# Patient Record
Sex: FEMALE | Race: WHITE | Employment: OTHER | URBAN - METROPOLITAN AREA
[De-identification: names, ages, dates, MRNs, and addresses within clinical notes are randomized per-mention and may not be internally consistent; named-entity substitution may affect disease eponyms.]

---

## 2017-01-03 ENCOUNTER — TELEPHONE (OUTPATIENT)
Dept: INTERNAL MEDICINE CLINIC | Age: 67
End: 2017-01-03

## 2017-01-03 ENCOUNTER — TELEPHONE (OUTPATIENT)
Dept: NEUROLOGY | Age: 67
End: 2017-01-03

## 2017-01-03 RX ORDER — PREGABALIN 75 MG/1
75 CAPSULE ORAL 2 TIMES DAILY
Qty: 60 CAP | Refills: 3
Start: 2017-01-03 | End: 2017-03-29 | Stop reason: SDUPTHER

## 2017-01-03 NOTE — TELEPHONE ENCOUNTER
Pt states she has an appt tomorrow, 1-4-16 but has a migraine. She would like to know if you could get her in any sooner than availability of 1-17-17?

## 2017-01-04 ENCOUNTER — DOCUMENTATION ONLY (OUTPATIENT)
Dept: INTERNAL MEDICINE CLINIC | Age: 67
End: 2017-01-04

## 2017-01-04 NOTE — PROGRESS NOTES
Medicare Part B Preventive Services Limitations Recommendation/Date completed if known Scheduled/ Next Due   Bone Mass Measurement  (age 72 & older, biennial) Requires diagnosis related to osteoporosis or estrogen deficiency. Biennial benefit unless patient has history of long-term glucocorticoid tx or baseline is needed because initial test was by other method       Recommended every 2 years    Cardiovascular Screening Blood Tests (every 5 years)  Total cholesterol, HDL, Triglycerides Order as a panel if possible Completed August 2016      Recommended annually Due August 2017   Colorectal Cancer Screening  -Fecal occult blood test (annual)  -Flexible sigmoidoscopy (5y)  -Screening colonoscopy (10y)  -Barium Enema  Completed June 2015 Dr. Deangelo Hull        Recommended in 2  years Due June 2017   Counseling to Prevent Tobacco Use (up to 8 sessions per year)  - Counseling greater than 3 and up to 10 minutes  - Counseling greater than 10 minutes Patients must be asymptomatic of tobacco-related conditions to receive as preventive service n/a n/a   Diabetes Screening Tests (at least every 3 years, Medicare covers annually or at 6-month intervals for prediabetic patients)    Fasting blood sugar (FBS) or glucose tolerance test (GTT)       Patient must be diagnosed with one of the following:  -Hypertension, Dyslipidemia, obesity, previous impaired FBS or GTT  Or any two of the following: overweight, FH of diabetes, age ? 72, history of gestational diabetes, birth of baby weighing more than 9 pounds   Completed A1c August 2016 - 5.8        Recommended every 3-6 months for prediabetes/diabetes Due now-Feb 2017   Diabetes Self-Management Training (DSMT) (no USPSTF recommendation) Requires referral by treating physician for patient with diabetes or renal disease. 10 hours of initial DSMT session of no less than 30 minutes each in a continuous 12-month period. 2 hours of follow-up DSMT in subsequent years.  n/a n/a   Glaucoma Screening (no USPSTF recommendation) Diabetes mellitus, family history, , age 48 or over,  American, age 72 or over         Recommended annually    Human Immunodeficiency Virus (HIV) Screening (annually for increased risk patients)  HIV-1 and HIV-2 by EIA, LAURA, rapid antibody test, or oral mucosa transudate Patient must be at increased risk for HIV infection per USPSTF guidelines or pregnant. Tests covered annually for patients at increased risk. Pregnant patients may receive up to 3 test during pregnancy. Medical Nutrition Therapy (MNT) (for diabetes or renal disease not recommended schedule) Requires referral by treating physician for patient with diabetes or renal disease. Can be provided in same year as diabetes self-management training (DSMT), and CMS recommends medical nutrition therapy take place after DSMT. Up to 3 hours for initial year and 2 hours in subsequent years. n/a n/a   Prostate Cancer Screening (annually up to age 76)  - Digital rectal exam (SUSANNA)  - Prostate specific antigen (PSA) Annually (age 48 or over), SUSANNA not paid separately when covered E/M service is provided on same date n/a        Recommended annually n/a   Seasonal Influenza Vaccination (annually)         Recommended annually    DUE every Fall   Pneumococcal Vaccination (once after 72)  Completed 2012    Recommended once over age 72 You are due for a Prevnar 13 vaccine. You can get this at your local pharmacy   Shingles Vaccine is also recommended once after age 61      Hepatitis B Vaccinations (if medium/high risk) Medium/high risk factors:  End-stage renal disease,  Hemophiliacs who received Factor VIII or IX concentrates, Clients of institutions for the mentally retarded, Persons who live in the same house as a HepB virus carrier, Homosexual men, Illicit injectable drug abusers. Screening Mammography (biennial age 54-69)?  Annually (age 36 or over) Completed 2009    Recommended annually Due now Screening Pap Tests and Pelvic Examination (up to age 79 and after 79 if unknown history or abnormal study last 10 years) Every 24 months except high risk   Dr. Florin Shanks? Recommended every 2 years    Ultrasound Screening for Abdominal Aortic Aneurysm (AAA) (once) Patient must be referred through [facility] and not have had a screening for abdominal aortic aneurysm before under Medicare.   Limited to patients who meet one of the following criteria:  - Men who are 73-68 years old and have smoked more than 100 cigarettes in their lifetime.  -Anyone with a FH of AAA  -Anyone recommended for screening by USPSTF n/a n/a

## 2017-01-04 NOTE — TELEPHONE ENCOUNTER
MD Kathi Deng        Caller: Unspecified (Yesterday, 12:10 PM)                     Phoned it in to [facility] at   [address]

## 2017-01-05 ENCOUNTER — OFFICE VISIT (OUTPATIENT)
Dept: INTERNAL MEDICINE CLINIC | Age: 67
End: 2017-01-05

## 2017-01-05 VITALS
WEIGHT: 229 LBS | OXYGEN SATURATION: 94 % | TEMPERATURE: 97.8 F | HEART RATE: 81 BPM | DIASTOLIC BLOOD PRESSURE: 74 MMHG | BODY MASS INDEX: 36.8 KG/M2 | SYSTOLIC BLOOD PRESSURE: 121 MMHG | HEIGHT: 66 IN | RESPIRATION RATE: 18 BRPM

## 2017-01-05 DIAGNOSIS — G43.809 OTHER MIGRAINE WITHOUT STATUS MIGRAINOSUS, NOT INTRACTABLE: ICD-10-CM

## 2017-01-05 DIAGNOSIS — Z13.820 SCREENING FOR OSTEOPOROSIS: ICD-10-CM

## 2017-01-05 DIAGNOSIS — Z00.00 ROUTINE GENERAL MEDICAL EXAMINATION AT A HEALTH CARE FACILITY: ICD-10-CM

## 2017-01-05 DIAGNOSIS — M54.50 BILATERAL LOW BACK PAIN WITHOUT SCIATICA, UNSPECIFIED CHRONICITY: Primary | ICD-10-CM

## 2017-01-05 DIAGNOSIS — Z13.39 SCREENING FOR ALCOHOLISM: ICD-10-CM

## 2017-01-05 DIAGNOSIS — Z78.0 ASYMPTOMATIC MENOPAUSAL STATE: ICD-10-CM

## 2017-01-05 DIAGNOSIS — Z13.31 SCREENING FOR DEPRESSION: ICD-10-CM

## 2017-01-05 DIAGNOSIS — Z23 ENCOUNTER FOR IMMUNIZATION: ICD-10-CM

## 2017-01-05 RX ORDER — BUTALBITAL, ASPIRIN AND CAFFEINE 50; 325; 40 MG/1; MG/1; MG/1
1 TABLET ORAL
Qty: 30 TAB | Refills: 6 | Status: SHIPPED | OUTPATIENT
Start: 2017-01-05 | End: 2017-10-19

## 2017-01-05 RX ORDER — CYCLOBENZAPRINE HCL 10 MG
10 TABLET ORAL
Qty: 30 TAB | Refills: 1 | Status: SHIPPED | OUTPATIENT
Start: 2017-01-05 | End: 2017-09-21

## 2017-01-05 NOTE — PROGRESS NOTES
1. Have you been to the ER, urgent care clinic since your last visit? Hospitalized since your last visit? NO    2. Have you seen or consulted any other health care providers outside of the [address] [place] [address] since your last visit? Include any pap smears or colon screening.  Dr Maria Elena Fuentes

## 2017-01-05 NOTE — PROGRESS NOTES
NNAWV    This is a Subsequent Medicare Annual Wellness Visit providing Personalized Prevention Plan Services (PPPS) (Performed 12 months after initial AWV and PPPS )    I have reviewed the patient's medical history in detail and updated the computerized patient record. History     Past Medical History   Diagnosis Date    Arrhythmia      atrial fibrillation 2014    Arthritis     Bipolar affective disorder ([address] 3/31/2010    Colon polyps 3/31/2010    Diarrhea 7/19/2013    Epigastric pain 6/5/2015    GERD (gastroesophageal reflux disease)      occasiional only; controlled with med    Hyperlipidemia 3/31/2010    Hypothyroidism 3/31/2010    Migraine 3/31/2010    S/P ablation of atrial fibrillation 5/13/2014    S/P cardiac pacemaker procedure 9/16/2014 9/16/14 Medtronic MRI compatible dual chamber pacemaker implant    Thyroid disease     Tick-borne disease     Unspecified sleep apnea      \"slight\" uses mouth device    Urge incontinence 3/31/2010    Vitamin D deficiency 3/31/2015      Past Surgical History   Procedure Laterality Date    Hx bunionectomy      Hx hysterectomy      Hx tonsillectomy      Pr cardiac surg procedure unlist  5/2014     ablation    Hx pacemaker  2014    Hx orthopaedic       removed bone spurs from R [employer] hand    Hx orthopaedic  1990s? left bunionectomy     Hx other surgical  07/2014     2 shocks to heart & ablations     Current Outpatient Prescriptions   Medication Sig Dispense Refill    cyclobenzaprine (FLEXERIL) 10 mg tablet Take 1 Tab by mouth three (3) times daily as needed for Muscle Spasm(s). 30 Tab 1    butalbital-aspirin-caffeine (FIORINAL) -40 mg tablet Take 1 Tab by mouth every four (4) hours as needed for Pain. Max Daily Amount: 6 Tabs. 30 Tab 6    pregabalin (LYRICA) 75 mg capsule Take 1 Cap by mouth two (2) times a day. Max Daily Amount: 150 mg.  Indications: FIBROMYALGIA 60 Cap 3    HYDROcodone-acetaminophen (NORCO)  mg tablet Take one by mouth twice a day for five days then 1/2 twice a day for 5 days then half daily for 4 days then quit 17 Tab 0    rizatriptan (MAXALT) 10 mg tablet Take the one on the onset of migraine may take a second in 40 minutes if the migraine does not begin to subside 12 Tab 3    PRADAXA 150 mg capsule TAKE ONE CAPSULE TWICE A DAY 60 Cap 1    furosemide (LASIX) 40 mg tablet take 1 tablet by mouth once daily 30 Tab 6    ergocalciferol (ERGOCALCIFEROL) 50,000 unit capsule Take 1 Cap by mouth every seven (7) days. 4 Cap 5    levothyroxine (SYNTHROID) 50 mcg tablet take 1 tablet by mouth once daily 90 Tab 3    zolpidem (AMBIEN) 5 mg tablet Take  by mouth nightly as needed for Sleep.  oxybutynin chloride XL (DITROPAN XL) 10 mg CR tablet Take 10 mg by mouth daily.  esomeprazole (NEXIUM) 40 mg capsule Take 40 mg by mouth daily.  DULoxetine (CYMBALTA) 30 mg capsule Take 60 mg by mouth daily.  diphenoxylate-atropine (LOMOTIL) 2.5-0.025 mg per tablet Take  by mouth four (4) times daily as needed for Diarrhea.  topiramate (TOPAMAX) 100 mg tablet Take 100 mg by mouth nightly.  lithium 300 mg tablet Take 400 mg by mouth nightly.  alprazolam (XANAX) 0.5 mg tablet Take 0.5 mg by mouth two (2) times a day.        Allergies   Allergen Reactions    Latex Swelling    Sulfa (Sulfonamide Antibiotics) Hives    Xarelto [Rivaroxaban] Other (comments)     GI problems     Family History   Problem Relation Age of Onset    Arrhythmia Mother      afib    Stroke Mother     Heart Failure Mother     Colon Cancer Father     Heart Disease Father     Arrhythmia Brother      afib    Asthma Brother     COPD Brother      Social History   Substance Use Topics    Smoking status: Never Smoker    Smokeless tobacco: Never Used    Alcohol use No     Patient Active Problem List   Diagnosis Code    Colon polyps K63.5    Urge incontinence N39.41    Migraine G43.909    Bipolar affective disorder ([facility] [address] F31.9  Hypothyroidism E03.9    Hyperglycemia R73.9    Hyperlipidemia E78.5    GERD (gastroesophageal reflux disease) K21.9    Diarrhea R19.7    Benign essential hypertension I10    Morbid obesity with BMI of 40.0-44.9, adult ([facility]) E66.01, Z68.41    Atrial fibrillation ([facility]) I48.91    S/P ablation of atrial fibrillation Z98.890, Z86.79    SSS (sick sinus syndrome) ([facility]) I49.5    S/P cardiac pacemaker procedure Z95.0    Esophageal spasm K22.4    A-fib ([facility]) I48.91    Vitamin D deficiency E55.9    Epigastric pain R10.13       Depression Risk Factor Screening:     PHQ 2 / 9, over the last two weeks 1/5/2017   Little interest or pleasure in doing things Several days   Feeling down, depressed or hopeless Several days   Total Score PHQ 2 2     Alcohol Risk Factor Screening: On any occasion during the past 3 months, have you had more than 3 drinks containing alcohol? Yes    Do you average more than 7 drinks per week? No      Functional Ability and Level of Safety:     Hearing Loss   normal-to-mild    Activities of Daily Living   Self-care. Requires assistance with: no ADLs    Fall Risk     Fall Risk Assessment, last 12 mths 1/5/2017   Able to walk? Yes   Fall in past 12 months? Yes   Fall with injury? Yes   Number of falls in past 12 months 1   Fall Risk Score 2     Abuse Screen   Patient is not abused    Review of Systems   Not required    Physical Examination     Evaluation of Cognitive Function:  Mood/affect:  happy  Appearance: age appropriate and casually dressed  Family member/caregiver input: no family member present    No exam performed today, [address] [place] [address].     Patient Care Team:  Esther Hernandez MD as PCP - General (Internal Medicine)  Enedelia Alan, RN as Ambulatory Care Navigator  Nick Mondragon MD as Referring Provider (Cardiology)  Demetrio Steiner RN as Nurse Gumaro Velasquez MD (Obstetrics & Gynecology)  Vinnie Carty MD (Neurology)  Elvi Moore MD (Podiatry)    Advice/Referrals/Counseling   Education and counseling provided:  Are appropriate based on today's review and evaluation  End-of-Life planning (with patient's consent)  Screening Mammography  Screening Pap and pelvic (covered once every 2 years)  Bone mass measurement (DEXA)  Screening for glaucoma    Assessment/Plan   1. Exercise level - walks regularly    2. Pt lives with her  who is her main support network. Medication reconciliation completed by MA and reviewed by provider. Family history and Care Team reviewed and updated. Patient provided with a copy of After Visit Summary and Preventative Screening table. Patient verbalized understanding of all information discussed. Nica Knox

## 2017-01-05 NOTE — PROGRESS NOTES
I have reviewed the information regarding the Medicare Annual Well Visit as documented by my nurse navigator; Agree with assessment.

## 2017-01-05 NOTE — PATIENT INSTRUCTIONS
1/5/2017  Medicare Part B Preventive Services Limitations Recommendation/Date completed if known Scheduled/ Next Due   Bone Mass Measurement  (age 72 & older, biennial) Requires diagnosis related to osteoporosis or estrogen deficiency. Biennial benefit unless patient has history of long-term glucocorticoid tx or baseline is needed because initial test was by other method          Recommended every 2 years Please schedule, ordered today   Cardiovascular Screening Blood Tests (every 5 years)  Total cholesterol, HDL, Triglycerides Order as a panel if possible Completed August 2016        Recommended annually Due August 2017   Colorectal Cancer Screening  -Fecal occult blood test (annual)  -Flexible sigmoidoscopy (5y)  -Screening colonoscopy (10y)  -Barium Enema   Completed June 2015   Dr. Jennings Cancer           Recommended in 2  years Due June 2017   Counseling to Prevent Tobacco Use (up to 8 sessions per year)  - Counseling greater than 3 and up to 10 minutes  - Counseling greater than 10 minutes Patients must be asymptomatic of tobacco-related conditions to receive as preventive service n/a n/a   Diabetes Screening Tests (at least every 3 years, Medicare covers annually or at 6-month intervals for prediabetic patients)     Fasting blood sugar (FBS) or glucose tolerance test (GTT)       Patient must be diagnosed with one of the following:  -Hypertension, Dyslipidemia, obesity, previous impaired FBS or GTT  Or any two of the following: overweight, FH of diabetes, age ? 72, history of gestational diabetes, birth of baby weighing more than 9 pounds Completed A1c August 2016 - 5.8           Recommended every 3-6 months for prediabetes/diabetes Due now-Feb 2017   Diabetes Self-Management Training (DSMT) (no USPSTF recommendation) Requires referral by treating physician for patient with diabetes or renal disease. 10 hours of initial DSMT session of no less than 30 minutes each in a continuous 12-month period.  2 hours of follow-up DSMT in subsequent years. n/a n/a   Glaucoma Screening (no USPSTF recommendation) Diabetes mellitus, family history, , age 48 or over,  American, age 72 or over             Recommended annually Scheduled at [facility]   Human Immunodeficiency Virus (HIV) Screening (annually for increased risk patients)  HIV-1 and HIV-2 by EIA, LAURA, rapid antibody test, or oral mucosa transudate Patient must be at increased risk for HIV infection per USPSTF guidelines or pregnant. Tests covered annually for patients at increased risk. Pregnant patients may receive up to 3 test during pregnancy.  n/a n/a   Medical Nutrition Therapy (MNT) (for diabetes or renal disease not recommended schedule) Requires referral by treating physician for patient with diabetes or renal disease. Can be provided in same year as diabetes self-management training (DSMT), and CMS recommends medical nutrition therapy take place after DSMT. Up to 3 hours for initial year and 2 hours in subsequent years. n/a n/a   Prostate Cancer Screening (annually up to age 76)  - Digital rectal exam (SUSANNA)  - Prostate specific antigen (PSA) Annually (age 48 or over), SUSANNA not paid separately when covered E/M service is provided on same date n/a           Recommended annually n/a   Seasonal Influenza Vaccination (annually)   Will get during today's visit       Recommended annually     DUE next Fall   Pneumococcal Vaccination (once after 65)   Completed 2012     Recommended once over age 72 You are due for a Prevnar 13 vaccine.  You can get this at your local pharmacy   Shingles Vaccine is also recommended once after age 61         Hepatitis B Vaccinations (if medium/high risk) Medium/high risk factors: End-stage renal disease,  Hemophiliacs who received Factor VIII or IX concentrates, Clients of institutions for the mentally retarded, Persons who live in the same house as a HepB virus carrier, Homosexual men, Illicit injectable drug abusers.  n/a n/a   Screening Mammography (biennial age 54-69)? Annually (age 36 or over) Completed 2009     Recommended annually Due now, please schedule   Screening Pap Tests and Pelvic Examination (up to age 79 and after 79 if unknown history or abnormal study last 10 years) Every 25 months except high risk    Dr. Christa Cueva        Recommended every 2 years Due now, please schedule   Ultrasound Screening for Abdominal Aortic Aneurysm (AAA) (once) Patient must be referred through [facility] and not have had a screening for abdominal aortic aneurysm before under Medicare.  Limited to patients who meet one of the following criteria:  - Men who are 73-68 years old and have smoked more than 100 cigarettes in their lifetime.  -Anyone with a FH of AAA  -Anyone recommended for screening by USPSTF n/a n/a

## 2017-01-05 NOTE — MR AVS SNAPSHOT
Visit Information Date & Time Provider Department Dept. Phone Encounter #  
 1/5/2017 11:15 AM Gennaro Nicholson, [address] [id number] Follow-up Instructions Return if symptoms worsen or fail to improve. Your Appointments 3/1/2017  1:30 PM  
ROUTINE CARE with Gennaro Nicholson MD  
[facility] [address]) Appt Note: 6 mo f/u  
 [facility] [address] [address]  
[phone]  
  
   
 [facility] [address] [facility] [address] [address]  
  
    
 3/21/2017 11:00 AM  
PACEMAKER with PACEMAKER, [facility] [address]) Appt Note: MDT DCPM 6mo check [address]  
[phone] [address]  
  
    
 4/25/2017  3:40 PM  
Follow Up with Fidel Camacho MD  
[facility] at [facility] [address]) Appt Note: follow up headaches $ 0 CP jll 12/20/16  
 [address] [place] [address], [address] [address]  
[address] [facility], [address] [place] [address], [address] [address] Upcoming Health Maintenance Date Due DTaP/Tdap/Td series (1 - Tdap) 1/8/1970 ZOSTER VACCINE AGE 60> 1/8/2009 GLAUCOMA SCREENING Q2Y 1/8/2014 OSTEOPOROSIS SCREENING (DEXA) 1/8/2014 MEDICARE YEARLY EXAM 1/8/2014 BREAST CANCER SCRN MAMMOGRAM 6/1/2015 INFLUENZA AGE 9 TO ADULT 8/1/2016 Pneumococcal 65+ Low/Medium Risk (2 of 2 - PPSV23) 10/16/2017 COLONOSCOPY 9/1/2019 Allergies as of 1/5/2017  Review Complete On: 1/5/2017 By: Gennaro Nicholson MD  
  
 Severity Noted Reaction Type Reactions Latex  03/19/2014    Swelling Sulfa (Sulfonamide Antibiotics)  03/23/2010    Hives Xarelto [Rivaroxaban]  10/20/2014    Other (comments) GI problems Current Immunizations  Reviewed on 6/9/2014 Name Date Influenza Vaccine 10/25/2015, 10/1/2014 Influenza Vaccine PF 11/15/2013 Influenza Vaccine Split 10/16/2012 Pneumococcal Vaccine (Unspecified Type) 10/16/2012 Not reviewed this visit You Were Diagnosed With   
  
 Codes Comments Bilateral low back pain without sciatica, unspecified chronicity    -  Primary ICD-10-CM: M54.5 ICD-9-CM: 724.2 Other migraine without status migrainosus, not intractable     ICD-10-CM: G43. [address] Vitals BP Pulse Temp Resp Height(growth percentile) Weight(growth percentile) 121/74 (BP 1 Location: Left arm, BP Patient Position: Sitting) 81 97.8 °F (36.6 °C) (Oral) 18 5' 6\" (1.676 m) 229 lb (103.9 kg) SpO2 BMI OB Status Smoking Status 94% 36.96 kg/m2 Hysterectomy Never Smoker Vitals History BMI and BSA Data Body Mass Index Body Surface Area  
 36.96 kg/m 2 2.2 m 2 Preferred Pharmacy Pharmacy Name Phone [facility]Y [id number], [place] - [address] [phone] Your Updated Medication List  
  
   
This list is accurate as of: 1/5/17 11:33 AM.  Always use your most recent med list.  
  
  
  
  
 AMBIEN 5 mg tablet Generic drug:  zolpidem Take  by mouth nightly as needed for Sleep. butalbital-aspirin-caffeine -40 mg tablet Commonly known as:  Luis Cam Take 1 Tab by mouth every four (4) hours as needed for Pain. Max Daily Amount: 6 Tabs. cyclobenzaprine 10 mg tablet Commonly known as:  FLEXERIL Take 1 Tab by mouth three (3) times daily as needed for Muscle Spasm(s). CYMBALTA 30 mg capsule Generic drug:  DULoxetine Take 60 mg by mouth daily. ergocalciferol 50,000 unit capsule Commonly known as:  ERGOCALCIFEROL Take 1 Cap by mouth every seven (7) days. furosemide 40 mg tablet Commonly known as:  LASIX  
take 1 tablet by mouth once daily HYDROcodone-acetaminophen  mg tablet Commonly known as:  Lori Villavicencio  
 Take one by mouth twice a day for five days then 1/2 twice a day for 5 days then half daily for 4 days then quit  
  
 levothyroxine 50 mcg tablet Commonly known as:  SYNTHROID  
take 1 tablet by mouth once daily  
  
 lithium carbonate 300 mg tablet Take 400 mg by mouth nightly. LOMOTIL 2.5-0.025 mg per tablet Generic drug:  diphenoxylate-atropine Take  by mouth four (4) times daily as needed for Diarrhea. NexIUM 40 mg capsule Generic drug:  esomeprazole Take 40 mg by mouth daily. oxybutynin chloride XL 10 mg CR tablet Commonly known as:  DITROPAN XL Take 10 mg by mouth daily. PRADAXA 150 mg capsule Generic drug:  dabigatran etexilate TAKE ONE CAPSULE TWICE A DAY  
  
 pregabalin 75 mg capsule Commonly known as:  Cindy Citizen Take 1 Cap by mouth two (2) times a day. Max Daily Amount: 150 mg. Indications: FIBROMYALGIA  
  
 rizatriptan 10 mg tablet Commonly known as:  Nguyen Aj Take the one on the onset of migraine may take a second in 40 minutes if the migraine does not begin to subside TOPAMAX 100 mg tablet Generic drug:  topiramate Take 100 mg by mouth nightly. XANAX 0.5 mg tablet Generic drug:  ALPRAZolam  
Take 0.5 mg by mouth two (2) times a day. Prescriptions Printed Refills  
 butalbital-aspirin-caffeine (FIORINAL) -40 mg tablet 6 Sig: Take 1 Tab by mouth every four (4) hours as needed for Pain. Max Daily Amount: 6 Tabs. Class: Print Route: Oral  
  
Prescriptions Sent to Pharmacy Refills  
 cyclobenzaprine (FLEXERIL) 10 mg tablet 1 Sig: Take 1 Tab by mouth three (3) times daily as needed for Muscle Spasm(s). Class: Normal  
 Pharmacy: [facility]/pharmacy [address] [place] [address] [address]  #: [phone] Route: Oral  
  
Follow-up Instructions Return if symptoms worsen or fail to improve. Referral Information Referral ID Referred By Referred To  
  
 [id number] Nasrin Dockjocelyn Not Available Visits Status Start Date End Date 1 New Request 1/5/17 1/5/18 If your referral has a status of pending review or denied, additional information will be sent to support the outcome of this decision. Referral ID Referred By Referred To  
 [id number] Nasrin Floyd Not Available Visits Status Start Date End Date 1 New Request 1/5/17 1/5/18 If your referral has a status of pending review or denied, additional information will be sent to support the outcome of this decision. Introducing [facility]! Dear Reyna Mitchell: Thank you for requesting a [employer] account. Our records indicate that you already have an active [employer] account. You can access your account anytime at [email or web address] Did you know that you can access your hospital and ER discharge instructions at any time in [employer]? You can also review all of your test results from your hospital stay or ER visit. Additional Information If you have questions, please visit the Frequently Asked Questions section of the [employer] website at [email or web address] [email or web address] Remember, [employer] is NOT to be used for urgent needs. For medical emergencies, dial 911. Now available from your iPhone and Android! Please provide this summary of care documentation to your next provider. Your primary care clinician is listed as [place]. If you have any questions after today's visit, please call [phone].
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
## 2017-01-13 ENCOUNTER — TELEPHONE (OUTPATIENT)
Dept: CARDIOLOGY CLINIC | Age: 67
End: 2017-01-13

## 2017-01-13 NOTE — TELEPHONE ENCOUNTER
Interaction check from pharmacist phone call received. Lithium and Tikosyn (taking but  on med list) interaction due to QT prolongation. Pt has been on both of these medications for awhile and has a dual chamber pacemaker.

## 2017-01-13 NOTE — TELEPHONE ENCOUNTER
We check ekgs frequently and she has not been having issues with QT prolongation. She has not shown any issues on her device checks as well.

## 2017-01-18 ENCOUNTER — TELEPHONE (OUTPATIENT)
Dept: INTERNAL MEDICINE CLINIC | Age: 67
End: 2017-01-18

## 2017-01-18 RX ORDER — HYDROCODONE BITARTRATE AND ACETAMINOPHEN 5; 325 MG/1; MG/1
1 TABLET ORAL
Qty: 30 TAB | Refills: 0 | Status: SHIPPED | OUTPATIENT
Start: 2017-01-18 | End: 2017-02-24 | Stop reason: SDUPTHER

## 2017-01-18 NOTE — TELEPHONE ENCOUNTER
Spoke with patient after verifying name and  regarding medication refill. Patient stated that she received Hydrocodone at a previous appointment and Dr. Ynes Flor gave her 10 pills and told her to call the office when she ran out. Patient stated the medication that Dr. Ynes Flor gave her didn't work. Patient stated she takes Rizatriptan and it works for an hour and comes back full blown. Patient would like a prescription for Hydrocodone. Will discuss with Dr. Ynes Flor. Patient given an opportunity to ask questions, repeated information, and verbalized understanding.

## 2017-01-18 NOTE — TELEPHONE ENCOUNTER
Pt called and states that she is needing a call back in regards to her medication (no details). Please call pt.

## 2017-01-18 NOTE — TELEPHONE ENCOUNTER
Patient called on number on file. Message left to return call to office. Writer needs to know medication the patient is inquiring about.

## 2017-01-19 RX ORDER — DABIGATRAN ETEXILATE MESYLATE 150 MG/1
CAPSULE ORAL
Qty: 60 CAP | Refills: 1 | Status: SHIPPED | OUTPATIENT
Start: 2017-01-19 | End: 2017-03-20 | Stop reason: SDUPTHER

## 2017-02-17 ENCOUNTER — DOCUMENTATION ONLY (OUTPATIENT)
Dept: NEUROLOGY | Age: 67
End: 2017-02-17

## 2017-02-21 ENCOUNTER — TELEPHONE (OUTPATIENT)
Dept: NEUROLOGY | Age: 67
End: 2017-02-21

## 2017-02-21 NOTE — TELEPHONE ENCOUNTER
This is the one where you say take on cap by mouth 3xs a day for 2 weeks, then decrease. ...  Can you clarify how she is decreasing the medication

## 2017-02-23 ENCOUNTER — TELEPHONE (OUTPATIENT)
Dept: NEUROLOGY | Age: 67
End: 2017-02-23

## 2017-02-23 NOTE — TELEPHONE ENCOUNTER
Patient called states that [facility] pharmacy has not recv the lyrica request, pt would like to have the nurse call back in and ask for Kirsty Monreal and he will fill it right away

## 2017-02-23 NOTE — TELEPHONE ENCOUNTER
Patient would like the refill for her lyrica, would like a call to know the status of the request and if it needs to be picked up her  can do it tomorrow

## 2017-02-24 RX ORDER — HYDROCODONE BITARTRATE AND ACETAMINOPHEN 5; 325 MG/1; MG/1
1 TABLET ORAL
Qty: 30 TAB | Refills: 0 | Status: SHIPPED | OUTPATIENT
Start: 2017-02-24 | End: 2017-04-05 | Stop reason: SDUPTHER

## 2017-02-24 NOTE — TELEPHONE ENCOUNTER
Patient has contacted her [facility] pharmacy and their phone lines are now working, please call in the prescription for the lyrica for her

## 2017-02-24 NOTE — TELEPHONE ENCOUNTER
Called the pt and advised that the script for Lyrica has been faxed to the [address] [place] [address]

## 2017-03-21 ENCOUNTER — TELEPHONE (OUTPATIENT)
Dept: CARDIOLOGY CLINIC | Age: 67
End: 2017-03-21

## 2017-03-21 DIAGNOSIS — E55.9 VITAMIN D DEFICIENCY: ICD-10-CM

## 2017-03-21 RX ORDER — DABIGATRAN ETEXILATE MESYLATE 150 MG/1
CAPSULE ORAL
Qty: 60 CAP | Refills: 1 | Status: SHIPPED | OUTPATIENT
Start: 2017-03-21 | End: 2017-05-16 | Stop reason: SDUPTHER

## 2017-03-21 RX ORDER — ERGOCALCIFEROL 1.25 MG/1
50000 CAPSULE ORAL
Qty: 4 CAP | Refills: 5 | Status: SHIPPED | OUTPATIENT
Start: 2017-03-21 | End: 2017-09-09 | Stop reason: SDUPTHER

## 2017-03-21 NOTE — TELEPHONE ENCOUNTER
Patient is scheduled for a pacemaker check today but she has cancelled due to a migraine. Patient is requesting a call from you.     Thanks

## 2017-03-29 ENCOUNTER — TELEPHONE (OUTPATIENT)
Dept: NEUROLOGY | Age: 67
End: 2017-03-29

## 2017-03-29 PROBLEM — M79.7 FIBROMYALGIA: Status: ACTIVE | Noted: 2017-03-29

## 2017-03-29 RX ORDER — PREGABALIN 75 MG/1
75 CAPSULE ORAL 2 TIMES DAILY
Qty: 60 CAP | Refills: 3 | Status: SHIPPED | OUTPATIENT
Start: 2017-03-29 | End: 2017-04-03 | Stop reason: SDUPTHER

## 2017-03-29 NOTE — TELEPHONE ENCOUNTER
Future Appointments  Date Time Provider Radha Yoon   4/3/2017 3:30 PM Cierra Garcia MD [address]   4/4/2017 11:45 AM PACEMAKER, Adelaida ZHANG [facility]   4/25/2017 3:40 PM Pérez Woodall MD [address]                         Last Appointment My Department:  12/20/2016    Can you please send in refill    Requested Prescriptions     Pending Prescriptions Disp Refills    pregabalin (LYRICA) 75 mg capsule 60 Cap 3     Sig: Take 1 Cap by mouth two (2) times a day. Max Daily Amount: 150 mg.  Indications: FIBROMYALGIA

## 2017-03-30 ENCOUNTER — TELEPHONE (OUTPATIENT)
Dept: NEUROLOGY | Age: 67
End: 2017-03-30

## 2017-03-30 NOTE — TELEPHONE ENCOUNTER
Called the patient and advised of the information provided from our PA person in office, advised the tier reduction was set-up today

## 2017-03-30 NOTE — TELEPHONE ENCOUNTER
Called [facility] - Lyrica - did not require a prior auth. It was 'dismissed' in their system from the PA request I did yesterday because it did not need one. Seems only needed refill which was written yesterday. Cost is $159.00. Set up a tier reduction request today. Will advise nurse of status.

## 2017-03-30 NOTE — TELEPHONE ENCOUNTER
----- Message from  [employer] sent at 3/30/2017  8:30 AM EDT -----  Regarding: /Telephone  Pt requesting update on prescription refill. She stated [facility] pharmacy has faxed request to the office two days ago. Patient is completely out of medication, has not had it for 2 days. Best contact [phone].

## 2017-04-03 ENCOUNTER — TELEPHONE (OUTPATIENT)
Dept: NEUROLOGY | Age: 67
End: 2017-04-03

## 2017-04-03 RX ORDER — PREGABALIN 75 MG/1
75 CAPSULE ORAL 2 TIMES DAILY
Qty: 28 CAP | Refills: 0 | Status: SHIPPED | COMMUNITY
Start: 2017-04-03 | End: 2017-04-05 | Stop reason: SDUPTHER

## 2017-04-04 ENCOUNTER — CLINICAL SUPPORT (OUTPATIENT)
Dept: CARDIOLOGY CLINIC | Age: 67
End: 2017-04-04

## 2017-04-04 DIAGNOSIS — I48.91 ATRIAL FIBRILLATION, UNSPECIFIED TYPE (HCC): ICD-10-CM

## 2017-04-04 DIAGNOSIS — Z95.0 S/P CARDIAC PACEMAKER PROCEDURE: Primary | ICD-10-CM

## 2017-04-04 DIAGNOSIS — I49.5 SSS (SICK SINUS SYNDROME) (HCC): ICD-10-CM

## 2017-04-04 RX ORDER — DOFETILIDE 0.12 MG/1
125 CAPSULE ORAL 2 TIMES DAILY
Qty: 60 CAP | Refills: 1 | Status: SHIPPED | OUTPATIENT
Start: 2017-04-04 | End: 2017-09-21

## 2017-04-04 RX ORDER — DOFETILIDE 0.25 MG/1
CAPSULE ORAL
COMMUNITY
Start: 2017-02-24 | End: 2017-04-04 | Stop reason: SDUPTHER

## 2017-04-05 ENCOUNTER — HOSPITAL ENCOUNTER (OUTPATIENT)
Dept: LAB | Age: 67
Discharge: HOME | End: 2017-04-05
Payer: MEDICARE

## 2017-04-05 ENCOUNTER — OFFICE VISIT (OUTPATIENT)
Dept: INTERNAL MEDICINE CLINIC | Age: 67
End: 2017-04-05

## 2017-04-05 VITALS
DIASTOLIC BLOOD PRESSURE: 59 MMHG | HEART RATE: 79 BPM | HEIGHT: 66 IN | BODY MASS INDEX: 37.67 KG/M2 | RESPIRATION RATE: 18 BRPM | SYSTOLIC BLOOD PRESSURE: 104 MMHG | WEIGHT: 234.4 LBS | TEMPERATURE: 97.7 F | OXYGEN SATURATION: 97 %

## 2017-04-05 DIAGNOSIS — E78.2 MIXED HYPERLIPIDEMIA: ICD-10-CM

## 2017-04-05 DIAGNOSIS — G43.019 INTRACTABLE MIGRAINE WITHOUT AURA AND WITHOUT STATUS MIGRAINOSUS: ICD-10-CM

## 2017-04-05 DIAGNOSIS — M25.511 RIGHT SHOULDER PAIN: ICD-10-CM

## 2017-04-05 DIAGNOSIS — R73.9 HYPERGLYCEMIA: ICD-10-CM

## 2017-04-05 DIAGNOSIS — E03.9 HYPOTHYROIDISM, UNSPECIFIED TYPE: Primary | ICD-10-CM

## 2017-04-05 PROCEDURE — 84443 ASSAY THYROID STIM HORMONE: CPT

## 2017-04-05 PROCEDURE — 82306 VITAMIN D 25 HYDROXY: CPT

## 2017-04-05 PROCEDURE — 36415 COLL VENOUS BLD VENIPUNCTURE: CPT

## 2017-04-05 PROCEDURE — 80061 LIPID PANEL: CPT

## 2017-04-05 PROCEDURE — 84439 ASSAY OF FREE THYROXINE: CPT

## 2017-04-05 PROCEDURE — 80053 COMPREHEN METABOLIC PANEL: CPT

## 2017-04-05 RX ORDER — HYDROCODONE BITARTRATE AND ACETAMINOPHEN 5; 325 MG/1; MG/1
1 TABLET ORAL
Qty: 30 TAB | Refills: 0 | Status: SHIPPED | OUTPATIENT
Start: 2017-04-05 | End: 2017-05-09 | Stop reason: SDUPTHER
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
## 2017-04-05 NOTE — PROGRESS NOTES
1. Have you been to the ER, urgent care clinic since your last visit? Hospitalized since your last visit?no    2. Have you seen or consulted any other health care providers outside of the [address] [place] [address] since your last visit? Include any pap smears or colon screening.  no

## 2017-04-05 NOTE — MR AVS SNAPSHOT
Visit Information Date & Time Provider Department Dept. Phone Encounter #  
 4/5/2017  8:45 AM Nanette Brown, [address] [place] [address],[address] [phone] [id number] Follow-up Instructions Return in about 6 months (around 10/5/2017) for Thyroid, etc.  
  
Your Appointments 4/25/2017  3:40 PM  
Follow Up with Melissa Romo MD  
Neurology Clinic at [facility] [address]) Appt Note: follow up headaches $ 0 CP jll 12/20/16  
 [address] [place] [address], 
[address] [place] [address], [address] [address]  
[address], [address] [facility], [address] [address]  
  
    
 5/4/2017 10:30 AM  
PACEMAKER with PACEMAKER, [facility] [address]) Appt Note: MDT DCPM 1mo rhythm check, on decreased tikosyn dose [id number] [facility]  
[phone] [address] [facility]  
  
    
 5/4/2017 10:30 AM  
1 MONTH with Gumaro Swenson MD  
[facility] [address]) Appt Note: MDT DCPM 1mo rhythm check, on decreased tikosyn dose [id number] [facility]  
[phone] [id number] [facility] Upcoming Health Maintenance Date Due DTaP/Tdap/Td series (1 - Tdap) 1/8/1970 ZOSTER VACCINE AGE 60> 1/8/2009 GLAUCOMA SCREENING Q2Y 1/8/2014 OSTEOPOROSIS SCREENING (DEXA) 1/8/2014 BREAST CANCER SCRN MAMMOGRAM 6/1/2015 Pneumococcal 65+ Low/Medium Risk (2 of 2 - PPSV23) 10/16/2017 MEDICARE YEARLY EXAM 1/6/2018 COLONOSCOPY 9/1/2019 Allergies as of 4/5/2017  Review Complete On: 4/5/2017 By: Nanette Brown MD  
  
 Severity Noted Reaction Type Reactions Latex  03/19/2014    Swelling Sulfa (Sulfonamide Antibiotics)  03/23/2010    Hives Xarelto [Rivaroxaban]  10/20/2014    Other (comments) GI problems Current Immunizations  Reviewed on 6/9/2014 Name Date Influenza Vaccine 10/25/2015, 10/1/2014 Influenza Vaccine (Quad) PF 1/5/2017 Influenza Vaccine PF 11/15/2013 Influenza Vaccine Split 10/16/2012 Pneumococcal Vaccine (Unspecified Type) 10/16/2012 Not reviewed this visit You Were Diagnosed With   
  
 Codes Comments Hypothyroidism, unspecified type    -  Primary ICD-10-CM: E03.9 ICD-9-CM: 244.9 Intractable migraine without aura and without status migrainosus     ICD-10-CM: G43.019 
ICD-9-CM: 346.11 Hyperglycemia     ICD-10-CM: R73.9 ICD-9-CM: 790.29 Mixed hyperlipidemia     ICD-10-CM: E78.2 ICD-9-CM: 272.2 Vitals BP Pulse Temp Resp Height(growth percentile) Weight(growth percentile) 104/59 (BP 1 Location: Left arm, BP Patient Position: Sitting) 79 97.7 °F (36.5 °C) (Oral) 18 5' 6\" (1.676 m) 234 lb 6.4 oz (106.3 kg) SpO2 BMI OB Status Smoking Status 97% 37.83 kg/m2 Hysterectomy Never Smoker Vitals History BMI and BSA Data Body Mass Index Body Surface Area  
 37.83 kg/m 2 2.22 m 2 Preferred Pharmacy Pharmacy Name Phone [facility]/PHARMACY #[id number]- [place], [place] - [address] [phone] Your Updated Medication List  
  
   
This list is accurate as of: 4/5/17  9:26 AM.  Always use your most recent med list.  
  
  
  
  
 AMBIEN 5 mg tablet Generic drug:  zolpidem Take  by mouth nightly as needed for Sleep. butalbital-aspirin-caffeine -40 mg tablet Commonly known as:  Jay Oddi Take 1 Tab by mouth every four (4) hours as needed for Pain. Max Daily Amount: 6 Tabs. cyclobenzaprine 10 mg tablet Commonly known as:  FLEXERIL Take 1 Tab by mouth three (3) times daily as needed for Muscle Spasm(s). CYMBALTA 30 mg capsule Generic drug:  DULoxetine Take 60 mg by mouth daily. dofetilide 125 mcg capsule Commonly known as:  Ezella Running Take 1 Cap by mouth two (2) times a day. ergocalciferol 50,000 unit capsule Commonly known as:  ERGOCALCIFEROL Take 1 Cap by mouth every seven (7) days. furosemide 40 mg tablet Commonly known as:  LASIX  
take 1 tablet by mouth once daily HYDROcodone-acetaminophen 5-325 mg per tablet Commonly known as:  Ceil Heap Take 1 Tab by mouth every eight (8) hours as needed for Pain. Max Daily Amount: 3 Tabs. levothyroxine 50 mcg tablet Commonly known as:  SYNTHROID  
take 1 tablet by mouth once daily  
  
 lithium carbonate 300 mg tablet Take 400 mg by mouth nightly. LOMOTIL 2.5-0.025 mg per tablet Generic drug:  diphenoxylate-atropine Take  by mouth four (4) times daily as needed for Diarrhea. NexIUM 40 mg capsule Generic drug:  esomeprazole Take 40 mg by mouth daily. oxybutynin chloride XL 10 mg CR tablet Commonly known as:  DITROPAN XL Take 10 mg by mouth daily. PRADAXA 150 mg capsule Generic drug:  dabigatran etexilate TAKE ONE CAPSULE TWICE A DAY  
  
 pregabalin 75 mg capsule Commonly known as:  Torin Jarrett Take 1 Cap by mouth two (2) times a day. Max Daily Amount: 150 mg. Indications: FIBROMYALGIA  
  
 rizatriptan 10 mg tablet Commonly known as:  Kip [place] Take the one on the onset of migraine may take a second in 40 minutes if the migraine does not begin to subside TOPAMAX 100 mg tablet Generic drug:  topiramate Take 100 mg by mouth nightly. XANAX 0.5 mg tablet Generic drug:  ALPRAZolam  
Take 0.5 mg by mouth two (2) times a day. Prescriptions Printed Refills HYDROcodone-acetaminophen (NORCO) 5-325 mg per tablet 0 Sig: Take 1 Tab by mouth every eight (8) hours as needed for Pain. Max Daily Amount: 3 Tabs. Class: Print Route: Oral  
  
Follow-up Instructions Return in about 6 months (around 10/5/2017) for Thyroid, etc.  
  
  
Introducing [facility] & HEALTH SERVICES! Dear Lisa Turner: Thank you for requesting a [employer] account. Our records indicate that you already have an active [employer] account. You can access your account anytime at [email or web address] (MemberRx). [employer] (Member[employer]) Did you know that you can access your hospital and ER discharge instructions at any time in [employer]? You can also review all of your test results from your hospital stay or ER visit. Additional Information If you have questions, please visit the Frequently Asked Questions section of the [employer] website at [email or web address] ([email or web address]). [employer] (Member[employer])/. Remember, [employer] is NOT to be used for urgent needs. For medical emergencies, dial 911. Now available from your iPhone and Android! Please provide this summary of care documentation to your next provider. Your primary care clinician is listed as [place]. If you have any questions after today's visit, please call [phone].

## 2017-04-05 NOTE — TELEPHONE ENCOUNTER
Requested Prescriptions     Pending Prescriptions Disp Refills    pregabalin (LYRICA) 75 mg capsule 30 Cap 5     Sig: Take 1 Cap by mouth two (2) times a day. Max Daily Amount: 150 mg.  Indications: FIBROMYALGIA     The tier has been done for the patient needed to have the medication current in the record to send in for the patient

## 2017-04-06 ENCOUNTER — TELEPHONE (OUTPATIENT)
Dept: INTERNAL MEDICINE CLINIC | Age: 67
End: 2017-04-06

## 2017-04-06 RX ORDER — PREGABALIN 75 MG/1
75 CAPSULE ORAL 2 TIMES DAILY
Qty: 30 CAP | Refills: 5 | Status: SHIPPED | OUTPATIENT
Start: 2017-04-06 | End: 2017-07-25 | Stop reason: SDUPTHER

## 2017-05-04 ENCOUNTER — CLINICAL SUPPORT (OUTPATIENT)
Dept: CARDIOLOGY CLINIC | Age: 67
End: 2017-05-04

## 2017-05-04 ENCOUNTER — OFFICE VISIT (OUTPATIENT)
Dept: CARDIOLOGY CLINIC | Age: 67
End: 2017-05-04

## 2017-05-04 VITALS
WEIGHT: 237 LBS | SYSTOLIC BLOOD PRESSURE: 110 MMHG | RESPIRATION RATE: 18 BRPM | OXYGEN SATURATION: 97 % | HEIGHT: 66 IN | BODY MASS INDEX: 38.09 KG/M2 | HEART RATE: 76 BPM | DIASTOLIC BLOOD PRESSURE: 70 MMHG

## 2017-05-04 DIAGNOSIS — I48.20 CHRONIC ATRIAL FIBRILLATION (HCC): ICD-10-CM

## 2017-05-04 DIAGNOSIS — I10 BENIGN ESSENTIAL HYPERTENSION: Primary | ICD-10-CM

## 2017-05-04 DIAGNOSIS — Z95.0 S/P CARDIAC PACEMAKER PROCEDURE: Primary | ICD-10-CM

## 2017-05-04 DIAGNOSIS — I48.91 ATRIAL FIBRILLATION, UNSPECIFIED TYPE (HCC): ICD-10-CM

## 2017-05-04 NOTE — MR AVS SNAPSHOT
Visit Information Date & Time Provider Department Dept. Phone Encounter #  
 5/4/2017 10:30 AM Maurilio Chen Postin, [address] [facility] [phone] [id number] Your Appointments 5/24/2017  3:30 PM  
Follow Up with Jaleesa Bazzi NP Neurology Clinic at [facility] [address]) Appt Note: follow up headaches $ 0 CP jll 12/20/16; follow up headaches $ 0 CP jll 12/20/16  
 [address] [facility], 
[address], [address] [phone]  
[address], [address], [address] [address]  
  
    
 10/6/2017  1:30 PM  
ROUTINE CARE with Sandro Aguilar MD  
[facility] [address]) Appt Note: 6 month follow up  
 [address] [address]  
[address] [facility]  [address] [place] [address] Upcoming Health Maintenance Date Due DTaP/Tdap/Td series (1 - Tdap) 1/8/1970 ZOSTER VACCINE AGE 60> 1/8/2009 GLAUCOMA SCREENING Q2Y 1/8/2014 OSTEOPOROSIS SCREENING (DEXA) 1/8/2014 BREAST CANCER SCRN MAMMOGRAM 6/1/2015 INFLUENZA AGE 9 TO ADULT 8/1/2017 Pneumococcal 65+ Low/Medium Risk (2 of 2 - PPSV23) 10/16/2017 MEDICARE YEARLY EXAM 1/6/2018 COLONOSCOPY 9/1/2019 Allergies as of 5/4/2017  Review Complete On: 4/5/2017 By: Sandro Aguilar MD  
  
 Severity Noted Reaction Type Reactions Latex  03/19/2014    Swelling Sulfa (Sulfonamide Antibiotics)  03/23/2010    Hives Xarelto [Rivaroxaban]  10/20/2014    Other (comments) GI problems Current Immunizations  Reviewed on 6/9/2014 Name Date Influenza Vaccine 10/25/2015, 10/1/2014 Influenza Vaccine (Quad) PF 1/5/2017 Influenza Vaccine PF 11/15/2013 Influenza Vaccine Split 10/16/2012 Pneumococcal Vaccine (Unspecified Type) 10/16/2012 Not reviewed this visit You Were Diagnosed With   
  
 Codes Comments Benign essential hypertension    -  Primary ICD-10-CM: I10 
ICD-9-CM: 401.1 Vitals BP Pulse Resp Height(growth percentile) Weight(growth percentile) SpO2  
 110/70 (BP 1 Location: Right arm, BP Patient Position: Sitting) 76 18 5' 6\" (1.676 m) 237 lb (107.5 kg) 97% BMI OB Status Smoking Status 38.25 kg/m2 Hysterectomy Never Smoker Vitals History BMI and BSA Data Body Mass Index Body Surface Area  
 38.25 kg/m 2 2.24 m 2 Preferred Pharmacy Pharmacy Name Phone [facility]/PHARMACY #[id number]- [place], [place] - [address] [phone] Your Updated Medication List  
  
   
This list is accurate as of: 5/4/17 11:08 AM.  Always use your most recent med list.  
  
  
  
  
 AMBIEN 5 mg tablet Generic drug:  zolpidem Take  by mouth nightly as needed for Sleep. butalbital-aspirin-caffeine -40 mg tablet Commonly known as:  Sonu Shown Take 1 Tab by mouth every four (4) hours as needed for Pain. Max Daily Amount: 6 Tabs. cyclobenzaprine 10 mg tablet Commonly known as:  FLEXERIL Take 1 Tab by mouth three (3) times daily as needed for Muscle Spasm(s). CYMBALTA 30 mg capsule Generic drug:  DULoxetine Take 60 mg by mouth daily. dofetilide 125 mcg capsule Commonly known as:  Gabbi Burdock Take 1 Cap by mouth two (2) times a day. ergocalciferol 50,000 unit capsule Commonly known as:  ERGOCALCIFEROL Take 1 Cap by mouth every seven (7) days. furosemide 40 mg tablet Commonly known as:  LASIX  
take 1 tablet by mouth once daily HYDROcodone-acetaminophen 5-325 mg per tablet Commonly known as:  Ceil Heap Take 1 Tab by mouth every eight (8) hours as needed for Pain. Max Daily Amount: 3 Tabs. levothyroxine 50 mcg tablet Commonly known as:  SYNTHROID  
take 1 tablet by mouth once daily  
  
 lithium carbonate 300 mg tablet Take 400 mg by mouth nightly. LOMOTIL 2.5-0.025 mg per tablet Generic drug:  diphenoxylate-atropine Take  by mouth four (4) times daily as needed for Diarrhea. NexIUM 40 mg capsule Generic drug:  esomeprazole Take 40 mg by mouth daily. oxybutynin chloride XL 10 mg CR tablet Commonly known as:  DITROPAN XL Take 10 mg by mouth daily. PRADAXA 150 mg capsule Generic drug:  dabigatran etexilate TAKE ONE CAPSULE TWICE A DAY  
  
 pregabalin 75 mg capsule Commonly known as:  Atul Lizbeth Take 1 Cap by mouth two (2) times a day. Max Daily Amount: 150 mg. Indications: FIBROMYALGIA  
  
 rizatriptan 10 mg tablet Commonly known as:  Kenna Payer Take the one on the onset of migraine may take a second in 40 minutes if the migraine does not begin to subside TOPAMAX 100 mg tablet Generic drug:  topiramate Take 100 mg by mouth nightly. XANAX 0.5 mg tablet Generic drug:  ALPRAZolam  
Take 0.5 mg by mouth two (2) times a day. We Performed the Following AMB POC EKG ROUTINE W/ 12 LEADS, INTER & REP [61518 CPT(R)] To-Do List   
 05/08/2017 11:15 AM  
  Appointment with [facility] 1 at [facility] ([phone]) 1. Please bring a list or a bag of your current medications to your appointment 2. Please be sure to remove ALL hair clips, pins, extensions, etc., prior to arriving for your MRI procedure. 3. Bring any non [facility] films or CDs pertaining to the area being imaged with you on the day of appointment. 4. A written order with a valid diagnosis and Physicians  signature is required for all scheduled tests. 5. Check in at registration 30min before your appointment time unless you were instructed to do otherwise. Introducing hospitals & HEALTH SERVICES! Dear Al Yañez: Thank you for requesting a [email or web address] account. Our records indicate that you already have an active [email or web address] account. You can access your account anytime at [email or web address] Did you know that you can access your hospital and ER discharge instructions at any time in TRA? You can also review all of your test results from your hospital stay or ER visit. Additional Information If you have questions, please visit the Frequently Asked Questions section of the [employer] website at [email or web address] Remember, [employer] is NOT to be used for urgent needs. For medical emergencies, dial 911. Now available from your iPhone and Android! Please provide this summary of care documentation to your next provider. Your primary care clinician is listed as [place]. If you have any questions after today's visit, please call [phone].

## 2017-05-04 NOTE — PROGRESS NOTES
Subjective:      Alfred Orosco is a 75 y.o. female is here for one month follow up after decreasing Tikosyn dose to 125mcg. She is feeling well without cardiac complaints. The patient denies chest pain/ shortness of breath, orthopnea, PND, LE edema, palpitations, syncope, presyncope or fatigue.        Patient Active Problem List    Diagnosis Date Noted    Fibromyalgia 03/29/2017    Epigastric pain 06/05/2015    Vitamin D deficiency 03/31/2015    A-fib ([facility] 01/28/2015    Esophageal spasm 12/16/2014    S/P cardiac pacemaker procedure 09/16/2014    SSS (sick sinus syndrome) ([facility] 09/02/2014    S/P ablation of atrial fibrillation 05/13/2014    Atrial fibrillation ([facility] 04/21/2014    Benign essential hypertension 03/14/2014    Morbid obesity with BMI of 40.0-44.9, adult ([facility] 03/14/2014    Diarrhea 07/19/2013    GERD (gastroesophageal reflux disease) 04/13/2012    Colon polyps 03/31/2010    Urge incontinence 03/31/2010    Migraine 03/31/2010    Bipolar affective disorder ([facility] 03/31/2010    Hypothyroidism 03/31/2010    Hyperglycemia 03/31/2010    Hyperlipidemia 03/31/2010      Bree Angeles MD  Past Medical History:   Diagnosis Date    Arrhythmia     atrial fibrillation 2014    Arthritis     Bipolar affective disorder ([facility] 3/31/2010    Colon polyps 3/31/2010    Diarrhea 7/19/2013    Epigastric pain 6/5/2015    GERD (gastroesophageal reflux disease)     occasiional only; controlled with med    Hyperlipidemia 3/31/2010    Hypothyroidism 3/31/2010    Migraine 3/31/2010    S/P ablation of atrial fibrillation 5/13/2014    S/P cardiac pacemaker procedure 9/16/2014 9/16/14 Medtronic MRI compatible dual chamber pacemaker implant    Thyroid disease     Tick-borne disease     Unspecified sleep apnea     \"slight\" uses mouth device    Urge incontinence 3/31/2010    Vitamin D deficiency 3/31/2015      Past Surgical History:   Procedure Laterality Date    CARDIAC SURG PROCEDURE UNLIST  5/2014    ablation    HX BUNIONECTOMY      HX HYSTERECTOMY      HX ORTHOPAEDIC      removed bone spurs from R & L hand    HX ORTHOPAEDIC  1990s? left bunionectomy     HX OTHER SURGICAL  07/2014    2 shocks to heart & ablations    HX PACEMAKER  2014    HX TONSILLECTOMY       Allergies   Allergen Reactions    Latex Swelling    Sulfa (Sulfonamide Antibiotics) Hives    Xarelto [Rivaroxaban] Other (comments)     GI problems      Family History   Problem Relation Age of Onset    Arrhythmia Mother      afib    Stroke Mother     Heart Failure Mother     Colon Cancer Father     Heart Disease Father     Arrhythmia Brother      afib    Asthma Brother     COPD Brother     negative for cardiac disease  Social History     Social History    Marital status:      Spouse name: N/A    Number of children: N/A    Years of education: N/A     Social History Main Topics    Smoking status: Never Smoker    Smokeless tobacco: Never Used    Alcohol use Yes      Comment: occasional    Drug use: No    Sexual activity: No     Other Topics Concern    None     Social History Narrative     Current Outpatient Prescriptions   Medication Sig    pregabalin (LYRICA) 75 mg capsule Take 1 Cap by mouth two (2) times a day. Max Daily Amount: 150 mg. Indications: FIBROMYALGIA    HYDROcodone-acetaminophen (NORCO) 5-325 mg per tablet Take 1 Tab by mouth every eight (8) hours as needed for Pain. Max Daily Amount: 3 Tabs.  dofetilide (TIKOSYN) 125 mcg capsule Take 1 Cap by mouth two (2) times a day.  PRADAXA 150 mg capsule TAKE ONE CAPSULE TWICE A DAY    ergocalciferol (ERGOCALCIFEROL) 50,000 unit capsule Take 1 Cap by mouth every seven (7) days.  cyclobenzaprine (FLEXERIL) 10 mg tablet Take 1 Tab by mouth three (3) times daily as needed for Muscle Spasm(s).  butalbital-aspirin-caffeine (FIORINAL) -40 mg tablet Take 1 Tab by mouth every four (4) hours as needed for Pain. Max Daily Amount: 6 Tabs.  furosemide (LASIX) 40 mg tablet take 1 tablet by mouth once daily    levothyroxine (SYNTHROID) 50 mcg tablet take 1 tablet by mouth once daily    zolpidem (AMBIEN) 5 mg tablet Take  by mouth nightly as needed for Sleep.  DULoxetine (CYMBALTA) 30 mg capsule Take 60 mg by mouth daily.  diphenoxylate-atropine (LOMOTIL) 2.5-0.025 mg per tablet Take  by mouth four (4) times daily as needed for Diarrhea.  topiramate (TOPAMAX) 100 mg tablet Take 100 mg by mouth nightly.  lithium 300 mg tablet Take 400 mg by mouth nightly.  alprazolam (XANAX) 0.5 mg tablet Take 0.5 mg by mouth two (2) times a day.  rizatriptan (MAXALT) 10 mg tablet Take the one on the onset of migraine may take a second in 40 minutes if the migraine does not begin to subside    oxybutynin chloride XL (DITROPAN XL) 10 mg CR tablet Take 10 mg by mouth daily.  esomeprazole (NEXIUM) 40 mg capsule Take 40 mg by mouth daily. No current facility-administered medications for this visit. Vitals:    05/04/17 1032   BP: 110/70   Pulse: 76   Resp: 18   SpO2: 97%   Weight: 237 lb (107.5 kg)   Height: 5' 6\" (1.676 m)       I have reviewed the nurses notes, vitals, problem list, allergy list, medical history, family, social history and medications. Review of Symptoms:    General: Pt denies excessive weight gain or loss. Pt is able to conduct ADL's  HEENT: Denies blurred vision, headaches, epistaxis and difficulty swallowing. Respiratory: Denies shortness of breath, BIANCHI, wheezing or stridor. Cardiovascular: Denies precordial pain, palpitations, edema or PND  Gastrointestinal: Denies poor appetite, indigestion, abdominal pain or blood in stool  Urinary: Denies dysuria, pyuria  Musculoskeletal: Denies pain or swelling from muscles or joints  Neurologic: Denies tremor, paresthesias, or sensory motor disturbance  Skin: Denies rash, itching or texture change.   Psych: Denies depression      Physical Exam:      General: Well developed, in no acute distress. HEENT: Eyes - PERRL, no jvd  Heart:  Normal S1/S2 negative S3 or S4. Regular, no murmur, gallop or rub.   Respiratory: Clear bilaterally x 4, no wheezing or rales  Abdomen:   Soft, non-tender, bowel sounds are active.   Extremities:  No edema, normal cap refill, no cyanosis. Musculoskeletal: No clubbing  Neuro: A&Ox3, speech clear, gait stable. Skin: Skin color is normal. No rashes or lesions. Non diaphoretic  Vascular: 2+ pulses symmetric in all extremities    Cardiographics    Ekg: sinus rhythm with first degree AVB  [id number]    Results for orders placed or performed during the hospital encounter of 01/28/15   EKG, 12 LEAD, INITIAL   Result Value Ref Range    Ventricular Rate 83 BPM    Atrial Rate 83 BPM    P-R Interval 174 ms    QRS Duration 124 ms    Q-T Interval 440 ms    QTC Calculation (Bezet) 517 ms    Calculated P Axis 81 degrees    Calculated R Axis 23 degrees    Calculated T Axis -155 degrees    Diagnosis       Sinus rhythm  Nonspecific intraventricular conduction delay  ST & T wave abnormality, consider inferior ischemia  ST & T wave abnormality, consider anterolateral ischemia  When compared with ECG of 29-JUL-2014 05:23,    SD interval has decreased  Confirmed by Shirleyann Ahumada ([id number]) on 1/29/2015 5:42:05 AM   Results for orders placed or performed in visit on 08/19/14   CARDIAC HOLTER MONITOR, 24 HOURS    Narrative    ECG Monitor/24 hours, Complete    Reason for Holter Monitor   A-FIB    Heartbeat    Slowest 42  Average 60  Fastest  99        Results:   Underlying Rhythm: Normal sinus rhythm      Atrial Arrhythmias: premature atrial contractions; occasional,  atrial couplets and atrial triplets            AV Conduction: normal    Ventricular Arrhythmias: premature ventricular contractions;  occasional     ST Segment Analysis:normal     Symptom Correlation:  Headache did not correlate with any arrhythmias.   Fatigue/dizziness correlated with sinus bradycardia in the 40s    Comment:   Sinus rhythm with symptomatic sinus bradycardia. Clinical  correlation advised. Venancio Cameron MD, [facility]            Lab Results   Component Value Date/Time    WBC 6.4 08/08/2016 10:09 AM    HGB 14.1 08/08/2016 10:09 AM    HCT 43.6 08/08/2016 10:09 AM    PLATELET 369 41/35/6579 10:09 AM    MCV 91 08/08/2016 10:09 AM      Lab Results   Component Value Date/Time    Sodium 142 04/05/2017 09:31 AM    Potassium 4.5 04/05/2017 09:31 AM    Chloride 105 04/05/2017 09:31 AM    CO2 20 04/05/2017 09:31 AM    Anion gap 9 01/30/2015 06:00 AM    Glucose 104 04/05/2017 09:31 AM    BUN 19 04/05/2017 09:31 AM    Creatinine 0.68 04/05/2017 09:31 AM    BUN/Creatinine ratio 28 04/05/2017 09:31 AM    GFR est  04/05/2017 09:31 AM    GFR est non-AA 90 04/05/2017 09:31 AM    Calcium 9.7 04/05/2017 09:31 AM    Bilirubin, total 0.2 04/05/2017 09:31 AM    AST (SGOT) 17 04/05/2017 09:31 AM    Alk. phosphatase 80 04/05/2017 09:31 AM    Protein, total 6.5 04/05/2017 09:31 AM    Albumin 4.1 04/05/2017 09:31 AM    Globulin 3.3 01/26/2015 04:10 PM    A-G Ratio 1.7 04/05/2017 09:31 AM    ALT (SGPT) 17 04/05/2017 09:31 AM         Assessment:     Assessment:        ICD-10-CM ICD-9-CM    1. Benign essential hypertension I10 401.1 AMB POC EKG ROUTINE W/ 12 LEADS, INTER & REP     Orders Placed This Encounter    AMB POC EKG ROUTINE W/ 12 LEADS, INTER & REP     Order Specific Question:   Reason for Exam:     Answer:   routine        Plan:   Ms. Beth Jiang is here for follow up after decreasing Tikosyn dose in an effort to eliminate this medical therapy. Device interrogation today demonstrates normal functioning without episodes of AF. She is feeling well. Plan to discontinue Tikosyn follow up with device check in 1 months. Continue medical management for HTN. Pheobe Casino, NP    Thank you for allowing me to participate in Edmond Weiner 's care.     Emir Orona MD, [facility]

## 2017-05-04 NOTE — PROGRESS NOTES
Chief Complaint   Patient presents with    Hypertension     1 mo Tikosyn dose decrease. Denied cardiac symptoms.

## 2017-05-05 RX ORDER — NARATRIPTAN 2.5 MG/1
2.5 TABLET ORAL
Qty: 6 TAB | Refills: 11 | Status: SHIPPED | OUTPATIENT
Start: 2017-05-05 | End: 2017-08-09

## 2017-05-09 NOTE — TELEPHONE ENCOUNTER
Pt is requesting hydrocodone for migraines. She has two pills left and doesn't feel too good she states.

## 2017-05-10 RX ORDER — HYDROCODONE BITARTRATE AND ACETAMINOPHEN 5; 325 MG/1; MG/1
1 TABLET ORAL
Qty: 30 TAB | Refills: 0 | Status: SHIPPED | OUTPATIENT
Start: 2017-05-10 | End: 2017-06-14 | Stop reason: SDUPTHER

## 2017-05-15 ENCOUNTER — HOSPITAL ENCOUNTER (OUTPATIENT)
Dept: GENERAL RADIOLOGY | Age: 67
Discharge: HOME | End: 2017-05-15
Attending: ORTHOPAEDIC SURGERY
Payer: MEDICARE

## 2017-05-15 ENCOUNTER — HOSPITAL ENCOUNTER (OUTPATIENT)
Dept: MRI IMAGING | Age: 67
Discharge: HOME | End: 2017-05-15
Attending: ORTHOPAEDIC SURGERY
Payer: MEDICARE

## 2017-05-15 PROCEDURE — 73221 MRI JOINT UPR EXTREM W/O DYE: CPT

## 2017-05-15 PROCEDURE — 71010 XR CHEST SNGL V: CPT

## 2017-05-17 RX ORDER — DABIGATRAN ETEXILATE MESYLATE 150 MG/1
CAPSULE ORAL
Qty: 60 CAP | Refills: 1 | Status: SHIPPED | OUTPATIENT
Start: 2017-05-17 | End: 2017-07-26 | Stop reason: SDUPTHER

## 2017-05-30 ENCOUNTER — TELEPHONE (OUTPATIENT)
Dept: INTERNAL MEDICINE CLINIC | Age: 67
End: 2017-05-30

## 2017-05-30 DIAGNOSIS — Z01.89 PHYSICAL THERAPY EVALUATION, INITIAL: Primary | ICD-10-CM

## 2017-05-30 DIAGNOSIS — M54.9 BACK PAIN, UNSPECIFIED BACK LOCATION, UNSPECIFIED BACK PAIN LATERALITY, UNSPECIFIED CHRONICITY: ICD-10-CM

## 2017-05-30 NOTE — TELEPHONE ENCOUNTER
Bent over and pulled lower part of back. Pt requesting order for PT to be sent to [facility] on [facility] [address][phone] pt doesn't have fax. Please call pt with any questions.

## 2017-06-01 NOTE — TELEPHONE ENCOUNTER
Pt is requesting an order to go to PT for her back. She goes to the physical therapy on [address] she states it's the same place as before?

## 2017-06-07 RX ORDER — RIZATRIPTAN BENZOATE 10 MG/1
TABLET ORAL
Qty: 12 TAB | Refills: 3 | Status: SHIPPED | OUTPATIENT
Start: 2017-06-07 | End: 2017-10-23

## 2017-06-14 NOTE — TELEPHONE ENCOUNTER
Pt is requesting a refill on the Hydrocodone as she has about two pills left and have been getting migraines from the heat. Pt best contact number [phone].        Message received & copied from [facility]

## 2017-06-15 RX ORDER — HYDROCODONE BITARTRATE AND ACETAMINOPHEN 5; 325 MG/1; MG/1
1 TABLET ORAL
Qty: 30 TAB | Refills: 0 | Status: SHIPPED | OUTPATIENT
Start: 2017-06-15 | End: 2017-06-21 | Stop reason: SDUPTHER

## 2017-06-21 RX ORDER — HYDROCODONE BITARTRATE AND ACETAMINOPHEN 5; 325 MG/1; MG/1
1 TABLET ORAL
Qty: 30 TAB | Refills: 0 | Status: SHIPPED | OUTPATIENT
Start: 2017-06-21 | End: 2017-07-14 | Stop reason: SDUPTHER

## 2017-06-21 NOTE — TELEPHONE ENCOUNTER
Patient states she needs to request refill as soon as possible as patient reports she has a migraine & is out of medication. Please call when Hard Copy is ready for .  Thank you

## 2017-07-13 NOTE — TELEPHONE ENCOUNTER
Last month Dr. Rudi Palafox left two scripts for hydrocodone. One for this month and one for next month. She picked up and they were in two different envelopes. She had first one filled around the 21 th of June. She put the other one away for this month and now she can't find that script. She states she has turned the house upside down and can't find.   Please call pt

## 2017-07-14 RX ORDER — HYDROCODONE BITARTRATE AND ACETAMINOPHEN 5; 325 MG/1; MG/1
1 TABLET ORAL
Qty: 30 TAB | Refills: 0 | Status: SHIPPED | OUTPATIENT
Start: 2017-07-14 | End: 2017-08-30 | Stop reason: SDUPTHER

## 2017-07-14 NOTE — TELEPHONE ENCOUNTER
This can only happen once. General rule is we can't refill if a script is lost or stolen. Rx done.    BJF

## 2017-07-17 NOTE — TELEPHONE ENCOUNTER
Pt notified that script for hydrocodone is ready for  and that [facility] LPN, will have the script. Joselyn made pt aware of new guide lines on pain medication, that Kathy will go over pain contract and also do a urine drug test. Pt understood.

## 2017-07-18 ENCOUNTER — HOSPITAL ENCOUNTER (OUTPATIENT)
Dept: LAB | Age: 67
Discharge: HOME | End: 2017-07-18
Payer: MEDICARE

## 2017-07-18 ENCOUNTER — TELEPHONE (OUTPATIENT)
Dept: INTERNAL MEDICINE CLINIC | Age: 67
End: 2017-07-18

## 2017-07-18 DIAGNOSIS — Z02.83 ENCOUNTER FOR DRUG SCREENING: Primary | ICD-10-CM

## 2017-07-18 PROCEDURE — 82570 ASSAY OF URINE CREATININE: CPT

## 2017-07-18 RX ORDER — PREGABALIN 75 MG/1
75 CAPSULE ORAL 2 TIMES DAILY
Qty: 30 CAP | Refills: 5 | Status: CANCELLED | OUTPATIENT
Start: 2017-07-18

## 2017-07-18 NOTE — TELEPHONE ENCOUNTER
Patient called and said [facility] has tried to fax this request over multiple times. She really needs this medication ASAP. Please give her a call when this has been done. Thanks.

## 2017-07-25 ENCOUNTER — TELEPHONE (OUTPATIENT)
Dept: NEUROLOGY | Age: 67
End: 2017-07-25

## 2017-07-25 RX ORDER — PREGABALIN 75 MG/1
75 CAPSULE ORAL 2 TIMES DAILY
Qty: 60 CAP | Refills: 5 | Status: SHIPPED | OUTPATIENT
Start: 2017-07-25 | End: 2017-10-23

## 2017-07-25 NOTE — TELEPHONE ENCOUNTER
The request has been faxed over to the pharmacy,    Called the patient and advised the script for the Lyrica has been faxed over to the pharmacy

## 2017-07-25 NOTE — TELEPHONE ENCOUNTER
----- Message from Kole Bowden sent at 7/25/2017  1:37 PM EDT -----  Regarding: Dr. Alfred Herndon [phone]        Pt would like a call back, today, from the nurse advising the status of her refill request for Lyrica. Pt stated, she spoke with two different people at the office and [facility] has contacted the office as well; with no results. Pt has been out of medication since last wk.

## 2017-07-25 NOTE — TELEPHONE ENCOUNTER
Requested Prescriptions     Pending Prescriptions Disp Refills    pregabalin (LYRICA) 75 mg capsule 60 Cap 5     Sig: Take 1 Cap by mouth two (2) times a day. Max Daily Amount: 150 mg.  Indications: FIBROMYALGIA

## 2017-07-25 NOTE — TELEPHONE ENCOUNTER
Earlene Lopez - is this supposed to be for 15 days only? Please see my note on 7/21 - for twice a day, should be 60/30, correct? Please let the patient know when it has been completed. She is concerned since she runs out early with a 30 day supply in 15 days.

## 2017-07-26 LAB — DRUGS UR: NORMAL

## 2017-07-26 RX ORDER — DABIGATRAN ETEXILATE 150 MG/1
CAPSULE ORAL
Qty: 60 CAP | Refills: 1 | Status: SHIPPED | OUTPATIENT
Start: 2017-07-26 | End: 2017-09-16 | Stop reason: SDUPTHER

## 2017-08-05 RX ORDER — LEVOTHYROXINE SODIUM 50 UG/1
TABLET ORAL
Qty: 90 TAB | Refills: 3 | Status: SHIPPED | OUTPATIENT
Start: 2017-08-05 | End: 2018-08-09 | Stop reason: SDUPTHER

## 2017-08-09 ENCOUNTER — OFFICE VISIT (OUTPATIENT)
Dept: NEUROLOGY | Age: 67
End: 2017-08-09

## 2017-08-09 VITALS
HEART RATE: 77 BPM | HEIGHT: 66 IN | WEIGHT: 233 LBS | DIASTOLIC BLOOD PRESSURE: 64 MMHG | OXYGEN SATURATION: 96 % | BODY MASS INDEX: 37.45 KG/M2 | SYSTOLIC BLOOD PRESSURE: 98 MMHG

## 2017-08-09 DIAGNOSIS — G25.0 ESSENTIAL TREMOR: ICD-10-CM

## 2017-08-09 DIAGNOSIS — G43.019 INTRACTABLE MIGRAINE WITHOUT AURA AND WITHOUT STATUS MIGRAINOSUS: Primary | ICD-10-CM

## 2017-08-09 NOTE — Clinical Note
Please fax a copy of the office note to patient's psychiatric NP, Flor Alvarez.   I could not find a fax number, but the phone number is [phone]

## 2017-08-09 NOTE — MR AVS SNAPSHOT
Visit Information Date & Time Provider Department Dept. Phone Encounter #  
 8/9/2017  3:00 PM Kirit Gaines NP [facility] [phone] [id number] Your Appointments 8/15/2017 11:30 AM  
PACEMAKER with PACEMAKER, [facility]) Appt Note: 1 month per berlin Polk cp MDT PM; .  
 [id number] [facility] system  
[phone] [address] Health system  
  
    
 8/15/2017 11:30 AM  
1 MONTH with Alexei Tejada MD  
[facility] Appt Note: . [id number] Health system  
[phone] [address] Health system  
  
    
 10/6/2017  1:30 PM  
ROUTINE CARE with Babita Cutler MD  
[facility] Appt Note: 6 month follow up  
 [address] [facility] [address] [facility]  
[phone]  
  
   
 [address] [facility] [address] [facility]  
  
    
 2/8/2018  3:00 PM  
Follow Up with Ryan Duke MD  
[facility]) Appt Note: Follow up $0CP tdb 8/9/17  
 [address], [address] [id number]  
[address], [address] [place] [address] [address] [phone] Upcoming Health Maintenance Date Due DTaP/Tdap/Td series (1 - Tdap) 1/8/1970 ZOSTER VACCINE AGE 60> 11/8/2008 GLAUCOMA SCREENING Q2Y 1/8/2014 OSTEOPOROSIS SCREENING (DEXA) 1/8/2014 BREAST CANCER SCRN MAMMOGRAM 6/1/2015 INFLUENZA AGE 9 TO ADULT 8/1/2017 Pneumococcal 65+ Low/Medium Risk (2 of 2 - PPSV23) 10/16/2017 MEDICARE YEARLY EXAM 1/6/2018 COLONOSCOPY 9/1/2019 Allergies as of 8/9/2017  Review Complete On: 8/9/2017 By: Alex Alvarado LPN Severity Noted Reaction Type Reactions Latex  03/19/2014    Swelling Sulfa (Sulfonamide Antibiotics)  03/23/2010    Hives Xarelto [Rivaroxaban]  10/20/2014    Other (comments) GI problems Current Immunizations  Reviewed on 6/9/2014 Name Date Influenza Vaccine 10/25/2015, 10/1/2014 Influenza Vaccine (Quad) PF 1/5/2017 Influenza Vaccine PF 11/15/2013 Influenza Vaccine Split 10/16/2012 ZZZ-RETIRED (DO NOT USE) Pneumococcal Vaccine (Unspecified Type) 10/16/2012 Not reviewed this visit Vitals BP Pulse Height(growth percentile) Weight(growth percentile) SpO2 BMI  
 98/64 77 5' 6\" (1.676 m) 233 lb (105.7 kg) 96% 37.61 kg/m2 OB Status Smoking Status Hysterectomy Never Smoker Vitals History BMI and BSA Data Body Mass Index Body Surface Area  
 37.61 kg/m 2 2.22 m 2 Preferred Pharmacy Pharmacy Name Phone [id number] [address] [phone] Your Updated Medication List  
  
   
This list is accurate as of: 8/9/17  3:10 PM.  Always use your most recent med list.  
  
  
  
  
 AMBIEN 5 mg tablet Generic drug:  zolpidem Take  by mouth nightly as needed for Sleep. butalbital-aspirin-caffeine -40 mg tablet Commonly known as:  Trinda [place] Take 1 Tab by mouth every four (4) hours as needed for Pain. Max Daily Amount: 6 Tabs. cyclobenzaprine 10 mg tablet Commonly known as:  FLEXERIL Take 1 Tab by mouth three (3) times daily as needed for Muscle Spasm(s). CYMBALTA 30 mg capsule Generic drug:  DULoxetine Take 60 mg by mouth daily. dabigatran etexilate 150 mg capsule Commonly known as:  PRADAXA TAKE ONE CAPSULE TWICE A DAY  
  
 dofetilide 125 mcg capsule Commonly known as:  Felipe [place] Take 1 Cap by mouth two (2) times a day. ergocalciferol 50,000 unit capsule Commonly known as:  ERGOCALCIFEROL Take 1 Cap by mouth every seven (7) days. furosemide 40 mg tablet Commonly known as:  LASIX  
take 1 tablet by mouth once daily HYDROcodone-acetaminophen 5-325 mg per tablet Commonly known as:  Ping [place] Take 1 Tab by mouth every eight (8) hours as needed for Pain. Max Daily Amount: 3 Tabs. levothyroxine 50 mcg tablet Commonly known as:  SYNTHROID  
TAKE 1 TABLET EVERY DAY  
  
 lithium carbonate 300 mg tablet Take 400 mg by mouth nightly. LOMOTIL 2.5-0.025 mg per tablet Generic drug:  diphenoxylate-atropine Take  by mouth four (4) times daily as needed for Diarrhea. NexIUM 40 mg capsule Generic drug:  esomeprazole Take 40 mg by mouth daily. pregabalin 75 mg capsule Commonly known as:  Agustin [place] Take 1 Cap by mouth two (2) times a day. Max Daily Amount: 150 mg. Indications: FIBROMYALGIA  
  
 rizatriptan 10 mg tablet Commonly known as:  Earvin Po Take the one on the onset of migraine may take a second in 40 minutes if the migraine does not begin to subside TOPAMAX 100 mg tablet Generic drug:  topiramate Take 100 mg by mouth nightly. XANAX 0.5 mg tablet Generic drug:  ALPRAZolam  
Take 0.5 mg by mouth two (2) times a day. Patient Instructions Talk to your psychiatry NP about increasing your Topamax, you could try titrating up to 100 mg twice a day. If that does give to significant side effects, there is an extended release version that may be better tolerated PRESCRIPTION REFILL POLICY [facility] Statement to Patients April 1, 2014 In an effort to ensure the large volume of patient prescription refills is processed in the most efficient and expeditious manner, we are asking our patients to assist us by calling your Pharmacy for all prescription refills, this will include also your  Mail Order Pharmacy. The pharmacy will contact our office electronically to continue the refill process. Please do not wait until the last minute to call your pharmacy.  We need at least 48 hours (2days) to fill prescriptions. We also encourage you to call your pharmacy before going to  your prescription to make sure it is ready. With regard to controlled substance prescription refill requests (narcotic refills) that need to be picked up at our office, we ask your cooperation by providing us with at least 72 hours (3days) notice that you will need a refill. We will not refill narcotic prescription refill requests after 4:00pm on any weekday, Monday through Thursday, or after 2:00pm on Fridays, or on the weekends. We encourage everyone to explore another way of getting your prescription refill request processed using rVita, our patient web portal through our electronic medical record system. rVita is an efficient and effective way to communicate your medication request directly to the office and  downloadable as an mulu on your smart phone . rVita also features a review functionality that allows you to view your medication list as well as leave messages for your physician. Are you ready to get connected? If so please review the attatched instructions or speak to any of our staff to get you set up right away! Thank you so much for your cooperation. Should you have any questions please contact our Practice Administrator. The Physicians and Staff,  [facility] A Healthy Lifestyle: Care Instructions Your Care Instructions A healthy lifestyle can help you feel good, stay at a healthy weight, and have plenty of energy for both work and play. A healthy lifestyle is something you can share with your whole family. A healthy lifestyle also can lower your risk for serious health problems, such as high blood pressure, heart disease, and diabetes. You can follow a few steps listed below to improve your health and the health of your family. Follow-up care is a key part of your treatment and safety.  Be sure to make and go to all appointments, and call your doctor if you are having problems. Its also a good idea to know your test results and keep a list of the medicines you take. How can you care for yourself at home? · Do not eat too much sugar, fat, or fast foods. You can still have dessert and treats now and then. The goal is moderation. · Start small to improve your eating habits. Pay attention to portion sizes, drink less juice and soda pop, and eat more fruits and vegetables. ¨ Eat a healthy amount of food. A 3-ounce serving of meat, for example, is about the size of a deck of cards. Fill the rest of your plate with vegetables and whole grains. ¨ Limit the amount of soda and sports drinks you have every day. Drink more water when you are thirsty. ¨ Eat at least 5 servings of fruits and vegetables every day. It may seem like a lot, but it is not hard to reach this goal. A serving or helping is 1 piece of fruit, 1 cup of vegetables, or 2 cups of leafy, raw vegetables. Have an apple or some carrot sticks as an afternoon snack instead of a candy bar. Try to have fruits and/or vegetables at every meal. 
· Make exercise part of your daily routine. You may want to start with simple activities, such as walking, bicycling, or slow swimming. Try to be active 30 to 60 minutes every day. You do not need to do all 30 to 60 minutes all at once. For example, you can exercise 3 times a day for 10 or 20 minutes. Moderate exercise is safe for most people, but it is always a good idea to talk to your doctor before starting an exercise program. 
· Keep moving. Candy Blair the lawn, work in the garden, or [employer]. Take the stairs instead of the elevator at work. · If you smoke, quit. People who smoke have an increased risk for heart attack, stroke, cancer, and other lung illnesses. Quitting is hard, but there are ways to boost your chance of quitting tobacco for good. ¨ Use nicotine gum, patches, or lozenges. ¨ Ask your doctor about stop-smoking programs and medicines. ¨ Keep trying. In addition to reducing your risk of diseases in the future, you will notice some benefits soon after you stop using tobacco. If you have shortness of breath or asthma symptoms, they will likely get better within a few weeks after you quit. · Limit how much alcohol you drink. Moderate amounts of alcohol (up to 2 drinks a day for men, 1 drink a day for women) are okay. But drinking too much can lead to liver problems, high blood pressure, and other health problems. Family health If you have a family, there are many things you can do together to improve your health. · Eat meals together as a family as often as possible. · Eat healthy foods. This includes fruits, vegetables, lean meats and dairy, and whole grains. · Include your family in your fitness plan. Most people think of activities such as jogging or tennis as the way to fitness, but there are many ways you and your family can be more active. Anything that makes you breathe hard and gets your heart pumping is exercise. Here are some tips: 
¨ Walk to do errands or to take your child to school or the bus. ¨ Go for a family bike ride after dinner instead of watching TV. Where can you learn more? Go to http://roge-suha.info/. Enter M837 in the search box to learn more about \"A Healthy Lifestyle: Care Instructions. \" Current as of: July 26, 2016 Content Version: 11.3 © 6683-4715 Healthwise, Incorporated. Care instructions adapted under license by [employer] (which disclaims liability or warranty for this information). If you have questions about a medical condition or this instruction, always ask your healthcare professional. [place] [address] any warranty or liability for your use of this information. Introducing [facility] & HE[facility]! Dear Jackelin So: Thank you for requesting a [employer] account.   Our records indicate that you already have an active Wingz account. You can access your account anytime at [email or web address] Did you know that you can access your hospital and ER discharge instructions at any time in Wingz? You can also review all of your test results from your hospital stay or ER visit. Additional Information If you have questions, please visit the Frequently Asked Questions section of the Wingz website at [email or web address] Remember, Wingz is NOT to be used for urgent needs. For medical emergencies, dial 911. Now available from your iPhone and Android! Please provide this summary of care documentation to your next provider. Your primary care clinician is listed as [place]. If you have any questions after today's visit, please call [phone].

## 2017-08-09 NOTE — PATIENT INSTRUCTIONS
Talk to your psychiatry NP about increasing your Topamax, you could try titrating up to 100 mg twice a day. If that does give to significant side effects, there is an extended release version that may be better tolerated        [facility]   Statement to Patients  April 1, 2014      In an effort to ensure the large volume of patient prescription refills is processed in the most efficient and expeditious manner, we are asking our patients to assist us by calling your Pharmacy for all prescription refills, this will include also your  Mail Order Pharmacy. The pharmacy will contact our office electronically to continue the refill process. Please do not wait until the last minute to call your pharmacy. We need at least 48 hours (2days) to fill prescriptions. We also encourage you to call your pharmacy before going to  your prescription to make sure it is ready. With regard to controlled substance prescription refill requests (narcotic refills) that need to be picked up at our office, we ask your cooperation by providing us with at least 72 hours (3days) notice that you will need a refill. We will not refill narcotic prescription refill requests after 4:00pm on any weekday, Monday through Thursday, or after 2:00pm on Fridays, or on the weekends. We encourage everyone to explore another way of getting your prescription refill request processed using Acquia, our patient web portal through our electronic medical record system. Acquia is an efficient and effective way to communicate your medication request directly to the office and  downloadable as an mulu on your smart phone . Acquia also features a review functionality that allows you to view your medication list as well as leave messages for your physician. Are you ready to get connected?  If so please review the attatched instructions or speak to any of our staff to get you set up right away!    Thank you so much for your cooperation. Should you have any questions please contact our Practice Administrator. The Physicians and Staff,  [facility]          A Healthy Lifestyle: Care Instructions  Your Care Instructions  A healthy lifestyle can help you feel good, stay at a healthy weight, and have plenty of energy for both work and play. A healthy lifestyle is something you can share with your whole family. A healthy lifestyle also can lower your risk for serious health problems, such as high blood pressure, heart disease, and diabetes. You can follow a few steps listed below to improve your health and the health of your family. Follow-up care is a key part of your treatment and safety. Be sure to make and go to all appointments, and call your doctor if you are having problems. Its also a good idea to know your test results and keep a list of the medicines you take. How can you care for yourself at home? · Do not eat too much sugar, fat, or fast foods. You can still have dessert and treats now and then. The goal is moderation. · Start small to improve your eating habits. Pay attention to portion sizes, drink less juice and soda pop, and eat more fruits and vegetables. ¨ Eat a healthy amount of food. A 3-ounce serving of meat, for example, is about the size of a deck of cards. Fill the rest of your plate with vegetables and whole grains. ¨ Limit the amount of soda and sports drinks you have every day. Drink more water when you are thirsty. ¨ Eat at least 5 servings of fruits and vegetables every day. It may seem like a lot, but it is not hard to reach this goal. A serving or helping is 1 piece of fruit, 1 cup of vegetables, or 2 cups of leafy, raw vegetables. Have an apple or some carrot sticks as an afternoon snack instead of a candy bar. Try to have fruits and/or vegetables at every meal.  · Make exercise part of your daily routine.  You may want to start with simple activities, such as walking, bicycling, or slow swimming. Try to be active 30 to 60 minutes every day. You do not need to do all 30 to 60 minutes all at once. For example, you can exercise 3 times a day for 10 or 20 minutes. Moderate exercise is safe for most people, but it is always a good idea to talk to your doctor before starting an exercise program.  · Keep moving. Nik Cue the lawn, work in the garden, or [employer]. Take the stairs instead of the elevator at work. · If you smoke, quit. People who smoke have an increased risk for heart attack, stroke, cancer, and other lung illnesses. Quitting is hard, but there are ways to boost your chance of quitting tobacco for good. ¨ Use nicotine gum, patches, or lozenges. ¨ Ask your doctor about stop-smoking programs and medicines. ¨ Keep trying. In addition to reducing your risk of diseases in the future, you will notice some benefits soon after you stop using tobacco. If you have shortness of breath or asthma symptoms, they will likely get better within a few weeks after you quit. · Limit how much alcohol you drink. Moderate amounts of alcohol (up to 2 drinks a day for men, 1 drink a day for women) are okay. But drinking too much can lead to liver problems, high blood pressure, and other health problems. Family health  If you have a family, there are many things you can do together to improve your health. · Eat meals together as a family as often as possible. · Eat healthy foods. This includes fruits, vegetables, lean meats and dairy, and whole grains. · Include your family in your fitness plan. Most people think of activities such as jogging or tennis as the way to fitness, but there are many ways you and your family can be more active. Anything that makes you breathe hard and gets your heart pumping is exercise. Here are some tips:  ¨ Walk to do errands or to take your child to school or the bus.   ¨ Go for a family bike ride after dinner instead of watching TV. Where can you learn more? Go to http://roge-suha.info/. Enter X646 in the search box to learn more about \"A Healthy Lifestyle: Care Instructions. \"  Current as of: July 26, 2016  Content Version: 11.3  © 4056-9109 [employer], [employer]. Care instructions adapted under license by [employer] (which disclaims liability or warranty for this information). If you have questions about a medical condition or this instruction, always ask your healthcare professional. [address] any warranty or liability for your use of this information.

## 2017-08-09 NOTE — PROGRESS NOTES
Date:            2017    Name:  Vipin Ramos  :  1950  MRN:  [id number]     PCP:  Sherita Woodard MD    Chief Complaint   Patient presents with    Follow-up    Migraine         HISTORY OF PRESENT ILLNESS:  Romi Aldridge is a 75 y.o., female who presents today for follow up for migraines. She reports that her headaches are bad right now, Summer tends to be a bad season for her. Heat and storms really trigger her headaches. She tries maxalt first when she gets a migraine, which does help a little but does not completely get rid of her headache. Second pill will knock her headache down for a few hours but then it will come back. She takes a hydrocodone for severe headaches when the maxalt does not work, her PCP is prescribing the hydrocodone now. She has a hard time making 30 pills last a full month. She added Lyrica last for preventative and for fibromyalgia and neuropathy, has not seen any improvement in headaches with that. She has tried topiramate, nortriptyline, elavil, neurontin, lyrica, botox with no appreciable benefit. She is still on topamax at bedtime, does not remember ever taking that in the morning or taking more than 100 mg daily. Her Topamax prescription comes from her psychiatric NP.    2016 recap  Patient returns for a follow up visit. We discussed the current guidelines in prescribing narcotics. I have advised her that going forward I will be weaning her off hydrocodone. We discussed other migraine medications that she has tried in the past as an alternatives. Her migraines occur 1-3 times a week and last for several hours. They are of a 7/10 magnitude and are preceded with photopsia and are associated with nausea and photophobia. She has tried topiramate, nortriptyline, elavil, neurontin, lyrica, botox with no appreciable benefit. She has also been on triptans and butabital. She has been seen in the ER on several occasions.  The most effective treatment that has reduced ER visits has been hydrocodone. Patient recently has seen her sleep doctor. She was previously non compliant with cpap and this caused excessive daytime drowsiness. She is now under strict instructions to comply with cpap and to follow proper sleep hygiene. She feels more energetic and less somnolent and has self restricted her driving.       Assesment and Plan     1. Migraine headaches intractable  Trial of maxalt. Agreed to wean off the hydrocodone.      2. Sleep apnea  Now compliant with cpap  Self restricted driving.      3. Essential tremor  Of limited magnitude will treat if necessary     Current Outpatient Prescriptions   Medication Sig    levothyroxine (SYNTHROID) 50 mcg tablet TAKE 1 TABLET EVERY DAY    dabigatran etexilate (PRADAXA) 150 mg capsule TAKE ONE CAPSULE TWICE A DAY    pregabalin (LYRICA) 75 mg capsule Take 1 Cap by mouth two (2) times a day. Max Daily Amount: 150 mg. Indications: FIBROMYALGIA    HYDROcodone-acetaminophen (NORCO) 5-325 mg per tablet Take 1 Tab by mouth every eight (8) hours as needed for Pain. Max Daily Amount: 3 Tabs.  rizatriptan (MAXALT) 10 mg tablet Take the one on the onset of migraine may take a second in 40 minutes if the migraine does not begin to subside    dofetilide (TIKOSYN) 125 mcg capsule Take 1 Cap by mouth two (2) times a day.  ergocalciferol (ERGOCALCIFEROL) 50,000 unit capsule Take 1 Cap by mouth every seven (7) days.  cyclobenzaprine (FLEXERIL) 10 mg tablet Take 1 Tab by mouth three (3) times daily as needed for Muscle Spasm(s).  butalbital-aspirin-caffeine (FIORINAL) -40 mg tablet Take 1 Tab by mouth every four (4) hours as needed for Pain. Max Daily Amount: 6 Tabs.  furosemide (LASIX) 40 mg tablet take 1 tablet by mouth once daily    zolpidem (AMBIEN) 5 mg tablet Take  by mouth nightly as needed for Sleep.  esomeprazole (NEXIUM) 40 mg capsule Take 40 mg by mouth daily.     DULoxetine (CYMBALTA) 30 mg capsule Take 60 mg by mouth daily.  diphenoxylate-atropine (LOMOTIL) 2.5-0.025 mg per tablet Take  by mouth four (4) times daily as needed for Diarrhea.  topiramate (TOPAMAX) 100 mg tablet Take 100 mg by mouth nightly.  lithium 300 mg tablet Take 400 mg by mouth nightly.  alprazolam (XANAX) 0.5 mg tablet Take 0.5 mg by mouth two (2) times a day. No current facility-administered medications for this visit. Allergies   Allergen Reactions    Latex Swelling    Sulfa (Sulfonamide Antibiotics) Hives    Xarelto [Rivaroxaban] Other (comments)     GI problems     Past Medical History:   Diagnosis Date    Arrhythmia     atrial fibrillation 2014    Arthritis     Bipolar affective disorder ([facility] [address] 3/31/2010    Colon polyps 3/31/2010    Diarrhea 7/19/2013    Epigastric pain 6/5/2015    GERD (gastroesophageal reflux disease)     occasiional only; controlled with med    Hyperlipidemia 3/31/2010    Hypothyroidism 3/31/2010    Migraine 3/31/2010    S/P ablation of atrial fibrillation 5/13/2014    S/P cardiac pacemaker procedure 9/16/2014 9/16/14 Medtronic MRI compatible dual chamber pacemaker implant    Thyroid disease     Tick-borne disease     Unspecified sleep apnea     \"slight\" uses mouth device    Urge incontinence 3/31/2010    Vitamin D deficiency 3/31/2015     Past Surgical History:   Procedure Laterality Date    CARDIAC SURG PROCEDURE UNLIST  5/2014    ablation    HX BUNIONECTOMY      HX HYSTERECTOMY      HX ORTHOPAEDIC      removed bone spurs from R & L hand    HX ORTHOPAEDIC  1990s? left bunionectomy     HX OTHER SURGICAL  07/2014    2 shocks to heart & ablations    HX PACEMAKER  2014    HX TONSILLECTOMY       Social History     Social History    Marital status:      Spouse name: N/A    Number of children: N/A    Years of education: N/A     Occupational History    Not on file.      Social History Main Topics    Smoking status: Never Smoker    Smokeless tobacco: Never Used  Alcohol use Yes      Comment: occasional    Drug use: No    Sexual activity: No     Other Topics Concern    Not on file     Social History Narrative     Family History   Problem Relation Age of Onset    Arrhythmia Mother      afib    Stroke Mother     Heart Failure Mother     Colon Cancer Father     Heart Disease Father     Arrhythmia Brother      afib    Asthma Brother     COPD Brother          PHYSICAL EXAMINATION:    Visit Vitals    BP 98/64    Pulse 77    Ht 5' 6\" (1.676 m)    Wt 233 lb (105.7 kg)    SpO2 96%    BMI 37.61 kg/m2     General:  Well defined, obese, and groomed individual in no acute distress. Neck: Supple, nontender, no bruits, no pain with resistance to active range of motion. Heart: Regular rate and rhythm, no murmurs, rub, or gallop. Normal S1S2. Lungs:  Clear to auscultation bilaterally with equal chest expansion, no cough, no wheeze  Musculoskeletal:  Extremities revealed no edema and had full range of motion of joints. Psych:  Good mood and bright affect    NEUROLOGICAL EXAMINATION:     Mental Status:   Alert and oriented to person, place, and time with recent and remote memory intact. Attention span and concentration are normal. Speech is fluent with a full fund of knowledge. Cranial Nerves:    II, III, IV, VI:  Visual acuity grossly intact. Pupils are equal, round, and reactive to light. Extra-ocular movements are full and fluid. No ptosis or nystagmus. V-XII: Hearing is grossly intact. Facial features are symmetric, with normal sensation and strength. The palate rises symmetrically and the tongue protrudes midline. Sternocleidomastoids 5/5. Motor Examination: Normal tone, bulk, and strength, 5/5 muscle strength throughout. Coordination:  Finger to nose testing was normal.   No resting tremor, bilateral hand intention tremor left > right  Gait and Station:  Steady while walking, difficulty with tandem walking. Normal arm swing.   No pronator drift. No muscle wasting or fasciculations noted. ASSESSMENT AND PLAN    ICD-10-CM ICD-9-CM    1. Intractable migraine without aura and without status migrainosus G43.019 346.11    2. Essential tremor G25.0 35.1      53-year-old female seen in follow-up for migraines. These are currently poorly controlled, summer weather is a trigger for her. She admits to using pain medication frequently, she takes Maxalt, Fioricet, or hydrocodone (which she receives from her PCP) for her migraines. She is struggling to make her hydrocodone prescription last a full 30 days. She is on Topamax from her psychiatrist for bipolar disorder, has already tried and failed nortriptyline, elavil, neurontin, lyrica, botox for preventative. 1.  Discussed analgesic rebound headache, and that she is on likely to get her headaches under control as long as she is taking pain medication more than 2 days per week  2. With the above in mind, she can continue using Maxalt and Fioricet for severe headaches. For less severe headaches, she should use conservative measures of cold rags, ice packs, resting  3. We will send a note to her psychiatrist, asked whether she can increase her Topamax. She may do better with 100 mg twice a day, if side effects are limiting at a higher dose she could try the extended release  4. Continue Lyrica 75 mg twice daily for  fibromyalgia  5. We will hold off on treatment of essential tremor to avoid adding another medication to her heart extensive regimen    Follow-up in 6 months, call sooner with concerns    Delfina High NP    This note was created using voice recognition software. Despite editing, there may be syntax errors.

## 2017-08-10 ENCOUNTER — DOCUMENTATION ONLY (OUTPATIENT)
Dept: NEUROLOGY | Age: 67
End: 2017-08-10

## 2017-08-15 ENCOUNTER — OFFICE VISIT (OUTPATIENT)
Dept: CARDIOLOGY CLINIC | Age: 67
End: 2017-08-15

## 2017-08-30 RX ORDER — HYDROCODONE BITARTRATE AND ACETAMINOPHEN 5; 325 MG/1; MG/1
1 TABLET ORAL
Qty: 21 TAB | Refills: 0 | Status: SHIPPED | OUTPATIENT
Start: 2017-08-30 | End: 2017-09-06

## 2017-09-01 ENCOUNTER — TELEPHONE (OUTPATIENT)
Dept: INTERNAL MEDICINE CLINIC | Age: 67
End: 2017-09-01

## 2017-09-01 NOTE — TELEPHONE ENCOUNTER
Patient would like to have an appointment for an hernia as soon as possible. The patient is checking the status of her medication Hydrocodone.  Her number is [phone].        Message received & copied from [facility]

## 2017-09-01 NOTE — TELEPHONE ENCOUNTER
Patient is scheduled for an appointment for 9/29 17 with Dr. Alexandrea Chavez for hernia eval and she was notified that she can  her script on Tuesday.

## 2017-09-09 DIAGNOSIS — E55.9 VITAMIN D DEFICIENCY: ICD-10-CM

## 2017-09-11 RX ORDER — ERGOCALCIFEROL 1.25 MG/1
CAPSULE ORAL
Qty: 4 CAP | Refills: 5 | Status: SHIPPED | OUTPATIENT
Start: 2017-09-11 | End: 2018-01-19 | Stop reason: SDUPTHER

## 2017-09-14 ENCOUNTER — CLINICAL SUPPORT (OUTPATIENT)
Dept: CARDIOLOGY CLINIC | Age: 67
End: 2017-09-14

## 2017-09-14 DIAGNOSIS — I48.91 ATRIAL FIBRILLATION, UNSPECIFIED TYPE (HCC): ICD-10-CM

## 2017-09-14 DIAGNOSIS — Z95.0 S/P CARDIAC PACEMAKER PROCEDURE: Primary | ICD-10-CM

## 2017-09-14 DIAGNOSIS — I49.5 SSS (SICK SINUS SYNDROME) (HCC): ICD-10-CM

## 2017-09-18 RX ORDER — DABIGATRAN ETEXILATE MESYLATE 150 MG/1
CAPSULE ORAL
Qty: 60 CAP | Refills: 1 | Status: SHIPPED | OUTPATIENT
Start: 2017-09-18 | End: 2017-11-24 | Stop reason: SDUPTHER

## 2017-09-21 ENCOUNTER — OFFICE VISIT (OUTPATIENT)
Dept: INTERNAL MEDICINE CLINIC | Age: 67
End: 2017-09-21

## 2017-09-21 VITALS
OXYGEN SATURATION: 96 % | WEIGHT: 233.4 LBS | SYSTOLIC BLOOD PRESSURE: 113 MMHG | DIASTOLIC BLOOD PRESSURE: 77 MMHG | BODY MASS INDEX: 37.51 KG/M2 | HEART RATE: 77 BPM | HEIGHT: 66 IN | RESPIRATION RATE: 18 BRPM | TEMPERATURE: 98.1 F

## 2017-09-21 DIAGNOSIS — E03.9 HYPOTHYROIDISM, UNSPECIFIED TYPE: ICD-10-CM

## 2017-09-21 DIAGNOSIS — L29.9 PRURITUS: Primary | ICD-10-CM

## 2017-09-21 DIAGNOSIS — K42.9 UMBILICAL HERNIA WITHOUT OBSTRUCTION AND WITHOUT GANGRENE: ICD-10-CM

## 2017-09-21 DIAGNOSIS — I10 BENIGN ESSENTIAL HYPERTENSION: ICD-10-CM

## 2017-09-21 DIAGNOSIS — J30.89 ALLERGIC RHINITIS DUE TO OTHER ALLERGIC TRIGGER, UNSPECIFIED RHINITIS SEASONALITY: ICD-10-CM

## 2017-09-21 DIAGNOSIS — R73.9 HYPERGLYCEMIA: ICD-10-CM

## 2017-09-21 RX ORDER — MONTELUKAST SODIUM 10 MG/1
10 TABLET ORAL DAILY
Qty: 30 TAB | Refills: 1 | Status: SHIPPED | OUTPATIENT
Start: 2017-09-21 | End: 2017-10-19

## 2017-09-21 RX ORDER — PREDNISONE 10 MG/1
TABLET ORAL
Qty: 21 TAB | Refills: 0 | Status: SHIPPED | OUTPATIENT
Start: 2017-09-21 | End: 2017-10-19

## 2017-09-21 RX ORDER — HYDROXYZINE 25 MG/1
25 TABLET, FILM COATED ORAL
Qty: 30 TAB | Refills: 1 | Status: SHIPPED | OUTPATIENT
Start: 2017-09-21 | End: 2018-04-11 | Stop reason: SDUPTHER

## 2017-09-21 NOTE — MR AVS SNAPSHOT
Visit Information Date & Time Provider Department Dept. Phone Encounter #  
 9/21/2017 11:30 AM Vicky Arciniega, [address] [place] [address],[address] [phone] [id number] Your Appointments 9/26/2017  1:30 PM  
1 MONTH with Tiara Saenz MD  
[facility] [address]) Appt Note: .; 8-15-17 1mo rsched 8-15-17 lj  
 [id number] [facility]  
[phone] [address] [facility]  
  
    
 9/26/2017  1:30 PM  
PACEMAKER with PACEMAKER, [facility] [address]) Appt Note: 6mo mdt dcpm  
 [address] [facility]  
[phone] [address] [facility]  
  
    
 9/29/2017  3:30 PM  
ROUTINE CARE with Vicky Arciniega MD  
[facility] [address] [place] [address] Road) Appt Note: eval hernia  
 [facility] [address] [facility]  
[phone]  
  
   
 [facility] [address] [facility] [address] [address]  
  
    
 10/6/2017  1:30 PM  
ROUTINE CARE with Vicky Arciniega MD  
[facility] [address] [place] [address] Road) Appt Note: 6 month follow up  
 [facility] [address] [address]  
[phone]  
  
    
 2/8/2018  3:00 PM  
Follow Up with Marlene Sue MD  
[facility] [address]) Appt Note: Follow up $0CP tdb 8/9/17  
 [address] [place] [address], 
[address] [place] [address], [address] [phone]  
[address] [facility], [address] [place] [address], [address] [place] [address] [address] [address] Upcoming Health Maintenance Date Due DTaP/Tdap/Td series (1 - Tdap) 1/8/1970 ZOSTER VACCINE AGE 60> 11/8/2008 GLAUCOMA SCREENING Q2Y 1/8/2014 OSTEOPOROSIS SCREENING (DEXA) 1/8/2014 INFLUENZA AGE 9 TO ADULT 8/1/2017 Pneumococcal 65+ Low/Medium Risk (2 of 2 - PPSV23) 10/16/2017 MEDICARE YEARLY EXAM 1/6/2018 BREAST CANCER SCRN MAMMOGRAM 6/1/2019 COLONOSCOPY 9/1/2019 Allergies as of 9/21/2017  Review Complete On: 9/21/2017 By: Rachel Novak Severity Noted Reaction Type Reactions Latex  03/19/2014    Swelling Sulfa (Sulfonamide Antibiotics)  03/23/2010    Hives Xarelto [Rivaroxaban]  10/20/2014    Other (comments) GI problems Current Immunizations  Reviewed on 6/9/2014 Name Date Influenza Vaccine 10/25/2015, 10/1/2014 Influenza Vaccine (Quad) PF 1/5/2017 Influenza Vaccine PF 11/15/2013 Influenza Vaccine Split 10/16/2012 ZZZ-RETIRED (DO NOT USE) Pneumococcal Vaccine (Unspecified Type) 10/16/2012 Not reviewed this visit You Were Diagnosed With   
  
 Codes Comments Pruritus    -  Primary ICD-10-CM: L29.9 ICD-9-CM: 698.9 Allergic rhinitis due to other allergic trigger, unspecified rhinitis seasonality     ICD-10-CM: J30.89 ICD-9-CM: 477.8 Hypothyroidism, unspecified type     ICD-10-CM: E03.9 ICD-9-CM: 244.9 Hyperglycemia     ICD-10-CM: R73.9 ICD-9-CM: 790.29 Benign essential hypertension     ICD-10-CM: I10 
ICD-9-CM: 401.1 Umbilical hernia without obstruction and without gangrene     ICD-10-CM: K42.9 ICD-9-CM: 553.1 Vitals BP Pulse Temp Resp Height(growth percentile) Weight(growth percentile) 113/77 (BP 1 Location: Right arm, BP Patient Position: Sitting) 77 98.1 °F (36.7 °C) (Oral) 18 5' 6\" (1.676 m) 233 lb 6.4 oz (105.9 kg) SpO2 BMI OB Status Smoking Status 96% 37.67 kg/m2 Hysterectomy Never Smoker Vitals History BMI and BSA Data Body Mass Index Body Surface Area  
 37.67 kg/m 2 2.22 m 2 Preferred Pharmacy Pharmacy Name Phone [id number], [place] [address] [phone] Your Updated Medication List  
  
   
This list is accurate as of: 9/21/17 12:17 PM.  Always use your most recent med list.  
  
  
  
  
 AMBIEN 5 mg tablet Generic drug:  zolpidem Take  by mouth nightly as needed for Sleep. butalbital-aspirin-caffeine -40 mg tablet Commonly known as:  Osie Nadine Take 1 Tab by mouth every four (4) hours as needed for Pain. Max Daily Amount: 6 Tabs. CYMBALTA 30 mg capsule Generic drug:  DULoxetine Take 60 mg by mouth daily. ergocalciferol 50,000 unit capsule Commonly known as:  ERGOCALCIFEROL  
TAKE 1 CAP BY MOUTH EVERY SEVEN (7) DAYS. hydrOXYzine HCl 25 mg tablet Commonly known as:  ATARAX Take 1 Tab by mouth three (3) times daily as needed for Itching for up to 10 days. levothyroxine 50 mcg tablet Commonly known as:  SYNTHROID  
TAKE 1 TABLET EVERY DAY  
  
 lithium carbonate 300 mg tablet Take 400 mg by mouth nightly. LOMOTIL 2.5-0.025 mg per tablet Generic drug:  diphenoxylate-atropine Take  by mouth four (4) times daily as needed for Diarrhea.  
  
 montelukast 10 mg tablet Commonly known as:  SINGULAIR Take 1 Tab by mouth daily. PRADAXA 150 mg capsule Generic drug:  dabigatran etexilate TAKE ONE CAPSULE TWICE A DAY  
  
 predniSONE 10 mg dose pack Commonly known as:  STERAPRED DS See administration instruction per 10mg dose pack  
  
 pregabalin 75 mg capsule Commonly known as:  Sameul [facility] Take 1 Cap by mouth two (2) times a day. Max Daily Amount: 150 mg. Indications: FIBROMYALGIA  
  
 rizatriptan 10 mg tablet Commonly known as:  Dino Simmonds Take the one on the onset of migraine may take a second in 40 minutes if the migraine does not begin to subside TOPAMAX 100 mg tablet Generic drug:  topiramate Take 100 mg by mouth nightly. XANAX 0.5 mg tablet Generic drug:  ALPRAZolam  
Take 0.5 mg by mouth two (2) times a day. Prescriptions Sent to Pharmacy Refills  
 hydrOXYzine HCl (ATARAX) 25 mg tablet 1 Sig: Take 1 Tab by mouth three (3) times daily as needed for Itching for up to 10 days. Class: Normal  
 Pharmacy: [facility]/pharmacy [address] [place] [address] [address] Ph #: [phone] Route: Oral  
 predniSONE (STERAPRED DS) 10 mg dose pack 0 Sig: See administration instruction per 10mg dose pack Class: Normal  
 Pharmacy: [facility]/pharmacy [address] [place] [address] [address] Ph #: [phone]  
 montelukast (SINGULAIR) 10 mg tablet 1 Sig: Take 1 Tab by mouth daily. Class: Normal  
 Pharmacy: [facility]/pharmacy [address] [place] [address] [address] Ph #: [phone] Route: Oral  
  
We Performed the Following CBC WITH AUTOMATED DIFF [21435 CPT(R)] HEMOGLOBIN A1C WITH EAG [18834 CPT(R)] LIPID PANEL [10870 CPT(R)] METABOLIC PANEL, COMPREHENSIVE [84183 CPT(R)] REFERRAL TO GENERAL SURGERY [REF27 Custom] Comments:  
 Please evaluate patient for umbilical hernia TSH 3RD GENERATION [88430 CPT(R)] Referral Information Referral ID Referred By Referred To  
  
 [id number] Alphonso CHRIS MD   
   [address] [place] [address] [address] [place] [address] Phone: [phone] Fax: [phone] Visits Status Start Date End Date 1 New Request 9/21/17 9/21/18 If your referral has a status of pending review or denied, additional information will be sent to support the outcome of this decision. Introducing [facility]! Dear Jing Sahu: Thank you for requesting a [employer] account. Our records indicate that you already have an active [employer] account. You can access your account anytime at [email or web address] Did you know that you can access your hospital and ER discharge instructions at any time in [employer]? You can also review all of your test results from your hospital stay or ER visit. Additional Information If you have questions, please visit the Frequently Asked Questions section of the [employer] website at [email or web address] Remember, [employer] is NOT to be used for urgent needs. For medical emergencies, dial 911. Now available from your iPhone and Android! Please provide this summary of care documentation to your next provider. Your primary care clinician is listed as [place]. If you have any questions after today's visit, please call [phone].

## 2017-09-21 NOTE — PROGRESS NOTES
SUBJECTIVE  Ms. Aylin Potts presents today acutely for     Chief Complaint   Patient presents with    Skin Exam     pt c.o itching all over x2 months; pt also c/o vaginal itch and that she has been using monostat     URI     pt c.o persistanc cough, sneezing, sore throat, and slight headache    Fatigue     pt c.o feeling tired all the time       Pruritus--\"It started in the back of my head, and I really thought it was the CPAP mask. \"  Also forehead, ears. It didn't go away with stopping CPAP. Now has spread to trunk and lower extremities. \"My bottom has started itching\"--got monistat, but that hasn't helped. She isn't aware of any new medications, soaps, hair products. \"I feel like I'm getting a cold,but it never comes. \"  Mild headache, cough, sore throat, teeth hurt. Allergist has seen her. \"I'm allergic to ticks and dogs, but I've had the dogs for 10 years. \"    OBJECTIVE  Visit Vitals    /77 (BP 1 Location: Right arm, BP Patient Position: Sitting)    Pulse 77    Temp 98.1 °F (36.7 °C) (Oral)    Resp 18    Ht 5' 6\" (1.676 m)    Wt 233 lb 6.4 oz (105.9 kg)    SpO2 96%    BMI 37.67 kg/m2     Gen: Pleasant 75 y.o.  female in NAD.   HEENT: PERRLA. EOMI. OP moist and pink.  Neck: Supple.  No LAD.  HEART: RRR, No M/G/R.   LUNGS: CTAB No W/R.   ABDOMEN: S, NT, ND, BS+.   EXTREMITIES: Warm. No C/C/E. MUSCULOSKELETAL: Normal ROM, muscle strength 5/5 all groups. NEURO: Alert and oriented x 3.  Cranial nerves grossly intact.  No focal sensory or motor deficits noted. SKIN: Warm. Dry. No rashes or other lesions noted. ASSESSMENT / PLAN    ICD-10-CM ICD-9-CM    1. Pruritus L29.9 698.9 hydrOXYzine HCl (ATARAX) 25 mg tablet      predniSONE (STERAPRED DS) 10 mg dose pack      CBC WITH AUTOMATED DIFF     Labs ordered in anticipation of next visit. Also, she requests referral for umbilical hernia--Dr. Yaya Buchanan.      I have reviewed with the patient details of the assessment and plan and all questions were answered. Relevant patient education was performed. Follow-up Disposition: As planned.

## 2017-09-26 ENCOUNTER — CLINICAL SUPPORT (OUTPATIENT)
Dept: CARDIOLOGY CLINIC | Age: 67
End: 2017-09-26

## 2017-09-26 ENCOUNTER — OFFICE VISIT (OUTPATIENT)
Dept: CARDIOLOGY CLINIC | Age: 67
End: 2017-09-26

## 2017-09-26 VITALS
HEIGHT: 66 IN | BODY MASS INDEX: 38.15 KG/M2 | DIASTOLIC BLOOD PRESSURE: 72 MMHG | WEIGHT: 237.4 LBS | OXYGEN SATURATION: 99 % | HEART RATE: 94 BPM | SYSTOLIC BLOOD PRESSURE: 122 MMHG | RESPIRATION RATE: 20 BRPM

## 2017-09-26 DIAGNOSIS — I49.5 SSS (SICK SINUS SYNDROME) (HCC): ICD-10-CM

## 2017-09-26 DIAGNOSIS — I48.0 PAROXYSMAL ATRIAL FIBRILLATION (HCC): ICD-10-CM

## 2017-09-26 DIAGNOSIS — I48.91 ATRIAL FIBRILLATION, UNSPECIFIED TYPE (HCC): Primary | ICD-10-CM

## 2017-09-26 DIAGNOSIS — Z95.0 S/P CARDIAC PACEMAKER PROCEDURE: Primary | ICD-10-CM

## 2017-09-26 RX ORDER — NITROFURANTOIN 25; 75 MG/1; MG/1
CAPSULE ORAL
Refills: 10 | COMMUNITY
Start: 2017-09-09 | End: 2017-10-23

## 2017-09-26 RX ORDER — CYCLOBENZAPRINE HCL 10 MG
TABLET ORAL
Refills: 1 | COMMUNITY
Start: 2017-07-02 | End: 2017-10-19

## 2017-09-26 RX ORDER — LITHIUM CARBONATE 300 MG/1
TABLET, FILM COATED, EXTENDED RELEASE ORAL
COMMUNITY
Start: 2017-09-23 | End: 2017-10-23

## 2017-09-26 RX ORDER — DICLOFENAC SODIUM 75 MG/1
TABLET, DELAYED RELEASE ORAL
Refills: 2 | COMMUNITY
Start: 2017-06-22 | End: 2017-10-19

## 2017-09-26 RX ORDER — LIDOCAINE HYDROCHLORIDE 20 MG/ML
SOLUTION ORAL; TOPICAL
COMMUNITY
Start: 2017-07-14 | End: 2017-10-19

## 2017-09-26 RX ORDER — FLUCONAZOLE 150 MG/1
TABLET ORAL
COMMUNITY
Start: 2017-09-23 | End: 2018-04-24

## 2017-09-26 RX ORDER — TOLTERODINE 4 MG/1
CAPSULE, EXTENDED RELEASE ORAL
Refills: 99 | COMMUNITY
Start: 2017-08-31 | End: 2017-10-19

## 2017-09-26 NOTE — PROGRESS NOTES
Subjective:      Vaishali Tse is a 75 y.o. female is here for f/u of her afib. The patient denies chest pain/ shortness of breath, orthopnea, PND, LE edema, palpitations, syncope, presyncope or fatigue.        Patient Active Problem List    Diagnosis Date Noted    Fibromyalgia 03/29/2017    Epigastric pain 06/05/2015    Vitamin D deficiency 03/31/2015    A-fib ([facility] 01/28/2015    Esophageal spasm 12/16/2014    S/P cardiac pacemaker procedure 09/16/2014    SSS (sick sinus syndrome) ([facility] 09/02/2014    S/P ablation of atrial fibrillation 05/13/2014    Atrial fibrillation ([facility] 04/21/2014    Benign essential hypertension 03/14/2014    Morbid obesity with BMI of 40.0-44.9, adult ([facility] 03/14/2014    Diarrhea 07/19/2013    GERD (gastroesophageal reflux disease) 04/13/2012    Colon polyps 03/31/2010    Urge incontinence 03/31/2010    Migraine 03/31/2010    Bipolar affective disorder ([facility] 03/31/2010    Hypothyroidism 03/31/2010    Hyperglycemia 03/31/2010    Hyperlipidemia 03/31/2010      Sean Young MD  Past Medical History:   Diagnosis Date    Arrhythmia     atrial fibrillation 2014    Arthritis     Bipolar affective disorder ([facility] 3/31/2010    Colon polyps 3/31/2010    Diarrhea 7/19/2013    Epigastric pain 6/5/2015    GERD (gastroesophageal reflux disease)     occasiional only; controlled with med    Hyperlipidemia 3/31/2010    Hypothyroidism 3/31/2010    Migraine 3/31/2010    S/P ablation of atrial fibrillation 5/13/2014    S/P cardiac pacemaker procedure 9/16/2014 9/16/14 Medtronic MRI compatible dual chamber pacemaker implant    Thyroid disease     Tick-borne disease     Unspecified sleep apnea     \"slight\" uses mouth device    Urge incontinence 3/31/2010    Vitamin D deficiency 3/31/2015      Past Surgical History:   Procedure Laterality Date    CARDIAC SURG PROCEDURE UNLIST  5/2014    ablation    HX BUNIONECTOMY      HX HYSTERECTOMY      HX ORTHOPAEDIC removed bone spurs from R & L hand    HX ORTHOPAEDIC  1990s? left bunionectomy     HX OTHER SURGICAL  07/2014    2 shocks to heart & ablations    HX PACEMAKER  2014    HX TONSILLECTOMY       Allergies   Allergen Reactions    Latex Swelling    Sulfa (Sulfonamide Antibiotics) Hives    Xarelto [Rivaroxaban] Other (comments)     GI problems      Family History   Problem Relation Age of Onset    Arrhythmia Mother      afib    Stroke Mother     Heart Failure Mother     Colon Cancer Father     Heart Disease Father     Arrhythmia Brother      afib    Asthma Brother     COPD Brother     negative for cardiac disease  Social History     Social History    Marital status:      Spouse name: N/A    Number of children: N/A    Years of education: N/A     Social History Main Topics    Smoking status: Never Smoker    Smokeless tobacco: Never Used    Alcohol use Yes      Comment: occasional    Drug use: No    Sexual activity: No     Other Topics Concern    None     Social History Narrative     Current Outpatient Prescriptions   Medication Sig    tolterodine ER (DETROL LA) 4 mg ER capsule TAKE 1 CAPSULE BY MOUTH DAILY    LIDOCAINE VISCOUS 2 % solution     fluconazole (DIFLUCAN) 150 mg tablet     hydrOXYzine HCl (ATARAX) 25 mg tablet Take 1 Tab by mouth three (3) times daily as needed for Itching for up to 10 days.  predniSONE (STERAPRED DS) 10 mg dose pack See administration instruction per 10mg dose pack    montelukast (SINGULAIR) 10 mg tablet Take 1 Tab by mouth daily.  PRADAXA 150 mg capsule TAKE ONE CAPSULE TWICE A DAY    ergocalciferol (ERGOCALCIFEROL) 50,000 unit capsule TAKE 1 CAP BY MOUTH EVERY SEVEN (7) DAYS.  levothyroxine (SYNTHROID) 50 mcg tablet TAKE 1 TABLET EVERY DAY    pregabalin (LYRICA) 75 mg capsule Take 1 Cap by mouth two (2) times a day. Max Daily Amount: 150 mg.  Indications: FIBROMYALGIA    rizatriptan (MAXALT) 10 mg tablet Take the one on the onset of migraine may take a second in 40 minutes if the migraine does not begin to subside    DULoxetine (CYMBALTA) 30 mg capsule Take 60 mg by mouth daily.  diphenoxylate-atropine (LOMOTIL) 2.5-0.025 mg per tablet Take  by mouth four (4) times daily as needed for Diarrhea.  topiramate (TOPAMAX) 100 mg tablet Take 100 mg by mouth nightly.  lithium 300 mg tablet Take 300 mg by mouth nightly.  alprazolam (XANAX) 0.5 mg tablet Take 0.5 mg by mouth daily.  diclofenac EC (VOLTAREN) 75 mg EC tablet TAKE 1 TABLET (75 MG TOTAL) BY MOUTH 2 (TWO) TIMES A DAY. TAKE ON FULL STOMACH    cyclobenzaprine (FLEXERIL) 10 mg tablet TAKE 1 TAB BY MOUTH THREE (3) TIMES DAILY AS NEEDED FOR MUSCLE SPASM(S).  lithium carbonate SR (LITHOBID) 300 mg CR tablet     nitrofurantoin, macrocrystal-monohydrate, (MACROBID) 100 mg capsule TAKE ONE CAPSULE BY MOUTH EVERY DAY TO PREVENT URINARY TRACT INFECTIONS    butalbital-aspirin-caffeine (FIORINAL) -40 mg tablet Take 1 Tab by mouth every four (4) hours as needed for Pain. Max Daily Amount: 6 Tabs.  zolpidem (AMBIEN) 5 mg tablet Take  by mouth nightly as needed for Sleep. No current facility-administered medications for this visit. Vitals:    09/26/17 1320   BP: 122/72   Pulse: 94   Resp: 20   SpO2: 99%   Weight: 237 lb 6.4 oz (107.7 kg)   Height: 5' 6\" (1.676 m)       I have reviewed the nurses notes, vitals, problem list, allergy list, medical history, family, social history and medications. Review of Symptoms:    General: Pt denies excessive weight gain or loss. Pt is able to conduct ADL's  HEENT: Denies blurred vision, headaches, epistaxis and difficulty swallowing. Respiratory: Denies shortness of breath, BIANCHI, wheezing or stridor.   Cardiovascular: Denies precordial pain, palpitations, edema or PND  Gastrointestinal: Denies poor appetite, indigestion, abdominal pain or blood in stool  Urinary: Denies dysuria, pyuria  Musculoskeletal: Denies pain or swelling from muscles or joints  Neurologic: Denies tremor, paresthesias, or sensory motor disturbance  Skin: Denies rash, itching or texture change. Psych: Denies depression      Physical Exam:      General: Well developed, in no acute distress. HEENT: Eyes - PERRL, no jvd  Heart:  Normal S1/S2 negative S3 or S4. Regular, no murmur, gallop or rub.   Respiratory: Clear bilaterally x 4, no wheezing or rales  Abdomen:   Soft, non-tender, bowel sounds are active.   Extremities:  No edema, normal cap refill, no cyanosis. Musculoskeletal: No clubbing  Neuro: A&Ox3, speech clear, gait stable. Skin: Skin color is normal. No rashes or lesions.  Non diaphoretic  Vascular: 2+ pulses symmetric in all extremities    Cardiographics    Ekg: a pacing with pvcs    Pacer - no AF    Results for orders placed or performed during the hospital encounter of 01/28/15   EKG, 12 LEAD, INITIAL   Result Value Ref Range    Ventricular Rate 83 BPM    Atrial Rate 83 BPM    P-R Interval 174 ms    QRS Duration 124 ms    Q-T Interval 440 ms    QTC Calculation (Bezet) 517 ms    Calculated P Axis 81 degrees    Calculated R Axis 23 degrees    Calculated T Axis -155 degrees    Diagnosis       Sinus rhythm  Nonspecific intraventricular conduction delay  ST & T wave abnormality, consider inferior ischemia  ST & T wave abnormality, consider anterolateral ischemia  When compared with ECG of 29-JUL-2014 05:23,    LA interval has decreased  Confirmed by Jermaine Ross ([id number]) on 1/29/2015 5:42:05 AM   Results for orders placed or performed in visit on 08/19/14   CARDIAC HOLTER MONITOR, 24 HOURS    Narrative    ECG Monitor/24 hours, Complete    Reason for Holter Monitor   A-FIB    Heartbeat    Slowest 42  Average 60  Fastest  99        Results:   Underlying Rhythm: Normal sinus rhythm      Atrial Arrhythmias: premature atrial contractions; occasional,  atrial couplets and atrial triplets            AV Conduction: normal    Ventricular Arrhythmias: premature ventricular contractions;  occasional     ST Segment Analysis:normal     Symptom Correlation:  Headache did not correlate with any arrhythmias. Fatigue/dizziness correlated with sinus bradycardia in the 40s    Comment:   Sinus rhythm with symptomatic sinus bradycardia. Clinical  correlation advised. Sol Rogel MD, [facility]            Lab Results   Component Value Date/Time    WBC 6.4 08/08/2016 10:09 AM    HGB 14.1 08/08/2016 10:09 AM    HCT 43.6 08/08/2016 10:09 AM    PLATELET 388 27/06/0033 10:09 AM    MCV 91 08/08/2016 10:09 AM      Lab Results   Component Value Date/Time    Sodium 142 04/05/2017 09:31 AM    Potassium 4.5 04/05/2017 09:31 AM    Chloride 105 04/05/2017 09:31 AM    CO2 20 04/05/2017 09:31 AM    Anion gap 9 01/30/2015 06:00 AM    Glucose 104 04/05/2017 09:31 AM    BUN 19 04/05/2017 09:31 AM    Creatinine 0.68 04/05/2017 09:31 AM    BUN/Creatinine ratio 28 04/05/2017 09:31 AM    GFR est  04/05/2017 09:31 AM    GFR est non-AA 90 04/05/2017 09:31 AM    Calcium 9.7 04/05/2017 09:31 AM    Bilirubin, total 0.2 04/05/2017 09:31 AM    AST (SGOT) 17 04/05/2017 09:31 AM    Alk. phosphatase 80 04/05/2017 09:31 AM    Protein, total 6.5 04/05/2017 09:31 AM    Albumin 4.1 04/05/2017 09:31 AM    Globulin 3.3 01/26/2015 04:10 PM    A-G Ratio 1.7 04/05/2017 09:31 AM    ALT (SGPT) 17 04/05/2017 09:31 AM         Assessment:     Assessment:        ICD-10-CM ICD-9-CM    1. Atrial fibrillation, unspecified type ([facility] I48.91 427.31 AMB POC EKG ROUTINE W/ 12 LEADS, INTER & REP   2. SSS (sick sinus syndrome) ([facility]) I49.5 427.81      Orders Placed This Encounter    AMB POC EKG ROUTINE W/ 12 LEADS, INTER & REP     Order Specific Question:   Reason for Exam:     Answer:   Routine    diclofenac EC (VOLTAREN) 75 mg EC tablet     Sig: TAKE 1 TABLET (75 MG TOTAL) BY MOUTH 2 (TWO) TIMES A DAY.  TAKE ON FULL STOMACH     Refill:  2    tolterodine ER (DETROL LA) 4 mg ER capsule     Sig: TAKE 1 CAPSULE BY MOUTH DAILY Refill:  99    cyclobenzaprine (FLEXERIL) 10 mg tablet     Sig: TAKE 1 TAB BY MOUTH THREE (3) TIMES DAILY AS NEEDED FOR MUSCLE SPASM(S). Refill:  1    lithium carbonate SR (LITHOBID) 300 mg CR tablet    nitrofurantoin, macrocrystal-monohydrate, (MACROBID) 100 mg capsule     Sig: TAKE ONE CAPSULE BY MOUTH EVERY DAY TO PREVENT URINARY TRACT INFECTIONS     Refill:  10    LIDOCAINE VISCOUS 2 % solution    fluconazole (DIFLUCAN) 150 mg tablet        Plan:   Ms Austine Hammans is A paced for her sss and asymptomatic. She has not had any further AF. She will cont on her [address]. She will follow up in the device clinic per routine and with me in one year. Continue medical management for htn, AF and SSS. Thank you for allowing me to participate in Jacob Nguyen 's care.     Dinah Trejo MD, [facility]

## 2017-09-26 NOTE — MR AVS SNAPSHOT
Visit Information Date & Time Provider Department Dept. Phone Encounter #  
 9/26/2017  1:30 PM Maurilio Jaime, [address] [facility] [phone] [id number] Your Appointments 9/28/2017  3:10 PM  
New Patient with Philomena Carmichael MD  
[facility] Dr[address] Marco Antonio [facility] ([facility]) Appt Note: hernia, ref by yuriy reyna, medicare,mut of [address], [address], [address] [phone]  
[address], [address], [address] [facility] [address] [phone]  
  
    
 10/6/2017  1:30 PM  
ROUTINE CARE with Jose Hay MD  
[facility] Appt Note: 6 month follow up  
 [facility] [address] [facility]  
[phone]  
  
   
 [facility] [address] [facility] [address] [facility]  
  
    
 2/8/2018  3:00 PM  
Follow Up with Irina Alvarez MD  
[facility]) Appt Note: Follow up $0CP tdb 8/9/17  
 [address] [facility], 
[address] [place] [address], [address] [phone]  
[address] [facility], [address] [place] [address], [address] [place] [address] [address] [phone] Upcoming Health Maintenance Date Due DTaP/Tdap/Td series (1 - Tdap) 1/8/1970 ZOSTER VACCINE AGE 60> 11/8/2008 GLAUCOMA SCREENING Q2Y 1/8/2014 OSTEOPOROSIS SCREENING (DEXA) 1/8/2014 INFLUENZA AGE 9 TO ADULT 8/1/2017 Pneumococcal 65+ Low/Medium Risk (2 of 2 - PPSV23) 10/16/2017 MEDICARE YEARLY EXAM 1/6/2018 BREAST CANCER SCRN MAMMOGRAM 6/1/2019 COLONOSCOPY 9/1/2019 Allergies as of 9/26/2017  Review Complete On: 9/21/2017 By: Sowmya Field Severity Noted Reaction Type Reactions Latex  03/19/2014    Swelling Sulfa (Sulfonamide Antibiotics)  03/23/2010    Hives Xarelto [Rivaroxaban]  10/20/2014    Other (comments) GI problems Current Immunizations  Reviewed on 6/9/2014 Name Date Influenza Vaccine 10/25/2015, 10/1/2014 Influenza Vaccine (Quad) PF 1/5/2017 Influenza Vaccine PF 11/15/2013 Influenza Vaccine Split 10/16/2012 ZZZ-RETIRED (DO NOT USE) Pneumococcal Vaccine (Unspecified Type) 10/16/2012 Not reviewed this visit You Were Diagnosed With   
  
 Codes Comments Atrial fibrillation, unspecified type ([facility]    -  Primary ICD-10-CM: I48.91 
ICD-9-CM: 427.31 Vitals BP Pulse Resp Height(growth percentile) Weight(growth percentile) SpO2  
 122/72 (BP 1 Location: Left arm, BP Patient Position: Sitting) 94 20 5' 6\" (1.676 m) 237 lb 6.4 oz (107.7 kg) 99% BMI OB Status Smoking Status 38.32 kg/m2 Hysterectomy Never Smoker Vitals History BMI and BSA Data Body Mass Index Body Surface Area  
 38.32 kg/m 2 2.24 m 2 Preferred Pharmacy Pharmacy Name Phone [id number]- [place], [place] - [address] [phone] Your Updated Medication List  
  
   
This list is accurate as of: 9/26/17  1:50 PM.  Always use your most recent med list.  
  
  
  
  
 AMBIEN 5 mg tablet Generic drug:  zolpidem Take  by mouth nightly as needed for Sleep. butalbital-aspirin-caffeine -40 mg tablet Commonly known as:  Alejo Schein Take 1 Tab by mouth every four (4) hours as needed for Pain. Max Daily Amount: 6 Tabs. cyclobenzaprine 10 mg tablet Commonly known as:  FLEXERIL  
TAKE 1 TAB BY MOUTH THREE (3) TIMES DAILY AS NEEDED FOR MUSCLE SPASM(S). CYMBALTA 30 mg capsule Generic drug:  DULoxetine Take 60 mg by mouth daily. diclofenac EC 75 mg EC tablet Commonly known as:  VOLTAREN  
TAKE 1 TABLET (75 MG TOTAL) BY MOUTH 2 (TWO) TIMES A DAY. TAKE ON FULL STOMACH  
  
 ergocalciferol 50,000 unit capsule Commonly known as:  ERGOCALCIFEROL  
TAKE 1 CAP BY MOUTH EVERY SEVEN (7) DAYS. fluconazole 150 mg tablet Commonly known as:  DIFLUCAN  
  
 hydrOXYzine HCl 25 mg tablet Commonly known as:  ATARAX Take 1 Tab by mouth three (3) times daily as needed for Itching for up to 10 days. levothyroxine 50 mcg tablet Commonly known as:  SYNTHROID  
TAKE 1 TABLET EVERY DAY  
  
 LIDOCAINE VISCOUS 2 % solution Generic drug:  lidocaine * lithium carbonate 300 mg tablet Take 300 mg by mouth nightly. * lithium carbonate  mg CR tablet Commonly known as:  LITHOBID  
  
 LOMOTIL 2.5-0.025 mg per tablet Generic drug:  diphenoxylate-atropine Take  by mouth four (4) times daily as needed for Diarrhea.  
  
 montelukast 10 mg tablet Commonly known as:  SINGULAIR Take 1 Tab by mouth daily. nitrofurantoin (macrocrystal-monohydrate) 100 mg capsule Commonly known as:  MACROBID  
TAKE ONE CAPSULE BY MOUTH EVERY DAY TO PREVENT URINARY TRACT INFECTIONS PRADAXA 150 mg capsule Generic drug:  dabigatran etexilate TAKE ONE CAPSULE TWICE A DAY  
  
 predniSONE 10 mg dose pack Commonly known as:  STERAPRED DS See administration instruction per 10mg dose pack  
  
 pregabalin 75 mg capsule Commonly known as:  Evins Anne Take 1 Cap by mouth two (2) times a day. Max Daily Amount: 150 mg. Indications: FIBROMYALGIA  
  
 rizatriptan 10 mg tablet Commonly known as:  Elodia Forward Take the one on the onset of migraine may take a second in 40 minutes if the migraine does not begin to subside  
  
 tolterodine ER 4 mg ER capsule Commonly known as:  DETROL LA  
TAKE 1 CAPSULE BY MOUTH DAILY  
  
 TOPAMAX 100 mg tablet Generic drug:  topiramate Take 100 mg by mouth nightly. XANAX 0.5 mg tablet Generic drug:  ALPRAZolam  
Take 0.5 mg by mouth daily. * Notice: This list has 2 medication(s) that are the same as other medications prescribed for you. Read the directions carefully, and ask your doctor or other care provider to review them with you. We Performed the Following AMB POC EKG ROUTINE W/ 12 LEADS, INTER & REP [71312 CPT(R)] Introducing [facility]! Dear Maikol Ordoñez: Thank you for requesting a [employer] account. Our records indicate that you already have an active [employer] account. You can access your account anytime at [email or web address] Did you know that you can access your hospital and ER discharge instructions at any time in [employer]? You can also review all of your test results from your hospital stay or ER visit. Additional Information If you have questions, please visit the Frequently Asked Questions section of the [employer] website at [email or web address] Remember, [employer] is NOT to be used for urgent needs. For medical emergencies, dial 911. Now available from your iPhone and Android! Please provide this summary of care documentation to your next provider. Your primary care clinician is listed as [place]. If you have any questions after today's visit, please call [phone].

## 2017-09-27 RX ORDER — HYDROCODONE BITARTRATE AND ACETAMINOPHEN 5; 325 MG/1; MG/1
TABLET ORAL
Status: CANCELLED | OUTPATIENT
Start: 2017-09-27

## 2017-09-27 RX ORDER — HYDROCODONE BITARTRATE AND ACETAMINOPHEN 5; 325 MG/1; MG/1
1 TABLET ORAL
Qty: 30 TAB | Refills: 0 | Status: SHIPPED | OUTPATIENT
Start: 2017-09-27 | End: 2017-10-23 | Stop reason: SDUPTHER

## 2017-09-27 NOTE — TELEPHONE ENCOUNTER
I called pt, no answer- left voice message that script that she request will be ready for  on 9/28/17 at 8am.    Requested Prescriptions     Signed Prescriptions Disp Refills    HYDROcodone-acetaminophen (NORCO) 5-325 mg per tablet 30 Tab 0     Sig: Take 1 Tab by mouth every eight (8) hours as needed for Pain. Max Daily Amount: 3 Tabs.      Authorizing Provider: Nurys Onofre

## 2017-10-16 ENCOUNTER — OFFICE VISIT (OUTPATIENT)
Dept: SURGERY | Age: 67
End: 2017-10-16

## 2017-10-19 ENCOUNTER — HOSPITAL ENCOUNTER (OUTPATIENT)
Dept: LAB | Age: 67
Discharge: HOME | End: 2017-10-19
Payer: MEDICARE

## 2017-10-19 ENCOUNTER — OFFICE VISIT (OUTPATIENT)
Dept: SURGERY | Age: 67
End: 2017-10-19

## 2017-10-19 ENCOUNTER — APPOINTMENT (OUTPATIENT)
Dept: INTERNAL MEDICINE CLINIC | Age: 67
End: 2017-10-19

## 2017-10-19 ENCOUNTER — TELEPHONE (OUTPATIENT)
Dept: CARDIOLOGY CLINIC | Age: 67
End: 2017-10-19

## 2017-10-19 VITALS
BODY MASS INDEX: 37.61 KG/M2 | SYSTOLIC BLOOD PRESSURE: 131 MMHG | OXYGEN SATURATION: 97 % | WEIGHT: 234 LBS | RESPIRATION RATE: 16 BRPM | HEIGHT: 66 IN | HEART RATE: 81 BPM | DIASTOLIC BLOOD PRESSURE: 48 MMHG

## 2017-10-19 DIAGNOSIS — E66.9 OBESITY, CLASS II, BMI 35-39.9: ICD-10-CM

## 2017-10-19 DIAGNOSIS — K43.2 INCISIONAL HERNIA, WITHOUT OBSTRUCTION OR GANGRENE: Primary | ICD-10-CM

## 2017-10-19 PROCEDURE — 36415 COLL VENOUS BLD VENIPUNCTURE: CPT

## 2017-10-19 PROCEDURE — 80053 COMPREHEN METABOLIC PANEL: CPT

## 2017-10-19 PROCEDURE — 84443 ASSAY THYROID STIM HORMONE: CPT

## 2017-10-19 PROCEDURE — 85025 COMPLETE CBC W/AUTO DIFF WBC: CPT

## 2017-10-19 PROCEDURE — 83036 HEMOGLOBIN GLYCOSYLATED A1C: CPT

## 2017-10-19 PROCEDURE — 80061 LIPID PANEL: CPT

## 2017-10-19 NOTE — TELEPHONE ENCOUNTER
Ronnell Austin from [facility], clearance note in connect care and when can she stop taking her prodaxa.     Thanks

## 2017-10-19 NOTE — PROGRESS NOTES
HISTORY OF PRESENT ILLNESS  Lara Harper is a 75 y.o. female.   HPI Comments:   Hernia    Hx of IBS with pain  Hard to tell how much is attributed to the hernia    BMs chronic diarrhea    Alpha gal allergy: cannot eat red meat or dairy    Wt about the same    Has pacemaker, on Pradaxa        ____________________________________________________________________________  Patient presents with:  Possible Hernia: umbilical hernia- seen at the request Dr Tameka Giordano    /48 (BP 1 Location: Left arm, BP Patient Position: Sitting)  Pulse 81  Resp 16  Ht 5' 6\" (1.676 m)  Wt 106.1 kg (234 lb)  SpO2 97%  BMI 37.77 kg/m2  Past Medical History:  No date: Arrhythmia      Comment: atrial fibrillation 2014  No date: Arthritis  3/31/2010: Bipolar affective disorder ([address]  3/31/2010: Colon polyps  No date: Depression  7/19/2013: Diarrhea  6/5/2015: Epigastric pain  No date: GERD (gastroesophageal reflux disease)      Comment: occasiional only; controlled with med  3/31/2010: Hyperlipidemia  3/31/2010: Hypothyroidism  No date: Long term current use of anticoagulant therapy  3/31/2010: Migraine  No date: Psychotic disorder  5/13/2014: S/P ablation of atrial fibrillation  9/16/2014: S/P cardiac pacemaker procedure      Comment: 9/16/14 Medtronic MRI compatible dual chamber                pacemaker implant  No date: Sleep apnea  No date: Stool color black  No date: Thyroid disease  No date: Tick-borne disease      Comment: Alpha Gal allergy  No date: Unspecified sleep apnea      Comment: \"slight\" uses mouth device  3/31/2010: Urge incontinence  3/31/2015: Vitamin D deficiency  Past Surgical History:  5/2014: CARDIAC SURG PROCEDURE UNLIST      Comment: ablation  No date: HX BUNIONECTOMY  No date: HX HYSTERECTOMY  No date: HX ORTHOPAEDIC      Comment: removed bone spurs from R & L hand  1990s?: HX ORTHOPAEDIC      Comment: left bunionectomy   07/2014: HX OTHER SURGICAL      Comment: 2 shocks to heart & ablations  2014: HX PACEMAKER  2015: HX PACEMAKER PLACEMENT  No date: HX TONSILLECTOMY  No date: HX TONSILLECTOMY  Social History    Marital status:              Spouse name:                       Years of education:                 Number of children:               Social History Main Topics    Smoking status: Never Smoker                                                                Smokeless status: Never Used                        Alcohol use: Yes                Comment: occasional    Drug use: No              Sexual activity: No                     Review of patient's family history indicates:    Arrhythmia                     Mother                      Comment: afib    Stroke                         Mother                    Heart Failure                  Mother                    Colon Cancer                   Father                    Heart Disease                  Father                    Arrhythmia                     Brother                     Comment: afib    Asthma                         Brother                   COPD                           Brother                   Current Outpatient Prescriptions:  HYDROcodone-acetaminophen (Rima Mura) 5-325 mg per tablet, Take 1 Tab by mouth every eight (8) hours as needed for Pain. Max Daily Amount: 3 Tabs. lithium carbonate SR (LITHOBID) 300 mg CR tablet,   nitrofurantoin, macrocrystal-monohydrate, (MACROBID) 100 mg capsule, TAKE ONE CAPSULE BY MOUTH EVERY DAY TO PREVENT URINARY TRACT INFECTIONS  fluconazole (DIFLUCAN) 150 mg tablet,   PRADAXA 150 mg capsule, TAKE ONE CAPSULE TWICE A DAY  ergocalciferol (ERGOCALCIFEROL) 50,000 unit capsule, TAKE 1 CAP BY MOUTH EVERY SEVEN (7) DAYS.  levothyroxine (SYNTHROID) 50 mcg tablet, TAKE 1 TABLET EVERY DAY  pregabalin (LYRICA) 75 mg capsule, Take 1 Cap by mouth two (2) times a day. Max Daily Amount: 150 mg.  Indications: FIBROMYALGIA  rizatriptan (MAXALT) 10 mg tablet, Take the one on the onset of migraine may take a second in 40 minutes if the migraine does not begin to subside  topiramate (TOPAMAX) 100 mg tablet, Take 100 mg by mouth nightly. lithium 300 mg tablet, Take 300 mg by mouth nightly. alprazolam (XANAX) 0.5 mg tablet, Take 0.5 mg by mouth daily. diclofenac EC (VOLTAREN) 75 mg EC tablet, TAKE 1 TABLET (75 MG TOTAL) BY MOUTH 2 (TWO) TIMES A DAY. TAKE ON FULL STOMACH  tolterodine ER (DETROL LA) 4 mg ER capsule, TAKE 1 CAPSULE BY MOUTH DAILY  cyclobenzaprine (FLEXERIL) 10 mg tablet, TAKE 1 TAB BY MOUTH THREE (3) TIMES DAILY AS NEEDED FOR MUSCLE SPASM(S). LIDOCAINE VISCOUS 2 % solution,   predniSONE (STERAPRED DS) 10 mg dose pack, See administration instruction per 10mg dose pack  montelukast (SINGULAIR) 10 mg tablet, Take 1 Tab by mouth daily. butalbital-aspirin-caffeine (FIORINAL) -40 mg tablet, Take 1 Tab by mouth every four (4) hours as needed for Pain. Max Daily Amount: 6 Tabs.  zolpidem (AMBIEN) 5 mg tablet, Take  by mouth nightly as needed for Sleep. DULoxetine (CYMBALTA) 30 mg capsule, Take 60 mg by mouth daily. diphenoxylate-atropine (LOMOTIL) 2.5-0.025 mg per tablet, Take  by mouth four (4) times daily as needed for Diarrhea. No current facility-administered medications for this visit. Allergies:  -- Latex -- Swelling   -- Sulfa (Sulfonamide Antibiotics) -- Hives   -- Xarelto (Rivaroxaban) -- Other (comments)    --  GI problems  _____________________________________________________________________________      Possible Hernia   The history is provided by the patient. This is a new problem. The current episode started more than 1 week ago. The problem occurs constantly. The problem has been gradually worsening. Associated symptoms include abdominal pain. Pertinent negatives include no chest pain, no headaches and no shortness of breath. The symptoms are aggravated by exertion. The symptoms are relieved by rest. The treatment provided no relief.        Review of Systems   Constitutional: Negative for chills, fever and weight loss. HENT: Negative for ear pain. Eyes: Negative for pain. Respiratory: Negative for shortness of breath. Cardiovascular: Negative for chest pain. Gastrointestinal: Positive for abdominal pain. Negative for blood in stool. Genitourinary: Negative for hematuria. Musculoskeletal: Negative for joint pain. Skin: Negative for rash. Neurological: Negative for dizziness, focal weakness, seizures and headaches. Endo/Heme/Allergies: Does not bruise/bleed easily. Psychiatric/Behavioral: The patient does not have insomnia. Physical Exam   Constitutional: She is oriented to person, place, and time. She appears well-developed and well-nourished. No distress. HENT:   Head: Normocephalic and atraumatic. Mouth/Throat: No oropharyngeal exudate. Eyes: Pupils are equal, round, and reactive to light. Neck: Normal range of motion. No tracheal deviation present. Cardiovascular: Normal rate, regular rhythm and normal heart sounds. No murmur heard. Pulmonary/Chest: Effort normal and breath sounds normal. No respiratory distress. She has no wheezes. Abdominal: Soft. Bowel sounds are normal. She exhibits no distension and no mass. There is tenderness in the periumbilical area. There is no rebound and no guarding. A hernia is present. Obese     Musculoskeletal: Normal range of motion. She exhibits no edema or tenderness. Lymphadenopathy:     She has no cervical adenopathy. Neurological: She is alert and oriented to person, place, and time. Skin: Skin is warm. No rash noted. She is not diaphoretic. No erythema. Psychiatric: She has a normal mood and affect. Her behavior is normal.       ASSESSMENT and PLAN    ICD-10-CM ICD-9-CM    1. Incisional hernia, without obstruction or gangrene K43.2 553.21    2.  Obesity, Class II, BMI 35-39.9 E66.9 278.00       I have recommended to Aneesh Acharya that we proceed with surgery    I had an extensive discussion with her regarding the risks, benefits, and alternatives of proceeding with a(n) Laparoscopic Incisional Hernia Repair with Mesh, robot assisted. Risks,benefits, and alternatives were discussed including the risk of anesthesia, bleeding, infection, injury to bowel, chronic pain, and recurrence were discussed. I reviewed with Naila Nolenting her increased risk of bleeding secondary to pradaxa use. I have asked her to discontinue for 2 days prior to surgery to reduce this risk if ok with Dr. Beverly Workman.    She is in agreement to proceed. All questions were answered. We will schedule her at her earliest convenience. Thank you for this consult.

## 2017-10-19 NOTE — MR AVS SNAPSHOT
Visit Information Date & Time Provider Department Dept. Phone Encounter #  
 10/19/2017 10:10 AM Federico Van MD [facility] [id number] Your Appointments 10/23/2017  2:45 PM  
ROUTINE CARE with Pebbles Valdivia MD  
[facility]) Appt Note: 6 month follow up Thyroid; rs; rs  
 [address] [facility]  
[phone]  
  
   
 [address] [facility]  [address] [facility]  
  
    
 2/8/2018  3:00 PM  
Follow Up with Deepti Edmonds MD  
[facility]) Appt Note: Follow up $0CP tdb 8/9/17  
 [address] [facility], 
[address] [place] [address], [address] [phone]  
[address] [facility], [address] [place] [address], [address] [phone] Upcoming Health Maintenance Date Due DTaP/Tdap/Td series (1 - Tdap) 1/8/1970 ZOSTER VACCINE AGE 60> 11/8/2008 GLAUCOMA SCREENING Q2Y 1/8/2014 OSTEOPOROSIS SCREENING (DEXA) 1/8/2014 INFLUENZA AGE 9 TO ADULT 8/1/2017 Pneumococcal 65+ Low/Medium Risk (2 of 2 - PPSV23) 10/16/2017 MEDICARE YEARLY EXAM 1/6/2018 BREAST CANCER SCRN MAMMOGRAM 6/1/2019 COLONOSCOPY 9/1/2019 Allergies as of 10/19/2017  Review Complete On: 10/19/2017 By: eVrn Li Severity Noted Reaction Type Reactions Latex  03/19/2014    Swelling Sulfa (Sulfonamide Antibiotics)  03/23/2010    Hives Xarelto [Rivaroxaban]  10/20/2014    Other (comments) GI problems Current Immunizations  Reviewed on 6/9/2014 Name Date Influenza Vaccine 10/25/2015, 10/1/2014 Influenza Vaccine (Quad) PF 1/5/2017 Influenza Vaccine PF 11/15/2013 Influenza Vaccine Split 10/16/2012 ZZZ-RETIRED (DO NOT USE) Pneumococcal Vaccine (Unspecified Type) 10/16/2012 Not reviewed this visit Vitals BP Pulse Resp Height(growth percentile) Weight(growth percentile) SpO2 131/48 (BP 1 Location: Left arm, BP Patient Position: Sitting) 81 16 5' 6\" (1.676 m) 234 lb (106.1 kg) 97% BMI OB Status Smoking Status 37.77 kg/m2 Hysterectomy Never Smoker BMI and BSA Data Body Mass Index Body Surface Area  
 37.77 kg/m 2 2.22 m 2 Preferred Pharmacy Pharmacy Name Phone [id number] [address] [phone] Your Updated Medication List  
  
   
This list is accurate as of: 10/19/17 10:53 AM.  Always use your most recent med list.  
  
  
  
  
 ergocalciferol 50,000 unit capsule Commonly known as:  ERGOCALCIFEROL  
TAKE 1 CAP BY MOUTH EVERY SEVEN (7) DAYS. fluconazole 150 mg tablet Commonly known as:  DIFLUCAN  
  
 HYDROcodone-acetaminophen 5-325 mg per tablet Commonly known as:  Rima Mura Take 1 Tab by mouth every eight (8) hours as needed for Pain. Max Daily Amount: 3 Tabs. levothyroxine 50 mcg tablet Commonly known as:  SYNTHROID  
TAKE 1 TABLET EVERY DAY  
  
 * lithium carbonate 300 mg tablet Take 300 mg by mouth nightly. * lithium carbonate  mg CR tablet Commonly known as:  LITHOBID  
  
 nitrofurantoin (macrocrystal-monohydrate) 100 mg capsule Commonly known as:  MACROBID  
TAKE ONE CAPSULE BY MOUTH EVERY DAY TO PREVENT URINARY TRACT INFECTIONS PRADAXA 150 mg capsule Generic drug:  dabigatran etexilate TAKE ONE CAPSULE TWICE A DAY  
  
 pregabalin 75 mg capsule Commonly known as:  [place] Schlatter Take 1 Cap by mouth two (2) times a day. Max Daily Amount: 150 mg. Indications: FIBROMYALGIA  
  
 rizatriptan 10 mg tablet Commonly known as:  [place] Take the one on the onset of migraine may take a second in 40 minutes if the migraine does not begin to subside TOPAMAX 100 mg tablet Generic drug:  topiramate Take 100 mg by mouth nightly. XANAX 0.5 mg tablet Generic drug:  ALPRAZolam  
Take 0.5 mg by mouth daily. * Notice: This list has 2 medication(s) that are the same as other medications prescribed for you. Read the directions carefully, and ask your doctor or other care provider to review them with you. Patient Instructions Will check with  regarding pacemaker and when to stop Pradaxa prior to scheduling hernia surgery. Introducing [facility] SERVICES! Dear Jenene Leyden: Thank you for requesting a [employer] account. Our records indicate that you already have an active [employer] account. You can access your account anytime at [email or web address] Did you know that you can access your hospital and ER discharge instructions at any time in [employer]? You can also review all of your test results from your hospital stay or ER visit. Additional Information If you have questions, please visit the Frequently Asked Questions section of the [employer] website at [email or web address] Remember, [employer] is NOT to be used for urgent needs. For medical emergencies, dial 911. Now available from your iPhone and Android! Please provide this summary of care documentation to your next provider. Your primary care clinician is listed as [place]. If you have any questions after today's visit, please call [phone].

## 2017-10-20 ENCOUNTER — TELEPHONE (OUTPATIENT)
Dept: SURGERY | Age: 67
End: 2017-10-20

## 2017-10-20 PROBLEM — K43.2 INCISIONAL HERNIA, WITHOUT OBSTRUCTION OR GANGRENE: Status: ACTIVE | Noted: 2017-10-20

## 2017-10-20 PROBLEM — E66.9 OBESITY, CLASS II, BMI 35-39.9: Status: ACTIVE | Noted: 2017-10-20

## 2017-10-20 LAB
ALBUMIN SERPL-MCNC: 4.3 G/DL (ref 3.6–4.8)
ALBUMIN/GLOB SERPL: 2 {RATIO} (ref 1.2–2.2)
ALP SERPL-CCNC: 67 IU/L (ref 39–117)
ALT SERPL-CCNC: 16 IU/L (ref 0–32)
AST SERPL-CCNC: 13 IU/L (ref 0–40)
BASOPHILS # BLD AUTO: 0 X10E3/UL (ref 0–0.2)
BASOPHILS NFR BLD AUTO: 1 %
BILIRUB SERPL-MCNC: 0.5 MG/DL (ref 0–1.2)
BUN SERPL-MCNC: 20 MG/DL (ref 8–27)
BUN/CREAT SERPL: 23 (ref 12–28)
CALCIUM SERPL-MCNC: 9.7 MG/DL (ref 8.7–10.3)
CHLORIDE SERPL-SCNC: 109 MMOL/L (ref 96–106)
CHOLEST SERPL-MCNC: 207 MG/DL (ref 100–199)
CO2 SERPL-SCNC: 23 MMOL/L (ref 18–29)
CREAT SERPL-MCNC: 0.88 MG/DL (ref 0.57–1)
EOSINOPHIL # BLD AUTO: 0.3 X10E3/UL (ref 0–0.4)
EOSINOPHIL NFR BLD AUTO: 5 %
ERYTHROCYTE [DISTWIDTH] IN BLOOD BY AUTOMATED COUNT: 13.6 % (ref 12.3–15.4)
EST. AVERAGE GLUCOSE BLD GHB EST-MCNC: 111 MG/DL
GFR SERPLBLD CREATININE-BSD FMLA CKD-EPI: 68 ML/MIN/1.73
GFR SERPLBLD CREATININE-BSD FMLA CKD-EPI: 78 ML/MIN/1.73
GLOBULIN SER CALC-MCNC: 2.2 G/DL (ref 1.5–4.5)
GLUCOSE SERPL-MCNC: 106 MG/DL (ref 65–99)
HBA1C MFR BLD: 5.5 % (ref 4.8–5.6)
HCT VFR BLD AUTO: 43.9 % (ref 34–46.6)
HDLC SERPL-MCNC: 53 MG/DL
HGB BLD-MCNC: 14.5 G/DL (ref 11.1–15.9)
IMM GRANULOCYTES # BLD: 0 X10E3/UL (ref 0–0.1)
IMM GRANULOCYTES NFR BLD: 0 %
LDLC SERPL CALC-MCNC: 118 MG/DL (ref 0–99)
LYMPHOCYTES # BLD AUTO: 2.1 X10E3/UL (ref 0.7–3.1)
LYMPHOCYTES NFR BLD AUTO: 37 %
MCH RBC QN AUTO: 30.5 PG (ref 26.6–33)
MCHC RBC AUTO-ENTMCNC: 33 G/DL (ref 31.5–35.7)
MCV RBC AUTO: 92 FL (ref 79–97)
MONOCYTES # BLD AUTO: 0.5 X10E3/UL (ref 0.1–0.9)
MONOCYTES NFR BLD AUTO: 8 %
NEUTROPHILS # BLD AUTO: 2.9 X10E3/UL (ref 1.4–7)
NEUTROPHILS NFR BLD AUTO: 49 %
PLATELET # BLD AUTO: 257 X10E3/UL (ref 150–379)
POTASSIUM SERPL-SCNC: 4.6 MMOL/L (ref 3.5–5.2)
PROT SERPL-MCNC: 6.5 G/DL (ref 6–8.5)
RBC # BLD AUTO: 4.76 X10E6/UL (ref 3.77–5.28)
SODIUM SERPL-SCNC: 144 MMOL/L (ref 134–144)
TRIGL SERPL-MCNC: 181 MG/DL (ref 0–149)
TSH SERPL DL<=0.005 MIU/L-ACNC: 1.76 UIU/ML (ref 0.45–4.5)
VLDLC SERPL CALC-MCNC: 36 MG/DL (ref 5–40)
WBC # BLD AUTO: 5.8 X10E3/UL (ref 3.4–10.8)

## 2017-10-20 NOTE — TELEPHONE ENCOUNTER
Please advise when patient can stop her Pradaxa prior to her Laparoscopic Incisional Hernia Repair with Mesh, robot assisted. Last 2 PM checks on 9/14/17 and 9/26/17 - 1 ATR, longest was 1 min 29 sec.

## 2017-10-20 NOTE — TELEPHONE ENCOUNTER
Advice Only            Ye Johnson NP   You 3 hours ago (11:37 AM)                 Ok to hold 2 days prior to surgery and resume as soon as possible. (Routing comment)                        You routed conversation to Ye Johnson NP 5 hours ago (8:53 AM)                       You 5 hours ago (8:49 AM)                 Please advise when patient can stop her Pradaxa prior to her Laparoscopic Incisional Hernia Repair with Mesh, robot assisted.     Last 2 PM checks on 9/14/17 and 9/26/17 - 1 ATR, longest was 1 min 29 sec.

## 2017-10-23 ENCOUNTER — OFFICE VISIT (OUTPATIENT)
Dept: INTERNAL MEDICINE CLINIC | Age: 67
End: 2017-10-23

## 2017-10-23 VITALS
WEIGHT: 235 LBS | HEART RATE: 83 BPM | SYSTOLIC BLOOD PRESSURE: 105 MMHG | OXYGEN SATURATION: 97 % | DIASTOLIC BLOOD PRESSURE: 52 MMHG | RESPIRATION RATE: 18 BRPM | BODY MASS INDEX: 37.77 KG/M2 | TEMPERATURE: 97.9 F | HEIGHT: 66 IN

## 2017-10-23 DIAGNOSIS — E78.2 MIXED HYPERLIPIDEMIA: ICD-10-CM

## 2017-10-23 DIAGNOSIS — E03.9 HYPOTHYROIDISM, UNSPECIFIED TYPE: Primary | ICD-10-CM

## 2017-10-23 DIAGNOSIS — G43.019 INTRACTABLE MIGRAINE WITHOUT AURA AND WITHOUT STATUS MIGRAINOSUS: ICD-10-CM

## 2017-10-23 DIAGNOSIS — R73.9 HYPERGLYCEMIA: ICD-10-CM

## 2017-10-23 DIAGNOSIS — K21.9 GASTROESOPHAGEAL REFLUX DISEASE WITHOUT ESOPHAGITIS: ICD-10-CM

## 2017-10-23 DIAGNOSIS — Z23 ENCOUNTER FOR IMMUNIZATION: ICD-10-CM

## 2017-10-23 DIAGNOSIS — F31.70 BIPOLAR AFFECTIVE DISORDER IN REMISSION (HCC): ICD-10-CM

## 2017-10-23 DIAGNOSIS — E55.9 VITAMIN D DEFICIENCY: ICD-10-CM

## 2017-10-23 RX ORDER — NARATRIPTAN 2.5 MG/1
2.5 TABLET ORAL
Qty: 12 TAB | Refills: 11 | Status: SHIPPED | OUTPATIENT
Start: 2017-10-23 | End: 2018-05-25

## 2017-10-23 RX ORDER — HYDROCODONE BITARTRATE AND ACETAMINOPHEN 5; 325 MG/1; MG/1
1 TABLET ORAL
Qty: 30 TAB | Refills: 0 | Status: SHIPPED | OUTPATIENT
Start: 2017-10-23 | End: 2017-11-20 | Stop reason: SDUPTHER
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
## 2017-10-23 NOTE — MR AVS SNAPSHOT
Visit Information Date & Time Provider Department Dept. Phone Encounter #  
 10/23/2017  2:45 PM Maddy Ward, [address] [phone] [id number] Follow-up Instructions Return in about 6 months (around 4/23/2018) for thyroid, etc.  
  
Your Appointments 12/18/2017  1:30 PM  
POST OP 10 MIN with Lamond Krabbe, MD  
[facility] Dr. Marco nAtonio Bynum [facility] ([address] [facility] [facility]) Appt Note: Post/op Davinci incisional HR with mesh on 12/4/17  
 [address] [facility], [address] [facility], [address] [phone]  
[address], [address], [address] [facility], [address] [facility] [address] [address]  
  
    
 2/8/2018  3:00 PM  
Follow Up with Ivan Yanez MD  
[facility] at [facility] [address] [facility]) Appt Note: Follow up $0CP tdb 8/9/17  
 [address] [facility], 
[address] [facility], [address] [phone]  
[address] , [address] [place] [address], [address] [phone] Upcoming Health Maintenance Date Due DTaP/Tdap/Td series (1 - Tdap) 1/8/1970 ZOSTER VACCINE AGE 60> 11/8/2008 GLAUCOMA SCREENING Q2Y 1/8/2014 OSTEOPOROSIS SCREENING (DEXA) 1/8/2014 INFLUENZA AGE 9 TO ADULT 8/1/2017 Pneumococcal 65+ Low/Medium Risk (2 of 2 - PPSV23) 10/16/2017 MEDICARE YEARLY EXAM 1/6/2018 BREAST CANCER SCRN MAMMOGRAM 6/1/2019 COLONOSCOPY 9/1/2019 Allergies as of 10/23/2017  Review Complete On: 10/23/2017 By: Maddy Ward MD  
  
 Severity Noted Reaction Type Reactions Latex  03/19/2014    Swelling Sulfa (Sulfonamide Antibiotics)  03/23/2010    Hives Xarelto [Rivaroxaban]  10/20/2014    Other (comments) GI problems Current Immunizations  Reviewed on 6/9/2014 Name Date Influenza Vaccine 10/25/2015, 10/1/2014 Influenza Vaccine (Quad) PF  Incomplete, 1/5/2017 Influenza Vaccine PF 11/15/2013 Influenza Vaccine Split 10/16/2012 ZZZ-RETIRED (DO NOT USE) Pneumococcal Vaccine (Unspecified Type) 10/16/2012 Not reviewed this visit You Were Diagnosed With   
  
 Codes Comments Hypothyroidism, unspecified type    -  Primary ICD-10-CM: E03.9 ICD-9-CM: 244.9 Hyperglycemia     ICD-10-CM: R73.9 ICD-9-CM: 790.29 Mixed hyperlipidemia     ICD-10-CM: E78.2 ICD-9-CM: 272.2 Gastroesophageal reflux disease without esophagitis     ICD-10-CM: K21.9 ICD-9-CM: 530.81 Intractable migraine without aura and without status migrainosus     ICD-10-CM: G43.019 
ICD-9-CM: 346.11 Vitamin D deficiency     ICD-10-CM: E55.9 ICD-9-CM: 268.9 Bipolar affective disorder in remission [facility])     ICD-10-CM: F31.70 ICD-9-CM: 296.80 Encounter for immunization     ICD-10-CM: R26 ICD-9-CM: V03.89 Vitals BP Pulse Temp Resp Height(growth percentile) Weight(growth percentile) 105/52 (BP 1 Location: Left arm, BP Patient Position: Sitting) 83 97.9 °F (36.6 °C) (Oral) 18 5' 6\" (1.676 m) 235 lb (106.6 kg) SpO2 BMI OB Status Smoking Status 97% 37.93 kg/m2 Hysterectomy Never Smoker Vitals History BMI and BSA Data Body Mass Index Body Surface Area  
 37.93 kg/m 2 2.23 m 2 Preferred Pharmacy Pharmacy Name Phone [facility]Y [id number], [place] - [address] [phone] Your Updated Medication List  
  
   
This list is accurate as of: 10/23/17  3:26 PM.  Always use your most recent med list.  
  
  
  
  
 ergocalciferol 50,000 unit capsule Commonly known as:  ERGOCALCIFEROL  
TAKE 1 CAP BY MOUTH EVERY SEVEN (7) DAYS. fluconazole 150 mg tablet Commonly known as:  DIFLUCAN  
  
 HYDROcodone-acetaminophen 5-325 mg per tablet Commonly known as:  [address] Take 1 Tab by mouth every eight (8) hours as needed for Pain. Max Daily Amount: 3 Tabs. levothyroxine 50 mcg tablet Commonly known as:  SYNTHROID  
TAKE 1 TABLET EVERY DAY  
  
 lithium carbonate 300 mg tablet Take 300 mg by mouth nightly. naratriptan 2.5 mg Tab Commonly known as:  Morena Scale Take 1 Tab by mouth once as needed for up to 1 dose. PRADAXA 150 mg capsule Generic drug:  dabigatran etexilate TAKE ONE CAPSULE TWICE A DAY  
  
 TOPAMAX 100 mg tablet Generic drug:  topiramate Take 100 mg by mouth nightly. XANAX 0.5 mg tablet Generic drug:  ALPRAZolam  
Take 0.5 mg by mouth daily. Prescriptions Printed Refills HYDROcodone-acetaminophen (NORCO) 5-325 mg per tablet 0 Sig: Take 1 Tab by mouth every eight (8) hours as needed for Pain. Max Daily Amount: 3 Tabs. Class: Print Route: Oral  
  
Prescriptions Sent to Pharmacy Refills  
 naratriptan (AMERGE) 2.5 mg tab 11 Sig: Take 1 Tab by mouth once as needed for up to 1 dose. Class: Normal  
 Pharmacy: [facility]/pharmacy [address] [place] [address] [address] Ph #: [phone] Route: Oral  
  
We Performed the Following INFLUENZA VIRUS VAC QUAD,SPLIT,PRESV FREE SYRINGE IM [id number] CPT(R)] Follow-up Instructions Return in about 6 months (around 4/23/2018) for thyroid, etc.  
  
To-Do List   
 11/27/2017 1:00 PM  
  Appointment with [facility] PAT ROOM P3 at [place] [address] ([phone])  
  
 02/01/2018 Lab:  LIPID PANEL   
  
 02/01/2018 Lab:  METABOLIC PANEL, COMPREHENSIVE   
  
 02/01/2018 Lab:  T4, FREE   
  
 02/01/2018 Lab:  TSH 3RD GENERATION   
  
 02/01/2018 Lab:  VITAMIN D, 25 HYDROXY Patient Instructions Learning About Cutting Calories How do calories affect your weight? Food gives your body energy. Energy from the food you eat is measured in calories. This energy keeps your heart beating, your brain active, and your muscles working. Your body needs a certain number of calories each day.  After your body uses the calories it needs, it stores extra calories as fat. To lose weight safely, you have to eat fewer calories while eating in a healthy way. How many calories do you need each day? The more active you are, the more calories you need. When you are less active, you need fewer calories. How many calories you need each day also depends on several things, including your age and whether you are male or female. Here are some general guidelines for adults: 
· Less active women and older adults need 1,600 to 2,000 calories each day. · Active women and less active men need 2,000 to 2,400 calories each day. · Active men need 2,400 to 3,000 calories each day. How can you cut calories and eat healthy meals? Whole grains, vegetables and fruits, and dried beans are good lower-calorie foods. They give you lots of nutrients and fiber. And they fill you up. Sweets, energy drinks, and soda pop are high in calories. They give you few nutrients and no fiber. Try to limit soda pop, fruit juice, and energy drinks. Drink water instead. Some fats can be part of a healthy diet. But cutting back on fats from highly processed foods like fast foods and many snack foods is a good way to lower the calories in your diet. Also, use smaller amounts of fats like butter, margarine, salad dressing, and mayonnaise. Add fresh garlic, lemon, or herbs to your meals to add flavor without adding fat. Meats and dairy products can be a big source of hidden fats. Try to choose lean or low-fat versions of these products. Fat-free cookies, candies, chips, and frozen treats can still be high in sugar and calories. Some fat-free foods have more calories than regular ones. Eat fat-free treats in moderation, as you would other foods. If your favorite foods are high in fat, salt, sugar, or calories, limit how often you eat them. Eat smaller servings, or look for healthy substitutes. Fill up on fruits, vegetables, and whole grains. Eating at home · Use meat as a side dish instead of as the main part of your meal. 
· Try main dishes that use whole wheat pasta, brown rice, dried beans, or vegetables. · Find ways to cook with little or no fat, such as broiling, steaming, or grilling. · Use cooking spray instead of oil. If you use oil, use a monounsaturated oil, such as canola or olive oil. · Trim fat from meats before you cook them. · Drain off fat after you brown the meat or while you roast it. · Chill soups and stews after you cook them. Then skim the fat off the top after it hardens. Eating out · Order foods that are broiled or poached rather than fried or breaded. · Cut back on the amount of butter or margarine that you use on bread. · Order sauces, gravies, and salad dressings on the side, and use only a little. · When you order pasta, choose tomato sauce rather than cream sauce. · Ask for salsa with your baked potato instead of sour cream, butter, cheese, or mejia. · Order meals in a small size instead of upgrading to a large. · Share an entree, or take part of your food home to eat as another meal. 
· Share appetizers and desserts. Where can you learn more? Go to http://roge-suha.info/. Enter 99 [id number] in the search box to learn more about \"Learning About Cutting Calories. \" Current as of: November 9, 2016 Content Version: 11.3 © 7114-4073 [employer]. Care instructions adapted under license by [employer] (which disclaims liability or warranty for this information). If you have questions about a medical condition or this instruction, always ask your healthcare professional. [place] [address] any warranty or liability for your use of this information. Introducing [facility]! Dear Purvi Villa: Thank you for requesting a Idooble account. Our records indicate that you already have an active Idooble account.   You can access your account anytime at [email or web address] Did you know that you can access your hospital and ER discharge instructions at any time in HypeSpark? You can also review all of your test results from your hospital stay or ER visit. Additional Information If you have questions, please visit the Frequently Asked Questions section of the HypeSpark website at [email or web address] Remember, HypeSpark is NOT to be used for urgent needs. For medical emergencies, dial 911. Now available from your iPhone and Android! Please provide this summary of care documentation to your next provider. Your primary care clinician is listed as [place]. If you have any questions after today's visit, please call [phone].

## 2017-10-23 NOTE — PATIENT INSTRUCTIONS

## 2017-10-23 NOTE — PROGRESS NOTES
1. Have you been to the ER, urgent care clinic since your last visit? Hospitalized since your last visit?no    2. Have you seen or consulted any other health care providers outside of the [address] [place] [address] since your last visit? Include any pap smears or colon screening.  no

## 2017-11-20 ENCOUNTER — TELEPHONE (OUTPATIENT)
Dept: INTERNAL MEDICINE CLINIC | Age: 67
End: 2017-11-20

## 2017-11-20 RX ORDER — HYDROCODONE BITARTRATE AND ACETAMINOPHEN 5; 325 MG/1; MG/1
1 TABLET ORAL
Qty: 30 TAB | Refills: 0 | Status: SHIPPED | OUTPATIENT
Start: 2017-11-20 | End: 2017-12-18 | Stop reason: SDUPTHER

## 2017-11-20 NOTE — TELEPHONE ENCOUNTER
Pt called to request Rx for Hydrocodone instead of taking the Noritriptyline that was prescribed, but it didn't work for her.        Message received & copied from [facility]

## 2017-11-20 NOTE — TELEPHONE ENCOUNTER
Identified patient 2 identifiers verified. Patient made aware and will  prescription Tuesday morning.

## 2017-11-22 ENCOUNTER — HOSPITAL ENCOUNTER (OUTPATIENT)
Dept: CT IMAGING | Age: 67
Discharge: HOME | End: 2017-11-22
Attending: SPECIALIST
Payer: MEDICARE

## 2017-11-22 DIAGNOSIS — K58.0 IRRITABLE BOWEL SYNDROME WITH DIARRHEA: ICD-10-CM

## 2017-11-22 DIAGNOSIS — R19.8 ALTERED BOWEL FUNCTION: ICD-10-CM

## 2017-11-22 DIAGNOSIS — R10.30 ABDOMINAL PAIN, LOWER: ICD-10-CM

## 2017-11-22 PROCEDURE — 74011000258 HC RX REV CODE- 258: Performed by: SPECIALIST

## 2017-11-22 PROCEDURE — 74011000255 HC RX REV CODE- 255: Performed by: SPECIALIST

## 2017-11-22 PROCEDURE — 74177 CT ABD & PELVIS W/CONTRAST: CPT

## 2017-11-22 PROCEDURE — 74011636320 HC RX REV CODE- 636/320: Performed by: SPECIALIST

## 2017-11-22 RX ORDER — SODIUM CHLORIDE 9 MG/ML
50 INJECTION, SOLUTION INTRAVENOUS
Status: COMPLETED | OUTPATIENT
Start: 2017-11-22 | End: 2017-11-22

## 2017-11-22 RX ORDER — BARIUM SULFATE 20 MG/ML
900 SUSPENSION ORAL
Status: COMPLETED | OUTPATIENT
Start: 2017-11-22 | End: 2017-11-22

## 2017-11-22 RX ADMIN — IOPAMIDOL 100 ML: 755 INJECTION, SOLUTION INTRAVENOUS at 12:29

## 2017-11-22 RX ADMIN — BARIUM SULFATE 900 ML: 21 SUSPENSION ORAL at 12:29

## 2017-11-22 RX ADMIN — SODIUM CHLORIDE 50 ML/HR: 900 INJECTION, SOLUTION INTRAVENOUS at 12:29

## 2017-11-24 ENCOUNTER — TELEPHONE (OUTPATIENT)
Dept: SURGERY | Age: 67
End: 2017-11-24

## 2017-11-24 RX ORDER — DABIGATRAN ETEXILATE MESYLATE 150 MG/1
CAPSULE ORAL
Qty: 60 CAP | Refills: 1 | Status: SHIPPED | OUTPATIENT
Start: 2017-11-24 | End: 2018-01-17 | Stop reason: SDUPTHER

## 2017-11-24 NOTE — TELEPHONE ENCOUNTER
Pt called to reschedule hernia until Feb.Did not give  a reason. Pt agreeable with feb 19. Will mail out new pre-op instructions and PAT appointment.

## 2017-12-11 RX ORDER — MONTELUKAST SODIUM 10 MG/1
TABLET ORAL
Qty: 30 TAB | Refills: 1 | Status: SHIPPED | OUTPATIENT
Start: 2017-12-11 | End: 2018-05-25

## 2017-12-14 ENCOUNTER — TELEPHONE (OUTPATIENT)
Dept: SURGERY | Age: 67
End: 2017-12-14

## 2017-12-18 NOTE — TELEPHONE ENCOUNTER
#[phone] pt states she knows this is just a bit early, but is concerned about the holidays. She has been getting a lot of migraines. Please call when ready for pickup.

## 2017-12-19 RX ORDER — HYDROCODONE BITARTRATE AND ACETAMINOPHEN 5; 325 MG/1; MG/1
1 TABLET ORAL
Qty: 30 TAB | Refills: 0 | Status: SHIPPED | OUTPATIENT
Start: 2017-12-19 | End: 2018-01-22 | Stop reason: SDUPTHER

## 2017-12-20 NOTE — TELEPHONE ENCOUNTER
Call completed to patient, two identifiers verified. Patient advised of 04-04 hour refill policy. Patient verbalized an understanding.

## 2017-12-22 ENCOUNTER — TELEPHONE (OUTPATIENT)
Dept: INTERNAL MEDICINE CLINIC | Age: 67
End: 2017-12-22

## 2017-12-22 NOTE — TELEPHONE ENCOUNTER
Patient requests a call back as soon as possible in reference to getting her Hard Copy of her Prescription today before we close for [place]. Patient states it takes her 45 mins to get here. Please call to advise if ready for .  Thank you

## 2017-12-22 NOTE — TELEPHONE ENCOUNTER
Called, spoke to pt. Two pt identifiers confirmed. Pt informed that norco rx is ready. Pt verbalized understanding of information discussed w/ no further questions at this time.

## 2018-01-17 RX ORDER — DABIGATRAN ETEXILATE MESYLATE 150 MG/1
CAPSULE ORAL
Qty: 60 CAP | Refills: 1 | Status: SHIPPED | OUTPATIENT
Start: 2018-01-17 | End: 2018-03-20 | Stop reason: SDUPTHER

## 2018-01-19 DIAGNOSIS — E55.9 VITAMIN D DEFICIENCY: ICD-10-CM

## 2018-01-19 RX ORDER — ERGOCALCIFEROL 1.25 MG/1
CAPSULE ORAL
Qty: 4 CAP | Refills: 5 | Status: SHIPPED | OUTPATIENT
Start: 2018-01-19 | End: 2018-01-22 | Stop reason: SDUPTHER

## 2018-01-22 DIAGNOSIS — E55.9 VITAMIN D DEFICIENCY: ICD-10-CM

## 2018-01-22 DIAGNOSIS — R52 PAIN: Primary | ICD-10-CM

## 2018-01-22 RX ORDER — ERGOCALCIFEROL 1.25 MG/1
CAPSULE ORAL
Qty: 12 CAP | Refills: 3 | Status: SHIPPED | OUTPATIENT
Start: 2018-01-22 | End: 2018-02-14 | Stop reason: SDUPTHER

## 2018-01-22 NOTE — TELEPHONE ENCOUNTER
#[phone] wife states she needs a referral to a RA for her . Please call. Pt states she has an appt in [facility] on [address] and is asking if she can pickup script at that time? Please call.

## 2018-01-23 ENCOUNTER — TELEPHONE (OUTPATIENT)
Dept: CARDIOLOGY CLINIC | Age: 68
End: 2018-01-23

## 2018-01-23 RX ORDER — HYDROCODONE BITARTRATE AND ACETAMINOPHEN 5; 325 MG/1; MG/1
1 TABLET ORAL
Qty: 30 TAB | Refills: 0 | Status: SHIPPED | OUTPATIENT
Start: 2018-01-23 | End: 2018-02-26 | Stop reason: SDUPTHER

## 2018-01-23 NOTE — TELEPHONE ENCOUNTER
Patient saw her Therapist and she is requesting to speak to the nurse regarding the medication she is taking-          Thanks,

## 2018-01-23 NOTE — TELEPHONE ENCOUNTER
Call completed to patient, two identifiers verified. Patient advised Rx was approved and available for pick-up.

## 2018-01-24 NOTE — TELEPHONE ENCOUNTER
Patient's psychologist would like to switch her from lithium to Geodon. They would like to get cardilogy clearance on this. Please advise.

## 2018-02-02 ENCOUNTER — TELEPHONE (OUTPATIENT)
Dept: SURGERY | Age: 68
End: 2018-02-02

## 2018-02-02 NOTE — TELEPHONE ENCOUNTER
Pt has hx of depression. Stated,\"Right now I am manic and the Dr's can't get me down, I can't go thru with surgery . \" want to cx surgery. She will call back when she is ready to reschedule. Chacha Gone

## 2018-02-05 ENCOUNTER — TELEPHONE (OUTPATIENT)
Dept: NEUROLOGY | Age: 68
End: 2018-02-05

## 2018-02-07 ENCOUNTER — TELEPHONE (OUTPATIENT)
Dept: NEUROLOGY | Age: 68
End: 2018-02-07

## 2018-02-07 NOTE — TELEPHONE ENCOUNTER
Called the patient back and advised that I wasn't able to save the appointment time that was offered before due to the timing of for the computer; called the patient back and offered an appointment with the NP  Future Appointments  Date Time Provider Radha Karrie   2/9/2018 11:00 AM Miriam German NP [address]   3/20/2018 2:00 PM PACEMAKER, [address]   3/27/2018 3:00 PM Lupe Driver MD [address]   4/23/2018 2:15 PM [facility]

## 2018-02-07 NOTE — TELEPHONE ENCOUNTER
Patient needs to be seen before the lyrica can be refilled    (not able to leave a message due to the mailbox not being set up yet)

## 2018-02-08 DIAGNOSIS — M79.7 FIBROMYALGIA: Primary | ICD-10-CM

## 2018-02-14 DIAGNOSIS — E55.9 VITAMIN D DEFICIENCY: ICD-10-CM

## 2018-02-14 NOTE — TELEPHONE ENCOUNTER
Future Appointments  Date Time Provider Radha Yoon   3/20/2018 2:00 PM Wei Tate, [address]   3/27/2018 3:00 PM Zion Carrasco MD [address]   4/23/2018 2:15 PM Marni Leavitt MD [address]                         Last Appointment My Department:  Visit date not found    Would you like to refill below? Requested Prescriptions     Pending Prescriptions Disp Refills    pregabalin (LYRICA) 75 mg capsule 60 Cap 1     Sig: Take 1 Cap by mouth two (2) times a day. Max Daily Amount: 150 mg.  Indications: FIBROMYALGIA

## 2018-02-15 RX ORDER — PREGABALIN 75 MG/1
75 CAPSULE ORAL 2 TIMES DAILY
Qty: 60 CAP | Refills: 5 | Status: SHIPPED | OUTPATIENT
Start: 2018-02-15 | End: 2018-03-27 | Stop reason: SDUPTHER

## 2018-02-15 RX ORDER — ERGOCALCIFEROL 1.25 MG/1
CAPSULE ORAL
Qty: 12 CAP | Refills: 3 | Status: SHIPPED | OUTPATIENT
Start: 2018-02-15 | End: 2019-06-25 | Stop reason: SDUPTHER

## 2018-02-19 ENCOUNTER — DOCUMENTATION ONLY (OUTPATIENT)
Dept: NEUROLOGY | Age: 68
End: 2018-02-19

## 2018-02-26 DIAGNOSIS — R52 PAIN: ICD-10-CM

## 2018-02-27 RX ORDER — HYDROCODONE BITARTRATE AND ACETAMINOPHEN 5; 325 MG/1; MG/1
1 TABLET ORAL
Qty: 30 TAB | Refills: 0 | Status: SHIPPED | OUTPATIENT
Start: 2018-02-27 | End: 2018-03-21 | Stop reason: SDUPTHER

## 2018-02-27 NOTE — TELEPHONE ENCOUNTER
Spoke to pt, 2 pt identifiers verified. Pt notified that script for hydrocodone is ready for pickup.

## 2018-03-20 RX ORDER — DABIGATRAN ETEXILATE MESYLATE 150 MG/1
CAPSULE ORAL
Qty: 60 CAP | Refills: 1 | Status: SHIPPED | OUTPATIENT
Start: 2018-03-20 | End: 2018-05-30 | Stop reason: SDUPTHER

## 2018-03-21 DIAGNOSIS — R52 PAIN: ICD-10-CM

## 2018-03-21 NOTE — TELEPHONE ENCOUNTER
#[phone] please call when ready for .   Pt states she had to use a couple extra this past weekend for a toothache until she was able to see the dentist.

## 2018-03-22 RX ORDER — HYDROCODONE BITARTRATE AND ACETAMINOPHEN 5; 325 MG/1; MG/1
1 TABLET ORAL
Qty: 30 TAB | Refills: 0 | Status: SHIPPED | OUTPATIENT
Start: 2018-03-22 | End: 2018-05-02 | Stop reason: SDUPTHER

## 2018-03-26 ENCOUNTER — HOSPITAL ENCOUNTER (OUTPATIENT)
Dept: CT IMAGING | Age: 68
Discharge: HOME | End: 2018-03-26
Attending: ORTHOPAEDIC SURGERY
Payer: MEDICARE

## 2018-03-26 DIAGNOSIS — M19.011 PRIMARY LOCALIZED OSTEOARTHROSIS OF SHOULDER REGION, RIGHT: ICD-10-CM

## 2018-03-26 PROCEDURE — 73200 CT UPPER EXTREMITY W/O DYE: CPT

## 2018-03-27 ENCOUNTER — OFFICE VISIT (OUTPATIENT)
Dept: NEUROLOGY | Age: 68
End: 2018-03-27

## 2018-03-27 VITALS
SYSTOLIC BLOOD PRESSURE: 122 MMHG | BODY MASS INDEX: 37.93 KG/M2 | DIASTOLIC BLOOD PRESSURE: 70 MMHG | HEIGHT: 66 IN | WEIGHT: 236 LBS | HEART RATE: 83 BPM | OXYGEN SATURATION: 98 %

## 2018-03-27 DIAGNOSIS — G43.719 CHRONIC MIGRAINE WITHOUT AURA, INTRACTABLE, WITHOUT STATUS MIGRAINOSUS: Primary | ICD-10-CM

## 2018-03-27 DIAGNOSIS — G47.33 OBSTRUCTIVE SLEEP APNEA: ICD-10-CM

## 2018-03-27 DIAGNOSIS — M79.7 FIBROMYALGIA: ICD-10-CM

## 2018-03-27 DIAGNOSIS — I48.20 CHRONIC ATRIAL FIBRILLATION (HCC): ICD-10-CM

## 2018-03-27 RX ORDER — LITHIUM CARBONATE 300 MG/1
900 TABLET, FILM COATED, EXTENDED RELEASE ORAL
COMMUNITY
Start: 2018-03-20 | End: 2018-03-27

## 2018-03-27 RX ORDER — NITROFURANTOIN 25; 75 MG/1; MG/1
CAPSULE ORAL
Refills: 5 | COMMUNITY
Start: 2018-03-02 | End: 2018-05-25

## 2018-03-27 RX ORDER — LAMOTRIGINE 25 MG/1
50 TABLET ORAL DAILY
COMMUNITY
Start: 2018-03-20 | End: 2019-03-05

## 2018-03-27 RX ORDER — TOPIRAMATE 200 MG/1
TABLET ORAL
Refills: 5 | COMMUNITY
Start: 2018-02-27 | End: 2018-04-24

## 2018-03-27 RX ORDER — PREGABALIN 100 MG/1
100 CAPSULE ORAL 2 TIMES DAILY
Qty: 60 CAP | Refills: 5 | Status: SHIPPED | OUTPATIENT
Start: 2018-03-27 | End: 2018-05-25

## 2018-03-27 NOTE — PROGRESS NOTES
Chief Complaint: Headaches    Now has an Alpha gal allergy This is giving her significant discomfort. She is unable to eat meat And suffersconstant fatigue. She is on chronic analgesic therapy for an arthritic right shoulder which may need replacement in the future. She complains of bilateral lower extremity neuropathy which she feels is getting worse she would like to adjust her medications we discussed increasing Lyrica      Assesment and Plan    1. Migraine headaches intractable  · Approach to treatment discussed. · Emphasis was placed on \"prevention is easier then intervention\"   · Explained rebound headaches and the importance of adopting a regimented approach when resorting to over the counter medication and prescribed medications to avoid these. · Importance of regular sleep was emphasized as well as early treatment. · All questions answered    2. Sleep apnea  Compliant with cpap    3. Essential tremor  Of limited magnitude will treat if necessary    4. Diabetic neuropathy  Increase Lyrica to 100mg ms b.i.d.    5.Right shoulder pain  Old orthopedics    6. Atrial fibrillation  Continue pradaxa          Allergies  Latex; Sulfa (sulfonamide antibiotics); and Xarelto [rivaroxaban]     Medications  Medication Sig    HYDROcodone-acetaminophen (NORCO)  mg tablet Take 1 Tab by mouth every twelve (12) hours every twelve (12) hours. Max Daily Amount: 2 Tabs.  PRADAXA 150 mg capsule TAKE ONE CAPSULE TWICE A DAY    furosemide (LASIX) 40 mg tablet take 1 tablet by mouth once daily    ergocalciferol (ERGOCALCIFEROL) 50,000 unit capsule Take 1 Cap by mouth every seven (7) days.  levothyroxine (SYNTHROID) 50 mcg tablet take 1 tablet by mouth once daily    nitrofurantoin, macrocrystal-monohydrate, (MACROBID) 100 mg capsule Take 100 mg by mouth daily.  zolpidem (AMBIEN) 5 mg tablet Take  by mouth nightly as needed for Sleep.     oxybutynin chloride XL (DITROPAN XL) 10 mg CR tablet Take 10 mg by mouth daily.    esomeprazole (NEXIUM) 40 mg capsule Take 40 mg by mouth daily.  DULoxetine (CYMBALTA) 30 mg capsule Take 60 mg by mouth daily.  diphenoxylate-atropine (LOMOTIL) 2.5-0.025 mg per tablet Take  by mouth four (4) times daily as needed for Diarrhea.  topiramate (TOPAMAX) 100 mg tablet Take 100 mg by mouth nightly.  lithium 300 mg tablet Take 400 mg by mouth nightly.  pregabalin (LYRICA) 75 mg capsule Take 150 mg by mouth daily (with dinner).  alprazolam (XANAX) 0.5 mg tablet Take 0.5 mg by mouth two (2) times a day. Medical History  Past Medical History   Diagnosis Date    Arrhythmia      atrial fibrillation 2014    Arthritis     Bipolar affective disorder ([facility] [address] 3/31/2010    Colon polyps 3/31/2010    Diarrhea 7/19/2013    Epigastric pain 6/5/2015    GERD (gastroesophageal reflux disease)      occasiional only; controlled with med    Hyperlipidemia 3/31/2010    Hypothyroidism 3/31/2010    Migraine 3/31/2010    S/P ablation of atrial fibrillation 5/13/2014    S/P cardiac pacemaker procedure 9/16/2014 9/16/14 Medtronic MRI compatible dual chamber pacemaker implant    Thyroid disease     Tick-borne disease     Unspecified sleep apnea      \"slight\" uses mouth device    Urge incontinence 3/31/2010    Vitamin D deficiency 3/31/2015       Review of Systems  Neuro: positive for frequent headaches, negative for  dizziness, seizures,   memory problems, paresthesia,  weakness positive for essential tremor  Constitutional: negative for fevers, chills positive for   fatigue  MSKL:positive for myalgias, arthralgias, and neck pain  BHV: positive for anxiety, and insomnia    Exam:    Visit Vitals    /70    Pulse 83    Ht 5' 6\" (1.676 m)    Wt 236 lb (107 kg)    SpO2 98%    BMI 38.09 kg/m2        General: Well developed, well nourished.  Patient in Mild discomfort   Head: Normocephalic, atraumatic, anicteric sclera   Ext: No pedal edema   Skin: No rash, supple Neurological Exam:  Mental Status: Alert and oriented to person place and time   Speech: Fluent no aphasia or dysarthria. Gait:  Gait is balanced and fluid with normal arm swing. Tremor:   Slight occasional coarse intention tremor right hand   Cerebellar:  Coordination intact. Neurovascular: No carotid bruits. No JVD       .   Lab Review    Lab Results   Component Value Date/Time    WBC 6.4 08/08/2016 10:09 AM    HCT 43.6 08/08/2016 10:09 AM    HGB 14.1 08/08/2016 10:09 AM    PLATELET 872 52/23/6082 10:09 AM       Lab Results   Component Value Date/Time    SODIUM 143 08/08/2016 10:09 AM    POTASSIUM 4.3 08/08/2016 10:09 AM    CHLORIDE 107 08/08/2016 10:09 AM    CO2 24 08/08/2016 10:09 AM    GLUCOSE 99 08/08/2016 10:09 AM    BUN 15 08/08/2016 10:09 AM    CREATININE 0.71 08/08/2016 10:09 AM    CALCIUM 10.0 08/08/2016 10:09 AM       Lab Results   Component Value Date/Time    HEMOGLOBIN A1C 5.8 08/08/2016 10:09 AM    HEMOGLOBIN A1C  5.9 04/28/2014 04:04 PM        Lab Results   Component Value Date/Time    CHOLESTEROL,  201 08/08/2016 10:09 AM    HDL CHOLESTEROL 48 08/08/2016 10:09 AM    LDL,DIRECT 124 03/30/2010 08:40 AM    LDL, CALCULATED 101 08/08/2016 10:09 AM    VLDL, CALCULATED 52 08/08/2016 10:09 AM    TRIGLYCERIDE 258 08/08/2016 10:09 AM    CHOL/HDL RATIO 5.3 03/30/2010 08:40 AM

## 2018-03-27 NOTE — MR AVS SNAPSHOT
[address] [address], 
[address], [address] [facility] 
[phone] Patient: Tori Burgos MRN: [id number] LRE:[id number] Visit Information Date & Time Provider Department Dept. Phone Encounter #  
 3/27/2018  3:00 PM Elizabeth Capps MD Neurology Clinic at [facility] [phone] [id number] Follow-up Instructions Return if symptoms worsen or fail to improve. Your Appointments 4/23/2018  2:15 PM  
ROUTINE CARE with Demarcus Nelson MD  
[facility]) Appt Note: 6 month f.u  
 [address] [id number]  
[address] [facility] [address] [facility] Upcoming Health Maintenance Date Due DTaP/Tdap/Td series (1 - Tdap) 1/8/1970 ZOSTER VACCINE AGE 60> 11/8/2008 GLAUCOMA SCREENING Q2Y 1/8/2014 Bone Densitometry (Dexa) Screening 1/8/2014 Pneumococcal 65+ Low/Medium Risk (2 of 2 - PPSV23) 10/16/2017 MEDICARE YEARLY EXAM 3/14/2018 BREAST CANCER SCRN MAMMOGRAM 6/1/2019 COLONOSCOPY 9/1/2019 Allergies as of 3/27/2018  Review Complete On: 3/27/2018 By: Elizabeth Capps MD  
  
 Severity Noted Reaction Type Reactions Latex  03/19/2014    Swelling Sulfa (Sulfonamide Antibiotics)  03/23/2010    Hives Xarelto [Rivaroxaban]  10/20/2014    Other (comments) GI problems Current Immunizations  Reviewed on 10/24/2017 Name Date Influenza Vaccine 10/23/2017, 10/25/2015, 10/1/2014 Influenza Vaccine (Quad) PF  Incomplete, 1/5/2017 Influenza Vaccine PF 11/15/2013 Influenza Vaccine Split 10/16/2012 ZZZ-RETIRED (DO NOT USE) Pneumococcal Vaccine (Unspecified Type) 10/16/2012 Not reviewed this visit You Were Diagnosed With   
  
 Codes Comments Fibromyalgia     ICD-10-CM: M79.7 ICD-9-CM: 729.1 Vitals BP Pulse Height(growth percentile) Weight(growth percentile) SpO2 BMI  
 122/70 83 5' 6\" (1.676 m) 236 lb (107 kg) 98% 38.09 kg/m2 OB Status Smoking Status Hysterectomy Never Smoker Vitals History BMI and BSA Data Body Mass Index Body Surface Area 38.09 kg/m 2 2.23 m 2 Preferred Pharmacy Pharmacy Name Phone [facility]Y #[id number]- [facility] [address] [phone] Your Updated Medication List  
  
   
This list is accurate as of 3/27/18  4:05 PM.  Always use your most recent med list.  
  
  
  
  
 ergocalciferol 50,000 unit capsule Commonly known as:  ERGOCALCIFEROL  
TAKE 1 CAP BY MOUTH EVERY SEVEN (7) DAYS. fluconazole 150 mg tablet Commonly known as:  DIFLUCAN  
  
 HYDROcodone-acetaminophen 5-325 mg per tablet Commonly known as:  Dyann Midway Take 1 Tab by mouth every eight (8) hours as needed for Pain. Max Daily Amount: 3 Tabs. lamoTRIgine 25 mg tablet Commonly known as: LaMICtal  
50 mg daily. levothyroxine 50 mcg tablet Commonly known as:  SYNTHROID  
TAKE 1 TABLET EVERY DAY  
  
 lithium carbonate 300 mg tablet Take 900 mg by mouth nightly. montelukast 10 mg tablet Commonly known as:  SINGULAIR  
TAKE 1 TAB BY MOUTH DAILY. naratriptan 2.5 mg Tab Commonly known as:  Belvie Jarret Take 1 Tab by mouth once as needed for up to 1 dose. nitrofurantoin (macrocrystal-monohydrate) 100 mg capsule Commonly known as:  MACROBID  
TAKE ONE CAPSULE BY MOUTH EVERY DAY TO PREVENT URINARY TRACT INFECTIONS PRADAXA 150 mg capsule Generic drug:  dabigatran etexilate TAKE ONE CAPSULE TWICE A DAY  
  
 pregabalin 100 mg capsule Commonly known as:  Alfaro Crumb Take 1 Cap by mouth two (2) times a day. Max Daily Amount: 200 mg. Indications: FIBROMYALGIA  
  
 * TOPAMAX 100 mg tablet Generic drug:  topiramate Take 200 mg by mouth nightly. * topiramate 200 mg tablet Commonly known as:  TOPAMAX TAKE 1 TABLET BY MOUTH AT BEDTIME  
  
 XANAX 0.5 mg tablet Generic drug:  ALPRAZolam  
Take 0.5 mg by mouth daily. * Notice: This list has 2 medication(s) that are the same as other medications prescribed for you. Read the directions carefully, and ask your doctor or other care provider to review them with you. Prescriptions Printed Refills  
 pregabalin (LYRICA) 100 mg capsule 5 Sig: Take 1 Cap by mouth two (2) times a day. Max Daily Amount: 200 mg. Indications: FIBROMYALGIA Class: Print Route: Oral  
  
Follow-up Instructions Return if symptoms worsen or fail to improve. Introducing [facility] SERVICES! Dear Panfilo Brown: Thank you for requesting a [email or web address] account. Our records indicate that you already have an active [email or web address] account. You can access your account anytime at [email or web address] Did you know that you can access your hospital and ER discharge instructions at any time in [email or web address]? You can also review all of your test results from your hospital stay or ER visit. Additional Information If you have questions, please visit the Frequently Asked Questions section of the [email or web address] website at [email or web address] Remember, [email or web address] is NOT to be used for urgent needs. For medical emergencies, dial 911. Now available from your iPhone and Android! Please provide this summary of care documentation to your next provider. Your primary care clinician is listed as [place]. If you have any questions after today's visit, please call [phone].

## 2018-04-11 DIAGNOSIS — L29.9 PRURITUS: ICD-10-CM

## 2018-04-12 RX ORDER — HYDROXYZINE 25 MG/1
TABLET, FILM COATED ORAL
Qty: 30 TAB | Refills: 1 | Status: SHIPPED | OUTPATIENT
Start: 2018-04-12 | End: 2018-04-24

## 2018-04-24 NOTE — PERIOP NOTES
[facility]  Ambulatory Surgery Unit  Pre-operative Instructions for Endo Procedures    Procedure Date  4/27/2018            Tentative Arrival Time 0830      1. On the day of your procedure, please report to the Ambulatory Surgery Unit Registration Desk and sign in at your designated time. The Ambulatory Surgery Unit is located in [facility] on the [facility] side of the \[facility] [place] [facility]\"" across from the [address] [place] [address]. Please have all of your health insurance cards and a photo ID. 2. You must have someone with you to drive you home, as you should not drive a car for 24 hours following anesthesia. Please make arrangements for a responsible adult friend or family member to stay with you for at least the first 24 hours after your procedure. 3. Do not have anything to eat or drink (including water, gum, mints, coffee, juice) after midnight   4/26/2018. This may not apply to medications prescribed by your physician. (Please note below the special instructions with medications to take the morning of your procedure.)    4. If applicable, follow the clear liquid diet and bowel prep instructions provided by your physician's office. If you do not have this information, or have any questions, please contact your physician's office. 5. We recommend you do not drink any alcoholic beverages for 24 hours before and after your procedure. 6. Contact your surgeons office for instructions on the following medications: non-steroidal anti-inflammatory drugs (i.e. Advil, Aleve), vitamins, and supplements. (Some surgeons will want you to stop these medications prior to surgery and others may allow you to take them)   **If you are currently taking Plavix, Coumadin, Aspirin and/or other blood-thinning agents, contact your surgeon for instructions. ** Your surgeon will partner with the physician prescribing these medications to determine if it is safe to stop or if you need to continue taking.  Please do not stop taking these medications without instructions from your surgeon. 7. In an effort to help prevent surgical site infection, we ask that you shower with an anti-bacterial soap (i.e. Dial or Safeguard) on the morning of your procedure. Do not apply any lotions, powders, or deodorants after showering. 8. Wear comfortable clothes. Wear glasses instead of contacts. Do not bring any jewelry or money (other than copays or fees as instructed). Do not wear make-up, particularly mascara, the morning of your procedure. Wear your hair loose or down, no ponytails, buns, yesi pins or clips. All body piercings must be removed. 9. You should understand that if you do not follow these instructions your procedure may be cancelled. If your physical condition changes (i.e. fever, cold or flu) please contact your surgeon as soon as possible. 10. It is important that you be on time. If a situation occurs where you may be late, or if you have any questions or problems, please call [phone]. 11. Your procedure time may be subject to change. You will receive a phone call the day prior to confirm your arrival time. Special Instructions: Take all medications and inhalers, as prescribed, on the morning of surgery with a sip of water EXCEPT: No NSAIDS or ASA. I understand a pre-operative phone call will be made to verify my procedure time. In the event that I am not available, I give permission for a message to be left on my answering service and/or with another person?       yes         Patient verbalized understanding___________________      ___________________      ___________________  (Signature of Patient)          (Witness)                   (Date and Time)

## 2018-04-26 ENCOUNTER — ANESTHESIA EVENT (OUTPATIENT)
Dept: SURGERY | Age: 68
End: 2018-04-26
Payer: MEDICARE

## 2018-04-27 ENCOUNTER — HOSPITAL ENCOUNTER (EMERGENCY)
Age: 68
Discharge: HOME | End: 2018-04-28
Attending: EMERGENCY MEDICINE | Admitting: EMERGENCY MEDICINE
Payer: MEDICARE

## 2018-04-27 ENCOUNTER — APPOINTMENT (OUTPATIENT)
Dept: CT IMAGING | Age: 68
End: 2018-04-27
Attending: EMERGENCY MEDICINE
Payer: MEDICARE

## 2018-04-27 ENCOUNTER — HOSPITAL ENCOUNTER (OUTPATIENT)
Age: 68
Setting detail: OUTPATIENT SURGERY
Discharge: HOME | End: 2018-04-27
Attending: SPECIALIST | Admitting: SPECIALIST
Payer: MEDICARE

## 2018-04-27 ENCOUNTER — ANESTHESIA (OUTPATIENT)
Dept: SURGERY | Age: 68
End: 2018-04-27
Payer: MEDICARE

## 2018-04-27 VITALS
BODY MASS INDEX: 39.41 KG/M2 | OXYGEN SATURATION: 97 % | DIASTOLIC BLOOD PRESSURE: 76 MMHG | HEIGHT: 65 IN | TEMPERATURE: 98.3 F | RESPIRATION RATE: 14 BRPM | WEIGHT: 236.55 LBS | HEART RATE: 77 BPM | SYSTOLIC BLOOD PRESSURE: 146 MMHG

## 2018-04-27 VITALS
RESPIRATION RATE: 20 BRPM | HEIGHT: 65 IN | OXYGEN SATURATION: 97 % | TEMPERATURE: 97.6 F | BODY MASS INDEX: 38.65 KG/M2 | SYSTOLIC BLOOD PRESSURE: 95 MMHG | WEIGHT: 232 LBS | HEART RATE: 75 BPM | DIASTOLIC BLOOD PRESSURE: 72 MMHG

## 2018-04-27 DIAGNOSIS — R41.840 DIFFICULTY CONCENTRATING: ICD-10-CM

## 2018-04-27 DIAGNOSIS — R29.810 FACIAL DROOP: Primary | ICD-10-CM

## 2018-04-27 LAB
ALBUMIN SERPL-MCNC: 3.5 G/DL (ref 3.5–5)
ALBUMIN/GLOB SERPL: 1.3 {RATIO} (ref 1.1–2.2)
ALP SERPL-CCNC: 100 U/L (ref 45–117)
ALT SERPL-CCNC: 29 U/L (ref 12–78)
ANION GAP SERPL CALC-SCNC: 8 MMOL/L (ref 5–15)
APTT PPP: 26.6 SEC (ref 22.1–32)
AST SERPL-CCNC: 17 U/L (ref 15–37)
BASOPHILS # BLD: 0 K/UL (ref 0–0.1)
BASOPHILS NFR BLD: 1 % (ref 0–1)
BILIRUB SERPL-MCNC: 0.2 MG/DL (ref 0.2–1)
BUN SERPL-MCNC: 17 MG/DL (ref 6–20)
BUN/CREAT SERPL: 23 (ref 12–20)
CALCIUM SERPL-MCNC: 9.3 MG/DL (ref 8.5–10.1)
CHLORIDE SERPL-SCNC: 116 MMOL/L (ref 97–108)
CO2 SERPL-SCNC: 22 MMOL/L (ref 21–32)
CREAT SERPL-MCNC: 0.75 MG/DL (ref 0.55–1.02)
DIFFERENTIAL METHOD BLD: NORMAL
EOSINOPHIL # BLD: 0.3 K/UL (ref 0–0.4)
EOSINOPHIL NFR BLD: 4 % (ref 0–7)
ERYTHROCYTE [DISTWIDTH] IN BLOOD BY AUTOMATED COUNT: 13.2 % (ref 11.5–14.5)
GLOBULIN SER CALC-MCNC: 2.8 G/DL (ref 2–4)
GLUCOSE SERPL-MCNC: 131 MG/DL (ref 65–100)
HCT VFR BLD AUTO: 39.4 % (ref 35–47)
HGB BLD-MCNC: 13.1 G/DL (ref 11.5–16)
IMM GRANULOCYTES # BLD: 0 K/UL (ref 0–0.04)
IMM GRANULOCYTES NFR BLD AUTO: 0 % (ref 0–0.5)
INR PPP: 1 (ref 0.9–1.1)
LYMPHOCYTES # BLD: 2.1 K/UL (ref 0.8–3.5)
LYMPHOCYTES NFR BLD: 30 % (ref 12–49)
MCH RBC QN AUTO: 30.8 PG (ref 26–34)
MCHC RBC AUTO-ENTMCNC: 33.2 G/DL (ref 30–36.5)
MCV RBC AUTO: 92.7 FL (ref 80–99)
MONOCYTES # BLD: 0.5 K/UL (ref 0–1)
MONOCYTES NFR BLD: 7 % (ref 5–13)
NEUTS SEG # BLD: 4.1 K/UL (ref 1.8–8)
NEUTS SEG NFR BLD: 59 % (ref 32–75)
NRBC # BLD: 0 K/UL (ref 0–0.01)
NRBC BLD-RTO: 0 PER 100 WBC
PLATELET # BLD AUTO: 226 K/UL (ref 150–400)
PMV BLD AUTO: 10.5 FL (ref 8.9–12.9)
POTASSIUM SERPL-SCNC: 3.6 MMOL/L (ref 3.5–5.1)
PROT SERPL-MCNC: 6.3 G/DL (ref 6.4–8.2)
PROTHROMBIN TIME: 9.9 SEC (ref 9–11.1)
RBC # BLD AUTO: 4.25 M/UL (ref 3.8–5.2)
SODIUM SERPL-SCNC: 146 MMOL/L (ref 136–145)
THERAPEUTIC RANGE,PTTT: NORMAL SECS (ref 58–77)
WBC # BLD AUTO: 7 K/UL (ref 3.6–11)

## 2018-04-27 PROCEDURE — 36415 COLL VENOUS BLD VENIPUNCTURE: CPT | Performed by: EMERGENCY MEDICINE

## 2018-04-27 PROCEDURE — 81001 URINALYSIS AUTO W/SCOPE: CPT | Performed by: EMERGENCY MEDICINE

## 2018-04-27 PROCEDURE — 80053 COMPREHEN METABOLIC PANEL: CPT | Performed by: EMERGENCY MEDICINE

## 2018-04-27 PROCEDURE — 74011250636 HC RX REV CODE- 250/636: Performed by: ANESTHESIOLOGY

## 2018-04-27 PROCEDURE — 70498 CT ANGIOGRAPHY NECK: CPT

## 2018-04-27 PROCEDURE — 74011250636 HC RX REV CODE- 250/636: Performed by: EMERGENCY MEDICINE

## 2018-04-27 PROCEDURE — 99285 EMERGENCY DEPT VISIT HI MDM: CPT

## 2018-04-27 PROCEDURE — 74011636320 HC RX REV CODE- 636/320: Performed by: EMERGENCY MEDICINE

## 2018-04-27 PROCEDURE — 77030013992 HC SNR POLYP ENDOSC BSC -B: Performed by: SPECIALIST

## 2018-04-27 PROCEDURE — 85730 THROMBOPLASTIN TIME PARTIAL: CPT | Performed by: EMERGENCY MEDICINE

## 2018-04-27 PROCEDURE — 87086 URINE CULTURE/COLONY COUNT: CPT | Performed by: EMERGENCY MEDICINE

## 2018-04-27 PROCEDURE — 76030000002 HC AMB SURG OR TIME FIRST 0.: Performed by: SPECIALIST

## 2018-04-27 PROCEDURE — 77030020255 HC SOL INJ LR 1000ML BG: Performed by: SPECIALIST

## 2018-04-27 PROCEDURE — 93005 ELECTROCARDIOGRAM TRACING: CPT

## 2018-04-27 PROCEDURE — 85610 PROTHROMBIN TIME: CPT | Performed by: EMERGENCY MEDICINE

## 2018-04-27 PROCEDURE — 76210000046 HC AMBSU PH II REC FIRST 0.5 HR: Performed by: SPECIALIST

## 2018-04-27 PROCEDURE — 74011250636 HC RX REV CODE- 250/636

## 2018-04-27 PROCEDURE — 88305 TISSUE EXAM BY PATHOLOGIST: CPT | Performed by: SPECIALIST

## 2018-04-27 PROCEDURE — 85025 COMPLETE CBC W/AUTO DIFF WBC: CPT | Performed by: EMERGENCY MEDICINE

## 2018-04-27 PROCEDURE — 74011000250 HC RX REV CODE- 250

## 2018-04-27 PROCEDURE — 70450 CT HEAD/BRAIN W/O DYE: CPT

## 2018-04-27 PROCEDURE — 76060000073 HC AMB SURG ANES FIRST 0.5 HR: Performed by: SPECIALIST

## 2018-04-27 PROCEDURE — 74011250637 HC RX REV CODE- 250/637: Performed by: SPECIALIST

## 2018-04-27 PROCEDURE — 77030009426 HC FCPS BIOP ENDOSC BSC -B: Performed by: SPECIALIST

## 2018-04-27 PROCEDURE — 77030021352 HC CBL LD SYS DISP COVD -B: Performed by: SPECIALIST

## 2018-04-27 PROCEDURE — 76210000040 HC AMBSU PH I REC FIRST 0.5 HR: Performed by: SPECIALIST

## 2018-04-27 RX ORDER — SODIUM CHLORIDE 0.9 % (FLUSH) 0.9 %
10 SYRINGE (ML) INJECTION
Status: COMPLETED | OUTPATIENT
Start: 2018-04-27 | End: 2018-04-27

## 2018-04-27 RX ORDER — SODIUM CHLORIDE, SODIUM LACTATE, POTASSIUM CHLORIDE, CALCIUM CHLORIDE 600; 310; 30; 20 MG/100ML; MG/100ML; MG/100ML; MG/100ML
25 INJECTION, SOLUTION INTRAVENOUS CONTINUOUS
Status: DISCONTINUED | OUTPATIENT
Start: 2018-04-27 | End: 2018-04-27 | Stop reason: HOSPADM

## 2018-04-27 RX ORDER — SODIUM CHLORIDE 9 MG/ML
100 INJECTION, SOLUTION INTRAVENOUS CONTINUOUS
Status: DISCONTINUED | OUTPATIENT
Start: 2018-04-27 | End: 2018-04-27 | Stop reason: HOSPADM

## 2018-04-27 RX ORDER — SODIUM CHLORIDE 0.9 % (FLUSH) 0.9 %
5-10 SYRINGE (ML) INJECTION EVERY 8 HOURS
Status: DISCONTINUED | OUTPATIENT
Start: 2018-04-27 | End: 2018-04-27 | Stop reason: HOSPADM

## 2018-04-27 RX ORDER — SODIUM CHLORIDE 0.9 % (FLUSH) 0.9 %
5-10 SYRINGE (ML) INJECTION AS NEEDED
Status: DISCONTINUED | OUTPATIENT
Start: 2018-04-27 | End: 2018-04-27 | Stop reason: HOSPADM

## 2018-04-27 RX ORDER — EPINEPHRINE 0.1 MG/ML
1 INJECTION INTRACARDIAC; INTRAVENOUS
Status: DISCONTINUED | OUTPATIENT
Start: 2018-04-27 | End: 2018-04-27 | Stop reason: HOSPADM

## 2018-04-27 RX ORDER — LIDOCAINE HYDROCHLORIDE 20 MG/ML
INJECTION, SOLUTION EPIDURAL; INFILTRATION; INTRACAUDAL; PERINEURAL AS NEEDED
Status: DISCONTINUED | OUTPATIENT
Start: 2018-04-27 | End: 2018-04-27 | Stop reason: HOSPADM

## 2018-04-27 RX ORDER — FLUMAZENIL 0.1 MG/ML
0.2 INJECTION INTRAVENOUS
Status: DISCONTINUED | OUTPATIENT
Start: 2018-04-27 | End: 2018-04-27 | Stop reason: HOSPADM

## 2018-04-27 RX ORDER — SODIUM CHLORIDE 9 MG/ML
50 INJECTION, SOLUTION INTRAVENOUS
Status: COMPLETED | OUTPATIENT
Start: 2018-04-27 | End: 2018-04-28

## 2018-04-27 RX ORDER — DEXTROMETHORPHAN/PSEUDOEPHED 2.5-7.5/.8
1.2 DROPS ORAL
Status: DISCONTINUED | OUTPATIENT
Start: 2018-04-27 | End: 2018-04-27 | Stop reason: HOSPADM

## 2018-04-27 RX ORDER — LIDOCAINE HYDROCHLORIDE 10 MG/ML
0.1 INJECTION, SOLUTION EPIDURAL; INFILTRATION; INTRACAUDAL; PERINEURAL AS NEEDED
Status: DISCONTINUED | OUTPATIENT
Start: 2018-04-27 | End: 2018-04-27 | Stop reason: HOSPADM

## 2018-04-27 RX ORDER — MIDAZOLAM HYDROCHLORIDE 1 MG/ML
.25-5 INJECTION, SOLUTION INTRAMUSCULAR; INTRAVENOUS
Status: DISCONTINUED | OUTPATIENT
Start: 2018-04-27 | End: 2018-04-27 | Stop reason: HOSPADM

## 2018-04-27 RX ORDER — ATROPINE SULFATE 0.1 MG/ML
0.5 INJECTION INTRAVENOUS
Status: DISCONTINUED | OUTPATIENT
Start: 2018-04-27 | End: 2018-04-27 | Stop reason: HOSPADM

## 2018-04-27 RX ORDER — FENTANYL CITRATE 50 UG/ML
25 INJECTION, SOLUTION INTRAMUSCULAR; INTRAVENOUS
Status: DISCONTINUED | OUTPATIENT
Start: 2018-04-27 | End: 2018-04-27 | Stop reason: HOSPADM

## 2018-04-27 RX ORDER — PROPOFOL 10 MG/ML
INJECTION, EMULSION INTRAVENOUS AS NEEDED
Status: DISCONTINUED | OUTPATIENT
Start: 2018-04-27 | End: 2018-04-27 | Stop reason: HOSPADM

## 2018-04-27 RX ORDER — NALOXONE HYDROCHLORIDE 0.4 MG/ML
0.4 INJECTION, SOLUTION INTRAMUSCULAR; INTRAVENOUS; SUBCUTANEOUS
Status: DISCONTINUED | OUTPATIENT
Start: 2018-04-27 | End: 2018-04-27 | Stop reason: HOSPADM

## 2018-04-27 RX ORDER — ONDANSETRON 2 MG/ML
4 INJECTION INTRAMUSCULAR; INTRAVENOUS AS NEEDED
Status: DISCONTINUED | OUTPATIENT
Start: 2018-04-27 | End: 2018-04-27 | Stop reason: HOSPADM

## 2018-04-27 RX ORDER — DIPHENHYDRAMINE HYDROCHLORIDE 50 MG/ML
12.5 INJECTION, SOLUTION INTRAMUSCULAR; INTRAVENOUS AS NEEDED
Status: DISCONTINUED | OUTPATIENT
Start: 2018-04-27 | End: 2018-04-27 | Stop reason: HOSPADM

## 2018-04-27 RX ORDER — KETAMINE HYDROCHLORIDE 10 MG/ML
INJECTION, SOLUTION INTRAMUSCULAR; INTRAVENOUS AS NEEDED
Status: DISCONTINUED | OUTPATIENT
Start: 2018-04-27 | End: 2018-04-27

## 2018-04-27 RX ADMIN — Medication 10 ML: at 22:55

## 2018-04-27 RX ADMIN — PROPOFOL 30 MG: 10 INJECTION, EMULSION INTRAVENOUS at 09:34

## 2018-04-27 RX ADMIN — PROPOFOL 30 MG: 10 INJECTION, EMULSION INTRAVENOUS at 09:27

## 2018-04-27 RX ADMIN — LIDOCAINE HYDROCHLORIDE 80 MG: 20 INJECTION, SOLUTION EPIDURAL; INFILTRATION; INTRACAUDAL; PERINEURAL at 09:42

## 2018-04-27 RX ADMIN — IOPAMIDOL 100 ML: 755 INJECTION, SOLUTION INTRAVENOUS at 22:55

## 2018-04-27 RX ADMIN — PROPOFOL 30 MG: 10 INJECTION, EMULSION INTRAVENOUS at 09:31

## 2018-04-27 RX ADMIN — PROPOFOL 30 MG: 10 INJECTION, EMULSION INTRAVENOUS at 09:37

## 2018-04-27 RX ADMIN — SODIUM CHLORIDE 50 ML/HR: 900 INJECTION, SOLUTION INTRAVENOUS at 22:55

## 2018-04-27 RX ADMIN — PROPOFOL 30 MG: 10 INJECTION, EMULSION INTRAVENOUS at 09:42

## 2018-04-27 RX ADMIN — SODIUM CHLORIDE, SODIUM LACTATE, POTASSIUM CHLORIDE, AND CALCIUM CHLORIDE 25 ML/HR: 600; 310; 30; 20 INJECTION, SOLUTION INTRAVENOUS at 08:29

## 2018-04-27 RX ADMIN — PROPOFOL 30 MG: 10 INJECTION, EMULSION INTRAVENOUS at 09:29

## 2018-04-27 RX ADMIN — PROPOFOL 30 MG: 10 INJECTION, EMULSION INTRAVENOUS at 09:25

## 2018-04-27 RX ADMIN — PROPOFOL 80 MG: 10 INJECTION, EMULSION INTRAVENOUS at 09:23

## 2018-04-27 RX ADMIN — PROPOFOL 30 MG: 10 INJECTION, EMULSION INTRAVENOUS at 09:24

## 2018-04-27 RX ADMIN — PROPOFOL 30 MG: 10 INJECTION, EMULSION INTRAVENOUS at 09:40

## 2018-04-27 RX ADMIN — SODIUM CHLORIDE, POTASSIUM CHLORIDE, SODIUM LACTATE AND CALCIUM CHLORIDE: 600; 310; 30; 20 INJECTION, SOLUTION INTRAVENOUS at 09:17

## 2018-04-27 NOTE — ANESTHESIA PREPROCEDURE EVALUATION
Anesthetic History   No history of anesthetic complications            Review of Systems / Medical History  Patient summary reviewed, nursing notes reviewed and pertinent labs reviewed    Pulmonary        Sleep apnea (uses mouthpiece)           Neuro/Psych         Headaches (migraines) and psychiatric history    Comments: Bipolar Affective d/o Cardiovascular    Hypertension: well controlled        Dysrhythmias : atrial fibrillation  Pacemaker ( PM for SSS)    Exercise tolerance: >4 METS  Comments: Not pacemaker dependent   GI/Hepatic/Renal     GERD ( occasional only)           Endo/Other      Hypothyroidism: well controlled  Morbid obesity and arthritis     Other Findings   Comments: Vitamin D Deficiency           Physical Exam    Airway  Mallampati: I  TM Distance: 4 - 6 cm  Neck ROM: normal range of motion   Mouth opening: Normal     Cardiovascular  Regular rate and rhythm,  S1 and S2 normal,  no murmur, click, rub, or gallop  Rhythm: regular  Rate: normal      Pertinent negatives: No murmur   Dental  No notable dental hx       Pulmonary  Breath sounds clear to auscultation               Abdominal  GI exam deferred       Other Findings            Anesthetic Plan    ASA: 3  Anesthesia type: total IV anesthesia          Induction: Intravenous  Anesthetic plan and risks discussed with: Patient

## 2018-04-27 NOTE — IP AVS SNAPSHOT
[address] [facility] 
[phone] Patient: Vaishali Tse MRN: [id number] DKP:4/3/5253 About your hospitalization You were admitted on:  April 27, 2018 You last received care in the:  [facility] PACU You were discharged on:  April 27, 2018 Why you were hospitalized Your primary diagnosis was:  Not on File Follow-up Information Follow up With Details Comments Contact Info MD Leonela Montoya. [address] [facility] 
[phone] Your Scheduled Appointments Wednesday May 02, 2018 11:15 AM EDT ROUTINE CARE with Sean Young MD  
[facility] [address] [facility]  
[phone] Tuesday May 29, 2018 11:00 AM EDT  
PREADMISSION TEST with [facility] PAT ROOM P3 [facility] Preadmit Testing (RI OR PRE ASSESSMENT) [address]  
[phone] Thursday June 07, 2018 SHOULDER ARTHROPLASTY TOTAL REVERSE  with Belinda Odom MD  
[facility] AMB SURGERY UNIT (RI OR PRE ASSESSMENT) [address]  
[phone] Discharge Orders None A check tracy indicates which time of day the medication should be taken. My Medications CONTINUE taking these medications Instructions Each Dose to Equal  
 Morning Noon Evening Bedtime  
 ergocalciferol 50,000 unit capsule Commonly known as:  ERGOCALCIFEROL Your last dose was: Your next dose is: TAKE 1 CAP BY MOUTH EVERY SEVEN (7) DAYS. HYDROcodone-acetaminophen 5-325 mg per tablet Commonly known as:  Orren Josefina Your last dose was: Your next dose is: Take 1 Tab by mouth every eight (8) hours as needed for Pain. Max Daily Amount: 3 Tabs. 1 Tab lamoTRIgine 25 mg tablet Commonly known as: LaMICtal  
   
Your last dose was: Your next dose is:    
   
   
 50 mg daily. 50 mg  
    
   
   
   
  
 levothyroxine 50 mcg tablet Commonly known as:  SYNTHROID Your last dose was: Your next dose is: TAKE 1 TABLET EVERY DAY  
     
   
   
   
  
 lithium carbonate 300 mg tablet Your last dose was: Your next dose is: Take 300 mg by mouth nightly. 300 mg  
    
   
   
   
  
 montelukast 10 mg tablet Commonly known as:  SINGULAIR Your last dose was: Your next dose is: TAKE 1 TAB BY MOUTH DAILY. naratriptan 2.5 mg Tab Commonly known as:  [employer] Your last dose was: Your next dose is: Take 1 Tab by mouth once as needed for up to 1 dose. 2.5 mg  
    
   
   
   
  
 nitrofurantoin (macrocrystal-monohydrate) 100 mg capsule Commonly known as:  MACROBID Your last dose was: Your next dose is: TAKE ONE CAPSULE BY MOUTH EVERY DAY TO PREVENT URINARY TRACT INFECTIONS PRADAXA 150 mg capsule Generic drug:  dabigatran etexilate Your last dose was: Your next dose is: TAKE ONE CAPSULE TWICE A DAY  
     
   
   
   
  
 pregabalin 100 mg capsule Commonly known as:  Brian Yañeze Your last dose was: Your next dose is: Take 1 Cap by mouth two (2) times a day. Max Daily Amount: 200 mg. Indications: FIBROMYALGIA  
 100 mg  
    
   
   
   
  
 TOPAMAX 100 mg tablet Generic drug:  topiramate Your last dose was: Your next dose is: Take 200 mg by mouth nightly. 200 mg  
    
   
   
   
  
 XANAX 0.5 mg tablet Generic drug:  ALPRAZolam  
   
Your last dose was: Your next dose is: Take 0.5 mg by mouth daily. 0.5 mg  
    
   
   
   
  
  
  
  
Opioid Education Prescription Opioids: What You Need to Know: 
 
 
Discomfort: 
Redness at IV site- apply warm compress to area; if redness or soreness persist- contact your physician There may be a slight amount of blood passed from the rectum Gaseous discomfort- walking, belching will help relieve any discomfort You may not operate a vehicle for 24 hours You may not engage in an occupation involving machinery or appliances for rest of today You may not drink alcoholic beverages for at least 24 hours Avoid making any critical decisions for at least 24 hour DIET: 
 You may resume your normal diet today. You should not overeat or \"feast\" today as your abdomen may become distended or uncomfortable. MEDICATIONS: 
 I reconciled this list from the list you gave us when you came today for the procedure. Please clarify with me, your primary care physician and the nurse who is discharging you if we have any discrepancies. Aspirin and or non-steroidal medication (Ibuprofen, Motrin, naproxen, etc.) is ok in limited quantities. ACTIVITY: 
You may resume your normal daily activities it is recommended that you spend the remainder of the day resting -  avoid any strenuous activity. CALL M.D. ANY SIGN OF: Increasing pain, nausea, vomiting Abdominal distension (swelling) New increased bleeding (oral or rectal) Fever (chills) Pain in chest area Bloody discharge from nose or mouth Shortness of breath Follow-up Instructions: 
 Call Dr. Daniel Alonzo for the results of  biopsy in approximately one week Telephone #  [phone] Follow up visit as previously scheduled or as needed. >>>MAY RESUME PRADAXA ON Sunday!!!<<< 
 
Ashlie Harley MD 
9:58 AM 
4/27/2018 DO NOT TAKE SLEEPING MEDICATIONS OR ANTIANXIETY MEDICATIONS WHILE TAKING NARCOTIC PAIN MEDICATIONS,  ESPECIALLY THE NIGHT OF ANESTHESIA. CPAP PATIENTS BE SURE TO WEAR MACHINE WHENEVER NAPPING OR SLEEPING. DISCHARGE SUMMARY from Nurse The following personal items collected during your admission are returned to you:  
Dental Appliance: Dental Appliances: None Vision: Visual Aid: Glasses Hearing Aid:   
Jewelry:   
Clothing:   
Other Valuables:   
Valuables sent to safe:   
 
 
PATIENT INSTRUCTIONS: 
 
 
B - Balance E - Eyes F-  Face looks uneven A-  Arms unable to move or move even S-  Speech slurred or non-existent T-  Time-call 911 as soon as signs and symptoms begin-DO NOT go Back to bed or wait to see if you get better-TIME IS BRAIN. If you have not received your influenza and/or pneumococcal vaccine, please follow up with your primary care physician. The discharge information has been reviewed with the patient and spouse. The patient and spouse verbalized understanding. ACO Transitions of Care Introducing [employer] [address] [place] offers a voluntary care coordination program to provide high quality service and care to [facility] fee-for-service beneficiaries. Yobany Stovall was designed to help you enhance your health and well-being through the following services: ? Transitions of Care  support for individuals who are transitioning from one care setting to another (example: Hospital to home). ? Chronic and Complex Care Coordination  support for individuals and caregivers of those with serious or chronic illnesses or with more than one chronic (ongoing) condition and those who take a number of different medications. If you meet specific medical criteria, a [facility] may call you directly to coordinate your care with your primary care physician and your other care providers. For questions about the [facility] programs, please, contact your physicians office. For general questions or additional information about Accountable Care Organizations: 
Please visit www.medicare.gov/acos. html or call 1-800-MEDICARE (1-750.542.6032) TTY users should call 6-995.275.5854. Introducing [facility] & HEALTH SERVICES! Dear Jazmine Parsons: Thank you for requesting a CarePayment account. Our records indicate that you already have an active CarePayment account. You can access your account anytime at [email or web address] Did you know that you can access your hospital and ER discharge instructions at any time in CarePayment? You can also review all of your test results from your hospital stay or ER visit. Additional Information If you have questions, please visit the Frequently Asked Questions section of the CarePayment website at [email or web address] Remember, CarePayment is NOT to be used for urgent needs. For medical emergencies, dial 911. Now available from your iPhone and Android! Introducing Andre Connor As a [facility] patient, I wanted to make you aware of our electronic visit tool called [email or web address]. [employer] allows you to connect within minutes with a medical provider 24 hours a day, seven days a week via a mobile device or tablet or logging into a secure website from your computer. You can access Andre Pereirafin from anywhere in the [place]. A virtual visit might be right for you when you have a simple condition and feel like you just dont want to get out of bed, or cant get away from work for an appointment, when your [facility] provider is not available (evenings, weekends or holidays), or when youre out of town and need minor care. Electronic visits cost only $49 and if the [employer] provider determines a prescription is needed to treat your condition, one can be electronically transmitted to a nearby pharmacy*. Please take a moment to enroll today if you have not already done so. The enrollment process is free and takes just a few minutes. To enroll, please download the [employer] mulu to your tablet or phone, or visit [email or web address] to enroll on your computer. And, as an [address] [place] [address] patient with a Highlight account, the results of your visits will be scanned into your electronic medical record and your primary care provider will be able to view the scanned results. We urge you to continue to see your regular [facility] provider for your ongoing medical care. And while your primary care provider may not be the one available when you seek a Andre Pereirafin virtual visit, the peace of mind you get from getting a real diagnosis real time can be priceless. For more information on Andre Pereirafin, view our Frequently Asked Questions (FAQs) at [email or web address]. Sincerely, 
 
Sandee Hooker MD 
Chief Medical Officer [facility] *:  certain medications cannot be prescribed via Andre Kellifin Providers Seen During Your Hospitalization Provider Specialty Primary office phone Ambar Zhu MD Gastroenterology [phone] Your Primary Care Physician (PCP) Primary Care Physician Office Phone Office Fax Brianna Pinto [phone] You are allergic to the following Allergen Reactions Latex Swelling Sulfa (Sulfonamide Antibiotics) Hives Xarelto (Rivaroxaban) Other (comments) GI problems Recent Documentation Height Weight BMI OB Status Smoking Status 1.651 m 106.6 kg 39.11 kg/m2 Hysterectomy Never Smoker Emergency Contacts Name Discharge Info Relation Home Work Mobile JjBill DISCHARGE CAREGIVER [3] Spouse [3] [phone] Patient Belongings The following personal items are in your possession at time of discharge: 
  Dental Appliances: None  Visual Aid: Glasses Please provide this summary of care documentation to your next provider. Signatures-by signing, you are acknowledging that this After Visit Summary has been reviewed with you and you have received a copy. Patient Signature:  ____________________________________________________________ Date:  ____________________________________________________________  
  
Pardeep Cart Provider Signature:  ____________________________________________________________ Date:  ____________________________________________________________

## 2018-04-27 NOTE — PROCEDURES
Colonoscopy    Indications: change in bowels    Pre-operative Diagnosis: see above     Medications:  See anesthesia form    Post-operative Diagnosis:  DIVERTICULOSIS, ASCENDING COLON POLYP, RECTAL POLYP, HYPERTROPHIC ANAL PAPILLA, EXTERNAL HEMORRHOIDS      Procedure Details   Prior to the procedure its objectives, risks, consequences and alternatives were discussed with the patient who then elected to proceed. All questions were answered. Digital Rectal Exam:  was normal     The Olympus videocolonoscope was inserted in the rectum and advanced to the cecum. The cecum was identified by typical landmarks. The colonoscope was slowly and carefully withdrawn as the mucosa was inspected. In the ascending colon there were two polyps removed (8mm hot snare, 4mm cold snare). Diverticula were present throughout, more so in the left colon. In the rectum at 3 cm there was a 4mm sessile polyp that I snared (hot). I took random biopsies. No other abnormalities were noted. Retroflexion in the rectum was remarkable for hypertrophied anal papillae. Upon removal of the scope external hemorrhoids were seen. Photos to document the ileocecal valve, appendiceal orifice and retroflexion exam were obtained. The preparation was adequate      Estimated Blood Loss:  none    Specimens:  Ascending colon polyps x 2  Random colon for diarrhea  Rectal polyp    Findings: Three polyps removed  External hemorrhoids  diverticulosis    Complications:  none    Repeat colonoscopy is recommended in: Three years.                Jeremy Rae MD  9:55 AM  4/27/2018

## 2018-04-27 NOTE — PERIOP NOTES
Jacy Hortensiain  1950  [id number]    Situation:  Verbal report given from: FANI Vyas  Procedure: Procedure(s):  COLONOSCOPY  ENDOSCOPIC POLYPECTOMY  COLON BIOPSY    Background:    Preoperative diagnosis: ALTERED BOWEL FUNCTION  ATRIAL FIBRILLATION S/P ABLATION   CHANGE IN BOWEL HABIT  CONSTIPATION, UNSPECIFIED     Postoperative diagnosis: DIVERTICULOSIS, ASCENDING COLON POLYP, RECTAL POLYP, HYPERTROPHIC ANAL PAPILLA, EXTERNAL HEMORRHOIDS    :  Dr. Leonard Plaster    Assistant(s): Circ-1: Sandro Spann RN  Circ-2: Fco Tucker RN  Circ-Relief: Jonathan Edmondson RN  Scrub Tech-1: Shaista Murillo    Specimens:   ID Type Source Tests Collected by Time Destination   1 : ASCENDING COLON POLYP Preservative Colon, Ena Small MD 4/27/2018 0930 Pathology   2 : RANDOM COLON BIOPSY Preservative Random colon  Juan Del Rio MD 4/27/2018 9939 Pathology   3 : rectal polyp Preservative Rectal  Juan Del Rio MD 4/27/2018 8664 Pathology       Assessment:  Intra-procedure medications         Anesthesia gave intra-procedure sedation and medications, see anesthesia flow sheet     Intravenous fluids: LR@ KVO     Vital signs stable       Recommendation:    Permission to share finding with  : yes

## 2018-04-27 NOTE — DISCHARGE INSTRUCTIONS
Trip Chappell  [id number]  1950              Procedure  Discharge Instructions:      Discomfort:  Redness at IV site- apply warm compress to area; if redness or soreness persist- contact your physician  There may be a slight amount of blood passed from the rectum  Gaseous discomfort- walking, belching will help relieve any discomfort  You may not operate a vehicle for 24 hours  You may not engage in an occupation involving machinery or appliances for rest of today  You may not drink alcoholic beverages for at least 24 hours  Avoid making any critical decisions for at least 24 hour  DIET:   You may resume your normal diet today. You should not overeat or \"feast\" today as your abdomen may become distended or uncomfortable. MEDICATIONS:   I reconciled this list from the list you gave us when you came today for the procedure. Please clarify with me, your primary care physician and the nurse who is discharging you if we have any discrepancies. Aspirin and or non-steroidal medication (Ibuprofen, Motrin, naproxen, etc.) is ok in limited quantities. ACTIVITY:  You may resume your normal daily activities it is recommended that you spend the remainder of the day resting -  avoid any strenuous activity. CALL M.D. ANY SIGN OF:  Increasing pain, nausea, vomiting  Abdominal distension (swelling)  New increased bleeding (oral or rectal)  Fever (chills)  Pain in chest area  Bloody discharge from nose or mouth  Shortness of breath          Follow-up Instructions:   Call Dr. Ragini Canseco for the results of  biopsy in approximately one week  Telephone #  [phone]  Follow up visit as previously scheduled or as needed.      >>>MAY RESUME PRADAXA ON Sunday!!!<<<    Sg Pyle MD  9:58 AM  4/27/2018             DO NOT TAKE SLEEPING MEDICATIONS OR ANTIANXIETY MEDICATIONS WHILE TAKING NARCOTIC PAIN MEDICATIONS,  ESPECIALLY THE NIGHT OF ANESTHESIA.     CPAP PATIENTS BE SURE TO WEAR MACHINE WHENEVER NAPPING OR SLEEPING. DISCHARGE SUMMARY from Nurse    The following personal items collected during your admission are returned to you:   Dental Appliance: Dental Appliances: None  Vision: Visual Aid: Glasses  Hearing Aid:    Jewelry:    Clothing:    Other Valuables:    Valuables sent to safe:        PATIENT INSTRUCTIONS:    After General Anesthesia or Intravenous Sedation, for 24 hours or while taking prescription Narcotics:        Someone should be with you for the next 24 hours. For your own safety, a responsible adult must drive you home. · Limit your activities  · Recommended activity: Rest today, up with assistance today. Do not climb stairs or shower unattended for the next 24 hours. · Please start with a soft bland diet and advance as tolerated (no nausea) to regular diet. · If you have a sore throat you should try the following: fluids, warm salt water gargles, or throat lozenges. If it does not improve after several days please follow up with your primary physician. · Do not drive and operate hazardous machinery  · Do not make important personal or business decisions  · Do  not drink alcoholic beverages  · If you have not urinated within 8 hours after discharge, please contact your surgeon on call. Report the following to your surgeon:  · Excessive pain, swelling, redness or odor of or around the surgical area  · Temperature over 100.5  · Nausea and vomiting lasting longer than 4 hours or if unable to take medications  · Any signs of decreased circulation or nerve impairment to extremity: change in color, persistent  numbness, tingling, coldness or increase pain      · You will receive a Post Operative Call from one of the Recovery Room Nurses on the day after your surgery to check on you. It is very important for us to know how you are recovering after your surgery. If you have an issue or need to speak with someone, please call your surgeon, do not wait for the post operative call.     · You may receive an e-mail or letter in the mail from [employer] regarding your experience with us in the Ambulatory Surgery Unit. Your feedback is valuable to us and we appreciate your participation in the survey. · If the above instructions are not adequate, please contact Tam Pritchett RN, [facility] anesthesia Nurse Manager or our Anesthesiologist, at [phone]. If you are having problems after your surgery, call the physician at his office number. · We wish you a speedy recovery ? What to do at Home:      *  Please give a list of your current medications to your Primary Care Provider. *  Please update this list whenever your medications are discontinued, doses are      changed, or new medications (including over-the-counter products) are added. *  Please carry medication information at all times in case of emergency situations. These are general instructions for a healthy lifestyle:    No smoking/ No tobacco products/ Avoid exposure to second hand smoke    Surgeon General's Warning:  Quitting smoking now greatly reduces serious risk to your health. Obesity, smoking, and sedentary lifestyle greatly increases your risk for illness    A healthy diet, regular physical exercise & weight monitoring are important for maintaining a healthy lifestyle    You may be retaining fluid if you have a history of heart failure or if you experience any of the following symptoms:  Weight gain of 3 pounds or more overnight or 5 pounds in a week, increased swelling in our hands or feet or shortness of breath while lying flat in bed. Please call your doctor as soon as you notice any of these symptoms; do not wait until your next office visit.     Recognize signs and symptoms of STROKE:    B - Balance  E - Eyes    F-  Face looks uneven    A-  Arms unable to move or move even    S-  Speech slurred or non-existent    T-  Time-call 911 as soon as signs and symptoms begin-DO NOT go       Back to bed or wait to see if you get better-TIME IS BRAIN. If you have not received your influenza and/or pneumococcal vaccine, please follow up with your primary care physician. The discharge information has been reviewed with the patient and spouse. The patient and spouse verbalized understanding.

## 2018-04-27 NOTE — ANESTHESIA POSTPROCEDURE EVALUATION
Post-Anesthesia Evaluation and Assessment    Patient: Tigist Robbins MRN: [id number]  SSN: [id number]    YOB: 1950  Age: 70 y.o. Sex: female       Cardiovascular Function/Vital Signs  Visit Vitals    BP 95/72    Pulse 75    Temp 36.4 °C (97.6 °F)    Resp 20    Ht 5' 5\" (1.651 m)    Wt 105.2 kg (232 lb)    SpO2 97%    BMI 38.61 kg/m2       Patient is status post total IV anesthesia anesthesia for Procedure(s):  COLONOSCOPY  ENDOSCOPIC POLYPECTOMY  COLON BIOPSY. Nausea/Vomiting: None    Postoperative hydration reviewed and adequate. Pain:  Pain Scale 1: Numeric (0 - 10) (04/27/18 1017)  Pain Intensity 1: 0 (04/27/18 1017)   Managed    Neurological Status:   Neuro (WDL): Within Defined Limits (04/27/18 1017)  Neuro  Neurologic State: Alert (04/27/18 1017)   At baseline    Mental Status and Level of Consciousness: Arousable    Pulmonary Status:   O2 Device: Room air (04/27/18 1017)   Adequate oxygenation and airway patent    Complications related to anesthesia: None    Post-anesthesia assessment completed.  No concerns    Signed By: Marco Reese MD     April 27, 2018

## 2018-04-27 NOTE — H&P
Pre-endoscopy H and P    The patient was seen and examined in the [facility] pre op . The airway was assessed and docuemented. The problem list, past medical history, and medications were reviewed. The history is:  She is having a lot of esophagus issues. She chokes easily on chicken and steak. This is improved somewhat. sxhe hasknown distal spasm. this can improve with indigestion tabs. The pain in the stomach is also \"at a calm\". She is now having constipation and occasional diarrhea. after 4 days of no bowels she will have a day of gas all day. then she will have # 4 type. Diarrhea stools are: 6-7. No pain with bms and no pain with constiaptoin. She might have some pain during the gas pain. this is all new. She will have four days of constipation then have a bm. It will stick to the toilet like glue. Today she had an episode of incontinence (second ever). the bad diarrhea (alphagal) stopped two months ago. there are no pancreas problems. no odynophagia, dilation 2.5 years ago helped.            Patient Active Problem List   Diagnosis Code    Colon polyps K63.5    Urge incontinence N39.41    Migraine G43.909    Bipolar affective disorder ([facility] [address] F31.9    Hypothyroidism E03.9    Hyperglycemia R73.9    Hyperlipidemia E78.5    GERD (gastroesophageal reflux disease) K21.9    Diarrhea R19.7    Benign essential hypertension I10    Morbid obesity with BMI of 40.0-44.9, adult ([facility] [address] E66.01, Z68.41    Atrial fibrillation ([facility]) I48.91    S/P ablation of atrial fibrillation Z98.890, Z86.79    SSS (sick sinus syndrome) ([facility]) I49.5    S/P cardiac pacemaker procedure Z95.0    Esophageal spasm K22.4    A-fib ([facility]) I48.91    Vitamin D deficiency E55.9    Epigastric pain R10.13    Fibromyalgia M79.7    Incisional hernia, without obstruction or gangrene K43.2    Obesity, Class II, BMI 35-39.9 E66.9     Social History     Social History    Marital status:      Spouse name: N/A    Number of children: N/A    Years of education: N/A     Occupational History    Not on file. Social History Main Topics    Smoking status: Never Smoker    Smokeless tobacco: Never Used    Alcohol use Yes      Comment: occasional    Drug use: No    Sexual activity: No     Other Topics Concern    Not on file     Social History Narrative     Past Medical History:   Diagnosis Date    Arrhythmia     atrial fibrillation 2014    Arthritis     Bipolar affective disorder ([facility] [address] 3/31/2010    Colon polyps 3/31/2010    Depression     Diarrhea 7/19/2013    Epigastric pain 6/5/2015    GERD (gastroesophageal reflux disease)     occasiional only; controlled with med    Hyperlipidemia 3/31/2010    Hypothyroidism 3/31/2010    Long term current use of anticoagulant therapy     Migraine 3/31/2010    Psychotic disorder     S/P ablation of atrial fibrillation 5/13/2014    S/P cardiac pacemaker procedure 9/16/2014 9/16/14 [employer] MRI compatible dual chamber pacemaker implant    Sleep apnea     Stool color black     Thyroid disease     Tick-borne disease     Alpha Gal allergy    Unspecified sleep apnea     \"slight\" uses mouth device    Urge incontinence 3/31/2010    Vitamin D deficiency 3/31/2015     The patient has a family history of na    Prescriptions Prior to Admission   Medication Sig    lamoTRIgine (LAMICTAL) 25 mg tablet 50 mg daily.  nitrofurantoin, macrocrystal-monohydrate, (MACROBID) 100 mg capsule TAKE ONE CAPSULE BY MOUTH EVERY DAY TO PREVENT URINARY TRACT INFECTIONS    pregabalin (LYRICA) 100 mg capsule Take 1 Cap by mouth two (2) times a day. Max Daily Amount: 200 mg. Indications: FIBROMYALGIA    HYDROcodone-acetaminophen (NORCO) 5-325 mg per tablet Take 1 Tab by mouth every eight (8) hours as needed for Pain. Max Daily Amount: 3 Tabs.  PRADAXA 150 mg capsule TAKE ONE CAPSULE TWICE A DAY    ergocalciferol (ERGOCALCIFEROL) 50,000 unit capsule TAKE 1 CAP BY MOUTH EVERY SEVEN (7) DAYS.     levothyroxine (SYNTHROID) 50 mcg tablet TAKE 1 TABLET EVERY DAY    topiramate (TOPAMAX) 100 mg tablet Take 200 mg by mouth nightly.  lithium 300 mg tablet Take 300 mg by mouth nightly.  alprazolam (XANAX) 0.5 mg tablet Take 0.5 mg by mouth daily.  montelukast (SINGULAIR) 10 mg tablet TAKE 1 TAB BY MOUTH DAILY.  naratriptan (AMERGE) 2.5 mg tab Take 1 Tab by mouth once as needed for up to 1 dose. The review of systems is:  negative for shortness of breath or chest pain      The heart, lungs, and mental status were satisfactory for the administration of anesthesia sedation and for the procedure. Main issues are incontinence, cosntipation, change in bowels. I discussed with the patient the objectives, risks, consequences and alternatives to the procedure.       Evangelina Lopez MD  4/27/2018  6:52 AM

## 2018-04-28 LAB
APPEARANCE UR: ABNORMAL
ATRIAL RATE: 75 BPM
BACTERIA URNS QL MICRO: ABNORMAL /HPF
BILIRUB UR QL: NEGATIVE
CALCULATED R AXIS, ECG10: -29 DEGREES
CALCULATED T AXIS, ECG11: 93 DEGREES
COLOR UR: ABNORMAL
DIAGNOSIS, 93000: NORMAL
EPITH CASTS URNS QL MICRO: ABNORMAL /LPF
GLUCOSE UR STRIP.AUTO-MCNC: NEGATIVE MG/DL
HGB UR QL STRIP: NEGATIVE
KETONES UR QL STRIP.AUTO: NEGATIVE MG/DL
LEUKOCYTE ESTERASE UR QL STRIP.AUTO: ABNORMAL
NITRITE UR QL STRIP.AUTO: NEGATIVE
P-R INTERVAL, ECG05: 326 MS
PH UR STRIP: 6.5 [PH] (ref 5–8)
PROT UR STRIP-MCNC: NEGATIVE MG/DL
Q-T INTERVAL, ECG07: 450 MS
QRS DURATION, ECG06: 170 MS
QTC CALCULATION (BEZET), ECG08: 502 MS
RBC #/AREA URNS HPF: ABNORMAL /HPF (ref 0–5)
SP GR UR REFRACTOMETRY: 1.01 (ref 1–1.03)
UA: UC IF INDICATED,UAUC: ABNORMAL
UROBILINOGEN UR QL STRIP.AUTO: 0.2 EU/DL (ref 0.2–1)
VENTRICULAR RATE, ECG03: 75 BPM
WBC URNS QL MICRO: ABNORMAL /HPF (ref 0–4)

## 2018-04-28 NOTE — ED PROVIDER NOTES
EMERGENCY DEPARTMENT HISTORY AND PHYSICAL EXAM      Date: 4/27/2018  Patient Name: Delayne Ahumada    History of Presenting Illness     Chief Complaint   Patient presents with    Facial Droop     She noticed about a week ago that her face was droopy in the left side. She also feels off balance. History Provided By: Patient    HPI: Delayne Ahumada, 70 y.o. female with PMHx significant for arrhythmia, GERD, and hyperlipidemia, presents ambulatory to the ED with cc of constantly feeling off balance for the past week, but worsening today. Pt notes that she thinks she had a stroke in the past week because she had left sided facial droop for ~1 day. She reports that her forehead was spared. Pt notes that she thought she had Bell's Palsy so she did not come in for evaluation. However, after reading more on strokes, she decided to come to ED for evaluation. She reports that she is on pradaxa, and notes that she has had 4 ablations which resolved her a fib. Pt notes that she has a pacemaker, and was told by her cardiologist to not take aspirin. She reports that she has seen a neurologist in the past for migraines. Pt denies fever, recent falls, or recent head trauma. Chief Complaint: off balance  Duration: 1 Weeks  Timing:  Worsening  Severity: Moderate  Associated Symptoms: left sided facial droop    There are no other complaints, changes, or physical findings at this time. PCP: Jessika Bach MD   Neurologist: Dr. Zunilda Nunes    Current Outpatient Prescriptions   Medication Sig Dispense Refill    lamoTRIgine (LAMICTAL) 25 mg tablet 50 mg daily.  nitrofurantoin, macrocrystal-monohydrate, (MACROBID) 100 mg capsule TAKE ONE CAPSULE BY MOUTH EVERY DAY TO PREVENT URINARY TRACT INFECTIONS  5    pregabalin (LYRICA) 100 mg capsule Take 1 Cap by mouth two (2) times a day. Max Daily Amount: 200 mg.  Indications: FIBROMYALGIA 60 Cap 5    HYDROcodone-acetaminophen (NORCO) 5-325 mg per tablet Take 1 Tab by mouth every eight (8) hours as needed for Pain. Max Daily Amount: 3 Tabs. 30 Tab 0    PRADAXA 150 mg capsule TAKE ONE CAPSULE TWICE A DAY 60 Cap 1    ergocalciferol (ERGOCALCIFEROL) 50,000 unit capsule TAKE 1 CAP BY MOUTH EVERY SEVEN (7) DAYS. 12 Cap 3    montelukast (SINGULAIR) 10 mg tablet TAKE 1 TAB BY MOUTH DAILY. 30 Tab 1    naratriptan (AMERGE) 2.5 mg tab Take 1 Tab by mouth once as needed for up to 1 dose. 12 Tab 11    levothyroxine (SYNTHROID) 50 mcg tablet TAKE 1 TABLET EVERY DAY 90 Tab 3    topiramate (TOPAMAX) 100 mg tablet Take 200 mg by mouth nightly.  lithium 300 mg tablet Take 300 mg by mouth nightly.  alprazolam (XANAX) 0.5 mg tablet Take 0.5 mg by mouth daily.          Past History     Past Medical History:  Past Medical History:   Diagnosis Date    Arrhythmia     atrial fibrillation 2014    Arthritis     Bipolar affective disorder ([facility] [address] 3/31/2010    Colon polyps 3/31/2010    Depression     Diarrhea 7/19/2013    Epigastric pain 6/5/2015    GERD (gastroesophageal reflux disease)     occasiional only; controlled with med    Hyperlipidemia 3/31/2010    Hypothyroidism 3/31/2010    Long term current use of anticoagulant therapy     Migraine 3/31/2010    Psychotic disorder     S/P ablation of atrial fibrillation 5/13/2014    S/P cardiac pacemaker procedure 9/16/2014 9/16/14 [employer] MRI compatible dual chamber pacemaker implant    Sleep apnea     Stool color black     Thyroid disease     Tick-borne disease     Alpha Gal allergy    Unspecified sleep apnea     \"slight\" uses mouth device    Urge incontinence 3/31/2010    Vitamin D deficiency 3/31/2015       Past Surgical History:  Past Surgical History:   Procedure Laterality Date    CARDIAC SURG PROCEDURE UNLIST  5/2014    ablation    COLONOSCOPY N/A 4/27/2018    COLONOSCOPY performed by Syd Fournier MD at [address] [place] [address]  4/27/2018         COLONOSCOPY,RENALDO Nixon  4/27/2018         HX BUNIONECTOMY      HX GI      egd, colonoscopy    HX HYSTERECTOMY      HX ORTHOPAEDIC      removed bone spurs from R & L hand    HX ORTHOPAEDIC  1990s? left bunionectomy     HX OTHER SURGICAL  07/2014    2 shocks to heart & ablations    HX PACEMAKER  2014    On the right side    HX PACEMAKER PLACEMENT  2015    HX TONSILLECTOMY      HX TONSILLECTOMY         Family History:  Family History   Problem Relation Age of Onset    Arrhythmia Mother      afib    Stroke Mother     Heart Failure Mother     Colon Cancer Father     Heart Disease Father     Arrhythmia Brother      afib    Asthma Brother     COPD Brother        Social History:  Social History   Substance Use Topics    Smoking status: Never Smoker    Smokeless tobacco: Never Used    Alcohol use Yes      Comment: occasional       Allergies: Allergies   Allergen Reactions    Latex Swelling    Sulfa (Sulfonamide Antibiotics) Hives    Xarelto [Rivaroxaban] Other (comments)     GI problems         Review of Systems   Review of Systems   Constitutional: Negative for chills and fever. HENT: Negative for congestion and sore throat. Eyes: Negative for visual disturbance. Respiratory: Negative for cough and shortness of breath. Cardiovascular: Negative for chest pain and leg swelling. Gastrointestinal: Negative for abdominal pain, blood in stool, diarrhea and nausea. Endocrine: Negative for polyuria. Genitourinary: Negative for dysuria, flank pain, vaginal bleeding and vaginal discharge. Musculoskeletal: Negative for myalgias. Skin: Negative for rash. Allergic/Immunologic: Negative for immunocompromised state. Neurological: Positive for facial asymmetry. Negative for weakness and headaches. +unsteady on feet   Psychiatric/Behavioral: Negative for confusion. Physical Exam   Physical Exam   Constitutional: She is oriented to person, place, and time. She appears well-developed and well-nourished.    HENT:   Head: Normocephalic and atraumatic. Moist mucous membranes   Eyes: Conjunctivae are normal. Pupils are equal, round, and reactive to light. Right eye exhibits no discharge. Left eye exhibits no discharge. Neck: Normal range of motion. Neck supple. No tracheal deviation present. Cardiovascular: Normal rate, regular rhythm and normal heart sounds. No murmur heard. Pulmonary/Chest: Effort normal and breath sounds normal. No respiratory distress. She has no wheezes. She has no rales. Abdominal: Soft. Bowel sounds are normal. There is no tenderness. There is no rebound and no guarding. Musculoskeletal: Normal range of motion. She exhibits no edema, tenderness or deformity. Neurological: She is alert and oriented to person, place, and time. Skin: Skin is warm and dry. No rash noted. No erythema. Psychiatric: Her behavior is normal.   Nursing note and vitals reviewed.         Diagnostic Study Results     Labs -     Recent Results (from the past 12 hour(s))   EKG, 12 LEAD, INITIAL    Collection Time: 04/27/18  8:28 PM   Result Value Ref Range    Ventricular Rate 75 BPM    Atrial Rate 75 BPM    P-R Interval 326 ms    QRS Duration 170 ms    Q-T Interval 450 ms    QTC Calculation (Bezet) 502 ms    Calculated R Axis -29 degrees    Calculated T Axis 93 degrees    Diagnosis       Atrial-paced rhythm with prolonged AV conduction  Right bundle branch block  Abnormal ECG  When compared with ECG of 28-JAN-2015 14:28,  Electronic atrial pacemaker has replaced Sinus rhythm  Right bundle branch block has replaced Nonspecific intraventricular   conduction delay     CBC WITH AUTOMATED DIFF    Collection Time: 04/27/18  9:03 PM   Result Value Ref Range    WBC 7.0 3.6 - 11.0 K/uL    RBC 4.25 3.80 - 5.20 M/uL    HGB 13.1 11.5 - 16.0 g/dL    HCT 39.4 35.0 - 47.0 %    MCV 92.7 80.0 - 99.0 FL    MCH 30.8 26.0 - 34.0 PG    MCHC 33.2 30.0 - 36.5 g/dL    RDW 13.2 11.5 - 14.5 %    PLATELET 634 792 - 566 K/uL    MPV 10.5 8.9 - 12.9 FL NRBC 0.0 0  WBC    ABSOLUTE NRBC 0.00 0.00 - 0.01 K/uL    NEUTROPHILS 59 32 - 75 %    LYMPHOCYTES 30 12 - 49 %    MONOCYTES 7 5 - 13 %    EOSINOPHILS 4 0 - 7 %    BASOPHILS 1 0 - 1 %    IMMATURE GRANULOCYTES 0 0.0 - 0.5 %    ABS. NEUTROPHILS 4.1 1.8 - 8.0 K/UL    ABS. LYMPHOCYTES 2.1 0.8 - 3.5 K/UL    ABS. MONOCYTES 0.5 0.0 - 1.0 K/UL    ABS. EOSINOPHILS 0.3 0.0 - 0.4 K/UL    ABS. BASOPHILS 0.0 0.0 - 0.1 K/UL    ABS. IMM. GRANS. 0.0 0.00 - 0.04 K/UL    DF AUTOMATED     METABOLIC PANEL, COMPREHENSIVE    Collection Time: 04/27/18  9:03 PM   Result Value Ref Range    Sodium 146 (H) 136 - 145 mmol/L    Potassium 3.6 3.5 - 5.1 mmol/L    Chloride 116 (H) 97 - 108 mmol/L    CO2 22 21 - 32 mmol/L    Anion gap 8 5 - 15 mmol/L    Glucose 131 (H) 65 - 100 mg/dL    BUN 17 6 - 20 MG/DL    Creatinine 0.75 0.55 - 1.02 MG/DL    BUN/Creatinine ratio 23 (H) 12 - 20      GFR est AA >60 >60 ml/min/1.73m2    GFR est non-AA >60 >60 ml/min/1.73m2    Calcium 9.3 8.5 - 10.1 MG/DL    Bilirubin, total 0.2 0.2 - 1.0 MG/DL    ALT (SGPT) 29 12 - 78 U/L    AST (SGOT) 17 15 - 37 U/L    Alk.  phosphatase 100 45 - 117 U/L    Protein, total 6.3 (L) 6.4 - 8.2 g/dL    Albumin 3.5 3.5 - 5.0 g/dL    Globulin 2.8 2.0 - 4.0 g/dL    A-G Ratio 1.3 1.1 - 2.2     PROTHROMBIN TIME + INR    Collection Time: 04/27/18  9:03 PM   Result Value Ref Range    INR 1.0 0.9 - 1.1      Prothrombin time 9.9 9.0 - 11.1 sec   PTT    Collection Time: 04/27/18  9:03 PM   Result Value Ref Range    aPTT 26.6 22.1 - 32.0 sec    aPTT, therapeutic range     58.0 - 77.0 SECS   URINALYSIS W/ REFLEX CULTURE    Collection Time: 04/27/18 11:09 PM   Result Value Ref Range    Color YELLOW/STRAW      Appearance CLOUDY (A) CLEAR      Specific gravity 1.015 1.003 - 1.030      pH (UA) 6.5 5.0 - 8.0      Protein NEGATIVE  NEG mg/dL    Glucose NEGATIVE  NEG mg/dL    Ketone NEGATIVE  NEG mg/dL    Bilirubin NEGATIVE  NEG      Blood NEGATIVE  NEG      Urobilinogen 0.2 0.2 - 1.0 EU/dL Nitrites NEGATIVE  NEG      Leukocyte Esterase SMALL (A) NEG      WBC 5-10 0 - 4 /hpf    RBC 0-5 0 - 5 /hpf    Epithelial cells MANY (A) FEW /lpf    Bacteria 3+ (A) NEG /hpf    UA:UC IF INDICATED URINE CULTURE ORDERED (A) CNI         Radiologic Studies -       EXAM:  CT HEAD WO CONT     INDICATION:   Stroke. Left facial droop for a week.     COMPARISON: None.     CONTRAST:  None.     TECHNIQUE: Unenhanced CT of the head was performed using 5 mm images. Brain and  bone windows were generated. CT dose reduction was achieved through use of a  standardized protocol tailored for this examination and automatic exposure  control for dose modulation.       FINDINGS:  The ventricles and sulci are normal in size, shape and configuration and  midline. There is no significant white matter disease. There is no intracranial  hemorrhage, significant extra-axial collection, mass, mass effect or midline  shift. The basilar cisterns are open. No acute infarct is identified. The bone  windows demonstrate no abnormalities. The visualized portions of the paranasal  sinuses and mastoid air cells are clear.     IMPRESSION  IMPRESSION: No acute intracranial process.       **PRELIMINARY REPORT**     No evidence of acute thrombosis or significant stenosis. Hypoplastic A1 segment  of the right anterior cerebral artery.     Preliminary report was provided by Dr. Agnes Wong, the on-call radiologist, at 11:16  PM.     Final report to follow.     **END PRELIMINARY REPORT**       Medical Decision Making   I am the first provider for this patient. I reviewed the vital signs, available nursing notes, past medical history, past surgical history, family history and social history. Vital Signs-Reviewed the patient's vital signs.   Patient Vitals for the past 12 hrs:   Temp Pulse Resp BP SpO2   04/27/18 2145 - 77 14 146/76 -   04/27/18 2130 - 77 19 140/78 97 %   04/27/18 2115 - 75 17 136/66 96 %   04/27/18 2100 - 77 20 131/63 96 %   04/27/18 2008 98.3 °F (36.8 °C) 83 16 148/69 98 %       Pulse Oximetry Analysis - 97% on room air    Cardiac Monitor:   Rate: 77 bpm  Rhythm: Normal Sinus Rhythm     EKG interpretation: (Preliminary)  2028  Rhythm: atrial-paced rhythm with prolonged AV conduction with RBBB; and regular . Rate (approx.): 75; Axis: normal; LA interval: normal; QRS interval: 170; QT/QTc: 450/502; ST/T wave: no acute ischemic changes. Written by Keren Gonzalez ED Scribe, as dictated by Elizabeth Baumgarten, DO. Records Reviewed: Old Medical Records    Provider Notes (Medical Decision Making):   DDx: stroke, TIA, UTI, electrolyte disturbance, intracranial bleed, medication side effect    ED Course:   Initial assessment performed. The patients presenting problems have been discussed, and they are in agreement with the care plan formulated and outlined with them. I have encouraged them to ask questions as they arise throughout their visit. CONSULT NOTE:  10:13 PM  Elizabeth Baumgarten, DO spoke with Dr. Timbo Cheng  Specialty: Neurology  Discussed pt's hx, disposition, and available diagnostic and imaging results. Reviewed care plans. Consultant agrees with plans as outlined. Dr. Timbo Cheng said if CTA head and neck and is normal pt can follow up as out pt. Written by Keren Gonzalez ED Scribe, as dictated by Elizabeth Baumgarten, DO. Disposition:  DISCHARGE NOTE:  12:10 AM  The patient is ready for discharge. The patient's signs, symptoms, diagnosis, and discharge instructions have been discussed and the patient has conveyed their understanding. The patient is to follow up as recommended or return to the ER should their symptoms worsen. Plan has been discussed and the patient is in agreement. PLAN:  1. Current Discharge Medication List        2.    Follow-up Information     Follow up With Details Comments Contact Info    Jose Dutta MD Schedule an appointment as soon as possible for a visit  [address]  [facility]. [phone]      [facility] EMERGENCY DEPT  If symptoms worsen [address] [address]  [phone]        Return to ED if worse     Diagnosis     Clinical Impression:   1. Facial droop    2. Difficulty concentrating        Attestations: This note is prepared by Keern Gonzalez, acting as Scribe for Elizabeth Baumgarten, DO Elizabeth Baumgarten, DO: The scribe's documentation has been prepared under my direction and personally reviewed by me in its entirety. I confirm that the note above accurately reflects all work, treatment, procedures, and medical decision making performed by me.

## 2018-04-28 NOTE — ED NOTES
Pt discharged by MD Cody Rosen. Pt provided with discharge instructions Rx and instructions on follow up care. Pt out of ED in stable condition accompanied by family.

## 2018-04-29 LAB
BACTERIA SPEC CULT: ABNORMAL
CC UR VC: ABNORMAL
SERVICE CMNT-IMP: ABNORMAL

## 2018-04-30 ENCOUNTER — TELEPHONE (OUTPATIENT)
Dept: NEUROLOGY | Age: 68
End: 2018-04-30

## 2018-04-30 ENCOUNTER — OFFICE VISIT (OUTPATIENT)
Dept: NEUROLOGY | Age: 68
End: 2018-04-30

## 2018-04-30 VITALS
BODY MASS INDEX: 39.65 KG/M2 | DIASTOLIC BLOOD PRESSURE: 82 MMHG | OXYGEN SATURATION: 98 % | HEART RATE: 78 BPM | WEIGHT: 238 LBS | SYSTOLIC BLOOD PRESSURE: 122 MMHG | HEIGHT: 65 IN

## 2018-04-30 DIAGNOSIS — E03.9 ACQUIRED HYPOTHYROIDISM: ICD-10-CM

## 2018-04-30 DIAGNOSIS — E53.8 B12 DEFICIENCY: ICD-10-CM

## 2018-04-30 DIAGNOSIS — T43.595A LITHIUM ADVERSE REACTION: ICD-10-CM

## 2018-04-30 DIAGNOSIS — E78.2 MIXED HYPERLIPIDEMIA: ICD-10-CM

## 2018-04-30 DIAGNOSIS — G45.1 HEMISPHERIC CAROTID ARTERY SYNDROME: Primary | ICD-10-CM

## 2018-04-30 DIAGNOSIS — G25.0 ESSENTIAL TREMOR: ICD-10-CM

## 2018-04-30 DIAGNOSIS — R42 DIZZINESS: ICD-10-CM

## 2018-04-30 DIAGNOSIS — F31.70 BIPOLAR AFFECTIVE DISORDER IN REMISSION (HCC): ICD-10-CM

## 2018-04-30 DIAGNOSIS — E55.9 VITAMIN D DEFICIENCY: ICD-10-CM

## 2018-04-30 RX ORDER — AZITHROMYCIN 500 MG/1
500 TABLET, FILM COATED ORAL DAILY
Qty: 14 TAB | Refills: 0 | Status: SHIPPED | OUTPATIENT
Start: 2018-04-30 | End: 2018-04-30 | Stop reason: CLARIF

## 2018-04-30 NOTE — MR AVS SNAPSHOT
[address], 
[address], [address] [facility] 
[phone] Patient: Delayne Ahumada MRN: [id number] PIE:[id number] Visit Information Date & Time Provider Department Dept. Phone Encounter #  
 4/30/2018  3:00 PM Dimas Beth MD Neurology Clinic at [facility] [phone] [id number] Follow-up Instructions Return in about 3 months (around 7/30/2018) for STROKE. Your Appointments 5/2/2018 11:15 AM  
ROUTINE CARE with Jessika Bach MD  
[facility] Appt Note: 6 month f.u; r/s  
 [facility] [address] [id number]  
[address] [facility] [address] [facility] Upcoming Health Maintenance Date Due DTaP/Tdap/Td series (1 - Tdap) 1/8/1970 ZOSTER VACCINE AGE 60> 11/8/2008 GLAUCOMA SCREENING Q2Y 1/8/2014 Bone Densitometry (Dexa) Screening 1/8/2014 Pneumococcal 65+ Low/Medium Risk (2 of 2 - PPSV23) 10/16/2017 MEDICARE YEARLY EXAM 3/14/2018 Influenza Age 5 to Adult 8/1/2018 BREAST CANCER SCRN MAMMOGRAM 6/1/2019 COLONOSCOPY 4/27/2028 Allergies as of 4/30/2018  Review Complete On: 4/30/2018 By: Dimas Beth MD  
  
 Severity Noted Reaction Type Reactions Latex  03/19/2014    Swelling Sulfa (Sulfonamide Antibiotics)  03/23/2010    Hives Xarelto [Rivaroxaban]  10/20/2014    Other (comments) GI problems Current Immunizations  Reviewed on 10/24/2017 Name Date Influenza Vaccine 10/23/2017, 10/25/2015, 10/1/2014 Influenza Vaccine (Quad) PF  Incomplete, 1/5/2017 Influenza Vaccine PF 11/15/2013 Influenza Vaccine Split 10/16/2012 ZZZ-RETIRED (DO NOT USE) Pneumococcal Vaccine (Unspecified Type) 10/16/2012 Not reviewed this visit You Were Diagnosed With   
  
 Codes Comments Hemispheric carotid artery syndrome    -  Primary ICD-10-CM: G45.1 ICD-9-CM: 435.8 Mixed hyperlipidemia     ICD-10-CM: E78.2 ICD-9-CM: 272.2 Bipolar affective disorder in remission [facility])     ICD-10-CM: F31.70 ICD-9-CM: 296.80 Essential tremor     ICD-10-CM: G25.0 ICD-9-CM: 333.1 Acute cystitis without hematuria     ICD-10-CM: N30.00 ICD-9-CM: 595.0 Dizziness     ICD-10-CM: T62 ICD-9-CM: 780.4 Lithium adverse reaction     ICD-10-CM: T43.595A ICD-9-CM: E939.8 Acquired hypothyroidism     ICD-10-CM: E03.9 ICD-9-CM: 118. 9 Vitals BP Pulse Height(growth percentile) Weight(growth percentile) SpO2 BMI  
 122/82 78 5' 5\" (1.651 m) 238 lb (108 kg) 98% 39.61 kg/m2 OB Status Smoking Status Hysterectomy Never Smoker BMI and BSA Data Body Mass Index Body Surface Area  
 39.61 kg/m 2 2.23 m 2 Preferred Pharmacy Pharmacy Name Phone [facility]/PHARMACY #[id number], [place] - [address] [phone] Your Updated Medication List  
  
   
This list is accurate as of 4/30/18  3:48 PM.  Always use your most recent med list.  
  
  
  
  
 azithromycin 500 mg Tab Commonly known as:  Olevia Lavell Take 1 Tab by mouth daily. ergocalciferol 50,000 unit capsule Commonly known as:  ERGOCALCIFEROL  
TAKE 1 CAP BY MOUTH EVERY SEVEN (7) DAYS. HYDROcodone-acetaminophen 5-325 mg per tablet Commonly known as:  Gleda Ridgel Take 1 Tab by mouth every eight (8) hours as needed for Pain. Max Daily Amount: 3 Tabs. lamoTRIgine 25 mg tablet Commonly known as: LaMICtal  
50 mg daily. levothyroxine 50 mcg tablet Commonly known as:  SYNTHROID  
TAKE 1 TABLET EVERY DAY  
  
 lithium carbonate 300 mg tablet Take 300 mg by mouth nightly. montelukast 10 mg tablet Commonly known as:  SINGULAIR  
TAKE 1 TAB BY MOUTH DAILY. naratriptan 2.5 mg Tab Commonly known as:  Merdis long term Take 1 Tab by mouth once as needed for up to 1 dose. nitrofurantoin (macrocrystal-monohydrate) 100 mg capsule Commonly known as:  MACROBID  
TAKE ONE CAPSULE BY MOUTH EVERY DAY TO PREVENT URINARY TRACT INFECTIONS PRADAXA 150 mg capsule Generic drug:  dabigatran etexilate TAKE ONE CAPSULE TWICE A DAY  
  
 pregabalin 100 mg capsule Commonly known as:  [place] Take 1 Cap by mouth two (2) times a day. Max Daily Amount: 200 mg. Indications: FIBROMYALGIA  
  
 TOPAMAX 100 mg tablet Generic drug:  topiramate Take 200 mg by mouth nightly. XANAX 0.5 mg tablet Generic drug:  ALPRAZolam  
Take 0.5 mg by mouth daily. Prescriptions Sent to Pharmacy Refills  
 azithromycin (ZITHROMAX) 500 mg tab 0 Sig: Take 1 Tab by mouth daily. Class: Normal  
 Pharmacy: [facility]/pharmacy [address] [place] [address] [address]  #: [phone] Route: Oral  
  
We Performed the Following LITHIUM [id number] CPT(R)] TSH 3RD GENERATION [23027 CPT(R)] Follow-up Instructions Return in about 3 months (around 7/30/2018) for STROKE. To-Do List   
 04/30/2018 Imaging:  DUPLEX CAROTID BILATERAL AMB NEURO   
  
 04/30/2018 Imaging:  MRI BRAIN WO CONT   
  
 05/29/2018 11:00 AM  
  Appointment with [facility] ROOM P3 at [place] [address] ([phone]) Patient Instructions PRESCRIPTION REFILL POLICY [facility] Statement to Patients April 1, 2014 In an effort to ensure the large volume of patient prescription refills is processed in the most efficient and expeditious manner, we are asking our patients to assist us by calling your Pharmacy for all prescription refills, this will include also your  Mail Order Pharmacy. The pharmacy will contact our office electronically to continue the refill process. Please do not wait until the last minute to call your pharmacy.  We need at least 48 hours (2days) to fill prescriptions. We also encourage you to call your pharmacy before going to  your prescription to make sure it is ready. With regard to controlled substance prescription refill requests (narcotic refills) that need to be picked up at our office, we ask your cooperation by providing us with at least 72 hours (3days) notice that you will need a refill. We will not refill narcotic prescription refill requests after 4:00pm on any weekday, Monday through Thursday, or after 2:00pm on Fridays, or on the weekends. We encourage everyone to explore another way of getting your prescription refill request processed using [employer], our patient web portal through our electronic medical record system. [employer] is an efficient and effective way to communicate your medication request directly to the office and  downloadable as an mulu on your smart phone . [employer] also features a review functionality that allows you to view your medication list as well as leave messages for your physician. Are you ready to get connected? If so please review the attatched instructions or speak to any of our staff to get you set up right away! Thank you so much for your cooperation. Should you have any questions please contact our Practice Administrator. The Physicians and Staff,  [facility] Please be advised there is a $25 fee for all paperwork to be completed from our  providers. This is to be paid by the patient prior to picking up the completed forms. A Healthy Lifestyle: Care Instructions Your Care Instructions A healthy lifestyle can help you feel good, stay at a healthy weight, and have plenty of energy for both work and play. A healthy lifestyle is something you can share with your whole family. A healthy lifestyle also can lower your risk for serious health problems, such as high blood pressure, heart disease, and diabetes. You can follow a few steps listed below to improve your health and the health of your family. Follow-up care is a key part of your treatment and safety. Be sure to make and go to all appointments, and call your doctor if you are having problems. It's also a good idea to know your test results and keep a list of the medicines you take. How can you care for yourself at home? · Do not eat too much sugar, fat, or fast foods. You can still have dessert and treats now and then. The goal is moderation. · Start small to improve your eating habits. Pay attention to portion sizes, drink less juice and soda pop, and eat more fruits and vegetables. ¨ Eat a healthy amount of food. A 3-ounce serving of meat, for example, is about the size of a deck of cards. Fill the rest of your plate with vegetables and whole grains. ¨ Limit the amount of soda and sports drinks you have every day. Drink more water when you are thirsty. ¨ Eat at least 5 servings of fruits and vegetables every day. It may seem like a lot, but it is not hard to reach this goal. A serving or helping is 1 piece of fruit, 1 cup of vegetables, or 2 cups of leafy, raw vegetables. Have an apple or some carrot sticks as an afternoon snack instead of a candy bar. Try to have fruits and/or vegetables at every meal. 
· Make exercise part of your daily routine. You may want to start with simple activities, such as walking, bicycling, or slow swimming. Try to be active 30 to 60 minutes every day. You do not need to do all 30 to 60 minutes all at once. For example, you can exercise 3 times a day for 10 or 20 minutes. Moderate exercise is safe for most people, but it is always a good idea to talk to your doctor before starting an exercise program. 
· Keep moving. Manolo Buddle the lawn, work in the garden, or [employer] Take the stairs instead of the elevator at work. · If you smoke, quit.  People who smoke have an increased risk for heart attack, stroke, cancer, and other lung illnesses. Quitting is hard, but there are ways to boost your chance of quitting tobacco for good. ¨ Use nicotine gum, patches, or lozenges. ¨ Ask your doctor about stop-smoking programs and medicines. ¨ Keep trying. In addition to reducing your risk of diseases in the future, you will notice some benefits soon after you stop using tobacco. If you have shortness of breath or asthma symptoms, they will likely get better within a few weeks after you quit. · Limit how much alcohol you drink. Moderate amounts of alcohol (up to 2 drinks a day for men, 1 drink a day for women) are okay. But drinking too much can lead to liver problems, high blood pressure, and other health problems. Family health If you have a family, there are many things you can do together to improve your health. · Eat meals together as a family as often as possible. · Eat healthy foods. This includes fruits, vegetables, lean meats and dairy, and whole grains. · Include your family in your fitness plan. Most people think of activities such as jogging or tennis as the way to fitness, but there are many ways you and your family can be more active. Anything that makes you breathe hard and gets your heart pumping is exercise. Here are some tips: 
¨ Walk to do errands or to take your child to school or the bus. ¨ Go for a family bike ride after dinner instead of watching TV. Where can you learn more? Go to http://roge-suha.info/. Enter J488 in the search box to learn more about \"A Healthy Lifestyle: Care Instructions. \" Current as of: May 12, 2017 Content Version: 11.4 © 8011-9732 [employer]. Care instructions adapted under license by [employer] (which disclaims liability or warranty for this information).  If you have questions about a medical condition or this instruction, always ask your healthcare professional. Valentina Alvarado[employer] disclaims any warranty or liability for your use of this information. Introducing [facility]! Dear Nayana Hagan: Thank you for requesting a [employer] account. Our records indicate that you already have an active [employer] account. You can access your account anytime at [email or web address] Did you know that you can access your hospital and ER discharge instructions at any time in [employer]? You can also review all of your test results from your hospital stay or ER visit. Additional Information If you have questions, please visit the Frequently Asked Questions section of the [employer] website at [email or web address] Remember, [employer] is NOT to be used for urgent needs. For medical emergencies, dial 911. Now available from your iPhone and Android! Please provide this summary of care documentation to your next provider. Your primary care clinician is listed as [place]. If you have any questions after today's visit, please call [phone].

## 2018-04-30 NOTE — PATIENT INSTRUCTIONS
[facility]   Statement to Patients  April 1, 2014      In an effort to ensure the large volume of patient prescription refills is processed in the most efficient and expeditious manner, we are asking our patients to assist us by calling your Pharmacy for all prescription refills, this will include also your  Mail Order Pharmacy. The pharmacy will contact our office electronically to continue the refill process. Please do not wait until the last minute to call your pharmacy. We need at least 48 hours (2days) to fill prescriptions. We also encourage you to call your pharmacy before going to  your prescription to make sure it is ready. With regard to controlled substance prescription refill requests (narcotic refills) that need to be picked up at our office, we ask your cooperation by providing us with at least 72 hours (3days) notice that you will need a refill. We will not refill narcotic prescription refill requests after 4:00pm on any weekday, Monday through Thursday, or after 2:00pm on Fridays, or on the weekends. We encourage everyone to explore another way of getting your prescription refill request processed using [employer], our patient web portal through our electronic medical record system. [employer] is an efficient and effective way to communicate your medication request directly to the office and  downloadable as an mulu on your smart phone . [employer] also features a review functionality that allows you to view your medication list as well as leave messages for your physician. Are you ready to get connected? If so please review the attatched instructions or speak to any of our staff to get you set up right away! Thank you so much for your cooperation. Should you have any questions please contact our Practice Administrator.     The Physicians and Staff,  [facility]     Please be advised there is a $25 fee for all paperwork to be completed from our  providers. This is to be paid by the patient prior to picking up the completed forms. A Healthy Lifestyle: Care Instructions  Your Care Instructions    A healthy lifestyle can help you feel good, stay at a healthy weight, and have plenty of energy for both work and play. A healthy lifestyle is something you can share with your whole family. A healthy lifestyle also can lower your risk for serious health problems, such as high blood pressure, heart disease, and diabetes. You can follow a few steps listed below to improve your health and the health of your family. Follow-up care is a key part of your treatment and safety. Be sure to make and go to all appointments, and call your doctor if you are having problems. It's also a good idea to know your test results and keep a list of the medicines you take. How can you care for yourself at home? · Do not eat too much sugar, fat, or fast foods. You can still have dessert and treats now and then. The goal is moderation. · Start small to improve your eating habits. Pay attention to portion sizes, drink less juice and soda pop, and eat more fruits and vegetables. ¨ Eat a healthy amount of food. A 3-ounce serving of meat, for example, is about the size of a deck of cards. Fill the rest of your plate with vegetables and whole grains. ¨ Limit the amount of soda and sports drinks you have every day. Drink more water when you are thirsty. ¨ Eat at least 5 servings of fruits and vegetables every day. It may seem like a lot, but it is not hard to reach this goal. A serving or helping is 1 piece of fruit, 1 cup of vegetables, or 2 cups of leafy, raw vegetables. Have an apple or some carrot sticks as an afternoon snack instead of a candy bar. Try to have fruits and/or vegetables at every meal.  · Make exercise part of your daily routine. You may want to start with simple activities, such as walking, bicycling, or slow swimming.  Try to be active 30 to 60 minutes every day. You do not need to do all 30 to 60 minutes all at once. For example, you can exercise 3 times a day for 10 or 20 minutes. Moderate exercise is safe for most people, but it is always a good idea to talk to your doctor before starting an exercise program.  · Keep moving. Gus Em the lawn, work in the garden, or [employer]. Take the stairs instead of the elevator at work. · If you smoke, quit. People who smoke have an increased risk for heart attack, stroke, cancer, and other lung illnesses. Quitting is hard, but there are ways to boost your chance of quitting tobacco for good. ¨ Use nicotine gum, patches, or lozenges. ¨ Ask your doctor about stop-smoking programs and medicines. ¨ Keep trying. In addition to reducing your risk of diseases in the future, you will notice some benefits soon after you stop using tobacco. If you have shortness of breath or asthma symptoms, they will likely get better within a few weeks after you quit. · Limit how much alcohol you drink. Moderate amounts of alcohol (up to 2 drinks a day for men, 1 drink a day for women) are okay. But drinking too much can lead to liver problems, high blood pressure, and other health problems. Family health  If you have a family, there are many things you can do together to improve your health. · Eat meals together as a family as often as possible. · Eat healthy foods. This includes fruits, vegetables, lean meats and dairy, and whole grains. · Include your family in your fitness plan. Most people think of activities such as jogging or tennis as the way to fitness, but there are many ways you and your family can be more active. Anything that makes you breathe hard and gets your heart pumping is exercise. Here are some tips:  ¨ Walk to do errands or to take your child to school or the bus. ¨ Go for a family bike ride after dinner instead of watching TV. Where can you learn more?   Go to http://roge-suha.info/. Enter K817 in the search box to learn more about \"A Healthy Lifestyle: Care Instructions. \"  Current as of: May 12, 2017  Content Version: 11.4  © 4104-4250 [employer]wise, [employer]orporated. Care instructions adapted under license by [employer] (which disclaims liability or warranty for this information). If you have questions about a medical condition or this instruction, always ask your healthcare professional. [address] any warranty or liability for your use of this information.

## 2018-04-30 NOTE — PROGRESS NOTES
Chief Complaint: Headaches    Returns for a follow up visit. She had acute onset right facial droop this past week. Seen in the ER three days after its onset. She is having trouble texting and and she is unsteady when walking. She has been compliant with her medications. She has not fallen. She has been struggling with urinary incontinence for which she has been receiving botox. She has had several urinary tract infections. Urinary studies were reviewed from last er visit, showing lactobacillus bacteremia which may have been iatrogenic. Assesment and Plan    1. Hemispheric carotid artery syndrome    - MRI BRAIN WO CONT; Future    2. Mixed hyperlipidemia      3. Bipolar affective disorder in remission ([facility] [address]  Continue lithium    4. Essential tremor  Not an issue    5. Dizziness    - DUPLEX CAROTID BILATERAL AMB NEURO (18287); Future    6. Lithium adverse reaction    - LITHIUM    7. Acquired hypothyroidism    - TSH 3RD GENERATION    8. B12 deficiency    - VITAMIN B12    9. Vitamin D deficiency    - VITAMIN D, 25 HYROXY PANEL; Future    Allergies  Latex; Sulfa (sulfonamide antibiotics); and Xarelto [rivaroxaban]     Medications  Medication Sig    HYDROcodone-acetaminophen (NORCO)  mg tablet Take 1 Tab by mouth every twelve (12) hours every twelve (12) hours. Max Daily Amount: 2 Tabs.  PRADAXA 150 mg capsule TAKE ONE CAPSULE TWICE A DAY    furosemide (LASIX) 40 mg tablet take 1 tablet by mouth once daily    ergocalciferol (ERGOCALCIFEROL) 50,000 unit capsule Take 1 Cap by mouth every seven (7) days.  levothyroxine (SYNTHROID) 50 mcg tablet take 1 tablet by mouth once daily    nitrofurantoin, macrocrystal-monohydrate, (MACROBID) 100 mg capsule Take 100 mg by mouth daily.  zolpidem (AMBIEN) 5 mg tablet Take  by mouth nightly as needed for Sleep.  oxybutynin chloride XL (DITROPAN XL) 10 mg CR tablet Take 10 mg by mouth daily.  esomeprazole (NEXIUM) 40 mg capsule Take 40 mg by mouth daily.  DULoxetine (CYMBALTA) 30 mg capsule Take 60 mg by mouth daily.  diphenoxylate-atropine (LOMOTIL) 2.5-0.025 mg per tablet Take  by mouth four (4) times daily as needed for Diarrhea.  topiramate (TOPAMAX) 100 mg tablet Take 100 mg by mouth nightly.  lithium 300 mg tablet Take 400 mg by mouth nightly.  pregabalin (LYRICA) 75 mg capsule Take 150 mg by mouth daily (with dinner).  alprazolam (XANAX) 0.5 mg tablet Take 0.5 mg by mouth two (2) times a day. Medical History  Past Medical History   Diagnosis Date    Arrhythmia      atrial fibrillation 2014    Arthritis     Bipolar affective disorder ([facility] [address] 3/31/2010    Colon polyps 3/31/2010    Diarrhea 7/19/2013    Epigastric pain 6/5/2015    GERD (gastroesophageal reflux disease)      occasiional only; controlled with med    Hyperlipidemia 3/31/2010    Hypothyroidism 3/31/2010    Migraine 3/31/2010    S/P ablation of atrial fibrillation 5/13/2014    S/P cardiac pacemaker procedure 9/16/2014 9/16/14 [employer]onic MRI compatible dual chamber pacemaker implant    Thyroid disease     Tick-borne disease     Unspecified sleep apnea      \"slight\" uses mouth device    Urge incontinence 3/31/2010    Vitamin D deficiency 3/31/2015       Review of Systems  Neuro: positive for frequent headaches, negative for  dizziness, seizures,   memory problems, paresthesia,  weakness positive for essential tremor  Constitutional: negative for fevers, chills positive for   fatigue  MSKL:positive for myalgias, arthralgias, and neck pain  BHV: positive for anxiety, and insomnia    Exam:    Visit Vitals    /82    Pulse 78    Ht 5' 5\" (1.651 m)    Wt 238 lb (108 kg)    SpO2 98%    BMI 39.61 kg/m2        Physical Exam   Constitutional: She is oriented to person, place, and time and well-developed, well-nourished, and in no distress. HENT:   Head: Normocephalic and atraumatic. Eyes: EOM are normal. Pupils are equal, round, and reactive to light. Neck: Normal range of motion. Neck supple. No JVD present. Carotid bruit is not present. No thyromegaly present. Cardiovascular: Normal rate, regular rhythm, normal heart sounds and intact distal pulses. Pulmonary/Chest: Effort normal and breath sounds normal.   Abdominal: Soft. Bowel sounds are normal.   Neurological: She is alert and oriented to person, place, and time. She has normal strength and normal reflexes. She displays no weakness. Cranial nerve deficit: Slight right facial droop. She shows no pronator drift. Abnormal gait: Unsteady gait.    Psychiatric: Affect and judgment normal.       Lab Review    Lab Results   Component Value Date/Time    WBC 6.4 08/08/2016 10:09 AM    HCT 43.6 08/08/2016 10:09 AM    HGB 14.1 08/08/2016 10:09 AM    PLATELET 381 92/76/9541 10:09 AM       Lab Results   Component Value Date/Time    SODIUM 143 08/08/2016 10:09 AM    POTASSIUM 4.3 08/08/2016 10:09 AM    CHLORIDE 107 08/08/2016 10:09 AM    CO2 24 08/08/2016 10:09 AM    GLUCOSE 99 08/08/2016 10:09 AM    BUN 15 08/08/2016 10:09 AM    CREATININE 0.71 08/08/2016 10:09 AM    CALCIUM 10.0 08/08/2016 10:09 AM       Lab Results   Component Value Date/Time    HEMOGLOBIN A1C 5.8 08/08/2016 10:09 AM    HEMOGLOBIN A1C  5.9 04/28/2014 04:04 PM        Lab Results   Component Value Date/Time    CHOLESTEROL,  201 08/08/2016 10:09 AM    HDL CHOLESTEROL 48 08/08/2016 10:09 AM    LDL,DIRECT 124 03/30/2010 08:40 AM    LDL, CALCULATED 101 08/08/2016 10:09 AM    VLDL, CALCULATED 52 08/08/2016 10:09 AM    TRIGLYCERIDE 258 08/08/2016 10:09 AM    CHOL/HDL RATIO 5.3 03/30/2010 08:40 AM

## 2018-04-30 NOTE — TELEPHONE ENCOUNTER
----- Message from Luis Eduardo Atkinson sent at 4/30/2018  3:59 PM EDT -----  Regarding: Dr Lilia Woodall with [facility] would like a call back from Dr. Yuliya Guevara or the nurse regarding a drug interaction between achromycin and pradaxa. Basilio Griffin can be reached at [phone].

## 2018-04-30 NOTE — TELEPHONE ENCOUNTER
Advised to the [facility] per Dr. Tangela Rocha not to give to not to give the patient what was sent in and Dr. Tangela Rocha is going to send in something else

## 2018-05-01 ENCOUNTER — TELEPHONE (OUTPATIENT)
Dept: NEUROLOGY | Age: 68
End: 2018-05-01

## 2018-05-01 NOTE — TELEPHONE ENCOUNTER
Advised to the patient that the original medication that was sent in had to be reversed due to an issue with another mediation that she was taking. Dr Linette Esparza patient would like to know if you will be sending in another antibiotic for the patient to take.

## 2018-05-02 ENCOUNTER — TELEPHONE (OUTPATIENT)
Dept: CARDIOLOGY CLINIC | Age: 68
End: 2018-05-02

## 2018-05-02 ENCOUNTER — OFFICE VISIT (OUTPATIENT)
Dept: INTERNAL MEDICINE CLINIC | Age: 68
End: 2018-05-02

## 2018-05-02 VITALS
TEMPERATURE: 97.7 F | BODY MASS INDEX: 39.18 KG/M2 | DIASTOLIC BLOOD PRESSURE: 81 MMHG | HEIGHT: 65 IN | HEART RATE: 87 BPM | OXYGEN SATURATION: 98 % | SYSTOLIC BLOOD PRESSURE: 119 MMHG | RESPIRATION RATE: 18 BRPM | WEIGHT: 235.2 LBS

## 2018-05-02 DIAGNOSIS — N39.0 URINARY TRACT INFECTION WITHOUT HEMATURIA, SITE UNSPECIFIED: Primary | ICD-10-CM

## 2018-05-02 DIAGNOSIS — K21.9 GASTROESOPHAGEAL REFLUX DISEASE WITHOUT ESOPHAGITIS: ICD-10-CM

## 2018-05-02 DIAGNOSIS — E03.9 HYPOTHYROIDISM, UNSPECIFIED TYPE: ICD-10-CM

## 2018-05-02 DIAGNOSIS — R52 PAIN: ICD-10-CM

## 2018-05-02 DIAGNOSIS — E78.2 MIXED HYPERLIPIDEMIA: ICD-10-CM

## 2018-05-02 DIAGNOSIS — R73.9 HYPERGLYCEMIA: ICD-10-CM

## 2018-05-02 DIAGNOSIS — E55.9 VITAMIN D DEFICIENCY: ICD-10-CM

## 2018-05-02 DIAGNOSIS — F31.70 BIPOLAR AFFECTIVE DISORDER IN REMISSION (HCC): ICD-10-CM

## 2018-05-02 DIAGNOSIS — E53.8 B12 DEFICIENCY: ICD-10-CM

## 2018-05-02 DIAGNOSIS — I48.20 CHRONIC ATRIAL FIBRILLATION (HCC): ICD-10-CM

## 2018-05-02 DIAGNOSIS — G43.019 INTRACTABLE MIGRAINE WITHOUT AURA AND WITHOUT STATUS MIGRAINOSUS: ICD-10-CM

## 2018-05-02 RX ORDER — HYDROCODONE BITARTRATE AND ACETAMINOPHEN 5; 325 MG/1; MG/1
1 TABLET ORAL
Qty: 30 TAB | Refills: 0 | Status: SHIPPED | OUTPATIENT
Start: 2018-05-02 | End: 2018-05-24 | Stop reason: SDUPTHER

## 2018-05-02 RX ORDER — CEFUROXIME AXETIL 500 MG/1
500 TABLET ORAL 2 TIMES DAILY
Qty: 10 TAB | Refills: 0 | Status: SHIPPED | OUTPATIENT
Start: 2018-05-02 | End: 2018-05-07

## 2018-05-02 NOTE — TELEPHONE ENCOUNTER
----- Message from Maykel Baron sent at 5/2/2018  1:36 PM EDT -----  Regarding: /telephone  Pt called to let the nurse know that she was able to get a RX for her UTI.

## 2018-05-02 NOTE — MR AVS SNAPSHOT
[address] [facility] 
[phone] Patient: Margo Guzman MRN: [id number] OCY:[birth year] Visit Information Date & Time Provider Department Dept. Phone Encounter #  
 5/2/2018 11:15 AM Josefina Raman, [address] [id number] Follow-up Instructions Return in about 6 months (around 11/2/2018) for thyroid, etc.  
  
Your Appointments 7/30/2018  1:00 PM  
Follow Up with Troy Lee MD  
Neurology Clinic at [facility]) Appt Note: Follow up [address] [place] [address], 
[address] [place] [address], [address] [phone]  
[address] , [address] [place] [address], [address] [phone] Upcoming Health Maintenance Date Due DTaP/Tdap/Td series (1 - Tdap) 1/8/1970 ZOSTER VACCINE AGE 60> 11/8/2008 GLAUCOMA SCREENING Q2Y 1/8/2014 Bone Densitometry (Dexa) Screening 1/8/2014 Pneumococcal 65+ Low/Medium Risk (2 of 2 - PPSV23) 10/16/2017 MEDICARE YEARLY EXAM 3/14/2018 Influenza Age 5 to Adult 8/1/2018 BREAST CANCER SCRN MAMMOGRAM 6/1/2019 COLONOSCOPY 4/27/2028 Allergies as of 5/2/2018  Review Complete On: 5/2/2018 By: Josefina Raman MD  
  
 Severity Noted Reaction Type Reactions Latex  03/19/2014    Swelling Sulfa (Sulfonamide Antibiotics)  03/23/2010    Hives Xarelto [Rivaroxaban]  10/20/2014    Other (comments) GI problems Current Immunizations  Reviewed on 10/24/2017 Name Date Influenza Vaccine 10/23/2017, 10/25/2015, 10/1/2014 Influenza Vaccine (Quad) PF  Incomplete, 1/5/2017 Influenza Vaccine PF 11/15/2013 Influenza Vaccine Split 10/16/2012 ZZZ-RETIRED (DO NOT USE) Pneumococcal Vaccine (Unspecified Type) 10/16/2012 Not reviewed this visit You Were Diagnosed With   
  
 Codes Comments Urinary tract infection without hematuria, site unspecified    -  Primary ICD-10-CM: N39.0 ICD-9-CM: 599.0 Chronic atrial fibrillation (HCC)     ICD-10-CM: N45.4 ICD-9-CM: 427.31 Bipolar affective disorder in remission [facility])     ICD-10-CM: F31.70 ICD-9-CM: 296.80 Pain     ICD-10-CM: R52 ICD-9-CM: 780.96 Hypothyroidism, unspecified type     ICD-10-CM: E03.9 ICD-9-CM: 244.9 Hyperglycemia     ICD-10-CM: R73.9 ICD-9-CM: 790.29 Mixed hyperlipidemia     ICD-10-CM: E78.2 ICD-9-CM: 272.2 Gastroesophageal reflux disease without esophagitis     ICD-10-CM: K21.9 ICD-9-CM: 530.81 Vitamin D deficiency     ICD-10-CM: E55.9 ICD-9-CM: 268.9 Intractable migraine without aura and without status migrainosus     ICD-10-CM: G43.019 
ICD-9-CM: 346.11   
 B12 deficiency     ICD-10-CM: E53.8 ICD-9-CM: 266.2 Vitals BP Pulse Temp Resp Height(growth percentile) Weight(growth percentile) 119/81 (BP 1 Location: Left arm, BP Patient Position: Sitting) 87 97.7 °F (36.5 °C) (Oral) 18 5' 5\" (1.651 m) 235 lb 3.2 oz (106.7 kg) SpO2 BMI OB Status Smoking Status 98% 39.14 kg/m2 Hysterectomy Never Smoker Vitals History BMI and BSA Data Body Mass Index Body Surface Area  
 39.14 kg/m 2 2.21 m 2 Preferred Pharmacy Pharmacy Name Phone [id number]- [place], [place] - [address] [phone] Your Updated Medication List  
  
   
This list is accurate as of 5/2/18 12:28 PM.  Always use your most recent med list.  
  
  
  
  
 cefUROXime 500 mg tablet Commonly known as:  CEFTIN Take 1 Tab by mouth two (2) times a day for 5 days. ergocalciferol 50,000 unit capsule Commonly known as:  ERGOCALCIFEROL  
TAKE 1 CAP BY MOUTH EVERY SEVEN (7) DAYS. HYDROcodone-acetaminophen 5-325 mg per tablet Commonly known as:  Gayle Fernandez Take 1 Tab by mouth every eight (8) hours as needed for Pain.  Max Daily Amount: 3 Tabs. lamoTRIgine 25 mg tablet Commonly known as: LaMICtal  
50 mg daily. levothyroxine 50 mcg tablet Commonly known as:  SYNTHROID  
TAKE 1 TABLET EVERY DAY  
  
 lithium carbonate 300 mg tablet Take 300 mg by mouth nightly. montelukast 10 mg tablet Commonly known as:  SINGULAIR  
TAKE 1 TAB BY MOUTH DAILY. naratriptan 2.5 mg Tab Commonly known as:  Bree Bradford Take 1 Tab by mouth once as needed for up to 1 dose. nitrofurantoin (macrocrystal-monohydrate) 100 mg capsule Commonly known as:  MACROBID  
TAKE ONE CAPSULE BY MOUTH EVERY DAY TO PREVENT URINARY TRACT INFECTIONS PRADAXA 150 mg capsule Generic drug:  dabigatran etexilate TAKE ONE CAPSULE TWICE A DAY  
  
 pregabalin 100 mg capsule Commonly known as:  Solo Mola Take 1 Cap by mouth two (2) times a day. Max Daily Amount: 200 mg. Indications: FIBROMYALGIA  
  
 TOPAMAX 100 mg tablet Generic drug:  topiramate Take 200 mg by mouth nightly. XANAX 0.5 mg tablet Generic drug:  ALPRAZolam  
Take 0.5 mg by mouth daily. Prescriptions Printed Refills HYDROcodone-acetaminophen (NORCO) 5-325 mg per tablet 0 Sig: Take 1 Tab by mouth every eight (8) hours as needed for Pain. Max Daily Amount: 3 Tabs. Class: Print Route: Oral  
  
Prescriptions Sent to Pharmacy Refills  
 cefUROXime (CEFTIN) 500 mg tablet 0 Sig: Take 1 Tab by mouth two (2) times a day for 5 days. Class: Normal  
 Pharmacy: [facility]/pharmacy [facility] [address] Ph #: [phone] Route: Oral  
  
We Performed the Following HEMOGLOBIN A1C WITH EAG [56896 CPT(R)] LIPID PANEL [22957 CPT(R)] METABOLIC PANEL, COMPREHENSIVE [20319 CPT(R)] T4, FREE [id number] CPT(R)] TSH 3RD GENERATION [30194 CPT(R)] VITAMIN B12 [id number] CPT(R)] VITAMIN D, 25 HYDROXY [id number] CPT(R)] Follow-up Instructions Return in about 6 months (around 11/2/2018) for thyroid, etc.  
  
To-Do List   
 05/29/2018 11:00 AM  
  Appointment with [facility] ROOM P3 at [facility] ([phone]) [facility] SERVICES! Dear Rocío: Thank you for requesting a [facility] account. Our records indicate that you already have an active Native account. You can access your account anytime at [email or web address] Did you know that you can access your hospital and ER discharge instructions at any time in Native? You can also review all of your test results from your hospital stay or ER visit. Additional Information If you have questions, please visit the Frequently Asked Questions section of the Native website at [email or web address] Remember, Native is NOT to be used for urgent needs. For medical emergencies, dial 911. Now available from your iPhone and Android! Please provide this summary of care documentation to your next provider. Your primary care clinician is listed as [place]. If you have any questions after today's visit, please call [phone].

## 2018-05-02 NOTE — TELEPHONE ENCOUNTER
Pt needs to know her pacemaker information and device's company before she has her MRI done. Please call.       Thanks,    [employer]

## 2018-05-02 NOTE — PROGRESS NOTES
Subjective:  Ms. Norma Ramirez is here for follow up. Chief Complaint   Patient presents with    Cholesterol Problem     6 here today for 6 month f.u        Has UTI, needs antibiotic. (She says that Dr. Andreia Shore had called something in that interfered with her other meds). She is planning to get a \"reverse shoulder replacement. \" Christelle Otoniel  A fib:  She has had two EP studies in 2014, and abalation for a fib, with Dr. Andreia Shore. She also sees Dr. Marybel Hutchinson. Sees Dr. Ragini Canseco for GI issues/GERD. On PPI. Blood testing has revealed allergies to pork, beef, egg white, milk. She has been referred by Christie Leblanc in Dr. Bee Love office, to go see an allergist.  She is concerned about weight, which is going up. Insomnia is a little better. ASHELY now on CPAP. Has trouble tolerating the mask--takes it off early in the morning. Depression. Stable. Currently following with NP. Also Sherry Sanchez, psychologist.   Migraines: Getting a bit better. \"Dr. David Hinson gave me a new medicine, but it made me sick. \"   Having a frequent unilateral HA, associated with photophobia and phonophobia. Tried botox injections in scalp, for migraines--\"it didn't work. \" She had been treated by Dr. Valeri Lu, neurologist, with meds including topamax and hydrocodone. Just about the only thing that has worked for her for abortive therapy is hydrocodone. Dr. David Hinson informed her in Dec that he can't prescribe narcotics anymore, \"and he explained what's going on with the government and everything. \"    Gets botox injections into bladder--\"It's amazing\". She is not having much urinary incontinence. Dr. Vivek Ward sees her. PMH: Anxiety, Depression/BPAD (seeing a psychiatrist; hospiatalized in 2007), Migraines (seeing Dr. David Hinson), hypothyroidism, hyperlipidemia, A fib (sees Dr. Marybel Hutchinson and Dr. Andreia Shore). Vit D deficiency. Colon polyps--2009 Romulo Marie. Hand pain s/p injections by Dr. Ernesto Armstrong. GERD--Sees Dr. Ragini Canseco. Obesity.   PSH: Hysterectomy due to endometriosis. Bunionectomy, neuroma. Carpal tunnel surgery. EP ablation x 2. Colon polypectomy in 2009. Sigmoidoscopy in 2015. EGD and dilatation in 2015. Hammertoe surgery 2016. Ventral hernia repair. All: Sulfa--hives. Had a reaction to one of the meds for BPAD prior to starting lithium. Meds:    Current Outpatient Prescriptions on File Prior to Visit   Medication Sig Dispense Refill    lamoTRIgine (LAMICTAL) 25 mg tablet 50 mg daily.  nitrofurantoin, macrocrystal-monohydrate, (MACROBID) 100 mg capsule TAKE ONE CAPSULE BY MOUTH EVERY DAY TO PREVENT URINARY TRACT INFECTIONS  5    pregabalin (LYRICA) 100 mg capsule Take 1 Cap by mouth two (2) times a day. Max Daily Amount: 200 mg. Indications: FIBROMYALGIA 60 Cap 5    HYDROcodone-acetaminophen (NORCO) 5-325 mg per tablet Take 1 Tab by mouth every eight (8) hours as needed for Pain. Max Daily Amount: 3 Tabs. 30 Tab 0    PRADAXA 150 mg capsule TAKE ONE CAPSULE TWICE A DAY 60 Cap 1    ergocalciferol (ERGOCALCIFEROL) 50,000 unit capsule TAKE 1 CAP BY MOUTH EVERY SEVEN (7) DAYS. 12 Cap 3    montelukast (SINGULAIR) 10 mg tablet TAKE 1 TAB BY MOUTH DAILY. 30 Tab 1    naratriptan (AMERGE) 2.5 mg tab Take 1 Tab by mouth once as needed for up to 1 dose. 12 Tab 11    levothyroxine (SYNTHROID) 50 mcg tablet TAKE 1 TABLET EVERY DAY 90 Tab 3    topiramate (TOPAMAX) 100 mg tablet Take 200 mg by mouth nightly.  lithium 300 mg tablet Take 300 mg by mouth nightly.  alprazolam (XANAX) 0.5 mg tablet Take 0.5 mg by mouth daily. No current facility-administered medications on file prior to visit. 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 FH:  Father, colon cancer. Mother, stroke. Grandmother, DM.  ROS: Otherwise negative except as noted elsewhere. Has a chronic burning sensation in her mouth and neck.     Objective:  Vitals:   Visit Vitals    /81 (BP 1 Location: Left arm, BP Patient Position: Sitting)    Pulse 87    Temp 97.7 °F (36.5 °C) (Oral)    Resp 18    Ht 5' 5\" (1.651 m)    Wt 235 lb 3.2 oz (106.7 kg)    SpO2 98%    BMI 39.14 kg/m2   . General appearance: Pleasant obese middle aged white female in NAD. HEENT: PERRLA. EOMI. Neck: Supple with No LAD. Lungs: CTAB. No wheezes. No rales. Heart: Irregular. Abdomen: S; NT, ND, BS +. Extremities: Warm. No C/C/E. Neuro: Nonfocal.     Assessment/Plan:    1. UTI: Rx for ceftin. 2. Hypothyroidism: Clinically euthyroid. Continue synthroid. 3. Atrial Fibrillation: F/U as per Dr. Vita Smith.    4. HLP:  Tg high. Continue OTC fish oil. Also encouraged lifestyle efforts. Will recheck lipids. 5. Migraines: Had been seeing Dr. Terry Barcenas. Has prn rizatriptan--try naratriptan, and topamax, and prn oxycodone. 6. BPAD/Anxiety/Stress. Continue psychiatry follow up. Hannah Fuller, psychology. 7. Urge incontinence: Ongoing. Continue f/u with Dr. Minerva Kehr. 8. Hand pain: F/U as per Dr. Garry Mckeon. 9. Epigastric burning / GERD: On Prilosec OTC. Follows with Dr. Maco Alatorre. 10. Hyperglycemia: Recheck labs; She has in the past met criteria for DM (fasting glc > 125 on more than 1 occasion); A1C okay today. For now, work on diet and exercise. 11. Edema: On daily lasix.      Follow up in 6 months

## 2018-05-02 NOTE — PROGRESS NOTES
1. Have you been to the ER, urgent care clinic since your last visit? Hospitalized since your last visit? yes  2. Have you seen or consulted any other health care providers outside of the [facility] since your last visit? Include any pap smears or colon screening.  no

## 2018-05-08 ENCOUNTER — OFFICE VISIT (OUTPATIENT)
Dept: CARDIOLOGY CLINIC | Age: 68
End: 2018-05-08

## 2018-05-08 ENCOUNTER — CLINICAL SUPPORT (OUTPATIENT)
Dept: CARDIOLOGY CLINIC | Age: 68
End: 2018-05-08

## 2018-05-08 VITALS
OXYGEN SATURATION: 98 % | RESPIRATION RATE: 16 BRPM | WEIGHT: 234 LBS | HEIGHT: 65 IN | DIASTOLIC BLOOD PRESSURE: 70 MMHG | SYSTOLIC BLOOD PRESSURE: 120 MMHG | HEART RATE: 80 BPM | BODY MASS INDEX: 38.99 KG/M2

## 2018-05-08 DIAGNOSIS — Z86.79 S/P ABLATION OF ATRIAL FIBRILLATION: ICD-10-CM

## 2018-05-08 DIAGNOSIS — Z98.890 S/P ABLATION OF ATRIAL FIBRILLATION: ICD-10-CM

## 2018-05-08 DIAGNOSIS — Z95.0 S/P CARDIAC PACEMAKER PROCEDURE: ICD-10-CM

## 2018-05-08 DIAGNOSIS — I48.0 PAROXYSMAL ATRIAL FIBRILLATION (HCC): Primary | ICD-10-CM

## 2018-05-08 DIAGNOSIS — I49.5 SSS (SICK SINUS SYNDROME) (HCC): ICD-10-CM

## 2018-05-08 DIAGNOSIS — Z01.810 PRE-OPERATIVE CARDIOVASCULAR EXAMINATION: ICD-10-CM

## 2018-05-08 DIAGNOSIS — I48.0 PAROXYSMAL ATRIAL FIBRILLATION (HCC): ICD-10-CM

## 2018-05-08 DIAGNOSIS — I10 BENIGN ESSENTIAL HYPERTENSION: ICD-10-CM

## 2018-05-08 DIAGNOSIS — Z95.0 S/P CARDIAC PACEMAKER PROCEDURE: Primary | ICD-10-CM

## 2018-05-08 NOTE — MR AVS SNAPSHOT
[facility] 
[phone] Patient: Iris Wills MRN: [id number] Visit Information Date & Time Provider Department Dept. Phone Encounter #  
 5/8/2018  1:00 PM Edwin Crabtree, 1024 [facility] [phone] [id number] Your Appointments 7/30/2018  1:00 PM  
Follow Up with Reny Wick MD  
[facility]) Appt Note: Follow up [address] [place] [address], 
[address] [place] [address], [address] [phone]  
[address] , [address] [place] [address] Avenue, [address] [address]  
  
    
 11/1/2018 11:30 AM  
ROUTINE CARE with Reyna Christopher MD  
[facility]) Appt Note: 6 month follow up  
 [facility] [address] [phone]  
[address] [facility] [address] [facility] Upcoming Health Maintenance Date Due DTaP/Tdap/Td series (1 - Tdap) 1/8/1970 ZOSTER VACCINE AGE 60> 11/8/2008 GLAUCOMA SCREENING Q2Y 1/8/2014 Bone Densitometry (Dexa) Screening 1/8/2014 Pneumococcal 65+ Low/Medium Risk (2 of 2 - PPSV23) 10/16/2017 MEDICARE YEARLY EXAM 3/14/2018 Influenza Age 5 to Adult 8/1/2018 BREAST CANCER SCRN MAMMOGRAM 6/1/2019 COLONOSCOPY 4/27/2028 Allergies as of 5/8/2018  Review Complete On: 5/8/2018 By: Irina Talavera NP Severity Noted Reaction Type Reactions Latex  03/19/2014    Swelling Sulfa (Sulfonamide Antibiotics)  03/23/2010    Hives Xarelto [Rivaroxaban]  10/20/2014    Other (comments) GI problems Current Immunizations  Reviewed on 10/24/2017 Name Date Influenza Vaccine 10/23/2017, 10/25/2015, 10/1/2014 Influenza Vaccine (Quad) PF  Incomplete, 1/5/2017 Influenza Vaccine PF 11/15/2013 Influenza Vaccine Split 10/16/2012 ZZZ-RETIRED (DO NOT USE) Pneumococcal Vaccine (Unspecified Type) 10/16/2012 Not reviewed this visit You Were Diagnosed With   
  
 Codes Comments Paroxysmal atrial fibrillation (HCC)    -  Primary ICD-10-CM: I48.0 ICD-9-CM: 427.31   
 SSS (sick sinus syndrome) (HCC)     ICD-10-CM: I49.5 ICD-9-CM: 427.81 Benign essential hypertension     ICD-10-CM: I10 
ICD-9-CM: 401.1 S/P cardiac pacemaker procedure     ICD-10-CM: Z95.0 ICD-9-CM: V45.01 S/P ablation of atrial fibrillation     ICD-10-CM: Z98.890, Z86.79 
ICD-9-CM: V45.89 Vitals BP Pulse Resp Height(growth percentile) Weight(growth percentile) SpO2  
 120/70 (BP 1 Location: Left arm, BP Patient Position: Sitting) 80 16 5' 5\" (1.651 m) 234 lb (106.1 kg) 98% BMI OB Status Smoking Status 38.94 kg/m2 Hysterectomy Never Smoker BMI and BSA Data Body Mass Index Body Surface Area 38.94 kg/m 2 2.21 m 2 Preferred Pharmacy Pharmacy Name Phone [id number]- [place], [place] - [address] [phone] Your Updated Medication List  
  
   
This list is accurate as of 5/8/18  1:47 PM.  Always use your most recent med list.  
  
  
  
  
 ergocalciferol 50,000 unit capsule Commonly known as:  ERGOCALCIFEROL  
TAKE 1 CAP BY MOUTH EVERY SEVEN (7) DAYS. HYDROcodone-acetaminophen 5-325 mg per tablet Commonly known as:  Cornelio [place] Take 1 Tab by mouth every eight (8) hours as needed for Pain. Max Daily Amount: 3 Tabs. lamoTRIgine 25 mg tablet Commonly known as: LaMICtal  
50 mg daily. levothyroxine 50 mcg tablet Commonly known as:  SYNTHROID  
TAKE 1 TABLET EVERY DAY  
  
 lithium carbonate 300 mg tablet Take 300 mg by mouth two (2) times a day. montelukast 10 mg tablet Commonly known as:  SINGULAIR  
TAKE 1 TAB BY MOUTH DAILY. naratriptan 2.5 mg Tab Commonly known as:  Mainor Jacobs  
 Take 1 Tab by mouth once as needed for up to 1 dose. nitrofurantoin (macrocrystal-monohydrate) 100 mg capsule Commonly known as:  MACROBID  
TAKE ONE CAPSULE BY MOUTH EVERY DAY TO PREVENT URINARY TRACT INFECTIONS PRADAXA 150 mg capsule Generic drug:  dabigatran etexilate TAKE ONE CAPSULE TWICE A DAY  
  
 pregabalin 100 mg capsule Commonly known as:  Meir Acron Take 1 Cap by mouth two (2) times a day. Max Daily Amount: 200 mg. Indications: FIBROMYALGIA  
  
 TOPAMAX 100 mg tablet Generic drug:  topiramate Take 200 mg by mouth nightly. XANAX 0.5 mg tablet Generic drug:  ALPRAZolam  
Take 0.5 mg by mouth daily. We Performed the Following AMB POC EKG ROUTINE W/ 12 LEADS, INTER & REP [87313 CPT(R)] To-Do List   
 05/29/2018 11:00 AM  
  Appointment with CADEN PAT ROOM P3 at [place] [address] ([phone]) [facility]! Dear Beth Clarkor: Thank you for requesting a [employer] account. Our records indicate that you already have an active [employer] account. You can access your account anytime at [email or web address] Did you know that you can access your hospital and ER discharge instructions at any time in [employer]? You can also review all of your test results from your hospital stay or ER visit. Additional Information If you have questions, please visit the Frequently Asked Questions section of the [employer] website at [email or web address] Remember, [employer] is NOT to be used for urgent needs. For medical emergencies, dial 911. Now available from your iPhone and Android! Please provide this summary of care documentation to your next provider. Your primary care clinician is listed as [place]. If you have any questions after today's visit, please call [phone].

## 2018-05-08 NOTE — PROGRESS NOTES
Subjective:      Gogo Storey is a 70 y.o. female is here for follow up of her AF and device check. She is doing well. The patient denies chest pain/ shortness of breath, orthopnea, PND, LE edema, palpitations, syncope, presyncope or fatigue.        Patient Active Problem List    Diagnosis Date Noted    Incisional hernia, without obstruction or gangrene 10/20/2017    Obesity, Class II, BMI 35-39.9 10/20/2017    Fibromyalgia 03/29/2017    Epigastric pain 06/05/2015    Vitamin D deficiency 03/31/2015    A-fib ([facility] 01/28/2015    Esophageal spasm 12/16/2014    S/P cardiac pacemaker procedure 09/16/2014    SSS (sick sinus syndrome) ([facility] 09/02/2014    S/P ablation of atrial fibrillation 05/13/2014    Atrial fibrillation ([facility] 04/21/2014    Benign essential hypertension 03/14/2014    Morbid obesity with BMI of 40.0-44.9, adult ([facility] 03/14/2014    Diarrhea 07/19/2013    GERD (gastroesophageal reflux disease) 04/13/2012    Colon polyps 03/31/2010    Urge incontinence 03/31/2010    Migraine 03/31/2010    Bipolar affective disorder ([facility] 03/31/2010    Hypothyroidism 03/31/2010    Hyperglycemia 03/31/2010    Hyperlipidemia 03/31/2010      Reji Officer, MD  Past Medical History:   Diagnosis Date    Arrhythmia     atrial fibrillation 2014, sick sinus syndrome    Arthritis     Bipolar affective disorder ([facility] 3/31/2010    Colon polyps 3/31/2010    Depression     Diarrhea 7/19/2013    Epigastric pain 6/5/2015    GERD (gastroesophageal reflux disease)     occasiional only; controlled with med    Hyperlipidemia 3/31/2010    Hypothyroidism 3/31/2010    Long term current use of anticoagulant therapy     Migraine 3/31/2010    Psychotic disorder     S/P ablation of atrial fibrillation 5/13/2014    S/P cardiac pacemaker procedure 9/16/2014 9/16/14 Medtronic MRI compatible dual chamber pacemaker implant    Sleep apnea     Stool color black     Thyroid disease     Tick-borne disease     Alpha Gal allergy    Unspecified sleep apnea     \"slight\" uses mouth device    Urge incontinence 3/31/2010    Vitamin D deficiency 3/31/2015      Past Surgical History:   Procedure Laterality Date    CARDIAC SURG PROCEDURE UNLIST  5/2014    ablation    COLONOSCOPY N/A 4/27/2018    COLONOSCOPY performed by Mani Lemos MD at [address] [place] [address]  4/27/2018         COLONOSCOPY,RENALDO MITCHELL,SNARE  4/27/2018         HX BUNIONECTOMY      HX GI      egd, colonoscopy    HX HYSTERECTOMY      HX ORTHOPAEDIC      removed bone spurs from R & L hand    HX ORTHOPAEDIC  1990s? left bunionectomy     HX OTHER SURGICAL  07/2014    2 shocks to heart & ablations    HX PACEMAKER  2014    On the right side    HX PACEMAKER PLACEMENT  2015    HX TONSILLECTOMY      HX TONSILLECTOMY       Allergies   Allergen Reactions    Latex Swelling    Sulfa (Sulfonamide Antibiotics) Hives    Xarelto [Rivaroxaban] Other (comments)     GI problems      Family History   Problem Relation Age of Onset    Arrhythmia Mother      afib    Stroke Mother     Heart Failure Mother     Colon Cancer Father     Heart Disease Father     Arrhythmia Brother      afib    Asthma Brother     COPD Brother     negative for cardiac disease  Social History     Social History    Marital status:      Spouse name: N/A    Number of children: N/A    Years of education: N/A     Social History Main Topics    Smoking status: Never Smoker    Smokeless tobacco: Never Used    Alcohol use Yes      Comment: occasional    Drug use: No    Sexual activity: No     Other Topics Concern    None     Social History Narrative     Current Outpatient Prescriptions   Medication Sig    HYDROcodone-acetaminophen (NORCO) 5-325 mg per tablet Take 1 Tab by mouth every eight (8) hours as needed for Pain. Max Daily Amount: 3 Tabs.  lamoTRIgine (LAMICTAL) 25 mg tablet 50 mg daily.     nitrofurantoin, macrocrystal-monohydrate, (MACROBID) 100 mg capsule TAKE ONE CAPSULE BY MOUTH EVERY DAY TO PREVENT URINARY TRACT INFECTIONS    pregabalin (LYRICA) 100 mg capsule Take 1 Cap by mouth two (2) times a day. Max Daily Amount: 200 mg. Indications: FIBROMYALGIA    PRADAXA 150 mg capsule TAKE ONE CAPSULE TWICE A DAY    ergocalciferol (ERGOCALCIFEROL) 50,000 unit capsule TAKE 1 CAP BY MOUTH EVERY SEVEN (7) DAYS.  montelukast (SINGULAIR) 10 mg tablet TAKE 1 TAB BY MOUTH DAILY.  naratriptan (AMERGE) 2.5 mg tab Take 1 Tab by mouth once as needed for up to 1 dose.  levothyroxine (SYNTHROID) 50 mcg tablet TAKE 1 TABLET EVERY DAY    topiramate (TOPAMAX) 100 mg tablet Take 200 mg by mouth nightly.  lithium 300 mg tablet Take 300 mg by mouth two (2) times a day.  alprazolam (XANAX) 0.5 mg tablet Take 0.5 mg by mouth daily. No current facility-administered medications for this visit. Vitals:    05/08/18 1332   BP: 120/70   Pulse: 80   Resp: 16   SpO2: 98%   Weight: 234 lb (106.1 kg)   Height: 5' 5\" (1.651 m)       I have reviewed the nurses notes, vitals, problem list, allergy list, medical history, family, social history and medications. Review of Symptoms:    General: Pt denies excessive weight gain or loss. Pt is able to conduct ADL's  HEENT: Denies blurred vision, headaches, epistaxis and difficulty swallowing. Respiratory: Denies shortness of breath, BIANCHI, wheezing or stridor. Cardiovascular: Denies precordial pain, palpitations, edema or PND  Gastrointestinal: Denies poor appetite, indigestion, abdominal pain or blood in stool  Urinary: Denies dysuria, pyuria  Musculoskeletal: Denies pain or swelling from muscles or joints  Neurologic: Denies tremor, paresthesias, or sensory motor disturbance  Skin: Denies rash, itching or texture change. Psych: Denies depression      Physical Exam:      General: Well developed, in no acute distress. HEENT: Eyes - PERRL, no jvd  Heart:  Normal S1/S2 negative S3 or S4.  Regular, no murmur, gallop or rub.   Respiratory: Clear bilaterally x 4, no wheezing or rales  Abdomen:   Soft, non-tender, bowel sounds are active.   Extremities:  No edema, normal cap refill, no cyanosis. Musculoskeletal: No clubbing  Neuro: A&Ox3, speech clear, gait stable. Skin: Skin color is normal. No rashes or lesions. Non diaphoretic  Vascular: 2+ pulses symmetric in all extremities    Cardiographics    MDT Pm: 99.6% AP, 0.3% RVP      Results for orders placed or performed during the hospital encounter of 04/27/18   EKG, 12 LEAD, INITIAL   Result Value Ref Range    Ventricular Rate 75 BPM    Atrial Rate 75 BPM    P-R Interval 326 ms    QRS Duration 170 ms    Q-T Interval 450 ms    QTC Calculation (Bezet) 502 ms    Calculated R Axis -29 degrees    Calculated T Axis 93 degrees    Diagnosis       Atrial-paced rhythm with prolonged AV conduction  Right bundle branch block  When compared with ECG of 28-JAN-2015 14:28,  Electronic atrial pacemaker has replaced Sinus rhythm  Right bundle branch block has replaced Nonspecific intraventricular   conduction delay  Confirmed by Inder Darnell, P.VJanine ([id number]) on 4/28/2018 1:38:30 PM     Results for orders placed or performed in visit on 08/19/14   CARDIAC HOLTER MONITOR, 24 HOURS    Narrative    ECG Monitor/24 hours, Complete    Reason for Holter Monitor   A-FIB    Heartbeat    Slowest 42  Average 60  Fastest  99        Results:   Underlying Rhythm: Normal sinus rhythm      Atrial Arrhythmias: premature atrial contractions; occasional,  atrial couplets and atrial triplets            AV Conduction: normal    Ventricular Arrhythmias: premature ventricular contractions;  occasional     ST Segment Analysis:normal     Symptom Correlation:  Headache did not correlate with any arrhythmias. Fatigue/dizziness correlated with sinus bradycardia in the 40s    Comment:   Sinus rhythm with symptomatic sinus bradycardia. Clinical  correlation advised.      Jeremy Coleman MD, Symone Fox Lab Results   Component Value Date/Time    WBC 7.0 04/27/2018 09:03 PM    HGB 13.1 04/27/2018 09:03 PM    HCT 39.4 04/27/2018 09:03 PM    PLATELET 008 03/90/7063 09:03 PM    MCV 92.7 04/27/2018 09:03 PM      Lab Results   Component Value Date/Time    Sodium 146 (H) 04/27/2018 09:03 PM    Potassium 3.6 04/27/2018 09:03 PM    Chloride 116 (H) 04/27/2018 09:03 PM    CO2 22 04/27/2018 09:03 PM    Anion gap 8 04/27/2018 09:03 PM    Glucose 131 (H) 04/27/2018 09:03 PM    BUN 17 04/27/2018 09:03 PM    Creatinine 0.75 04/27/2018 09:03 PM    BUN/Creatinine ratio 23 (H) 04/27/2018 09:03 PM    GFR est AA >60 04/27/2018 09:03 PM    GFR est non-AA >60 04/27/2018 09:03 PM    Calcium 9.3 04/27/2018 09:03 PM    Bilirubin, total 0.2 04/27/2018 09:03 PM    AST (SGOT) 17 04/27/2018 09:03 PM    Alk. phosphatase 100 04/27/2018 09:03 PM    Protein, total 6.3 (L) 04/27/2018 09:03 PM    Albumin 3.5 04/27/2018 09:03 PM    Globulin 2.8 04/27/2018 09:03 PM    A-G Ratio 1.3 04/27/2018 09:03 PM    ALT (SGPT) 29 04/27/2018 09:03 PM         Assessment:     Assessment:        ICD-10-CM ICD-9-CM    1. Paroxysmal atrial fibrillation (HCC) I48.0 427.31 AMB POC EKG ROUTINE W/ 12 LEADS, INTER & REP   2. SSS (sick sinus syndrome) (HCC) I49.5 427.81    3. Benign essential hypertension I10 401.1    4. S/P cardiac pacemaker procedure Z95.0 V45.01    5. S/P ablation of atrial fibrillation Z98.890 V45.89     Z86.79       Orders Placed This Encounter    AMB POC EKG ROUTINE W/ 12 LEADS, INTER & REP     Order Specific Question:   Reason for Exam:     Answer:   routine        Plan:   Ms. Loy Parker is here for device check and follow up of her AF. She is doing well and denies cardiac complaints. EKG shows atrial pacing for her sick sinus and her device interrogation demonstrates normal functioning (99.6% AP, 0.3% RVP). She has a nl lvef. She is considered low risk for cardiac complications for upcoming right shoulder replacement.  Continue current medical therapy for htn and afib. and follow up with Dr. Nan Alvarado as scheduled. Continue medical management for sss, HTN, AF. Thank you for allowing me to participate in Tahira Coreasrita 's care.     CODY Burgess MD, Shaniqua Boyd

## 2018-05-08 NOTE — PROGRESS NOTES
1. Have you been to the ER, urgent care clinic since your last visit? Hospitalized since your last visit? Yes When: 4/2018 [facility] facial droop    2. Have you seen or consulted any other health care providers outside of the [address] [place] [address] since your last visit? Include any pap smears or colon screening. Yes When: 3/2018 Dr Sommer [place]     Patient has no cardiac complaints. She is requesting clearance for MRI & Right shoulder surgery.

## 2018-05-08 NOTE — TELEPHONE ENCOUNTER
She doesn't need it. Tell her I made a mistake reading the report. The bacteria in her urine was from the medicine she was using.

## 2018-05-11 ENCOUNTER — DOCUMENTATION ONLY (OUTPATIENT)
Dept: NEUROLOGY | Age: 68
End: 2018-05-11

## 2018-05-11 ENCOUNTER — APPOINTMENT (OUTPATIENT)
Dept: INTERNAL MEDICINE CLINIC | Age: 68
End: 2018-05-11

## 2018-05-11 ENCOUNTER — HOSPITAL ENCOUNTER (OUTPATIENT)
Dept: GENERAL RADIOLOGY | Age: 68
Discharge: HOME | End: 2018-05-11
Payer: MEDICARE

## 2018-05-11 ENCOUNTER — HOSPITAL ENCOUNTER (OUTPATIENT)
Dept: LAB | Age: 68
Discharge: HOME | End: 2018-05-11
Payer: MEDICARE

## 2018-05-11 DIAGNOSIS — I63.9 CEREBROVASCULAR ACCIDENT (CVA), UNSPECIFIED MECHANISM (HCC): ICD-10-CM

## 2018-05-11 PROCEDURE — 84443 ASSAY THYROID STIM HORMONE: CPT

## 2018-05-11 PROCEDURE — 36415 COLL VENOUS BLD VENIPUNCTURE: CPT

## 2018-05-11 PROCEDURE — 71046 X-RAY EXAM CHEST 2 VIEWS: CPT

## 2018-05-11 PROCEDURE — 84439 ASSAY OF FREE THYROXINE: CPT

## 2018-05-11 PROCEDURE — 80053 COMPREHEN METABOLIC PANEL: CPT

## 2018-05-11 PROCEDURE — 82306 VITAMIN D 25 HYDROXY: CPT

## 2018-05-11 PROCEDURE — 83036 HEMOGLOBIN GLYCOSYLATED A1C: CPT

## 2018-05-11 PROCEDURE — 82607 VITAMIN B-12: CPT

## 2018-05-11 PROCEDURE — 80061 LIPID PANEL: CPT

## 2018-05-12 LAB
25(OH)D3+25(OH)D2 SERPL-MCNC: 28 NG/ML (ref 30–100)
ALBUMIN SERPL-MCNC: 4 G/DL (ref 3.6–4.8)
ALBUMIN/GLOB SERPL: 1.6 {RATIO} (ref 1.2–2.2)
ALP SERPL-CCNC: 77 IU/L (ref 39–117)
ALT SERPL-CCNC: 14 IU/L (ref 0–32)
AST SERPL-CCNC: 15 IU/L (ref 0–40)
BILIRUB SERPL-MCNC: 0.2 MG/DL (ref 0–1.2)
BUN SERPL-MCNC: 20 MG/DL (ref 8–27)
BUN/CREAT SERPL: 26 (ref 12–28)
CALCIUM SERPL-MCNC: 9.9 MG/DL (ref 8.7–10.3)
CHLORIDE SERPL-SCNC: 111 MMOL/L (ref 96–106)
CHOLEST SERPL-MCNC: 250 MG/DL (ref 100–199)
CO2 SERPL-SCNC: 22 MMOL/L (ref 18–29)
CREAT SERPL-MCNC: 0.78 MG/DL (ref 0.57–1)
EST. AVERAGE GLUCOSE BLD GHB EST-MCNC: 111 MG/DL
GFR SERPLBLD CREATININE-BSD FMLA CKD-EPI: 78 ML/MIN/1.73
GFR SERPLBLD CREATININE-BSD FMLA CKD-EPI: 90 ML/MIN/1.73
GLOBULIN SER CALC-MCNC: 2.5 G/DL (ref 1.5–4.5)
GLUCOSE SERPL-MCNC: 113 MG/DL (ref 65–99)
HBA1C MFR BLD: 5.5 % (ref 4.8–5.6)
HDLC SERPL-MCNC: 48 MG/DL
LDLC SERPL CALC-MCNC: 148 MG/DL (ref 0–99)
POTASSIUM SERPL-SCNC: 4.8 MMOL/L (ref 3.5–5.2)
PROT SERPL-MCNC: 6.5 G/DL (ref 6–8.5)
SODIUM SERPL-SCNC: 143 MMOL/L (ref 134–144)
T4 FREE SERPL-MCNC: 0.95 NG/DL (ref 0.82–1.77)
TRIGL SERPL-MCNC: 272 MG/DL (ref 0–149)
TSH SERPL DL<=0.005 MIU/L-ACNC: 3.04 UIU/ML (ref 0.45–4.5)
VIT B12 SERPL-MCNC: 222 PG/ML (ref 232–1245)
VLDLC SERPL CALC-MCNC: 54 MG/DL (ref 5–40)

## 2018-05-15 ENCOUNTER — TELEPHONE (OUTPATIENT)
Dept: NEUROLOGY | Age: 68
End: 2018-05-15

## 2018-05-15 NOTE — TELEPHONE ENCOUNTER
Advised to the patient of the results for the chest x-ray; patient stated that the shoulder surgery is June 7th;  Spoke with Dr. Vazquez Beavers and he stated that patient isn't able to get an MRI done

## 2018-05-16 ENCOUNTER — TELEPHONE (OUTPATIENT)
Dept: INTERNAL MEDICINE CLINIC | Age: 68
End: 2018-05-16

## 2018-05-24 DIAGNOSIS — R52 PAIN: ICD-10-CM

## 2018-05-24 RX ORDER — HYDROCODONE BITARTRATE AND ACETAMINOPHEN 5; 325 MG/1; MG/1
1 TABLET ORAL
Qty: 30 TAB | Refills: 0 | Status: SHIPPED | OUTPATIENT
Start: 2018-05-24 | End: 2018-06-09

## 2018-05-24 NOTE — TELEPHONE ENCOUNTER
Patient is requesting refill for Rx\" Hydrocodone\".  Stated weather has been horrible for migraines.   Send to [facility] [phone]   Best contact [phone]       Message received & copied from Janna Mckeon

## 2018-05-25 RX ORDER — PREGABALIN 200 MG/1
100 CAPSULE ORAL 2 TIMES DAILY
COMMUNITY
End: 2018-08-02 | Stop reason: SDUPTHER

## 2018-05-25 RX ORDER — ACETAMINOPHEN 500 MG
1000 TABLET ORAL
COMMUNITY
End: 2018-06-09

## 2018-05-25 RX ORDER — DICLOFENAC SODIUM 75 MG/1
75 TABLET, DELAYED RELEASE ORAL 2 TIMES DAILY
COMMUNITY
End: 2018-12-28

## 2018-05-25 RX ORDER — DIPHENOXYLATE HYDROCHLORIDE AND ATROPINE SULFATE 2.5; .025 MG/1; MG/1
1 TABLET ORAL
COMMUNITY
End: 2020-11-17 | Stop reason: ALTCHOICE

## 2018-05-30 ENCOUNTER — HOSPITAL ENCOUNTER (OUTPATIENT)
Dept: SURGERY | Age: 68
Discharge: HOME | End: 2018-05-30
Attending: ORTHOPAEDIC SURGERY
Payer: MEDICARE

## 2018-05-30 VITALS
TEMPERATURE: 97.8 F | HEART RATE: 78 BPM | DIASTOLIC BLOOD PRESSURE: 50 MMHG | OXYGEN SATURATION: 98 % | RESPIRATION RATE: 16 BRPM | SYSTOLIC BLOOD PRESSURE: 136 MMHG

## 2018-05-30 LAB
ABO + RH BLD: NORMAL
APPEARANCE UR: ABNORMAL
BACTERIA URNS QL MICRO: ABNORMAL /HPF
BILIRUB UR QL: NEGATIVE
BLOOD GROUP ANTIBODIES SERPL: NORMAL
COLOR UR: ABNORMAL
EPITH CASTS URNS QL MICRO: ABNORMAL /LPF
ERYTHROCYTE [DISTWIDTH] IN BLOOD BY AUTOMATED COUNT: 13.8 % (ref 11.5–14.5)
GLUCOSE UR STRIP.AUTO-MCNC: NEGATIVE MG/DL
HCT VFR BLD AUTO: 43.3 % (ref 35–47)
HGB BLD-MCNC: 14.3 G/DL (ref 11.5–16)
HGB UR QL STRIP: NEGATIVE
INR PPP: 1.2 (ref 0.9–1.1)
KETONES UR QL STRIP.AUTO: NEGATIVE MG/DL
LEUKOCYTE ESTERASE UR QL STRIP.AUTO: NEGATIVE
MCH RBC QN AUTO: 31.2 PG (ref 26–34)
MCHC RBC AUTO-ENTMCNC: 33 G/DL (ref 30–36.5)
MCV RBC AUTO: 94.3 FL (ref 80–99)
NITRITE UR QL STRIP.AUTO: NEGATIVE
NRBC # BLD: 0 K/UL (ref 0–0.01)
NRBC BLD-RTO: 0 PER 100 WBC
PH UR STRIP: 6 [PH] (ref 5–8)
PLATELET # BLD AUTO: 238 K/UL (ref 150–400)
PMV BLD AUTO: 10.2 FL (ref 8.9–12.9)
PROT UR STRIP-MCNC: NEGATIVE MG/DL
PROTHROMBIN TIME: 11.8 SEC (ref 9–11.1)
RBC # BLD AUTO: 4.59 M/UL (ref 3.8–5.2)
RBC #/AREA URNS HPF: ABNORMAL /HPF (ref 0–5)
SP GR UR REFRACTOMETRY: 1.02 (ref 1–1.03)
SPECIMEN EXP DATE BLD: NORMAL
UA: UC IF INDICATED,UAUC: ABNORMAL
UROBILINOGEN UR QL STRIP.AUTO: 0.2 EU/DL (ref 0.2–1)
WBC # BLD AUTO: 6.2 K/UL (ref 3.6–11)
WBC URNS QL MICRO: ABNORMAL /HPF (ref 0–4)

## 2018-05-30 PROCEDURE — 36415 COLL VENOUS BLD VENIPUNCTURE: CPT | Performed by: ORTHOPAEDIC SURGERY

## 2018-05-30 PROCEDURE — 85610 PROTHROMBIN TIME: CPT | Performed by: ORTHOPAEDIC SURGERY

## 2018-05-30 PROCEDURE — 87086 URINE CULTURE/COLONY COUNT: CPT | Performed by: ORTHOPAEDIC SURGERY

## 2018-05-30 PROCEDURE — 85027 COMPLETE CBC AUTOMATED: CPT | Performed by: ORTHOPAEDIC SURGERY

## 2018-05-30 PROCEDURE — 81001 URINALYSIS AUTO W/SCOPE: CPT | Performed by: ORTHOPAEDIC SURGERY

## 2018-05-30 PROCEDURE — 86900 BLOOD TYPING SEROLOGIC ABO: CPT | Performed by: ORTHOPAEDIC SURGERY

## 2018-05-30 RX ORDER — DABIGATRAN ETEXILATE MESYLATE 150 MG/1
CAPSULE ORAL
Qty: 60 CAP | Refills: 0 | Status: SHIPPED | OUTPATIENT
Start: 2018-05-30 | End: 2018-07-02 | Stop reason: SDUPTHER

## 2018-05-30 NOTE — PERIOP NOTES
Dr. West May to review patient's health history: patient has history S/P ablation of A-fib in 2014, Pacemaker in 2014. Heart notes/EKG in [facility]. She has ASHELY, unable to tolerate CPAP, denies any chest pain, and says she can walk of stairs (mets greater than 4). She is allergic to Beef/milk/pork: Alpha gal allergy. Has hemispheric carotid artery syndrome: neuro notes/ CT of head/neck on chart & in connect care. Chart to Dr. West May for review. Urine C&S/ MRSA tests pending. 6/4/18 1615:Dr. West May reviewed above information: Pacemaker form on chart, may proceed with surgery per Dr. West May.

## 2018-05-30 NOTE — PERIOP NOTES
Emailed Kaleb roland NP: ASHELY score is 4, used CPAP for a year, but not slept well, and stopped it. Tried mouth guard but it was uncomfortable, stopped it. Called and faxed Pacemaker form to Dr. Zia Ramon: received confirmation. Also left message with Aysha Saunders, assistant, who will send message to his nurse to confirm they received form/please fax to [facility] as soon as possible. Today's reflex urinalysis showed bacteria 2+, Urine C&S in progress.

## 2018-05-30 NOTE — ADVANCED PRACTICE NURSE
Msg sent to PCP via [facility] staff message regarding ASHELY score of 4. Pt has previous dx of ASHELY and was noncompliant with CPAP after one year. Request further recommendations for pt regarding sleep apnea evaluation.

## 2018-05-30 NOTE — PERIOP NOTES
Preventing Infections Before - and After - Your Surgery  IMPORTANT INSTRUCTIONS    Please read and follow these instructions carefully. Every Night for Three (3) nights before your surgery: Monday, 6/4  1. Shower with [employer] provided at your preassessment appointment. A shower is better than a bath for cleaning your skin. 2. If needed, ask someone to help you reach all areas of your body. Dont forget to clean your belly button with every shower. The night before your surgery: Wednesday, 6/6  1. On the night before your surgery, shower with Aria Mist soap provided at your preassessment appointment. 2. With one packet of Hibiclens in hand, turn water off.  3. Apply Hibiclens antiseptic skin cleanser with a clean, freshly washed washcloth. ? Gently apply to your body from chin to toes (except the genital area) and especially the area(s) where your incision(s) will be. ? Leave Hibiclens on your skin for at least 20 seconds. CAUTION: If needed, Hibiclens may be used to clean the folds of skin of the legs (such as in the area of the groin) and on your buttocks and hips. However, do not use Hibiclens above the neck or in the genital area (your bottom) or put inside any area of your body. 4. Turn the water back on and rinse. 5. Dry gently with a clean, freshly washed towel. 6. After your shower, do not use any powder, deodorant, perfumes or lotion. 7. Use clean, freshly washed towels and washcloths every time you shower. 8. Wear clean, freshly washed pajamas to bed the night before surgery. 9. Sleep on clean, freshly washed sheets. 10. Do not allow pets to sleep in your bed with you. The Morning of your surgery:  1. Shower again thoroughly with [employer] provided at your preassessment appointment. If needed, ask someone for help to reach all areas of your body. Dont forget to clean your belly button! Rinse. 2. Dry gently with a clean, freshly washed towel.   3. After your shower, do not use any powder, deodorant, perfumes or lotion prior to surgery. 4. Put on clean, freshly washed clothing. Tips to help prevent infections after your surgery:  1. Protect your surgical wound from germs:  ? Hand washing is the most important thing you and your caregivers can do to prevent infections. ? Keep your bandage clean and dry! ? Do not touch your surgical wound. 2. Use clean, freshly washed towels and washcloths every time you shower; do not share bath linens with others. 3. Until your surgical wound is healed, wear clothing and sleep on bed linens each day that are clean and freshly washed. 4. Do not allow pets to sleep in your bed with you or touch your surgical wound. 5. Do not smoke - smoking delays wound healing. This may be a good time to stop smoking. 6. If you have diabetes, it is important for you to manage your blood sugar levels properly before your surgery as well as after your surgery. Poorly managed blood sugar levels slow down wound healing and prevent you from healing completely.

## 2018-05-30 NOTE — PERIOP NOTES
[facility]  Ambulatory Surgery Unit  Pre-operative Instructions    Surgery/Procedure Date  6/7/18            Tentative Arrival Time: Pre op will call on 6/6/18 after 1:30pm      1. On the day of your surgery/procedure, please report to the Ambulatory Surgery Unit Registration Desk and sign in at your designated time. The Ambulatory Surgery Unit is located in [facility] on the [facility] side of the [facility] across from the [address] [place] [address]. Please have all of your health insurance cards and a photo ID. 2. You must have someone with you to drive you home, as you should not drive a car for 24 hours following anesthesia. Please make arrangements for a responsible adult friend or family member to stay with you for at least the first 24 hours after your surgery. 3. Do not have anything to eat or drink (including water, gum, mints, coffee, juice) after midnight   6/6/18. This may not apply to medications prescribed by your physician. (Please note below the special instructions with medications to take the morning of surgery, if applicable.)    4. We recommend you do not drink any alcoholic beverages for 24 hours before and after your surgery. 5. Contact your surgeons office for instructions on the following medications: non-steroidal anti-inflammatory drugs (i.e. Advil, Aleve), vitamins, and supplements. (Some surgeons will want you to stop these medications prior to surgery and others may allow you to take them)   **If you are currently taking Plavix, Coumadin, Aspirin and/or other blood-thinning agents, contact your surgeon for instructions. ** Your surgeon will partner with the physician prescribing these medications to determine if it is safe to stop or if you need to continue taking. Please do not stop taking these medications without instructions from your surgeon. 6. Follow the showering instructions on page 3 using the [employer].  If applicable, please do not shave the operative site for 48 hours prior to surgery. 7. Wear comfortable clothes. Wear glasses instead of contacts. Do not bring any jewelry or money (other than copays or fees as instructed). Do not wear make-up, particularly mascara, the morning of your surgery. Do not wear nail polish, particularly if you are having foot /hand surgery. Wear your hair loose or down, no ponytails, buns, yesi pins or clips. All body piercings must be removed. 8. You should understand that if you do not follow these instructions your surgery may be cancelled. If your physical condition changes (i.e. fever, cold or flu) please contact your surgeon as soon as possible. 9. It is important that you be on time. If a situation occurs where you may be late, or if you have any questions or problems, please call [phone].    10. Your surgery time may be subject to change. You will receive a phone call the day prior to surgery to confirm your arrival time. 11. Pediatric patients: please bring a change of clothes, diapers, bottle/sippy cup, pacifier, etc.      Special Instructions: patient to call  [facility] office for instructions concerning Diclofenac, Pradaxa    Take all medications and inhalers, as prescribed, on the morning of surgery with a sip of water EXCEPT: none      I understand a pre-operative phone call will be made to verify my surgery time. In the event that I am not available, I give permission for a message to be left on my answering service and/or with another person?       yes         ___________________      ___________________      ________________  (Signature of Patient)          (Witness)                   (Date and Time)

## 2018-05-31 DIAGNOSIS — G47.33 OSA (OBSTRUCTIVE SLEEP APNEA): Primary | ICD-10-CM

## 2018-05-31 LAB
BACTERIA SPEC CULT: NORMAL
CC UR VC: NORMAL
SERVICE CMNT-IMP: NORMAL
SERVICE CMNT-IMP: NORMAL

## 2018-06-04 ENCOUNTER — ANESTHESIA EVENT (OUTPATIENT)
Dept: SURGERY | Age: 68
DRG: 483 | End: 2018-06-04
Payer: MEDICARE

## 2018-06-06 PROBLEM — M19.011 OSTEOARTHRITIS OF RIGHT SHOULDER: Status: ACTIVE | Noted: 2018-06-06

## 2018-06-06 NOTE — H&P
Subjective:   Patient ID: Bess Langford is a 70 y.o. female.     Chief Complaint:  Right shoulder pain     History of Present Illness:     Pt has R grade 4 glenohumeral arthritis and presents for CT scan F/U of R shoulder. Pain has worsening sharp stabbing right shoulder pain over the last week. Rates the pain at its worse as a 10/10. No new injuries. Pain worse with use. Better with rest.    Nonsurgical treatments do not help.                Objective:   Constitutional:  No acute distress. Well nourished. Well developed. Walks without a limp. Eyes:  Sclera are nonicteric. Respiratory:  No labored breathing. Cardiovascular:  No marked edema. Skin:  No marked skin ulcers. Neurological:  No marked sensory loss noted. Psychiatric: Alert and oriented x3.   Right Shoulder Exam      Tenderness   The patient is experiencing no tenderness.           Range of Motion   Forward Flexion: 120      Muscle Strength   External Rotation: 5/5   Supraspinatus: 4/5   Subscapularis: 5/5      Tests   Apprehension: negative  Drop Arm: positive  Hawkin's test: positive     Other   Erythema: absent  Scars: absent  Sensation: normal  Pulse: present        Left Shoulder Exam      Tenderness   The patient is experiencing no tenderness.            Range of Motion   Forward Flexion: 120      Muscle Strength   External Rotation: 5/5   Supraspinatus: 5/5   Subscapularis: 5/5      Tests   Apprehension: negative  Drop Arm: negative  Hawkin's test: negative     Other   Erythema: absent  Scars: absent  Sensation: normal  Pulse: present               Procedures:    Procedures   None performed today.       Radiographs:       Ct Upper Extremity Right Without Contrast ([id number])     Result Date: 3/28/2018  [facility] - CT UP EXT RT WO CONT Patient: Emmy Andrews : 1950 Date of Service: 3/26/2018 9:59:28 AM Reason For Exam: Primary localized osteoarthrosis of shoulder region, right Ordering Provider: Merlyn Pacheco Physician: Shivam Mckay Signing Date: 3/28/2018 10:56:26 AM Addendum: The glenoid retroversion measures 17 degrees. EXAM:  CT UP EXT RT WO CONT INDICATION:  Right shoulder pain secondary to osteoarthritis. COMPARISON: MRI 1/74/2461 TECHNIQUE: Helical CT of the right shoulder with oblique coronal and oblique sagittal reformats. Images reviewed in soft tissue and bone windows. CT dose reduction was achieved through the use of a standardized protocol tailored for this examination and automatic exposure control for dose modulation. CONTRAST: None. FINDINGS: Glenohumeral joint: Severe osteoarthritis. There is near bone-on-bone contact in the inferior glenohumeral joint. There is mild flattening of the humeral head with degenerative sclerosis and a prominent spur from the inferior and posterior aspects. There is mild retroversion of the glenoid with osteophytosis along the anterior margin. There is a prominent central subchondral cyst with surrounding degenerative sclerosis. Glenoid bone depth in the transverse plane: 3.4 cm. Bones: Normal bone mineralization. No fracture, dislocation, or periosteal reaction. Joint fluid:  No significant joint effusion. Rotator cuff tendons: No massive cuff tear identified given the limitations of CT. Acromioclavicular joint: Intact. Acromion process type: 2 Muscles: No atrophy. Other: No soft tissue mass. Partially imaged lung is clear. A right-sided pacemaker is in place. IMPRESSION: Unchanged severe glenohumeral osteoarthritis with glenoid retroversion      CT scan shows 15 degrees of retroversion. Assessment:      1. Primary localized osteoarthrosis of right shoulder region    2. Obesity (BMI 30-39. 9)            Plan:   I reviewed previous records, previous xrays which showed Grade 4 arthritis. I reviewed CT scan which showed extreme thinning of the supraspinatus and 15 degrees of retroversion.     I am concerned about the supraspinatus.   We do not want to do a Total and have the cuff tear in 2 years requiring subsequent revision to a reverse. We will evaluate the [facility] intraooperatively and make a decision at that time to do a total or a reverse. Pt give consent to proceed with surgery.       I discussed indications, risks and benefits of operative and non-operative treatments discussed, specifically the risk of early glenoid component loosening if heavy labor or weight lifting are resumed post-operatively.  Risks including infection, blood clot arms, blood loss, arm swelling post-operatively which may worsen carpal tunnel or cubital tunnel symptoms, hand swelling causing hand stiffness.

## 2018-06-07 ENCOUNTER — ANESTHESIA (OUTPATIENT)
Dept: SURGERY | Age: 68
DRG: 483 | End: 2018-06-07
Payer: MEDICARE

## 2018-06-07 ENCOUNTER — HOSPITAL ENCOUNTER (INPATIENT)
Age: 68
LOS: 2 days | Discharge: HOME HEALTH | DRG: 483 | End: 2018-06-09
Attending: ORTHOPAEDIC SURGERY | Admitting: ORTHOPAEDIC SURGERY
Payer: MEDICARE

## 2018-06-07 DIAGNOSIS — Z96.611 S/P REVERSE TOTAL SHOULDER ARTHROPLASTY, RIGHT: Primary | ICD-10-CM

## 2018-06-07 PROCEDURE — C1776 JOINT DEVICE (IMPLANTABLE): HCPCS | Performed by: ORTHOPAEDIC SURGERY

## 2018-06-07 PROCEDURE — 77030020268 HC MISC GENERAL SUPPLY: Performed by: ORTHOPAEDIC SURGERY

## 2018-06-07 PROCEDURE — 77030006835 HC BLD SAW SAG STRY -B: Performed by: ORTHOPAEDIC SURGERY

## 2018-06-07 PROCEDURE — 74011250636 HC RX REV CODE- 250/636: Performed by: ORTHOPAEDIC SURGERY

## 2018-06-07 PROCEDURE — 0LS30ZZ REPOSITION RIGHT UPPER ARM TENDON, OPEN APPROACH: ICD-10-PCS | Performed by: ORTHOPAEDIC SURGERY

## 2018-06-07 PROCEDURE — 77030013708 HC HNDPC SUC IRR PULS STRY –B: Performed by: ORTHOPAEDIC SURGERY

## 2018-06-07 PROCEDURE — 74011250636 HC RX REV CODE- 250/636

## 2018-06-07 PROCEDURE — 77030020788: Performed by: ORTHOPAEDIC SURGERY

## 2018-06-07 PROCEDURE — 77030002912 HC SUT ETHBND J&J -A: Performed by: ORTHOPAEDIC SURGERY

## 2018-06-07 PROCEDURE — 77030008684 HC TU ET CUF COVD -B: Performed by: ANESTHESIOLOGY

## 2018-06-07 PROCEDURE — 77030019908 HC STETH ESOPH SIMS -A: Performed by: ANESTHESIOLOGY

## 2018-06-07 PROCEDURE — 77030038020 HC MANFLD NEPTUNE STRY -B: Performed by: ORTHOPAEDIC SURGERY

## 2018-06-07 PROCEDURE — 74011000250 HC RX REV CODE- 250: Performed by: ORTHOPAEDIC SURGERY

## 2018-06-07 PROCEDURE — 76030000020 HC AMB SURG 1.5 TO 2 HR INTENSV-TIER 1: Performed by: ORTHOPAEDIC SURGERY

## 2018-06-07 PROCEDURE — 74011250637 HC RX REV CODE- 250/637: Performed by: PHYSICIAN ASSISTANT

## 2018-06-07 PROCEDURE — C1713 ANCHOR/SCREW BN/BN,TIS/BN: HCPCS | Performed by: ORTHOPAEDIC SURGERY

## 2018-06-07 PROCEDURE — 74011000250 HC RX REV CODE- 250

## 2018-06-07 PROCEDURE — 77030008467 HC STPLR SKN COVD -B: Performed by: ORTHOPAEDIC SURGERY

## 2018-06-07 PROCEDURE — 77030018673: Performed by: ORTHOPAEDIC SURGERY

## 2018-06-07 PROCEDURE — 77030011640 HC PAD GRND REM COVD -A: Performed by: ORTHOPAEDIC SURGERY

## 2018-06-07 PROCEDURE — 77030026438 HC STYL ET INTUB CARD -A: Performed by: ANESTHESIOLOGY

## 2018-06-07 PROCEDURE — 77030031139 HC SUT VCRL2 J&J -A: Performed by: ORTHOPAEDIC SURGERY

## 2018-06-07 PROCEDURE — 77030002922 HC SUT FBRWRE ARTH -B: Performed by: ORTHOPAEDIC SURGERY

## 2018-06-07 PROCEDURE — 3E0T3BZ INTRODUCTION OF ANESTHETIC AGENT INTO PERIPHERAL NERVES AND PLEXI, PERCUTANEOUS APPROACH: ICD-10-PCS | Performed by: ANESTHESIOLOGY

## 2018-06-07 PROCEDURE — 76060000063 HC AMB SURG ANES 1.5 TO 2 HR: Performed by: ORTHOPAEDIC SURGERY

## 2018-06-07 PROCEDURE — 65270000029 HC RM PRIVATE

## 2018-06-07 PROCEDURE — 0RRJ00Z REPLACEMENT OF RIGHT SHOULDER JOINT WITH REVERSE BALL AND SOCKET SYNTHETIC SUBSTITUTE, OPEN APPROACH: ICD-10-PCS | Performed by: ORTHOPAEDIC SURGERY

## 2018-06-07 PROCEDURE — 77030010781 HC BOWL MX BN ENCR -C: Performed by: ORTHOPAEDIC SURGERY

## 2018-06-07 PROCEDURE — 74011250636 HC RX REV CODE- 250/636: Performed by: ANESTHESIOLOGY

## 2018-06-07 PROCEDURE — 77030018846 HC SOL IRR STRL H20 ICUM -A: Performed by: ORTHOPAEDIC SURGERY

## 2018-06-07 PROCEDURE — 77030010516 HC APPL HEMA CLP TELE -B: Performed by: ORTHOPAEDIC SURGERY

## 2018-06-07 PROCEDURE — 76210000036 HC AMBSU PH I REC 1.5 TO 2 HR: Performed by: ORTHOPAEDIC SURGERY

## 2018-06-07 PROCEDURE — 77030018836 HC SOL IRR NACL ICUM -A: Performed by: ORTHOPAEDIC SURGERY

## 2018-06-07 PROCEDURE — 74011250636 HC RX REV CODE- 250/636: Performed by: PHYSICIAN ASSISTANT

## 2018-06-07 DEVICE — SCREW, LOCKING BONE, RSP, 5X22
Type: IMPLANTABLE DEVICE | Site: SHOULDER | Status: FUNCTIONAL
Brand: DJO SURGICAL

## 2018-06-07 DEVICE — SCREW, LOCKING BONE, RSP, 5X14
Type: IMPLANTABLE DEVICE | Site: SHOULDER | Status: FUNCTIONAL
Brand: DJO SURGICAL

## 2018-06-07 DEVICE — PLUG, CEMENT SZ. 10.0
Type: IMPLANTABLE DEVICE | Site: SHOULDER | Status: FUNCTIONAL
Brand: DJO SURGICAL

## 2018-06-07 DEVICE — COMPONENT SHLDR REVERSED RSP: Type: IMPLANTABLE DEVICE | Site: SHOULDER | Status: FUNCTIONAL

## 2018-06-07 DEVICE — COBALT HV BONE CEMENT 40GM
Type: IMPLANTABLE DEVICE | Site: SHOULDER | Status: FUNCTIONAL
Brand: DJO SURGICAL

## 2018-06-07 DEVICE — BASEPLATE, GLENOID HA-COAT, RSP, 6.5MM X 30MM
Type: IMPLANTABLE DEVICE | Site: SHOULDER | Status: FUNCTIONAL
Brand: DJO SURGICAL

## 2018-06-07 DEVICE — GLENOID, HEAD W/RETAINING SCREW, RSP, 32MM/-4MM
Type: IMPLANTABLE DEVICE | Site: SHOULDER | Status: FUNCTIONAL
Brand: DJO SURGICAL

## 2018-06-07 RX ORDER — POLYETHYLENE GLYCOL 3350 17 G/17G
17 POWDER, FOR SOLUTION ORAL DAILY
Status: DISCONTINUED | OUTPATIENT
Start: 2018-06-08 | End: 2018-06-09 | Stop reason: HOSPADM

## 2018-06-07 RX ORDER — DIPHENHYDRAMINE HYDROCHLORIDE 50 MG/ML
12.5 INJECTION, SOLUTION INTRAMUSCULAR; INTRAVENOUS AS NEEDED
Status: DISCONTINUED | OUTPATIENT
Start: 2018-06-07 | End: 2018-06-07 | Stop reason: HOSPADM

## 2018-06-07 RX ORDER — FENTANYL CITRATE 50 UG/ML
25 INJECTION, SOLUTION INTRAMUSCULAR; INTRAVENOUS
Status: DISCONTINUED | OUTPATIENT
Start: 2018-06-07 | End: 2018-06-07 | Stop reason: HOSPADM

## 2018-06-07 RX ORDER — HYDROMORPHONE HYDROCHLORIDE 2 MG/ML
0.5 INJECTION, SOLUTION INTRAMUSCULAR; INTRAVENOUS; SUBCUTANEOUS
Status: DISCONTINUED | OUTPATIENT
Start: 2018-06-07 | End: 2018-06-08

## 2018-06-07 RX ORDER — OXYCODONE HYDROCHLORIDE 5 MG/1
10 TABLET ORAL
Status: DISCONTINUED | OUTPATIENT
Start: 2018-06-07 | End: 2018-06-08

## 2018-06-07 RX ORDER — ROCURONIUM BROMIDE 10 MG/ML
INJECTION, SOLUTION INTRAVENOUS AS NEEDED
Status: DISCONTINUED | OUTPATIENT
Start: 2018-06-07 | End: 2018-06-07 | Stop reason: HOSPADM

## 2018-06-07 RX ORDER — CEFAZOLIN SODIUM/WATER 2 G/20 ML
SYRINGE (ML) INTRAVENOUS
Status: COMPLETED
Start: 2018-06-07 | End: 2018-06-07

## 2018-06-07 RX ORDER — FAMOTIDINE 20 MG/1
20 TABLET, FILM COATED ORAL 2 TIMES DAILY
Status: DISCONTINUED | OUTPATIENT
Start: 2018-06-07 | End: 2018-06-09 | Stop reason: HOSPADM

## 2018-06-07 RX ORDER — ROPIVACAINE HYDROCHLORIDE 5 MG/ML
INJECTION, SOLUTION EPIDURAL; INFILTRATION; PERINEURAL
Status: DISCONTINUED
Start: 2018-06-07 | End: 2018-06-07

## 2018-06-07 RX ORDER — OXYCODONE HYDROCHLORIDE 5 MG/1
5 TABLET ORAL
Status: DISCONTINUED | OUTPATIENT
Start: 2018-06-07 | End: 2018-06-09 | Stop reason: HOSPADM

## 2018-06-07 RX ORDER — SODIUM CHLORIDE 0.9 % (FLUSH) 0.9 %
5-10 SYRINGE (ML) INJECTION AS NEEDED
Status: DISCONTINUED | OUTPATIENT
Start: 2018-06-07 | End: 2018-06-07 | Stop reason: HOSPADM

## 2018-06-07 RX ORDER — DIPHENHYDRAMINE HYDROCHLORIDE 50 MG/ML
12.5 INJECTION, SOLUTION INTRAMUSCULAR; INTRAVENOUS
Status: DISCONTINUED | OUTPATIENT
Start: 2018-06-07 | End: 2018-06-08

## 2018-06-07 RX ORDER — MORPHINE SULFATE 10 MG/ML
2 INJECTION, SOLUTION INTRAMUSCULAR; INTRAVENOUS
Status: DISCONTINUED | OUTPATIENT
Start: 2018-06-07 | End: 2018-06-07 | Stop reason: HOSPADM

## 2018-06-07 RX ORDER — ACETAMINOPHEN 10 MG/ML
INJECTION, SOLUTION INTRAVENOUS
Status: DISPENSED
Start: 2018-06-07 | End: 2018-06-08

## 2018-06-07 RX ORDER — AMOXICILLIN 250 MG
1 CAPSULE ORAL 2 TIMES DAILY
Status: DISCONTINUED | OUTPATIENT
Start: 2018-06-07 | End: 2018-06-09 | Stop reason: HOSPADM

## 2018-06-07 RX ORDER — LIDOCAINE HYDROCHLORIDE AND EPINEPHRINE 20; 5 MG/ML; UG/ML
INJECTION, SOLUTION EPIDURAL; INFILTRATION; INTRACAUDAL; PERINEURAL
Status: DISCONTINUED
Start: 2018-06-07 | End: 2018-06-07

## 2018-06-07 RX ORDER — MIDAZOLAM HYDROCHLORIDE 1 MG/ML
INJECTION, SOLUTION INTRAMUSCULAR; INTRAVENOUS AS NEEDED
Status: DISCONTINUED | OUTPATIENT
Start: 2018-06-07 | End: 2018-06-07 | Stop reason: HOSPADM

## 2018-06-07 RX ORDER — SODIUM CHLORIDE 0.9 % (FLUSH) 0.9 %
5-10 SYRINGE (ML) INJECTION EVERY 8 HOURS
Status: DISCONTINUED | OUTPATIENT
Start: 2018-06-07 | End: 2018-06-07 | Stop reason: HOSPADM

## 2018-06-07 RX ORDER — SODIUM CHLORIDE 9 MG/ML
100 INJECTION, SOLUTION INTRAVENOUS CONTINUOUS
Status: DISPENSED | OUTPATIENT
Start: 2018-06-07 | End: 2018-06-08

## 2018-06-07 RX ORDER — LIDOCAINE HYDROCHLORIDE AND EPINEPHRINE 10; 10 MG/ML; UG/ML
INJECTION, SOLUTION INFILTRATION; PERINEURAL AS NEEDED
Status: DISCONTINUED | OUTPATIENT
Start: 2018-06-07 | End: 2018-06-07 | Stop reason: HOSPADM

## 2018-06-07 RX ORDER — TOPIRAMATE 100 MG/1
200 TABLET, FILM COATED ORAL
Status: DISCONTINUED | OUTPATIENT
Start: 2018-06-07 | End: 2018-06-09 | Stop reason: HOSPADM

## 2018-06-07 RX ORDER — ACETAMINOPHEN 10 MG/ML
INJECTION, SOLUTION INTRAVENOUS AS NEEDED
Status: DISCONTINUED | OUTPATIENT
Start: 2018-06-07 | End: 2018-06-07 | Stop reason: HOSPADM

## 2018-06-07 RX ORDER — FENTANYL CITRATE 50 UG/ML
INJECTION, SOLUTION INTRAMUSCULAR; INTRAVENOUS AS NEEDED
Status: DISCONTINUED | OUTPATIENT
Start: 2018-06-07 | End: 2018-06-07 | Stop reason: HOSPADM

## 2018-06-07 RX ORDER — CEFAZOLIN SODIUM/WATER 2 G/20 ML
2 SYRINGE (ML) INTRAVENOUS ONCE
Status: COMPLETED | OUTPATIENT
Start: 2018-06-07 | End: 2018-06-07

## 2018-06-07 RX ORDER — EPINEPHRINE 1 MG/ML
INJECTION, SOLUTION, CONCENTRATE INTRAVENOUS
Status: DISCONTINUED
Start: 2018-06-07 | End: 2018-06-07

## 2018-06-07 RX ORDER — NALOXONE HYDROCHLORIDE 0.4 MG/ML
0.4 INJECTION, SOLUTION INTRAMUSCULAR; INTRAVENOUS; SUBCUTANEOUS AS NEEDED
Status: DISCONTINUED | OUTPATIENT
Start: 2018-06-07 | End: 2018-06-09 | Stop reason: HOSPADM

## 2018-06-07 RX ORDER — DEXAMETHASONE SODIUM PHOSPHATE 4 MG/ML
INJECTION, SOLUTION INTRA-ARTICULAR; INTRALESIONAL; INTRAMUSCULAR; INTRAVENOUS; SOFT TISSUE AS NEEDED
Status: DISCONTINUED | OUTPATIENT
Start: 2018-06-07 | End: 2018-06-07 | Stop reason: HOSPADM

## 2018-06-07 RX ORDER — LIDOCAINE HYDROCHLORIDE 10 MG/ML
0.1 INJECTION, SOLUTION EPIDURAL; INFILTRATION; INTRACAUDAL; PERINEURAL AS NEEDED
Status: DISCONTINUED | OUTPATIENT
Start: 2018-06-07 | End: 2018-06-07 | Stop reason: HOSPADM

## 2018-06-07 RX ORDER — SODIUM CHLORIDE 0.9 % (FLUSH) 0.9 %
5-10 SYRINGE (ML) INJECTION AS NEEDED
Status: DISCONTINUED | OUTPATIENT
Start: 2018-06-07 | End: 2018-06-09 | Stop reason: HOSPADM

## 2018-06-07 RX ORDER — PROPOFOL 10 MG/ML
INJECTION, EMULSION INTRAVENOUS AS NEEDED
Status: DISCONTINUED | OUTPATIENT
Start: 2018-06-07 | End: 2018-06-07 | Stop reason: HOSPADM

## 2018-06-07 RX ORDER — DIPHENOXYLATE HYDROCHLORIDE AND ATROPINE SULFATE 2.5; .025 MG/1; MG/1
1 TABLET ORAL
Status: DISCONTINUED | OUTPATIENT
Start: 2018-06-07 | End: 2018-06-09 | Stop reason: HOSPADM

## 2018-06-07 RX ORDER — OXYCODONE AND ACETAMINOPHEN 5; 325 MG/1; MG/1
1 TABLET ORAL
Status: DISCONTINUED | OUTPATIENT
Start: 2018-06-07 | End: 2018-06-07 | Stop reason: HOSPADM

## 2018-06-07 RX ORDER — MIDAZOLAM HYDROCHLORIDE 1 MG/ML
INJECTION, SOLUTION INTRAMUSCULAR; INTRAVENOUS
Status: COMPLETED
Start: 2018-06-07 | End: 2018-06-07

## 2018-06-07 RX ORDER — LITHIUM CARBONATE 300 MG
300 TABLET ORAL 2 TIMES DAILY
Status: DISCONTINUED | OUTPATIENT
Start: 2018-06-07 | End: 2018-06-09 | Stop reason: HOSPADM

## 2018-06-07 RX ORDER — LEVOTHYROXINE SODIUM 50 UG/1
50 TABLET ORAL
Status: DISCONTINUED | OUTPATIENT
Start: 2018-06-08 | End: 2018-06-09 | Stop reason: HOSPADM

## 2018-06-07 RX ORDER — ONDANSETRON 2 MG/ML
4 INJECTION INTRAMUSCULAR; INTRAVENOUS
Status: ACTIVE | OUTPATIENT
Start: 2018-06-07 | End: 2018-06-08

## 2018-06-07 RX ORDER — DABIGATRAN ETEXILATE 150 MG/1
150 CAPSULE ORAL 2 TIMES DAILY
Status: DISCONTINUED | OUTPATIENT
Start: 2018-06-07 | End: 2018-06-09 | Stop reason: HOSPADM

## 2018-06-07 RX ORDER — LIDOCAINE HYDROCHLORIDE AND EPINEPHRINE 20; 10 MG/ML; UG/ML
INJECTION, SOLUTION INFILTRATION; PERINEURAL AS NEEDED
Status: DISCONTINUED | OUTPATIENT
Start: 2018-06-07 | End: 2018-06-07 | Stop reason: HOSPADM

## 2018-06-07 RX ORDER — GLYCOPYRROLATE 0.2 MG/ML
INJECTION INTRAMUSCULAR; INTRAVENOUS AS NEEDED
Status: DISCONTINUED | OUTPATIENT
Start: 2018-06-07 | End: 2018-06-07 | Stop reason: HOSPADM

## 2018-06-07 RX ORDER — SODIUM CHLORIDE 0.9 % (FLUSH) 0.9 %
5-10 SYRINGE (ML) INJECTION EVERY 8 HOURS
Status: DISCONTINUED | OUTPATIENT
Start: 2018-06-07 | End: 2018-06-09 | Stop reason: HOSPADM

## 2018-06-07 RX ORDER — PHENYLEPHRINE HCL IN 0.9% NACL 0.4MG/10ML
SYRINGE (ML) INTRAVENOUS AS NEEDED
Status: DISCONTINUED | OUTPATIENT
Start: 2018-06-07 | End: 2018-06-07 | Stop reason: HOSPADM

## 2018-06-07 RX ORDER — LIDOCAINE HYDROCHLORIDE 20 MG/ML
INJECTION, SOLUTION EPIDURAL; INFILTRATION; INTRACAUDAL; PERINEURAL AS NEEDED
Status: DISCONTINUED | OUTPATIENT
Start: 2018-06-07 | End: 2018-06-07 | Stop reason: HOSPADM

## 2018-06-07 RX ORDER — HYDROMORPHONE HYDROCHLORIDE 1 MG/ML
.2-.5 INJECTION, SOLUTION INTRAMUSCULAR; INTRAVENOUS; SUBCUTANEOUS ONCE
Status: DISCONTINUED | OUTPATIENT
Start: 2018-06-07 | End: 2018-06-07 | Stop reason: HOSPADM

## 2018-06-07 RX ORDER — LAMOTRIGINE 25 MG/1
50 TABLET ORAL DAILY
Status: DISCONTINUED | OUTPATIENT
Start: 2018-06-08 | End: 2018-06-09 | Stop reason: HOSPADM

## 2018-06-07 RX ORDER — ONDANSETRON 2 MG/ML
INJECTION INTRAMUSCULAR; INTRAVENOUS AS NEEDED
Status: DISCONTINUED | OUTPATIENT
Start: 2018-06-07 | End: 2018-06-07 | Stop reason: HOSPADM

## 2018-06-07 RX ORDER — KETOROLAC TROMETHAMINE 30 MG/ML
15 INJECTION, SOLUTION INTRAMUSCULAR; INTRAVENOUS EVERY 6 HOURS
Status: COMPLETED | OUTPATIENT
Start: 2018-06-07 | End: 2018-06-08

## 2018-06-07 RX ORDER — ACETAMINOPHEN 500 MG
1000 TABLET ORAL EVERY 6 HOURS
Status: DISCONTINUED | OUTPATIENT
Start: 2018-06-07 | End: 2018-06-09 | Stop reason: HOSPADM

## 2018-06-07 RX ORDER — SODIUM CHLORIDE, SODIUM LACTATE, POTASSIUM CHLORIDE, CALCIUM CHLORIDE 600; 310; 30; 20 MG/100ML; MG/100ML; MG/100ML; MG/100ML
25 INJECTION, SOLUTION INTRAVENOUS CONTINUOUS
Status: DISCONTINUED | OUTPATIENT
Start: 2018-06-07 | End: 2018-06-07 | Stop reason: HOSPADM

## 2018-06-07 RX ORDER — NEOSTIGMINE METHYLSULFATE 1 MG/ML
INJECTION INTRAVENOUS AS NEEDED
Status: DISCONTINUED | OUTPATIENT
Start: 2018-06-07 | End: 2018-06-07 | Stop reason: HOSPADM

## 2018-06-07 RX ADMIN — Medication 10 ML: at 15:09

## 2018-06-07 RX ADMIN — KETOROLAC TROMETHAMINE 15 MG: 30 INJECTION, SOLUTION INTRAMUSCULAR at 18:05

## 2018-06-07 RX ADMIN — STANDARDIZED SENNA CONCENTRATE AND DOCUSATE SODIUM 1 TABLET: 8.6; 5 TABLET, FILM COATED ORAL at 18:03

## 2018-06-07 RX ADMIN — NEOSTIGMINE METHYLSULFATE 3 MG: 1 INJECTION INTRAVENOUS at 12:31

## 2018-06-07 RX ADMIN — Medication 120 MCG: at 11:59

## 2018-06-07 RX ADMIN — ACETAMINOPHEN 1000 MG: 500 TABLET, FILM COATED ORAL at 18:03

## 2018-06-07 RX ADMIN — FENTANYL CITRATE 25 MCG: 50 INJECTION, SOLUTION INTRAMUSCULAR; INTRAVENOUS at 13:26

## 2018-06-07 RX ADMIN — SODIUM CHLORIDE 100 ML/HR: 900 INJECTION, SOLUTION INTRAVENOUS at 15:09

## 2018-06-07 RX ADMIN — DEXAMETHASONE SODIUM PHOSPHATE 8 MG: 4 INJECTION, SOLUTION INTRA-ARTICULAR; INTRALESIONAL; INTRAMUSCULAR; INTRAVENOUS; SOFT TISSUE at 11:28

## 2018-06-07 RX ADMIN — PROPOFOL 150 MG: 10 INJECTION, EMULSION INTRAVENOUS at 11:18

## 2018-06-07 RX ADMIN — SODIUM CHLORIDE, SODIUM LACTATE, POTASSIUM CHLORIDE, AND CALCIUM CHLORIDE 25 ML/HR: 600; 310; 30; 20 INJECTION, SOLUTION INTRAVENOUS at 09:34

## 2018-06-07 RX ADMIN — Medication 120 MCG: at 11:39

## 2018-06-07 RX ADMIN — FENTANYL CITRATE 25 MCG: 50 INJECTION, SOLUTION INTRAMUSCULAR; INTRAVENOUS at 13:31

## 2018-06-07 RX ADMIN — LIDOCAINE HYDROCHLORIDE 50 MG: 20 INJECTION, SOLUTION EPIDURAL; INFILTRATION; INTRACAUDAL; PERINEURAL at 11:18

## 2018-06-07 RX ADMIN — Medication 2 G: at 11:29

## 2018-06-07 RX ADMIN — Medication 80 MCG: at 11:43

## 2018-06-07 RX ADMIN — FENTANYL CITRATE 100 MCG: 50 INJECTION, SOLUTION INTRAMUSCULAR; INTRAVENOUS at 11:18

## 2018-06-07 RX ADMIN — Medication 80 MCG: at 12:03

## 2018-06-07 RX ADMIN — MIDAZOLAM HYDROCHLORIDE 3 MG: 1 INJECTION, SOLUTION INTRAMUSCULAR; INTRAVENOUS at 09:45

## 2018-06-07 RX ADMIN — Medication 80 MCG: at 11:54

## 2018-06-07 RX ADMIN — OXYCODONE HYDROCHLORIDE 10 MG: 5 TABLET ORAL at 15:10

## 2018-06-07 RX ADMIN — MORPHINE SULFATE 2 MG: 10 INJECTION INTRAMUSCULAR; INTRAVENOUS; SUBCUTANEOUS at 14:00

## 2018-06-07 RX ADMIN — PREGABALIN 200 MG: 150 CAPSULE ORAL at 18:05

## 2018-06-07 RX ADMIN — DABIGATRAN ETEXILATE MESYLATE 150 MG: 150 CAPSULE ORAL at 22:00

## 2018-06-07 RX ADMIN — MORPHINE SULFATE 2 MG: 10 INJECTION INTRAMUSCULAR; INTRAVENOUS; SUBCUTANEOUS at 14:15

## 2018-06-07 RX ADMIN — PROPOFOL 50 MG: 10 INJECTION, EMULSION INTRAVENOUS at 11:49

## 2018-06-07 RX ADMIN — Medication 120 MCG: at 11:33

## 2018-06-07 RX ADMIN — Medication 80 MCG: at 11:30

## 2018-06-07 RX ADMIN — HYDROMORPHONE HYDROCHLORIDE 0.5 MG: 2 INJECTION INTRAMUSCULAR; INTRAVENOUS; SUBCUTANEOUS at 18:36

## 2018-06-07 RX ADMIN — MEPERIDINE HYDROCHLORIDE 12.5 MG: 50 INJECTION, SOLUTION INTRAMUSCULAR; INTRAVENOUS; SUBCUTANEOUS at 13:25

## 2018-06-07 RX ADMIN — TOPIRAMATE 200 MG: 100 TABLET, FILM COATED ORAL at 21:55

## 2018-06-07 RX ADMIN — OXYCODONE HYDROCHLORIDE 10 MG: 5 TABLET ORAL at 18:03

## 2018-06-07 RX ADMIN — Medication 80 MCG: at 11:56

## 2018-06-07 RX ADMIN — ACETAMINOPHEN 1000 MG: 10 INJECTION, SOLUTION INTRAVENOUS at 12:56

## 2018-06-07 RX ADMIN — ONDANSETRON 4 MG: 2 INJECTION INTRAMUSCULAR; INTRAVENOUS at 11:30

## 2018-06-07 RX ADMIN — LITHIUM CARBONATE 300 MG: 300 TABLET ORAL at 21:55

## 2018-06-07 RX ADMIN — Medication 10 ML: at 14:07

## 2018-06-07 RX ADMIN — MEPERIDINE HYDROCHLORIDE 12.5 MG: 50 INJECTION, SOLUTION INTRAMUSCULAR; INTRAVENOUS; SUBCUTANEOUS at 13:20

## 2018-06-07 RX ADMIN — Medication 40 MCG: at 12:01

## 2018-06-07 RX ADMIN — MIDAZOLAM HYDROCHLORIDE 2 MG: 1 INJECTION, SOLUTION INTRAMUSCULAR; INTRAVENOUS at 09:48

## 2018-06-07 RX ADMIN — ROCURONIUM BROMIDE 40 MG: 10 INJECTION, SOLUTION INTRAVENOUS at 11:18

## 2018-06-07 RX ADMIN — GLYCOPYRROLATE 0.4 MG: 0.2 INJECTION INTRAMUSCULAR; INTRAVENOUS at 12:31

## 2018-06-07 RX ADMIN — FAMOTIDINE 20 MG: 20 TABLET, FILM COATED ORAL at 18:04

## 2018-06-07 NOTE — IP AVS SNAPSHOT
[address] [facility] 
[phone] Patient: Tahira Taylor MRN: [id number] BMB:[id number] About your hospitalization You were admitted on:  June 7, 2018 You last received care in the:  [facility]ICS You were discharged on:  June 9, 2018 Why you were hospitalized Your primary diagnosis was:  Not on File Your diagnoses also included:  Osteoarthritis Of Right Shoulder Follow-up Information Follow up With Details Comments Contact Info Rachael Brothers MD In 2 weeks  [address] [facility] 
[phone] Armando Duke MD   [address] [facility] 
[phone] [address] [place] [address]  This is your home health agency. If you have questions regarding their services, or if you do not hear from them within 24 hours, please reach out to them directly. [address] 
1st Floor [place] [address] 
[phone] Your Scheduled Appointments Monday July 30, 2018  1:00 PM EDT Follow Up with Marti Huber MD  
Neurology Clinic at [address], [address] [facility]  
[phone] Discharge Orders None A check tracy indicates which time of day the medication should be taken. My Medications START taking these medications Instructions Each Dose to Equal  
 Morning Noon Evening Bedtime  
 naloxone 4 mg/actuation nasal spray Commonly known as:  [employer] Your last dose was: Your next dose is:    
   
   
 1 [place] by IntraNASal route as needed (respiratory depression). Give single spray into one nostril. Call 911.  Give additional doses every 2 to 3 minutes alternating nostrils until assistance arrives using a new nasal spray with each dose, if patient does not respond or responds and then relapses. 1 Spray  
    
   
   
   
  
 oxyCODONE IR 5 mg immediate release tablet Commonly known as:  Benedict Louis Your last dose was: Your next dose is: Take 1 Tab by mouth every four (4) hours as needed. Max Daily Amount: 30 mg.  
 5 mg  
    
   
   
   
  
 polyethylene glycol 17 gram packet Commonly known as:  Les De Leon Your last dose was: Your next dose is: Take 1 Packet by mouth daily as needed (constipation) for up to 15 days. 17 g  
    
   
   
   
  
 senna-docusate 8.6-50 mg per tablet Commonly known as:  Vika Pretty Your last dose was: Your next dose is: Take 1 Tab by mouth daily. 1 Tab CHANGE how you take these medications Instructions Each Dose to Equal  
 Morning Noon Evening Bedtime  
 acetaminophen 500 mg tablet Commonly known as:  TYLENOL What changed:   
- when to take this 
- reasons to take this Your last dose was: Your next dose is: Take 2 Tabs by mouth every six (6) hours for 14 days. 1000 mg CONTINUE taking these medications Instructions Each Dose to Equal  
 Morning Noon Evening Bedtime  
 diclofenac EC 75 mg EC tablet Commonly known as:  VOLTAREN Your last dose was: Your next dose is: Take 75 mg by mouth two (2) times a day. 75 mg  
    
   
   
   
  
 ergocalciferol 50,000 unit capsule Commonly known as:  ERGOCALCIFEROL Your last dose was: Your next dose is: TAKE 1 CAP BY MOUTH EVERY SEVEN (7) DAYS. lamoTRIgine 25 mg tablet Commonly known as: LaMICtal  
   
Your last dose was: Your next dose is:    
   
   
 50 mg daily. Indications: BIPOLAR DISORDER IN REMISSION 50 mg  
    
   
   
   
  
 levothyroxine 50 mcg tablet Commonly known as:  SYNTHROID Your last dose was: Your next dose is: TAKE 1 TABLET EVERY DAY  
     
   
   
   
  
 lithium carbonate 300 mg tablet Your last dose was: Your next dose is: Take 300 mg by mouth two (2) times a day. 300 mg  
    
   
   
   
  
 LOMOTIL 2.5-0.025 mg per tablet Generic drug:  diphenoxylate-atropine Your last dose was: Your next dose is: Take  by mouth two (2) times daily as needed for Diarrhea. LYRICA 200 mg capsule Generic drug:  pregabalin Your last dose was: Your next dose is: Take 200 mg by mouth two (2) times a day. Indications: Diabetic Peripheral Neuropathy 200 mg PRADAXA 150 mg capsule Generic drug:  dabigatran etexilate Your last dose was: Your next dose is: TAKE 1 CAPSULE BY MOUTH TWICE DAILY  
     
   
   
   
  
 TOPAMAX 100 mg tablet Generic drug:  topiramate Your last dose was: Your next dose is: Take 200 mg by mouth nightly. 200 mg  
    
   
   
   
  
 XANAX 0.5 mg tablet Generic drug:  ALPRAZolam  
   
Your last dose was: Your next dose is: Take 0.5 mg by mouth nightly as needed. 0.5 mg  
    
   
   
   
  
  
STOP taking these medications HYDROcodone-acetaminophen 5-325 mg per tablet Commonly known as:  Amarjit Palomares Where to Get Your Medications Information on where to get these meds will be given to you by the nurse or doctor. ! Ask your nurse or doctor about these medications  
  acetaminophen 500 mg tablet  
 naloxone 4 mg/actuation nasal spray  
 oxyCODONE IR 5 mg immediate release tablet  
 polyethylene glycol 17 gram packet  
 senna-docusate 8.6-50 mg per tablet Opioid Education Prescription Opioids: What You Need to Know: Prescription opioids can be used to help relieve moderate-to-severe pain and are often prescribed following a surgery or injury, or for certain health conditions. These medications can be an important part of treatment but also come with serious risks. Opioids are strong pain medicines. Examples include hydrocodone, oxycodone, fentanyl, and morphine. Heroin is an example of an illegal opioid. It is important to work with your health care provider to make sure you are getting the safest, most effective care. WHAT ARE THE RISKS AND SIDE EFFECTS OF OPIOID USE? Prescription opioids carry serious risks of addiction and overdose, especially with prolonged use. An opioid overdose, often marked by slow breathing, can cause sudden death. The use of prescription opioids can have a number of side effects as well, even when taken as directed. · Tolerance-meaning you might need to take more of a medication for the same pain relief · Physical dependence-meaning you have symptoms of withdrawal when the medication is stopped. Withdrawal symptoms can include nausea, sweating, chills, diarrhea, stomach cramps, and muscle aches. Withdrawal can last up to several weeks, depending on which drug you took and how long you took it. · Increased sensitivity to pain · Constipation · Nausea, vomiting, and dry mouth · Sleepiness and dizziness · Confusion · Depression · Low levels of testosterone that can result in lower sex drive, energy, and strength · Itching and sweating RISKS ARE GREATER WITH:      
· History of drug misuse, substance use disorder, or overdose · Mental health conditions (such as depression or anxiety) · Sleep apnea · Older age (72 years or older) · Pregnancy Avoid alcohol while taking prescription opioids. Also, unless specifically advised by your health care provider, medications to avoid include: · Benzodiazepines (such as Xanax or Valium) · Muscle relaxants (such as Soma or Flexeril) · Hypnotics (such as Ambien or Lunesta) · Other prescription opioids KNOW YOUR OPTIONS Talk to your health care provider about ways to manage your pain that don't involve prescription opioids. Some of these options may actually work better and have fewer risks and side effects. Options may include: 
· Pain relievers such as acetaminophen, ibuprofen, and naproxen · Some medications that are also used for depression or seizures · Physical therapy and exercise · Counseling to help patients learn how to cope better with triggers of pain and stress. · Application of heat or cold compress · Massage therapy · Relaxation techniques Be Informed Make sure you know the name of your medication, how much and how often to take it, and its potential risks & side effects. IF YOU ARE PRESCRIBED OPIOIDS FOR PAIN: 
· Never take opioids in greater amounts or more often than prescribed. Remember the goal is not to be pain-free but to manage your pain at a tolerable level. · Follow up with your primary care provider to: · Work together to create a plan on how to manage your pain. · Talk about ways to help manage your pain that don't involve prescription opioids. · Talk about any and all concerns and side effects. · Help prevent misuse and abuse. · Never sell or share prescription opioids · Help prevent misuse and abuse. · Store prescription opioids in a secure place and out of reach of others (this may include visitors, children, friends, and family). · Safely dispose of unused/unwanted prescription opioids: Find your community drug take-back program or your pharmacy mail-back program, or flush them down the toilet, following guidance from the Food and Drug Administration (www.fda.gov/Drugs/ResourcesForYou). · Visit www.cdc.gov/drugoverdose to learn about the risks of opioid abuse and overdose.  
· If you believe you may be struggling with addiction, tell your health care provider and ask for guidance or call [employer] at 9-994-757-HDFI. Discharge Instructions Avelina Herrera M.D. 
         Knee & Shoulder Surgery Sports Medicine [facility] Surgery: Total Shoulder Replacement Surgeon: Xiao Massey MD 
Surgery Date:  6/7/2018 Going Home from the [place] can let your arm hang loosely by your side, but avoid actively lifting the surgical arm. You may perform [place]man dangles and shoulder blade pinching exercises as instructed. Do not put weight on the affected arm. Do not lean on it, and do not hold objects with that hand. In general for the first week keep arm in sling, rest, perform simple stretching exercises and ice your shoulder. Common Post-op Concerns Hand swelling: Adjust sling, squeeze a stress ball, do biceps curls to help pump  the fluid out of your arm. Call the office if severe and we can see you to wrap  your hand/arm and send you for hand therapy. Bruising: Very common and will subside over 2-3 weeks. Nausea: If you have severe nausea call the office and a medicine can be called  in. Often times reducing pain medicine doses may provide relief. Fever: Somewhat common the first 3 days after surgery, take Tylenol Sling You should wear the sling most of the time until further instructions at your follow up appointment. You may need to adjust the front shoulder strap so that your hand is slightly above your elbow, this will prevent some of the hand swelling that is common after surgery. You may remove the sling when sitting down and allow your arm to rest in your lap. You may take the sling off to shower. To relieve pain: ? Use ice/gel packs. ? Put the ice pack directly over the wound, or anywhere you are hurting or swollen. ?  To control pain and swelling, keep ice on regularly, especially after physical activity. ? The packs should stay cold for 3-4 hours. When it is not cold anymore, rotate with the packs in the freezer. ?  
? Rest for at least 20 minutes between activity or exercises. ? You will be sent home with 2 different pain medicines. We recommend using hydromorphone first.  If this medicine makes you feel nauseous or does not adequately control her pain then stopped using this medicine, throw away, and use oxycodone. ? To keep track of your pain medications, write down what you take and when you take it. ? The last dose of pain medication you got in the hospital was:    
 
Medication Dose Date & Time ? Choose your medications based on the pain scale below: ? To keep your pain under control, take Tylenol every 6 hours for 14 days - even if you feel like you dont need it. ? For mild to moderate pain (1-6 on pain scale), take one pain pill every 3-4 hours as needed. ? For severe pain (7-10 on pain scale), take two pain pills every 3-4 hours as needed. ? To prevent nausea, take your pain medications with food. Pain Scale ? As your pain lessens: 
 
? Slowly start taking less pain medication. You may do this by waiting longer between doses or by taking smaller doses. ? Stop using the pain medications as soon as you no longer need it, usually in 2-3 weeks. ? Generally after shoulder surgery, ambulation or walking around and being active is the best prevention for blood clots. ? If you are at risk for blood clots due to your inability to walk around or have had blood clots in the past you may take Aspirin 325 mg once a day for 2-3 weeks to lower the chance of developing a blood clot. ? To prevent stomach upset or bleeding: ? Do not take non-steroidal anti-inflammatory medications (Ibuprofen, Advil, Motrin, Naproxen, etc.) ? Take Pepcid 20 mg twice a day, or a similar home medication, while you are taking a blood thinner. OPSITE (Honeycomb dressing) ? Keep your clear, waterproof dressing in place for 5-7 days after your leave the hospital. 
 
? If you are still having drainage, you will need to change your dressing in 5-7 days. You will be given one extra dressing to use at home. ? If there is no more drainage from the wound, you may leave it open to the air. ? Your staples will be removed at your 1st postop appointment usually about 10 days after surgery. OPSITE DRESSING INSTRUCTIONS ? To increase and promote healing: 
? Stop Smoking (or at least cut back on 
     Smoking). ? Eat a well-balanced diet (high in protein 
     and vitamin C). ? If you have a poor appetite, drink Ensure, Glucerna, or  
      [place] Instant Breakfast for the next 30 days. ? If you are diabetic, you should control your blood  
      sugars to prevent infection and help your wound  
      to heal. 
               
 
 
 
? To prevent constipation, stay active and drink plenty of fluid. ? While using pain medications, you should also take stool softeners and laxatives, such as Pericolace and Miralax. ? If you are having too many bowel Movements, then you may need 
     to stop taking the laxatives. ? You should have a bowel movement 3-4 days  
     after surgery and then at least every other day while 
     on pain medication. ? Take short walks (in your home)  
      about every 1-2 hours during the day. ? Try to increase how far you walk each day. ? NO DRIVING until your surgeon tells you it is ok. Rehabilitation Healing is the main focus during the early phase of your rehabilitation. This means protecting the shoulder and only performing elbow/hand/wrist exercises to prevent stiffness. · Weeks 0-3:  You should begin dangle exercises the day after surgery. Work at this for 5 repetitions about 5 times a day. You may also begin active-assisted forward flexion at this time by starting to lift your operative arm with your good arm there to help push it up. At this time avoid moving your arm out to the side or externally rotating. · Weeks 3-6: You may begin active forward flexion at this time and continue using your other arm to help raise the operative arm. You may be set up to see a physical therapist to work on your range of motion at this time. · Weeks 6-12: Focus is on actively lifting your arm and achieving full range of motion and beginning with some light weight lifting. You may now begin externally rotating your arm and gently strengthening. At 2 months weight bearing on the arm is now permitted for the use of walkers, pushing up out of a chair, etc.  
 
 
? Please call your physician immediately if you have: 
 
? Constant bleeding from your wound. ? Increasing redness or swelling around your wound (Some warmth, bruising and swelling is normal). ? Change in wound drainage (increase in amount, color, or bad smell). ? Change in mental status (unusual behavior ? Temperature over 101.5 degrees Fahrenheit ? Pain or redness in the calf (back of your lower leg  see picture) ? Increased swelling of the thigh, ankle, calf, or foot. ? Emergency: CALL 911 if you have: 
 
? Shortness of breath ? Chest pain when you cough or taking a deep breath A follow up appointment card will be given to you or your family member after the surgery. If you are not aware of your follow up time or lost the card, please call the office at [phone]. 
              
 
? If you have questions or concerns during normal business hours, you may reach Dr. Valeriy Erickson team at [phone].  
 
If you have immediate concerns or questions you may call the office and reach Dr Eliazar Villalobos or another [address] [place] [address] provider who is on call. [phone] ACO Transitions of Care [facility] a voluntary care coordination program to provide high quality service and care to [facility] fee-for-service beneficiaries. Kasey Menjivar was designed to help you enhance your health and well-being through the following services: ? Transitions of Care  support for individuals who are transitioning from one care setting to another (example: Hospital to home). ? Chronic and Complex Care Coordination  support for individuals and caregivers of those with serious or chronic illnesses or with more than one chronic (ongoing) condition and those who take a number of different medications. If you meet specific medical criteria, a [address] [place] [address] Rd may call you directly to coordinate your care with your primary care physician and your other care providers. For questions about the [facility] programs, please, contact your physicians office. For general questions or additional information about Accountable Care Organizations: 
Please visit www.medicare.gov/acos. html or call 1-800-MEDICARE (9-794.287.5962) TTY users should call 7-872.336.6743. [employer] Announcement We are excited to announce that we are making your provider's discharge notes available to you in [employer]. You will see these notes when they are completed and signed by the physician that discharged you from your recent hospital stay. If you have any questions or concerns about any information you see in [employer], please call the Health Information Department where you were seen or reach out to your Primary Care Provider for more information about your plan of care. Introducing [facility]! Dear Master: Thank you for requesting a Tamion account. Our records indicate that you already have an active Tamion account. You can access your account anytime at [email or web address] Did you know that you can access your hospital and ER discharge instructions at any time in Tamion? You can also review all of your test results from your hospital stay or ER visit. Additional Information If you have questions, please visit the Frequently Asked Questions section of the Tamion website at [email or web address] Remember, Tamion is NOT to be used for urgent needs. For medical emergencies, dial 911. Now available from your iPhone and Android! Introducing Andre Connor As a [employer] patient, I wanted to make you aware of our electronic visit tool called Andre Connor. [employer] 24/7 allows you to connect within minutes with a medical provider 24 hours a day, seven days a week via a mobile device or tablet or logging into a secure website from your computer. You can access Andre Pereirafin from anywhere in the [place]. A virtual visit might be right for you when you have a simple condition and feel like you just dont want to get out of bed, or cant get away from work for an appointment, when your regular [employer] provider is not available (evenings, weekends or holidays), or when youre out of town and need minor care. Electronic visits cost only $49 and if the [employer] 24/7 provider determines a prescription is needed to treat your condition, one can be electronically transmitted to a nearby pharmacy*. Please take a moment to enroll today if you have not already done so. The enrollment process is free and takes just a few minutes. To enroll, please download the [employer] 24/7 mulu to your tablet or phone, or visit [email or web address] to enroll on your computer. And, as an [address] [place] [address] patient with a [employer] account, the results of your visits will be scanned into your electronic medical record and your primary care provider will be able to view the scanned results. We urge you to continue to see your regular [facility] provider for your ongoing medical care. And while your primary care provider may not be the one available when you seek a Andre Pereirafin virtual visit, the peace of mind you get from getting a real diagnosis real time can be priceless. For more information on Andre frentsmoisefin, view our Frequently Asked Questions (FAQs) at [email or web address]. Sincerely, 
 
Bill Cueva MD 
Chief Medical Officer [facility] Kendal Bryant *:  certain medications cannot be prescribed via [employer] Providers Seen During Your Hospitalization Provider Specialty Primary office phone Feroz Bolden MD Orthopedic Surgery [phone] Your Primary Care Physician (PCP) Primary Care Physician Office Phone Office Fax Alexadnrea Alonso [phone] You are allergic to the following Allergen Reactions Latex Swelling Swelling of lips says patient Beef Containing Products Nausea and Vomiting  
 diarrhea Milk Nausea and Vomiting  
 diarrhea Pork Derived (Porcine) Nausea and Vomiting  
 diarrhea Sulfa (Sulfonamide Antibiotics) Hives Xarelto (Rivaroxaban) Other (comments) GI problems Recent Documentation Height Weight BMI OB Status Smoking Status 1.651 m 105.2 kg 38.61 kg/m2 Hysterectomy Never Smoker Emergency Contacts Name Discharge Info Relation Home Work Mobile Bill Gardner DISCHARGE CAREGIVER [3] Spouse [3] [phone] Patient Belongings  The following personal items are in your possession at time of discharge: 
  Dental Appliances: None  Visual Aid: Glasses      Home Medications: None Jewelry: None  Clothing: Other (comment) (clothing in belongings bag)    Other Valuables: Eyeglasses (in PACU) Please provide this summary of care documentation to your next provider. Signatures-by signing, you are acknowledging that this After Visit Summary has been reviewed with you and you have received a copy. Patient Signature:  ____________________________________________________________ Date:  ____________________________________________________________  
  
[facility] Provider Signature:  ____________________________________________________________ Date:  ____________________________________________________________

## 2018-06-07 NOTE — PERIOP NOTES
at bedside. Prescriptions for oxycodone and zofran given to  along with PT referrel, follow up and discharge instructions.

## 2018-06-07 NOTE — ANESTHESIA PREPROCEDURE EVALUATION
Anesthetic History   No history of anesthetic complications            Review of Systems / Medical History  Patient summary reviewed, nursing notes reviewed and pertinent labs reviewed    Pulmonary        Sleep apnea (uses mouthpiece): No treatment           Neuro/Psych         Headaches (migraines) and psychiatric history    Comments: Bipolar Affective d/o Cardiovascular    Hypertension: well controlled        Dysrhythmias : atrial fibrillation  Pacemaker ( PM for SSS) and hyperlipidemia    Exercise tolerance: <4 METS  Comments: Not pacemaker dependent   GI/Hepatic/Renal     GERD ( occasional only)          Comments: Colon polyps Endo/Other      Hypothyroidism: well controlled  Morbid obesity and arthritis     Other Findings   Comments: Vitamin D Deficiency  Urge incontinence  Hemispheric carotid artery syndrome   Osteoarthritis of right shoulder           Physical Exam    Airway  Mallampati: I  TM Distance: 4 - 6 cm  Neck ROM: normal range of motion   Mouth opening: Normal     Cardiovascular  Regular rate and rhythm,  S1 and S2 normal,  no murmur, click, rub, or gallop  Rhythm: regular  Rate: normal      Pertinent negatives: No murmur   Dental  No notable dental hx       Pulmonary  Breath sounds clear to auscultation               Abdominal  GI exam deferred       Other Findings            Anesthetic Plan    ASA: 3  Anesthesia type: general    Monitoring Plan: BIS  Post-op pain plan if not by surgeon: peripheral nerve block single    Induction: Intravenous  Anesthetic plan and risks discussed with: Patient

## 2018-06-07 NOTE — BRIEF OP NOTE
BRIEF OPERATIVE NOTE    Date of Procedure: 6/7/2018   Preoperative Diagnosis: OSTEOARTHRITIS RIGHT SHOULDER  Postoperative Diagnosis: OSTEOARTHRITIS RIGHT SHOULDER    Procedure(s):  RIGHT REVERSE SHOULDER ARTHROPLASTY AXILLARY NERVE NEUROPLASTY, ROTATOR CUFF REPAIR, BICEPS TENODESIS    Surgeon(s) and Role: Mary Knapp MD - Primary         Surgical Assistant: Bradley Garza PA-C  - Assist     Surgical Staff:  Circ-1: Kam Pearl RN  Circ-Relief: Mary Nava RN  Scrub Tech-1: Grace Mckeon  Scrub RN-1: Jovon Hurst RN  Event Time In   Incision Start 1136   Incision Close 1234     Anesthesia: General   Estimated Blood Loss: 60cc  Specimens: * No specimens in log *   Findings: see above   Complications: none  Implants:   Implant Name Type Inv.  Item Serial No.  Lot No. LRB No. Used Action   CEMENT BNE [place] 40/20GM -- FORMERLY [employer] - [id number] - SN/A  CEMENT BNE COLBALT 40/20GM -- FORMERLY [employer] - [id number] N/A [employer] [id number] Right 2 Implanted   BASEPLT GLENOID REVER 6.5X30MM --  - SN/A  BASEPLT GLENOID REVER 6.5X30MM --  N/A [employer] [id number] Right 1 Implanted   HEAD GLENOID REVERSE SHOULDER --  - SN/A  HEAD GLENOID REVERSE SHOULDER --  N/A [employer] [id number] Right 1 Implanted   RSTRCTR YIN BNE BIOABSRB 10MM --  - SN/A  RSTRCTR YIN BNE BIOABSRB 10MM --  N/A [employer] [id number] Right 1 Implanted   SCR LCK GLENOID REVERSE BA --  - SN/A  SCR LCK GLENOID REVERSE BA --  N/A [employer] [id number] Right 1 Implanted   SCR LCK GLENOID REVERSE BA --  - SN/A  SCR LCK GLENOID REVERSE BA --  N/A [employer] [id number] Right 1 Implanted   RSP Monoblock Stem Size 8 Other  N/A [employer] [id number] Right 1 Implanted   SCR LCK GLENOID REVERSE BA --  - SN/A  SCR LCK GLENOID REVERSE BA --  N/A [employer] [id number] Right 1 Implanted   SCR LCK GLENOID REVERSE BA --  - SN/A  SCR LCK GLENOID REVERSE BA --  N/A [employer] [id number] Right 1 Implanted   RSP Humeral Socket Insert 32mm Other   N/A [employer] [id number] Right 1 Implanted

## 2018-06-07 NOTE — ANESTHESIA PROCEDURE NOTES
Peripheral Block    Performed by: Ariel Vaca  Authorized by: Ariel Vaca       Pre-procedure: Indications: at surgeon's request and post-op pain management    Preanesthetic Checklist: risks and benefits discussed, site marked and timeout performed      Block Type:   Block Type:   Interscalene  Monitoring:  Standard ASA monitoring, continuous pulse ox, frequent vital sign checks, heart rate, responsive to questions and oxygen  Injection Technique:  Single shot  Procedures: nerve stimulator    Patient Position: supine  Prep: DuraPrep    Location:  Interscalene  Needle Type:  Stimuplex  Needle Gauge:  22 G  Needle Localization:  Nerve stimulator and ultrasound guidance  Motor Response: minimal motor response >0.4 mA    Medication Injected:  0.5%  ropivacaine  Adds:  Epi 1:200K  Volume (mL):  20  Add'l Medication Injected:  2.0%  lidocaine  Adds:  Epi 1:200K  Volume (mL):  20    Assessment:  Number of attempts:  1  Injection Assessment:  Incremental injection every 5 mL, negative aspiration for CSF, negative aspiration for blood, no paresthesia and no intravascular symptoms  Patient tolerance:  Patient tolerated the procedure well with no immediate complications

## 2018-06-07 NOTE — PERIOP NOTES
TRANSFER - OUT REPORT:    Verbal report given to Kristen DIAMOND on Burnice Quick  being transferred to [phone] for routine post - op       Report consisted of patients Situation, Background, Assessment and   Recommendations(SBAR). Information from the following report(s) OR Summary, Procedure Summary, Intake/Output and MAR was reviewed with the receiving nurse. Opportunity for questions and clarification was provided.       Patient transported with:   O2 @ 2 liters  Registered Nurse  [facility]

## 2018-06-07 NOTE — ANESTHESIA POSTPROCEDURE EVALUATION
Post-Anesthesia Evaluation and Assessment    Patient: Francis Mancia MRN: [id number]  SSN: [id number]    YOB: 1950  Age: 70 y.o. Sex: female       Cardiovascular Function/Vital Signs  Visit Vitals    /50    Pulse 75    Temp 36.4 °C (97.5 °F)    Resp 22    Ht 5' 5\" (1.651 m)    Wt 105.2 kg (232 lb)    SpO2 97%    BMI 38.61 kg/m2       Patient is status post general anesthesia for Procedure(s):  RIGHT REVERSE SHOULDER ARTHROPLASTY AXILLARY NERVE NEUROPLASTY, ROTATOR CUFF REPAIR, BICEPS TENODESIS  . Nausea/Vomiting: None    Postoperative hydration reviewed and adequate. Pain:  Pain Scale 1: Numeric (0 - 10) (06/07/18 1404)  Pain Intensity 1: 2 (06/07/18 1404)   Managed    Neurological Status:   Neuro (WDL): Exceptions to WDL (06/07/18 1250)  Neuro  RUE Motor Response:  (interscalene block) (06/07/18 1250)   At baseline    Mental Status and Level of Consciousness: Arousable    Pulmonary Status:   O2 Device: Nasal cannula (06/07/18 1253)   Adequate oxygenation and airway patent    Complications related to anesthesia: None    Post-anesthesia assessment completed.  No concerns    Signed By: Crescencio Marquez MD     June 7, 2018

## 2018-06-07 NOTE — PERIOP NOTES
Dr. Hakeem Herrera performed right interscalene block in preop using nerve stimulator. Pt on CM x3, 02 by NC at 2L, patient responsive when spoken to. Able to answer questions appropriately. Pt did receive 5 mg versed given by Dr. Umer Aldridge for sedation. Pt tolerated procedure well.  VSS and will continue to monitor

## 2018-06-08 LAB
ANION GAP SERPL CALC-SCNC: 8 MMOL/L (ref 5–15)
BUN SERPL-MCNC: 20 MG/DL (ref 6–20)
BUN/CREAT SERPL: 25 (ref 12–20)
CALCIUM SERPL-MCNC: 8.3 MG/DL (ref 8.5–10.1)
CHLORIDE SERPL-SCNC: 113 MMOL/L (ref 97–108)
CO2 SERPL-SCNC: 20 MMOL/L (ref 21–32)
CREAT SERPL-MCNC: 0.8 MG/DL (ref 0.55–1.02)
ERYTHROCYTE [DISTWIDTH] IN BLOOD BY AUTOMATED COUNT: 13.9 % (ref 11.5–14.5)
GLUCOSE SERPL-MCNC: 110 MG/DL (ref 65–100)
HCT VFR BLD AUTO: 34.5 % (ref 35–47)
HGB BLD-MCNC: 10.9 G/DL (ref 11.5–16)
MCH RBC QN AUTO: 30.6 PG (ref 26–34)
MCHC RBC AUTO-ENTMCNC: 31.6 G/DL (ref 30–36.5)
MCV RBC AUTO: 96.9 FL (ref 80–99)
NRBC # BLD: 0 K/UL (ref 0–0.01)
NRBC BLD-RTO: 0 PER 100 WBC
PLATELET # BLD AUTO: 174 K/UL (ref 150–400)
PMV BLD AUTO: 10.7 FL (ref 8.9–12.9)
POTASSIUM SERPL-SCNC: 4.2 MMOL/L (ref 3.5–5.1)
RBC # BLD AUTO: 3.56 M/UL (ref 3.8–5.2)
SODIUM SERPL-SCNC: 141 MMOL/L (ref 136–145)
WBC # BLD AUTO: 7.2 K/UL (ref 3.6–11)

## 2018-06-08 PROCEDURE — 74011250637 HC RX REV CODE- 250/637: Performed by: PHYSICIAN ASSISTANT

## 2018-06-08 PROCEDURE — 80048 BASIC METABOLIC PNL TOTAL CA: CPT | Performed by: ORTHOPAEDIC SURGERY

## 2018-06-08 PROCEDURE — 36415 COLL VENOUS BLD VENIPUNCTURE: CPT | Performed by: ORTHOPAEDIC SURGERY

## 2018-06-08 PROCEDURE — 97161 PT EVAL LOW COMPLEX 20 MIN: CPT

## 2018-06-08 PROCEDURE — 97166 OT EVAL MOD COMPLEX 45 MIN: CPT | Performed by: OCCUPATIONAL THERAPIST

## 2018-06-08 PROCEDURE — G8987 SELF CARE CURRENT STATUS: HCPCS | Performed by: OCCUPATIONAL THERAPIST

## 2018-06-08 PROCEDURE — 97535 SELF CARE MNGMENT TRAINING: CPT | Performed by: OCCUPATIONAL THERAPIST

## 2018-06-08 PROCEDURE — G8979 MOBILITY GOAL STATUS: HCPCS

## 2018-06-08 PROCEDURE — 65270000029 HC RM PRIVATE

## 2018-06-08 PROCEDURE — G8988 SELF CARE GOAL STATUS: HCPCS | Performed by: OCCUPATIONAL THERAPIST

## 2018-06-08 PROCEDURE — G8978 MOBILITY CURRENT STATUS: HCPCS

## 2018-06-08 PROCEDURE — 74011250636 HC RX REV CODE- 250/636: Performed by: PHYSICIAN ASSISTANT

## 2018-06-08 PROCEDURE — 97110 THERAPEUTIC EXERCISES: CPT | Performed by: OCCUPATIONAL THERAPIST

## 2018-06-08 PROCEDURE — 85027 COMPLETE CBC AUTOMATED: CPT | Performed by: ORTHOPAEDIC SURGERY

## 2018-06-08 PROCEDURE — 97530 THERAPEUTIC ACTIVITIES: CPT

## 2018-06-08 RX ORDER — POLYETHYLENE GLYCOL 3350 17 G/17G
17 POWDER, FOR SOLUTION ORAL
Qty: 15 PACKET | Refills: 0 | Status: SHIPPED | OUTPATIENT
Start: 2018-06-08 | End: 2018-06-23

## 2018-06-08 RX ORDER — ACETAMINOPHEN 500 MG
1000 TABLET ORAL EVERY 6 HOURS
Qty: 112 TAB | Refills: 0 | Status: SHIPPED
Start: 2018-06-08 | End: 2018-06-22

## 2018-06-08 RX ORDER — OXYCODONE HYDROCHLORIDE 5 MG/1
5 TABLET ORAL
Qty: 1 TAB | Refills: 0 | Status: SHIPPED
Start: 2018-06-08 | End: 2019-05-17

## 2018-06-08 RX ORDER — AMOXICILLIN 250 MG
1 CAPSULE ORAL DAILY
Qty: 30 TAB | Refills: 0 | Status: SHIPPED | OUTPATIENT
Start: 2018-06-08 | End: 2020-04-07 | Stop reason: ALTCHOICE

## 2018-06-08 RX ORDER — NALOXONE HYDROCHLORIDE 4 MG/.1ML
1 SPRAY NASAL AS NEEDED
Qty: 1 EACH | Refills: 0 | Status: SHIPPED | OUTPATIENT
Start: 2018-06-08 | End: 2019-03-05

## 2018-06-08 RX ADMIN — ACETAMINOPHEN 1000 MG: 500 TABLET, FILM COATED ORAL at 00:24

## 2018-06-08 RX ADMIN — Medication 10 ML: at 05:16

## 2018-06-08 RX ADMIN — Medication 10 ML: at 00:25

## 2018-06-08 RX ADMIN — KETOROLAC TROMETHAMINE 15 MG: 30 INJECTION, SOLUTION INTRAMUSCULAR at 00:26

## 2018-06-08 RX ADMIN — ACETAMINOPHEN 1000 MG: 500 TABLET, FILM COATED ORAL at 05:16

## 2018-06-08 RX ADMIN — DABIGATRAN ETEXILATE MESYLATE 150 MG: 150 CAPSULE ORAL at 17:43

## 2018-06-08 RX ADMIN — KETOROLAC TROMETHAMINE 15 MG: 30 INJECTION, SOLUTION INTRAMUSCULAR at 05:16

## 2018-06-08 RX ADMIN — KETOROLAC TROMETHAMINE 15 MG: 30 INJECTION, SOLUTION INTRAMUSCULAR at 11:25

## 2018-06-08 RX ADMIN — Medication 10 ML: at 11:26

## 2018-06-08 RX ADMIN — LAMOTRIGINE 50 MG: 25 TABLET ORAL at 08:22

## 2018-06-08 RX ADMIN — PREGABALIN 200 MG: 150 CAPSULE ORAL at 17:37

## 2018-06-08 RX ADMIN — OXYCODONE HYDROCHLORIDE 5 MG: 5 TABLET ORAL at 08:22

## 2018-06-08 RX ADMIN — STANDARDIZED SENNA CONCENTRATE AND DOCUSATE SODIUM 1 TABLET: 8.6; 5 TABLET, FILM COATED ORAL at 08:22

## 2018-06-08 RX ADMIN — STANDARDIZED SENNA CONCENTRATE AND DOCUSATE SODIUM 1 TABLET: 8.6; 5 TABLET, FILM COATED ORAL at 17:37

## 2018-06-08 RX ADMIN — OXYCODONE HYDROCHLORIDE 10 MG: 5 TABLET ORAL at 02:02

## 2018-06-08 RX ADMIN — PREGABALIN 200 MG: 150 CAPSULE ORAL at 08:22

## 2018-06-08 RX ADMIN — Medication 10 ML: at 21:27

## 2018-06-08 RX ADMIN — ACETAMINOPHEN 1000 MG: 500 TABLET, FILM COATED ORAL at 17:37

## 2018-06-08 RX ADMIN — TOPIRAMATE 200 MG: 100 TABLET, FILM COATED ORAL at 21:27

## 2018-06-08 RX ADMIN — FAMOTIDINE 20 MG: 20 TABLET, FILM COATED ORAL at 17:37

## 2018-06-08 RX ADMIN — FAMOTIDINE 20 MG: 20 TABLET, FILM COATED ORAL at 08:23

## 2018-06-08 RX ADMIN — POLYETHYLENE GLYCOL 3350 17 G: 17 POWDER, FOR SOLUTION ORAL at 08:22

## 2018-06-08 RX ADMIN — SODIUM CHLORIDE 100 ML/HR: 900 INJECTION, SOLUTION INTRAVENOUS at 00:25

## 2018-06-08 RX ADMIN — DABIGATRAN ETEXILATE MESYLATE 150 MG: 150 CAPSULE ORAL at 08:24

## 2018-06-08 RX ADMIN — LITHIUM CARBONATE 300 MG: 300 TABLET ORAL at 21:27

## 2018-06-08 RX ADMIN — LITHIUM CARBONATE 300 MG: 300 TABLET ORAL at 08:23

## 2018-06-08 RX ADMIN — LEVOTHYROXINE SODIUM 50 MCG: 50 TABLET ORAL at 07:35

## 2018-06-08 RX ADMIN — OXYCODONE HYDROCHLORIDE 5 MG: 5 TABLET ORAL at 05:16

## 2018-06-08 NOTE — DISCHARGE SUMMARY
Ortho Discharge Summary    Patient ID:  Iris Wills  [id number]  female  70 y.o.  1950    Admit date: 6/7/2018    Discharge date: 6/8/2018    Admitting Physician: Jamila Aguirre MD     Consulting Physician(s):   Treatment Team: Attending Provider: Jamila Aguirre MD; Utilization Review: Francheska Dos Santos RN; Nurse Practitioner: George Barton NP    Date of Surgery:   6/7/2018     Preoperative Diagnosis:  OSTEOARTHRITIS RIGHT SHOULDER    Postoperative Diagnosis:   OSTEOARTHRITIS RIGHT SHOULDER    Procedure(s):     RIGHT REVERSE SHOULDER ARTHROPLASTY AXILLARY NERVE NEUROPLASTY, ROTATOR CUFF REPAIR, BICEPS TENODESIS       Anesthesia Type:   General     Surgeon: Jamila Aguirre MD                            HPI:  Pt is a 70 y.o. female who has a history of OSTEOARTHRITIS RIGHT SHOULDER  with pain and limitations of activities of daily living who presents at this time for a right reverse TSDA following the failure of conservative management.     PMH:   Past Medical History:   Diagnosis Date    Arrhythmia 2014    atrial fibrillation 2014, sick sinus syndrome: [facility]    Arthritis     Bipolar affective disorder ([facility] [address] 3/31/2010    Chronic pain 2018    right shoulder    Colon polyps 03/31/2010    also 5/2018: colon polyps    Depression     Diarrhea 07/19/2013    D/t alpha gal allergy says patient    Epigastric pain 6/5/2015    GERD (gastroesophageal reflux disease)     occasiional only; controlled with med    Hemispheric carotid artery syndrome No stroke    Neuro: Reny Wick  ([facility] scan head/neck [facility] 4/[facility])    Hyperlipidemia 3/31/2010    Hypothyroidism 3/31/2010    Long term current use of anticoagulant therapy     Migraine 03/31/2010    has them x2 a month: last one 5/23/2018    Psychotic disorder     S/P ablation of atrial fibrillation 05/13/2014    S/P cardiac pacemaker procedure 9/16/2014 9/16/14 [employer] MRI compatible dual chamber pacemaker implant    Sleep apnea     unable to tolerate CPAP    Stool color black     Thyroid disease     Tick-borne disease     Alpha Gal allergy: no eat pork, beef, milk (Meat allergy showed up on a allergy test)    Urge incontinence 3/31/2010    Vitamin D deficiency 3/31/2015       Body mass index is 38.61 kg/(m^2). : A BMI > 30 is classified as obesity and > 40 is classified as morbid obesity. Medications upon admission :   Prior to Admission Medications   Prescriptions Last Dose Informant Patient Reported? Taking? HYDROcodone-acetaminophen (NORCO) 5-325 mg per tablet 2018 at Unknown time  No Yes   Sig: Take 1 Tab by mouth every eight (8) hours as needed for Pain. Max Daily Amount: 3 Tabs. PRADAXA 150 mg capsule 6/3/2018  No No   Sig: TAKE 1 CAPSULE BY MOUTH TWICE DAILY   acetaminophen (TYLENOL EXTRA STRENGTH) 500 mg tablet 2018 at Unknown time  Yes Yes   Sig: Take 1,000 mg by mouth two (2) times daily as needed for Pain. alprazolam (XANAX) 0.5 mg tablet 2018 at Unknown time  Yes Yes   Sig: Take 0.5 mg by mouth nightly as needed. diclofenac EC (VOLTAREN) 75 mg EC tablet 2018 at Unknown time  Yes Yes   Sig: Take 75 mg by mouth two (2) times a day. diphenoxylate-atropine (LOMOTIL) 2.5-0.025 mg per tablet 2018 at Unknown time  Yes Yes   Sig: Take  by mouth two (2) times daily as needed for Diarrhea.   ergocalciferol (ERGOCALCIFEROL) 50,000 unit capsule 2018 at Unknown time  No Yes   Sig: TAKE 1 CAP BY MOUTH EVERY SEVEN (7) DAYS.   lamoTRIgine (LAMICTAL) 25 mg tablet 2018 at Unknown time  Yes Yes   Si mg daily. Indications: BIPOLAR DISORDER IN REMISSION   levothyroxine (SYNTHROID) 50 mcg tablet 2018 at Unknown time  No Yes   Sig: TAKE 1 TABLET EVERY DAY   lithium 300 mg tablet 2018 at Unknown time  Yes Yes   Sig: Take 300 mg by mouth two (2) times a day. pregabalin (LYRICA) 200 mg capsule 2018 at Unknown time  Yes Yes   Sig: Take 200 mg by mouth two (2) times a day. Indications: Diabetic Peripheral Neuropathy   topiramate (TOPAMAX) 100 mg tablet 6/6/2018 at Unknown time  Yes Yes   Sig: Take 200 mg by mouth nightly. Facility-Administered Medications: None        Allergies: Allergies   Allergen Reactions    Latex Swelling     Swelling of lips says patient    Beef Containing Products Nausea and Vomiting     diarrhea    Milk Nausea and Vomiting     diarrhea    Pork Derived (Porcine) Nausea and Vomiting     diarrhea    Sulfa (Sulfonamide Antibiotics) Hives    Xarelto [Rivaroxaban] Other (comments)     GI problems        Hospital Course: The patient underwent surgery. Complications:  None; patient tolerated the procedure well. Was taken to the PACU in stable condition and then transferred to the ortho floor. Perioperative Antibiotics:  Ancef     Postoperative Pain Management:  Oxycodone      Postoperative transfusions:    Number of units banked PRBCs =   none     Post Op complications: none    Hemoglobin at discharge:    Lab Results   Component Value Date/Time    HGB 10.9 (L) 06/08/2018 04:12 AM    INR 1.2 (H) 05/30/2018 11:45 AM       Dressing remained clean, dry and intact. No significant erythema but moderate swelling and bruising noted to upper arm. Neurovascular exam found to be within normal limits. Wound appears to be healing without any evidence of infection. Occupational Therapy started on the day following surgery and participated in bed mobility, transfers and ambulation. Discharged to: Home. Condition on Discharge:   stable    Discharge instructions:  - Take pain medications as prescribed  - Resume pre hospital diet      - Discharge activity: activity as tolerated  - Wound Care Keep wound clean and dry. See discharge instruction sheet.             -DISCHARGE MEDICATION LIST     Current Discharge Medication List      START taking these medications    Details   oxyCODONE IR (ROXICODONE) 5 mg immediate release tablet Take 1 Tab by mouth every four (4) hours as needed. Max Daily Amount: 30 mg.  Qty: 1 Tab, Refills: 0    Comments: Rx already provided by Office  Associated Diagnoses: S/P reverse total shoulder arthroplasty, right      naloxone (NARCAN) 4 mg/actuation nasal spray 1 [place] by IntraNASal route as needed (respiratory depression). Give single spray into one nostril. Call 911. Give additional doses every 2 to 3 minutes alternating nostrils until assistance arrives using a new nasal spray with each dose, if patient does not respond or responds and then relapses. Qty: 1 Each, Refills: 0      polyethylene glycol (MIRALAX) 17 gram packet Take 1 Packet by mouth daily as needed (constipation) for up to 15 days. Qty: 15 Packet, Refills: 0      senna-docusate (PERICOLACE) 8.6-50 mg per tablet Take 1 Tab by mouth daily. Qty: 30 Tab, Refills: 0         CONTINUE these medications which have CHANGED    Details   acetaminophen (TYLENOL) 500 mg tablet Take 2 Tabs by mouth every six (6) hours for 14 days. Qty: 112 Tab, Refills: 0         CONTINUE these medications which have NOT CHANGED    Details   diphenoxylate-atropine (LOMOTIL) 2.5-0.025 mg per tablet Take  by mouth two (2) times daily as needed for Diarrhea. pregabalin (LYRICA) 200 mg capsule Take 200 mg by mouth two (2) times a day. Indications: Diabetic Peripheral Neuropathy      diclofenac EC (VOLTAREN) 75 mg EC tablet Take 75 mg by mouth two (2) times a day. lamoTRIgine (LAMICTAL) 25 mg tablet 50 mg daily. Indications: BIPOLAR DISORDER IN REMISSION      ergocalciferol (ERGOCALCIFEROL) 50,000 unit capsule TAKE 1 CAP BY MOUTH EVERY SEVEN (7) DAYS. Qty: 12 Cap, Refills: 3    Associated Diagnoses: Vitamin D deficiency      levothyroxine (SYNTHROID) 50 mcg tablet TAKE 1 TABLET EVERY DAY  Qty: 90 Tab, Refills: 3      topiramate (TOPAMAX) 100 mg tablet Take 200 mg by mouth nightly. lithium 300 mg tablet Take 300 mg by mouth two (2) times a day.       alprazolam (XANAX) 0.5 mg tablet Take 0.5 mg by mouth nightly as needed. PRADAXA 150 mg capsule TAKE 1 CAPSULE BY MOUTH TWICE DAILY  Qty: 60 Cap, Refills: 0         STOP taking these medications       HYDROcodone-acetaminophen (NORCO) 5-325 mg per tablet Comments:   Reason for Stopping:            per medical continuation form      -Follow up in office in 2 weeks      Signed:  BON DardenP-BC, ONP-C  Orthopaedic Nurse Practitioner    6/8/2018  10:06 AM

## 2018-06-08 NOTE — ROUTINE PROCESS
Bedside and Verbal shift change report given to Arnulfo Moralse (oncoming nurse) by Teodora Guevara RN (offgoing nurse). Report included the following information SBAR, Kardex, Intake/Output, MAR and Recent Results.

## 2018-06-08 NOTE — PROGRESS NOTES
Problem: Self Care Deficits Care Plan (Adult)  Goal: *Acute Goals and Plan of Care (Insert Text)  Occupational Therapy Goals:  Initiated 6/8/2018  1. Patient will perform upper body dressing adhering to TSR precautions with supervision/set-up within 7 days. 2. Patient will perform toileting with supervision/set-up within 7 days. 3. Patient will perform lower body dressing with supervision/set-up with AE PRN within 7 days. 4. Patient will independent with home exercise program for TSR protocol within 7 days. 5. Patient will transfer from toilet with supervision using the least restrictive device and appropriate durable medical equipment within 7 days. Occupational Therapy EVALUATION  Patient: Francis Mancia (71 y.o. female)  Date: 6/8/2018  Primary Diagnosis: OSTEOARTHRITIS RIGHT SHOULDER  Osteoarthritis of right shoulder  Procedure(s) (LRB):  RIGHT REVERSE SHOULDER ARTHROPLASTY AXILLARY NERVE NEUROPLASTY, ROTATOR CUFF REPAIR, BICEPS TENODESIS   (Right) 1 Day Post-Op   Precautions: Codman dangles. Passive and some gentle active assisted forward elevation only. No external rotation. ASSESSMENT :  Pt was seated at bedside chair upon arrival.  Pts  was also at bedside. Pt was drowsy at times and falling asleep during training today. She was noted to have had a bolus this AM for low BP but BP was stable this session at rest and with activity. The majority of the teaching was not retained by patient this session due to cognitive status. Per pts  pt takes a long time to process anesthesia out of her system. Pt made random off hand comments and needed hand over hand assist for majority of tasks today. At times pt was able to perform some tasks with supervision (wiping ivet areas seated, pulling up pants with one hand technique. Max assist to don all UB clothing with adaptive technique and educated on using button up shirt and getting assist with bra.   Pt needed hand over hand assist for all UB dressing due to decreased attention. Mobilized down simon with multiple path deviations and 4 losses of balance with min assist to correct. Pt reported a fall over the past 6 months with left foot deficits. Pt has steps with right sided rail only. Requested PT eval from ortho NP and informed PT of consult. Pt should do well once anethesia has worn off. Recommend home health with 24/7 assist at discharge. Patient will benefit from skilled intervention to address the above impairments. Patients rehabilitation potential is considered to be Good  Factors which may influence rehabilitation potential include:   []             None noted  [x]             Mental ability/status  []             Medical condition  []             Home/family situation and support systems  []             Safety awareness  []             Pain tolerance/management  []             Other:      PLAN :  Recommendations and Planned Interventions:  [x]               Self Care Training                  [x]        Therapeutic Activities  [x]               Functional Mobility Training    []        Cognitive Retraining  [x]               Therapeutic Exercises           []        Endurance Activities  [x]               Balance Training                   []        Neuromuscular Re-Education  []               Visual/Perceptual Training     [x]   Home Safety Training  [x]               Patient Education                 [x]        Family Training/Education  []               Other (comment):    Frequency/Duration: Patient will be followed by occupational therapy 4 times a week to address goals. Discharge Recommendations: Home Health with 24/7 assist  Further Equipment Recommendations for Discharge: shower chair; possible SPC (defer to PT)     SUBJECTIVE:   Patient stated Oh I am sorry.  I am not sure what you said    OBJECTIVE DATA SUMMARY:   HISTORY:   Past Medical History:   Diagnosis Date    Arrhythmia 2014    atrial fibrillation 2014, sick sinus syndrome: [facility]    Arthritis     Bipolar affective disorder ([address] 3/31/2010    Chronic pain 2018    right shoulder    Colon polyps 03/31/2010    also 5/2018: colon polyps    Depression     Diarrhea 07/19/2013    D/t alpha gal allergy says patient    Epigastric pain 6/5/2015    GERD (gastroesophageal reflux disease)     occasiional only; controlled with med    Hemispheric carotid artery syndrome No stroke    Neuro: Dillan Kurtz  (CT scan head/neck negative 4/2018 in [facility])    Hyperlipidemia 3/31/2010    Hypothyroidism 3/31/2010    Long term current use of anticoagulant therapy     Migraine 03/31/2010    has them x2 a month: last one 5/23/2018    Psychotic disorder     S/P ablation of atrial fibrillation 05/13/2014    S/P cardiac pacemaker procedure 9/16/2014 9/16/14 Medtronic MRI compatible dual chamber pacemaker implant    Sleep apnea     unable to tolerate CPAP    Stool color black     Thyroid disease     Tick-borne disease     Alpha Gal allergy: no eat pork, beef, milk (Meat allergy showed up on a allergy test)    Urge incontinence 3/31/2010    Vitamin D deficiency 3/31/2015     Past Surgical History:   Procedure Laterality Date    CARDIAC SURG PROCEDURE UNLIST  5/2014    ablation    COLONOSCOPY N/A 4/27/2018    COLONOSCOPY performed by Ashlie Harley MD at [address] [place] [address]  4/27/2018         COLONOSCOPY,[place]  4/27/2018         HX BUNIONECTOMY      HX GI  2010, 5/2018    egd, colonoscopy    HX HYSTERECTOMY      HX ORTHOPAEDIC  2000's    removed bone spurs from R & L hand    HX ORTHOPAEDIC  1990s?     left bunionectomy     HX OTHER SURGICAL  07/2014    2 shocks to heart & ablations    HX PACEMAKER  2014    On the right side    HX PACEMAKER PLACEMENT  2015    HX TONSILLECTOMY  15 yrs old       Prior Level of Function/Environment/Context: history of fall within the past 6 months; reports left foot issues and occasionally catches her toes on objects causing her to trip; ambulated without assist devices; chronic balance deficits with walking; performed ADLs without assist    Expanded or extensive additional review of patient history:     Home Situation  Home Environment: Private residence  # Steps to Enter: 4  Rails to Enter: Yes  Hand Rails : Right  One/Two Story Residence: One story  Living Alone: No  Support Systems: Family member(s), Friends \ neighbors, Spouse/Significant Other/Partner  Patient Expects to be Discharged to[de-identified] Private residence  Current DME Used/Available at Home: [employer], rolling, Grab bars  Tub or Shower Type: Shower    Hand dominance: Left    EXAMINATION OF PERFORMANCE DEFICITS:  Cognitive/Behavioral Status:  Neurologic State: Drowsy  Orientation Level: Oriented to place;Oriented to person;Oriented to situation (odd non related comments at times)  Cognition: Decreased attention/concentration;Decreased command following; Impulsive;Poor safety awareness  Perception: Appears intact  Perseveration: No perseveration noted  Safety/Judgement: Fall prevention;Decreased awareness of need for safety      Hearing: Auditory  Auditory Impairment: None    Vision/Perceptual:                                Corrective Lenses: Glasses    Range of Motion:    AROM: Generally decreased, functional (operative UE not tested)                         Strength:    Strength: Generally decreased, functional (operative extremity not tested)                Coordination:  Coordination: Within functional limits (operative Extremity)  Fine Motor Skills-Upper: Left Intact; Right Intact    Gross Motor Skills-Upper:  (operative Extremity not tested; non op WFL)    Tone & Sensation:    Tone: Normal  Sensation: Intact                      Balance:  Sitting: Intact  Standing: Impaired  Standing - Static: Fair  Standing - Dynamic : Fair    Functional Mobility and Transfers for ADLs:  Bed Mobility:  Rolling: Supervision  Supine to Sit: Supervision (educated to get up on opposite side of TSR)  Sit to Supine: Stand-by assistance (LE back to bed)  Scooting: Supervision    Transfers:  Sit to Stand: Supervision  Stand to Sit: Supervision  Bed to Chair: Minimum assistance  Toilet Transfer : Minimum assistance    ADL Assessment:  Feeding: Stand-by assistance    Oral Facial Hygiene/Grooming: Stand-by assistance    Bathing: Maximum assistance    Upper Body Dressing: Maximum assistance; Total assistance    Lower Body Dressing: Moderate assistance    Toileting: Minimum assistance                ADL Intervention and task modifications:  Educated pt and her  all THR on precautions:  Codman dangles. Passive and some gentle active assisted forward elevation only. No external rotation. Washing UB:  Patient instructed as well as  ( indicated understanding) propping surgical arm on surface, can back away from arm in order to access axilla to wash, dry, and deodorize or lean forward at waist like pendulum exercise to wash and dry with narrow part of hand to wash to prevent abduction. Informed pt and her  to not attempt pendulm type technique until pt is less confused and more awake     Donned pants/underwear one handed technique using non-operative extremity to thread. With moderate assist to thread and SBA assist to pull over right  hip. Educated pt to sit to don LB clothing and to not stand on one foot    Performed ivet care seated with supervision this session and limited full reach to mock BM clean up. Patient/ instructed and () indicated understanding dress operative UE first/undress last. Constant cues for pt not to move operative extremity towards clothing. Patient and  instructed and () indicated understanding of compensatory strategies to don sling donned and doffed sling x3 and pt needed max/total assist assist to perform.   donned pts sling with min assist. Verbal cues for pt not to move left UE at the shoulder. Educated pt to wear button up shirts and to don over affected extremity first.  Instructed pt that would need assist with fasteners and recommended they wear pants with elastic waist.       Pt does have a recliner at home and was told that  should sleep in recliner at home if this is more comfortable. Educated to have small pillow behind shoulder to prevent extension of operative shoulder when reclined in recliner or when supine in bed. Educated pt to get up on side of bed opposite of that to surgery side. Pt verbalized understanding as well as . Therapeutic Exercise: Educated pt on safe PROM lshoulder and educated to perform 2x-3 a day 10-15 reps  Issued hand out    EXERCISE   Sets   Reps   Active Active Assist   Passive   Comments   Shoulder Flexion 1 10 []               []               [x]                SROM using non op extremity to perform exercise with op extremity; pillow behind shoulder to prevent extension; max hand over hand assist and verbal cues not to actively lift arm;  also performed exercises for pt while she was supine   Pendulums: clockwise, counter clockwise, lateral standing 1 10 []               []               [x]                manual assist through hips and pt needed constant cues to keep non operative UE on surface for safety.   Verbal/tactile cues not to move operative extremity      []               []               []                constant cues to stay awake and attend to task   Elbow flexion/extension 1 10 [x]               []               []               constant cues to stay awake and attend to task   Wrist flexion/extension 1 10 [x]               []               []               constant cues to stay awake and attend to task   Finger flexion/extension  10 [x]               []               []               constant cues to stay awake and attend to task                             Cognitive Retraining  Safety/Judgement: Fall prevention;Decreased awareness of need for safety      Functional Measure:  Barthel Index:    Bathin  Bladder: 5  Bowels: 10  Groomin  Dressin  Feedin  Mobility: 10  Stairs: 0  Toilet Use: 5  Transfer (Bed to Chair and Back): 10  Total: 45       Barthel and G-code impairment scale:  Percentage of impairment CH  0% CI  1-19% CJ  20-39% CK  40-59% CL  60-79% CM  80-99% CN  100%   Barthel Score 0-100 100 99-80 79-60 59-40 20-39 1-19   0   Barthel Score 0-20 20 17-19 13-16 9-12 5-8 1-4 0      The Barthel ADL Index: Guidelines  1. The index should be used as a record of what a patient does, not as a record of what a patient could do. 2. The main aim is to establish degree of independence from any help, physical or verbal, however minor and for whatever reason. 3. The need for supervision renders the patient not independent. 4. A patient's performance should be established using the best available evidence. Asking the patient, friends/relatives and nurses are the usual sources, but direct observation and common sense are also important. However direct testing is not needed. 5. Usually the patient's performance over the preceding 24-48 hours is important, but occasionally longer periods will be relevant. 6. Middle categories imply that the patient supplies over 50 per cent of the effort. 7. Use of aids to be independent is allowed. Sowmya Kamara., Barthel, D.W. (0393). Functional evaluation: the Barthel Index. [facility] (14)2. REBECCA Anguiano Allene Guardian.Tracey.David, [address] [place] [address] (). Measuring the change indisability after inpatient rehabilitation; comparison of the responsiveness of the Barthel Index and Functional Bigelow Measure. Journal of Neurology, Neurosurgery, and Psychiatry, 66(4), 307-166.   MALIK Allen, DONELL Brooks.JERILYN, & Juan Bence, M.A. (2004.) Assessment of post-stroke quality of life in cost-effectiveness studies: The usefulness of the Barthel Index and the EuroQoL-5D. Quality of Life Research, 13, 822-14         G codes: In compliance with CMSs Claims Based Outcome Reporting, the following G-code set was chosen for this patient based on their primary functional limitation being treated: The outcome measure chosen to determine the severity of the functional limitation was the barthel with a score of 45/100 which was correlated with the impairment scale. ? Self Care:     - CURRENT STATUS: CK - 40%-59% impaired, limited or restricted    - GOAL STATUS: CJ - 20%-39% impaired, limited or restricted    - D/C STATUS:  ---------------To be determined---------------     Occupational Therapy Evaluation Charge Determination   History Examination Decision-Making   MEDIUM Complexity : Expanded review of history including physical, cognitive and psychosocial  history  MEDIUM Complexity : 3-5 performance deficits relating to physical, cognitive , or psychosocial skils that result in activity limitations and / or participation restrictions MEDIUM Complexity : Patient may present with comorbidities that affect occupational performnce. Miniml to moderate modification of tasks or assistance (eg, physical or verbal ) with assesment(s) is necessary to enable patient to complete evaluation       Based on the above components, the patient evaluation is determined to be of the following complexity level: MEDIUM  Pain:  Pain Scale 1: Numeric (0 - 10)  Pain Intensity 1: 0  Pain Location 1: Shoulder  Pain Orientation 1: Right  Pain Description 1: Aching  Pain Intervention(s) 1: Cold pack  Activity Tolerance:     Please refer to the flowsheet for vital signs taken during this treatment.   After treatment:   [] Patient left in no apparent distress sitting up in chair  [x] Patient left in no apparent distress in bed; pt falling asleep in chair  [x] Call bell left within reach  [x] Nursing notified  [x] Caregiver present  [] Bed alarm activated    COMMUNICATION/EDUCATION:   The patients plan of care was discussed with: Physical Therapist, Registered Nurse,  and pt, her  and ortho NP. [x] Home safety education was provided and the patient/caregiver indicated understanding. [x] family have participated as able in goal setting and plan of care. [x] Patient/family agree to work toward stated goals and plan of care. [] Patient understands intent and goals of therapy, but is neutral about his/her participation. [] Patient is unable to participate in goal setting and plan of care. This patients plan of care is appropriate for delegation to [facility].     Thank you for this referral.  Deidre Delgadillo OTR/L  Time Calculation: 79 mins

## 2018-06-08 NOTE — PROGRESS NOTES
Bedside shift change report given to Yazmin RN (oncoming nurse) by bird RN (offgoing nurse). Report included the following information SBAR, Kardex, OR Summary, Intake/Output, MAR and Recent Results.

## 2018-06-08 NOTE — DISCHARGE INSTRUCTIONS
Avelina Pascual M.D.           Jumatami Dixonro  Surgery: Total Shoulder Replacement  Surgeon: Sarah Danielle MD  Surgery Date:  6/7/2018      Going Home from the [address] [place] [address], [address] [place] [address] can let your arm hang loosely by your side, but avoid actively lifting the surgical arm. You may perform Codman dangles and shoulder blade pinching exercises as instructed. Do not put weight on the affected arm. Do not lean on it, and do not hold objects with that hand. In general for the first week keep arm in sling, rest, perform simple stretching exercises and ice your shoulder. Common Post-op Concerns      Hand swelling: Adjust sling, squeeze a stress ball, do biceps curls to help pump  the fluid out of your arm. Call the office if severe and we can see you to wrap  your hand/arm and send you for hand therapy. Bruising: Very common and will subside over 2-3 weeks. Nausea: If you have severe nausea call the office and a medicine can be called  in. Often times reducing pain medicine doses may provide relief. Fever: Somewhat common the first 3 days after surgery, take Tylenol    Sling  You should wear the sling most of the time until further instructions at your follow up appointment. You may need to adjust the front shoulder strap so that your hand is slightly above your elbow, this will prevent some of the hand swelling that is common after surgery. You may remove the sling when sitting down and allow your arm to rest in your lap. You may take the sling off to shower. To relieve pain:   Use ice/gel packs.  Put the ice pack directly over the wound, or anywhere you are hurting or swollen.  To control pain and swelling, keep ice on regularly, especially after physical activity.  The packs should stay cold for 3-4 hours. When it is not cold anymore, rotate with the packs in the freezer.        Rest for at least 20 minutes between activity or exercises.  You will be sent home with 2 different pain medicines. We recommend using hydromorphone first.  If this medicine makes you feel nauseous or does not adequately control her pain then stopped using this medicine, throw away, and use oxycodone.  To keep track of your pain medications, write down what you take and when you take it.  The last dose of pain medication you got in the hospital was:       Medication    Dose    Date & Time             Choose your medications based on the pain scale below:     To keep your pain under control, take Tylenol every 6 hours for 14 days - even if you feel like you dont need it.  For mild to moderate pain (1-6 on pain scale), take one pain pill every 3-4 hours as needed.  For severe pain (7-10 on pain scale), take two pain pills every 3-4 hours as needed.  To prevent nausea, take your pain medications with food. Pain Scale                   As your pain lessens:     Slowly start taking less pain medication. You may do this by waiting longer between doses or by taking smaller doses.  Stop using the pain medications as soon as you no longer need it, usually in 2-3 weeks.  Generally after shoulder surgery, ambulation or walking around and being active is the best prevention for blood clots.  If you are at risk for blood clots due to your inability to walk around or have had blood clots in the past you may take Aspirin 325 mg once a day for 2-3 weeks to lower the chance of developing a blood clot.  To prevent stomach upset or bleeding:   Do not take non-steroidal anti-inflammatory medications (Ibuprofen, Advil, Motrin, Naproxen, etc.)    Take Pepcid 20 mg twice a day, or a similar home medication, while you are taking a blood thinner.             OPSITE (Honeycomb dressing)     Keep your clear, waterproof dressing in place for 5-7 days after your leave the hospital.     If you are still having drainage, you will need to change your dressing in 5-7 days. You will be given one extra dressing to use at home.  If there is no more drainage from the wound, you may leave it open to the air.    Your staples will be removed at your 1st postop appointment usually about 10 days after surgery. OPSITE DRESSING INSTRUCTIONS                       To increase and promote healing:   Stop Smoking (or at least cut back on       Smoking).  Eat a well-balanced diet (high in protein       and vitamin C).  If you have a poor appetite, drink Ensure, Glucerna, or         [place] Instant Breakfast for the next 30 days.  If you are diabetic, you should control your blood         sugars to prevent infection and help your wound         to heal.                         To prevent constipation, stay active and drink plenty of fluid.  While using pain medications, you should also take stool softeners and laxatives, such as Pericolace and Miralax.  If you are having too many bowel       Movements, then you may need       to stop taking the laxatives.  You should have a bowel movement 3-4 days        after surgery and then at least every other day while       on pain medication.  Take short walks (in your home)         about every 1-2 hours during the day.  Try to increase how far you walk each day.  NO DRIVING until your surgeon tells you it is ok. Rehabilitation  Healing is the main focus during the early phase of your rehabilitation. This means protecting the shoulder and only performing elbow/hand/wrist exercises to prevent stiffness. · Weeks 0-3:  You should begin dangle exercises the day after surgery. Work at this for 5 repetitions about 5 times a day.  You may also begin active-assisted forward flexion at this time by starting to lift your operative arm with your good arm there to help push it up. At this time avoid moving your arm out to the side or externally rotating. · Weeks 3-6: You may begin active forward flexion at this time and continue using your other arm to help raise the operative arm. You may be set up to see a physical therapist to work on your range of motion at this time. · Weeks 6-12: Focus is on actively lifting your arm and achieving full range of motion and beginning with some light weight lifting. You may now begin externally rotating your arm and gently strengthening. At 2 months weight bearing on the arm is now permitted for the use of walkers, pushing up out of a chair, etc.        Please call your physician immediately if you have:     Constant bleeding from your wound.  Increasing redness or swelling around your wound (Some warmth, bruising and swelling is normal).  Change in wound drainage (increase in amount, color, or bad smell).  Change in mental status (unusual behavior   Temperature over 101.5 degrees Fahrenheit      Pain or redness in the calf (back of your lower leg - see picture)     Increased swelling of the thigh, ankle, calf, or foot.  Emergency: CALL 911 if you have:     Shortness of breath     Chest pain when you cough or taking a deep breath          A follow up appointment card will be given to you or your family member after the surgery. If you are not aware of your follow up time or lost the card, please call the office at [phone].  If you have questions or concerns during normal business hours, you may reach Dr. Valeriy Erickson team at [phone]. If you have immediate concerns or questions you may call the office and reach Dr Valeriy Erickson or another [address] [place] [address] provider who is on call.  [phone]

## 2018-06-08 NOTE — PROGRESS NOTES
Physical Therapy Goals  Initiated 6/8/2018  1. Patient will move from supine to sit and sit to supine in bed with independence within 7 day(s). 2. Patient will transfer from bed to chair and chair to bed with modified independence using the least restrictive device within 7 day(s). 3. Patient will perform sit to stand with independence within 7 day(s). 4. Patient will ambulate with modified independence for 656 feet with the least restrictive device within 7 day(s). 5. Patient will ascend/descend 12 stairs with one handrail(s) with independence within 7 day(s). physical Therapy EVALUATION  Patient: Pattie Almodovar (88 y.o. female)  Date: 6/8/2018  Primary Diagnosis: OSTEOARTHRITIS RIGHT SHOULDER  Osteoarthritis of right shoulder  Procedure(s) (LRB):  RIGHT REVERSE SHOULDER ARTHROPLASTY AXILLARY NERVE NEUROPLASTY, ROTATOR CUFF REPAIR, BICEPS TENODESIS   (Right) 1 Day Post-Op   Precautions:        ASSESSMENT :  Based on the objective data described below, the patient presents with decreased functional mobility due to decreased strength and balance impairments. Patient presented with general tiredness, which her  attributed to the anesthesia and pain medication. Patient received sitting in bedside chair with spouse and nurse present helping her to the restroom. Patient able to stand from commode independently, but is noticeably unsteady with static and dynamic standing. Began ambulation without assistive device and patient presented with multiple path deviations and tendency to lean and walk to the R. Mod a x 1 required to maintain balance and prevent loss of balance laterally. Patient given cane to use on L and gait became more unsteady with more frequency path deviations and greater lateral lean.  Pt. Reported that she usually walks with path deviations and was unable to remember falling in her yard 6 months ago, but did express feelings of unsteadiness and the need to grab onto objects for support. Stairs were ascended and descended 8 stairs, ascended 4 stairs while using left handrail for support and ascending 4 stairs with cane. Patients legs gave out briefly while ascending the stairs with the cane but she was able to catch herself. LLE was externally rotated and may have contributed to R path deviation. Patient unable to recall the exercises given in the morning by OT, presented with forgetfulness throughout session, and was unable to dual task with assistive device. Discussed home health vs. Outpatient PT with patient and spouse. Both expressed desire to go straight to outpatient for rehab of the R shoulder, but would recommend home health PT to address balance deficits. Recommend RW for stability once shoulder is WFL. Patient needs to be seen by acute PT tomorrow to address gait and balance deficits. Patient will benefit from skilled intervention to address the above impairments. Patients rehabilitation potential is considered to be Fair  Factors which may influence rehabilitation potential include:   []         None noted  [x]         Mental ability/status  []         Medical condition  []         Home/family situation and support systems  [x]         Safety awareness  []         Pain tolerance/management  []         Other:      PLAN :  Recommendations and Planned Interventions:  [x]           Bed Mobility Training             []    Neuromuscular Re-Education  [x]           Transfer Training                   []    Orthotic/Prosthetic Training  [x]           Gait Training                         []    Modalities  [x]           Therapeutic Exercises           []    Edema Management/Control  [x]           Therapeutic Activities            [x]    Patient and Family Training/Education  []           Other (comment):    Frequency/Duration: Patient will be followed by physical therapy  daily to address goals.   Discharge Recommendations: Home Health  Further Equipment Recommendations for Discharge: Gait belt     SUBJECTIVE:   Patient stated I like to grab on to furniture to catch my balance.     OBJECTIVE DATA SUMMARY:   HISTORY:    Past Medical History:   Diagnosis Date    Arrhythmia 2014    atrial fibrillation 2014, sick sinus syndrome: [facility]    Arthritis     Bipolar affective disorder ([address] 3/31/2010    Chronic pain 2018    right shoulder    Colon polyps 03/31/2010    also 5/2018: colon polyps    Depression     Diarrhea 07/19/2013    D/t alpha gal allergy says patient    Epigastric pain 6/5/2015    GERD (gastroesophageal reflux disease)     occasiional only; controlled with med    Hemispheric carotid artery syndrome No stroke    Neuro: [facility]  (CT scan head/neck negative 4/2018 in [facility])    Hyperlipidemia 3/31/2010    Hypothyroidism 3/31/2010    Long term current use of anticoagulant therapy     Migraine 03/31/2010    has them x2 a month: last one 5/23/2018    Psychotic disorder     S/P ablation of atrial fibrillation 05/13/2014    S/P cardiac pacemaker procedure 9/16/2014 9/16/14 Medtronic MRI compatible dual chamber pacemaker implant    Sleep apnea     unable to tolerate CPAP    Stool color black     Thyroid disease     Tick-borne disease     Alpha Gal allergy: no eat pork, beef, milk (Meat allergy showed up on a allergy test)    Urge incontinence 3/31/2010    Vitamin D deficiency 3/31/2015     Past Surgical History:   Procedure Laterality Date    CARDIAC SURG PROCEDURE UNLIST  5/2014    ablation    COLONOSCOPY N/A 4/27/2018    COLONOSCOPY performed by Mani Lemos MD at [address] [place] [address]  4/27/2018         COLONOSCOPY,RENALDO Chavirap  4/27/2018         HX BUNIONECTOMY      HX GI  2010, 5/2018    egd, colonoscopy    HX HYSTERECTOMY      HX ORTHOPAEDIC  2000's    removed bone spurs from R & L hand    HX ORTHOPAEDIC  1990s?     left bunionectomy     HX OTHER SURGICAL  07/2014    2 shocks to heart & ablations    HX PACEMAKER  2014    On the right side    HX PACEMAKER PLACEMENT  2015    HX TONSILLECTOMY  15 yrs old     Prior Level of Function/Home Situation: Patient lives with  and was independent prior to surgery with self reported balance deficits. No use of DME reported at home. Patient reports no regular physical activity or activities that she does regularly. Patient does drive. Personal factors and/or comorbidities impacting plan of care: Cognition    Home Situation  Home Environment: Private residence  # Steps to Enter: 4  Rails to Enter: Yes  Hand Rails : Right  One/Two Story Residence: One story  Living Alone: No  Support Systems: Family member(s), Friends \ neighbors, Spouse/Significant Other/Partner  Patient Expects to be Discharged to[de-identified] Private residence  Current DME Used/Available at Home: Walker, rolling, Grab bars  Tub or Shower Type: Shower    EXAMINATION/PRESENTATION/DECISION MAKING:   Critical Behavior:  Neurologic State: Drowsy  Orientation Level: Oriented to place, Oriented to person, Oriented to situation (odd non related comments at times)  Cognition: Decreased attention/concentration, Decreased command following, Impulsive, Poor safety awareness  Safety/Judgement: Fall prevention, Decreased awareness of need for safety  Hearing:   Auditory  Auditory Impairment: None    Range Of Motion:  AROM: Generally decreased, functional                       Strength:    Strength: Generally decreased, functional                    Tone & Sensation:   Tone: Normal              Sensation: Intact               Coordination:  Coordination: Within functional limits  Vision:   Corrective Lenses: Glasses  Functional Mobility:  Bed Mobility:  Rolling: Supervision  Supine to Sit: Supervision (educated to get up on opposite side of TSR)  Sit to Supine: Stand-by assistance (LE back to bed)  Scooting: Supervision  Transfers:  Sit to Stand: Supervision  Stand to Sit: Supervision        Bed to Chair: Minimum assistance              Balance:   Sitting: Intact  Standing: Impaired  Standing - Static: Fair  Standing - Dynamic : Fair  Ambulation/Gait Training:  Distance (ft): 300 Feet (ft)  Assistive Device: Gait belt  Ambulation - Level of Assistance: Moderate assistance;Assist x1     Gait Description (WDL): Exceptions to WDL  Gait Abnormalities: Decreased step clearance; Path deviations; Shuffling gait        Base of Support: Widened     Speed/Raisa: Pace decreased (<100 feet/min); Fluctuations  Step Length: Left shortened;Right shortened        Interventions: Safety awareness training;Verbal cues         Discussed need for assistive device with patient but that use of a cane made balance deficits worse. Stairs:  Number of Stairs Trained: 8  Stairs - Level of Assistance: Contact guard assistance   Rail Use: Left         Functional Measure:  Barthel Index:    Bathin  Bladder: 5  Bowels: 10  Groomin  Dressin  Feedin  Mobility: 10  Stairs: 0  Toilet Use: 5  Transfer (Bed to Chair and Back): 10  Total: 45       Barthel and G-code impairment scale:  Percentage of impairment CH  0% CI  1-19% CJ  20-39% CK  40-59% CL  60-79% CM  80-99% CN  100%   Barthel Score 0-100 100 99-80 79-60 59-40 20-39 1-19   0   Barthel Score 0-20 20 17-19 13-16 9-12 5-8 1-4 0      The Barthel ADL Index: Guidelines  1. The index should be used as a record of what a patient does, not as a record of what a patient could do. 2. The main aim is to establish degree of independence from any help, physical or verbal, however minor and for whatever reason. 3. The need for supervision renders the patient not independent. 4. A patient's performance should be established using the best available evidence. Asking the patient, friends/relatives and nurses are the usual sources, but direct observation and common sense are also important. However direct testing is not needed.   5. Usually the patient's performance over the preceding 24-48 hours is important, but occasionally longer periods will be relevant. 6. Middle categories imply that the patient supplies over 50 per cent of the effort. 7. Use of aids to be independent is allowed. Bernard Song., Barthel, D.W. (1123). Functional evaluation: the Barthel Index. [facility] (14)2. REBECCA Lanier, Silver Ply., Kam Cruz., Guy Jobs, [facility] (1999). Measuring the change indisability after inpatient rehabilitation; comparison of the responsiveness of the Barthel Index and Functional [place] Measure. Journal of Neurology, Neurosurgery, and Psychiatry, 66(4), 110-485. Jaelyn Eduardo, N.J.A, PATRICK Brooks, & Prachi Ovalle MJanineA. (2004.) Assessment of post-stroke quality of life in cost-effectiveness studies: The usefulness of the Barthel Index and the EuroQoL-5D. Quality of Life Research, 13, 788-29         G codes: In compliance with CMSs Claims Based Outcome Reporting, the following G-code set was chosen for this patient based on their primary functional limitation being treated: The outcome measure chosen to determine the severity of the functional limitation was the Barthel with a score of 45/100 which was correlated with the impairment scale. ? Mobility - Walking and Moving Around:     - CURRENT STATUS: CK - 40%-59% impaired, limited or restricted    - GOAL STATUS: CI - 1%-19% impaired, limited or restricted    - D/C STATUS:  ---------------To be determined---------------       Pain:  Pain Scale 1: Numeric (0 - 10)  Pain Intensity 1: 0  Pain Location 1: Shoulder  Pain Orientation 1: Right  Pain Description 1: Aching  Pain Intervention(s) 1: Cold pack  Activity Tolerance:   Activity tolerated with no limiting factors. VSS  Please refer to the flowsheet for vital signs taken during this treatment.   After treatment:   []         Patient left in no apparent distress sitting up in chair  [x]         Patient left in no apparent distress in bed  [x] Call bell left within reach  [x]         Nursing notified  [x]         Caregiver present  []         Bed alarm activated    COMMUNICATION/EDUCATION:   The patients plan of care was discussed with: Registered Nurse. [x]         Fall prevention education was provided and the patient/caregiver indicated understanding. [x]         Patient/family have participated as able in goal setting and plan of care. [x]         Patient/family agree to work toward stated goals and plan of care. []         Patient understands intent and goals of therapy, but is neutral about his/her participation. []         Patient is unable to participate in goal setting and plan of care. Thank you for this referral.  Miguelina Brito, SPT   Time Calculation: 29 mins     Regarding student involvement in patient care:  A student participated in this treatment session. Per CMS Medicare statements and APTA guidelines I certify that the following was true:  1. I was present and directly observed the entire session. 2. I made all skilled judgments and clinical decisions regarding care. 3. I am the practitioner responsible for assessment, treatment, and documentation.

## 2018-06-08 NOTE — OP NOTES
[address]  OPERATIVE REPORT    Dwayne Thrasher  MR#: [id number]  : 1950  ACCOUNT #: [de-identified]   DATE OF SERVICE: 2018    PREOPERATIVE DIAGNOSIS:  Right shoulder osteoarthritis with high-grade partial rotator cuff tear. POSTOPERATIVE DIAGNOSIS:  Right shoulder osteoarthritis with high-grade partial rotator cuff tear. PROCEDURE PERFORMED:  Right reverse total shoulder arthroplasty including glenoid, supraspinatus rotator cuff repair, axillary nerve neuroplasty, long head biceps tenodesis. SURGEON:  Erroll Lombard, MD    ASSISTANT:  LATRICIA Vogel    ANESTHESIA:  get    COMPLICATIONS:  None. ESTIMATED BLOOD LOSS:  60 mL. SPECIMENS REMOVED:  Humeral head. IMPLANTS:  RSP glenoid baseplate with 4 locking screws, a 32 - 4 glenosphere, a 10 mm cement restrictor, a size 8 monoblock stem, size 32 mm standard humeral socket insert. INDICATION:  This patient has osteoarthritis and a high-grade partial cuff tear, retroversion greater than 15 degrees on the glenoid, comes in for reverse arthroplasty. This is a complex surgical procedure requiring an assistant, and one was used. PROCEDURE:  The patient was brought to the operating theater, given airway, IV antibiotics and preoperative interscalene block, positioned on the operating room table in a beach chair position. Right shoulder prepped and draped. After a timeout, we made a deltopectoral incision, taking the vein laterally, dissecting down to the short flexors, which were retracted medially. Subdeltoid dissection was performed. The front of the supraspinatus was examined and it was felt that the quality of the tendon was not adequate for a total shoulder replacement. We removed the biceps from its groove and tenodesed the long head of the biceps to the pec major tendon using two #2 FiberWire sutures, then suture ligated the 3 vessels at the bottom of the subscapularis.   Then, we released the subscapularis off the lesser tuberosity and tagged the subscap for later dissection. Released the capsule anteriorly and inferiorly off the humeral neck, staying on bone to avoid damaging the axillary nerve. The patient has massive osteophytes which we had to remove as we dissected with a rongeur. Once the releases were performed, we were able to expose the proximal humerus which showed grade IV osteoarthritis. I made a 30-degree retroverted cut and then broached up to a size 8 and then used the small and medium pineapple coring reamers from the RSP system, removed the medial calcar bone. Put a Fukuda in posterior inferiorly and copiously irrigated the glenohumeral joint. We then did a 360 degree dissection around the subscap due to an axillary nerve neuroplasty from the undersurface of the subscap all the way to the posterior humeral shaft. With a nerve in plain view we did anterior and a 360-degree labral repair. With the glenoid thus exposed, we put in the central guide pin 10 degrees up and slightly angled posteriorly to make up for the retroversion so it was down the center of the vault. We then reamed with a small medium reamers, put in the RSP glenoid baseplate and the 4 locking screws. Finally screwed into place the 32 - 4 glenosphere and locked it in with the locking screw, then turned our attention to the proximal humerus, cemented in after putting in a cement restrictor and then cemented in the size 8 monoblock stem in 30 degrees of retroversion. Trialed various size implants and then finally put in the size 32 standard humeral socket insert. Once we reduced it, stability was good. Range of motion was good. We took this high-grade rotator cuff tear and actually repaired it to the front of the tuberosity using #2 FiberWire sutures to hopefully give her a little bit more external rotation power.   Copiously irrigated with bacitracin saline, closed in layers and took the patient to recovery room in stable condition.       MD KAREN Ferrari / [place]  D: 06/07/2018 20:16     T: 06/07/2018 22:22  JOB #: [id number]

## 2018-06-08 NOTE — PROGRESS NOTES
[facility] - Progress Note      Pt sitting in chair. No complaints. Pain well controlled. Feels ready to go home. VSS Afebrile. Incision and dressing c.d.i  Calves soft and supple; No pain with passive stretch  Sensation and motor intact  SCD/Foot pumps for mechanical DVT proph while in bed     PLAN:  1) PT/OT Today  2) Pain control continue current  3) Plan d/c today after PT/OT.     LATRICIA Vigil

## 2018-06-08 NOTE — PROGRESS NOTES
Ortho NP Note:    Pt sitting in chair. C/o pain in arm and to elbow  With moderate swelling and bruising already. Sensation with some \"tingling\" in fingers. Otherwsie intact and motor intact. Pulse strong and cap refill adequate. Slin in place but she is hold arm in dependent position. Educated her about elevation and ice. OT in room to work with her and  her on exercises. D/c home today    Abdulaziz Spicer NP      Addendum @ 21 [phone]:    Groggy and falling asleep while working with OT. Concern she is stumbling and a fall risk. Recommended PT which I have ordered. I have stopped IV opioid and higher dose fo Oxycodone to avoid medication induced sedation. Will monitor this afternoon. At this point OT strongly recommending another night in hospital for safe discharge.     Abdulaziz Spicer NP

## 2018-06-08 NOTE — PROGRESS NOTES
Spiritual Care Partner Volunteer visited patient in [place] on 6/8/18. Documented by:  Teetee Hernandez M.Div.    Paging Service [phone])

## 2018-06-09 ENCOUNTER — HOME HEALTH ADMISSION (OUTPATIENT)
Dept: HOME HEALTH SERVICES | Facility: HOME HEALTH | Age: 68
End: 2018-06-09
Payer: MEDICARE

## 2018-06-09 VITALS
BODY MASS INDEX: 38.65 KG/M2 | HEART RATE: 76 BPM | SYSTOLIC BLOOD PRESSURE: 107 MMHG | HEIGHT: 65 IN | DIASTOLIC BLOOD PRESSURE: 50 MMHG | OXYGEN SATURATION: 95 % | RESPIRATION RATE: 18 BRPM | WEIGHT: 232 LBS | TEMPERATURE: 98.4 F

## 2018-06-09 PROCEDURE — 74011250637 HC RX REV CODE- 250/637: Performed by: PHYSICIAN ASSISTANT

## 2018-06-09 PROCEDURE — 97535 SELF CARE MNGMENT TRAINING: CPT

## 2018-06-09 PROCEDURE — 97116 GAIT TRAINING THERAPY: CPT

## 2018-06-09 PROCEDURE — 97535 SELF CARE MNGMENT TRAINING: CPT | Performed by: OCCUPATIONAL THERAPIST

## 2018-06-09 RX ADMIN — OXYCODONE HYDROCHLORIDE 5 MG: 5 TABLET ORAL at 06:58

## 2018-06-09 RX ADMIN — OXYCODONE HYDROCHLORIDE 5 MG: 5 TABLET ORAL at 09:20

## 2018-06-09 RX ADMIN — PREGABALIN 200 MG: 150 CAPSULE ORAL at 09:20

## 2018-06-09 RX ADMIN — OXYCODONE HYDROCHLORIDE 5 MG: 5 TABLET ORAL at 14:58

## 2018-06-09 RX ADMIN — LITHIUM CARBONATE 300 MG: 300 TABLET ORAL at 09:21

## 2018-06-09 RX ADMIN — POLYETHYLENE GLYCOL 3350 17 G: 17 POWDER, FOR SOLUTION ORAL at 09:20

## 2018-06-09 RX ADMIN — Medication 10 ML: at 06:42

## 2018-06-09 RX ADMIN — LAMOTRIGINE 50 MG: 25 TABLET ORAL at 09:20

## 2018-06-09 RX ADMIN — LEVOTHYROXINE SODIUM 50 MCG: 50 TABLET ORAL at 06:41

## 2018-06-09 RX ADMIN — DABIGATRAN ETEXILATE MESYLATE 150 MG: 150 CAPSULE ORAL at 09:20

## 2018-06-09 RX ADMIN — Medication 10 ML: at 11:06

## 2018-06-09 RX ADMIN — STANDARDIZED SENNA CONCENTRATE AND DOCUSATE SODIUM 1 TABLET: 8.6; 5 TABLET, FILM COATED ORAL at 09:20

## 2018-06-09 RX ADMIN — ACETAMINOPHEN 1000 MG: 500 TABLET, FILM COATED ORAL at 06:41

## 2018-06-09 RX ADMIN — ACETAMINOPHEN 1000 MG: 500 TABLET, FILM COATED ORAL at 11:06

## 2018-06-09 RX ADMIN — FAMOTIDINE 20 MG: 20 TABLET, FILM COATED ORAL at 09:21

## 2018-06-09 RX ADMIN — ACETAMINOPHEN 1000 MG: 500 TABLET, FILM COATED ORAL at 00:44

## 2018-06-09 NOTE — PROGRESS NOTES
[facility] record reviewed. Pt and her  were seen for OT treatment. Reinforced all education provided in previous session. Reviewed precautions and sling as well as home safety and fall prevention. Pt and her  have necessary handouts for reinforcement. Pt will need supervision and assistance from her  for adls and IADLs at discharge. All questions answered. Pt is ready for discharge. No OT services needed at home at this time. Full OT note to follow.

## 2018-06-09 NOTE — ROUTINE PROCESS
Pt given education regarding discharge instructions, prescriptions, prescription literature, and extra dressings. Opportunities for questions given. PIV taken out with cath tip intact. Pt discharged home with  and home health.

## 2018-06-09 NOTE — PROGRESS NOTES
Orthopedic NP Progress Note  Post Op day: 2 Days Post-Op    June 9, 2018 [facility]    Attending Physician: Treatment Team: Attending Provider: Koby Ross MD; Utilization Review: Benigno Mendoza RN; Nurse Practitioner: Jorge Tinsley NP     Vital Signs:    Patient Vitals for the past 8 hrs:   BP Temp Pulse Resp SpO2   06/09/18 0800 109/59 98.7 °F (37.1 °C) 70 18 97 %   06/09/18 0432 108/56 98.7 °F (37.1 °C) 74 16 97 %     BMI (calculated): 38.6 (06/07/18 0923)    Intake/Output:     06/07 1901 - 06/09 0700  In: 2661.7 [P.O.:200; I.V.:2461.7]  Out: -     Pain Control:   Pain Assessment  Pain Scale 1: Numeric (0 - 10)  Pain Intensity 1: 0  Pain Location 1: Arm  Pain Orientation 1: Right  Pain Description 1: Aching  Pain Intervention(s) 1: Medication (see MAR)    LAB:    Recent Labs      06/08/18   0412   HCT  34.5*   HGB  10.9*     Lab Results   Component Value Date/Time    Sodium 141 06/08/2018 04:12 AM    Potassium 4.2 06/08/2018 04:12 AM    Chloride 113 (H) 06/08/2018 04:12 AM    CO2 20 (L) 06/08/2018 04:12 AM    Glucose 110 (H) 06/08/2018 04:12 AM    BUN 20 06/08/2018 04:12 AM    Creatinine 0.80 06/08/2018 04:12 AM    Calcium 8.3 (L) 06/08/2018 04:12 AM       Subjective:  Álvaro Antoine is a 70 y.o. female s/p a  Procedure(s):  RIGHT REVERSE SHOULDER ARTHROPLASTY AXILLARY NERVE NEUROPLASTY, ROTATOR CUFF REPAIR, BICEPS TENODESIS     Procedure(s):  RIGHT REVERSE SHOULDER ARTHROPLASTY AXILLARY NERVE NEUROPLASTY, ROTATOR CUFF REPAIR, BICEPS TENODESIS  . Tolerating diet. Pain well controlled      Objective: General: alert, cooperative, no distress. Cardiac: Normal S1&S2, no murmur. Resp: BBS clear, no wheezing. Gastrointestinal:  Soft, non-tender. Neuro/Vascular: CNS Intact. Sensation stable. Brisk cap refill, 2+ pulses UE/LE  Musculoskeletal:  +ROM UE with sling to RUE/LE. Efra's sign negative in bilateral lower extremities.   Calves soft, supple, non-tender upon palpation or with passive stretch. Skin: Incision - clean, dry and intact. No significant erythema or swelling. Dressing: clean, dry, and intact    Siegel - n  Drain - n       PT/OT:   Gait:  Gait  Base of Support: Widened  Speed/Raisa: Pace decreased (<100 feet/min), Fluctuations  Step Length: Left shortened, Right shortened  Gait Abnormalities: Decreased step clearance, Path deviations, Shuffling gait  Ambulation - Level of Assistance: Moderate assistance, Assist x1  Distance (ft): 300 Feet (ft)  Assistive Device: Gait belt  Rail Use: Left   Stairs - Level of Assistance: Contact guard assistance  Number of Stairs Trained: 8  Interventions: Safety awareness training, Verbal cues            Interventions: Safety awareness training, Verbal cues      Assessment:    s/p Procedure(s):  RIGHT REVERSE SHOULDER ARTHROPLASTY AXILLARY NERVE NEUROPLASTY, ROTATOR CUFF REPAIR, BICEPS TENODESIS      Active Problems:    Osteoarthritis of right shoulder (6/6/2018)         Plan:     -  Continue PT/OT  -  Continue established methods of pain control  -  VTE Prophylaxes - TEDS &/or SCDs        Discharge To: Home today with plan for [facility]     Dr. Garcia aware and agree with plan.      Signed By: Osito Chester NP    Orthopedic Nurse Practitioner

## 2018-06-09 NOTE — PROGRESS NOTES
*CM acknowledges discharge order*    CM awaiting PT/OT evaluation for further discharge planning. CM to meet with pt re: discharge planning once PT/OT recommendations are provided. 12:38p.m. Reason for Admission:   Osteoarthritis right shoulder                  RRAT Score:     13             Do you (patient/family) have any concerns for transition/discharge? None identified at this time              Plan for utilizing home health:     Pt is to utilize [facility] for home health    Likelihood of readmission? Low            Transition of Care Plan:      CM spoke with pt via telephone for assessment. CM introduced self, role. Pt verbalized understanding. Pt verified demographic information on chart. Pt stated she is independent in ADL/IADL needs at baseline. She does not have access to DME in the home, but stated she does not need it at this time. Pt's  to assist with transportation at discharge and as needed. CM and pt discussed home health options. Pt is open to utilizing home health. FOC offered and pt chose [address] CM sent referral to [facility] via [address]. Pt stated she has no further questions or needs at this time. CM updated AVS with [facility] information. CM will continue to remain available for support, discharge planning as needed. Care Management Interventions  Palliative Care Criteria Met (RRAT>21 & CHF Dx)?: No  Mode of Transport at Discharge:  Other (see comment)  Transition of Care Consult (CM Consult): [address]: Yes  Discharge Durable Medical Equipment: No  Physical Therapy Consult: Yes  Occupational Therapy Consult: Yes  Speech Therapy Consult: No  Current Support Network: Own Home, Lives with Spouse  Confirm Follow Up Transport: Family  Plan discussed with Pt/Family/Caregiver: Yes  Freedom of Choice Offered: Yes  Discharge Location  Discharge Placement: Home with home health    NAVEEN Rogers  [address]  [phone]

## 2018-06-09 NOTE — PROGRESS NOTES
Problem: Self Care Deficits Care Plan (Adult)  Goal: *Acute Goals and Plan of Care (Insert Text)  Occupational Therapy Goals:  Initiated 6/8/2018  1. Patient will perform upper body dressing adhering to TSR precautions with supervision/set-up within 7 days. 2. Patient will perform toileting with supervision/set-up within 7 days. 3. Patient will perform lower body dressing with supervision/set-up with AE PRN within 7 days. 4. Patient will independent with home exercise program for TSR protocol within 7 days. 5. Patient will transfer from toilet with supervision using the least restrictive device and appropriate durable medical equipment within 7 days. Occupational Therapy TREATMENT  Patient: Peyton Stewart (89 y.o. female)  Date: 6/9/2018  Diagnosis: OSTEOARTHRITIS RIGHT SHOULDER  Osteoarthritis of right shoulder <principal problem not specified>  Procedure(s) (LRB):  RIGHT REVERSE SHOULDER ARTHROPLASTY AXILLARY NERVE NEUROPLASTY, ROTATOR CUFF REPAIR, BICEPS TENODESIS   (Right) 2 Days Post-Op  Precautions:    Chart, occupational therapy assessment, plan of care, and goals were reviewed. ASSESSMENT:  Pt participated in OT tx session focusing on use of sling, donning/doffing appropriately, adhering to precautions and reinforcement of all handouts with pt and her . Educated on home safety and fall prevention. Encouraged to balance rest and activity. Per PT pt feels more comfortable using a cane. No concerns re: returning home,  will be with pt. All questions answered. Progression toward goals:  []       Improving appropriately and progressing toward goals  [x]       Improving slowly and progressing toward goals  []       Not making progress toward goals and plan of care will be adjusted     PLAN:  Patient continues to benefit from skilled intervention to address the above impairments. Continue treatment per established plan of care.   Discharge Recommendation:  F/u with MD  Further Equipment Recommendations for Discharge:  PT recommended cane     SUBJECTIVE:   Patient stated I don't do well with anesthesia.     OBJECTIVE DATA SUMMARY:   Cognitive/Behavioral Status:  Neurologic State: Alert;Eyes open spontaneously  Orientation Level: Oriented X4  Cognition: Follows commands                  Balance:  Sitting: Intact  Standing: Intact; With support  Standing - Static: Good;Constant support  Standing - Dynamic : Fair (to good)    ADL Intervention:        Reinforced all education provided via reviewing handouts and fitting sling. Pt encouraged to perform the hand exercises. She is quite edematous and unaware of the exercises, despite previous education. Pt stood up and changed position -- sling fit properly.  asking appropriate questions. He is ready and has been waiting to discharge home.     Encouraged keeping written track of medication management                                             Therapeutic Exercises:   All hand exercises--grasp release, table top, finger abd/adduction, opposition  Pain:  Pain Scale 1: Numeric (0 - 10)  Pain Intensity 1: no complaints of pain in RUE  Pain Location 1: Arm  Pain Orientation 1: Right  Pain Description 1: Aching  Pain Intervention(s) 1: Cold pack  After treatment:   [x] Patient left in no apparent distress sitting up in chair  [] Patient left in no apparent distress in bed  [x] Call bell left within reach  [x] Nursing notified  [x] Caregiver present  [] Bed alarm activated    COMMUNICATION/COLLABORATION:   The patients plan of care was discussed with: Registered Nurse    DILMA Yao/L  Time Calculation: 25 mins

## 2018-06-09 NOTE — PROGRESS NOTES
Problem: Falls - Risk of  Goal: *Absence of Falls  Document Jenna Fall Risk and appropriate interventions in the flowsheet.    Outcome: Progressing Towards Goal  Fall Risk Interventions:  Mobility Interventions: Assess mobility with egress test, Patient to call before getting OOB, Utilize gait belt for transfers/ambulation, Communicate number of staff needed for ambulation/transfer         Medication Interventions: Assess postural VS orthostatic hypotension, Patient to call before getting OOB, Utilize gait belt for transfers/ambulation    Elimination Interventions: Call light in reach, Patient to call for help with toileting needs, Toileting schedule/hourly rounds

## 2018-06-09 NOTE — PROGRESS NOTES
Problem: Mobility Impaired (Adult and Pediatric)  Goal: *Acute Goals and Plan of Care (Insert Text)  Physical Therapy Goals  Initiated 6/8/2018  1. Patient will move from supine to sit and sit to supine  in bed with independence within 7 day(s). 2.  Patient will transfer from bed to chair and chair to bed with modified independence using the least restrictive device within 7 day(s). 3.  Patient will perform sit to stand with independence within 7 day(s). 4.  Patient will ambulate with modified independence for 656 feet with the least restrictive device within 7 day(s). 5.  Patient will ascend/descend 12 stairs with one handrail(s) with independence within 7 day(s). physical Therapy TREATMENT  Patient: Amadeo Duverney (59 y.o. female)  Date: 6/9/2018  Diagnosis: OSTEOARTHRITIS RIGHT SHOULDER  Osteoarthritis of right shoulder <principal problem not specified>  Procedure(s) (LRB):  RIGHT REVERSE SHOULDER ARTHROPLASTY AXILLARY NERVE NEUROPLASTY, ROTATOR CUFF REPAIR, BICEPS TENODESIS   (Right) 2 Days Post-Op  Precautions:    Chart, physical therapy assessment, plan of care and goals were reviewed. ASSESSMENT:  Pt cleared by nurse to mobilize. Pt received in bed supine. Pt agreeable to therapy. Pt performed all bed mobility at supervision. Pt performed sit to stand transfer at supervision. Pt ambulated 200ft with no device at CGA and 200ft with SPC at CGA/SBA. Pt with imporved stability today requiring less asstiance for safety. Pt with some unsteadiness and path deviations with no device. Pts stability improved with SPC. Pt educated on correct sequencing with SPC. Pt able to use [facility] correctly. Pt agreeable to using SPC until balance has improves. Pt ascended and descended 4 steps with left rail at SBA step over step. Pt with improved mobility safety today with SPC. Pt could benefit from HHPT to improve balance and strength.    Progression toward goals:  [x]    Improving appropriately and progressing toward goals  []    Improving slowly and progressing toward goals  []    Not making progress toward goals and plan of care will be adjusted     PLAN:  Patient continues to benefit from skilled intervention to address the above impairments. Continue treatment per established plan of care. Discharge Recommendations:  Home Health vs none  Further Equipment Recommendations for Discharge:  Pt has SPC     SUBJECTIVE:   Patient stated I don't care if my friends laugh because I have a cane I think I need it for now.     OBJECTIVE DATA SUMMARY:   Critical Behavior:  Neurologic State: Alert, Eyes open spontaneously  Orientation Level: Oriented X4  Cognition: Follows commands  Safety/Judgement: Fall prevention, Decreased awareness of need for safety  Functional Mobility Training:  Bed Mobility:  Rolling: Supervision  Supine to Sit: Supervision     Scooting: Supervision        Transfers:  Sit to Stand: Supervision  Stand to Sit: Supervision                             Balance:  Sitting: Intact  Standing: Intact; With support  Standing - Static: Good;Constant support  Standing - Dynamic : Fair (to good)  Ambulation/Gait Training:  Distance (ft): 400 Feet (ft)  Assistive Device: Gait belt;Cane, straight  Ambulation - Level of Assistance: Contact guard assistance;Stand-by assistance        Gait Abnormalities: Decreased step clearance; Path deviations        Base of Support: Widened     Speed/Raisa: Pace decreased (<100 feet/min)  Step Length: Left shortened;Right shortened     Stairs:  Number of Stairs Trained: 4  Stairs - Level of Assistance: Stand-by assistance   Rail Use: Left   Pain:  Pain Scale 1: Numeric (0 - 10)  Pain Intensity 1: 4  Pain Location 1: Arm  Pain Orientation 1: Right  Pain Description 1: Aching  Pain Intervention(s) 1: Cold pack  Activity Tolerance:   Pt with improved stability with SPC.   After treatment:   [x]    Patient left in no apparent distress sitting up in chair  []    Patient left in no apparent distress in bed  [x]    Call bell left within reach  [x]    Nursing notified  [x]    Caregiver present   []    Bed alarm activated    COMMUNICATION/COLLABORATION:   The patients plan of care was discussed with: Occupational Therapist and Registered Nurse    Duncan Carbajal   Time Calculation: 15 mins

## 2018-06-10 ENCOUNTER — HOME CARE VISIT (OUTPATIENT)
Dept: SCHEDULING | Facility: HOME HEALTH | Age: 68
End: 2018-06-10
Payer: MEDICARE

## 2018-06-10 VITALS
RESPIRATION RATE: 16 BRPM | DIASTOLIC BLOOD PRESSURE: 84 MMHG | TEMPERATURE: 97.8 F | HEART RATE: 75 BPM | SYSTOLIC BLOOD PRESSURE: 130 MMHG | OXYGEN SATURATION: 97 %

## 2018-06-10 PROCEDURE — 3331090002 HH PPS REVENUE DEBIT

## 2018-06-10 PROCEDURE — 3331090001 HH PPS REVENUE CREDIT

## 2018-06-10 PROCEDURE — G0151 HHCP-SERV OF PT,EA 15 MIN: HCPCS

## 2018-06-10 PROCEDURE — 400013 HH SOC

## 2018-06-11 ENCOUNTER — PATIENT OUTREACH (OUTPATIENT)
Dept: INTERNAL MEDICINE CLINIC | Age: 68
End: 2018-06-11

## 2018-06-11 ENCOUNTER — HOME CARE VISIT (OUTPATIENT)
Dept: HOME HEALTH SERVICES | Facility: HOME HEALTH | Age: 68
End: 2018-06-11
Payer: MEDICARE

## 2018-06-11 PROCEDURE — 3331090001 HH PPS REVENUE CREDIT

## 2018-06-11 PROCEDURE — 3331090002 HH PPS REVENUE DEBIT

## 2018-06-11 NOTE — PROGRESS NOTES
Hospital Discharge Follow-Up      Date/Time:  6/11/2018 10:34 AM  VBD: Yes; Shoulders      Patient was admitted to [facility] on 6/7/18 and discharged on 6/9/18 s/p right shoulder arthroplasty. The physician discharge summary was available at the time of outreach. Patient was contacted within 1 business days of discharge. Top Challenges reviewed with the provider   - does not currently have a JONO appointment scheduled with PCP  - Advance Care Planning/Advance Medical Directive - not on file; please review       Method of communication with provider :staff message    Inpatient RRAT score: 15  Was this a readmission? no   Patient stated reason for the readmission: N/A    Nurse Navigator (NN) attempted to contact the patient by telephone to perform post hospital discharge assessment; lvm requesting a return phone call to this NN. NN outreach to [address] [place] [address] to inquire about scheduled outpatient follow-up; patient has post-op appointment scheduled with Dr. Joselo Herrera on 6/20/18.       Disease Specific:   N/A          IP Consults: None    Home Health orders at discharge: Physical [facility]: [address]  Date of initial visit: Start of Care 6/10/18    Durable Medical Equipment ordered/company: No  Durable Medical Equipment received: N/A    Recommended OP Follow-Up:  Dr. Joselo Herrera - 2 weeks  Dr. Ebony Cosme:   Does patient have an Advance Directive:  Not on file    Medication(s):     New Medications at Discharge: Narcan nasal spray, oxycodone, miralax, pericolace  Changed Medications at Discharge: tylenol  Discontinued Medications at Discharge: norco          PCP/Specialist follow up: Future Appointments  Date Time Provider Radha Yoon   7/30/2018 1:00 PM Shun Elmore MD [address]   11/1/2018 11:30 AM Airam Alicea MD [address]   5/14/2019 10:30 AM Kayleen Fuentes MD [address] [facility]   5/14/2019 10:45 AM Maurilio LOPEZ Karsten Cotto, [address]          Goals     None

## 2018-06-12 ENCOUNTER — HOME CARE VISIT (OUTPATIENT)
Dept: HOME HEALTH SERVICES | Facility: HOME HEALTH | Age: 68
End: 2018-06-12
Payer: MEDICARE

## 2018-06-12 PROCEDURE — 3331090001 HH PPS REVENUE CREDIT

## 2018-06-12 PROCEDURE — 3331090002 HH PPS REVENUE DEBIT

## 2018-06-13 ENCOUNTER — HOME CARE VISIT (OUTPATIENT)
Dept: SCHEDULING | Facility: HOME HEALTH | Age: 68
End: 2018-06-13
Payer: MEDICARE

## 2018-06-13 PROCEDURE — G0151 HHCP-SERV OF PT,EA 15 MIN: HCPCS

## 2018-06-13 PROCEDURE — 3331090001 HH PPS REVENUE CREDIT

## 2018-06-13 PROCEDURE — 3331090002 HH PPS REVENUE DEBIT

## 2018-06-14 ENCOUNTER — HOME CARE VISIT (OUTPATIENT)
Dept: SCHEDULING | Facility: HOME HEALTH | Age: 68
End: 2018-06-14
Payer: MEDICARE

## 2018-06-14 VITALS — OXYGEN SATURATION: 97 % | SYSTOLIC BLOOD PRESSURE: 140 MMHG | HEART RATE: 69 BPM | DIASTOLIC BLOOD PRESSURE: 100 MMHG

## 2018-06-14 PROCEDURE — 3331090002 HH PPS REVENUE DEBIT

## 2018-06-14 PROCEDURE — 3331090001 HH PPS REVENUE CREDIT

## 2018-06-14 PROCEDURE — G0152 HHCP-SERV OF OT,EA 15 MIN: HCPCS

## 2018-06-15 ENCOUNTER — HOME CARE VISIT (OUTPATIENT)
Dept: SCHEDULING | Facility: HOME HEALTH | Age: 68
End: 2018-06-15
Payer: MEDICARE

## 2018-06-15 VITALS
DIASTOLIC BLOOD PRESSURE: 88 MMHG | TEMPERATURE: 98.2 F | HEART RATE: 74 BPM | OXYGEN SATURATION: 96 % | SYSTOLIC BLOOD PRESSURE: 118 MMHG

## 2018-06-15 PROCEDURE — 3331090002 HH PPS REVENUE DEBIT

## 2018-06-15 PROCEDURE — G0151 HHCP-SERV OF PT,EA 15 MIN: HCPCS

## 2018-06-15 PROCEDURE — 3331090003 HH PPS REVENUE ADJ

## 2018-06-15 PROCEDURE — 3331090001 HH PPS REVENUE CREDIT

## 2018-06-18 VITALS
TEMPERATURE: 98.2 F | OXYGEN SATURATION: 96 % | SYSTOLIC BLOOD PRESSURE: 130 MMHG | HEART RATE: 78 BPM | DIASTOLIC BLOOD PRESSURE: 86 MMHG

## 2018-06-29 DIAGNOSIS — Z96.611 S/P REVERSE TOTAL SHOULDER ARTHROPLASTY, RIGHT: ICD-10-CM

## 2018-06-29 DIAGNOSIS — R52 PAIN: ICD-10-CM

## 2018-06-29 RX ORDER — OXYCODONE HYDROCHLORIDE 5 MG/1
5 TABLET ORAL
Qty: 1 TAB | Refills: 0 | Status: CANCELLED | OUTPATIENT
Start: 2018-06-29

## 2018-06-29 RX ORDER — HYDROCODONE BITARTRATE AND ACETAMINOPHEN 5; 325 MG/1; MG/1
1 TABLET ORAL
Qty: 30 TAB | Refills: 0 | Status: SHIPPED | OUTPATIENT
Start: 2018-06-29 | End: 2018-07-27 | Stop reason: SDUPTHER

## 2018-06-29 NOTE — TELEPHONE ENCOUNTER
Patient is requesting refill for Rx\" Hydrocodone\"  Patient has 3 pill remaining.  Please call when ready for .  Best contact  [phone]         Message received & copied from [facility]

## 2018-07-03 RX ORDER — DABIGATRAN ETEXILATE MESYLATE 150 MG/1
CAPSULE ORAL
Qty: 60 CAP | Refills: 0 | Status: SHIPPED | OUTPATIENT
Start: 2018-07-03 | End: 2018-08-01 | Stop reason: SDUPTHER

## 2018-07-19 ENCOUNTER — PATIENT OUTREACH (OUTPATIENT)
Dept: INTERNAL MEDICINE CLINIC | Age: 68
End: 2018-07-19

## 2018-07-19 NOTE — PROGRESS NOTES
7/19/2018   10:28 AM       - home health PT discharge 6/15/18    - NN outreach to [facility] today; confirmed that patient attended post-op office visit 6/20/18 with Physician Assistant, currently receiving Outpatient Physical Therapy, Next scheduled office visit is 7/30/18.    - To the best of this NN's knowledge, this patient had no additional ED visits or Hospital Admissions during 30 day JONO period following admission to [facility] 6/7/18-6/9/18.   - JONO period has ended. Transitions of Care Episode Resolved. Patient has graduated from the Transitions of Care Coordination  program on 7/9/18.

## 2018-07-27 DIAGNOSIS — R52 PAIN: ICD-10-CM

## 2018-07-27 NOTE — TELEPHONE ENCOUNTER
Identified patient 2 identifiers verified. Patient  Aware Dr. Simone Paris of for the office until next week and will wait until his return.

## 2018-07-27 NOTE — TELEPHONE ENCOUNTER
Pt is requesting a refill Rx on \"Hydrocodone 30 tablets\". Pt has been experiencing bad headaches because of the weather.      Pt best contact number [phone]   Secondary number [phone]              Copy/paste Andree Francis

## 2018-07-30 ENCOUNTER — TELEPHONE (OUTPATIENT)
Dept: INTERNAL MEDICINE CLINIC | Age: 68
End: 2018-07-30

## 2018-07-30 RX ORDER — HYDROCODONE BITARTRATE AND ACETAMINOPHEN 5; 325 MG/1; MG/1
1 TABLET ORAL
Qty: 30 TAB | Refills: 0 | Status: SHIPPED | OUTPATIENT
Start: 2018-07-30 | End: 2018-08-29 | Stop reason: SDUPTHER

## 2018-08-01 RX ORDER — DABIGATRAN ETEXILATE MESYLATE 150 MG/1
CAPSULE ORAL
Qty: 60 CAP | Refills: 0 | Status: SHIPPED | OUTPATIENT
Start: 2018-08-01 | End: 2018-09-05 | Stop reason: SDUPTHER

## 2018-08-02 DIAGNOSIS — M79.7 FIBROMYALGIA: Primary | ICD-10-CM

## 2018-08-02 NOTE — TELEPHONE ENCOUNTER
Future Appointments  Date Time Provider Radha Yoon   8/15/2018 11:40 AM Jono Rosales MD [address]   10/4/2018 2:20 PM Coleen Stewart  [facility]   11/1/2018 11:30 AM Marybeth Moses MD [address]   5/14/2019 10:30 AM Acosta Ambrocio MD [facility]   5/14/2019 10:45 AM Acosta Ambrocio MD [address] [facility]                         Last Appointment My Department:  4/30/2018    Would you like to refill below? Requested Prescriptions     Pending Prescriptions Disp Refills    pregabalin (LYRICA) 200 mg capsule       Sig: Take 1 Cap by mouth two (2) times a day. Max Daily Amount: 400 mg.  Indications: Diabetic Peripheral Neuropathy

## 2018-08-02 NOTE — TELEPHONE ENCOUNTER
----- Message from Shelby Dukes sent at 8/2/2018  9:23 AM EDT -----  Regarding: Hegab/Rx  Pt is requesting a Rx for Lyrica. called to [facility].She stated she has no medication left. Pts number is [phone] and [facility] number is [phone].

## 2018-08-04 RX ORDER — PREGABALIN 100 MG/1
100 CAPSULE ORAL 2 TIMES DAILY
Qty: 60 CAP | Refills: 3 | Status: SHIPPED | OUTPATIENT
Start: 2018-08-04 | End: 2018-11-26 | Stop reason: SDUPTHER

## 2018-08-07 ENCOUNTER — DOCUMENTATION ONLY (OUTPATIENT)
Dept: NEUROLOGY | Age: 68
End: 2018-08-07

## 2018-08-09 ENCOUNTER — TELEPHONE (OUTPATIENT)
Dept: CARDIOLOGY CLINIC | Age: 68
End: 2018-08-09

## 2018-08-09 NOTE — TELEPHONE ENCOUNTER
Patient is asking if it is okay to use Latanoprost, eye drops and would Dr. Liza Simons be able to prescribe a diuretic for swelling. Please call to advise. Thanks!

## 2018-08-09 NOTE — TELEPHONE ENCOUNTER
Kristen Sheth, ANP   You 56 minutes ago (10:49 AM)                 Ok to use Latanoprost.   Defer diuretic to PCP (hx of normal EF)   Thanks (Routing comment)

## 2018-08-10 RX ORDER — LEVOTHYROXINE SODIUM 50 UG/1
TABLET ORAL
Qty: 90 TAB | Refills: 3 | Status: SHIPPED | OUTPATIENT
Start: 2018-08-10 | End: 2019-08-31 | Stop reason: SDUPTHER

## 2018-08-29 DIAGNOSIS — R52 PAIN: ICD-10-CM

## 2018-08-30 ENCOUNTER — TELEPHONE (OUTPATIENT)
Dept: INTERNAL MEDICINE CLINIC | Age: 68
End: 2018-08-30

## 2018-08-30 DIAGNOSIS — R52 PAIN: ICD-10-CM

## 2018-08-30 RX ORDER — HYDROCODONE BITARTRATE AND ACETAMINOPHEN 5; 325 MG/1; MG/1
1 TABLET ORAL
Qty: 30 TAB | Refills: 0 | Status: SHIPPED | OUTPATIENT
Start: 2018-08-30 | End: 2018-09-04 | Stop reason: SDUPTHER

## 2018-09-04 RX ORDER — HYDROCODONE BITARTRATE AND ACETAMINOPHEN 5; 325 MG/1; MG/1
1 TABLET ORAL
Qty: 30 TAB | Refills: 0 | Status: SHIPPED | OUTPATIENT
Start: 2018-09-04 | End: 2018-10-12 | Stop reason: SDUPTHER

## 2018-09-06 RX ORDER — DABIGATRAN ETEXILATE MESYLATE 150 MG/1
CAPSULE ORAL
Qty: 60 CAP | Refills: 0 | Status: SHIPPED | OUTPATIENT
Start: 2018-09-06 | End: 2018-10-03 | Stop reason: SDUPTHER

## 2018-09-22 DIAGNOSIS — L29.9 PRURITUS: ICD-10-CM

## 2018-09-24 RX ORDER — HYDROXYZINE 25 MG/1
TABLET, FILM COATED ORAL
Qty: 30 TAB | Refills: 1 | Status: SHIPPED | OUTPATIENT
Start: 2018-09-24 | End: 2018-10-15 | Stop reason: SDUPTHER

## 2018-10-04 RX ORDER — DABIGATRAN ETEXILATE MESYLATE 150 MG/1
CAPSULE ORAL
Qty: 60 CAP | Refills: 0 | Status: SHIPPED | OUTPATIENT
Start: 2018-10-04 | End: 2018-11-04 | Stop reason: SDUPTHER

## 2018-10-12 DIAGNOSIS — R52 PAIN: ICD-10-CM

## 2018-10-12 NOTE — TELEPHONE ENCOUNTER
Patient requests approval asap as she is out of meds. Please call when Hard Copy is ready for  & patient states a detailed message can be left if not answer on status of request. Patient reminded of [address] around on this type of refill Thank you

## 2018-10-15 ENCOUNTER — OFFICE VISIT (OUTPATIENT)
Dept: INTERNAL MEDICINE CLINIC | Age: 68
End: 2018-10-15

## 2018-10-15 VITALS
SYSTOLIC BLOOD PRESSURE: 121 MMHG | BODY MASS INDEX: 38.49 KG/M2 | HEART RATE: 81 BPM | WEIGHT: 231 LBS | OXYGEN SATURATION: 97 % | RESPIRATION RATE: 16 BRPM | HEIGHT: 65 IN | DIASTOLIC BLOOD PRESSURE: 72 MMHG | TEMPERATURE: 98.8 F

## 2018-10-15 DIAGNOSIS — E78.2 MIXED HYPERLIPIDEMIA: ICD-10-CM

## 2018-10-15 DIAGNOSIS — E55.9 VITAMIN D DEFICIENCY: ICD-10-CM

## 2018-10-15 DIAGNOSIS — R73.9 HYPERGLYCEMIA: ICD-10-CM

## 2018-10-15 DIAGNOSIS — E03.9 HYPOTHYROIDISM, UNSPECIFIED TYPE: ICD-10-CM

## 2018-10-15 DIAGNOSIS — I10 BENIGN ESSENTIAL HYPERTENSION: ICD-10-CM

## 2018-10-15 DIAGNOSIS — R60.9 SWELLING: Primary | ICD-10-CM

## 2018-10-15 DIAGNOSIS — L29.9 PRURITUS: ICD-10-CM

## 2018-10-15 RX ORDER — HYDROCODONE BITARTRATE AND ACETAMINOPHEN 5; 325 MG/1; MG/1
1 TABLET ORAL
Qty: 30 TAB | Refills: 0 | Status: SHIPPED | OUTPATIENT
Start: 2018-10-15 | End: 2018-11-14 | Stop reason: SDUPTHER

## 2018-10-15 RX ORDER — FUROSEMIDE 20 MG/1
20 TABLET ORAL
Qty: 30 TAB | Refills: 6 | Status: SHIPPED | OUTPATIENT
Start: 2018-10-15 | End: 2019-03-05

## 2018-10-15 NOTE — MR AVS SNAPSHOT
[address] [facility] 
[phone] Patient: Ashley Church MRN: [id number] QRD:[birth year] Visit Information Date & Time Provider Department Dept. Phone Encounter #  
 10/15/2018  1:30 PM Xin Potts, [address] [place] [address],[address] [phone] [id number] Your Appointments 11/1/2018 11:30 AM  
ROUTINE CARE with Xin Potts MD  
[address]) Appt Note: 6 month follow up  
 [address] [facility]  
[phone]  
  
   
 [address] [address]  
  
    
 11/26/2018 10:00 AM  
Follow Up with Robert Bernabe MD  
Neurology [facility] at [address]) Appt Note: Follow up stroke,lsw,04/30/18; Follow up stroke,lsw,04/30/18-tlw 7/5/18; Follow up stroke,lsw,04/30/18-tlw 7/5/18-8/13/18  
 [address] [place] [address], [address] [address]  
[address] [facility], [address] [place] [address], [address] [facility] [address] [address]  
  
    
 1/17/2019  2:20 PM  
New Patient with Megan Hurd MD  
[address] [facility] [place] ([address]) Appt Note: NP; referred by Dr. Reshma Centeno; last seen in 2013; NP; referred by Dr. Reshma Centeno; last seen in 2013  
 [address] [facility] [address] [phone] [address] [facility] [address] 5/14/2019 10:30 AM  
PROCEDURE with MD Kenn Linares [facility] [address] [place] [address]) [address]  
[phone] [address] 5/14/2019 10:45 AM  
ESTABLISHED PATIENT with MD Carlos Linares [facility] [address] [facility]) Appt Note: Dr Obie Nyhan and pm check [address] [facility] [address] [place] [address]  
[phone] [address] [facility] Upcoming Health Maintenance Date Due DTaP/Tdap/Td series (1 - Tdap) 1/8/1970 Shingrix Vaccine Age 50> (1 of 2) 1/8/1999 GLAUCOMA SCREENING Q2Y 1/8/2014 Bone Densitometry (Dexa) Screening 1/8/2014 Pneumococcal 65+ Low/Medium Risk (2 of 2 - PPSV23) 10/16/2017 MEDICARE YEARLY EXAM 3/14/2018 Influenza Age 5 to Adult 8/1/2018 BREAST CANCER SCRN MAMMOGRAM 6/1/2019 COLONOSCOPY 4/27/2028 Allergies as of 10/15/2018  Review Complete On: 10/15/2018 By: Jonathan Acosta MD  
  
 Severity Noted Reaction Type Reactions Latex Low 03/19/2014   Systemic Swelling Swelling of lips says patient Beef Containing Products  06/07/2018    Nausea and Vomiting  
 diarrhea Milk  06/07/2018    Nausea and Vomiting  
 diarrhea Pork Derived (Porcine)  06/07/2018    Nausea and Vomiting  
 diarrhea Sulfa (Sulfonamide Antibiotics) Low 03/23/2010   Topical Hives Xarelto [Rivaroxaban] Low 10/20/2014   Systemic Other (comments) GI problems Current Immunizations  Reviewed on 10/24/2017 Name Date Influenza Vaccine 10/23/2017, 10/25/2015, 10/1/2014 Influenza Vaccine (Quad) PF  Incomplete, 1/5/2017 Influenza Vaccine PF 11/15/2013 Influenza Vaccine Split 10/16/2012 ZZZ-RETIRED (DO NOT USE) Pneumococcal Vaccine (Unspecified Type) 10/16/2012 Not reviewed this visit You Were Diagnosed With   
  
 Codes Comments Swelling    -  Primary ICD-10-CM: R60.9 ICD-9-CM: 259. 3 Hypothyroidism, unspecified type     ICD-10-CM: E03.9 ICD-9-CM: 244.9 Hyperglycemia     ICD-10-CM: R73.9 ICD-9-CM: 790.29 Mixed hyperlipidemia     ICD-10-CM: E78.2 ICD-9-CM: 272.2 Benign essential hypertension     ICD-10-CM: I10 
ICD-9-CM: 401.1 Vitamin D deficiency     ICD-10-CM: E55.9 ICD-9-CM: 268.9 Vitals BP Pulse Temp Resp Height(growth percentile) Weight(growth percentile) 121/72 (BP 1 Location: Left arm, BP Patient Position: Sitting) 81 98.8 °F (37.1 °C) (Oral) 16 5' 5\" (1.651 m) 231 lb (104.8 kg) SpO2 BMI OB Status Smoking Status 97% 38.44 kg/m2 Hysterectomy Never Smoker Vitals History BMI and BSA Data Body Mass Index Body Surface Area  
 38.44 kg/m 2 2.19 m 2 Preferred Pharmacy Pharmacy Name Phone [id number]- [place], [place] - [address] [phone] Your Updated Medication List  
  
   
This list is accurate as of 10/15/18  1:55 PM.  Always use your most recent med list.  
  
  
  
  
 diclofenac EC 75 mg EC tablet Commonly known as:  VOLTAREN Take 75 mg by mouth two (2) times a day. ergocalciferol 50,000 unit capsule Commonly known as:  ERGOCALCIFEROL  
TAKE 1 CAP BY MOUTH EVERY SEVEN (7) DAYS. FLONASE NA  
by Nasal route. furosemide 20 mg tablet Commonly known as:  LASIX Take 1 Tab by mouth daily as needed. HYDROcodone-acetaminophen 5-325 mg per tablet Commonly known as:  Reida Mone Take 1 Tab by mouth every eight (8) hours as needed for Pain. Max Daily Amount: 3 Tabs. hydrOXYzine HCl 25 mg tablet Commonly known as:  ATARAX TAKE 1 TAB BY MOUTH THREE (3) TIMES DAILY AS NEEDED FOR ITCHING FOR UP TO 10 DAYS. lamoTRIgine 25 mg tablet Commonly known as: LaMICtal  
Take 50 mg by mouth daily. Indications: BIPOLAR DISORDER IN REMISSION  
  
 * levothyroxine 50 mcg tablet Commonly known as:  SYNTHROID Take 50 mcg by mouth daily. * levothyroxine 50 mcg tablet Commonly known as:  SYNTHROID  
TAKE 1 TABLET EVERY DAY  
  
 lithium carbonate 300 mg tablet Take 300 mg by mouth daily (with dinner). LOMOTIL 2.5-0.025 mg per tablet Generic drug:  diphenoxylate-atropine Take 1 Tab by mouth two (2) times daily as needed for Diarrhea.  
  
 naloxone 4 mg/actuation nasal spray Commonly known as:  [employer]  
 1 [place] by IntraNASal route as needed (respiratory depression). Give single spray into one nostril. Call 911. Give additional doses every 2 to 3 minutes alternating nostrils until assistance arrives using a new nasal spray with each dose, if patient does not respond or responds and then relapses. nitrofurantoin 100 mg capsule Commonly known as:  MACRODANTIN Take 100 mg by mouth daily. oxyCODONE IR 5 mg immediate release tablet Commonly known as:  Blanchie Roots Take 1 Tab by mouth every four (4) hours as needed. Max Daily Amount: 30 mg. PRADAXA 150 mg capsule Generic drug:  dabigatran etexilate TAKE ONE CAPSULE BY MOUTH TWICE A DAY  
  
 pregabalin 100 mg capsule Commonly known as:  Quin Sunny Take 1 Cap by mouth two (2) times a day. Max Daily Amount: 200 mg. Indications: Diabetic Peripheral Neuropathy  
  
 senna-docusate 8.6-50 mg per tablet Commonly known as:  Esteban Mix Take 1 Tab by mouth daily. TOPAMAX 100 mg tablet Generic drug:  topiramate Take 200 mg by mouth nightly. XANAX 0.5 mg tablet Generic drug:  ALPRAZolam  
Take 0.5 mg by mouth nightly as needed for Anxiety. ZYRTEC PO Take  by mouth. * Notice: This list has 2 medication(s) that are the same as other medications prescribed for you. Read the directions carefully, and ask your doctor or other care provider to review them with you. Prescriptions Printed Refills  
 furosemide (LASIX) 20 mg tablet 6 Sig: Take 1 Tab by mouth daily as needed. Class: Print Route: Oral  
  
We Performed the Following CBC WITH AUTOMATED DIFF [47933 CPT(R)] HEMOGLOBIN A1C WITH EAG [80347 CPT(R)] LIPID PANEL [69290 CPT(R)] METABOLIC PANEL, COMPREHENSIVE [17103 CPT(R)] T4, FREE F2985176 CPT(R)] TSH 3RD GENERATION [93683 CPT(R)] VITAMIN D, 25 HYDROXY [id number] CPT(R)] Introducing [facility]! Dear Neva Dear: Thank you for requesting a [employer] account. Our records indicate that you already have an active [employer] account. You can access your account anytime at [email or web address] Did you know that you can access your hospital and ER discharge instructions at any time in [employer]? You can also review all of your test results from your hospital stay or ER visit. Additional Information If you have questions, please visit the Frequently Asked Questions section of the [employer] website at [email or web address] Remember, [employer] is NOT to be used for urgent needs. For medical emergencies, dial 911. Now available from your iPhone and Android! Please provide this summary of care documentation to your next provider. Your primary care clinician is listed as [place]. If you have any questions after today's visit, please call [phone].

## 2018-10-15 NOTE — PROGRESS NOTES
SUBJECTIVE Ms. Navarro Garay presents today acutely for Chief Complaint Patient presents with  Swelling  
  pt here today c/o swelling in feet and hands; pt wants to discuss fluid medication \"Even as a young woman I've had trouble with swelling. \" Finds that she swells up if she walks. \"My hands are swelling, not just my feet. \" Requesting diuretic. OBJECTIVE Visit Vitals  /72 (BP 1 Location: Left arm, BP Patient Position: Sitting)  Pulse 81  Temp 98.8 °F (37.1 °C) (Oral)  Resp 16  
 Ht 5' 5\" (1.651 m)  Wt 231 lb (104.8 kg)  SpO2 97%  BMI 38.44 kg/m2 Gen: Pleasant 70 y.o.  female in NAD.    HEART: RRR, No M/G/R.   LUNGS: CTAB No W/R.   ABDOMEN: S, NT, ND, BS+.   EXTREMITIES: Warm. She has some edema of LE and wrists are puffy. SKIN: Warm. Dry. No rashes or other lesions noted. ASSESSMENT / PLAN 
  ICD-10-CM ICD-9-CM 1. Swelling W86.8 809.2 METABOLIC PANEL, COMPREHENSIVE  
   LIPID PANEL  
   CBC WITH AUTOMATED DIFF  
   TSH 3RD GENERATION  
   T4, FREE I have reviewed with the patient details of the assessment and plan and all questions were answered. Relevant patient education was performed. Follow-up Disposition: As planned. I will order labs in anticipation of next month's visit.

## 2018-10-15 NOTE — PROGRESS NOTES
1. Have you been to the ER, urgent care clinic since your last visit? Hospitalized since your last visit?no 2. Have you seen or consulted any other health care providers outside of the [address] [place] [address] since your last visit? Include any pap smears or colon screening.  no

## 2018-10-16 RX ORDER — HYDROXYZINE 25 MG/1
TABLET, FILM COATED ORAL
Qty: 30 TAB | Refills: 1 | Status: SHIPPED | OUTPATIENT
Start: 2018-10-16 | End: 2019-03-05

## 2018-11-05 RX ORDER — DABIGATRAN ETEXILATE MESYLATE 150 MG/1
CAPSULE ORAL
Qty: 60 CAP | Refills: 0 | Status: SHIPPED | OUTPATIENT
Start: 2018-11-05 | End: 2019-03-11 | Stop reason: SDUPTHER

## 2018-11-06 ENCOUNTER — APPOINTMENT (OUTPATIENT)
Dept: INTERNAL MEDICINE CLINIC | Age: 68
End: 2018-11-06

## 2018-11-07 LAB
25(OH)D3+25(OH)D2 SERPL-MCNC: 28.7 NG/ML (ref 30–100)
ALBUMIN SERPL-MCNC: 4.2 G/DL (ref 3.6–4.8)
ALBUMIN/GLOB SERPL: 2.1 {RATIO} (ref 1.2–2.2)
ALP SERPL-CCNC: 75 IU/L (ref 39–117)
ALT SERPL-CCNC: 17 IU/L (ref 0–32)
AST SERPL-CCNC: 15 IU/L (ref 0–40)
BASOPHILS # BLD AUTO: 0 X10E3/UL (ref 0–0.2)
BASOPHILS NFR BLD AUTO: 1 %
BILIRUB SERPL-MCNC: 0.4 MG/DL (ref 0–1.2)
BUN SERPL-MCNC: 15 MG/DL (ref 8–27)
BUN/CREAT SERPL: 18 (ref 12–28)
CALCIUM SERPL-MCNC: 9.8 MG/DL (ref 8.7–10.3)
CHLORIDE SERPL-SCNC: 108 MMOL/L (ref 96–106)
CHOLEST SERPL-MCNC: 213 MG/DL (ref 100–199)
CO2 SERPL-SCNC: 21 MMOL/L (ref 20–29)
CREAT SERPL-MCNC: 0.84 MG/DL (ref 0.57–1)
EOSINOPHIL # BLD AUTO: 0.2 X10E3/UL (ref 0–0.4)
EOSINOPHIL NFR BLD AUTO: 4 %
ERYTHROCYTE [DISTWIDTH] IN BLOOD BY AUTOMATED COUNT: 13.8 % (ref 12.3–15.4)
EST. AVERAGE GLUCOSE BLD GHB EST-MCNC: 111 MG/DL
GLOBULIN SER CALC-MCNC: 2 G/DL (ref 1.5–4.5)
GLUCOSE SERPL-MCNC: 99 MG/DL (ref 65–99)
HBA1C MFR BLD: 5.5 % (ref 4.8–5.6)
HCT VFR BLD AUTO: 44.4 % (ref 34–46.6)
HDLC SERPL-MCNC: 46 MG/DL
HGB BLD-MCNC: 14.4 G/DL (ref 11.1–15.9)
IMM GRANULOCYTES # BLD: 0 X10E3/UL (ref 0–0.1)
IMM GRANULOCYTES NFR BLD: 0 %
LDLC SERPL CALC-MCNC: 125 MG/DL (ref 0–99)
LYMPHOCYTES # BLD AUTO: 1.9 X10E3/UL (ref 0.7–3.1)
LYMPHOCYTES NFR BLD AUTO: 35 %
MCH RBC QN AUTO: 30.6 PG (ref 26.6–33)
MCHC RBC AUTO-ENTMCNC: 32.4 G/DL (ref 31.5–35.7)
MCV RBC AUTO: 95 FL (ref 79–97)
MONOCYTES # BLD AUTO: 0.4 X10E3/UL (ref 0.1–0.9)
MONOCYTES NFR BLD AUTO: 8 %
NEUTROPHILS # BLD AUTO: 2.8 X10E3/UL (ref 1.4–7)
NEUTROPHILS NFR BLD AUTO: 52 %
PLATELET # BLD AUTO: 254 X10E3/UL (ref 150–379)
POTASSIUM SERPL-SCNC: 4.4 MMOL/L (ref 3.5–5.2)
PROT SERPL-MCNC: 6.2 G/DL (ref 6–8.5)
RBC # BLD AUTO: 4.7 X10E6/UL (ref 3.77–5.28)
SODIUM SERPL-SCNC: 142 MMOL/L (ref 134–144)
T4 FREE SERPL-MCNC: 0.96 NG/DL (ref 0.82–1.77)
TRIGL SERPL-MCNC: 212 MG/DL (ref 0–149)
TSH SERPL DL<=0.005 MIU/L-ACNC: 2.43 UIU/ML (ref 0.45–4.5)
VLDLC SERPL CALC-MCNC: 42 MG/DL (ref 5–40)
WBC # BLD AUTO: 5.4 X10E3/UL (ref 3.4–10.8)

## 2018-11-08 RX ORDER — DABIGATRAN ETEXILATE MESYLATE 150 MG/1
CAPSULE ORAL
Qty: 60 CAP | Refills: 0 | Status: SHIPPED | OUTPATIENT
Start: 2018-11-08 | End: 2018-12-27 | Stop reason: SDUPTHER

## 2018-11-14 DIAGNOSIS — R52 PAIN: ICD-10-CM

## 2018-11-14 NOTE — TELEPHONE ENCOUNTER
Pt is requesting a refill on Rx \"Hydrocodone\" 5/325mg. Please call when it is ready for pickup. Best contact is [phone].        Copy/paste Envera

## 2018-11-15 RX ORDER — HYDROCODONE BITARTRATE AND ACETAMINOPHEN 5; 325 MG/1; MG/1
1 TABLET ORAL
Qty: 30 TAB | Refills: 0 | Status: SHIPPED | OUTPATIENT
Start: 2018-11-15 | End: 2018-12-19 | Stop reason: SDUPTHER

## 2018-11-16 ENCOUNTER — TELEPHONE (OUTPATIENT)
Dept: INTERNAL MEDICINE CLINIC | Age: 68
End: 2018-11-16

## 2018-11-16 DIAGNOSIS — Z79.891 LONG TERM CURRENT USE OF OPIATE ANALGESIC: ICD-10-CM

## 2018-11-16 DIAGNOSIS — R52 PAIN MANAGEMENT: Primary | ICD-10-CM

## 2018-11-16 NOTE — TELEPHONE ENCOUNTER
Pt informed that script for hydrocodone is ready for  and that a urine drug test/controlled substance contract will be expected upon  of his script. Pt understood. Pt informed to ask for Kathy or  when she arrives - script will be placed in Kathy's desk until urine is collected/contract completed.

## 2018-11-16 NOTE — TELEPHONE ENCOUNTER
Verbal order from Dr. Abiel Angeles placed for Compliance drug screen was placed in system. Patient also needs Controlled Substance Agreement.

## 2018-11-20 ENCOUNTER — TELEPHONE (OUTPATIENT)
Dept: INTERNAL MEDICINE CLINIC | Age: 68
End: 2018-11-20

## 2018-11-20 ENCOUNTER — OFFICE VISIT (OUTPATIENT)
Dept: INTERNAL MEDICINE CLINIC | Age: 68
End: 2018-11-20

## 2018-11-20 VITALS
SYSTOLIC BLOOD PRESSURE: 105 MMHG | TEMPERATURE: 98 F | BODY MASS INDEX: 38.49 KG/M2 | HEART RATE: 77 BPM | DIASTOLIC BLOOD PRESSURE: 81 MMHG | OXYGEN SATURATION: 95 % | RESPIRATION RATE: 18 BRPM | WEIGHT: 231 LBS | HEIGHT: 65 IN

## 2018-11-20 DIAGNOSIS — E55.9 VITAMIN D DEFICIENCY: ICD-10-CM

## 2018-11-20 DIAGNOSIS — Z23 ENCOUNTER FOR IMMUNIZATION: Primary | ICD-10-CM

## 2018-11-20 DIAGNOSIS — E03.9 HYPOTHYROIDISM, UNSPECIFIED TYPE: ICD-10-CM

## 2018-11-20 DIAGNOSIS — E78.2 MIXED HYPERLIPIDEMIA: ICD-10-CM

## 2018-11-20 DIAGNOSIS — R73.9 HYPERGLYCEMIA: ICD-10-CM

## 2018-11-20 NOTE — PROGRESS NOTES
Subjective:  Ms. Saman Dumont is here for follow up. Chief Complaint   Patient presents with    Hypertension     pt here today for 6 month f.u     Immunization/Injection     pt wants a flu shot       Dr. Abbi Ferraro is seeing her for her foot pain. Her L foot is in a boot, and \"I have several torn tendons. \"   A fib:  She has had two EP studies in 2014, and abalation for a fib, with Dr. Vinod Oglesby. She also sees Dr. Anju Reed. Sees Dr. William Parra for GI issues/GERD. On PPI. Blood testing has revealed allergies to pork, beef, egg white, milk. She has been referred by Patti Nguyễn in Dr. Narendra Inman office, to go see an allergist.  She is concerned about weight, which is going up. Insomnia is a little better. ASHELY now on CPAP. Has trouble tolerating the mask--takes it off early in the morning. Depression. Stable. Currently following with NP. Also Jaden Jeff, psychologist.   Migraines: Getting a bit better. \"Dr. Jacinta Arevalo gave me a new medicine, but it made me sick. \"   Having a frequent unilateral HA, associated with photophobia and phonophobia. Tried botox injections in scalp, for migraines--\"it didn't work. \" She had been treated by Dr. Tavia Cool, neurologist, with meds including topamax and hydrocodone. Just about the only thing that has worked for her for abortive therapy is hydrocodone. Dr. Jacinta Arevalo informed her in Dec that he can't prescribe narcotics anymore, \"and he explained what's going on with the government and everything. \"    Gets botox injections into bladder--\"It's amazing\". She is not having much urinary incontinence. Dr. Vega Silva sees her. PMH: Anxiety, Depression/BPAD (seeing a psychiatrist; hospiatalized in 2007), Migraines (seeing Dr. Jacinta Arevalo), hypothyroidism, hyperlipidemia, A fib (sees Dr. Anju Reed and Dr. Vinod Oglesby). Vit D deficiency. Colon polyps--2009 [place] Almodovar. Hand pain s/p injections by Dr. Erick Ellington. GERD--Sees Dr. William Parra. Obesity. PSH: Hysterectomy due to endometriosis. Bunionectomy, neuroma. Carpal tunnel surgery. EP ablation x 2. Colon polypectomy in 2009. Sigmoidoscopy in 2015. EGD and dil[facility] Ventral hernia repair. All: Sulfa--hives. Had a reaction to one of the meds for BPAD prior to starting lithium. Meds:    Current Outpatient Medications on File Prior to Visit   Medication Sig Dispense Refill    HYDROcodone-acetaminophen (NORCO) 5-325 mg per tablet Take 1 Tab by mouth every eight (8) hours as needed for Pain. Max Daily Amount: 3 Tabs. 30 Tab 0    PRADAXA 150 mg capsule TAKE ONE CAPSULE BY MOUTH TWICE A DAY 60 Cap 0    PRADAXA 150 mg capsule TAKE ONE CAPSULE BY MOUTH TWICE A DAY 60 Cap 0    hydrOXYzine HCl (ATARAX) 25 mg tablet TAKE 1 TAB BY MOUTH THREE (3) TIMES DAILY AS NEEDED FOR ITCHING FOR UP TO 10 DAYS. 30 Tab 1    fluticasone propionate (FLONASE NA) by Nasal route.  cetirizine HCl (ZYRTEC PO) Take  by mouth.  furosemide (LASIX) 20 mg tablet Take 1 Tab by mouth daily as needed. 30 Tab 6    levothyroxine (SYNTHROID) 50 mcg tablet TAKE 1 TABLET EVERY DAY 90 Tab 3    pregabalin (LYRICA) 100 mg capsule Take 1 Cap by mouth two (2) times a day. Max Daily Amount: 200 mg. Indications: Diabetic Peripheral Neuropathy 60 Cap 3    levothyroxine (SYNTHROID) 50 mcg tablet Take 50 mcg by mouth daily.  nitrofurantoin (MACRODANTIN) 100 mg capsule Take 100 mg by mouth daily.  oxyCODONE IR (ROXICODONE) 5 mg immediate release tablet Take 1 Tab by mouth every four (4) hours as needed. Max Daily Amount: 30 mg. (Patient taking differently: Take 1 Tab by mouth every four (4) hours as needed for Pain.) 1 Tab 0    diphenoxylate-atropine (LOMOTIL) 2.5-0.025 mg per tablet Take 1 Tab by mouth two (2) times daily as needed for Diarrhea.  diclofenac EC (VOLTAREN) 75 mg EC tablet Take 75 mg by mouth two (2) times a day.  lamoTRIgine (LAMICTAL) 25 mg tablet Take 50 mg by mouth daily.  Indications: BIPOLAR DISORDER IN REMISSION      ergocalciferol (ERGOCALCIFEROL) 50,000 unit capsule TAKE 1 CAP BY MOUTH EVERY SEVEN (7) DAYS. 12 Cap 3    topiramate (TOPAMAX) 100 mg tablet Take 200 mg by mouth nightly.  lithium 300 mg tablet Take 300 mg by mouth daily (with dinner).  alprazolam (XANAX) 0.5 mg tablet Take 0.5 mg by mouth nightly as needed for Anxiety.  naloxone (NARCAN) 4 mg/actuation nasal spray 1 Beverly by IntraNASal route as needed (respiratory depression). Give single spray into one nostril. Call 911. Give additional doses every 2 to 3 minutes alternating nostrils until assistance arrives using a new nasal spray with each dose, if patient does not respond or responds and then relapses. (Patient not taking: Reported on 6/10/2018) 1 Each 0    senna-docusate (PERICOLACE) 8.6-50 mg per tablet Take 1 Tab by mouth daily. 30 Tab 0     No current facility-administered medications on file prior to visit. 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 FH:  Father, colon cancer. Mother, stroke. Grandmother, DM.  ROS: Otherwise negative except as noted elsewhere. Has a chronic burning sensation in her mouth and neck. Objective:  Vitals:   Visit Vitals  /81 (BP 1 Location: Right arm, BP Patient Position: Sitting)   Pulse 77   Temp 98 °F (36.7 °C) (Oral)   Resp 18   Ht 5' 5\" (1.651 m)   Wt 231 lb (104.8 kg)   SpO2 95%   BMI 38.44 kg/m²   . General appearance: Pleasant obese middle aged white female in NAD. HEENT: PERRLA. EOMI. Neck: Supple with No LAD. Lungs: CTAB. No wheezes. No rales. Heart: Irregular. Abdomen: S; NT, ND, BS +. Extremities: Warm. No C/C/E. Neuro: Nonfocal.     Assessment/Plan:    1. Hypothyroidism: Clinically euthyroid. Continue synthroid. 2. Atrial Fibrillation: F/U as per Dr. Steven Hanson.    3. HLP:  Continue OTC fish oil. Also encouraged lifestyle efforts. 4. Migraines: Had been seeing Dr. Mae Hyatt. Has prn triptan, and topamax, and prn oxycodone. 5. BPAD/Anxiety/Stress. Continue psychiatry follow up. Maxwell Tijerina, psychology. 6. Urge incontinence: Ongoing. Continue f/u with Dr. Odilon Stevenson. 7. Hand pain: F/U as per Dr. Thalia Fine. 8. Epigastric burning / GERD: On Prilosec OTC. Follows with Dr. Daniela Stratton. 9. Hyperglycemia: Recheck labs; She has in the past met criteria for DM (fasting glc > 125 on more than 1 occasion); A1C okay today. For now, work on diet and exercise. 10. Edema: On daily lasix.      Follow up in 6 months

## 2018-11-26 ENCOUNTER — OFFICE VISIT (OUTPATIENT)
Dept: NEUROLOGY | Age: 68
End: 2018-11-26

## 2018-11-26 VITALS
DIASTOLIC BLOOD PRESSURE: 74 MMHG | OXYGEN SATURATION: 98 % | HEIGHT: 65 IN | SYSTOLIC BLOOD PRESSURE: 122 MMHG | BODY MASS INDEX: 38.49 KG/M2 | HEART RATE: 84 BPM | WEIGHT: 231 LBS

## 2018-11-26 DIAGNOSIS — G43.719 INTRACTABLE CHRONIC MIGRAINE WITHOUT AURA AND WITHOUT STATUS MIGRAINOSUS: ICD-10-CM

## 2018-11-26 DIAGNOSIS — E03.9 ACQUIRED HYPOTHYROIDISM: ICD-10-CM

## 2018-11-26 DIAGNOSIS — G25.0 ESSENTIAL TREMOR: Primary | ICD-10-CM

## 2018-11-26 DIAGNOSIS — M79.7 FIBROMYALGIA: ICD-10-CM

## 2018-11-26 DIAGNOSIS — E78.2 MIXED HYPERLIPIDEMIA: ICD-10-CM

## 2018-11-26 RX ORDER — PREGABALIN 100 MG/1
100 CAPSULE ORAL 2 TIMES DAILY
Qty: 60 CAP | Refills: 5 | Status: SHIPPED | OUTPATIENT
Start: 2018-11-26 | End: 2018-12-28 | Stop reason: SDUPTHER

## 2018-11-26 RX ORDER — PRIMIDONE 50 MG/1
25 TABLET ORAL
Qty: 15 TAB | Refills: 3 | Status: SHIPPED | OUTPATIENT
Start: 2018-11-26 | End: 2019-02-20 | Stop reason: SDUPTHER

## 2018-11-26 NOTE — PATIENT INSTRUCTIONS
A Healthy Lifestyle: Care Instructions  Your Care Instructions    A healthy lifestyle can help you feel good, stay at a healthy weight, and have plenty of energy for both work and play. A healthy lifestyle is something you can share with your whole family. A healthy lifestyle also can lower your risk for serious health problems, such as high blood pressure, heart disease, and diabetes. You can follow a few steps listed below to improve your health and the health of your family. Follow-up care is a key part of your treatment and safety. Be sure to make and go to all appointments, and call your doctor if you are having problems. It's also a good idea to know your test results and keep a list of the medicines you take. How can you care for yourself at home? · Do not eat too much sugar, fat, or fast foods. You can still have dessert and treats now and then. The goal is moderation. · Start small to improve your eating habits. Pay attention to portion sizes, drink less juice and soda pop, and eat more fruits and vegetables. ? Eat a healthy amount of food. A 3-ounce serving of meat, for example, is about the size of a deck of cards. Fill the rest of your plate with vegetables and whole grains. ? Limit the amount of soda and sports drinks you have every day. Drink more water when you are thirsty. ? Eat at least 5 servings of fruits and vegetables every day. It may seem like a lot, but it is not hard to reach this goal. A serving or helping is 1 piece of fruit, 1 cup of vegetables, or 2 cups of leafy, raw vegetables. Have an apple or some carrot sticks as an afternoon snack instead of a candy bar. Try to have fruits and/or vegetables at every meal.  · Make exercise part of your daily routine. You may want to start with simple activities, such as walking, bicycling, or slow swimming. Try to be active 30 to 60 minutes every day. You do not need to do all 30 to 60 minutes all at once.  For example, you can exercise 3 times a day for 10 or 20 minutes. Moderate exercise is safe for most people, but it is always a good idea to talk to your doctor before starting an exercise program.  · Keep moving. Segundo Maggie the lawn, work in the garden, or [employer]. Take the stairs instead of the elevator at work. · If you smoke, quit. People who smoke have an increased risk for heart attack, stroke, cancer, and other lung illnesses. Quitting is hard, but there are ways to boost your chance of quitting tobacco for good. ? Use nicotine gum, patches, or lozenges. ? Ask your doctor about stop-smoking programs and medicines. ? Keep trying. In addition to reducing your risk of diseases in the future, you will notice some benefits soon after you stop using tobacco. If you have shortness of breath or asthma symptoms, they will likely get better within a few weeks after you quit. · Limit how much alcohol you drink. Moderate amounts of alcohol (up to 2 drinks a day for men, 1 drink a day for women) are okay. But drinking too much can lead to liver problems, high blood pressure, and other health problems. Family health  If you have a family, there are many things you can do together to improve your health. · Eat meals together as a family as often as possible. · Eat healthy foods. This includes fruits, vegetables, lean meats and dairy, and whole grains. · Include your family in your fitness plan. Most people think of activities such as jogging or tennis as the way to fitness, but there are many ways you and your family can be more active. Anything that makes you breathe hard and gets your heart pumping is exercise. Here are some tips:  ? Walk to do errands or to take your child to school or the bus.  ? Go for a family bike ride after dinner instead of watching TV. Where can you learn more? Go to http://roge-suha.info/. Enter J299 in the search box to learn more about \"A Healthy Lifestyle: Care Instructions. \"  Current as of: December 7, 2017  Content Version: 11.8  © 3619-1788 [employer]wise, [employer]. Care instructions adapted under license by [employer] (which disclaims liability or warranty for this information). If you have questions about a medical condition or this instruction, always ask your healthcare professional. [address] any warranty or liability for your use of this information. Office Policies        Phone calls/patient messages:    · Please allow up to 24 hours for someone in the office to contact you about your call or message. Be mindful your provider may be out of the office or your message may require further review. We encourage you to use [employer] for your messages as this is a faster, more efficient way to communicate with our office           Medication Refills:    · Prescription medications require up to 48 business hours to process. We encourage you to use [employer] for your refills. · For controlled medications: Please allow up to 72 business hours to process. Certain medications may require you to  a written prescription at our office. · NO narcotic/controlled medications will be prescribed after 4pm Monday through Friday or on weekends              Form/Paperwork Completion:    · Please note there is a $25 fee for all paperwork completed by our providers. We ask that you allow 7-14 business days. Pre-payment is due prior to picking up/faxing the completed form. You may also download your forms to [employer] to have your doctor print off. Please note our office is moving to a new location at the end of December 2018. It will be at:  [address] [place] [address] 2 [address] [facility], [facility] [address]    Please contact the office in one month to set up your next appointment. Thank you for your patience during this time.

## 2018-11-26 NOTE — PROGRESS NOTES
Chief Complaint: Headaches    Returns for follow up. Headaches are better. She believes she is getting sinus migraines which have all the symptoms except the head pain. Headache is int he back of the head and the neck. She gets these once every 10 days. They are relieved by hydrocodone,   She finds the lyrica is expensive but helps with her fibromyalgia and her tremor. She does not want to add to her financial burden    Assesment and Plan    1. Essential tremor  Start primidone    2. Acquired hypothyroidism  Continue synthroid    3. Atrial fib  Continue pradaxa    4. Fibromyalgia  Continue lyrica    5. Intractable chronic migraine without aura and without status migrainosus  Continue topamax    6. Alphagal   Resolved      1. Allergies  Latex; Beef containing products; Milk; Pork derived (porcine); Sulfa (sulfonamide antibiotics); and Xarelto [rivaroxaban]     Medications  Medication Sig    HYDROcodone-acetaminophen (NORCO)  mg tablet Take 1 Tab by mouth every twelve (12) hours every twelve (12) hours. Max Daily Amount: 2 Tabs.  PRADAXA 150 mg capsule TAKE ONE CAPSULE TWICE A DAY    furosemide (LASIX) 40 mg tablet take 1 tablet by mouth once daily    ergocalciferol (ERGOCALCIFEROL) 50,000 unit capsule Take 1 Cap by mouth every seven (7) days.  levothyroxine (SYNTHROID) 50 mcg tablet take 1 tablet by mouth once daily    nitrofurantoin, macrocrystal-monohydrate, (MACROBID) 100 mg capsule Take 100 mg by mouth daily.  zolpidem (AMBIEN) 5 mg tablet Take  by mouth nightly as needed for Sleep.  oxybutynin chloride XL (DITROPAN XL) 10 mg CR tablet Take 10 mg by mouth daily.  esomeprazole (NEXIUM) 40 mg capsule Take 40 mg by mouth daily.  DULoxetine (CYMBALTA) 30 mg capsule Take 60 mg by mouth daily.  diphenoxylate-atropine (LOMOTIL) 2.5-0.025 mg per tablet Take  by mouth four (4) times daily as needed for Diarrhea.  topiramate (TOPAMAX) 100 mg tablet Take 100 mg by mouth nightly.     lithium 300 mg tablet Take 400 mg by mouth nightly.  pregabalin (LYRICA) 75 mg capsule Take 150 mg by mouth daily (with dinner).  alprazolam (XANAX) 0.5 mg tablet Take 0.5 mg by mouth two (2) times a day. Medical History  Past Medical History   Diagnosis Date    Arrhythmia      atrial fibrillation 2014    Arthritis     Bipolar affective disorder ([facility] [address] 3/31/2010    Colon polyps 3/31/2010    Diarrhea 7/19/2013    Epigastric pain 6/5/2015    GERD (gastroesophageal reflux disease)      occasiional only; controlled with med    Hyperlipidemia 3/31/2010    Hypothyroidism 3/31/2010    Migraine 3/31/2010    S/P ablation of atrial fibrillation 5/13/2014    S/P cardiac pacemaker procedure 9/16/2014 9/16/14 Medtronic MRI compatible dual chamber pacemaker implant    Thyroid disease     Tick-borne disease     Unspecified sleep apnea      \"slight\" uses mouth device    Urge incontinence 3/31/2010    Vitamin D deficiency 3/31/2015       Review of Systems  Neuro: positive for frequent headaches, negative for  dizziness, seizures,   memory problems, paresthesia,  weakness positive for essential tremor  Constitutional: negative for fevers, chills positive for   fatigue  MSKL:positive for myalgias, arthralgias, and neck pain  BHV: positive for anxiety, and insomnia    Exam:    Visit Vitals  /74   Pulse 84   Ht 5' 5\" (1.651 m)   Wt 231 lb (104.8 kg)   SpO2 98%   BMI 38.44 kg/m²        Physical Exam   Constitutional: She is oriented to person, place, and time and well-developed, well-nourished, and in no distress. HENT:   Head: Normocephalic and atraumatic. Eyes: EOM are normal. Pupils are equal, round, and reactive to light. Neck: Normal range of motion. Neck supple. No JVD present. Carotid bruit is not present. No thyromegaly present. Cardiovascular: Normal rate, regular rhythm and normal heart sounds. Pulmonary/Chest: Effort normal and breath sounds normal.   Abdominal: Soft.  Bowel sounds are normal.   Neurological: She is alert and oriented to person, place, and time. She has normal strength and normal reflexes.    Psychiatric: Affect and judgment normal.       Lab Review    Lab Results   Component Value Date/Time    WBC 6.4 08/08/2016 10:09 AM    HCT 43.6 08/08/2016 10:09 AM    HGB 14.1 08/08/2016 10:09 AM    PLATELET 063 06/05/1361 10:09 AM       Lab Results   Component Value Date/Time    SODIUM 143 08/08/2016 10:09 AM    POTASSIUM 4.3 08/08/2016 10:09 AM    CHLORIDE 107 08/08/2016 10:09 AM    CO2 24 08/08/2016 10:09 AM    GLUCOSE 99 08/08/2016 10:09 AM    BUN 15 08/08/2016 10:09 AM    CREATININE 0.71 08/08/2016 10:09 AM    CALCIUM 10.0 08/08/2016 10:09 AM       Lab Results   Component Value Date/Time    HEMOGLOBIN A1C 5.8 08/08/2016 10:09 AM    HEMOGLOBIN A1C  5.9 04/28/2014 04:04 PM        Lab Results   Component Value Date/Time    CHOLESTEROL,  201 08/08/2016 10:09 AM    HDL CHOLESTEROL 48 08/08/2016 10:09 AM    LDL,DIRECT 124 03/30/2010 08:40 AM    LDL, CALCULATED 101 08/08/2016 10:09 AM    VLDL, CALCULATED 52 08/08/2016 10:09 AM    TRIGLYCERIDE 258 08/08/2016 10:09 AM    CHOL/HDL RATIO 5.3 03/30/2010 08:40 AM

## 2018-11-27 LAB — DRUGS UR: NORMAL

## 2018-11-29 ENCOUNTER — TELEPHONE (OUTPATIENT)
Dept: INTERNAL MEDICINE CLINIC | Age: 68
End: 2018-11-29

## 2018-11-29 NOTE — TELEPHONE ENCOUNTER
Pt returned the call to office regarding \"Urine test\" results.      Best [phone]       Message received & copied form ENVERA

## 2018-12-19 DIAGNOSIS — R52 PAIN: ICD-10-CM

## 2018-12-19 NOTE — TELEPHONE ENCOUNTER
Pt is requesting a refill for her Rx \"hydrocodone\". Pt has 5 pills remaining.        Message received & copied from [facility]

## 2018-12-20 RX ORDER — HYDROCODONE BITARTRATE AND ACETAMINOPHEN 5; 325 MG/1; MG/1
1 TABLET ORAL
Qty: 30 TAB | Refills: 0 | Status: SHIPPED | OUTPATIENT
Start: 2018-12-20 | End: 2019-01-21 | Stop reason: SDUPTHER

## 2018-12-28 DIAGNOSIS — M79.7 FIBROMYALGIA: ICD-10-CM

## 2018-12-28 RX ORDER — DABIGATRAN ETEXILATE MESYLATE 150 MG/1
CAPSULE ORAL
Qty: 60 CAP | Refills: 0 | Status: SHIPPED | OUTPATIENT
Start: 2018-12-28 | End: 2019-02-09 | Stop reason: SDUPTHER

## 2018-12-28 NOTE — TELEPHONE ENCOUNTER
Future Appointments   Date Time Provider Department Center   1/17/2019  2:20 PM Kee Green  [facility]   5/14/2019 10:30 AM [facility] Silva [facility]   5/14/2019 10:45 AM Jeannine Silva MD [facility]                         Last Appointment My Department:  Visit date not found    Would you like to refill below? Requested Prescriptions     Pending Prescriptions Disp Refills    pregabalin (LYRICA) 100 mg capsule 60 Cap 5     Sig: Take 1 Cap by mouth two (2) times a day. Max Daily Amount: 200 mg.

## 2018-12-28 NOTE — TELEPHONE ENCOUNTER
----- Message from Diamond Potter sent at 12/28/2018 12:54 PM EST -----  Regarding: Dr. Akanksha Dillard  Pt requesting a refill of medication \"Lyrica\". [facility] this week with no response from the office. Pt stated, she is out of medication now. Best contact number [phone].

## 2018-12-29 RX ORDER — PREGABALIN 100 MG/1
100 CAPSULE ORAL 2 TIMES DAILY
Qty: 60 CAP | Refills: 5 | Status: SHIPPED | OUTPATIENT
Start: 2018-12-29 | End: 2019-06-11 | Stop reason: SDUPTHER

## 2018-12-29 NOTE — TELEPHONE ENCOUNTER
Please advise patient to not take diclofenac with pradaxa as it can increase risk of bleeding ( internal bleeding)

## 2018-12-31 ENCOUNTER — TELEPHONE (OUTPATIENT)
Dept: NEUROLOGY | Age: 68
End: 2018-12-31

## 2018-12-31 NOTE — TELEPHONE ENCOUNTER
----- Message from Alta Pena sent at 12/31/2018  8:29 AM EST -----  Regarding: Dr. Asif/Refill/Telephone  Pt requested a refill for Lyrica 100 mg and stated she declined previous Rx due to an impulsive decision caused by Bipolar disorder. Pt uses the [facility] on file. Best contact [phone].

## 2018-12-31 NOTE — TELEPHONE ENCOUNTER
Called [facility] and spoke with Eloisa; called in script for Lyrica   pregabalin (LYRICA) 100 mg capsule [[id number]]   Order Details   Dose: 100 mg Route: Oral Frequency: 2 TIMES DAILY   Indications of Use: Diabetic Peripheral Neuropathy   Dispense Quantity: 60 Cap Refills: 5 Fills remaining: --           Sig: Take 1 Cap by mouth two (2) times a day.  Max Daily Amount: 200 mg.          Written Date: 12/29/18 Expiration Date: --     Start Date: 12/29/18 End Date: --            Ordering Provider:  -- IGNACIA #:  -- NPI:  --    Authorizing Provider:  Torres Worthy MD IGNACIA #:  [id number] NPI:  [id number]    Ordering User:  Torres Worthy MD           Since Dr. Amber Jacobo is on call

## 2019-01-21 DIAGNOSIS — R52 PAIN: ICD-10-CM

## 2019-01-21 NOTE — TELEPHONE ENCOUNTER
PCP: Doris Varela MD    Last appt: 11/20/2018  Future Appointments   Date Time Provider Radha Yoon   5/14/2019 10:30 AM MD ABI Laughlin   5/14/2019 10:45 AM MD ABI Laughlin       Requested Prescriptions     Pending Prescriptions Disp Refills    HYDROcodone-acetaminophen (NORCO) 5-325 mg per tablet 30 Tab 0     Sig: Take 1 Tab by mouth every eight (8) hours as needed for Pain. Max Daily Amount: 3 Tabs.

## 2019-01-22 RX ORDER — HYDROCODONE BITARTRATE AND ACETAMINOPHEN 5; 325 MG/1; MG/1
1 TABLET ORAL
Qty: 30 TAB | Refills: 0 | Status: SHIPPED | OUTPATIENT
Start: 2019-01-22 | End: 2019-02-22 | Stop reason: SDUPTHER

## 2019-02-04 ENCOUNTER — TELEPHONE (OUTPATIENT)
Dept: INTERNAL MEDICINE CLINIC | Age: 69
End: 2019-02-04

## 2019-02-04 RX ORDER — CIPROFLOXACIN 250 MG/1
250 TABLET, FILM COATED ORAL 2 TIMES DAILY
Qty: 10 TAB | Refills: 0 | Status: SHIPPED | OUTPATIENT
Start: 2019-02-04 | End: 2019-02-05

## 2019-02-04 NOTE — TELEPHONE ENCOUNTER
#[phone] pt states she is allergic to Cipro and needs something different called [facility]. Pt started on this thinking it may have changed over the years, but the instant she took this she started with the diarrhea.

## 2019-02-04 NOTE — TELEPHONE ENCOUNTER
Called, spoke to pt. Two identifiers confirmed. Notified pt rx called into pharmacy. Pt aware if her s/s worsen, to call the office. Pt verbalized understanding of information discussed w/ no further questions at this time.

## 2019-02-04 NOTE — TELEPHONE ENCOUNTER
#[phone] pt states she has all the typical signs of a UTI and can't get around as she is on a knee cart. She is asking that Dr. Van Mask call something in as soon as possible. Pt states she is incontinent and hard to get to the bathroom on the knee scooter. Please call pt to let her know. Pt allergic to sulfur she wanted to remind you.

## 2019-02-05 RX ORDER — CEFUROXIME AXETIL 500 MG/1
500 TABLET ORAL 2 TIMES DAILY
Qty: 10 TAB | Refills: 0 | Status: SHIPPED | OUTPATIENT
Start: 2019-02-05 | End: 2019-02-10

## 2019-02-05 NOTE — TELEPHONE ENCOUNTER
Called, spoke to pt. Two identifiers confirmed. Pt stated she started taking the cipro last night and is feeling better. Pt stated she has not had any reactions to cipro yet. Pt verbalized understanding of information discussed w/ no further questions at this time.

## 2019-02-11 RX ORDER — DABIGATRAN ETEXILATE MESYLATE 150 MG/1
CAPSULE ORAL
Qty: 60 CAP | Refills: 0 | Status: SHIPPED | OUTPATIENT
Start: 2019-02-11 | End: 2019-03-05 | Stop reason: SDUPTHER

## 2019-02-20 NOTE — TELEPHONE ENCOUNTER
Received 90 day  refill request for primidone 50 mg take 0.5 tab by mouth nightly from [facility] pharmacy   Last filled 11/26/18   Dr. Yuliya Guevara , please refill

## 2019-02-21 RX ORDER — PRIMIDONE 50 MG/1
25 TABLET ORAL
Qty: 45 TAB | Refills: 3 | Status: SHIPPED | OUTPATIENT
Start: 2019-02-21 | End: 2019-03-05

## 2019-02-21 NOTE — TELEPHONE ENCOUNTER
Future Appointments   Date Time Provider Radha Yoon   3/5/2019  1:15 PM Kadeem Olea MD [facility] SCHED   5/14/2019 10:30 AM Kadeem Olea MD [facility] SCHED   5/14/2019 10:45 AM Kadeem Olea MD [address] [facility]                         Last Appointment My Department:  Visit date not found    Would you like to refill below? Requested Prescriptions     Pending Prescriptions Disp Refills    primidone (MYSOLINE) 50 mg tablet 45 Tab 3     Sig: Take 0.5 Tabs by mouth nightly.      **Recevied a fax request **

## 2019-02-22 DIAGNOSIS — R52 PAIN: ICD-10-CM

## 2019-02-22 RX ORDER — HYDROCODONE BITARTRATE AND ACETAMINOPHEN 5; 325 MG/1; MG/1
1 TABLET ORAL
Qty: 30 TAB | Refills: 0 | Status: SHIPPED | OUTPATIENT
Start: 2019-02-22 | End: 2019-04-02 | Stop reason: SDUPTHER

## 2019-02-22 NOTE — TELEPHONE ENCOUNTER
Pt requesting a refill for Rx\"Hydrocodone 5-325 mg\". Contact pt when  Rx is ready . Best contact:[phone]       Message received & copied from [facility]

## 2019-03-05 ENCOUNTER — OFFICE VISIT (OUTPATIENT)
Dept: CARDIOLOGY CLINIC | Age: 69
End: 2019-03-05

## 2019-03-05 ENCOUNTER — CLINICAL SUPPORT (OUTPATIENT)
Dept: CARDIOLOGY CLINIC | Age: 69
End: 2019-03-05

## 2019-03-05 VITALS
BODY MASS INDEX: 39.79 KG/M2 | RESPIRATION RATE: 18 BRPM | SYSTOLIC BLOOD PRESSURE: 114 MMHG | OXYGEN SATURATION: 98 % | DIASTOLIC BLOOD PRESSURE: 74 MMHG | HEIGHT: 65 IN | WEIGHT: 238.8 LBS | HEART RATE: 76 BPM

## 2019-03-05 DIAGNOSIS — I49.9 IRREGULAR HEARTBEAT: Primary | ICD-10-CM

## 2019-03-05 DIAGNOSIS — I49.5 SSS (SICK SINUS SYNDROME) (HCC): ICD-10-CM

## 2019-03-05 DIAGNOSIS — I48.20 CHRONIC ATRIAL FIBRILLATION (HCC): ICD-10-CM

## 2019-03-05 DIAGNOSIS — F31.70 BIPOLAR AFFECTIVE DISORDER IN REMISSION (HCC): ICD-10-CM

## 2019-03-05 DIAGNOSIS — Z95.0 CARDIAC PACEMAKER IN SITU: Primary | ICD-10-CM

## 2019-03-05 DIAGNOSIS — I10 BENIGN ESSENTIAL HYPERTENSION: ICD-10-CM

## 2019-03-05 NOTE — PROGRESS NOTES
1. Have you been to the ER, urgent care clinic since your last visit? Hospitalized since your last visit? No    2. Have you seen or consulted any other health care providers outside of the [address] [place] [address] since your last visit? Include any pap smears or colon screening. No    Chief Complaint   Patient presents with    Irregular Heart Beat     Yearly appt. C/O fatigue.

## 2019-03-05 NOTE — PROGRESS NOTES
Subjective:      Minna Urbina is a 78 y.o. female is here for device follow up. She is doing well aside from fatigue which is unchanged. The patient denies chest pain/ shortness of breath, orthopnea, PND, LE edema, palpitations, syncope, presyncope.       Patient Active Problem List    Diagnosis Date Noted    Osteoarthritis of right shoulder 06/06/2018    Incisional hernia, without obstruction or gangrene 10/20/2017    Obesity, Class II, BMI 35-39.9 10/20/2017    Fibromyalgia 03/29/2017    Epigastric pain 06/05/2015    Vitamin D deficiency 03/31/2015    A-fib ([address] 01/28/2015    Esophageal spasm 12/16/2014    S/P cardiac pacemaker procedure 09/16/2014    SSS (sick sinus syndrome) ([address] 09/02/2014    S/P ablation of atrial fibrillation 05/13/2014    Atrial fibrillation ([address] 04/21/2014    Benign essential hypertension 03/14/2014    Morbid obesity with BMI of 40.0-44.9, adult ([address] 03/14/2014    Diarrhea 07/19/2013    GERD (gastroesophageal reflux disease) 04/13/2012    Colon polyps 03/31/2010    Urge incontinence 03/31/2010    Migraine 03/31/2010    Bipolar affective disorder ([address] 03/31/2010    Hypothyroidism 03/31/2010    Hyperglycemia 03/31/2010    Hyperlipidemia 03/31/2010      Kathe Plasencia MD  Past Medical History:   Diagnosis Date    Arrhythmia 2014    atrial fibrillation 2014, sick sinus syndrome: [facility]    Arthritis     Bipolar affective disorder ([address] 3/31/2010    Chronic pain 2018    right shoulder    Colon polyps 03/31/2010    also 5/2018: colon polyps    Depression     Diarrhea 07/19/2013    D/t alpha gal allergy says patient    Epigastric pain 6/5/2015    GERD (gastroesophageal reflux disease)     occasiional only; controlled with med    Hemispheric carotid artery syndrome No stroke    Neuro: Irina Alvarez  (CT scan head/neck negative 4/2018 in [facility])    Hyperlipidemia 3/31/2010    Hypothyroidism 3/31/2010    Long term current use of anticoagulant therapy     Migraine 03/31/2010    has them x2 a month: last one 5/23/2018    Psychotic disorder ([address]     S/P ablation of atrial fibrillation 05/13/2014    S/P cardiac pacemaker procedure 9/16/2014 9/16/14 Medtronic MRI compatible dual chamber pacemaker implant    Sleep apnea     unable to tolerate CPAP    Stool color black     Thyroid disease     Tick-borne disease     Alpha Gal allergy: no eat pork, beef, milk (Meat allergy showed up on a allergy test)    Urge incontinence 3/31/2010    Vitamin D deficiency 3/31/2015      Past Surgical History:   Procedure Laterality Date    CARDIAC SURG PROCEDURE UNLIST  5/2014    ablation    COLONOSCOPY N/A 4/27/2018    COLONOSCOPY performed by Vicky Hansen MD at [address] [facility]  4/27/2018         COLONOSCOPY,RENALDO Eckert  4/27/2018         HX BUNIONECTOMY      HX GI  2010, 5/2018    egd, colonoscopy    HX HYSTERECTOMY      HX ORTHOPAEDIC  2000's    removed bone spurs from R & L hand    HX ORTHOPAEDIC  1990s?     left bunionectomy     HX OTHER SURGICAL  07/2014    2 shocks to heart & ablations    HX PACEMAKER  2014    On the right side    HX PACEMAKER PLACEMENT  2015    HX TONSILLECTOMY  15 yrs old     Allergies   Allergen Reactions    Latex Swelling     Swelling of lips says patient    Beef Containing Products Nausea and Vomiting     diarrhea    Ciprofloxacin Unknown (comments)    Milk Nausea and Vomiting     diarrhea    Pork Derived (Porcine) Nausea and Vomiting     diarrhea    Sulfa (Sulfonamide Antibiotics) Hives    Xarelto [Rivaroxaban] Other (comments)     GI problems      Family History   Problem Relation Age of Onset    Arrhythmia Mother         afib    Stroke Mother     Heart Failure Mother     Colon Cancer Father     Heart Disease Father     Arrhythmia Brother         afib    Asthma Brother     COPD Brother     negative for cardiac disease  Social History     Socioeconomic History  Marital status:      Spouse name: Not on file    Number of children: Not on file    Years of education: Not on file    Highest education level: Not on file   Tobacco Use    Smoking status: Never Smoker    Smokeless tobacco: Never Used   Substance and Sexual Activity    Alcohol use: Yes     Alcohol/week: 0.6 oz     Types: 1 Cans of beer per week     Comment: weekly    Drug use: Yes     Types: Prescription    Sexual activity: No     Current Outpatient Medications   Medication Sig    HYDROcodone-acetaminophen (NORCO) 5-325 mg per tablet Take 1 Tab by mouth every eight (8) hours as needed for Pain. Max Daily Amount: 3 Tabs.  pregabalin (LYRICA) 100 mg capsule Take 1 Cap by mouth two (2) times a day. Max Daily Amount: 200 mg.    PRADAXA 150 mg capsule TAKE ONE CAPSULE BY MOUTH TWICE A DAY    fluticasone propionate (FLONASE NA) by Nasal route.  cetirizine HCl (ZYRTEC PO) Take  by mouth daily.  levothyroxine (SYNTHROID) 50 mcg tablet TAKE 1 TABLET EVERY DAY    oxyCODONE IR (ROXICODONE) 5 mg immediate release tablet Take 1 Tab by mouth every four (4) hours as needed. Max Daily Amount: 30 mg. (Patient taking differently: Take 1 Tab by mouth every four (4) hours as needed for Pain.)    senna-docusate (PERICOLACE) 8.6-50 mg per tablet Take 1 Tab by mouth daily.  diphenoxylate-atropine (LOMOTIL) 2.5-0.025 mg per tablet Take 1 Tab by mouth two (2) times daily as needed for Diarrhea.  ergocalciferol (ERGOCALCIFEROL) 50,000 unit capsule TAKE 1 CAP BY MOUTH EVERY SEVEN (7) DAYS.  topiramate (TOPAMAX) 100 mg tablet Take 200 mg by mouth nightly.  lithium 300 mg tablet Take 300 mg by mouth daily (with dinner).  alprazolam (XANAX) 0.5 mg tablet Take 0.5 mg by mouth nightly as needed for Anxiety. No current facility-administered medications for this visit.        Vitals:    03/05/19 1309   BP: 114/74   Pulse: 76   Resp: 18   SpO2: 98%   Weight: 238 lb 12.8 oz (108.3 kg)   Height: 5' 5\" (1.651 m)       I have reviewed the nurses notes, vitals, problem list, allergy list, medical history, family, social history and medications. Review of Symptoms:    General: Pt denies excessive weight gain or loss. Pt is able to conduct ADL's  HEENT: Denies blurred vision, headaches, epistaxis and difficulty swallowing. Respiratory: Denies shortness of breath, BIANCHI, wheezing or stridor. Cardiovascular: Denies precordial pain, palpitations, edema or PND  Gastrointestinal: Denies poor appetite, indigestion, abdominal pain or blood in stool  Urinary: Denies dysuria, pyuria  Musculoskeletal: Denies pain or swelling from muscles or joints  Neurologic: Denies tremor, paresthesias, or sensory motor disturbance  Skin: Denies rash, itching or texture change. Psych: Denies depression      Physical Exam:      General: Well developed, in no acute distress. HEENT: Eyes - PERRL, no jvd  Heart:  Normal S1/S2 negative S3 or S4. Regular, no murmur, gallop or rub.   Respiratory: Clear bilaterally x 4, no wheezing or rales  Abdomen:   Soft, non-tender, bowel sounds are active.   Extremities:  No edema, normal cap refill, no cyanosis. Musculoskeletal: No clubbing  Neuro: A&Ox3, speech clear, gait stable. Skin: Skin color is normal. No rashes or lesions.  Non diaphoretic  Vascular: 2+ pulses symmetric in all extremities    Cardiographics    Ekg: ventricular pacing    Results for orders placed or performed during the hospital encounter of 04/27/18   EKG, 12 LEAD, INITIAL   Result Value Ref Range    Ventricular Rate 75 BPM    Atrial Rate 75 BPM    P-R Interval 326 ms    QRS Duration 170 ms    Q-T Interval 450 ms    QTC Calculation (Bezet) 502 ms    Calculated R Axis -29 degrees    Calculated T Axis 93 degrees    Diagnosis       Atrial-paced rhythm with prolonged AV conduction  Right bundle branch block  When compared with ECG of 28-JAN-2015 14:28,  Electronic atrial pacemaker has replaced Sinus rhythm  Right bundle branch block has replaced Nonspecific intraventricular   conduction delay  Confirmed by MARIO George ([id number]) on 4/28/2018 1:38:30 PM     Results for orders placed or performed in visit on 08/19/14   CARDIAC HOLTER MONITOR, 24 HOURS    Narrative    ECG Monitor/24 hours, Complete    Reason for Holter Monitor   A-FIB    Heartbeat    Slowest 42  Average 60  Fastest  99        Results:   Underlying Rhythm: Normal sinus rhythm      Atrial Arrhythmias: premature atrial contractions; occasional,  atrial couplets and atrial triplets            AV Conduction: normal    Ventricular Arrhythmias: premature ventricular contractions;  occasional     ST Segment Analysis:normal     Symptom Correlation:  Headache did not correlate with any arrhythmias. Fatigue/dizziness correlated with sinus bradycardia in the 40s    Comment:   Sinus rhythm with symptomatic sinus bradycardia. Clinical  correlation advised. Donato Rosales MD, [facility]      Lab Results   Component Value Date/Time    WBC 5.4 11/06/2018 08:19 AM    HGB 14.4 11/06/2018 08:19 AM    HCT 44.4 11/06/2018 08:19 AM    PLATELET 394 09/71/8502 08:19 AM    MCV 95 11/06/2018 08:19 AM      Lab Results   Component Value Date/Time    Sodium 142 11/06/2018 08:19 AM    Potassium 4.4 11/06/2018 08:19 AM    Chloride 108 (H) 11/06/2018 08:19 AM    CO2 21 11/06/2018 08:19 AM    Anion gap 8 06/08/2018 04:12 AM    Glucose 99 11/06/2018 08:19 AM    BUN 15 11/06/2018 08:19 AM    Creatinine 0.84 11/06/2018 08:19 AM    BUN/Creatinine ratio 18 11/06/2018 08:19 AM    GFR est AA 82 11/06/2018 08:19 AM    GFR est non-AA 71 11/06/2018 08:19 AM    Calcium 9.8 11/06/2018 08:19 AM    Bilirubin, total 0.4 11/06/2018 08:19 AM    AST (SGOT) 15 11/06/2018 08:19 AM    Alk.  phosphatase 75 11/06/2018 08:19 AM    Protein, total 6.2 11/06/2018 08:19 AM    Albumin 4.2 11/06/2018 08:19 AM    Globulin 2.8 04/27/2018 09:03 PM    A-G Ratio 2.1 11/06/2018 08:19 AM    ALT (SGPT) 17 11/06/2018 08:19 AM Assessment:     Assessment:        ICD-10-CM ICD-9-CM    1. Irregular heartbeat I49.9 427.9 AMB POC EKG ROUTINE W/ 12 LEADS, INTER & REP   2. Chronic atrial fibrillation (HCC) I48.2 427.31    3. SSS (sick sinus syndrome) (HCC) I49.5 427.81    4. Benign essential hypertension I10 401.1    5. Bipolar affective disorder in remission ([facility] F31.70 296.80      Orders Placed This Encounter    AMB POC EKG ROUTINE W/ 12 LEADS, INTER & REP     Order Specific Question:   Reason for Exam:     Answer:   routine        Plan:   Ms. Silke Cuellar is here for device check and follow up of her AF. She is doing well and denies cardiac complaints. EKG shows atrial pacing for her sick sinus and her device interrogation demonstrates normal functioning (99.6% AP, 0.3% RVP, 0.3% atrial flutter). She has a nl lvef. Continue current medical therapy and follow up in one year. Continue medical management for HTN, bipolar, SSS, AF. Thank you for allowing me to participate [facility]. Denver Rundle, NP    Patient seen and examined by me with nurse practitioner. I personally performed all components of the history, physical, and medical decision making and agree with the assessment and plan with minor modifications as noted. A paced 99% for sick sinus. Minimal amount of afl. On oac. Cont med rx for htn. F/u in one year.     Azalia Kincaid MD, [facility]

## 2019-03-11 RX ORDER — DABIGATRAN ETEXILATE MESYLATE 150 MG/1
CAPSULE ORAL
Qty: 60 CAP | Refills: 0 | Status: SHIPPED | OUTPATIENT
Start: 2019-03-11 | End: 2019-04-11

## 2019-03-18 ENCOUNTER — ANTI-COAG VISIT (OUTPATIENT)
Dept: CARDIOLOGY CLINIC | Age: 69
End: 2019-03-18

## 2019-03-18 DIAGNOSIS — I48.0 PAROXYSMAL ATRIAL FIBRILLATION (HCC): ICD-10-CM

## 2019-03-18 DIAGNOSIS — Z79.01 LONG TERM (CURRENT) USE OF ANTICOAGULANTS: Primary | ICD-10-CM

## 2019-03-18 LAB
INR BLD: 0.9 (ref 1–1.5)
PT POC: 11.3 SECONDS (ref 9.1–12)
VALID INTERNAL CONTROL?: YES

## 2019-03-18 RX ORDER — WARFARIN SODIUM 5 MG/1
5 TABLET ORAL DAILY
COMMUNITY
End: 2019-03-18 | Stop reason: SDUPTHER

## 2019-03-18 RX ORDER — WARFARIN SODIUM 5 MG/1
5 TABLET ORAL DAILY
Qty: 30 TAB | Refills: 1 | Status: SHIPPED | OUTPATIENT
Start: 2019-03-18 | End: 2019-04-11 | Stop reason: SDUPTHER

## 2019-03-26 ENCOUNTER — ANTI-COAG VISIT (OUTPATIENT)
Dept: CARDIOLOGY CLINIC | Age: 69
End: 2019-03-26

## 2019-03-26 DIAGNOSIS — I48.0 PAROXYSMAL ATRIAL FIBRILLATION (HCC): ICD-10-CM

## 2019-03-26 DIAGNOSIS — Z79.01 LONG TERM (CURRENT) USE OF ANTICOAGULANTS: Primary | ICD-10-CM

## 2019-03-26 LAB
INR BLD: 1.1 (ref 1–1.5)
PT POC: 13.3 SECONDS (ref 9.1–12)
VALID INTERNAL CONTROL?: YES

## 2019-04-02 ENCOUNTER — ANTI-COAG VISIT (OUTPATIENT)
Dept: CARDIOLOGY CLINIC | Age: 69
End: 2019-04-02

## 2019-04-02 DIAGNOSIS — R52 PAIN: ICD-10-CM

## 2019-04-02 DIAGNOSIS — I48.0 PAROXYSMAL ATRIAL FIBRILLATION (HCC): ICD-10-CM

## 2019-04-02 DIAGNOSIS — Z79.01 LONG TERM (CURRENT) USE OF ANTICOAGULANTS: Primary | ICD-10-CM

## 2019-04-02 LAB
INR BLD: 1.1 (ref 1–1.5)
PT POC: 12.7 SECONDS (ref 9.1–12)
VALID INTERNAL CONTROL?: YES

## 2019-04-02 RX ORDER — HYDROCODONE BITARTRATE AND ACETAMINOPHEN 5; 325 MG/1; MG/1
1 TABLET ORAL
Qty: 30 TAB | Refills: 0 | Status: SHIPPED | OUTPATIENT
Start: 2019-04-02 | End: 2019-05-17 | Stop reason: SDUPTHER

## 2019-04-02 NOTE — TELEPHONE ENCOUNTER
Pt wanted a refill on her Hydrocodone 5-325 mg.       Message received & copied from [facility]

## 2019-04-08 ENCOUNTER — ANTI-COAG VISIT (OUTPATIENT)
Dept: CARDIOLOGY CLINIC | Age: 69
End: 2019-04-08

## 2019-04-08 DIAGNOSIS — Z79.01 LONG TERM (CURRENT) USE OF ANTICOAGULANTS: Primary | ICD-10-CM

## 2019-04-08 DIAGNOSIS — I48.0 PAROXYSMAL ATRIAL FIBRILLATION (HCC): ICD-10-CM

## 2019-04-08 LAB
INR BLD: 1.2 (ref 1–1.5)
PT POC: 14.3 SECONDS (ref 9.1–12)
VALID INTERNAL CONTROL?: YES

## 2019-04-16 ENCOUNTER — ANTI-COAG VISIT (OUTPATIENT)
Dept: CARDIOLOGY CLINIC | Age: 69
End: 2019-04-16

## 2019-04-16 DIAGNOSIS — Z79.01 LONG TERM (CURRENT) USE OF ANTICOAGULANTS: Primary | ICD-10-CM

## 2019-04-16 DIAGNOSIS — I48.0 PAROXYSMAL ATRIAL FIBRILLATION (HCC): ICD-10-CM

## 2019-04-16 LAB
INR BLD: 1.1 (ref 1–1.5)
PT POC: 13.7 SECONDS (ref 9.1–12)
VALID INTERNAL CONTROL?: YES

## 2019-04-23 ENCOUNTER — ANTI-COAG VISIT (OUTPATIENT)
Dept: CARDIOLOGY CLINIC | Age: 69
End: 2019-04-23

## 2019-04-23 DIAGNOSIS — I48.0 PAROXYSMAL ATRIAL FIBRILLATION (HCC): ICD-10-CM

## 2019-04-23 DIAGNOSIS — Z79.01 LONG TERM (CURRENT) USE OF ANTICOAGULANTS: Primary | ICD-10-CM

## 2019-04-23 LAB
INR BLD: 1.2 (ref 1–1.5)
PT POC: 14.2 SECONDS (ref 9.1–12)
VALID INTERNAL CONTROL?: YES

## 2019-05-02 ENCOUNTER — ANTI-COAG VISIT (OUTPATIENT)
Dept: CARDIOLOGY CLINIC | Age: 69
End: 2019-05-02

## 2019-05-02 DIAGNOSIS — Z79.01 LONG TERM (CURRENT) USE OF ANTICOAGULANTS: Primary | ICD-10-CM

## 2019-05-02 DIAGNOSIS — I48.0 PAROXYSMAL ATRIAL FIBRILLATION (HCC): ICD-10-CM

## 2019-05-02 LAB
INR BLD: 1 (ref 1–1.5)
PT POC: 12.4 SECONDS (ref 9.1–12)
VALID INTERNAL CONTROL?: YES

## 2019-05-10 ENCOUNTER — ANTI-COAG VISIT (OUTPATIENT)
Dept: CARDIOLOGY CLINIC | Age: 69
End: 2019-05-10

## 2019-05-10 DIAGNOSIS — Z79.01 LONG TERM (CURRENT) USE OF ANTICOAGULANTS: Primary | ICD-10-CM

## 2019-05-10 DIAGNOSIS — I48.0 PAROXYSMAL ATRIAL FIBRILLATION (HCC): ICD-10-CM

## 2019-05-10 LAB
INR BLD: 1.1 (ref 1–1.5)
PT POC: 12.7 SECONDS (ref 9.1–12)
VALID INTERNAL CONTROL?: YES

## 2019-05-16 ENCOUNTER — HOSPITAL ENCOUNTER (OUTPATIENT)
Dept: GENERAL RADIOLOGY | Age: 69
Discharge: HOME | End: 2019-05-16
Payer: MEDICARE

## 2019-05-16 DIAGNOSIS — K58.9 IRRITABLE BOWEL SYNDROME: ICD-10-CM

## 2019-05-16 PROCEDURE — 74018 RADEX ABDOMEN 1 VIEW: CPT

## 2019-05-17 ENCOUNTER — ANTI-COAG VISIT (OUTPATIENT)
Dept: CARDIOLOGY CLINIC | Age: 69
End: 2019-05-17

## 2019-05-17 DIAGNOSIS — Z79.01 LONG TERM (CURRENT) USE OF ANTICOAGULANTS: Primary | ICD-10-CM

## 2019-05-17 DIAGNOSIS — I48.0 PAROXYSMAL ATRIAL FIBRILLATION (HCC): ICD-10-CM

## 2019-05-17 LAB
INR BLD: 1.1 (ref 1–1.5)
PT POC: 13.2 SECONDS (ref 9.1–12)
VALID INTERNAL CONTROL?: YES

## 2019-05-17 NOTE — PROGRESS NOTES
A full discussion of the nature of anticoagulants has been carried out. A benefit risk analysis has been presented to the patient, so that they understand the justification for choosing anticoagulation at this time. The need for frequent and regular monitoring, precise dosage adjustment and compliance is stressed. Side effects of potential bleeding are discussed. The patient should avoid any OTC items containing aspirin, naproxen or ibuprofen, and should avoid great swings in general diet. Avoid alcohol consumption. Advised to notify the office if antibiotic or steroid therapy is initiated. Call if any signs of abnormal bleeding are present. Next PT/INR test Thursday.

## 2019-05-23 ENCOUNTER — ANTI-COAG VISIT (OUTPATIENT)
Dept: CARDIOLOGY CLINIC | Age: 69
End: 2019-05-23

## 2019-05-23 DIAGNOSIS — Z79.01 LONG TERM (CURRENT) USE OF ANTICOAGULANTS: Primary | ICD-10-CM

## 2019-05-23 DIAGNOSIS — I48.0 PAROXYSMAL ATRIAL FIBRILLATION (HCC): ICD-10-CM

## 2019-05-23 LAB
INR BLD: 1.2 (ref 1–1.5)
PT POC: 14.9 SECONDS (ref 9.1–12)
VALID INTERNAL CONTROL?: YES

## 2019-05-28 RX ORDER — WARFARIN SODIUM 5 MG/1
TABLET ORAL
Qty: 60 TAB | Refills: 1 | Status: SHIPPED | OUTPATIENT
Start: 2019-05-28 | End: 2019-07-26 | Stop reason: SDUPTHER

## 2019-05-30 ENCOUNTER — ANTI-COAG VISIT (OUTPATIENT)
Dept: CARDIOLOGY CLINIC | Age: 69
End: 2019-05-30

## 2019-05-30 DIAGNOSIS — Z79.01 LONG TERM (CURRENT) USE OF ANTICOAGULANTS: Primary | ICD-10-CM

## 2019-05-30 DIAGNOSIS — I48.0 PAROXYSMAL ATRIAL FIBRILLATION (HCC): ICD-10-CM

## 2019-05-30 LAB
INR BLD: 2 (ref 1–1.5)
PT POC: 24.4 SECONDS (ref 9.1–12)
VALID INTERNAL CONTROL?: YES

## 2019-06-03 DIAGNOSIS — E55.9 VITAMIN D DEFICIENCY: ICD-10-CM

## 2019-06-03 RX ORDER — ERGOCALCIFEROL 1.25 MG/1
CAPSULE ORAL
Qty: 4 CAP | Refills: 0 | Status: SHIPPED | OUTPATIENT
Start: 2019-06-03 | End: 2019-06-25 | Stop reason: SDUPTHER

## 2019-06-06 ENCOUNTER — ANTI-COAG VISIT (OUTPATIENT)
Dept: CARDIOLOGY CLINIC | Age: 69
End: 2019-06-06

## 2019-06-06 DIAGNOSIS — Z79.01 LONG TERM (CURRENT) USE OF ANTICOAGULANTS: Primary | ICD-10-CM

## 2019-06-06 DIAGNOSIS — I48.0 PAROXYSMAL ATRIAL FIBRILLATION (HCC): ICD-10-CM

## 2019-06-06 LAB
INR BLD: 1.5 (ref 1–1.5)
PT POC: 17.5 SECONDS (ref 9.1–12)
VALID INTERNAL CONTROL?: YES

## 2019-06-11 DIAGNOSIS — M79.7 FIBROMYALGIA: ICD-10-CM

## 2019-06-18 ENCOUNTER — DOCUMENTATION ONLY (OUTPATIENT)
Dept: NEUROLOGY | Age: 69
End: 2019-06-18

## 2019-06-18 RX ORDER — PREGABALIN 100 MG/1
CAPSULE ORAL
Qty: 60 CAP | Refills: 5 | Status: SHIPPED | OUTPATIENT
Start: 2019-06-18 | End: 2019-07-02 | Stop reason: SDUPTHER

## 2019-06-18 NOTE — TELEPHONE ENCOUNTER
Future Appointments   Date Time Provider Radha Yoon   6/20/2019  1:45 PM COUMADIN, [facility] SCHED   9/9/2019  8:00 AM REMOTE_RCA [facility] SCHED   12/16/2019  8:00 AM REMOTE_RCAM [address] [facility]   4/16/2020  1:00 PM PACEMAKER, [facility] SCHED   4/16/2020  1:20 PM Iman Tucker NP [address] [facility]                         Last Appointment My Department:  Visit date not found    Would you like to refill below? Requested Prescriptions     Pending Prescriptions Disp Refills    primidone (MYSOLINE) 50 mg tablet 15 Tab 3     Sig: Take 0.5 Tabs by mouth nightly.      Patient calling to request this refill

## 2019-06-20 ENCOUNTER — ANTI-COAG VISIT (OUTPATIENT)
Dept: CARDIOLOGY CLINIC | Age: 69
End: 2019-06-20

## 2019-06-20 DIAGNOSIS — I48.0 PAROXYSMAL ATRIAL FIBRILLATION (HCC): ICD-10-CM

## 2019-06-20 DIAGNOSIS — Z79.01 LONG TERM (CURRENT) USE OF ANTICOAGULANTS: Primary | ICD-10-CM

## 2019-06-20 LAB
INR BLD: 2 (ref 1–1.5)
PT POC: 23.5 SECONDS (ref 9.1–12)
VALID INTERNAL CONTROL?: YES

## 2019-06-21 RX ORDER — PRIMIDONE 50 MG/1
TABLET ORAL
Qty: 15 TAB | Refills: 3 | Status: SHIPPED | OUTPATIENT
Start: 2019-06-21 | End: 2019-09-25 | Stop reason: SDUPTHER

## 2019-06-25 DIAGNOSIS — E55.9 VITAMIN D DEFICIENCY: ICD-10-CM

## 2019-06-25 RX ORDER — ERGOCALCIFEROL 1.25 MG/1
CAPSULE ORAL
Qty: 4 CAP | Refills: 0 | Status: SHIPPED | OUTPATIENT
Start: 2019-06-25 | End: 2019-07-17 | Stop reason: SDUPTHER

## 2019-07-01 ENCOUNTER — TELEPHONE (OUTPATIENT)
Dept: INTERNAL MEDICINE CLINIC | Age: 69
End: 2019-07-01

## 2019-07-01 DIAGNOSIS — R52 PAIN: ICD-10-CM

## 2019-07-01 RX ORDER — HYDROCODONE BITARTRATE AND ACETAMINOPHEN 5; 325 MG/1; MG/1
1 TABLET ORAL
Qty: 30 TAB | Refills: 0 | Status: SHIPPED | OUTPATIENT
Start: 2019-07-01 | End: 2019-08-07 | Stop reason: SDUPTHER

## 2019-07-01 NOTE — TELEPHONE ENCOUNTER
Pt is requesting the hydrocodone and I can't select it from her med list as it is not in current list.      Pt states she needs this as soon as possible before the 4 th.

## 2019-07-02 DIAGNOSIS — M79.7 FIBROMYALGIA: ICD-10-CM

## 2019-07-08 RX ORDER — PREGABALIN 100 MG/1
CAPSULE ORAL
Qty: 60 CAP | Refills: 5 | Status: SHIPPED | OUTPATIENT
Start: 2019-07-08 | End: 2020-01-17

## 2019-07-09 ENCOUNTER — ANTI-COAG VISIT (OUTPATIENT)
Dept: CARDIOLOGY CLINIC | Age: 69
End: 2019-07-09

## 2019-07-09 DIAGNOSIS — Z79.01 LONG TERM (CURRENT) USE OF ANTICOAGULANTS: Primary | ICD-10-CM

## 2019-07-09 DIAGNOSIS — I48.0 PAROXYSMAL ATRIAL FIBRILLATION (HCC): ICD-10-CM

## 2019-07-09 LAB
INR BLD: 1.8 (ref 1–1.5)
PT POC: 22.1 SECONDS (ref 9.1–12)
VALID INTERNAL CONTROL?: YES

## 2019-07-16 ENCOUNTER — ANTI-COAG VISIT (OUTPATIENT)
Dept: CARDIOLOGY CLINIC | Age: 69
End: 2019-07-16

## 2019-07-16 DIAGNOSIS — Z79.01 LONG TERM (CURRENT) USE OF ANTICOAGULANTS: Primary | ICD-10-CM

## 2019-07-16 DIAGNOSIS — I48.0 PAROXYSMAL ATRIAL FIBRILLATION (HCC): ICD-10-CM

## 2019-07-16 LAB
INR BLD: 2.5 (ref 1–1.5)
PT POC: 30.2 SECONDS (ref 9.1–12)
VALID INTERNAL CONTROL?: YES

## 2019-07-17 DIAGNOSIS — E55.9 VITAMIN D DEFICIENCY: ICD-10-CM

## 2019-07-17 RX ORDER — ERGOCALCIFEROL 1.25 MG/1
CAPSULE ORAL
Qty: 4 CAP | Refills: 0 | Status: SHIPPED | OUTPATIENT
Start: 2019-07-17 | End: 2019-08-10 | Stop reason: SDUPTHER

## 2019-07-22 ENCOUNTER — TELEPHONE (OUTPATIENT)
Dept: INTERNAL MEDICINE CLINIC | Age: 69
End: 2019-07-22

## 2019-07-22 NOTE — TELEPHONE ENCOUNTER
Identified patient 2 identifiers verified. Feet and ankles swelling due to the heat requesting a diuretic Patient reports that this can be discussed with Dr. Abbie Honeycutt when he returns to the office.

## 2019-07-22 NOTE — TELEPHONE ENCOUNTER
----- Message from Stone Nichole sent at 7/22/2019  8:17 AM EDT -----  Regarding: Dr. Justice Olivo  Medication Refill    Caller (if not patient):      Relationship of caller (if not patient):      Best contact number(s):      Name of medication and dosage if known:      Is patient out of this medication (yes/no):      Pharmacy name: [facility]    Pharmacy listed in chart? (yes/no): yes  Pharmacy phone number:  [phone]    Details to clarify the request: Patient needs a prescription called in to the [facility] pharmacy for foot and ankle swelling.       Vamsi Pitts    Copy/paste envera

## 2019-07-26 RX ORDER — WARFARIN SODIUM 5 MG/1
TABLET ORAL
Qty: 64 TAB | Refills: 3 | Status: SHIPPED | OUTPATIENT
Start: 2019-07-26 | End: 2019-10-21 | Stop reason: SDUPTHER

## 2019-08-01 ENCOUNTER — ANTI-COAG VISIT (OUTPATIENT)
Dept: CARDIOLOGY CLINIC | Age: 69
End: 2019-08-01

## 2019-08-01 DIAGNOSIS — Z79.01 LONG TERM (CURRENT) USE OF ANTICOAGULANTS: Primary | ICD-10-CM

## 2019-08-01 DIAGNOSIS — I48.0 PAROXYSMAL ATRIAL FIBRILLATION (HCC): ICD-10-CM

## 2019-08-01 LAB
INR BLD: 2.1 (ref 1–1.5)
PT POC: 25.2 SECONDS (ref 9.1–12)
VALID INTERNAL CONTROL?: YES

## 2019-08-01 NOTE — PROGRESS NOTES
A full discussion of the nature of anticoagulants has been carried out. A benefit risk analysis has been presented to the patient, so that they understand the justification for choosing anticoagulation at this time. The need for frequent and regular monitoring, precise dosage adjustment and compliance is stressed. Side effects of potential bleeding are discussed. The patient should avoid any OTC items containing aspirin, naproxen or ibuprofen, and should avoid great swings in general diet. Avoid alcohol consumption. Advised to notify the office if antibiotic or steroid therapy is initiated. Call if any signs of abnormal bleeding are present. Next PT/INR test in 3.5 weeks.

## 2019-08-05 NOTE — TELEPHONE ENCOUNTER
This will potentially interact with lithium, causing lithium toxicity. I would avoid diuretic. Would recommend compression socks, and elevation.    BJF

## 2019-08-06 DIAGNOSIS — R51.9 INTRACTABLE HEADACHE, UNSPECIFIED CHRONICITY PATTERN, UNSPECIFIED HEADACHE TYPE: Primary | ICD-10-CM

## 2019-08-06 DIAGNOSIS — R52 PAIN: ICD-10-CM

## 2019-08-06 NOTE — TELEPHONE ENCOUNTER
Identified patient 2 identifiers verified. Patient made aware about Dr. Anthony Vázquez response to Diuretic and compress stockings. Patient requesting a refill on Hydrocodone. Last refill 7/1/19 and last visit 11/20/ 18.

## 2019-08-07 RX ORDER — HYDROCODONE BITARTRATE AND ACETAMINOPHEN 5; 325 MG/1; MG/1
1 TABLET ORAL
Qty: 30 TAB | Refills: 0 | Status: SHIPPED | OUTPATIENT
Start: 2019-08-07 | End: 2019-09-13 | Stop reason: SDUPTHER

## 2019-08-07 RX ORDER — HYDROCODONE BITARTRATE AND ACETAMINOPHEN 5; 325 MG/1; MG/1
TABLET ORAL
Status: CANCELLED | OUTPATIENT
Start: 2019-08-07 | End: 2019-08-10

## 2019-08-10 DIAGNOSIS — E55.9 VITAMIN D DEFICIENCY: ICD-10-CM

## 2019-08-10 RX ORDER — ERGOCALCIFEROL 1.25 MG/1
CAPSULE ORAL
Qty: 4 CAP | Refills: 0 | Status: SHIPPED | OUTPATIENT
Start: 2019-08-10 | End: 2019-09-01 | Stop reason: SDUPTHER

## 2019-08-12 ENCOUNTER — TELEPHONE (OUTPATIENT)
Dept: INTERNAL MEDICINE CLINIC | Age: 69
End: 2019-08-12

## 2019-08-12 NOTE — TELEPHONE ENCOUNTER
Pt present in office, requests Dr Perla Arredondo best recommendation for a \"surgeon\" for a \"hernia\"    Best PT contact - [phone]

## 2019-08-13 NOTE — TELEPHONE ENCOUNTER
Called, spoke with Pt. Two pt identifiers confirmed. Pt given info for Dr. Alejandra Williamson. Pt verbalized understanding of information discussed w/ no further questions at this time.

## 2019-08-30 ENCOUNTER — ANTI-COAG VISIT (OUTPATIENT)
Dept: CARDIOLOGY CLINIC | Age: 69
End: 2019-08-30

## 2019-08-30 DIAGNOSIS — Z79.01 LONG TERM (CURRENT) USE OF ANTICOAGULANTS: Primary | ICD-10-CM

## 2019-08-30 LAB
INR BLD: 2.5
PT POC: 30.4 SECONDS
VALID INTERNAL CONTROL?: YES

## 2019-08-31 RX ORDER — LEVOTHYROXINE SODIUM 50 UG/1
TABLET ORAL
Qty: 90 TAB | Refills: 3 | Status: SHIPPED | OUTPATIENT
Start: 2019-08-31 | End: 2020-08-25 | Stop reason: SDUPTHER

## 2019-09-01 DIAGNOSIS — E55.9 VITAMIN D DEFICIENCY: ICD-10-CM

## 2019-09-02 RX ORDER — ERGOCALCIFEROL 1.25 MG/1
CAPSULE ORAL
Qty: 4 CAP | Refills: 0 | Status: SHIPPED | OUTPATIENT
Start: 2019-09-02 | End: 2019-09-23 | Stop reason: SDUPTHER

## 2019-09-06 ENCOUNTER — ANTI-COAG VISIT (OUTPATIENT)
Dept: CARDIOLOGY CLINIC | Age: 69
End: 2019-09-06

## 2019-09-06 DIAGNOSIS — Z79.01 LONG TERM (CURRENT) USE OF ANTICOAGULANTS: Primary | ICD-10-CM

## 2019-09-06 DIAGNOSIS — I48.0 PAROXYSMAL ATRIAL FIBRILLATION (HCC): ICD-10-CM

## 2019-09-06 LAB
INR BLD: 2.7 (ref 1–1.5)
PT POC: 32.2 SECONDS (ref 9.1–12)
VALID INTERNAL CONTROL?: YES

## 2019-09-06 NOTE — TELEPHONE ENCOUNTER
I called pt, 2 pt identifiers verified; pt states that she already spoke to nurse here at our office who already gave her Dr Perez Grounds recommendations. No further action needed.

## 2019-09-13 DIAGNOSIS — R52 PAIN: ICD-10-CM

## 2019-09-13 RX ORDER — HYDROCODONE BITARTRATE AND ACETAMINOPHEN 5; 325 MG/1; MG/1
1 TABLET ORAL
Qty: 30 TAB | Refills: 0 | Status: SHIPPED | OUTPATIENT
Start: 2019-09-13 | End: 2019-10-24 | Stop reason: SDUPTHER

## 2019-09-13 NOTE — TELEPHONE ENCOUNTER
Maricarmen Valadez, Cynthia [address] [address] Office Pool             Medication Refill     Caller (if not patient):       Relationship of caller (if not patient):       Best contact number(s): [phone]       Name of medication and dosage if known:Hydrocodone 5/32mg       Is patient out of this medication (yes/no): No - 2 pills remaining       Pharmacy name:N/a     Pharmacy listed in chart? (yes/no):   Pharmacy phone number:       Details to clarify the request:Contact pt when Rx is ready for .      Vanessa Prakash received & copied from [facility]

## 2019-09-23 ENCOUNTER — OFFICE VISIT (OUTPATIENT)
Dept: INTERNAL MEDICINE CLINIC | Age: 69
End: 2019-09-23

## 2019-09-23 VITALS
HEART RATE: 79 BPM | TEMPERATURE: 97.7 F | RESPIRATION RATE: 16 BRPM | BODY MASS INDEX: 37.65 KG/M2 | HEIGHT: 65 IN | DIASTOLIC BLOOD PRESSURE: 75 MMHG | SYSTOLIC BLOOD PRESSURE: 128 MMHG | WEIGHT: 226 LBS | OXYGEN SATURATION: 98 %

## 2019-09-23 DIAGNOSIS — R60.0 LOWER EXTREMITY EDEMA: ICD-10-CM

## 2019-09-23 DIAGNOSIS — E78.2 MIXED HYPERLIPIDEMIA: ICD-10-CM

## 2019-09-23 DIAGNOSIS — Z23 ENCOUNTER FOR IMMUNIZATION: ICD-10-CM

## 2019-09-23 DIAGNOSIS — Z00.00 MEDICARE ANNUAL WELLNESS VISIT, SUBSEQUENT: Primary | ICD-10-CM

## 2019-09-23 DIAGNOSIS — Z13.31 SCREENING FOR DEPRESSION: ICD-10-CM

## 2019-09-23 DIAGNOSIS — I10 BENIGN ESSENTIAL HYPERTENSION: ICD-10-CM

## 2019-09-23 DIAGNOSIS — E55.9 VITAMIN D DEFICIENCY: ICD-10-CM

## 2019-09-23 DIAGNOSIS — E66.01 MORBID OBESITY WITH BMI OF 40.0-44.9, ADULT (HCC): ICD-10-CM

## 2019-09-23 DIAGNOSIS — E03.9 HYPOTHYROIDISM, UNSPECIFIED TYPE: ICD-10-CM

## 2019-09-23 DIAGNOSIS — M79.671 PAIN IN BOTH FEET: ICD-10-CM

## 2019-09-23 DIAGNOSIS — M79.672 PAIN IN BOTH FEET: ICD-10-CM

## 2019-09-23 DIAGNOSIS — F31.70 BIPOLAR AFFECTIVE DISORDER IN REMISSION (HCC): ICD-10-CM

## 2019-09-23 DIAGNOSIS — R73.9 HYPERGLYCEMIA: ICD-10-CM

## 2019-09-23 RX ORDER — ERGOCALCIFEROL 1.25 MG/1
CAPSULE ORAL
Qty: 4 CAP | Refills: 0 | Status: SHIPPED | OUTPATIENT
Start: 2019-09-23 | End: 2019-10-18 | Stop reason: SDUPTHER

## 2019-09-23 RX ORDER — LATANOPROST 50 UG/ML
SOLUTION/ DROPS OPHTHALMIC
Refills: 3 | COMMUNITY
Start: 2019-08-04 | End: 2020-04-07 | Stop reason: ALTCHOICE

## 2019-09-23 NOTE — PROGRESS NOTES
Subjective:  Ms. Antonella Morris is here for follow up. Chief Complaint   Patient presents with    Swelling     pt here today c.o bilateral foot swelling since that beginning of summer     \"I feel happier since I have the dog. \"   Edema L > R. Worsening. For the foot pain, \"nothing worked. \" Got another opinion from \"Dr. STYLES.\"  \"He didn't like how swollen my legs were, and wants me to see my cardiologist and PCP before he does any cutting. \"   We note from 2018: Dr. Becky Dougherty is seeing her for her foot pain. Her L foot is in a boot, and \"I have several torn tendons. \"    A fib:  She has had two EP studies in 2014, and ablation for a fib, with Dr. Jaja Daniel. She also sees Dr. Solange Shelby. Sees Dr. Priyanka Ford for GI issues/GERD. On PPI. Blood testing has revealed allergies to pork, beef, egg white, milk. She has been referred by Jaleesa Mckeon in Dr. Jerson Richey office, to go see an allergist.  She is concerned about weight, which is going up. Insomnia is a little better. ASHELY no longer on CPAP. Has trouble tolerating the mask--takes it off early in the morning. Depression. Stable. Currently following with NP. Also Radha Segovia, psychologist.   Migraines: Sometimes has aura without headache. Had been having a frequent unilateral HA, associated with photophobia and phonophobia. Tried botox injections in scalp, for migraines--\"it didn't work. \" She had been treated by Dr. Kamar Avila, neurologist, with meds including topamax and hydrocodone. Just about the only thing that has worked for her for abortive therapy is hydrocodone. Dr. Natali Tyler informed her that he can't prescribe narcotics anymore, and to get them from PCP: \"and he explained what's going on with the government and everything. \"    Gets botox injections into bladder--\"It's amazing\". She is not having much urinary incontinence. Dr. Linda Ricci sees her.       PMH: Anxiety, Depression/BPAD (seeing a psychiatrist; hospiatalized in 2007), Migraines (seeing Dr. Natali Tyler), hypothyroidism, hyperlipidemia, A fib (sees Dr. Marlene Diaz and Dr. Aziza Meraz). Vit D deficiency. Colon polyps2009 Ashok Monreal. Hand pain s/p injections by Dr. Kelley Clark. GERD--Sees Dr. Edenilson Rivero. Obesity. PSH: Hysterectomy due to endometriosis. Bunionectomy, neuroma. Carpal tunnel surgery. EP ablation x 2. Colon polypectomy in 2009. Sigmoidoscopy in 2015. EGD and dilatation in 2015. Hammertoe surgery 2016. Ventral hernia repair. All: Sulfahives. Had a reaction to one of the meds for BPAD prior to starting lithium. Meds:    Current Outpatient Medications on File Prior to Visit   Medication Sig Dispense Refill    latanoprost (XALATAN) 0.005 % ophthalmic solution PLACE 1 DROP EVERY DAY INTO BOTH EYES AT BEDTIME  3    HYDROcodone-acetaminophen (NORCO) 5-325 mg per tablet Take 1 Tab by mouth every eight (8) hours as needed for Pain for up to 30 days. Max Daily Amount: 3 Tabs. 30 Tab 0    ergocalciferol (ERGOCALCIFEROL) 50,000 unit capsule TAKE 1 CAPSULE BY MOUTH EVERY 7 DAYS 4 Cap 0    levothyroxine (SYNTHROID) 50 mcg tablet TAKE 1 TABLET BY MOUTH EVERY DAY 90 Tab 3    warfarin (COUMADIN) 5 mg tablet Take 3 pills on Tuesdays and 2 pills all other days. 64 Tab 3    LYRICA 100 mg capsule TAKE 1 CAPSULE BY MOUTH TWICE A DAY 60 Cap 5    primidone (MYSOLINE) 50 mg tablet Take 0.5 Tabs by mouth nightly. 15 Tab 3    fluticasone propionate (FLONASE NA) by Nasal route.  cetirizine HCl (ZYRTEC PO) Take  by mouth daily.  senna-docusate (PERICOLACE) 8.6-50 mg per tablet Take 1 Tab by mouth daily. 30 Tab 0    diphenoxylate-atropine (LOMOTIL) 2.5-0.025 mg per tablet Take 1 Tab by mouth two (2) times daily as needed for Diarrhea.  topiramate (TOPAMAX) 100 mg tablet Take 200 mg by mouth nightly.  lithium 300 mg tablet Take 300 mg by mouth daily (with dinner).  alprazolam (XANAX) 0.5 mg tablet Take 0.5 mg by mouth nightly as needed for Anxiety.        No current facility-administered medications on file prior to visit. 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 FH:  Father, colon cancer. Mother, stroke. Grandmother, DM.  ROS: Otherwise negative except as noted elsewhere. Has a chronic burning sensation in her mouth and neck. Objective:  Vitals:   Visit Vitals  /75 (BP 1 Location: Left arm, BP Patient Position: Sitting)   Pulse 79   Temp 97.7 °F (36.5 °C) (Oral)   Resp 16   Ht 5' 5\" (1.651 m)   Wt 226 lb (102.5 kg)   SpO2 98%   BMI 37.61 kg/m²   . General appearance: Pleasant obese middle aged white female in NAD. HEENT: PERRLA. EOMI. Neck: Supple with No LAD. Lungs: CTAB. No wheezes. No rales. Heart: Irregular. Abdomen: S; NT, ND, BS +. Extremities: Warm. No C/C/E. Neuro: Nonfocal.     Assessment/Plan:    1. Edema: Discussed DDx, including venous insufficiency, DVT, volume overload, CHF, metabolic disorders. Check labs. F/u with cardiology. Also duplex to r/o DVT. 2. Hypothyroidism: Clinically euthyroid. Continue synthroid. Recheck labs. 3. Atrial Fibrillation: F/U as per Dr. Li Mcdaniel.    4. HLP:  Continue OTC fish oil. Also encouraged lifestyle efforts. 5. Migraines: Sees Dr. Elizabeth Bryant. Has prn triptan, and topamax, and prn oxycodone. 6. BPAD/Anxiety/Stress. Continue psychiatry follow up. Mckayla Elaine, psychology. 7. Urge incontinence: Ongoing. Continue f/u with Dr. Edgar Gómez. 8. Hand pain: F/U as per Dr. Eugenio Ratliff. 9. Epigastric burning / GERD: On Prilosec OTC. Follows with Dr. Saul Tran. 10. Hyperglycemia: Recheck labs; She has in the past met criteria for DM (fasting glc > 125 on more than 1 occasion). For now, work on diet and exercise. 11. Edema: On daily lasix. Follow up in 6 months      Discussed the patient's BMI with her.   The BMI follow up plan is as follows:     dietary management education, guidance, and counseling  encourage exercise  monitor weight  prescribed dietary intake    An After Visit Summary was printed and given to the patient.

## 2019-09-23 NOTE — PROGRESS NOTES
1. Have you been to the ER, urgent care clinic since your last visit? Hospitalized since your last visit?no    2. Have you seen or consulted any other health care providers outside of the [address] [place] [address] since your last visit? Include any pap smears or colon screening.  no

## 2019-09-23 NOTE — PATIENT INSTRUCTIONS
Body Mass Index: Care Instructions Your Care Instructions Body mass index (BMI) can help you see if your weight is raising your risk for health problems. It uses a formula to compare how much you weigh with how tall you are. · A BMI lower than 18.5 is considered underweight. · A BMI between 18.5 and 24.9 is considered healthy. · A BMI between 25 and 29.9 is considered overweight. A BMI of 30 or higher is considered obese. If your BMI is in the normal range, it means that you have a lower risk for weight-related health problems. If your BMI is in the overweight or obese range, you may be at increased risk for weight-related health problems, such as high blood pressure, heart disease, stroke, arthritis or joint pain, and diabetes. If your BMI is in the underweight range, you may be at increased risk for health problems such as fatigue, lower protection (immunity) against illness, muscle loss, bone loss, hair loss, and hormone problems. BMI is just one measure of your risk for weight-related health problems. You may be at higher risk for health problems if you are not active, you eat an unhealthy diet, or you drink too much alcohol or use tobacco products. Follow-up care is a key part of your treatment and safety. Be sure to make and go to all appointments, and call your doctor if you are having problems. It's also a good idea to know your test results and keep a list of the medicines you take. How can you care for yourself at home? · Practice healthy eating habits. This includes eating plenty of fruits, vegetables, whole grains, lean protein, and low-fat dairy. · If your doctor recommends it, get more exercise. Walking is a good choice. Bit by bit, increase the amount you walk every day. Try for at least 30 minutes on most days of the week. · Do not smoke. Smoking can increase your risk for health problems.  If you need help quitting, talk to your doctor about stop-smoking programs and medicines. These can increase your chances of quitting for good. · Limit alcohol to 2 drinks a day for men and 1 drink a day for women. Too much alcohol can cause health problems. If you have a BMI higher than 25 · Your doctor may do other tests to check your risk for weight-related health problems. This may include measuring the distance around your waist. A waist measurement of more than 40 inches in men or 35 inches in women can increase the risk of weight-related health problems. · Talk with your doctor about steps you can take to stay healthy or improve your health. You may need to make lifestyle changes to lose weight and stay healthy, such as changing your diet and getting regular exercise. If you have a BMI lower than 18.5 · Your doctor may do other tests to check your risk for health problems. · Talk with your doctor about steps you can take to stay healthy or improve your health. You may need to make lifestyle changes to gain or maintain weight and stay healthy, such as getting more healthy foods in your diet and doing exercises to build muscle. Where can you learn more? Go to http://roge-suha.info/. Enter S176 in the search box to learn more about \"Body Mass Index: Care Instructions. \" Current as of: October 13, 2016 Content Version: 11.4 © 2686-6209 [employer]wise, Incorporated. Care instructions adapted under license by [employer] (which disclaims liability or warranty for this information). If you have questions about a medical condition or this instruction, always ask your healthcare professional. [place] [address] any warranty or liability for your use of this information. Medicare Wellness Visit, Female The best way to live healthy is to have a lifestyle where you eat a well-balanced diet, exercise regularly, limit alcohol use, and quit all forms of tobacco/nicotine, if applicable. Regular preventive services are another way to keep healthy. Preventive services (vaccines, screening tests, monitoring & exams) can help personalize your care plan, which helps you manage your own care. Screening tests can find health problems at the earliest stages, when they are easiest to treat. [facility] follows the current, evidence-based guidelines published by the [facility] Casey Henao (USPSTF) when recommending preventive services for our patients. Because we follow these guidelines, sometimes recommendations change over time as research supports it. (For example, mammograms used to be recommended annually. Even though Medicare will still pay for an annual mammogram, the newer guidelines recommend a mammogram every two years for women of average risk.) Of course, you and your doctor may decide to screen more often for some diseases, based on your risk and your health status. Preventive services for you include: - Medicare offers their members a free annual wellness visit, which is time for you and your primary care provider to discuss and plan for your preventive service needs. Take advantage of this benefit every year! 
-All adults over the age of 72 should receive the recommended pneumonia vaccines. Current USPSTF guidelines recommend a series of two vaccines for the best pneumonia protection.  
-All adults should have a flu vaccine yearly and a tetanus vaccine every 10 years. All adults age 61 and older should receive a shingles vaccine once in their lifetime.   
-A bone mass density test is recommended when a woman turns 65 to screen for osteoporosis. This test is only recommended one time, as a screening. Some providers will use this same test as a disease monitoring tool if you already have osteoporosis. -All adults age 38-68 who are overweight should have a diabetes screening test once every three years. -Other screening tests and preventive services for persons with diabetes include: an eye exam to screen for diabetic retinopathy, a kidney function test, a foot exam, and stricter control over your cholesterol.  
-Cardiovascular screening for adults with routine risk involves an electrocardiogram (ECG) at intervals determined by your doctor.  
-Colorectal cancer screenings should be done for adults age 54-65 with no increased risk factors for colorectal cancer. There are a number of acceptable methods of screening for this type of cancer. Each test has its own benefits and drawbacks. Discuss with your doctor what is most appropriate for you during your annual wellness visit. The different tests include: colonoscopy (considered the best screening method), a fecal occult blood test, a fecal DNA test, and sigmoidoscopy. -Breast cancer screenings are recommended every other year for women of normal risk, age 54-69. 
-Cervical cancer screenings for women over age 72 are only recommended with certain risk factors.  
-All adults born between [facility] should be screened once for Hepatitis C. Here is a list of your current Health Maintenance items (your personalized list of preventive services) with a due date: 
Health Maintenance Due Topic Date Due  
 DTaP/Tdap/Td  (1 - Tdap) 01/08/1970  Shingles Vaccine (1 of 2) 01/08/1999  Bone Mineral Density   01/08/2014  Pneumococcal Vaccine (1 of 2 - PCV13) 01/08/2014 [facility] Annual Well Visit  03/14/2018  Mammogram  06/01/2019  Flu Vaccine  08/01/2019

## 2019-09-23 NOTE — PROGRESS NOTES
This is the Subsequent Medicare Annual Wellness Exam, performed 12 months or more after the Initial AWV or the last Subsequent AWV    I have reviewed the patient's medical history in detail and updated the computerized patient record.      History     Past Medical History:   Diagnosis Date    Arrhythmia 2014    atrial fibrillation 2014, sick sinus syndrome: [facility]    Arthritis     Bipolar affective disorder ([facility] 3/31/2010    Chronic pain 2018    right shoulder    Colon polyps 03/31/2010    also 5/2018: colon polyps    Depression     Diarrhea 07/19/2013    D/t alpha gal allergy says patient    Epigastric pain 6/5/2015    GERD (gastroesophageal reflux disease)     occasiional only; controlled with med    Hemispheric carotid artery syndrome No stroke    Neuro: Alexis Stewart  (CT scan head/neck negative 4/2018 in [facility])    Hyperlipidemia 3/31/2010    Hypothyroidism 3/31/2010    Long term current use of anticoagulant therapy     Migraine 03/31/2010    has them x2 a month: last one 5/23/2018    Psychotic disorder ([facility]     S/P ablation of atrial fibrillation 05/13/2014    S/P cardiac pacemaker procedure 9/16/2014 9/16/14 Medtronic MRI compatible dual chamber pacemaker implant    Sleep apnea     unable to tolerate CPAP    Stool color black     Thyroid disease     Tick-borne disease     Alpha Gal allergy: no eat pork, beef, milk (Meat allergy showed up on a allergy test)    Urge incontinence 3/31/2010    Vitamin D deficiency 3/31/2015      Past Surgical History:   Procedure Laterality Date    CARDIAC SURG PROCEDURE UNLIST  5/2014    ablation    COLONOSCOPY N/A 4/27/2018    [facility]  4/27/2018         COLONOSCOPY,RENALDO Jewell  4/27/2018         HX BUNIONECTOMY      HX GI  2010, 5/2018    egd, colonoscopy    HX HYSTERECTOMY      HX ORTHOPAEDIC  2000's    removed bone spurs from R & L hand  HX ORTHOPAEDIC  1990s? left bunionectomy     HX OTHER SURGICAL  07/2014    2 shocks to heart & ablations    HX PACEMAKER  2014    On the right side    HX PACEMAKER PLACEMENT  2015    HX TONSILLECTOMY  15 yrs old     Current Outpatient Medications   Medication Sig Dispense Refill    latanoprost (XALATAN) 0.005 % ophthalmic solution PLACE 1 DROP EVERY DAY INTO BOTH EYES AT BEDTIME  3    HYDROcodone-acetaminophen (NORCO) 5-325 mg per tablet Take 1 Tab by mouth every eight (8) hours as needed for Pain for up to 30 days. Max Daily Amount: 3 Tabs. 30 Tab 0    ergocalciferol (ERGOCALCIFEROL) 50,000 unit capsule TAKE 1 CAPSULE BY MOUTH EVERY 7 DAYS 4 Cap 0    levothyroxine (SYNTHROID) 50 mcg tablet TAKE 1 TABLET BY MOUTH EVERY DAY 90 Tab 3    warfarin (COUMADIN) 5 mg tablet Take 3 pills on Tuesdays and 2 pills all other days. 64 Tab 3    LYRICA 100 mg capsule TAKE 1 CAPSULE BY MOUTH TWICE A DAY 60 Cap 5    primidone (MYSOLINE) 50 mg tablet Take 0.5 Tabs by mouth nightly. 15 Tab 3    fluticasone propionate (FLONASE NA) by Nasal route.  cetirizine HCl (ZYRTEC PO) Take  by mouth daily.  senna-docusate (PERICOLACE) 8.6-50 mg per tablet Take 1 Tab by mouth daily. 30 Tab 0    diphenoxylate-atropine (LOMOTIL) 2.5-0.025 mg per tablet Take 1 Tab by mouth two (2) times daily as needed for Diarrhea.  topiramate (TOPAMAX) 100 mg tablet Take 200 mg by mouth nightly.  lithium 300 mg tablet Take 300 mg by mouth daily (with dinner).  alprazolam (XANAX) 0.5 mg tablet Take 0.5 mg by mouth nightly as needed for Anxiety.        Allergies   Allergen Reactions    Latex Swelling     Swelling of lips says patient    Beef Containing Products Nausea and Vomiting     diarrhea    Ciprofloxacin Unknown (comments)    Milk Nausea and Vomiting     diarrhea    Pork Derived (Porcine) Nausea and Vomiting     diarrhea    Sulfa (Sulfonamide Antibiotics) Hives    Xarelto [Rivaroxaban] Other (comments)     GI problems     Family History   Problem Relation Age of Onset    Arrhythmia Mother         afib    Stroke Mother     Heart Failure Mother     Colon Cancer Father     Heart Disease Father     Arrhythmia Brother         afib    Asthma Brother     COPD Brother      Social History     Tobacco Use    Smoking status: Never Smoker    Smokeless tobacco: Never Used   Substance Use Topics    Alcohol use: Yes     Alcohol/week: 1.0 standard drinks     Types: 1 Cans of beer per week     Comment: weekly     Patient Active Problem List   Diagnosis Code    Colon polyps K63.5    Urge incontinence N39.41    Migraine G43.909    Bipolar affective disorder ([facility] F31.9    Hypothyroidism E03.9    Hyperglycemia R73.9    Hyperlipidemia E78.5    GERD (gastroesophageal reflux disease) K21.9    Diarrhea R19.7    Benign essential hypertension I10    Morbid obesity with BMI of 40.0-44.9, adult ([facility] E66.01, Z68.41    Atrial fibrillation ([facility] I48.91    S/P ablation of atrial fibrillation Z98.890, Z86.79    SSS (sick sinus syndrome) ([facility]) I49.5    S/P cardiac pacemaker procedure Z95.0    Esophageal spasm K22.4    A-fib ([facility]) I48.91    Vitamin D deficiency E55.9    Epigastric pain R10.13    Fibromyalgia M79.7    Incisional hernia, without obstruction or gangrene K43.2    Obesity, Class II, BMI 35-39.9 E66.9    Osteoarthritis of right shoulder M19.011       Depression Risk Factor Screening:     3 most recent PHQ Screens 1/5/2017   Little interest or pleasure in doing things Several days   Feeling down, depressed, irritable, or hopeless Several days   Total Score PHQ 2 2   Sees psychiatrist; has BPAD. Alcohol Risk Factor Screening: You do not drink alcohol or very rarely. Functional Ability and Level of Safety:   Hearing Loss  Hearing is somewhat impaired. She doesn't feel ready for hearing aid. Activities of Daily Living  The home contains: no safety equipment.   Patient does total self care    Fall Risk  Fall Risk Assessment, last 12 mths 9/23/2019   Able to walk? Yes   Fall in past 12 months? Yes   Fall with injury? No   Number of falls in past 12 months 1   Fall Risk Score 1       Abuse Screen  Patient is not abused    Cognitive Screening   Evaluation of Cognitive Function:  Has your family/caregiver stated any concerns about your memory: no  Normal    Patient Care Team   Patient Care Team:  Anastasia Ruffin MD as PCP - General (Internal Medicine)  Tricia Galaviz, RN as Ambulatory Care Navigator  Moise Martinez MD as Referring Provider (Cardiology)  Bolivar Bird, DARA as Nurse Navigator  Chris Vail MD (Obstetrics & Gynecology)  Any Wang MD (Neurology)  Estelle White DPM (Podiatry)  Albert Osullivan MD as Surgeon (General Surgery)    Assessment/Plan   Education and counseling provided:  Are appropriate based on today's review and evaluation       Diagnoses and all orders for this visit:    1. Lower extremity edema  -     DUPLEX LOWER EXT VENOUS BILAT; Future  -     REFERRAL TO CARDIOLOGY    2. Hypothyroidism, unspecified type  -     TSH 3RD GENERATION  -     T4, FREE    3. Hyperglycemia  -     HEMOGLOBIN A1C WITH EAG    4. Mixed hyperlipidemia  -     LIPID PANEL    5. Bipolar affective disorder in remission ([facility]    6. Benign essential hypertension  -     LIPID PANEL  -     METABOLIC PANEL, COMPREHENSIVE  -     CBC WITH AUTOMATED DIFF    7. Pain in both feet  -     REFERRAL TO ORTHOPEDICS    8. Vitamin D deficiency  -     VITAMIN D, 25 HYDROXY    9. Encounter for immunization  -     INFLUENZA VACCINE INACTIVATED (IIV), SUBUNIT, ADJUVANTED, IM  -     ADMIN INFLUENZA VIRUS VAC  -     PNEUMOCOCCAL POLYSACCHARIDE VACCINE, 23-VALENT, ADULT OR IMMUNOSUPPRESSED PT DOSE,  -     ADMIN PNEUMOCOCCAL VACCINE    10.  Morbid obesity with BMI of 40.0-44.9, adult [facility])        Health Maintenance Due   Topic Date Due    DTaP/Tdap/Td series (1 - Tdap) 01/08/1970    Shingrix Vaccine Age 50> (1 of 2) 01/08/1999    Bone Densitometry (Dexa) Screening  01/08/2014    Pneumococcal 65+ years (1 of 2 - PCV13) 01/08/2014    MEDICARE YEARLY EXAM  03/14/2018    BREAST CANCER SCRN MAMMOGRAM  06/01/2019    Influenza Age 9 to Adult  08/01/2019

## 2019-09-25 RX ORDER — PRIMIDONE 50 MG/1
TABLET ORAL
Qty: 15 TAB | Refills: 3 | Status: SHIPPED | OUTPATIENT
Start: 2019-09-25 | End: 2020-01-09 | Stop reason: SDUPTHER

## 2019-09-25 NOTE — TELEPHONE ENCOUNTER
Future Appointments   Date Time Provider Radha Yoon   9/26/2019  9:15 AM COUMADIN, [facility]   12/4/2019  3:00 PM Samantha Diaz MD [address] [place] [address]   12/16/2019  8:00 AM REMOTE_RCA [address] [facility]   3/23/2020  1:30 PM Sarahi Arcos MD [address]   4/16/2020  1:00 PM PACEMAKER, [facility] [address] [place] [address],[address]   4/16/2020  1:20 PM Kaley Baker NP [address] [place] [address],[address]                         Last Appointment My Department:  Visit date not found    Would you like to refill below? Requested Prescriptions     Pending Prescriptions Disp Refills    primidone (MYSOLINE) 50 mg tablet 15 Tab 3     Sig: Take 0.5 Tabs by mouth nightly.

## 2019-09-26 ENCOUNTER — ANTI-COAG VISIT (OUTPATIENT)
Dept: CARDIOLOGY CLINIC | Age: 69
End: 2019-09-26

## 2019-09-26 ENCOUNTER — HOSPITAL ENCOUNTER (OUTPATIENT)
Dept: ULTRASOUND IMAGING | Age: 69
Discharge: HOME | End: 2019-09-26
Attending: INTERNAL MEDICINE
Payer: MEDICARE

## 2019-09-26 DIAGNOSIS — I48.0 PAROXYSMAL ATRIAL FIBRILLATION (HCC): ICD-10-CM

## 2019-09-26 DIAGNOSIS — Z79.01 LONG TERM (CURRENT) USE OF ANTICOAGULANTS: Primary | ICD-10-CM

## 2019-09-26 DIAGNOSIS — R60.0 LOWER EXTREMITY EDEMA: ICD-10-CM

## 2019-09-26 LAB
INR BLD: 2 (ref 1–1.5)
PT POC: 23.8 SECONDS (ref 9.1–12)
VALID INTERNAL CONTROL?: YES

## 2019-09-26 PROCEDURE — 93970 EXTREMITY STUDY: CPT

## 2019-09-26 NOTE — PROGRESS NOTES
A full discussion of the nature of anticoagulants has been carried out. A benefit risk analysis has been presented to the patient, so that they understand the justification for choosing anticoagulation at this time. The need for frequent and regular monitoring, precise dosage adjustment and compliance is stressed. Side effects of potential bleeding are discussed. The patient should avoid any OTC items containing aspirin, naproxen or ibuprofen, and should avoid great swings in general diet. Avoid alcohol consumption. Advised to notify the office if antibiotic or steroid therapy is initiated. Call if any signs of abnormal bleeding are present. Next PT/INR test in 3 weeks.

## 2019-09-27 ENCOUNTER — TELEPHONE (OUTPATIENT)
Dept: INTERNAL MEDICINE CLINIC | Age: 69
End: 2019-09-27

## 2019-09-30 NOTE — PROGRESS NOTES
I called pt, 2 pt identifiers verified ; pt informed per Dr Lozano Amel:  : No DVT, but she might have a Bakers cyst.  BJF    Pt wants to know what should she do regarding bakers cyst, do you want to refer pt?

## 2019-10-01 ENCOUNTER — TELEPHONE (OUTPATIENT)
Dept: INTERNAL MEDICINE CLINIC | Age: 69
End: 2019-10-01

## 2019-10-01 DIAGNOSIS — M71.20 SYNOVIAL CYST OF POPLITEAL SPACE, UNSPECIFIED LATERALITY: ICD-10-CM

## 2019-10-01 DIAGNOSIS — M17.12 OSTEOARTHRITIS OF LEFT KNEE, UNSPECIFIED OSTEOARTHRITIS TYPE: Primary | ICD-10-CM

## 2019-10-01 NOTE — TELEPHONE ENCOUNTER
Baker's cyst is a benign entity, typically related to knee arthritis. We could refer to orthopedics.

## 2019-10-01 NOTE — TELEPHONE ENCOUNTER
Sh[place] Palafox:   I called pt, 2 pt identifiers verified ; pt informed per Dr Tiffany Lafleur:   : No DVT, but she might have a Bakers cyst.  BJF     Pt wants to know what should she do regarding bakers cyst, do you want to refer pt?

## 2019-10-03 ENCOUNTER — HOSPITAL ENCOUNTER (OUTPATIENT)
Dept: GENERAL RADIOLOGY | Age: 69
Discharge: HOME | End: 2019-10-03

## 2019-10-03 DIAGNOSIS — R19.7 DIARRHEA: ICD-10-CM

## 2019-10-04 ENCOUNTER — HOSPITAL ENCOUNTER (OUTPATIENT)
Dept: GENERAL RADIOLOGY | Age: 69
Discharge: HOME | End: 2019-10-04
Payer: MEDICARE

## 2019-10-04 DIAGNOSIS — R10.13 EPIGASTRIC PAIN: ICD-10-CM

## 2019-10-04 DIAGNOSIS — R10.13 DYSPEPSIA: ICD-10-CM

## 2019-10-04 DIAGNOSIS — R10.31 RLQ ABDOMINAL PAIN: ICD-10-CM

## 2019-10-04 DIAGNOSIS — R19.7 DIARRHEA: ICD-10-CM

## 2019-10-04 DIAGNOSIS — Z79.01 LONG TERM (CURRENT) USE OF ANTICOAGULANTS: ICD-10-CM

## 2019-10-04 DIAGNOSIS — K58.9 IRRITABLE BOWEL DISEASE: ICD-10-CM

## 2019-10-04 PROCEDURE — 74018 RADEX ABDOMEN 1 VIEW: CPT

## 2019-10-15 DIAGNOSIS — L29.9 PRURITUS: ICD-10-CM

## 2019-10-15 RX ORDER — HYDROXYZINE 25 MG/1
TABLET, FILM COATED ORAL
Qty: 30 TAB | Refills: 1 | Status: SHIPPED | OUTPATIENT
Start: 2019-10-15 | End: 2020-03-19 | Stop reason: SDUPTHER

## 2019-10-15 NOTE — TELEPHONE ENCOUNTER
----- Message from Nasir Pruett sent at 10/15/2019  2:15 PM EDT -----  Regarding: Dr. Topher Rm  Pt is requesting a refill for her \"Hydroxyzine HCL\" be called into her pharmacy on file. She called and stated that she was unable to make her appointment this morning due to a migraine.  Best contact number is [phone]      Message copied/pasted from [facility]

## 2019-10-17 ENCOUNTER — ANTI-COAG VISIT (OUTPATIENT)
Dept: CARDIOLOGY CLINIC | Age: 69
End: 2019-10-17

## 2019-10-17 ENCOUNTER — OFFICE VISIT (OUTPATIENT)
Dept: CARDIOLOGY CLINIC | Age: 69
End: 2019-10-17

## 2019-10-17 VITALS
WEIGHT: 228 LBS | BODY MASS INDEX: 37.99 KG/M2 | HEART RATE: 72 BPM | HEIGHT: 65 IN | SYSTOLIC BLOOD PRESSURE: 108 MMHG | RESPIRATION RATE: 18 BRPM | OXYGEN SATURATION: 97 % | DIASTOLIC BLOOD PRESSURE: 70 MMHG

## 2019-10-17 DIAGNOSIS — I10 BENIGN ESSENTIAL HYPERTENSION: ICD-10-CM

## 2019-10-17 DIAGNOSIS — E78.2 MIXED HYPERLIPIDEMIA: ICD-10-CM

## 2019-10-17 DIAGNOSIS — I48.0 PAROXYSMAL ATRIAL FIBRILLATION (HCC): Primary | ICD-10-CM

## 2019-10-17 DIAGNOSIS — I49.5 SSS (SICK SINUS SYNDROME) (HCC): ICD-10-CM

## 2019-10-17 DIAGNOSIS — I48.0 PAROXYSMAL ATRIAL FIBRILLATION (HCC): ICD-10-CM

## 2019-10-17 DIAGNOSIS — Z79.01 LONG TERM (CURRENT) USE OF ANTICOAGULANTS: Primary | ICD-10-CM

## 2019-10-17 LAB
INR BLD: 2.1 (ref 1–1.5)
PT POC: 24.9 SECONDS (ref 9.1–12)
VALID INTERNAL CONTROL?: YES

## 2019-10-17 NOTE — PROGRESS NOTES
Subjective:      Mariajose Verduzco is a 78 y.o. female is here for EP consult. The patient denies chest pain/ shortness of breath, orthopnea, PND, LE edema, palpitations, syncope, presyncope or fatigue.        Patient Active Problem List    Diagnosis Date Noted    Osteoarthritis of right shoulder 06/06/2018    Incisional hernia, without obstruction or gangrene 10/20/2017    Obesity, Class II, BMI 35-39.9 10/20/2017    Fibromyalgia 03/29/2017    Epigastric pain 06/05/2015    Vitamin D deficiency 03/31/2015    A-fib ([address] 01/28/2015    Esophageal spasm 12/16/2014    S/P cardiac pacemaker procedure 09/16/2014    SSS (sick sinus syndrome) ([address] 09/02/2014    S/P ablation of atrial fibrillation 05/13/2014    Atrial fibrillation ([address] 04/21/2014    Benign essential hypertension 03/14/2014    Morbid obesity with BMI of 40.0-44.9, adult ([facility] 03/14/2014    Diarrhea 07/19/2013    GERD (gastroesophageal reflux disease) 04/13/2012    Colon polyps 03/31/2010    Urge incontinence 03/31/2010    Migraine 03/31/2010    Bipolar affective disorder ([address] 03/31/2010    Hypothyroidism 03/31/2010    Hyperglycemia 03/31/2010    Hyperlipidemia 03/31/2010      Ry Thakur MD  Past Medical History:   Diagnosis Date    Arrhythmia 2014    atrial fibrillation 2014, sick sinus syndrome: [facility]    Arthritis     Bipolar affective disorder ([facility] 3/31/2010    Chronic pain 2018    right shoulder    Colon polyps 03/31/2010    also 5/2018: colon polyps    Depression     Diarrhea 07/19/2013    D/t alpha gal allergy says patient    Epigastric pain 6/5/2015    GERD (gastroesophageal reflux disease)     occasiional only; controlled with med    Hemispheric carotid artery syndrome No stroke    Neuro: Estrellita Lanier  (CT scan head/neck negative 4/2018 in [facility])    Hyperlipidemia 3/31/2010    Hypothyroidism 3/31/2010    Long term current use of anticoagulant therapy     Migraine 03/31/2010 has them x2 a month: last one 5/23/2018    Psychotic disorder ([address]     S/P ablation of atrial fibrillation 05/13/2014    S/P cardiac pacemaker procedure 9/16/2014 9/16/14 Medtronic MRI compatible dual chamber pacemaker implant    Sleep apnea     unable to tolerate CPAP    Stool color black     Thyroid disease     Tick-borne disease     Alpha Gal allergy: no eat pork, beef, milk (Meat allergy showed up on a allergy test)    Urge incontinence 3/31/2010    Vitamin D deficiency 3/31/2015      Past Surgical History:   Procedure Laterality Date    CARDIAC SURG PROCEDURE UNLIST  5/2014    ablation    COLONOSCOPY N/A 4/27/2018    COLONOS[facility]gen [facility]  4/27/2018         COLONOSCOPY,[facility] Aislinn Casarez  4/27/2018         HX BUNIONECTOMY      HX GI  2010, 5/2018    egd, colonoscopy    HX HYSTERECTOMY      HX ORTHOPAEDIC  2000's    removed bone spurs from R & L hand    HX ORTHOPAEDIC  1990s?     left bunionectomy     HX OTHER SURGICAL  07/2014    2 shocks to heart & ablations    HX PACEMAKER  2014    On the right side    HX PACEMAKER PLACEMENT  2015    HX TONSILLECTOMY  15 yrs old     Allergies   Allergen Reactions    Latex Swelling     Swelling of lips says patient    Beef Containing Products Nausea and Vomiting     diarrhea    Ciprofloxacin Unknown (comments)    Milk Nausea and Vomiting     diarrhea    Pork Derived (Porcine) Nausea and Vomiting     diarrhea    Sulfa (Sulfonamide Antibiotics) Hives    Xarelto [Rivaroxaban] Other (comments)     GI problems      Family History   Problem Relation Age of Onset    Arrhythmia Mother         afib    Stroke Mother     Heart Failure Mother     Colon Cancer Father     Heart Disease Father     Arrhythmia Brother         afib    Asthma Brother     COPD Brother     negative for cardiac disease  Social History     Socioeconomic History    Marital status:      Spouse name: Not on file    Number of children: Not on file    Years of education: Not on file    Highest education level: Not on file   Tobacco Use    Smoking status: Never Smoker    Smokeless tobacco: Never Used   Substance and Sexual Activity    Alcohol use: Yes     Alcohol/week: 1.0 standard drinks     Types: 1 Cans of beer per week     Comment: weekly    Drug use: Yes     Types: Prescription    Sexual activity: Never     Current Outpatient Medications   Medication Sig    hydrOXYzine HCl (ATARAX) 25 mg tablet TAKE 1 TAB BY MOUTH THREE (3) TIMES DAILY AS NEEDED FOR ITCHING FOR UP TO 10 DAYS.  primidone (MYSOLINE) 50 mg tablet Take 0.5 Tabs by mouth nightly.  ergocalciferol (ERGOCALCIFEROL) 50,000 unit capsule TAKE ONE CAPSULE BY MOUTH ONE TIME PER WEEK    latanoprost (XALATAN) 0.005 % ophthalmic solution PLACE 1 DROP EVERY DAY INTO BOTH EYES AT BEDTIME    levothyroxine (SYNTHROID) 50 mcg tablet TAKE 1 TABLET BY MOUTH EVERY DAY    warfarin (COUMADIN) 5 mg tablet Take 3 pills on Tuesdays and 2 pills all other days.  LYRICA 100 mg capsule TAKE 1 CAPSULE BY MOUTH TWICE A DAY    fluticasone propionate (FLONASE NA) by Nasal route.  cetirizine HCl (ZYRTEC PO) Take  by mouth daily.  topiramate (TOPAMAX) 100 mg tablet Take 200 mg by mouth nightly.  lithium 300 mg tablet Take 300 mg by mouth daily (with dinner).  alprazolam (XANAX) 0.5 mg tablet Take 0.5 mg by mouth nightly as needed for Anxiety.  senna-docusate (PERICOLACE) 8.6-50 mg per tablet Take 1 Tab by mouth daily.  diphenoxylate-atropine (LOMOTIL) 2.5-0.025 mg per tablet Take 1 Tab by mouth two (2) times daily as needed for Diarrhea. No current facility-administered medications for this visit.        Vitals:    10/17/19 0928   BP: 108/70   Pulse: 72   Resp: 18   SpO2: 97%   Weight: 228 lb (103.4 kg)   Height: 5' 5\" (1.651 m)       I have reviewed the nurses notes, vitals, problem list, allergy list, medical history, family, social history and medications. Review of Symptoms:    General: Pt denies excessive weight gain or loss. Pt is able to conduct ADL's  HEENT: Denies blurred vision, headaches, epistaxis and difficulty swallowing. Respiratory: Denies shortness of breath, BIANCHI, wheezing or stridor. Cardiovascular: Denies precordial pain, palpitations, edema or PND  Gastrointestinal: Denies poor appetite, indigestion, abdominal pain or blood in stool  Urinary: Denies dysuria, pyuria  Musculoskeletal: Denies pain or swelling from muscles or joints  Neurologic: Denies tremor, paresthesias, or sensory motor disturbance  Skin: Denies rash, itching or texture change. Psych: Denies depression      Physical Exam:      General: Well developed, in no acute distress. HEENT: Eyes - PERRL, no jvd  Heart:  Normal S1/S2 negative S3 or S4. Regular, no murmur, gallop or rub.   Respiratory: Clear bilaterally x 4, no wheezing or rales  Abdomen:   Soft, non-tender, bowel sounds are active.   Extremities:  No edema, normal cap refill, no cyanosis. Musculoskeletal: No clubbing  Neuro: A&Ox3, speech clear, gait stable. Skin: Skin color is normal. No rashes or lesions.  Non diaphoretic  Vascular: 2+ pulses symmetric in all extremities    Cardiographics    Ekg: A paced    Pacer - A paced 99.6%    Results for orders placed or performed during the hospital encounter of 04/27/18   EKG, 12 LEAD, INITIAL   Result Value Ref Range    Ventricular Rate 75 BPM    Atrial Rate 75 BPM    P-R Interval 326 ms    QRS Duration 170 ms    Q-T Interval 450 ms    QTC Calculation (Bezet) 502 ms    Calculated R Axis -29 degrees    Calculated T Axis 93 degrees    Diagnosis       Atrial-paced rhythm with prolonged AV conduction  Right bundle branch block  When compared with ECG of 28-JAN-2015 14:28,  Electronic atrial pacemaker has replaced Sinus rhythm  Right bundle branch block has replaced Nonspecific intraventricular   conduction delay  Confirmed by Jack James P.KAITLIN ([id number]) on 4/28/2018 1:38:30 PM     Results for orders placed or performed in visit on 08/19/14   CARDIAC HOLTER MONITOR, 24 HOURS    Narrative    ECG Monitor/24 hours, Complete    Reason for Holter Monitor   A-FIB    Heartbeat    Slowest 42  Average 60  Fastest  99        Results:   Underlying Rhythm: Normal sinus rhythm      Atrial Arrhythmias: premature atrial contractions; occasional,  atrial couplets and atrial triplets            AV Conduction: normal    Ventricular Arrhythmias: premature ventricular contractions;  occasional     ST Segment Analysis:normal     Symptom Correlation:  Headache did not correlate with any arrhythmias. Fatigue/dizziness correlated with sinus bradycardia in the 40s    Comment:   Sinus rhythm with symptomatic sinus bradycardia. Clinical  correlation advised. Norma Huang MD, [facility]            Lab Results   Component Value Date/Time    WBC 5.4 11/06/2018 08:19 AM    HGB 14.4 11/06/2018 08:19 AM    HCT 44.4 11/06/2018 08:19 AM    PLATELET 696 36/15/7990 08:19 AM    MCV 95 11/06/2018 08:19 AM      Lab Results   Component Value Date/Time    Sodium 142 11/06/2018 08:19 AM    Potassium 4.4 11/06/2018 08:19 AM    Chloride 108 (H) 11/06/2018 08:19 AM    CO2 21 11/06/2018 08:19 AM    Anion gap 8 06/08/2018 04:12 AM    Glucose 99 11/06/2018 08:19 AM    BUN 15 11/06/2018 08:19 AM    Creatinine 0.84 11/06/2018 08:19 AM    BUN/Creatinine ratio 18 11/06/2018 08:19 AM    GFR est AA 82 11/06/2018 08:19 AM    GFR est non-AA 71 11/06/2018 08:19 AM    Calcium 9.8 11/06/2018 08:19 AM    Bilirubin, total 0.4 11/06/2018 08:19 AM    AST (SGOT) 15 11/06/2018 08:19 AM    Alk. phosphatase 75 11/06/2018 08:19 AM    Protein, total 6.2 11/06/2018 08:19 AM    Albumin 4.2 11/06/2018 08:19 AM    Globulin 2.8 04/27/2018 09:03 PM    A-G Ratio 2.1 11/06/2018 08:19 AM    ALT (SGPT) 17 11/06/2018 08:19 AM         Assessment:     Assessment:        ICD-10-CM ICD-9-CM    1.  Paroxysmal atrial fibrillation (HCC) I48.0 427.31 AMB POC EKG ROUTINE W/ 12 LEADS, INTER & REP   2. SSS (sick sinus syndrome) (HCC) I49.5 427.81    3. Benign essential hypertension I10 401.1    4. Mixed hyperlipidemia E78.2 272.2      Orders Placed This Encounter    AMB POC EKG ROUTINE W/ 12 LEADS, INTER & REP     Order Specific Question:   Reason for Exam:     Answer:   routine        Plan:   Ms Bharat Valle is doing well. She is A paced for sick sinus. No AF noted on her device. Cont med rx for htn and hyperlipidemia. I suggested she see Dr Summer Lucas for her left leg swelling to r/o varicose veins as a cause. She will f/u in one year. Continue medical management for htn and hyperlipidemia. .    Thank you for allowing me to participate in Rolando Roberson 's care.     Jeet Haney MD, [facility]

## 2019-10-17 NOTE — PROGRESS NOTES
Verified patient with 2 identifiers   Reviewed record in preparation for visit and obtained necessary documentation. Chief Complaint   Patient presents with    Irregular Heart Beat     Follow up     Foot Swelling     angel'    Ankle swelling     angel'     1. Have you been to the ER, urgent care clinic since your last visit? Hospitalized since your last visit? No     2. Have you seen or consulted any other health care providers outside of the [address] [place] [address] since your last visit? Include any pap smears or colon screening.   Dr Jarrod Strong

## 2019-10-18 DIAGNOSIS — E55.9 VITAMIN D DEFICIENCY: ICD-10-CM

## 2019-10-18 RX ORDER — ERGOCALCIFEROL 1.25 MG/1
CAPSULE ORAL
Qty: 4 CAP | Refills: 0 | Status: SHIPPED | OUTPATIENT
Start: 2019-10-18 | End: 2019-11-08 | Stop reason: SDUPTHER

## 2019-10-21 RX ORDER — WARFARIN SODIUM 5 MG/1
TABLET ORAL
Qty: 180 TAB | Refills: 1 | Status: SHIPPED | OUTPATIENT
Start: 2019-10-21 | End: 2020-04-07 | Stop reason: ALTCHOICE

## 2019-10-23 DIAGNOSIS — R52 PAIN: ICD-10-CM

## 2019-10-24 RX ORDER — HYDROCODONE BITARTRATE AND ACETAMINOPHEN 5; 325 MG/1; MG/1
1 TABLET ORAL
Qty: 30 TAB | Refills: 0 | Status: SHIPPED | OUTPATIENT
Start: 2019-10-24 | End: 2019-10-30 | Stop reason: SDUPTHER

## 2019-10-25 NOTE — TELEPHONE ENCOUNTER
Dr. Ana Maria Richardson   Received:  Today   Message Contents   [place], [facility] Office Pool             Medication Refill yes Hydrocodone     Caller (if not patient):       Relationship of caller (if not patient):       Best contact number(s): [phone]   Name of medication and dosage if known:       Is patient out of this medication (yes/no): yes     Pharmacy name:      Pharmacy listed in chart? (yes/no):   Pharmacy phone number:       Details to clarify the request:Patient would like a refill of Hydrocodone       Olivia Smith

## 2019-10-25 NOTE — TELEPHONE ENCOUNTER
I called pt, no answer- left voice message that script for requested medication is ready for pickup.

## 2019-10-28 ENCOUNTER — TELEPHONE (OUTPATIENT)
Dept: INTERNAL MEDICINE CLINIC | Age: 69
End: 2019-10-28

## 2019-10-28 NOTE — TELEPHONE ENCOUNTER
[place], [address] Office Pool             General Message/Vendor Calls     Caller's first and last name: Patient       Reason for call: Patient is calling to see if her Hydrocodone prescription is ready.      Callback required yes/no and why:       Best contact number(s):[phone]     Details to clarify the request:       Adelita Molina     Message received & copied from [facility]

## 2019-10-29 NOTE — TELEPHONE ENCOUNTER
Message was left at [phone] for patient to return call to office to clarify what medication she was waiting for.

## 2019-10-29 NOTE — TELEPHONE ENCOUNTER
Please call pt at this number only #[phone] pt would like to know why her script is not ready? Please call this number when you can as she is waiting.

## 2019-10-30 ENCOUNTER — OFFICE VISIT (OUTPATIENT)
Dept: INTERNAL MEDICINE CLINIC | Age: 69
End: 2019-10-30

## 2019-10-30 VITALS
OXYGEN SATURATION: 96 % | HEART RATE: 77 BPM | SYSTOLIC BLOOD PRESSURE: 118 MMHG | WEIGHT: 228.8 LBS | TEMPERATURE: 99.2 F | BODY MASS INDEX: 38.12 KG/M2 | DIASTOLIC BLOOD PRESSURE: 71 MMHG | HEIGHT: 65 IN | RESPIRATION RATE: 18 BRPM

## 2019-10-30 DIAGNOSIS — M54.50 BILATERAL LOW BACK PAIN WITHOUT SCIATICA, UNSPECIFIED CHRONICITY: Primary | ICD-10-CM

## 2019-10-30 DIAGNOSIS — R52 PAIN: ICD-10-CM

## 2019-10-30 RX ORDER — CYCLOBENZAPRINE HCL 10 MG
10 TABLET ORAL
Qty: 30 TAB | Refills: 1 | Status: SHIPPED | OUTPATIENT
Start: 2019-10-30 | End: 2020-04-07 | Stop reason: ALTCHOICE

## 2019-10-30 RX ORDER — HYDROCODONE BITARTRATE AND ACETAMINOPHEN 5; 325 MG/1; MG/1
1 TABLET ORAL
Qty: 30 TAB | Refills: 0 | Status: SHIPPED | OUTPATIENT
Start: 2019-10-30 | End: 2019-12-04 | Stop reason: SDUPTHER

## 2019-10-30 NOTE — PROGRESS NOTES
SUBJECTIVE  Ms. Anupam Jones presents today acutely for     Chief Complaint   Patient presents with    Medication Refill     pt heret today requesting a refill on hydrocodone    Back Pain     pt c.o lower back  pain and bilateral hip pain; pt conerned of athritis; today pt rates pain as a 4/10     She is having pain from lumbar musculature down into buttocks. Present for the past 3 weeks, though she has had similar symptoms before. She had been in [place], helping to take care of her step-daughter's household (due to injury). She was going up and down stairs, bending to do cleaning, and doing . Also, some upper back pain. Also L great toe is hurting. She is interested in doing Yoga. OBJECTIVE  Visit Vitals  /71 (BP 1 Location: Left arm, BP Patient Position: Sitting)   Pulse 77   Temp 99.2 °F (37.3 °C) (Oral)   Resp 18   Ht 5' 5\" (1.651 m)   Wt 228 lb 12.8 oz (103.8 kg)   SpO2 96%   BMI 38.07 kg/m²     Gen: Pleasant 78 y.o.  female in NAD.   HEART: RRR, No M/G/R.   LUNGS: CTAB No W/R.   ABDOMEN: S, NT, ND, BS+.   EXTREMITIES: Warm. No C/C/E. MUSCULOSKELETAL: +TTP lumbar spine and adjacent musculature. Walks with antalgic gait. ASSESSMENT / PLAN    ICD-10-CM ICD-9-CM    1. Bilateral low back pain without sciatica, unspecified chronicity M54.5 724.2 REFERRAL TO PHYSICAL THERAPY      cyclobenzaprine (FLEXERIL) 10 mg tablet   2. Pain R52 780.96 HYDROcodone-acetaminophen (NORCO) 5-325 mg per tablet       I have reviewed with the patient details of the assessment and plan and all questions were answered. Relevant patient education was performed. Follow-up and Dispositions    · Return if symptoms worsen or fail to improve.
Never smoker

## 2019-10-30 NOTE — PATIENT INSTRUCTIONS
Office Policies    Phone calls/patient messages:            Please allow up to 24 hours for someone in the office to contact you about your call or message. Be mindful your provider may be out of the office or your message may require further review. We encourage you to use [employer] for your messages as this is a faster, more efficient way to communicate with our office                         Medication Refills:            Prescription medications require 48-72 business hours to process. We encourage you to use [employer] for your refills. For controlled medications: Please allow 72 business hours to process. Certain medications may require you to  a written prescription at our office. NO narcotic/controlled medications will be prescribed after 4pm Monday through Friday or on weekends              Form/Paperwork Completion:            Please note a $25 fee may incur for all paperwork for completed by our providers. We ask that you allow 7-10 business days. Pre-payment is due prior to picking up/faxing the completed form. You may also download your forms to [employer] to have your doctor print off.

## 2019-10-31 ENCOUNTER — DOCUMENTATION ONLY (OUTPATIENT)
Dept: INTERNAL MEDICINE CLINIC | Age: 69
End: 2019-10-31

## 2019-11-05 ENCOUNTER — TELEPHONE (OUTPATIENT)
Dept: INTERNAL MEDICINE CLINIC | Age: 69
End: 2019-11-05

## 2019-11-05 NOTE — TELEPHONE ENCOUNTER
#[phone] pt is asking that you fax an order for cervical therapy to [address] [place] [address]     Fax #[phone]

## 2019-11-05 NOTE — TELEPHONE ENCOUNTER
Identified patient 2 identifiers verified. Patient requesting Cervical Therapy. Order place on Dr. Lito Wooten desk to complete.

## 2019-11-07 ENCOUNTER — TELEPHONE (OUTPATIENT)
Dept: INTERNAL MEDICINE CLINIC | Age: 69
End: 2019-11-07

## 2019-11-07 NOTE — TELEPHONE ENCOUNTER
Patient states she needs a call back to discuss possible Stroke & Memory Loss concerns. Patient states she has had no other Stroke symptoms just has had a Lot of Memory issues. Please call to discuss & advise if appt or test needed.  Thank you

## 2019-11-07 NOTE — TELEPHONE ENCOUNTER
Spoke with patient about  2 days ago and she reported that she had to write everything down so that she would not forget , and that sometimes she had trouble getting out what she wants to say. Would you advise or refer patient to a Specialist for further evaluation. She has not seen Neurology since 11/25/18 with Dr. Marcus Bee.

## 2019-11-08 ENCOUNTER — TELEPHONE (OUTPATIENT)
Dept: NEUROLOGY | Age: 69
End: 2019-11-08

## 2019-11-08 DIAGNOSIS — E55.9 VITAMIN D DEFICIENCY: ICD-10-CM

## 2019-11-08 RX ORDER — ERGOCALCIFEROL 1.25 MG/1
CAPSULE ORAL
Qty: 4 CAP | Refills: 0 | Status: SHIPPED | OUTPATIENT
Start: 2019-11-08 | End: 2019-12-04 | Stop reason: SDUPTHER

## 2019-11-08 NOTE — TELEPHONE ENCOUNTER
Kathy called from [facility] [address] office. She states that pt is having increased memory issue.  Please call 2nd# [phone]

## 2019-11-08 NOTE — TELEPHONE ENCOUNTER
Identified patient 2 identifiers verified. Spoke with Scheduling at Dr. Dilip fernandes to  Than December in sooner, patient will be called by Dr. Dilip fernandes and talk to patient to get in sooner.

## 2019-11-08 NOTE — TELEPHONE ENCOUNTER
Patient call back, stating that it is believed that she may have had a stroke and Dr. Jordyn Aburto office wanted to see if she could be seen sooner. Please call patient back.         [phone]

## 2019-11-12 ENCOUNTER — TELEPHONE (OUTPATIENT)
Dept: NEUROLOGY | Age: 69
End: 2019-11-12

## 2019-11-12 NOTE — TELEPHONE ENCOUNTER
Patient has called multiple times as well as Dr. Bobby Sicard stating the patient is having multiple TIA's and needs to be seen sooner than Dec.4 but there are no sooner appointments. Is there any advice I can give them to calm them down and ease their worry?     Please advise

## 2019-11-12 NOTE — TELEPHONE ENCOUNTER
Patient is trying to get a sooner appt than Dec, she stated that her therapist thinks she may have had a TIA.

## 2019-11-14 ENCOUNTER — ANTI-COAG VISIT (OUTPATIENT)
Dept: CARDIOLOGY CLINIC | Age: 69
End: 2019-11-14

## 2019-11-14 DIAGNOSIS — Z79.01 LONG TERM (CURRENT) USE OF ANTICOAGULANTS: Primary | ICD-10-CM

## 2019-11-14 DIAGNOSIS — I48.0 PAROXYSMAL ATRIAL FIBRILLATION (HCC): ICD-10-CM

## 2019-11-14 LAB
INR BLD: 2.5 (ref 1–1.5)
PT POC: 29.7 SECONDS (ref 9.1–12)
VALID INTERNAL CONTROL?: YES

## 2019-11-14 NOTE — TELEPHONE ENCOUNTER
Spoke with patient and informed her that per Dr. Renetta Brito if patient is having multiple TIA's she needs to go the nearest ER and get treatment. She stated she understands and that if something happens to happen she will go to the nearest emergency room and she will.

## 2019-11-15 ENCOUNTER — OFFICE VISIT (OUTPATIENT)
Dept: INTERNAL MEDICINE CLINIC | Age: 69
End: 2019-11-15

## 2019-11-15 VITALS
SYSTOLIC BLOOD PRESSURE: 118 MMHG | HEIGHT: 65 IN | HEART RATE: 89 BPM | TEMPERATURE: 97.4 F | RESPIRATION RATE: 16 BRPM | WEIGHT: 223.8 LBS | OXYGEN SATURATION: 99 % | DIASTOLIC BLOOD PRESSURE: 55 MMHG | BODY MASS INDEX: 37.29 KG/M2

## 2019-11-15 DIAGNOSIS — R27.0 ATAXIA: Primary | ICD-10-CM

## 2019-11-15 DIAGNOSIS — H53.9 VISION CHANGES: ICD-10-CM

## 2019-11-15 NOTE — PROGRESS NOTES
SUBJECTIVE  Ms. Sandra Blanco presents today acutely for     Chief Complaint   Patient presents with    TIA     pt here today due to her therapist thinking that pt has had a TIA     She has had some alarming symptoms \"a while back\". She told her therapist, \"I see spirits floating past my vision\" bilaterally. Also, she couldn't do the last round of bills, because \"I looked at them and they just didn't make sense, I couldn't figure them out. \"  Also she had three times when she couldn't get on or off [employer]'s truck, and fell. She has noted some imbalance and ataxia. These symptoms were present for perhaps days to weeks, then abruptly resolved.      Past Medical History:   Diagnosis Date    Arrhythmia 2014    atrial fibrillation 2014, sick sinus syndrome: [facility]    Arthritis     Bipolar affective disorder ([address] 3/31/2010    Chronic pain 2018    right shoulder    Colon polyps 03/31/2010    also 5/2018: colon polyps    Depression     Diarrhea 07/19/2013    D/t alpha gal allergy says patient    Epigastric pain 6/5/2015    GERD (gastroesophageal reflux disease)     occasiional only; controlled with med    Hemispheric carotid artery syndrome No stroke    Neuro: Ilene Walter  (CT scan head[facility]neck [facility] 4/2018 in [facility])    Hyperlipidemia 3/31/2010    Hypothyroidism 3/31/2010    Long term current use of anticoagulant therapy     Migraine 03/31/2010    has them x2 a month: last one 5/23/2018    Psychotic disorder ([facility]     S/P ablation of atrial fibrillation 05/13/2014    S/P cardiac pacemaker procedure 9/16/2014 9/16/14 Medtronic MRI compatible dual chamber pacemaker implant    Sleep apnea     unable to tolerate CPAP    Stool color black     Thyroid disease     Tick-borne disease     Alpha Gal allergy: no eat pork, beef, milk (Meat allergy showed up on a allergy test)    Urge incontinence 3/31/2010    Vitamin D deficiency 3/31/2015     SH:  reports that she has never smoked. She has never used smokeless tobacco. She reports that she drinks about 1.0 standard drinks of alcohol per week. She reports that she has current or past drug history. Drug: Prescription. ROS: Complete review of systems was performed and is otherwise unremarkable except as noted elsewhere. OBJECTIVE  Visit Vitals  /55 (BP 1 Location: Left arm, BP Patient Position: Sitting)   Pulse 89   Temp 97.4 °F (36.3 °C) (Oral)   Resp 16   Ht 5' 5\" (1.651 m)   Wt 223 lb 12.8 oz (101.5 kg)   SpO2 99%   BMI 37.24 kg/m²     Gen: Pleasant 78 y.o. obese female in NAD.   HEENT: PERRLA. EOMI. OP moist and pink.  Neck: Supple.  No LAD.  HEART: RRR, No M/G/R.   LUNGS: CTAB No W/R.   ABDOMEN: S, NT, ND, BS+.   EXTREMITIES: Warm. No C/C/E. MUSCULOSKELETAL: Normal ROM, muscle strength 5/5 all groups. NEURO: Alert and oriented x 3.  Cranial nerves grossly intact.  No focal sensory or motor deficits noted. SKIN: Warm. Dry. No rashes or other lesions noted. ASSESSMENT / PLAN    ICD-10-CM ICD-9-CM    1. Ataxia R27.0 781.3 CT HEAD W WO CONT   2. Vision changes H53.9 368.9 CT HEAD W WO CONT     Keep upcoming appointment with neurologist, Dr. Laurent Burton. I recommended a low threshold to go to ER if symptoms recur. I have reviewed with the patient details of the assessment and plan and all questions were answered. Relevant patient education was performed.

## 2019-11-15 NOTE — PROGRESS NOTES
1. Have you been to the ER, urgent care clinic since your last visit? Hospitalized since your last visit?no  2. Have you seen or consulted any other health care providers outside of the [address] [place] [address] since your last visit? Include any pap smears or colon screening.  no

## 2019-11-18 ENCOUNTER — HOSPITAL ENCOUNTER (OUTPATIENT)
Dept: ULTRASOUND IMAGING | Age: 69
Discharge: HOME | End: 2019-11-18
Attending: ORTHOPAEDIC SURGERY
Payer: MEDICARE

## 2019-11-18 DIAGNOSIS — M76.822 POSTERIOR TIBIAL TENDON DYSFUNCTION (PTTD) OF LEFT LOWER EXTREMITY: ICD-10-CM

## 2019-11-18 PROCEDURE — 76882 US LMTD JT/FCL EVL NVASC XTR: CPT

## 2019-11-21 ENCOUNTER — TELEPHONE (OUTPATIENT)
Dept: PRIMARY CARE CLINIC | Age: 69
End: 2019-11-21

## 2019-11-21 ENCOUNTER — TELEPHONE (OUTPATIENT)
Dept: INTERNAL MEDICINE CLINIC | Age: 69
End: 2019-11-21

## 2019-11-21 NOTE — TELEPHONE ENCOUNTER
----- Message from Heri Stockton sent at 11/21/2019 12:32 PM EST -----  Regarding: Dr. Willis Isbell: [phone]  Caller's first and last name and relationship (if not the patient): n/a  Best contact number(s):  [phone]  Whose call is being returned:  Practice  Details to clarify the request: Pt would like provider or nurse to give her a call.

## 2019-11-22 NOTE — TELEPHONE ENCOUNTER
Identified patient 2 identifiers verified. Confirmed with patient that she does have an appointment with Dr. Janak Ott on 11/25/19.

## 2019-11-22 NOTE — TELEPHONE ENCOUNTER
Pt is requesting a call back in regards appt on 11/25/19 to discuss CT results. Pt wants to know if the appt is needed.  Best contact [phone]   Beatrice Aragon

## 2019-11-25 ENCOUNTER — TELEPHONE (OUTPATIENT)
Dept: INTERNAL MEDICINE CLINIC | Age: 69
End: 2019-11-25

## 2019-12-03 ENCOUNTER — OFFICE VISIT (OUTPATIENT)
Dept: INTERNAL MEDICINE CLINIC | Age: 69
End: 2019-12-03

## 2019-12-03 VITALS
DIASTOLIC BLOOD PRESSURE: 71 MMHG | HEIGHT: 65 IN | SYSTOLIC BLOOD PRESSURE: 137 MMHG | TEMPERATURE: 97.3 F | RESPIRATION RATE: 20 BRPM | WEIGHT: 223.8 LBS | OXYGEN SATURATION: 97 % | HEART RATE: 97 BPM | BODY MASS INDEX: 37.29 KG/M2

## 2019-12-03 DIAGNOSIS — R41.0 CONFUSION: ICD-10-CM

## 2019-12-03 DIAGNOSIS — R52 PAIN: ICD-10-CM

## 2019-12-03 DIAGNOSIS — R27.0 ATAXIA: Primary | ICD-10-CM

## 2019-12-03 NOTE — PROGRESS NOTES
SUBJECTIVE  Ms. Garrett Hsieh presents today acutely for     Chief Complaint   Patient presents with   [facility] Results     pt here today to discuss CT scan results     We saw her on 11/16:  She has had some alarming symptoms \"a while back\". She told her therapist, \"I see spirits floating past my vision\" bilaterally. Also, she couldn't do the last round of bills, because \"I looked at them and they just didn't make sense, I couldn't figure them out. \"  Also she had three times when she couldn't get on or off [employer] truck, and fell. She has noted some imbalance and ataxia. These symptoms were present for perhaps days to weeks, then abruptly resolved. She seems a bit confused today. She is here for scan result, thinking that she had a CT head. This appears to have been cancelled, and it turns out that she didn't show because she didn't why she was to have gotten the scan; \"I didn't know what it was about so I didn't go. \"     She has appointment tomorrow with Dr. Mauro Woodall, neurologist.  She also has an appointment on Friday with Dr. Jason Auguste, ENT/dizziness clinic. OBJECTIVE  Visit Vitals  /71 (BP 1 Location: Left arm, BP Patient Position: Sitting)   Pulse 97   Temp 97.3 °F (36.3 °C) (Oral)   Resp 20   Ht 5' 5\" (1.651 m)   Wt 223 lb 12.8 oz (101.5 kg)   SpO2 97%   BMI 37.24 kg/m²     Gen: Pleasant 78 y.o.  female in NAD.   HEENT: PERRLA. EOMI. OP moist and pink.  Neck: Supple.  No LAD.  HEART: RRR, No M/G/R.   LUNGS: CTAB No W/R.   ABDOMEN: S, NT, ND, BS+.   EXTREMITIES: Warm. No C/C/E. MUSCULOSKELETAL: Normal ROM, muscle strength 5/5 all groups. NEURO: Alert and oriented x 3.  Cranial nerves grossly intact.  No focal sensory or motor deficits noted. SKIN: Warm. Dry. No rashes or other lesions noted. ASSESSMENT   Ataxia  Confusion    PLAN  F/U with neurologist as planned; We'll hold off CT head, and let him decide on this. Keep the appointment with Dr. Jason Auguste.      I have reviewed with the patient details of the assessment and plan and all questions were answered. Relevant patient education was performed. Follow up as planned.

## 2019-12-03 NOTE — PATIENT INSTRUCTIONS
Office Policies    Phone calls/patient messages:            Please allow up to 24 hours for someone in the office to contact you about your call or message. Be mindful your provider may be out of the office or your message may require further review. We encourage you to use [employer] for your messages as this is a faster, more efficient way to communicate with our office                         Medication Refills:            Prescription medications require 48-72 business hours to process. We encourage you to use [employer] for your refills. For controlled medications: Please allow 72 business hours to process. Certain medications may require you to  a written prescription at our office. NO narcotic/controlled medications will be prescribed after 4pm Monday through Friday or on weekends              Form/Paperwork Completion:            Please note a $25 fee may incur for all paperwork for completed by our providers. We ask that you allow 7-10 business days. Pre-payment is due prior to picking up/faxing the completed form. You may also download your forms to [employer] to have your doctor print off.

## 2019-12-03 NOTE — PROGRESS NOTES
1. Have you been to the ER, urgent care clinic since your last visit? Hospitalized since your last visit?no    2. Have you seen or consulted any other health care providers outside of the [address] [place] [address] since your last visit? Include any pap smears or colon screening.  no

## 2019-12-03 NOTE — TELEPHONE ENCOUNTER
[facility] [employer] Office Pool   Phone Number: [phone]             Caller (if not patient):n/a   Relationship of caller (if not patient): n/a   Best contact number(s): [phone]   Name of medication and dosage if known: ' hydrocodone\" 5/325mg   Is patient out of this medication (yes/no): yes   Pharmacy name: [address] [place] [address] Dr listed in chart? (yes/no): n/a   Pharmacy phone number: n/a   Date of last visit: 11/15/2019   Details to clarify the request: n/a     Copy/Paste  [email or web address]

## 2019-12-04 ENCOUNTER — OFFICE VISIT (OUTPATIENT)
Dept: NEUROLOGY | Age: 69
End: 2019-12-04

## 2019-12-04 VITALS
BODY MASS INDEX: 36.65 KG/M2 | OXYGEN SATURATION: 97 % | SYSTOLIC BLOOD PRESSURE: 114 MMHG | WEIGHT: 220 LBS | DIASTOLIC BLOOD PRESSURE: 70 MMHG | HEIGHT: 65 IN | HEART RATE: 68 BPM

## 2019-12-04 DIAGNOSIS — E55.9 VITAMIN D DEFICIENCY: ICD-10-CM

## 2019-12-04 DIAGNOSIS — G47.33 OBSTRUCTIVE SLEEP APNEA: Primary | ICD-10-CM

## 2019-12-04 DIAGNOSIS — E78.2 MIXED HYPERLIPIDEMIA: ICD-10-CM

## 2019-12-04 DIAGNOSIS — E03.9 ACQUIRED HYPOTHYROIDISM: ICD-10-CM

## 2019-12-04 DIAGNOSIS — E53.8 B12 DEFICIENCY: ICD-10-CM

## 2019-12-04 DIAGNOSIS — G44.321 INTRACTABLE CHRONIC POST-TRAUMATIC HEADACHE: ICD-10-CM

## 2019-12-04 DIAGNOSIS — F31.70 BIPOLAR AFFECTIVE DISORDER IN REMISSION (HCC): ICD-10-CM

## 2019-12-04 DIAGNOSIS — G43.719 INTRACTABLE CHRONIC MIGRAINE WITHOUT AURA AND WITHOUT STATUS MIGRAINOSUS: ICD-10-CM

## 2019-12-04 RX ORDER — ERGOCALCIFEROL 1.25 MG/1
CAPSULE ORAL
Qty: 4 CAP | Refills: 0 | Status: SHIPPED | OUTPATIENT
Start: 2019-12-04 | End: 2019-12-27

## 2019-12-04 RX ORDER — HYDROCODONE BITARTRATE AND ACETAMINOPHEN 5; 325 MG/1; MG/1
1 TABLET ORAL
Qty: 30 TAB | Refills: 0 | Status: SHIPPED | OUTPATIENT
Start: 2019-12-04 | End: 2020-01-03

## 2019-12-04 RX ORDER — HYDROCODONE BITARTRATE AND ACETAMINOPHEN 5; 325 MG/1; MG/1
TABLET ORAL
Refills: 0 | Status: CANCELLED | OUTPATIENT
Start: 2019-12-04 | End: 2019-12-07

## 2019-12-05 LAB
ALBUMIN SERPL-MCNC: 4.4 G/DL (ref 3.5–4.8)
ALBUMIN/GLOB SERPL: 1.8 {RATIO} (ref 1.2–2.2)
ALP SERPL-CCNC: 82 IU/L (ref 39–117)
ALT SERPL-CCNC: 14 IU/L (ref 0–32)
AST SERPL-CCNC: 14 IU/L (ref 0–40)
BILIRUB SERPL-MCNC: 0.4 MG/DL (ref 0–1.2)
BUN SERPL-MCNC: 21 MG/DL (ref 8–27)
BUN/CREAT SERPL: 25 (ref 12–28)
CALCIUM SERPL-MCNC: 10.2 MG/DL (ref 8.7–10.3)
CHLORIDE SERPL-SCNC: 109 MMOL/L (ref 96–106)
CO2 SERPL-SCNC: 16 MMOL/L (ref 20–29)
CREAT SERPL-MCNC: 0.84 MG/DL (ref 0.57–1)
ERYTHROCYTE [DISTWIDTH] IN BLOOD BY AUTOMATED COUNT: 13.1 % (ref 12.3–15.4)
GLOBULIN SER CALC-MCNC: 2.4 G/DL (ref 1.5–4.5)
GLUCOSE SERPL-MCNC: 103 MG/DL (ref 65–99)
HCT VFR BLD AUTO: 43.3 % (ref 34–46.6)
HGB BLD-MCNC: 14.5 G/DL (ref 11.1–15.9)
MCH RBC QN AUTO: 30.4 PG (ref 26.6–33)
MCHC RBC AUTO-ENTMCNC: 33.5 G/DL (ref 31.5–35.7)
MCV RBC AUTO: 91 FL (ref 79–97)
PLATELET # BLD AUTO: 265 X10E3/UL (ref 150–450)
POTASSIUM SERPL-SCNC: 4.6 MMOL/L (ref 3.5–5.2)
PROT SERPL-MCNC: 6.8 G/DL (ref 6–8.5)
RBC # BLD AUTO: 4.77 X10E6/UL (ref 3.77–5.28)
SODIUM SERPL-SCNC: 140 MMOL/L (ref 134–144)
VIT B12 SERPL-MCNC: 284 PG/ML (ref 232–1245)
WBC # BLD AUTO: 7.3 X10E3/UL (ref 3.4–10.8)

## 2019-12-10 ENCOUNTER — TELEPHONE (OUTPATIENT)
Dept: NEUROLOGY | Age: 69
End: 2019-12-10

## 2019-12-10 NOTE — TELEPHONE ENCOUNTER
----- Message from Ricky Leonard MD sent at 12/9/2019  2:05 PM EST -----  You need to take B12.  Take 1000 mcg of oral B12 daily

## 2019-12-13 ENCOUNTER — DOCUMENTATION ONLY (OUTPATIENT)
Dept: NEUROLOGY | Age: 69
End: 2019-12-13

## 2019-12-16 ENCOUNTER — OFFICE VISIT (OUTPATIENT)
Dept: CARDIOLOGY CLINIC | Age: 69
End: 2019-12-16

## 2019-12-16 DIAGNOSIS — Z95.0 CARDIAC PACEMAKER IN SITU: Primary | ICD-10-CM

## 2019-12-16 DIAGNOSIS — I49.5 SSS (SICK SINUS SYNDROME) (HCC): ICD-10-CM

## 2019-12-17 ENCOUNTER — HOSPITAL ENCOUNTER (OUTPATIENT)
Dept: CT IMAGING | Age: 69
Discharge: HOME | End: 2019-12-17
Attending: PSYCHIATRY & NEUROLOGY
Payer: MEDICARE

## 2019-12-17 DIAGNOSIS — G44.321 INTRACTABLE CHRONIC POST-TRAUMATIC HEADACHE: ICD-10-CM

## 2019-12-17 PROCEDURE — 70450 CT HEAD/BRAIN W/O DYE: CPT

## 2019-12-27 DIAGNOSIS — E55.9 VITAMIN D DEFICIENCY: ICD-10-CM

## 2019-12-27 RX ORDER — ERGOCALCIFEROL 1.25 MG/1
CAPSULE ORAL
Qty: 4 CAP | Refills: 0 | Status: SHIPPED | OUTPATIENT
Start: 2019-12-27 | End: 2020-03-18

## 2020-01-09 NOTE — TELEPHONE ENCOUNTER
Received a 90 day authorization request from [facility] for    Requested Prescriptions     Pending Prescriptions Disp Refills    primidone (MYSOLINE) 50 mg tablet 45 Tab 1     Sig: Take 0.5 Tabs by mouth nightly.      Future Appointments   Date Time Provider Radha Yoon   3/2/2020  3:40 PM Aldair Morrison  [facility]   3/23/2020  1:30 PM Sourav Lanza MD [address]   4/7/2020  3:00 PM Ron Cruz  [facility]   4/16/2020  1:00 PM [facility]   4/16/2020  1:20 PM Marya Grimes NP [address] [facility]                         Last Appointment My Department:  12/4/2019   Please review

## 2020-01-11 RX ORDER — PRIMIDONE 50 MG/1
TABLET ORAL
Qty: 45 TAB | Refills: 1 | Status: SHIPPED | OUTPATIENT
Start: 2020-01-11 | End: 2020-06-12

## 2020-01-15 DIAGNOSIS — M79.7 FIBROMYALGIA: ICD-10-CM

## 2020-01-17 RX ORDER — PREGABALIN 100 MG/1
CAPSULE ORAL
Qty: 60 CAP | Refills: 3 | Status: SHIPPED | OUTPATIENT
Start: 2020-01-17 | End: 2020-05-22

## 2020-01-21 DIAGNOSIS — R52 PAIN: Primary | ICD-10-CM

## 2020-01-21 NOTE — TELEPHONE ENCOUNTER
----- Message from Ryna Vera sent at 1/21/2020  1:25 PM EST -----  Regarding: Dr. Makenzie Keenan / refill  Best contact number(s): [phone]  Name of medication and dosage if known: Hydrocodone - 5/ 325 mg  Is patient out of this medication (yes/no): Yes  Pharmacy name: Patient will  the prescription from the office.   Pharmacy listed in chart? (yes/no): unknown  Pharmacy phone number: N/A  Date of last visit:  December 03, 2019  Details to clarify the request:

## 2020-01-22 RX ORDER — HYDROCODONE BITARTRATE AND ACETAMINOPHEN 5; 325 MG/1; MG/1
1 TABLET ORAL
Qty: 30 TAB | Refills: 0 | Status: SHIPPED | OUTPATIENT
Start: 2020-01-22 | End: 2020-03-04 | Stop reason: SDUPTHER

## 2020-01-22 NOTE — TELEPHONE ENCOUNTER
PCP: Janes Jones MD    Last appt: 12/3/2019  Future Appointments   Date Time Provider Radha Yoon   3/2/2020  3:40 PM Sara Simms  [facility]   3/23/2020  1:30 PM Janes Jones MD [address]   4/7/2020  3:00 PM Wendi Blair  [facility]   4/16/2020  1:00 PM PACEMAKER, [address] [facility]   4/16/2020  1:20 PM Alric Leventhal, NP [address] [facility]       Requested Prescriptions     Pending Prescriptions Disp Refills    HYDROcodone-acetaminophen (NORCO) 5-325 mg per tablet 30 Tab 0     Sig: Take 1 Tab by mouth every eight (8) hours as needed for Pain for up to 30 days. Max Daily Amount: 3 Tabs.

## 2020-02-03 ENCOUNTER — ANTI-COAG VISIT (OUTPATIENT)
Dept: CARDIOLOGY CLINIC | Age: 70
End: 2020-02-03

## 2020-02-03 DIAGNOSIS — I48.0 PAROXYSMAL ATRIAL FIBRILLATION (HCC): ICD-10-CM

## 2020-02-03 DIAGNOSIS — Z79.01 LONG TERM (CURRENT) USE OF ANTICOAGULANTS: Primary | ICD-10-CM

## 2020-02-03 LAB
INR BLD: 1.9 (ref 1–1.5)
PT POC: 23.3 SECONDS (ref 9.1–12)
VALID INTERNAL CONTROL?: YES

## 2020-02-10 ENCOUNTER — ANTI-COAG VISIT (OUTPATIENT)
Dept: CARDIOLOGY CLINIC | Age: 70
End: 2020-02-10

## 2020-02-10 DIAGNOSIS — I48.0 PAROXYSMAL ATRIAL FIBRILLATION (HCC): ICD-10-CM

## 2020-02-10 DIAGNOSIS — Z79.01 LONG TERM (CURRENT) USE OF ANTICOAGULANTS: Primary | ICD-10-CM

## 2020-02-10 LAB
INR BLD: 2.3 (ref 1–1.5)
PT POC: 28 SECONDS (ref 9.1–12)
VALID INTERNAL CONTROL?: YES

## 2020-03-02 ENCOUNTER — OFFICE VISIT (OUTPATIENT)
Dept: SLEEP MEDICINE | Age: 70
End: 2020-03-02

## 2020-03-02 VITALS
WEIGHT: 223.7 LBS | HEIGHT: 65 IN | DIASTOLIC BLOOD PRESSURE: 70 MMHG | HEART RATE: 85 BPM | SYSTOLIC BLOOD PRESSURE: 111 MMHG | OXYGEN SATURATION: 98 % | BODY MASS INDEX: 37.27 KG/M2

## 2020-03-02 DIAGNOSIS — G47.10 HYPERSOMNIA: ICD-10-CM

## 2020-03-02 DIAGNOSIS — G47.33 OSA (OBSTRUCTIVE SLEEP APNEA): Primary | ICD-10-CM

## 2020-03-02 DIAGNOSIS — I10 BENIGN ESSENTIAL HYPERTENSION: ICD-10-CM

## 2020-03-02 RX ORDER — HYDROCODONE BITARTRATE AND ACETAMINOPHEN 5; 325 MG/1; MG/1
5 TABLET ORAL AS NEEDED
COMMUNITY
End: 2020-03-04 | Stop reason: SDUPTHER

## 2020-03-02 NOTE — PATIENT INSTRUCTIONS
[address] [place] [place], [address]  Tel.  [phone]  Fax. [address] [facility] [address]  [place], [address] [facility]  Tel.  [phone]  Fax. [phone] [address] [facility] [address] Renetta Calhoun  Tel.  [phone]  Fax. [phone]     PROPER SLEEP HYGIENE    What to avoid  · Do not have drinks with caffeine, such as coffee or black tea, for 8 hours before bed. · Do not smoke or use other types of tobacco near bedtime. Nicotine is a stimulant and can keep you awake. · Avoid drinking alcohol late in the evening, because it can cause you to wake in the middle of the night. · Do not eat a big meal close to bedtime. If you are hungry, eat a light snack. · Do not drink a lot of water close to bedtime, because the need to urinate may wake you up during the night. · Do not read or watch TV in bed. Use the bed only for sleeping and sexual activity. What to try  · Go to bed at the same time every night, and wake up at the same time every morning. Do not take naps during the day. · Keep your bedroom quiet, dark, and cool. · Get regular exercise, but not within 3 to 4 hours of your bedtime. .  · Sleep on a comfortable pillow and mattress. · If watching the clock makes you anxious, turn it facing away from you so you cannot see the time. · If you worry when you lie down, start a worry book. Well before bedtime, write down your worries, and then set the book and your concerns aside. · Try meditation or other relaxation techniques before you go to bed. · If you cannot fall asleep, get up and go to another room until you feel sleepy. Do something relaxing. Repeat your bedtime routine before you go to bed again. · Make your house quiet and calm about an hour before bedtime. Turn down the lights, turn off the TV, log off the computer, and turn down the volume on music. This can help you relax after a busy day.     Drowsy Driving  The [address] [place] [address] Road Traffic Safety Administration cites drowsiness as a causing factor in more than 717,477 police reported crashes annually, resulting in 76,000 injuries and 1,500 deaths. Other surveys suggest 55% of people polled have driven while drowsy in the past year, 23% had fallen asleep but not crashed, 3% crashed, and 2% had and accident due to drowsy driving. Who is at risk? Young Drivers: One study of drowsy driving accidents states that 55% of the drivers were under 25 years. Of those, 75% were male. Shift Workers and Travelers: People who work overnight or travel across time zones frequently are at higher risk of experiencing Circadian Rhythm Disorders. They are trying to work and function when their body is programed to sleep. Sleep Deprived: Lack of sleep has a serious impact on your ability to pay attention or focus on a task. Consistently getting less than the average of 8 hours your body needs creates partial or cumulative sleep deprivation. Untreated Sleep Disorders: Sleep Apnea, Narcolepsy, R.L.S., and other sleep disorders (untreated) prevent a person from getting enough restful sleep. This leads to excessive daytime sleepiness and increases the risk for drowsy driving accidents by up to 7 times. Medications / Alcohol: Even over the counter medications can cause drowsiness. Medications that impair a drivers attention should have a warning label. Alcohol naturally makes you sleepy and on its own can cause accidents. Combined with excessive drowsiness its effects are amplified. Signs of Drowsy Driving:   * You don't remember driving the last few miles   * You may drift out of your hillary   * You are unable to focus and your thoughts wander   * You may yawn more often than normal   * You have difficulty keeping your eyes open / nodding off   * Missing traffic signs, speeding, or tailgating  Prevention-   Good sleep hygiene, lifestyle and behavioral choices have the most impact on drowsy driving.  There is no substitute for sleep and the average person requires 8 hours nightly. If you find yourself driving drowsy, stop and sleep. Consider the sleep hygiene tips provided during your visit as well. Medication Refill Policy: Refills for all medications require 1 week advance notice. Please have your pharmacy fax a refill request. We are unable to fax, or call in \"controled substance\" medications and you will need to pick these prescriptions up from our office. [employer]ks Activation    Thank you for requesting access to [employer]. Please follow the instructions below to securely access and download your online medical record. [employer] allows you to send messages to your doctor, view your test results, renew your prescriptions, schedule appointments, and more. How Do I Sign Up? 1. In your internet browser, go to [email or web address]. 2. Click on the First Time User? Click Here link in the Sign In box. You will see the New Member Sign Up page. 3. Enter your Vo[employer]ks Access Code exactly as it appears below. You will not need to use this code after youve completed the sign-up process. If you do not sign up before the expiration date, you must request a new code. Vo[employer]ks Access Code: [email or web address]  Expires: 2020  4:14 PM (This is the date your Vo[employer]lks access code will )    4. Enter the last four digits of your Social Security Number (xxxx) and Date of Birth (mm/dd/yyyy) as indicated and click Submit. You will be taken to the next sign-up page. 5. Create a Vo[employer]lks ID. This will be your Vo[employer]lks login ID and cannot be changed, so think of one that is secure and easy to remember. 6. Create a Vo[employer]ks password. You can change your password at any time. 7. Enter your Password Reset Question and Answer. This can be used at a later time if you forget your password. 8. Enter your e-mail address. You will receive e-mail notification when new information is available in [address] 9. Click Sign Up.  You can now view and download portions of your medical record. 10. Click the Download Summary menu link to download a portable copy of your medical information. Additional Information    If you have questions, please call [phone]. Remember, [employer] is NOT to be used for urgent needs. For medical emergencies, dial 911.

## 2020-03-02 NOTE — PROGRESS NOTES
[address] [facility] [place]. [place], [address]  Tel.  [phone]  Fax. [address] [facility] [address]  [place], [address] [facility]  Tel.  [phone]  Fax. [phone] [address] [facility] Renetta Calhoun   Tel.  [phone]  Fax. [phone]         Subjective:      Leslie Tyler is an 69 y.o. female referred for evaluation for a sleep disorder. Chart reviewed. She was seen here in 2013 and diagnosed with mild sleep apnea (AHI-9/hour). She elected to proceed with an oral appliance for treatment. She did not recall having a sleep study or having met me before. Although I saw her in 2013. She does recall seeing a dentist to make an oral appliance but she is not using it. She complains of snoring, periods of not breathing associated with excessive daytime sleepiness. Symptoms began several years ago, gradually worsening since that time. She usually can fall asleep in 15 minutes. Family or house members note snoring. She denies falling asleep while during conversation. Leslie Tyler does wake up frequently at night. She is bothered by waking up too early and left unable to get back to sleep. She actually sleeps about 4 hours at night and wakes up about 4 times during the night. She   work shifts:  . Kai Fournier indicates she does get too little sleep at night. Her bedtime is 2100. She awakens at 0500. She does take naps. She takes 1 naps a week lasting 15 to 30. She has the following observed behaviors: Loud snoring, Sitting up in bed while still asleep, Grinding teeth;  . Other remarks: vivid dreams    [place] Sleepiness Score: 21   which reflect severe daytime drowsiness.     Allergies   Allergen Reactions    Latex Swelling     Swelling of lips says patient    Beef Containing Products Nausea and Vomiting     diarrhea    Ciprofloxacin Unknown (comments)    Milk Nausea and Vomiting     diarrhea    Pork Derived (Porcine) Nausea and Vomiting     diarrhea    Sulfa (Sulfonamide Antibiotics) Hives    Xarelto [Rivaroxaban] Other (comments)     GI problems         Current Outpatient Medications:     HYDROcodone-acetaminophen (NORCO) 5-325 mg per tablet, Take 5 Tabs by mouth as needed. , Disp: , Rfl:     LYRICA 100 mg capsule, TAKE 1 CAPSULE BY MOUTH TWICE A DAY, Disp: 60 Cap, Rfl: 3    ergocalciferol (ERGOCALCIFEROL) 50,000 unit capsule, TAKE 1 CAPSULE BY MOUTH ONCE A WEEK, Disp: 4 Cap, Rfl: 0    warfarin (COUMADIN) 5 mg tablet, TAKE 3 TABLETS BY MOUTH EVERY DAY ON TUESDAYS AND TAKE 2 TABLETS ON ALL OTHER DAYS, Disp: 180 Tab, Rfl: 1    latanoprost (XALATAN) 0.005 % ophthalmic solution, PLACE 1 DROP EVERY DAY INTO BOTH EYES AT BEDTIME, Disp: , Rfl: 3    levothyroxine (SYNTHROID) 50 mcg tablet, TAKE 1 TABLET BY MOUTH EVERY DAY, Disp: 90 Tab, Rfl: 3    topiramate (TOPAMAX) 100 mg tablet, Take 200 mg by mouth nightly., Disp: , Rfl:     lithium 300 mg tablet, Take 300 mg by mouth daily (with dinner). , Disp: , Rfl:     alprazolam (XANAX) 0.5 mg tablet, Take 0.5 mg by mouth nightly as needed for Anxiety. , Disp: , Rfl:     primidone (MYSOLINE) 50 mg tablet, Take 0.5 Tabs by mouth nightly., Disp: 45 Tab, Rfl: 1    cyclobenzaprine (FLEXERIL) 10 mg tablet, Take 1 Tab by mouth three (3) times daily as needed for Muscle Spasm(s). , Disp: 30 Tab, Rfl: 1    hydrOXYzine HCl (ATARAX) 25 mg tablet, TAKE 1 TAB BY MOUTH THREE (3) TIMES DAILY AS NEEDED FOR ITCHING FOR UP TO 10 DAYS., Disp: 30 Tab, Rfl: 1    fluticasone propionate (FLONASE NA), by Nasal route., Disp: , Rfl:     cetirizine HCl (ZYRTEC PO), Take  by mouth daily. , Disp: , Rfl:     senna-docusate (PERICOLACE) 8.6-50 mg per tablet, Take 1 Tab by mouth daily. 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 S/P ablation of atrial fibrillation (05/13/2014), S/P cardiac pacemaker procedure (9/16/2014), Sleep apnea, Stool color black, Thyroid disease, Tick-borne disease, Urge incontinence (3/31/2010), and Vitamin D deficiency (3/31/2015). She  has a past surgical history that includes hx bunionectomy; hx hysterectomy; hx other surgical (07/2014); hx pacemaker placement (2015); colonoscopy,remv lesn,snare (4/27/2018); colonoscopy,biopsy (4/27/2018); colonoscopy (N/A, 4/27/2018); pr cardiac surg procedure unlist (5/2014); hx pacemaker (2014); hx gi (2010, 5/2018); hx orthopaedic (2000's); hx orthopaedic (1990s?); and hx tonsillectomy (15 yrs old). She family history includes Arrhythmia in her brother and mother; Asthma in her brother; COPD in her brother; Colon Cancer in her father; Heart Disease in her father; Heart Failure in her mother; Stroke in her mother. She  reports that she has never smoked. She has never used smokeless tobacco. She reports current alcohol use of about 1.0 standard drinks of alcohol per week. She reports current drug use. Drug: Prescription. Review of Systems:  Constitutional:  No significant weight loss or weight gain. Eyes:  No blurred vision.   CVS:  No significant chest pain  Pulm:  No significant shortness of breath  GI:  No significant nausea or vomiting  :  No significant nocturia  Musculoskeletal:  + significant joint pain at night  Skin:  No significant rashes  Neuro:  + significant dizziness , treated for fibromyalgia  Psych: treated for bipolar disorder    Sleep Review of Systems: notable for no difficulty falling asleep; +frequent awakenings at night;  regular dreaming noted; no nightmares ; no early morning headaches; + memory problems; + concentration issues; + history of any automobile or occupational accidents due to daytime drowsiness (a few years ago, an isolated incident). Objective:     Visit Vitals  /70 (BP 1 Location: Left arm, BP Patient Position: Sitting)   Pulse 85   Ht 5' 5\" (1.651 m)   Wt 223 lb 11.2 oz (101.5 kg)   SpO2 98%   BMI 37.23 kg/m²         General:   Not in acute distress   Eyes:  Anicteric sclerae, no obvious strabismus   Nose:  No obvious nasal septum deviation    Oropharynx:   Class 3 oropharyngeal outlet, thick tongue base, enlarged and boggy uvula, low-lying soft palate, narrow tonsilo-pharyngeal pilars   Tonsils:   tonsils are absent   Neck:   Neck circ. in \"inches\": 16; midline trachea   Chest/Lungs:  Equal lung expansion, clear on auscultation    CVS:  Normal rate, regular rhythm; no JVD   Skin:  Warm to touch; no obvious rashes   Neuro:  No focal deficits ; no obvious tremor    Psych:  Normal affect,  normal countenance;          Assessment:       ICD-10-CM ICD-9-CM    1. ASHELY (obstructive sleep apnea) G47.33 327.23    2. Hypersomnia G47.10 780.54    3. Benign essential hypertension I10 401.1          Plan:     * The patient currently has a zhigh Risk for having sleep apnea. STOP-BANG score 7.  * PSG was ordered for initial evaluation. We will follow the American Academy of Sleep Medicine protocol regarding split-night procedures and offering a trial of Positive Airway Pressure (CPAP, BPAP, etc.)  * She was provided information on sleep apnea including coresponding risk factors and the importance of proper treatment. * Counseling was provided regarding proper sleep hygiene and safe driving. * Treatment options for sleep apnea were reviewed. she is not against a trial of PAP if found to have significant sleep apnea.      The treatment plan was reviewed with the patient in detail and reviewed with the patient and the lead technologist. she understands that the lead technologist will be calling her  with the results and assisting with the next step in the treatment plan as outlined today during the consultation with me. All of her questions were addressed. (call cell phone )  2. Hypersomnia - she has several medications that are contributing to her excessive daytime sleepiness. She should speak to her psychiatrist and neurologist to see if some of them can be tapered. She should not drive if she is sleepy. 3.. Obesity - I have counseled the patient regarding the benefits of weight loss. Thank you for allowing us to participate in your patient's medical care. We'll keep you updated on these investigations.   Electronically signed by    Kylie Darling MD  Diplomate in Sleep Medicine  [facility]

## 2020-03-04 DIAGNOSIS — R52 PAIN: ICD-10-CM

## 2020-03-04 RX ORDER — HYDROCODONE BITARTRATE AND ACETAMINOPHEN 5; 325 MG/1; MG/1
5 TABLET ORAL AS NEEDED
Status: CANCELLED | OUTPATIENT
Start: 2020-03-04

## 2020-03-04 RX ORDER — HYDROCODONE BITARTRATE AND ACETAMINOPHEN 5; 325 MG/1; MG/1
1 TABLET ORAL
Qty: 30 TAB | Refills: 0 | Status: SHIPPED | OUTPATIENT
Start: 2020-03-04 | End: 2020-05-21 | Stop reason: SDUPTHER

## 2020-03-13 ENCOUNTER — ANTI-COAG VISIT (OUTPATIENT)
Dept: CARDIOLOGY CLINIC | Age: 70
End: 2020-03-13

## 2020-03-13 DIAGNOSIS — I48.0 PAROXYSMAL ATRIAL FIBRILLATION (HCC): ICD-10-CM

## 2020-03-13 DIAGNOSIS — Z79.01 LONG TERM (CURRENT) USE OF ANTICOAGULANTS: Primary | ICD-10-CM

## 2020-03-13 LAB
INR BLD: 1.8 (ref 1–1.5)
PT POC: 21 SECONDS (ref 9.1–12)
VALID INTERNAL CONTROL?: YES

## 2020-03-14 ENCOUNTER — TELEPHONE (OUTPATIENT)
Dept: INTERNAL MEDICINE CLINIC | Age: 70
End: 2020-03-14

## 2020-03-14 RX ORDER — CEPHALEXIN 500 MG/1
500 CAPSULE ORAL 2 TIMES DAILY
Qty: 14 CAP | Refills: 0 | Status: SHIPPED | OUTPATIENT
Start: 2020-03-14 | End: 2020-06-09 | Stop reason: ALTCHOICE

## 2020-03-14 NOTE — TELEPHONE ENCOUNTER
Patient called this AM she is having urine burning and pain since lat night.  She is concerned she has a UTI  No fever   does not want to go to urgent care due to COVID   I sent in keflex 500mg BID

## 2020-03-16 NOTE — TELEPHONE ENCOUNTER
I returned call to pt, 2 pt identifiers verified - pt informed of instructions below per Dr Shane Gibson, pt understood with no further questions. #[phone] use this number only please. Pt returning your call.

## 2020-03-16 NOTE — TELEPHONE ENCOUNTER
She has already had keflex, which would treat any bacterial otitis media. For now, recommend tylenol for pain. She should let us know if she develops any new symptoms, such as fever or drainage from ear.

## 2020-03-16 NOTE — TELEPHONE ENCOUNTER
I return call to pt who states that she spoke to Dr Disha Dunham [facility]) where pt expressed her symptoms- per Dr Disha Dunham, pt has a yeast infection and Dr Disha Dunham did prescribed. Now pt c/o pain in L ear, and wants to know if Dr Paola Tinoco can prescribe.

## 2020-03-18 DIAGNOSIS — E55.9 VITAMIN D DEFICIENCY: ICD-10-CM

## 2020-03-18 RX ORDER — ERGOCALCIFEROL 1.25 MG/1
CAPSULE ORAL
Qty: 4 CAP | Refills: 0 | Status: SHIPPED | OUTPATIENT
Start: 2020-03-18 | End: 2020-04-13

## 2020-03-19 DIAGNOSIS — L29.9 PRURITUS: ICD-10-CM

## 2020-03-19 RX ORDER — HYDROXYZINE 25 MG/1
TABLET, FILM COATED ORAL
Qty: 30 TAB | Refills: 1 | Status: SHIPPED | OUTPATIENT
Start: 2020-03-19 | End: 2020-04-07 | Stop reason: ALTCHOICE

## 2020-03-19 NOTE — TELEPHONE ENCOUNTER
#[phone]  Pt has a popping sensation in her ear and facial itching. Pt states she needs to know what to do. Does she need medication for her ears? She needs this requested script for the facial itching that Dr. Levi Mandel is aware of.

## 2020-04-06 ENCOUNTER — OFFICE VISIT (OUTPATIENT)
Dept: CARDIOLOGY CLINIC | Age: 70
End: 2020-04-06

## 2020-04-06 DIAGNOSIS — I49.5 SSS (SICK SINUS SYNDROME) (HCC): ICD-10-CM

## 2020-04-06 DIAGNOSIS — Z95.0 CARDIAC PACEMAKER IN SITU: Primary | ICD-10-CM

## 2020-04-07 ENCOUNTER — VIRTUAL VISIT (OUTPATIENT)
Dept: NEUROLOGY | Age: 70
End: 2020-04-07

## 2020-04-07 ENCOUNTER — TELEPHONE (OUTPATIENT)
Dept: NEUROLOGY | Age: 70
End: 2020-04-07

## 2020-04-07 VITALS — HEIGHT: 65 IN | BODY MASS INDEX: 37.23 KG/M2

## 2020-04-07 DIAGNOSIS — M79.7 FIBROMYALGIA: Primary | ICD-10-CM

## 2020-04-07 DIAGNOSIS — G47.33 OBSTRUCTIVE SLEEP APNEA: ICD-10-CM

## 2020-04-07 DIAGNOSIS — G43.719 INTRACTABLE CHRONIC MIGRAINE WITHOUT AURA AND WITHOUT STATUS MIGRAINOSUS: ICD-10-CM

## 2020-04-07 DIAGNOSIS — R41.3 MEMORY LOSS: ICD-10-CM

## 2020-04-07 DIAGNOSIS — E03.9 ACQUIRED HYPOTHYROIDISM: ICD-10-CM

## 2020-04-07 DIAGNOSIS — F31.70 BIPOLAR AFFECTIVE DISORDER IN REMISSION (HCC): ICD-10-CM

## 2020-04-07 DIAGNOSIS — G25.0 ESSENTIAL TREMOR: ICD-10-CM

## 2020-04-07 DIAGNOSIS — E78.2 MIXED HYPERLIPIDEMIA: ICD-10-CM

## 2020-04-07 RX ORDER — TOPIRAMATE 100 MG/1
50 TABLET, FILM COATED ORAL 2 TIMES DAILY
Qty: 60 TAB | Refills: 3 | Status: SHIPPED | OUTPATIENT
Start: 2020-04-07 | End: 2022-04-27 | Stop reason: ALTCHOICE

## 2020-04-07 NOTE — PATIENT INSTRUCTIONS

## 2020-04-07 NOTE — PROGRESS NOTES
Follow-up Visit    Name Edmond  Age 69 y.o. MRN [id number]  1950       Chief Complaint: headaches    Continues to have frequent headaches. Has progressive loss of memory. Was told by sleep doc that she had a mild case and that she did not believe the mask would have a significant impact. She continues to be compliant with her medications and is having her thyroid regularly checked. Assesment and Plan  1. Fibromyalgia  conitnue lyrica    2. Intractable chronic migraine without aura and without status migrainosus  Continue Fioricet prn and topiramate    3. Bipolar affective disorder in remission ([facility] [address]  Continue lithium    4. Obstructive sleep apnea  Not a factor for her fatigue according to the sleep doctor    5. Acquired hypothyroidism  continue synthroid. Does not believe that this is contributing to her fatigue    6. Mixed hyperlipidemia      7. Essential tremor  Continue primidone reluctant to increase the dose because of fatigue    8. Memory loss  redce topiramate to 100mg daily      Allergies  Latex; Beef containing products; Ciprofloxacin; Milk; Pork derived (porcine); Sulfa (sulfonamide antibiotics); and Xarelto [rivaroxaban]     Medications  Current Outpatient Medications   Medication Sig    ergocalciferol (ERGOCALCIFEROL) 1,250 mcg (50,000 unit) capsule TAKE 1 CAPSULE BY MOUTH ONE TIME PER WEEK    cephALEXin (KEFLEX) 500 mg capsule Take 1 Cap by mouth two (2) times a day.  LYRICA 100 mg capsule TAKE 1 CAPSULE BY MOUTH TWICE A DAY    primidone (MYSOLINE) 50 mg tablet Take 0.5 Tabs by mouth nightly.  levothyroxine (SYNTHROID) 50 mcg tablet TAKE 1 TABLET BY MOUTH EVERY DAY    topiramate (TOPAMAX) 100 mg tablet Take 200 mg by mouth nightly.  lithium 300 mg tablet Take 300 mg by mouth three (3) times daily.  alprazolam (XANAX) 0.5 mg tablet Take 0.5 mg by mouth nightly.     hydrOXYzine HCL (ATARAX) 25 mg tablet TAKE 1 TAB BY MOUTH THREE (3) TIMES DAILY AS NEEDED FOR ITCHING FOR UP TO 10 DAYS.  cyclobenzaprine (FLEXERIL) 10 mg tablet Take 1 Tab by mouth three (3) times daily as needed for Muscle Spasm(s).  warfarin (COUMADIN) 5 mg tablet TAKE 3 TABLETS BY MOUTH EVERY DAY ON TUESDAYS AND TAKE 2 TABLETS ON ALL OTHER DAYS    latanoprost (XALATAN) 0.005 % ophthalmic solution PLACE 1 DROP EVERY DAY INTO BOTH EYES AT BEDTIME    fluticasone propionate (FLONASE NA) by Nasal route.  cetirizine HCl (ZYRTEC PO) Take  by mouth daily.  senna-docusate (PERICOLACE) 8.6-50 mg per tablet Take 1 Tab by mouth daily.  diphenoxylate-atropine (LOMOTIL) 2.5-0.025 mg per tablet Take 1 Tab by mouth two (2) times daily as needed for Diarrhea. No current facility-administered medications for this visit.          Medical History  Past Medical History:   Diagnosis Date    Arrhythmia 2014    atrial fibrillation 2014, sick sinus syndrome: [facility]    Arthritis     Bipolar affective disorder ([facility] [address] 3/31/2010    Chronic pain 2018    right shoulder    Colon polyps 03/31/2010    also 5/2018: colon polyps    Depression     Diarrhea 07/19/2013    D/t alpha gal allergy says patient    Epigastric pain 6/5/2015    GERD (gastroesophageal reflux disease)     occasiional only; controlled with med    Hemispheric carotid artery syndrome No stroke    Neuro: Bhavin Kessler  (CT [facility] head/neck [facility] 4/2018 [facility]    Hyperlipidemia 3/31/2010    Hypothyroidism 3/31/2010    Long term current use of anticoagulant therapy     Migraine 03/31/2010    has them x2 a month: last one 5/23/2018    Psychotic disorder ([facility] [address]     S/P ablation of atrial fibrillation 05/13/2014    S/P cardiac pacemaker procedure 9/16/2014 9/16/14 Medtronic MRI compatible dual chamber pacemaker implant    Sleep apnea     unable to tolerate CPAP    Stool color black     Thyroid disease     Tick-borne disease     Alpha Gal allergy: no eat pork, beef, milk (Meat allergy showed up on a allergy test)    Urge incontinence 3/31/2010    Vitamin D deficiency 3/31/2015       ROS    Exam:  Visit Vitals  Ht 5' 5\" (1.651 m)   BMI 37.23 kg/m²        General: Well developed, well nourished. tired   Head: Normocephalic, atraumatic, anicteric sclera   Neck Normal ROM, No thyromegally   Lungs:  Normal effort   Cardiac: Regular rate and rhythm with no murmurs. Ext: No pedal edema   Skin: Supple no rash     NeurologicExam:  Mental Status: Alert and oriented to person place and time   Speech: Fluent no aphasia or dysarthria. Cranial Nerves:  II - XII Intact   Motor:  Full and symmetric strength   Sensory:   Symmetric and intact    Gait:  Gait is balanced   Tremor:   No tremor noted. Cerebellar:  Coordination intact. Lab Review  Lab Results   Component Value Date/Time    WBC 7.3 12/04/2019 04:33 PM    HCT 43.3 12/04/2019 04:33 PM    HGB 14.5 12/04/2019 04:33 PM    PLATELET 229 01/60/6040 04:33 PM       Lab Results   Component Value Date/Time    Sodium 140 12/04/2019 04:33 PM    Potassium 4.6 12/04/2019 04:33 PM    Chloride 109 (H) 12/04/2019 04:33 PM    CO2 16 (L) 12/04/2019 04:33 PM    Glucose 103 (H) 12/04/2019 04:33 PM    BUN 21 12/04/2019 04:33 PM    Creatinine 0.84 12/04/2019 04:33 PM    Calcium 10.2 12/04/2019 04:33 PM       Lab Results   Component Value Date/Time    Hemoglobin A1c 5.5 11/06/2018 08:19 AM    Hemoglobin A1c (POC) 5.9 (A) 04/28/2014 04:04 PM        Lab Results   Component Value Date/Time    Vitamin B12 284 12/04/2019 04:33 PM         Lab Results   Component Value Date/Time    Cholesterol, total 213 (H) 11/06/2018 08:19 AM    HDL Cholesterol 46 11/06/2018 08:19 AM    LDL,Direct 124 (H) 03/30/2010 08:40 AM    LDL, calculated 125 (H) 11/06/2018 08:19 AM    VLDL, calculated 42 (H) 11/06/2018 08:19 AM    Triglyceride 212 (H) 11/06/2018 08:19 AM    CHOL/HDL Ratio 5.3 (H) 03/30/2010 08:40 AM       Galina Re was seen by synchronous (real-time) audio-video technology on 04/10/20. Consent:  She and/or her healthcare decision maker is aware that this patient-initiated Telehealth encounter is a billable service, with coverage as determined by her insurance carrier. She is aware that she may receive a bill and has provided verbal consent to proceed: Yes    I was in the office while conducting this encounter. Pursuant to the emergency declaration under the [facility] waiver authority and the [employer] Act, this Virtual  Visit was conducted, with patient's consent, to reduce the patient's risk of exposure to COVID-19 and provide continuity of care for an established patient. Services were provided through a video synchronous discussion virtually to substitute for in-person clinic visit.

## 2020-04-12 DIAGNOSIS — E55.9 VITAMIN D DEFICIENCY: ICD-10-CM

## 2020-04-13 RX ORDER — ERGOCALCIFEROL 1.25 MG/1
CAPSULE ORAL
Qty: 4 CAP | Refills: 0 | Status: SHIPPED | OUTPATIENT
Start: 2020-04-13 | End: 2020-05-22

## 2020-04-14 ENCOUNTER — ANTI-COAG VISIT (OUTPATIENT)
Dept: CARDIOLOGY CLINIC | Age: 70
End: 2020-04-14

## 2020-04-14 DIAGNOSIS — Z79.01 LONG TERM (CURRENT) USE OF ANTICOAGULANTS: Primary | ICD-10-CM

## 2020-04-14 DIAGNOSIS — I48.0 PAROXYSMAL ATRIAL FIBRILLATION (HCC): ICD-10-CM

## 2020-04-14 LAB
INR BLD: 1.8 (ref 1–1.5)
PT POC: 22.1 SECONDS (ref 9.1–12)
VALID INTERNAL CONTROL?: YES

## 2020-04-15 ENCOUNTER — TELEPHONE (OUTPATIENT)
Dept: INTERNAL MEDICINE CLINIC | Age: 70
End: 2020-04-15

## 2020-04-15 NOTE — TELEPHONE ENCOUNTER
Called, spoke with Pt. Two pt identifiers confirmed. Pt offered and accepted virtual appt for Thursday, April 16, 2020 11:15 AM.   [facility], no answer left message to call office back.

## 2020-04-15 NOTE — TELEPHONE ENCOUNTER
----- Message from Kurtis Turcios sent at 4/15/2020 12:52 PM EDT -----  Regarding: Dr. Scarlet Bojorquez first and last name: Jovana Matias   Reason for call:   Pt is having ear problems such as itching around outer parts of eye and a dull itch around ears for the last couple of weeks. Pt think it's due to allergies. Pt wants doctor to send antibiotics to [facility] on file.    Callback required yes/no and why: yes  Best contact number(s):  [phone]  Details to clarify the request

## 2020-04-16 ENCOUNTER — VIRTUAL VISIT (OUTPATIENT)
Dept: INTERNAL MEDICINE CLINIC | Age: 70
End: 2020-04-16

## 2020-04-16 DIAGNOSIS — L29.9 PRURITUS: ICD-10-CM

## 2020-04-16 DIAGNOSIS — B02.9 HERPES ZOSTER WITHOUT COMPLICATION: Primary | ICD-10-CM

## 2020-04-16 RX ORDER — HYDROXYZINE 25 MG/1
TABLET, FILM COATED ORAL
Qty: 30 TAB | Refills: 1 | Status: SHIPPED | OUTPATIENT
Start: 2020-04-16 | End: 2020-08-25 | Stop reason: SDUPTHER

## 2020-04-16 RX ORDER — BETAMETHASONE VALERATE 1.2 MG/G
CREAM TOPICAL 2 TIMES DAILY
Qty: 15 G | Refills: 0 | Status: SHIPPED | OUTPATIENT
Start: 2020-04-16 | End: 2020-07-20

## 2020-04-16 NOTE — PROGRESS NOTES
Kesha Quiroga is a 69 y.o. female evaluated via telephone on 4/16/2020. Consent:  She and/or health care decision maker is aware that that she may receive a bill for this telephone service, depending on her insurance coverage, and has provided verbal consent to proceed: Yes      Documentation:    SUBJECTIVE  Ms. Kesha Quiroga presents today acutely for     Chief Complaint   Patient presents with    Skin Problem     itching around her ears     1. She had a rash of a patch of skin behind her (?left, I believe) leg and \"behind the knee\", blistering and very painful. \"It's drying up. \"  Pain is better. 2. Now, she has itching and \"little spots\" in her forehead. Her ears are itching and swelling. She has taken hydroxyzine in past for temporary relief. \"I think it's allergies\" --possibly to her dog. OBJECTIVE  There were no vitals taken for this visit. ASSESSMENT / PLAN    ICD-10-CM ICD-9-CM    1. Herpes zoster without complication, involving leg--resolving B02.9 053.9    2. Pruritus L29.9 698.9 hydrOXYzine HCL (ATARAX) 25 mg tablet      betamethasone valerate (VALISONE) 0.1 % topical cream     Consider a daily dose of zyrtec or allegra. I have reviewed with the patient details of the assessment and plan and all questions were answered. Relevant patient education was performed. I affirm this is a Patient Initiated Episode with an Established Patient who has not had a related appointment within my department in the past 7 days or scheduled within the next 24 hours.     Total Time: minutes: 5-10 minutes    Note: not billable if this call serves to triage the patient into an appointment for the relevant concern      Govind Bassett MD

## 2020-04-27 ENCOUNTER — VIRTUAL VISIT (OUTPATIENT)
Dept: INTERNAL MEDICINE CLINIC | Age: 70
End: 2020-04-27

## 2020-04-27 DIAGNOSIS — M54.50 ACUTE LEFT-SIDED LOW BACK PAIN, UNSPECIFIED WHETHER SCIATICA PRESENT: Primary | ICD-10-CM

## 2020-04-27 RX ORDER — METHOCARBAMOL 750 MG/1
750 TABLET, FILM COATED ORAL 4 TIMES DAILY
Qty: 60 TAB | Refills: 1 | Status: SHIPPED | OUTPATIENT
Start: 2020-04-27 | End: 2020-04-28

## 2020-04-27 NOTE — PROGRESS NOTES
Golden Badillo is a 69 y.o. female who was seen by synchronous (real-time) audio-video technology on 4/27/2020. Consent: Golden Badillo, who was seen by synchronous (real-time) audio-video technology, and/or her healthcare decision maker, is aware that this patient-initiated, Telehealth encounter on 4/27/2020 is a billable service, with coverage as determined by her insurance carrier. She is aware that she may receive a bill and has provided verbal consent to proceed: Yes. Subjective:   Golden Badillo is a 69 y.o. female who was seen for Back Pain (pt states that she hurt her back this morning - lower back pain to L side; pt rates her pain 9/10)      SUBJECTIVE  Ms. Golden Badillo presents today acutely for     Chief Complaint   Patient presents with    Back Pain     pt states that she hurt her back this morning - lower back pain to L side; pt rates her pain 9/10     Pain is in low back, radiating down the leg this morning, now just in left lumbar area. \"I can hardly move at all. \" Pain is worse with bending, rising/sitting. OBJECTIVE  There were no vitals taken for this visit. Gen: Pleasant 69 y.o.  female in NAD.       ASSESSMENT / PLAN  1. Acute left-sided low back pain, unspecified whether sciatica present  -     methocarbamoL (ROBAXIN) 750 mg tablet; Take 1 Tab by mouth four (4) times daily for 30 days.  -     REFERRAL TO PHYSICAL THERAPY   Continue tylenol. Avoid NSAID (warfarin). Follow-up and Dispositions    · Return if symptoms worsen or fail to improve. Objective:   Vital Signs: (As obtained by patient/caregiver at home)  There were no vitals taken for this visit.      [INSTRUCTIONS:  \"[x]\" Indicates a positive item  \"[]\" Indicates a negative item  -- DELETE ALL ITEMS NOT EXAMINED]    Constitutional: [x] Appears well-developed and well-nourished [x] No apparent distress      [] Abnormal -     Mental status: [x] Alert and awake  [x] Oriented to person/place/time [x] Able to follow commands    [] Abnormal -     Eyes:   EOM    [x]  Normal    [] Abnormal -   Sclera  [x]  Normal    [] Abnormal -          Discharge [x]  None visible   [] Abnormal -     HENT: [x] Normocephalic, atraumatic  [] Abnormal -   [x] Mouth/Throat: Mucous membranes are moist    External Ears [x] Normal  [] Abnormal -    Neck: [x] No visualized mass [] Abnormal -     Pulmonary/Chest: [x] Respiratory effort normal   [x] No visualized signs of difficulty breathing or respiratory distress        [] Abnormal -      Musculoskeletal:   [x] Normal gait with no signs of ataxia         [x] Normal range of motion of neck        [] Abnormal -     Neurological:        [x] No Facial Asymmetry (Cranial nerve 7 motor function) (limited exam due to video visit)          [x] No gaze palsy        [] Abnormal -          Skin:        [x] No significant exanthematous lesions or discoloration noted on facial skin         [] Abnormal -            Psychiatric:       [x] Normal Affect [] Abnormal -        [x] No Hallucinations    Other pertinent observable physical exam findings:-        We discussed the expected course, resolution and complications of the diagnosis(es) in detail. Medication risks, benefits, costs, interactions, and alternatives were discussed as indicated. I advised her to contact the office if her condition worsens, changes or fails to improve as anticipated. She expressed understanding with the diagnosis(es) and plan. Yobani Pritchard is a 69 y.o. female who was evaluated by a video visit encounter for concerns as above. Patient identification was verified prior to start of the visit. A caregiver was present when appropriate. Due to this being a TeleHealth encounter (During GZNLA-50 public health emergency), evaluation of the following organ systems was limited: Vitals/Constitutional/EENT/Resp/CV/GI//MS/Neuro/Skin/Heme-Lymph-Imm.   Pursuant to the emergency declaration under the [address] and the National Emergencies Act, [facility] waiver authority and the [employer] Act, this Virtual  Visit was conducted, with patient's (and/or legal guardian's) consent, to reduce the patient's risk of exposure to COVID-19 and provide necessary medical care. Services were provided through a video synchronous discussion virtually to substitute for in-person clinic visit. Patient and provider were located at their individual homes.       Kathrin Francois MD

## 2020-04-27 NOTE — PATIENT INSTRUCTIONS
Office Policies Phone calls/patient messages: Please allow up to 24 hours for someone in the office to contact you about your call or message. Be mindful your provider may be out of the office or your message may require further review. We encourage you to use SingleFeed for your messages as this is a faster, more efficient way to communicate with our office Medication Refills: 
         
Prescription medications require 48-72 business hours to process. We encourage you to use SingleFeed for your refills. For controlled medications: Please allow 72 business hours to process. Certain medications may require you to  a written prescription at our office. NO narcotic/controlled medications will be prescribed after 4pm Monday through Friday or on weekends Form/Paperwork Completion: 
         
Please note a $25 fee may incur for all paperwork for completed by our providers. We ask that you allow 7-10 business days. Pre-payment is due prior to picking up/faxing the completed form. You may also download your forms to SingleFeed to have your doctor print off. 
 
 
1. Have you been to the ER, urgent care clinic since your last visit? Hospitalized since your last visit?[facility] 
 
2. Have you seen or consulted any other health care providers outside of the [address] [place] [address] since your last visit? Include any pap smears or colon screening.  no

## 2020-04-28 ENCOUNTER — TELEPHONE (OUTPATIENT)
Dept: INTERNAL MEDICINE CLINIC | Age: 70
End: 2020-04-28

## 2020-04-28 ENCOUNTER — ANTI-COAG VISIT (OUTPATIENT)
Dept: CARDIOLOGY CLINIC | Age: 70
End: 2020-04-28

## 2020-04-28 DIAGNOSIS — I48.0 PAROXYSMAL ATRIAL FIBRILLATION (HCC): ICD-10-CM

## 2020-04-28 DIAGNOSIS — Z79.01 LONG TERM (CURRENT) USE OF ANTICOAGULANTS: Primary | ICD-10-CM

## 2020-04-28 LAB
INR BLD: 2.9 (ref 1–1.5)
PT POC: 35.4 SECONDS (ref 9.1–12)
VALID INTERNAL CONTROL?: YES

## 2020-04-28 RX ORDER — TIZANIDINE 2 MG/1
2 TABLET ORAL 3 TIMES DAILY
Qty: 90 TAB | Refills: 5 | Status: SHIPPED | OUTPATIENT
Start: 2020-04-28 | End: 2022-07-11

## 2020-04-28 NOTE — TELEPHONE ENCOUNTER
Today our office received a notice from [facility] pharmacy stating that methocarbamol is not covered by pt's insurance.

## 2020-04-29 NOTE — TELEPHONE ENCOUNTER
Called, spoke with Pt. Two pt identifiers confirmed. Pt informed of new medication send to pharmacy. Pt verbalized understanding of information discussed w/ no further questions at this time.

## 2020-05-12 ENCOUNTER — HOSPITAL ENCOUNTER (OUTPATIENT)
Dept: PHYSICAL THERAPY | Age: 70
End: 2020-05-12

## 2020-05-12 RX ORDER — WARFARIN SODIUM 5 MG/1
5 TABLET ORAL DAILY
COMMUNITY
End: 2020-05-12 | Stop reason: SDUPTHER

## 2020-05-12 RX ORDER — WARFARIN SODIUM 5 MG/1
TABLET ORAL
Qty: 204 TAB | Refills: 1 | Status: SHIPPED | OUTPATIENT
Start: 2020-05-12 | End: 2020-11-06

## 2020-05-19 ENCOUNTER — APPOINTMENT (OUTPATIENT)
Dept: PHYSICAL THERAPY | Age: 70
End: 2020-05-19

## 2020-05-20 ENCOUNTER — TELEPHONE (OUTPATIENT)
Dept: INTERNAL MEDICINE CLINIC | Age: 70
End: 2020-05-20

## 2020-05-20 DIAGNOSIS — R52 PAIN: ICD-10-CM

## 2020-05-21 DIAGNOSIS — E55.9 VITAMIN D DEFICIENCY: ICD-10-CM

## 2020-05-21 DIAGNOSIS — M79.7 FIBROMYALGIA: ICD-10-CM

## 2020-05-21 RX ORDER — HYDROCODONE BITARTRATE AND ACETAMINOPHEN 5; 325 MG/1; MG/1
1 TABLET ORAL
Qty: 30 TAB | Refills: 0 | Status: SHIPPED | OUTPATIENT
Start: 2020-05-21 | End: 2020-07-07 | Stop reason: SDUPTHER

## 2020-05-22 RX ORDER — PREGABALIN 100 MG/1
CAPSULE ORAL
Qty: 60 CAP | Refills: 3 | Status: SHIPPED | OUTPATIENT
Start: 2020-05-22 | End: 2020-09-24

## 2020-05-22 RX ORDER — ERGOCALCIFEROL 1.25 MG/1
CAPSULE ORAL
Qty: 4 CAP | Refills: 0 | Status: SHIPPED | OUTPATIENT
Start: 2020-05-22 | End: 2020-06-12

## 2020-05-28 ENCOUNTER — ANTI-COAG VISIT (OUTPATIENT)
Dept: CARDIOLOGY CLINIC | Age: 70
End: 2020-05-28

## 2020-05-28 DIAGNOSIS — Z79.01 LONG TERM (CURRENT) USE OF ANTICOAGULANTS: Primary | ICD-10-CM

## 2020-05-28 DIAGNOSIS — I48.0 PAROXYSMAL ATRIAL FIBRILLATION (HCC): ICD-10-CM

## 2020-05-28 LAB
INR BLD: 2.1 (ref 1–1.5)
PT POC: 25.7 SECONDS (ref 9.1–12)
VALID INTERNAL CONTROL?: YES

## 2020-06-09 ENCOUNTER — VIRTUAL VISIT (OUTPATIENT)
Dept: INTERNAL MEDICINE CLINIC | Age: 70
End: 2020-06-09

## 2020-06-09 ENCOUNTER — CLINICAL SUPPORT (OUTPATIENT)
Dept: CARDIOLOGY CLINIC | Age: 70
End: 2020-06-09

## 2020-06-09 ENCOUNTER — OFFICE VISIT (OUTPATIENT)
Dept: CARDIOLOGY CLINIC | Age: 70
End: 2020-06-09

## 2020-06-09 ENCOUNTER — TELEPHONE (OUTPATIENT)
Dept: INTERNAL MEDICINE CLINIC | Age: 70
End: 2020-06-09

## 2020-06-09 VITALS
WEIGHT: 235 LBS | HEART RATE: 76 BPM | SYSTOLIC BLOOD PRESSURE: 110 MMHG | HEIGHT: 65 IN | RESPIRATION RATE: 20 BRPM | BODY MASS INDEX: 39.15 KG/M2 | OXYGEN SATURATION: 99 % | DIASTOLIC BLOOD PRESSURE: 70 MMHG

## 2020-06-09 DIAGNOSIS — K42.9 UMBILICAL HERNIA WITHOUT OBSTRUCTION AND WITHOUT GANGRENE: Primary | ICD-10-CM

## 2020-06-09 DIAGNOSIS — I48.0 PAROXYSMAL ATRIAL FIBRILLATION (HCC): Primary | ICD-10-CM

## 2020-06-09 DIAGNOSIS — Z95.0 CARDIAC PACEMAKER IN SITU: Primary | ICD-10-CM

## 2020-06-09 DIAGNOSIS — Z79.01 LONG TERM (CURRENT) USE OF ANTICOAGULANTS: ICD-10-CM

## 2020-06-09 DIAGNOSIS — I49.5 SSS (SICK SINUS SYNDROME) (HCC): ICD-10-CM

## 2020-06-09 DIAGNOSIS — I10 BENIGN ESSENTIAL HYPERTENSION: ICD-10-CM

## 2020-06-09 DIAGNOSIS — Z95.0 CARDIAC PACEMAKER IN SITU: ICD-10-CM

## 2020-06-09 RX ORDER — ZOLPIDEM TARTRATE 10 MG/1
10 TABLET ORAL
COMMUNITY
End: 2022-07-11

## 2020-06-09 RX ORDER — LATANOPROST 50 UG/ML
1 SOLUTION/ DROPS OPHTHALMIC 2 TIMES DAILY
COMMUNITY
Start: 2020-04-26

## 2020-06-09 RX ORDER — NITROFURANTOIN 25; 75 MG/1; MG/1
CAPSULE ORAL
COMMUNITY
End: 2020-11-17 | Stop reason: ALTCHOICE

## 2020-06-09 RX ORDER — TRIMETHOPRIM 100 MG/1
TABLET ORAL
COMMUNITY
Start: 2020-03-25 | End: 2021-03-29

## 2020-06-09 NOTE — PATIENT INSTRUCTIONS
Office Policies Phone calls/patient messages: Please allow up to 24 hours for someone in the office to contact you about your call or message. Be mindful your provider may be out of the office or your message may require further review. We encourage you to use Back[employer] for your messages as this is a faster, more efficient way to communicate with our office Medication Refills: 
         
Prescription medications require 48-72 business hours to process. We encourage you to use [employer] for your refills. For controlled medications: Please allow 72 business hours to process. Certain medications may require you to  a written prescription at our office. NO narcotic/controlled medications will be prescribed after 4pm Monday through Friday or on weekends Form/Paperwork Completion: 
         
Please note a $25 fee may incur for all paperwork for completed by our providers. We ask that you allow 7-10 business days. Pre-payment is due prior to picking up/faxing the completed form. You may also download your forms to [employer] to have your doctor print off. 
 
 
1. Have you been to the ER, urgent care clinic since your last visit? Hospitalized since your last visit?no 2. Have you seen or consulted any other health care providers outside of the [address] [place] [address] since your last visit? Include any pap smears or colon screening.   dermatologist

## 2020-06-09 NOTE — PROGRESS NOTES
Subjective:      Xavier Oh is a 69 y.o. female is here for EP consult. The patient denies chest pain/ shortness of breath, orthopnea, PND, palpitations, syncope, presyncope or fatigue. Reports lower extremity edema and endorses eating high sodium diet. Xavier Oh  is on [address] [place] [address] Road, reports no melena, hematuria, or obvious signs of bleeding. No falls.         Patient Active Problem List    Diagnosis Date Noted    Osteoarthritis of right shoulder 06/06/2018    Incisional hernia, without obstruction or gangrene 10/20/2017    Obesity, Class II, BMI 35-39.9 10/20/2017    Fibromyalgia 03/29/2017    Epigastric pain 06/05/2015    Vitamin D deficiency 03/31/2015    A-fib ([facility] 01/28/2015    Esophageal spasm 12/16/2014    S/P cardiac pacemaker procedure 09/16/2014    SSS (sick sinus syndrome) ([facility] 09/02/2014    S/P ablation of atrial fibrillation 05/13/2014    Atrial fibrillation ([facility] 04/21/2014    Benign essential hypertension 03/14/2014    Morbid obesity with BMI of 40.0-44.9, adult ([facility] 03/14/2014    Diarrhea 07/19/2013    GERD (gastroesophageal reflux disease) 04/13/2012    Colon polyps 03/31/2010    Urge incontinence 03/31/2010    Migraine 03/31/2010    Bipolar affective disorder ([facility] 03/31/2010    Hypothyroidism 03/31/2010    Hyperglycemia 03/31/2010    Hyperlipidemia 03/31/2010      Jalyn Leon MD  Past Medical History:   Diagnosis Date    Arrhythmia 2014    atrial fibrillation 2014, sick sinus syndrome: [facility]    Arthritis     Bipolar affective disorder ([facility] 3/31/2010    Chronic pain 2018    right shoulder    Colon polyps 03/31/2010    also 5/2018: colon polyps    Depression     Diarrhea 07/19/2013    D/t alpha gal allergy says patient    Epigastric pain 6/5/2015    GERD (gastroesophageal reflux disease)     occasiional only; controlled with med    Hemispheric carotid artery syndrome No stroke    Neuro: Joana Dandy  (CT scan head/neck negative 4/2018 in [facility])    Hyperlipidemia 3/31/2010    Hypothyroidism 3/31/2010    Long term current use of anticoagulant therapy     Migraine 03/31/2010    has them x2 a month: last one 5/23/2018    Psychotic disorder ([address]     S/P ablation of atrial fibrillation 05/13/2014    S/P cardiac pacemaker procedure 9/16/2014 9/16/14 Medtronic MRI compatible dual chamber pacemaker implant    Sleep apnea     unable to tolerate CPAP    Stool color black     Thyroid disease     Tick-borne disease     Alpha Gal allergy: no eat pork, beef, milk (Meat allergy showed up on a allergy test)    Urge incontinence 3/31/2010    Vitamin D deficiency 3/31/2015      Past Surgical History:   Procedure Laterality Date    CARDIAC SURG PROCEDURE UNLIST  5/2014    ablation    COLONOSCOPY N/A 4/27/2018    COLONOSCOPY performed by Areli Foreman MD at [address] [place] [address]  4/27/2018         COLONOSCOPY,REMV Rosalva Ping  4/27/2018         HX BUNIONECTOMY      HX GI  2010, 5/2018    egd, colonoscopy    HX HYSTERECTOMY      HX ORTHOPAEDIC  2000's    removed bone spurs from R & L hand    HX ORTHOPAEDIC  1990s?     left bunionectomy     HX OTHER SURGICAL  07/2014    2 shocks to heart & ablations    HX PACEMAKER  2014    On the right side    HX PACEMAKER PLACEMENT  2015    HX TONSILLECTOMY  15 yrs old     Allergies   Allergen Reactions    Latex Swelling     Swelling of lips says patient    Beef Containing Products Nausea and Vomiting     diarrhea    Ciprofloxacin Unknown (comments)    Milk Nausea and Vomiting     diarrhea    Pork Derived (Porcine) Nausea and Vomiting     diarrhea    Sulfa (Sulfonamide Antibiotics) Hives    Xarelto [Rivaroxaban] Other (comments)     GI problems      Family History   Problem Relation Age of Onset    Arrhythmia Mother         afib    Stroke Mother     Heart Failure Mother     Colon Cancer Father     Heart Disease Father     Arrhythmia Brother afib    Asthma Brother     COPD Brother     negative for cardiac disease  Social History     Socioeconomic History    Marital status:      Spouse name: Not on file    Number of children: Not on file    Years of education: Not on file    Highest education level: Not on file   Tobacco Use    Smoking status: Never Smoker    Smokeless tobacco: Never Used   Substance and Sexual Activity    Alcohol use: Yes     Alcohol/week: 1.0 standard drinks     Types: 1 Cans of beer per week     Frequency: Monthly or less     Comment: weekly    Drug use: Yes     Types: Prescription    Sexual activity: Never     Current Outpatient Medications   Medication Sig    latanoprost (XALATAN) 0.005 % ophthalmic solution Administer 1 Drop to both eyes two (2) times a day.  nitrofurantoin, macrocrystal-monohydrate, (MACROBID) 100 mg capsule nitrofurantoin monohydrate/macrocrystals 100 mg capsule    trimethoprim (TRIMPEX) 100 mg tablet TAKE 1 TABLET BY MOUTH EVERY DAY    zolpidem (AMBIEN) 10 mg tablet zolpidem 10 mg tablet    ergocalciferol (ERGOCALCIFEROL) 1,250 mcg (50,000 unit) capsule TAKE 1 CAPSULE BY MOUTH ONE TIME PER WEEK    pregabalin (LYRICA) 100 mg capsule TAKE 1 CAPSULE BY MOUTH TWICE A DAY    HYDROcodone-acetaminophen (NORCO) 5-325 mg per tablet Take 1 Tab by mouth every eight (8) hours as needed for Pain for up to 30 days. Max Daily Amount: 3 Tabs.  warfarin (COUMADIN) 5 mg tablet Take 3 pills on Tue, Fri and 2 pills all other days.  hydrOXYzine HCL (ATARAX) 25 mg tablet TAKE 1 TAB BY MOUTH THREE (3) TIMES DAILY AS NEEDED FOR ITCHING FOR UP TO 10 DAYS.  topiramate (Topamax) 100 mg tablet Take 1 Tab by mouth two (2) times a day.  primidone (MYSOLINE) 50 mg tablet Take 0.5 Tabs by mouth nightly.  levothyroxine (SYNTHROID) 50 mcg tablet TAKE 1 TABLET BY MOUTH EVERY DAY    lithium 300 mg tablet Take 300 mg by mouth three (3) times daily.     alprazolam (XANAX) 0.5 mg tablet Take 0.5 mg by mouth nightly.  tiZANidine (ZANAFLEX) 2 mg tablet Take 1 Tab by mouth three (3) times daily.  betamethasone valerate (VALISONE) 0.1 % topical cream Apply  to affected area two (2) times a day. (Patient not taking: Reported on 6/9/2020)    diphenoxylate-atropine (LOMOTIL) 2.5-0.025 mg per tablet Take 1 Tab by mouth two (2) times daily as needed for Diarrhea. No current facility-administered medications for this visit. Vitals:    06/09/20 1016   BP: 110/70   Pulse: 76   Resp: 20   SpO2: 99%   Weight: 235 lb (106.6 kg)   Height: 5' 5\" (1.651 m)       I have reviewed the nurses notes, vitals, problem list, allergy list, medical history, family, social history and medications. Review of Symptoms:    General: Pt denies excessive weight gain or loss. Pt is able to conduct ADL's  HEENT: Denies blurred vision, headaches, epistaxis and difficulty swallowing. Respiratory: Denies shortness of breath, BIANCHI, wheezing or stridor. Cardiovascular: Denies precordial pain, palpitations, Reports edema   Gastrointestinal: Denies poor appetite, indigestion, abdominal pain or blood in stool  Urinary: Denies dysuria, pyuria  Musculoskeletal: Denies pain or swelling from muscles or joints  Neurologic: Denies tremor, paresthesias, or sensory motor disturbance  Skin: Denies rash, itching or texture change. Psych: Denies depression    Physical Exam:      General: Well developed, in no acute distress. HEENT: Eyes - PERRL, no jvd  Heart:  Normal S1/S2 negative S3 or S4. Regular, no murmur, gallop or rub. Respiratory: Clear bilaterally x 4, no wheezing or rales  Extremities:  Non- pitting edema, normal cap refill, no cyanosis. Musculoskeletal: No clubbing  Neuro: A&Ox3, speech clear, gait stable. Skin: Skin color is normal. No rashes or lesions.  Non diaphoretic  Vascular: 2+ pulses symmetric in all extremities    Cardiographics    Ekg: A paced    Results for orders placed or performed during the hospital encounter of 04/27/18   EKG, 12 LEAD, INITIAL   Result Value Ref Range    Ventricular Rate 75 BPM    Atrial Rate 75 BPM    P-R Interval 326 ms    QRS Duration 170 ms    Q-T Interval 450 ms    QTC Calculation (Bezet) 502 ms    Calculated R Axis -29 degrees    Calculated T Axis 93 degrees    Diagnosis       Atrial-paced rhythm with prolonged AV conduction  Right bundle branch block  When compared with ECG of 28-JAN-2015 14:28,  Electronic atrial pacemaker has replaced Sinus rhythm  Right bundle branch block has replaced Nonspecific intraventricular   conduction delay  Confirmed by Elizabeth Ingram PHUBER ([id number]) on 4/28/2018 1:38:30 PM     Results for orders placed or performed in visit on 08/19/14   CARDIAC HOLTER MONITOR, 24 HOURS    Narrative    ECG Monitor/24 hours, Complete    Reason for Holter Monitor   A-FIB    Heartbeat    Slowest 42  Average 60  Fastest  99        Results:   Underlying Rhythm: Normal sinus rhythm      Atrial Arrhythmias: premature atrial contractions; occasional,  atrial couplets and atrial triplets            AV Conduction: normal    Ventricular Arrhythmias: premature ventricular contractions;  occasional     ST Segment Analysis:normal     Symptom Correlation:  Headache did not correlate with any arrhythmias. Fatigue/dizziness correlated with sinus bradycardia in the 40s    Comment:   Sinus rhythm with symptomatic sinus bradycardia. Clinical  correlation advised.      Ubaldo Sosa MD, [facility]            Lab Results   Component Value Date/Time    WBC 7.3 12/04/2019 04:33 PM    HGB 14.5 12/04/2019 04:33 PM    HCT 43.3 12/04/2019 04:33 PM    PLATELET 953 95/86/3744 04:33 PM    MCV 91 12/04/2019 04:33 PM      Lab Results   Component Value Date/Time    Sodium 140 12/04/2019 04:33 PM    Potassium 4.6 12/04/2019 04:33 PM    Chloride 109 (H) 12/04/2019 04:33 PM    CO2 16 (L) 12/04/2019 04:33 PM    Anion gap 8 06/08/2018 04:12 AM    Glucose 103 (H) 12/04/2019 04:33 PM    BUN 21 12/04/2019 04:33 PM    Creatinine 0.84 12/04/2019 04:33 PM    BUN/Creatinine ratio 25 12/04/2019 04:33 PM    GFR est AA 81 12/04/2019 04:33 PM    GFR est non-AA 71 12/04/2019 04:33 PM    Calcium 10.2 12/04/2019 04:33 PM    Bilirubin, total 0.4 12/04/2019 04:33 PM    Alk. phosphatase 82 12/04/2019 04:33 PM    Protein, total 6.8 12/04/2019 04:33 PM    Albumin 4.4 12/04/2019 04:33 PM    Globulin 2.8 04/27/2018 09:03 PM    A-G Ratio 1.8 12/04/2019 04:33 PM    ALT (SGPT) 14 12/04/2019 04:33 PM      Lab Results   Component Value Date/Time    TSH 2.430 11/06/2018 08:19 AM        Assessment:             ICD-10-CM ICD-9-CM    1. Paroxysmal atrial fibrillation (HCC) I48.0 427.31 AMB POC EKG ROUTINE W/ 12 LEADS, INTER & REP      ECHO ADULT COMPLETE   2. Long term (current) use of anticoagulants Z79.01 V58.61 ECHO ADULT COMPLETE   3. Cardiac pacemaker in situ Z95.0 V45.01 ECHO ADULT COMPLETE   4. SSS (sick sinus syndrome) (HCC) I49.5 427.81 ECHO ADULT COMPLETE   5. Benign essential hypertension I10 401.1 ECHO ADULT COMPLETE   6. BMI 39.0-39.9,adult Z68.39 V85.39 ECHO ADULT COMPLETE     Orders Placed This Encounter    AMB POC EKG ROUTINE W/ 12 LEADS, INTER & REP     Order Specific Question:   Reason for Exam:     Answer:   routine    latanoprost (XALATAN) 0.005 % ophthalmic solution     Sig: Administer 1 Drop to both eyes two (2) times a day.  nitrofurantoin, macrocrystal-monohydrate, (MACROBID) 100 mg capsule     Sig: nitrofurantoin monohydrate/macrocrystals 100 mg capsule    trimethoprim (TRIMPEX) 100 mg tablet     Sig: TAKE 1 TABLET BY MOUTH EVERY DAY    zolpidem (AMBIEN) 10 mg tablet     Sig: zolpidem 10 mg tablet        Plan:     Ms. Nicole Roger is here for annual follow up on AF and device interrogation. She is doing well and denies cardiac complaints.  EKG shows atrial pacing for her sick sinus and her device interrogation demonstrates normal functioning (100% AP, 0.4% RVP, 0.1% atrial flutter). She has a hx of nl lvef. Will repeat echo with bilat edema. Continue current medical therapy and follow up in one year.     Continue medical management for HTN, bipolar, SSS, AF. Thank you for allowing me to participate in Maximino Blake 's care. Kadie Carty NP    Patient seen and examined. All pertinent data reviewed. I have reviewed detailed note as outlined by Kadie aCrty NP. Case discussed with Nursing/medical assistant staff and Kadie Carty NP. Plans as outlined. A paced 100% for sick sinus. No AF noted - on oac. Cont med rx for htn. Maximino Blake will follow up with me in one year and in the device clinic per routine.       Karlos Seay MD, [facility]

## 2020-06-09 NOTE — PROGRESS NOTES
Kina Joseph is a 69 y.o. female who was seen by synchronous (real-time) audio-video technology on 6/9/2020. Consent: Kina Joseph, who was seen by synchronous (real-time) audio-video technology, and/or her healthcare decision maker, is aware that this patient-initiated, Telehealth encounter on 6/9/2020 is a billable service, with coverage as determined by her insurance carrier. She is aware that she may receive a bill and has provided verbal consent to proceed: Yes. Assessment & Plan:   Diagnoses and all orders for this visit:    1. Umbilical hernia without obstruction and without gangrene  -     REFERRAL TO GENERAL SURGERY      Follow-up and Dispositions    · Return if symptoms worsen or fail to improve. Subjective:   Kina Joseph is a 69 y.o. female who was seen for Umbilical Hernia (pt wants to discuss getting surg for umbilical hernia; pt has some tenderness )    Chief Complaint   Patient presents with    Umbilical Hernia     pt wants to discuss getting surg for umbilical hernia; pt has some tenderness      She was previously referred to a surgeon, and decided not to follow through because \"I just didn't seem to get along with that office. \"     Now, though, she has more swelling and pain. \"It's bothering me so I felt I'd better get it over with. \"         Objective:   Vital Signs: (As obtained by patient/caregiver at home)  There were no vitals taken for this visit.      [INSTRUCTIONS:  \"[x]\" Indicates a positive item  \"[]\" Indicates a negative item  -- DELETE ALL ITEMS NOT EXAMINED]    Constitutional: [x] Appears well-developed and well-nourished [x] No apparent distress      [] Abnormal -     Mental status: [x] Alert and awake  [x] Oriented to person/place/time [x] Able to follow commands    [] Abnormal -     Eyes:   EOM    [x]  Normal    [] Abnormal -   Sclera  [x]  Normal    [] Abnormal -          Discharge [x]  None visible   [] Abnormal -     HENT: [x] Normocephalic, atraumatic  [] Abnormal -   [x] Mouth/Throat: Mucous membranes are moist    External Ears [x] Normal  [] Abnormal -    Neck: [x] No visualized mass [] Abnormal -     Pulmonary/Chest: [x] Respiratory effort normal   [x] No visualized signs of difficulty breathing or respiratory distress        [] Abnormal -      Musculoskeletal:   [x] Normal gait with no signs of ataxia         [x] Normal range of motion of neck        [] Abnormal -     Neurological:        [x] No Facial Asymmetry (Cranial nerve 7 motor function) (limited exam due to video visit)          [x] No gaze palsy        [] Abnormal -          Skin:        [x] No significant exanthematous lesions or discoloration noted on facial skin         [] Abnormal -            Psychiatric:       [x] Normal Affect [] Abnormal -        [x] No Hallucinations    Other pertinent observable physical exam findings:-        We discussed the expected course, resolution and complications of the diagnosis(es) in detail. Medication risks, benefits, costs, interactions, and alternatives were discussed as indicated. I advised her to contact the office if her condition worsens, changes or fails to improve as anticipated. She expressed understanding with the diagnosis(es) and plan. Kim Mcneill is a 69 y.o. female who was evaluated by a video visit encounter for concerns as above. Patient identification was verified prior to start of the visit. A caregiver was present when appropriate. Due to this being a TeleHealth encounter (During PEDTV-35 public health emergency), evaluation of the following organ systems was limited: Vitals/Constitutional/EENT/Resp/CV/GI//MS/Neuro/Skin/Heme-Lymph-Imm.   Pursuant to the emergency declaration under the [facility], [facility] waiver authority and the [employer] Act, this Virtual  Visit was conducted, with patient's (and/or legal guardian's) consent, to reduce the patient's risk of exposure to COVID-19 and provide necessary medical care. Services were provided through a video synchronous discussion virtually to substitute for in-person clinic visit. Patient and provider were located at their individual homes.       Jesus Akhtar MD

## 2020-06-09 NOTE — PROGRESS NOTES
Chief Complaint   Patient presents with    Follow-up    Irregular Heart Beat    Pacemaker Check       1. Have you been to the ER, urgent care clinic since your last visit? Hospitalized since your last visit? No    2. Have you seen or consulted any other health care providers outside of the [address] [place] [address] since your last visit? Include any pap smears or colon screening.   No

## 2020-06-09 NOTE — LETTER
6/9/20 Patient: Selena Husbands YOB: 1950 Date of Visit: 6/9/2020 Cosme Melendrez MD 
[address] [address] VIA In Basket Dear Cosme Melendrez MD, Thank you for referring Ms. Alex Chamberlain to [facility] for evaluation. My notes for this consultation are attached. If you have questions, please do not hesitate to call me. I look forward to following your patient along with you. Sincerely, Julián Sims MD

## 2020-06-10 ENCOUNTER — HOSPITAL ENCOUNTER (OUTPATIENT)
Dept: PHYSICAL THERAPY | Age: 70
End: 2020-06-10
Payer: MEDICARE

## 2020-06-11 ENCOUNTER — TELEPHONE (OUTPATIENT)
Dept: NEUROLOGY | Age: 70
End: 2020-06-11

## 2020-06-12 DIAGNOSIS — E55.9 VITAMIN D DEFICIENCY: ICD-10-CM

## 2020-06-12 RX ORDER — ERGOCALCIFEROL 1.25 MG/1
CAPSULE ORAL
Qty: 4 CAP | Refills: 0 | Status: SHIPPED | OUTPATIENT
Start: 2020-06-12 | End: 2020-07-03

## 2020-06-12 RX ORDER — PRIMIDONE 50 MG/1
TABLET ORAL
Qty: 45 TAB | Refills: 5 | Status: SHIPPED | OUTPATIENT
Start: 2020-06-12 | End: 2021-03-05 | Stop reason: SINTOL

## 2020-06-17 ENCOUNTER — APPOINTMENT (OUTPATIENT)
Dept: PHYSICAL THERAPY | Age: 70
End: 2020-06-17
Payer: MEDICARE

## 2020-06-22 ENCOUNTER — OFFICE VISIT (OUTPATIENT)
Dept: SURGERY | Age: 70
End: 2020-06-22

## 2020-06-22 VITALS
RESPIRATION RATE: 18 BRPM | OXYGEN SATURATION: 95 % | BODY MASS INDEX: 38.74 KG/M2 | HEART RATE: 81 BPM | DIASTOLIC BLOOD PRESSURE: 85 MMHG | HEIGHT: 65 IN | TEMPERATURE: 98.8 F | SYSTOLIC BLOOD PRESSURE: 142 MMHG | WEIGHT: 232.5 LBS

## 2020-06-22 DIAGNOSIS — K42.9 UMBILICAL HERNIA WITHOUT OBSTRUCTION AND WITHOUT GANGRENE: Primary | ICD-10-CM

## 2020-06-22 NOTE — PROGRESS NOTES
Kim Mcneill is a 69 y.o. female who is referred by Dr. Brody Tejada for further evaluation of an umbilical hernia. Ms. Aurora Valdivia tells me that she has had an abdominal wall bulge at her umbilicus for approximately two years now. The bulge has become progressively larger and more bothersome to her. Associated nausea and vomitting. Found to have an umbilical hernia. She has otherwise been in her usual state of health.      Past Medical History:   Diagnosis Date    Arrhythmia 2014    atrial fibrillation 2014, sick sinus syndrome: Heart [facility]o    Arthritis     Bipolar affective disorder ([address] 3/31/2010    Chronic pain 2018    right shoulder    Colon polyps 03/31/2010    also 5/2018: colon polyps    Depression     Diarrhea 07/19/2013    D/t alpha gal allergy says patient    Epigastric pain 6/5/2015    GERD (gastroesophageal reflux disease)     occasiional only; controlled with med    Hemispheric carotid artery syndrome No stroke    Neuro: Clausse Bobo  ([facility] head[facility] 4/[facility])    Hyperlipidemia 3/31/2010    Hypothyroidism 3/31/2010    Long term current use of anticoagulant therapy     Lupus ([address]     Migraine 03/31/2010    has them x2 a month: last one 5/23/2018    Psychotic disorder ([address]     S/P ablation of atrial fibrillation 05/13/2014    S/P cardiac pacemaker procedure 9/16/2014 9/16/14 Medtronic MRI compatible dual chamber pacemaker implant    Sleep apnea     unable to tolerate CPAP    Stool color black     Thyroid disease     Tick-borne disease     Alpha Gal allergy: no eat pork, beef, milk (Meat allergy showed up on a allergy test)    Umbilical hernia without obstruction and without gangrene 6/22/2020    Urge incontinence 3/31/2010    Vitamin D deficiency 3/31/2015     Past Surgical History:   Procedure Laterality Date    CARDIAC SURG PROCEDURE UNLIST  5/2014    ablation    COLONOSCOPY N/A 4/27/2018    COLONOSCOPY performed by Areli Foreman MD at [address]  4/27/2018         COLONOSCOPY,REMV LESN,[facility]ARE  4/27/2018         HX BUNIONECTOMY      HX GI  2010, 5/2018    egd, colonoscopy    HX HYSTERECTOMY      HX ORTHOPAEDIC  2000's    removed bone spurs from R & L hand    HX ORTHOPAEDIC  1990s? left bunionectomy     HX OTHER SURGICAL  07/2014    2 shocks to heart & ablations    HX PACEMAKER  2014    On the right side    HX PACEMAKER PLACEMENT  2015    HX TONSILLECTOMY  15 yrs old     Family History   Problem Relation Age of Onset    Arrhythmia Mother         afib    Stroke Mother     Heart Failure Mother     Colon Cancer Father     Heart Disease Father     Arrhythmia Brother         afib    Asthma Brother     COPD Brother      Social History     Socioeconomic History    Marital status:      Spouse name: Not on file    Number of children: Not on file    Years of education: Not on file    Highest education level: Not on file   Tobacco Use    Smoking status: Never Smoker    Smokeless tobacco: Never Used   Substance and Sexual Activity    Alcohol use: Yes     Alcohol/week: 1.0 standard drinks     Types: 1 Cans of beer per week     Frequency: Monthly or less     Comment: weekly    Drug use: Yes     Types: Prescription    Sexual activity: Never     Review of systems negative except as noted. Review of Systems   Constitutional: Negative for chills and fever. Gastrointestinal: Positive for abdominal pain, diarrhea, heartburn, nausea and vomiting. Genitourinary: Positive for frequency. Musculoskeletal: Positive for joint pain and myalgias. Neurological: Positive for headaches. Psychiatric/Behavioral: Positive for depression. The patient is nervous/anxious. Physical Exam  Vitals signs reviewed. Constitutional:       General: She is not in acute distress. Appearance: Normal appearance. She is obese. HENT:      Head: Normocephalic and atraumatic.    Eyes:      General: No scleral icterus. Neck:      Musculoskeletal: Neck supple. Cardiovascular:      Rate and Rhythm: Normal rate and regular rhythm. Pulmonary:      Effort: Pulmonary effort is normal.      Breath sounds: Normal breath sounds. Abdominal:      General: There is no distension. Palpations: Abdomen is soft. Tenderness: There is no abdominal tenderness. There is no guarding or rebound. Hernia: A hernia is present. Hernia is present in the umbilical area (Reducible. ). Musculoskeletal: Normal range of motion. Lymphadenopathy:      Cervical: No cervical adenopathy. Skin:     Comments: Well healed abdominal wall scars. Neurological:      General: No focal deficit present. Mental Status: She is alert. ASSESSMENT and PLAN  Ms. Neida Antonio is a 69 y.o. female with an umbilical hernia. She should benefit from repair since the hernia is symptomatic. I discussed umbilical hernia repair, possibly with mesh, with her today including the potential risks of bleeding, infection and hernia recurrence. She understands and wishes to proceed. Ms. Neida Antonio will contact the office to schedule surgery or if any questions or concerns arise. She will need to stop coumadin five days prior to surgery. Activity as tolerated for now. Follow up with Dr. Bibiana Garg as scheduled.       CC: Jalyn Leon MD

## 2020-06-23 ENCOUNTER — HOSPITAL ENCOUNTER (OUTPATIENT)
Dept: PHYSICAL THERAPY | Age: 70
Discharge: HOME | End: 2020-06-23
Payer: MEDICARE

## 2020-06-23 PROCEDURE — 97110 THERAPEUTIC EXERCISES: CPT | Performed by: PHYSICAL THERAPIST

## 2020-06-23 PROCEDURE — 97162 PT EVAL MOD COMPLEX 30 MIN: CPT | Performed by: PHYSICAL THERAPIST

## 2020-06-23 NOTE — PROGRESS NOTES
[facility]  [address] ([address] [place]), [address] [place]  Phone: [phone] Fax: [phone]     Plan of Care/Statement of Necessity for Physical Therapy Services  2-15    Patient name: Maria E Tyson  : 1950  Provider#: [id number]  Referral source: Home Wolfe MD      Medical/Treatment Diagnosis: Low back pain [M54.5]     Prior Hospitalization: see medical history     Comorbidities: allergies, anxiety, arthritis, back pain, BMI over 30, depression, GI issues, headaches, hearing impariment, incontinence, pacemaker, prior surgery (right total reverse shoulder arthroplasty), visual impairment  Prior Level of Function: 20 min of exercise seldom or never  Medications: Verified on Patient Summary List  Start of Care: 20      Onset Date: 2020   The [address] [place] [address] and following information is based on the information from the initial evaluation. Assessment/ key information: The patient presents with a chief complaint of lumbar pain (significant muscle spasm causing the patient to fall to her knees while walking across her kitchen two months ago), which has improved but is still lingering, most likely due to underlying lumbar DDD. The patient has significantly decreased lumbar and hip mobility, along with decreased core and hip/glute strength. She has scoliosis, causing an elevated right iliac crest/longer right leg, significantly altering her gait and pelvic alignment. She was given a small heel lift to try in the left shoe, which immediately improved her gait and may assist with lumbar pain. As of note, she also has a \"torn tendon\" in her left ankle/foot, which limits functional mobility overall and exacerbates her lumbar pain.  The patient will benefit from guided therapeutic interventions such as therex for strengthening and neuromuscular re-eduction, manual techniques for joint mobility and soft tissue extensibility, and modalities for pain management in order to improve functional mobility with daily activities. Evaluation Complexity History HIGH Complexity :3+ comorbidities / personal factors will impact the outcome/ POC ; Examination MEDIUM Complexity : 3 Standardized tests and measures addressing body structure, function, activity limitation and / or participation in recreation  ;Presentation MEDIUM Complexity : Evolving with changing characteristics  ; Clinical Decision Making MEDIUM Complexity : FOTO score of 26-74  Overall Complexity Rating: MEDIUM    Problem List: pain affecting function, decrease ROM, decrease strength, edema affecting function, impaired gait/ balance, decrease ADL/ functional abilitiies, decrease activity tolerance, decrease flexibility/ joint mobility and decrease transfer abilities   Treatment Plan may include any combination of the following: Therapeutic exercise, Therapeutic activities, Neuromuscular re-education, Physical agent/modality, Gait/balance training, Manual therapy, Patient education, Self Care training, Functional mobility training, Home safety training and Stair training  Patient / Family readiness to learn indicated by: asking questions, trying to perform skills and interest  Persons(s) to be included in education: patient (P)  Barriers to Learning/Limitations: None  Patient Goal (s): reduce the pain  Patient Self Reported Health Status: good  Rehabilitation Potential: good    Short Term Goals: To be accomplished in 2 treatments:                         1.) The patient will be independent with their HEP consistently for at least one week. Long Term Goals: To be accomplished in 16 treatments:                         1.) The patient will have at most 2/10 pain with daily activities. 2.) The patient will be able to ambulate for at least 15 min without having to change positions due to pain.                          3.) The patient will improve their FOTO score from 44 to at least 50 to show improvements in functional mobility. Frequency / Duration: Patient to be seen 2 times per week for 16 treatments. Patient/ Caregiver education and instruction: self care, activity modification and exercises    [x]  Plan of care has been reviewed with PTA        Certification Period: 6/23/20-9/23/20  Singh Morales, PT 6/23/2020     ________________________________________________________________________    I certify that the above Therapy Services are being furnished while the patient is under my care. I agree with the treatment plan and certify that this therapy is necessary.     [de-identified] Signature:____________________  Date:____________Time: _________

## 2020-06-23 NOTE — PROGRESS NOTES
PT INITIAL EVALUATION NOTE - [facility] 2-15    Patient Name: Haydee Sullivan  Date:2020  : 1950  [x]  Patient  Verified  Payor: [place] MEDICARE / Plan: [place] MEDICARE PART A & B / Product Type: Medicare /    In time: 3:18pm  Out time: 4:20pm  Total Treatment Time (min): 62  Total Timed Codes (min): 25  1:1 Treatment Time ( [address] only): 25   Visit #: 1     Treatment Area: Low back pain [M54.5]    SUBJECTIVE  Pain Level (0-10 scale): 3 (0-9/10)  Any medication changes, allergies to medications, adverse drug reactions, diagnosis change, or new procedure performed?: [] No    [x] Yes (see summary sheet for update)  Subjective: The patient reports that she has a torn tendon in the left foot, about a year ago. She also has a reverse shoulder replacement a few years ago, but still has pain with that. She has low back pain that started with spasms while walking across the kitchen floor about 2 months ago, which eased up, but left her with back pain. Heat helps. Doing housework or being on her feet for 5-10 minutes will irritate, and she has to use a pillow in the car. Changing her sheets and playing with her beagle/luis e mix is very painful.       OBJECTIVE    Posture:  Scapular protraction bilaterally  Other Observations:  Scoliosis; right elevated iliac crest; SLS: R=1s; L=1s  Gait and Functional Mobility:  Left trunk lean; decreased clearance bilaterally (left worse than right)  Palpation: not tender        Lumbar AROM:          R  L    Flexion    80%      Extension   limited      Side Bending   Limited more than left due to iliac crest    Rotation   Limited bilaterally          LOWER QUARTER   MUSCLE STRENGTH  KEY       R  L  0 - No Contraction  L1, L2 Psoas  5  5  1 - Trace   L3 Quads  5  5  2 - Poor   L4 Tib Ant  5  5  3 - Fair    L5 EHL  5  5  4 - Good   S1 Peroneals  5  5  5 - Normal   S2 Hams  5  5    Flexibility: tight hamstrings and calves  Mobility Assessment: hypomobile hip IR and lumbar spine      MMT: HIP Abd: 3-/5 bilaterally  Neurological: Reflexes / Sensations: WFL  Special Tests:   Long Sit: + for right longer leg (scoliosis)    25 min Therapeutic Exercise:  [x] See flow sheet :   Rationale: increase ROM, increase strength and improve coordination to improve the patients ability to perform daily activities.           With   [x] TE   [] TA   [] neuro   [] other: Patient Education: [x] Review HEP    [x] Progressed/Changed HEP based on:   [x] positioning   [x] body mechanics   [] transfers   [] heat/ice application    [] other:      Other Objective/Functional Measures: FOTO=44    Pain Level (0-10 scale) post treatment: 2    ASSESSMENT/Changes in Function:     [x]  See Plan of [facility], PT 6/23/2020

## 2020-06-26 RX ORDER — BUPIVACAINE HYDROCHLORIDE 2.5 MG/ML
30 INJECTION, SOLUTION EPIDURAL; INFILTRATION; INTRACAUDAL ONCE
Status: CANCELLED | OUTPATIENT
Start: 2020-06-26 | End: 2020-06-26

## 2020-06-26 RX ORDER — ACETAMINOPHEN 325 MG/1
1000 TABLET ORAL ONCE
Status: CANCELLED | OUTPATIENT
Start: 2020-06-26 | End: 2020-06-27

## 2020-06-29 ENCOUNTER — ANTI-COAG VISIT (OUTPATIENT)
Dept: CARDIOLOGY CLINIC | Age: 70
End: 2020-06-29

## 2020-06-29 DIAGNOSIS — Z79.01 LONG TERM (CURRENT) USE OF ANTICOAGULANTS: ICD-10-CM

## 2020-06-29 DIAGNOSIS — I48.0 PAROXYSMAL ATRIAL FIBRILLATION (HCC): Primary | ICD-10-CM

## 2020-06-29 LAB
INR BLD: 2.9 (ref 1–1.5)
PT POC: 35.1 SECONDS (ref 9.1–12)
VALID INTERNAL CONTROL?: YES

## 2020-06-29 NOTE — PROGRESS NOTES
A full discussion of the nature of anticoagulants has been carried out. A benefit risk analysis has been presented to the patient, so that they understand the justification for choosing anticoagulation at this time. The need for frequent and regular monitoring, precise dosage adjustment and compliance is stressed. Side effects of potential bleeding are discussed. The patient should avoid any OTC items containing aspirin, naproxen or ibuprofen, and should avoid great swings in general diet. Avoid alcohol consumption. Advised to notify the office if antibiotic or steroid therapy is initiated. Call if any signs of abnormal bleeding are present.   Next PT/INR test in 1 month

## 2020-06-30 ENCOUNTER — APPOINTMENT (OUTPATIENT)
Dept: PHYSICAL THERAPY | Age: 70
End: 2020-06-30
Payer: MEDICARE

## 2020-07-02 ENCOUNTER — HOSPITAL ENCOUNTER (OUTPATIENT)
Dept: PHYSICAL THERAPY | Age: 70
Discharge: HOME | End: 2020-07-02
Payer: MEDICARE

## 2020-07-02 DIAGNOSIS — E55.9 VITAMIN D DEFICIENCY: ICD-10-CM

## 2020-07-02 PROCEDURE — 97110 THERAPEUTIC EXERCISES: CPT

## 2020-07-02 NOTE — PROGRESS NOTES
PT DAILY TREATMENT NOTE - [facility] 2-15    Patient Name: Joleen Chauhan  Date:2020  : 1950  [x]  Patient  Verified  Payor: [place] MEDICARE / Plan: [place] MEDICARE PART A & B / Product Type: Medicare /    In time:310  Out time:400  Total Treatment Time (min): 50  Total Timed Codes (min): 50  1:1 Treatment Time ([address] only): 50   Visit #:  2    Treatment Area: Low back pain [M54.5]    SUBJECTIVE  Pain Level (0-10 scale): 9/10  Any medication changes, allergies to medications, adverse drug reactions, diagnosis change, or new procedure performed?: [x] No    [] Yes (see summary sheet for update)  Subjective functional status/changes:   [] No changes reported  Patient reports she has been dizzy today and feels very off balance. OBJECTIVE    50 min Therapeutic Exercise:  [x] See flow sheet :   Rationale: increase ROM and increase strength to improve the patients ability to perform ADLs and reduce pain levels          With   [] TE   [] TA   [] neuro   [] other: Patient Education: [x] Review HEP    [] Progressed/Changed HEP based on:   [] positioning   [] body mechanics   [] transfers   [] heat/ice application    [] other:      Other Objective/Functional Measures: none noted     Pain Level (0-10 scale) post treatment: 4/10    ASSESSMENT/Changes in Function:   Attempted eplis maneuver with no increased dizziness on either side. Tolerated all therex well, will continue to progress as tolerated. Patient will continue to benefit from skilled PT services to modify and progress therapeutic interventions, address functional mobility deficits, address ROM deficits, address strength deficits, analyze and address soft tissue restrictions, analyze and cue movement patterns, analyze and modify body mechanics/ergonomics and assess and modify postural abnormalities to attain remaining goals.      [x]  See Plan of Care  []  See progress note/recertification  []  See Discharge Summary         Progress towards goals / Updated goals:  Patient is progressing towards goals.      PLAN  [x]  Upgrade activities as tolerated     [x]  Continue plan of care  [x]  Update interventions per flow sheet       []  Discharge due to:_  []  Other:_      Ashley Min 7/2/2020

## 2020-07-03 RX ORDER — ERGOCALCIFEROL 1.25 MG/1
CAPSULE ORAL
Qty: 4 CAP | Refills: 0 | Status: SHIPPED | OUTPATIENT
Start: 2020-07-03 | End: 2020-07-26

## 2020-07-07 ENCOUNTER — APPOINTMENT (OUTPATIENT)
Dept: PHYSICAL THERAPY | Age: 70
End: 2020-07-07
Payer: MEDICARE

## 2020-07-09 ENCOUNTER — TELEPHONE (OUTPATIENT)
Dept: CARDIOLOGY CLINIC | Age: 70
End: 2020-07-09

## 2020-07-09 ENCOUNTER — HOSPITAL ENCOUNTER (OUTPATIENT)
Dept: PHYSICAL THERAPY | Age: 70
End: 2020-07-09
Payer: MEDICARE

## 2020-07-09 NOTE — TELEPHONE ENCOUNTER
----- Message from Jarrod Bryant ANP sent at 7/7/2020  4:24 PM EDT -----  Pls let her know that her EF is normal.

## 2020-07-13 ENCOUNTER — APPOINTMENT (OUTPATIENT)
Dept: PHYSICAL THERAPY | Age: 70
End: 2020-07-13
Payer: MEDICARE

## 2020-07-15 ENCOUNTER — HOSPITAL ENCOUNTER (OUTPATIENT)
Dept: PHYSICAL THERAPY | Age: 70
Discharge: HOME | End: 2020-07-15
Payer: MEDICARE

## 2020-07-15 PROCEDURE — 97110 THERAPEUTIC EXERCISES: CPT

## 2020-07-15 NOTE — PROGRESS NOTES
PT DAILY TREATMENT NOTE - [facility] 2-15    Patient Name: Rasheeda Theodore  Date:7/15/2020  : 1950  [x]  Patient  Verified  Payor: [place] MEDICARE / Plan: VA MEDICARE PART A & B / Product Type: Medicare /    In time:230  Out time:317  Total Treatment Time (min): 47  Total Timed Codes (min): 47  1:1 Treatment Time ( [address] only): 52   Visit #:  3    Treatment Area: Low back pain [M54.5]    SUBJECTIVE  Pain Level (0-10 scale): 5/10  Any medication changes, allergies to medications, adverse drug reactions, diagnosis change, or new procedure performed?: [x] No    [] Yes (see summary sheet for update)  Subjective functional status/changes:   [] No changes reported  Patient reports she continues to experience her dizzy spells and is not able to control it. She did have a migraine which lasted two weeks. OBJECTIVE    47 min Therapeutic Exercise:  [x] See flow sheet :   Rationale: increase ROM and increase strength to improve the patients ability to perform ADLs and reduce pain levels          With   [] TE   [] TA   [] neuro   [] other: Patient Education: [x] Review HEP    [] Progressed/Changed HEP based on:   [] positioning   [] body mechanics   [] transfers   [] heat/ice application    [] other:      Other Objective/Functional Measures: none noted     Pain Level (0-10 scale) post treatment: 210    ASSESSMENT/Changes in Function:   Continue to focus on non standing therex due to dizziness. Patient tolerated all interventions well, will continue to progress as tolerated. Patient will continue to benefit from skilled PT services to modify and progress therapeutic interventions, address functional mobility deficits, address ROM deficits, address strength deficits, analyze and address soft tissue restrictions, analyze and cue movement patterns, analyze and modify body mechanics/ergonomics and assess and modify postural abnormalities to attain remaining goals.      [x]  See Plan of Care  []  See progress note/recertification  []  See Discharge Summary         Progress towards goals / Updated goals:  Patient is progressing towards goals.       PLAN  [x]  Upgrade activities as tolerated     [x]  Continue plan of care  [x]  Update interventions per flow sheet       []  Discharge due to:_  []  Other:_      Sky Collins 7/15/2020

## 2020-07-19 DIAGNOSIS — L29.9 PRURITUS: ICD-10-CM

## 2020-07-20 RX ORDER — BETAMETHASONE VALERATE 1.2 MG/G
CREAM TOPICAL
Qty: 15 G | Refills: 0 | Status: SHIPPED | OUTPATIENT
Start: 2020-07-20 | End: 2020-12-30 | Stop reason: CLARIF

## 2020-07-21 ENCOUNTER — HOSPITAL ENCOUNTER (OUTPATIENT)
Dept: PHYSICAL THERAPY | Age: 70
Discharge: HOME | End: 2020-07-21
Payer: MEDICARE

## 2020-07-21 PROCEDURE — 97110 THERAPEUTIC EXERCISES: CPT | Performed by: PHYSICAL THERAPIST

## 2020-07-21 NOTE — PROGRESS NOTES
PT DAILY TREATMENT NOTE - [facility] 2-15    Patient Name: Ivania Schofield  Date:2020  : 1950  [x]  Patient  Verified  Payor: [place] MEDICARE / Plan: VA MEDICARE PART A & B / Product Type: Medicare /    In time: 8:45am  Out time: 9:42am  Total Treatment Time (min): 57  Total Timed Codes (min): 57  1:1 Treatment Time ([facility] only): 40   Visit #:  4    Treatment Area: Low back pain [M54.5]    SUBJECTIVE  Pain Level (0-10 scale): 5  Any medication changes, allergies to medications, adverse drug reactions, diagnosis change, or new procedure performed?: [x] No    [] Yes (see summary sheet for update)  Subjective functional status/changes:   [] No changes reported  The patient reports that she felt okay over the weekend but feels a little pain all over today. She got her left ear cleaned out (it was infected), and her right ear has a \"hole\" in it that will never heal, but she has to \"clear it out\" by holding her nose and blowing. OBJECTIVE    57 min Therapeutic Exercise:  [x] See flow sheet :   Rationale: increase ROM and increase strength to improve the patients ability to perform ADLs and reduce pain levels          With   [x] TE   [] TA   [] neuro   [] other: Patient Education: [x] Review HEP    [x] Progressed/Changed HEP based on:   [x] positioning   [x] body mechanics   [] transfers   [] heat/ice application    [] other:      Other Objective/Functional Measures: none noted     Pain Level (0-10 scale) post treatment: 2    ASSESSMENT/Changes in Function:   The patient is slowly progressing with strengthening overall. Patient will continue to benefit from skilled PT services to modify and progress therapeutic interventions, address functional mobility deficits, address ROM deficits, address strength deficits, analyze and address soft tissue restrictions, analyze and cue movement patterns, analyze and modify body mechanics/ergonomics and assess and modify postural abnormalities to attain remaining goals.      [x] See Plan of Care  []  See progress note/recertification  []  See Discharge Summary         Progress towards goals / Updated goals:  Patient is progressing towards goals.       PLAN  [x]  Upgrade activities as tolerated     [x]  Continue plan of care  [x]  Update interventions per flow sheet       []  Discharge due to:_  []  Other:_      Jerry Liang, PT 7/21/2020

## 2020-07-23 ENCOUNTER — APPOINTMENT (OUTPATIENT)
Dept: PHYSICAL THERAPY | Age: 70
End: 2020-07-23
Payer: MEDICARE

## 2020-07-23 NOTE — ANCILLARY DISCHARGE INSTRUCTIONS
[facility]  [address] ([place]), [address] [facility] [place]  Phone: [phone] Fax: [phone]    Discharge Summary 2-15    Patient name: Kev Padron  : 1950  Provider#: [id number]  Referral source: Sin Quiroz MD      Medical/Treatment Diagnosis: Low back pain [M54.5]     Prior Hospitalization: see medical history     Comorbidities: See Plan of Care  Prior Level of Function: See Plan of Care  Medications: Verified on Patient Summary List    Start of Care: 20      Onset Date: 2020   Visits from Start of Care: 4     Missed Visits: 2  Reporting Period : 20 to 20    Assessment/Summary of care: The patient called and wanted to be discharged right before her reassessment for her lumbar pain. Therapy was just starting to progress as she had been limited for the first few visits due to dizziness/ear infection/inner ear issues that inhibited her ability to performing standing exercises. It is uncertain why she wanted to be discharged, but she will need a new script if she wants to return, and has a progressive and safe HEP that she will continue to utilize to assist with her low back pain. Pt is discharged today, 2020, as they have stopped attending therapy. Final objective data and outcomes were unable to be obtained.       RECOMMENDATIONS:  [x]Discontinue therapy: []Patient has reached or is progressing toward set goals     [x]Patient is non-compliant or has abdicated     []Due to lack of appreciable progress towards set goals     []Other  [place] [facility], PT 2020

## 2020-07-25 DIAGNOSIS — E55.9 VITAMIN D DEFICIENCY: ICD-10-CM

## 2020-07-26 RX ORDER — ERGOCALCIFEROL 1.25 MG/1
CAPSULE ORAL
Qty: 4 CAP | Refills: 0 | Status: SHIPPED | OUTPATIENT
Start: 2020-07-26 | End: 2020-08-17

## 2020-07-28 ENCOUNTER — APPOINTMENT (OUTPATIENT)
Dept: PHYSICAL THERAPY | Age: 70
End: 2020-07-28
Payer: MEDICARE

## 2020-07-30 ENCOUNTER — APPOINTMENT (OUTPATIENT)
Dept: PHYSICAL THERAPY | Age: 70
End: 2020-07-30
Payer: MEDICARE

## 2020-08-11 ENCOUNTER — ANTI-COAG VISIT (OUTPATIENT)
Dept: CARDIOLOGY CLINIC | Age: 70
End: 2020-08-11
Payer: MEDICARE

## 2020-08-11 DIAGNOSIS — I48.0 PAROXYSMAL ATRIAL FIBRILLATION (HCC): ICD-10-CM

## 2020-08-11 DIAGNOSIS — Z79.01 LONG TERM (CURRENT) USE OF ANTICOAGULANTS: Primary | ICD-10-CM

## 2020-08-11 LAB
INR BLD: 2.4 (ref 1–1.5)
PT POC: 28.8 SECONDS (ref 9.1–12)
VALID INTERNAL CONTROL?: YES

## 2020-08-11 PROCEDURE — 85610 PROTHROMBIN TIME: CPT | Performed by: INTERNAL MEDICINE

## 2020-08-17 DIAGNOSIS — E55.9 VITAMIN D DEFICIENCY: ICD-10-CM

## 2020-08-17 RX ORDER — ERGOCALCIFEROL 1.25 MG/1
CAPSULE ORAL
Qty: 4 CAP | Refills: 0 | Status: SHIPPED | OUTPATIENT
Start: 2020-08-17 | End: 2020-09-08

## 2020-08-25 ENCOUNTER — TELEPHONE (OUTPATIENT)
Dept: INTERNAL MEDICINE CLINIC | Age: 70
End: 2020-08-25

## 2020-08-25 ENCOUNTER — VIRTUAL VISIT (OUTPATIENT)
Dept: INTERNAL MEDICINE CLINIC | Age: 70
End: 2020-08-25
Payer: MEDICARE

## 2020-08-25 DIAGNOSIS — E03.9 HYPOTHYROIDISM, UNSPECIFIED TYPE: ICD-10-CM

## 2020-08-25 DIAGNOSIS — L29.9 PRURITUS: ICD-10-CM

## 2020-08-25 DIAGNOSIS — K42.9 UMBILICAL HERNIA WITHOUT OBSTRUCTION AND WITHOUT GANGRENE: Primary | ICD-10-CM

## 2020-08-25 DIAGNOSIS — H26.9 CATARACT OF BOTH EYES, UNSPECIFIED CATARACT TYPE: ICD-10-CM

## 2020-08-25 PROCEDURE — 99441 PR PHYS/QHP TELEPHONE EVALUATION 5-10 MIN: CPT | Performed by: INTERNAL MEDICINE

## 2020-08-25 RX ORDER — HYDROXYZINE 25 MG/1
TABLET, FILM COATED ORAL
Qty: 90 TAB | Refills: 2 | Status: SHIPPED | OUTPATIENT
Start: 2020-08-25 | End: 2020-09-17 | Stop reason: SDUPTHER

## 2020-08-25 RX ORDER — LEVOTHYROXINE SODIUM 50 UG/1
TABLET ORAL
Qty: 90 TAB | Refills: 3 | Status: SHIPPED | OUTPATIENT
Start: 2020-08-25 | End: 2021-09-20

## 2020-08-25 NOTE — PROGRESS NOTES
Sandra Blanco is a 69 y.o. female, evaluated via audio-only technology on 8/25/2020 for Medication Refill (med refill & pt want to discuss umbilical hernia surg & cataract surg )  . Assessment & Plan:   Diagnoses and all orders for this visit:    1. Umbilical hernia without obstruction and without gangrene    2. Pruritus  -     hydrOXYzine HCL (ATARAX) 25 mg tablet; TAKE 1 TAB BY MOUTH THREE (3) TIMES DAILY AS NEEDED FOR ITCHING FOR UP TO 10 DAYS. -     REFERRAL TO DERMATOLOGY    3. Cataract of both eyes, unspecified cataract type    4. Hypothyroidism, unspecified type  -     levothyroxine (SYNTHROID) 50 mcg tablet; TAKE 1 TABLET BY MOUTH EVERY DAY      Follow-up and Dispositions    · Return if symptoms worsen or fail to improve. 12  Subjective:     Chief Complaint   Patient presents with    Medication Refill     med refill & pt want to discuss umbilical hernia surg & cataract surg      She went to see surgeon for a second opinion on the umbilical hernia, \"he was nice but it was not [facility], it wasn't home. \"  She is planning to reach out to her first surgeon soon. She has ophthalmologist, Dr. Santa Sargent, planning to get extraction, possibly in October. Foot surgery--plans to get a second opinion. Prior to Admission medications    Medication Sig Start Date End Date Taking? Authorizing Provider   levothyroxine (SYNTHROID) 50 mcg tablet TAKE 1 TABLET BY MOUTH EVERY DAY 8/25/20  Yes Gavino Moreno MD   ergocalciferol (ERGOCALCIFEROL) 1,250 mcg (50,000 unit) capsule TAKE 1 CAPSULE BY MOUTH ONE TIME PER WEEK 8/17/20  Yes Gavino Moreno MD   betamethasone valerate (VALISONE) 0.1 % topical cream APPLY TO AFFECTED AREA TWICE A DAY 7/20/20  Yes Gavino Moreno MD   primidone (MYSOLINE) 50 mg tablet TAKE 1/2 TABLET BY MOUTH NIGHTLY 6/12/20  Yes Sarah Alegria MD   latanoprost (XALATAN) 0.005 % ophthalmic solution Administer 1 Drop to both eyes two (2) times a day.  4/26/20  Yes Provider, Historical nitrofurantoin, macrocrystal-monohydrate, (MACROBID) 100 mg capsule nitrofurantoin monohydrate/macrocrystals 100 mg capsule   Yes Provider, Historical   trimethoprim (TRIMPEX) 100 mg tablet TAKE 1 TABLET BY MOUTH EVERY DAY 3/25/20  Yes Provider, Historical   zolpidem (AMBIEN) 10 mg tablet zolpidem 10 mg tablet   Yes Provider, Historical   pregabalin (LYRICA) 100 mg capsule TAKE 1 CAPSULE BY MOUTH TWICE A DAY 5/22/20  Yes Sarah Alegria MD   warfarin (COUMADIN) 5 mg tablet Take 3 pills on Tue, Fri and 2 pills all other days. 5/12/20  Yes Kristen Sheth ANP   tiZANidine (ZANAFLEX) 2 mg tablet Take 1 Tab by mouth three (3) times daily. 4/28/20  Yes Gavino Moreno MD   hydrOXYzine HCL (ATARAX) 25 mg tablet TAKE 1 TAB BY MOUTH THREE (3) TIMES DAILY AS NEEDED FOR ITCHING FOR UP TO 10 DAYS. 4/16/20  Yes Gavino Moreno MD   topiramate (Topamax) 100 mg tablet Take 1 Tab by mouth two (2) times a day. 4/7/20  Yes Sarah Alegria MD   lithium 300 mg tablet Take 300 mg by mouth three (3) times daily. Yes Provider, Historical   alprazolam (XANAX) 0.5 mg tablet Take 0.5 mg by mouth nightly. 3/23/10  Yes Provider, Historical   diphenoxylate-atropine (LOMOTIL) 2.5-0.025 mg per tablet Take 1 Tab by mouth two (2) times daily as needed for Diarrhea. Provider, Historical       No flowsheet data found. Sandra Blanco, who was evaluated through a patient-initiated, synchronous (real-time) audio only encounter, and/or her healthcare decision maker, is aware that it is a billable service, with coverage as determined by her insurance carrier. She provided verbal consent to proceed: Yes. She has not had a related appointment within my department in the past 7 days or scheduled within the next 24 hours.       Total Time: minutes: 5-10 minutes    Rohith Cornejo MD

## 2020-09-02 ENCOUNTER — TELEPHONE (OUTPATIENT)
Dept: INTERNAL MEDICINE CLINIC | Age: 70
End: 2020-09-02

## 2020-09-02 NOTE — TELEPHONE ENCOUNTER
#[phone] pt has an allergy from a tick bite and pt can't eat red meat or pork. 12 days ago she took a small bite of mejia and has been very sick every since that bite. Phone cut off so I didn't get any further reason. Pt needs to be seen virtually as soon as possible she states. Can you work her in tomorrow?

## 2020-09-03 ENCOUNTER — VIRTUAL VISIT (OUTPATIENT)
Dept: INTERNAL MEDICINE CLINIC | Age: 70
End: 2020-09-03

## 2020-09-03 NOTE — TELEPHONE ENCOUNTER
Patient requesting VV to discuss  Alpha-gal syndrome. States she ate meat & this episode has lasted 3 weeks.  Appt confirmed for today at 1115 with Jaden Rand MD.

## 2020-09-04 ENCOUNTER — TELEPHONE (OUTPATIENT)
Dept: INTERNAL MEDICINE CLINIC | Age: 70
End: 2020-09-04

## 2020-09-04 ENCOUNTER — VIRTUAL VISIT (OUTPATIENT)
Dept: INTERNAL MEDICINE CLINIC | Age: 70
End: 2020-09-04
Payer: MEDICARE

## 2020-09-04 DIAGNOSIS — R53.81 MALAISE: ICD-10-CM

## 2020-09-04 DIAGNOSIS — Z91.018 ALLERGY TO MEAT: Primary | ICD-10-CM

## 2020-09-04 DIAGNOSIS — Z51.81 ENCOUNTER FOR LITHIUM MONITORING: ICD-10-CM

## 2020-09-04 DIAGNOSIS — Z79.899 ENCOUNTER FOR LITHIUM MONITORING: ICD-10-CM

## 2020-09-04 DIAGNOSIS — R73.9 HYPERGLYCEMIA: ICD-10-CM

## 2020-09-04 DIAGNOSIS — E66.01 MORBID OBESITY WITH BMI OF 40.0-44.9, ADULT (HCC): ICD-10-CM

## 2020-09-04 DIAGNOSIS — E03.9 HYPOTHYROIDISM, UNSPECIFIED TYPE: ICD-10-CM

## 2020-09-04 DIAGNOSIS — E55.9 VITAMIN D DEFICIENCY: ICD-10-CM

## 2020-09-04 DIAGNOSIS — E78.2 MIXED HYPERLIPIDEMIA: ICD-10-CM

## 2020-09-04 DIAGNOSIS — E53.8 B12 DEFICIENCY: ICD-10-CM

## 2020-09-04 PROCEDURE — 1090F PRES/ABSN URINE INCON ASSESS: CPT | Performed by: INTERNAL MEDICINE

## 2020-09-04 PROCEDURE — 99214 OFFICE O/P EST MOD 30 MIN: CPT | Performed by: INTERNAL MEDICINE

## 2020-09-04 PROCEDURE — G8427 DOCREV CUR MEDS BY ELIG CLIN: HCPCS | Performed by: INTERNAL MEDICINE

## 2020-09-04 PROCEDURE — 1101F PT FALLS ASSESS-DOCD LE1/YR: CPT | Performed by: INTERNAL MEDICINE

## 2020-09-04 PROCEDURE — G8536 NO DOC ELDER MAL SCRN: HCPCS | Performed by: INTERNAL MEDICINE

## 2020-09-04 PROCEDURE — G9717 DOC PT DX DEP/BP F/U NT REQ: HCPCS | Performed by: INTERNAL MEDICINE

## 2020-09-04 PROCEDURE — 3017F COLORECTAL CA SCREEN DOC REV: CPT | Performed by: INTERNAL MEDICINE

## 2020-09-04 PROCEDURE — G0463 HOSPITAL OUTPT CLINIC VISIT: HCPCS | Performed by: INTERNAL MEDICINE

## 2020-09-04 PROCEDURE — G8756 NO BP MEASURE DOC: HCPCS | Performed by: INTERNAL MEDICINE

## 2020-09-04 PROCEDURE — G8400 PT W/DXA NO RESULTS DOC: HCPCS | Performed by: INTERNAL MEDICINE

## 2020-09-04 PROCEDURE — G8417 CALC BMI ABV UP PARAM F/U: HCPCS | Performed by: INTERNAL MEDICINE

## 2020-09-04 NOTE — PROGRESS NOTES
Anais Corral is a 69 y.o. female who was seen by synchronous (real-time) audio-video technology on 9/4/2020 for Fatigue (pt concnered of alpha-gal syndrome ; pt c/o feeling weak/tired, headache, back pain, & diarrhea; today pt rates pain 6/10)      Assessment & Plan:   Diagnoses and all orders for this visit:    1. Allergy to meat: Presumably alpha galactosidase allergy.   -     REFERRAL TO ALLERGY    2. Malaise: Fatigue/malaise: Differential diagnosis for this is broad, and includes mood disorder, endocrine abnormalities such as hypothyroidism, anemia, ASHELY, inflammation, neoplasm, autoimmune disease, medication side effect (eg beta blocker). We will check appropriate labs. -     CBC WITH AUTOMATED DIFF  -     METABOLIC PANEL, COMPREHENSIVE  -     TSH 3RD GENERATION  -     SED RATE (ESR)    Keep future appt. Labs ordered in anticipation of future visit    Subjective:     SUBJECTIVE  Ms. Anais Corral presents today acutely for     Chief Complaint   Patient presents with    Fatigue     pt concnered of alpha-gal syndrome ; pt c/o feeling weak/tired, headache, back pain, & diarrhea; today pt rates pain 6/10     Symptoms started three weeks ago. \"We were with my grandchildren, and my grandson had mejia, and it looked so good. \" She took a tiny bite, and \"10 hours later I was sick as a dog. \"  She had nausea, diarrhea, body aches. \"I just felt bad. \" +Dizzy. \"Also, I had a migraine. \"    She still feels bad today. +Fatigue, malaise. \"But I'm starting to feel a little bit better. \"      She has had \"terrible diarrhea\" She took a pill that Dr. Albright Even gave her, and this helped with diarrhea-- Difenoxylate. She has had reactions to mejia and beef in the past.     She has seen allergist.     Prior to Admission medications    Medication Sig Start Date End Date Taking?  Authorizing Provider   levothyroxine (SYNTHROID) 50 mcg tablet TAKE 1 TABLET BY MOUTH EVERY DAY 8/25/20  Yes Dulce Baldwin MD   hydrOXYzine HCL (ATARAX) 25 mg tablet TAKE 1 TAB BY MOUTH THREE (3) TIMES DAILY AS NEEDED FOR ITCHING FOR UP TO 10 DAYS. 8/25/20  Yes Lexus Hagen MD   ergocalciferol (ERGOCALCIFEROL) 1,250 mcg (50,000 unit) capsule TAKE 1 CAPSULE BY MOUTH ONE TIME PER WEEK 8/17/20  Yes Lexus Hagen MD   betamethasone valerate (VALISONE) 0.1 % topical cream APPLY TO AFFECTED AREA TWICE A DAY 7/20/20  Yes Lexus Hagen MD   primidone (MYSOLINE) 50 mg tablet TAKE 1/2 TABLET BY MOUTH NIGHTLY 6/12/20  Yes Rohan Perdomo MD   latanoprost (XALATAN) 0.005 % ophthalmic solution Administer 1 Drop to both eyes two (2) times a day. 4/26/20  Yes Provider, Historical   nitrofurantoin, macrocrystal-monohydrate, (MACROBID) 100 mg capsule nitrofurantoin monohydrate/macrocrystals 100 mg capsule   Yes Provider, Historical   trimethoprim (TRIMPEX) 100 mg tablet TAKE 1 TABLET BY MOUTH EVERY DAY 3/25/20  Yes Provider, Historical   zolpidem (AMBIEN) 10 mg tablet zolpidem 10 mg tablet   Yes Provider, Historical   pregabalin (LYRICA) 100 mg capsule TAKE 1 CAPSULE BY MOUTH TWICE A DAY 5/22/20  Yes Rohan Perdomo MD   warfarin (COUMADIN) 5 mg tablet Take 3 pills on Tue, Fri and 2 pills all other days. 5/12/20  Yes Kristen Sheth ANP   tiZANidine (ZANAFLEX) 2 mg tablet Take 1 Tab by mouth three (3) times daily. 4/28/20  Yes Lexus Hgaen MD   topiramate (Topamax) 100 mg tablet Take 1 Tab by mouth two (2) times a day. 4/7/20  Yes Rohan Perdomo MD   lithium 300 mg tablet Take 300 mg by mouth three (3) times daily. Yes Provider, Historical   alprazolam (XANAX) 0.5 mg tablet Take 0.5 mg by mouth nightly. 3/23/10  Yes Provider, Historical   diphenoxylate-atropine (LOMOTIL) 2.5-0.025 mg per tablet Take 1 Tab by mouth two (2) times daily as needed for Diarrhea. Provider, Historical     SH:  reports that she has never smoked. She has never used smokeless tobacco. She reports current alcohol use of about 1.0 standard drinks of alcohol per week.  She reports current drug use. Drug: Prescription. ROS: Complete review of systems was performed and is otherwise unremarkable except as noted elsewhere. Objective:   No flowsheet data found. [INSTRUCTIONS:  \"[x]\" Indicates a positive item  \"[]\" Indicates a negative item  -- DELETE ALL ITEMS NOT EXAMINED]    Constitutional: [x] Appears well-developed and well-nourished [x] No apparent distress      [] Abnormal -     Mental status: [x] Alert and awake  [x] Oriented to person/place/time [x] Able to follow commands    [] Abnormal -     Eyes:   EOM    [x]  Normal    [] Abnormal -   Sclera  [x]  Normal    [] Abnormal -          Discharge [x]  None visible   [] Abnormal -     HENT: [x] Normocephalic, atraumatic  [] Abnormal -   [x] Mouth/Throat: Mucous membranes are moist    External Ears [x] Normal  [] Abnormal -    Neck: [x] No visualized mass [] Abnormal -     Pulmonary/Chest: [x] Respiratory effort normal   [x] No visualized signs of difficulty breathing or respiratory distress        [] Abnormal -      Musculoskeletal:   [x] Normal gait with no signs of ataxia         [x] Normal range of motion of neck        [] Abnormal -     Neurological:        [x] No Facial Asymmetry (Cranial nerve 7 motor function) (limited exam due to video visit)          [x] No gaze palsy        [] Abnormal -          Skin:        [x] No significant exanthematous lesions or discoloration noted on facial skin         [] Abnormal -            Psychiatric:       [x] Normal Affect [] Abnormal -        [x] No Hallucinations    Other pertinent observable physical exam findings:-        We discussed the expected course, resolution and complications of the diagnosis(es) in detail. Medication risks, benefits, costs, interactions, and alternatives were discussed as indicated. I advised her to contact the office if her condition worsens, changes or fails to improve as anticipated. She expressed understanding with the diagnosis(es) and plan.        Maurie Phlegm Carly Brooks, who was evaluated through a patient-initiated, synchronous (real-time) audio-video encounter, and/or her healthcare decision maker, is aware that it is a billable service, with coverage as determined by her insurance carrier. She provided verbal consent to proceed: Yes, and patient identification was verified. It was conducted pursuant to the emergency declaration under the [address] [place] [address] and the [employer] General Act. A caregiver was present when appropriate. Ability to conduct physical exam was limited. I was in the office. The patient was at home.       Alfred Zhou MD

## 2020-09-08 DIAGNOSIS — E55.9 VITAMIN D DEFICIENCY: ICD-10-CM

## 2020-09-08 RX ORDER — ERGOCALCIFEROL 1.25 MG/1
CAPSULE ORAL
Qty: 4 CAP | Refills: 0 | Status: SHIPPED | OUTPATIENT
Start: 2020-09-08 | End: 2020-11-18

## 2020-09-14 ENCOUNTER — OFFICE VISIT (OUTPATIENT)
Dept: CARDIOLOGY CLINIC | Age: 70
End: 2020-09-14
Payer: MEDICARE

## 2020-09-14 ENCOUNTER — ANTI-COAG VISIT (OUTPATIENT)
Dept: CARDIOLOGY CLINIC | Age: 70
End: 2020-09-14
Payer: MEDICARE

## 2020-09-14 DIAGNOSIS — I49.5 SSS (SICK SINUS SYNDROME) (HCC): ICD-10-CM

## 2020-09-14 DIAGNOSIS — Z95.0 CARDIAC PACEMAKER IN SITU: Primary | ICD-10-CM

## 2020-09-14 DIAGNOSIS — Z79.01 LONG TERM (CURRENT) USE OF ANTICOAGULANTS: Primary | ICD-10-CM

## 2020-09-14 DIAGNOSIS — I48.0 PAROXYSMAL ATRIAL FIBRILLATION (HCC): ICD-10-CM

## 2020-09-14 LAB
INR BLD: 2.2 (ref 1–1.5)
PT POC: 26.8 SECONDS (ref 9.1–12)
VALID INTERNAL CONTROL?: YES

## 2020-09-14 PROCEDURE — 85610 PROTHROMBIN TIME: CPT | Performed by: INTERNAL MEDICINE

## 2020-09-14 PROCEDURE — 93294 REM INTERROG EVL PM/LDLS PM: CPT | Performed by: INTERNAL MEDICINE

## 2020-09-14 PROCEDURE — 93296 REM INTERROG EVL PM/IDS: CPT | Performed by: INTERNAL MEDICINE

## 2020-09-17 DIAGNOSIS — L29.9 PRURITUS: ICD-10-CM

## 2020-09-18 RX ORDER — HYDROXYZINE 25 MG/1
TABLET, FILM COATED ORAL
Qty: 270 TAB | Refills: 2 | Status: SHIPPED | OUTPATIENT
Start: 2020-09-18 | End: 2021-01-21

## 2020-09-24 DIAGNOSIS — M79.7 FIBROMYALGIA: ICD-10-CM

## 2020-09-24 RX ORDER — PREGABALIN 100 MG/1
CAPSULE ORAL
Qty: 60 CAP | Refills: 5 | Status: SHIPPED | OUTPATIENT
Start: 2020-09-24 | End: 2021-03-29

## 2020-10-08 ENCOUNTER — HOSPITAL ENCOUNTER (OUTPATIENT)
Dept: LAB | Age: 70
Discharge: HOME | End: 2020-10-08
Payer: MEDICARE

## 2020-10-08 PROCEDURE — 36415 COLL VENOUS BLD VENIPUNCTURE: CPT

## 2020-10-08 PROCEDURE — 82306 VITAMIN D 25 HYDROXY: CPT

## 2020-10-08 PROCEDURE — 84443 ASSAY THYROID STIM HORMONE: CPT

## 2020-10-08 PROCEDURE — 80053 COMPREHEN METABOLIC PANEL: CPT

## 2020-10-08 PROCEDURE — 80061 LIPID PANEL: CPT

## 2020-10-08 PROCEDURE — 85651 RBC SED RATE NONAUTOMATED: CPT

## 2020-10-08 PROCEDURE — 80178 ASSAY OF LITHIUM: CPT

## 2020-10-08 PROCEDURE — 84439 ASSAY OF FREE THYROXINE: CPT

## 2020-10-08 PROCEDURE — 83036 HEMOGLOBIN GLYCOSYLATED A1C: CPT

## 2020-10-08 PROCEDURE — 85025 COMPLETE CBC W/AUTO DIFF WBC: CPT

## 2020-10-08 PROCEDURE — 82607 VITAMIN B-12: CPT

## 2020-10-09 ENCOUNTER — ANTI-COAG VISIT (OUTPATIENT)
Dept: CARDIOLOGY CLINIC | Age: 70
End: 2020-10-09
Payer: MEDICARE

## 2020-10-09 DIAGNOSIS — Z79.01 LONG TERM (CURRENT) USE OF ANTICOAGULANTS: Primary | ICD-10-CM

## 2020-10-09 DIAGNOSIS — I48.0 PAROXYSMAL ATRIAL FIBRILLATION (HCC): ICD-10-CM

## 2020-10-09 LAB
25(OH)D3+25(OH)D2 SERPL-MCNC: 32 NG/ML (ref 30–100)
ALBUMIN SERPL-MCNC: 4 G/DL (ref 3.7–4.7)
ALBUMIN/GLOB SERPL: 1.8 {RATIO} (ref 1.2–2.2)
ALP SERPL-CCNC: 74 IU/L (ref 39–117)
ALT SERPL-CCNC: 14 IU/L (ref 0–32)
AST SERPL-CCNC: 21 IU/L (ref 0–40)
BASOPHILS # BLD AUTO: 0.1 X10E3/UL (ref 0–0.2)
BASOPHILS NFR BLD AUTO: 1 %
BILIRUB SERPL-MCNC: 0.3 MG/DL (ref 0–1.2)
BUN SERPL-MCNC: 16 MG/DL (ref 8–27)
BUN/CREAT SERPL: 16 (ref 12–28)
CALCIUM SERPL-MCNC: 9.6 MG/DL (ref 8.7–10.3)
CHLORIDE SERPL-SCNC: 110 MMOL/L (ref 96–106)
CHOLEST SERPL-MCNC: 209 MG/DL (ref 100–199)
CO2 SERPL-SCNC: 23 MMOL/L (ref 20–29)
CREAT SERPL-MCNC: 0.99 MG/DL (ref 0.57–1)
EOSINOPHIL # BLD AUTO: 0.2 X10E3/UL (ref 0–0.4)
EOSINOPHIL NFR BLD AUTO: 4 %
ERYTHROCYTE [DISTWIDTH] IN BLOOD BY AUTOMATED COUNT: 13.7 % (ref 11.7–15.4)
ERYTHROCYTE [SEDIMENTATION RATE] IN BLOOD BY WESTERGREN METHOD: 2 MM/HR (ref 0–40)
EST. AVERAGE GLUCOSE BLD GHB EST-MCNC: 105 MG/DL
GLOBULIN SER CALC-MCNC: 2.2 G/DL (ref 1.5–4.5)
GLUCOSE SERPL-MCNC: 94 MG/DL (ref 65–99)
HBA1C MFR BLD: 5.3 % (ref 4.8–5.6)
HCT VFR BLD AUTO: 40.8 % (ref 34–46.6)
HDLC SERPL-MCNC: 40 MG/DL
HGB BLD-MCNC: 14.1 G/DL (ref 11.1–15.9)
IMM GRANULOCYTES # BLD AUTO: 0 X10E3/UL (ref 0–0.1)
IMM GRANULOCYTES NFR BLD AUTO: 0 %
INR BLD: 2.9 (ref 1–1.5)
LDLC SERPL CALC-MCNC: 118 MG/DL (ref 0–99)
LITHIUM SERPL-SCNC: 1.3 MMOL/L (ref 0.6–1.2)
LYMPHOCYTES # BLD AUTO: 1.3 X10E3/UL (ref 0.7–3.1)
LYMPHOCYTES NFR BLD AUTO: 29 %
MCH RBC QN AUTO: 30.7 PG (ref 26.6–33)
MCHC RBC AUTO-ENTMCNC: 34.6 G/DL (ref 31.5–35.7)
MCV RBC AUTO: 89 FL (ref 79–97)
MONOCYTES # BLD AUTO: 0.3 X10E3/UL (ref 0.1–0.9)
MONOCYTES NFR BLD AUTO: 8 %
NEUTROPHILS # BLD AUTO: 2.5 X10E3/UL (ref 1.4–7)
NEUTROPHILS NFR BLD AUTO: 58 %
PLATELET # BLD AUTO: 197 X10E3/UL (ref 150–450)
POTASSIUM SERPL-SCNC: 4.9 MMOL/L (ref 3.5–5.2)
PROT SERPL-MCNC: 6.2 G/DL (ref 6–8.5)
PT POC: 34.5 SECONDS (ref 9.1–12)
RBC # BLD AUTO: 4.59 X10E6/UL (ref 3.77–5.28)
SODIUM SERPL-SCNC: 141 MMOL/L (ref 134–144)
T4 FREE SERPL-MCNC: 1.03 NG/DL (ref 0.82–1.77)
TRIGL SERPL-MCNC: 290 MG/DL (ref 0–149)
TSH SERPL DL<=0.005 MIU/L-ACNC: 1.56 UIU/ML (ref 0.45–4.5)
VALID INTERNAL CONTROL?: YES
VIT B12 SERPL-MCNC: 1074 PG/ML (ref 232–1245)
VLDLC SERPL CALC-MCNC: 51 MG/DL (ref 5–40)
WBC # BLD AUTO: 4.3 X10E3/UL (ref 3.4–10.8)

## 2020-10-09 PROCEDURE — 85610 PROTHROMBIN TIME: CPT | Performed by: INTERNAL MEDICINE

## 2020-10-22 ENCOUNTER — OFFICE VISIT (OUTPATIENT)
Dept: SURGERY | Age: 70
End: 2020-10-22
Payer: MEDICARE

## 2020-10-22 VITALS
BODY MASS INDEX: 37.59 KG/M2 | HEIGHT: 65 IN | SYSTOLIC BLOOD PRESSURE: 126 MMHG | HEART RATE: 89 BPM | OXYGEN SATURATION: 98 % | TEMPERATURE: 97.9 F | WEIGHT: 225.6 LBS | DIASTOLIC BLOOD PRESSURE: 67 MMHG

## 2020-10-22 DIAGNOSIS — K43.0 INCISIONAL HERNIA, INCARCERATED: Primary | ICD-10-CM

## 2020-10-22 DIAGNOSIS — E66.9 OBESITY, CLASS II, BMI 35-39.9: ICD-10-CM

## 2020-10-22 PROCEDURE — G8754 DIAS BP LESS 90: HCPCS | Performed by: SURGERY

## 2020-10-22 PROCEDURE — 1090F PRES/ABSN URINE INCON ASSESS: CPT | Performed by: SURGERY

## 2020-10-22 PROCEDURE — 99214 OFFICE O/P EST MOD 30 MIN: CPT | Performed by: SURGERY

## 2020-10-22 PROCEDURE — G8752 SYS BP LESS 140: HCPCS | Performed by: SURGERY

## 2020-10-22 NOTE — PROGRESS NOTES
HISTORY OF PRESENT ILLNESS  Andrea Mae is a 69 y.o. female. Saw me in 2017 was scheduled for surgery but she call to cancel  Then saw Dr. Ria Correa in 2018 but did not go through the surgery      Reviewed CT from 2017 there is a 7 cm diastasis with an epigastric hernia and a midline ventral hernia near the umbilicus      Troublesome when she bends over    Saw Dr Daniel Galicia at [facility] for left tendon  Wearing a brace and physical therapy    Having a colonoscopy in 2 weeks with Dr Lauro Corley  On coumadin        ____________________________________________________________________________  Patient presents with:   Incisional Hernia: Seen at the request of Dr. Jorden Ball for evaulation of incisional hernia     /67   Pulse 89   Temp 97.9 °F (36.6 °C)   Ht 5' 5\" (1.651 m)   Wt 102.3 kg (225 lb 9.6 oz)   SpO2 98%   BMI 37.54 kg/m²   Past Medical History:  2014: Arrhythmia      Comment:  atrial fibrillation 2014, sick sinus syndrome: Heart                Maurilio Rancho  No date: Arthritis  3/31/2010: Bipolar affective disorder ([facility]  2018: Chronic pain      Comment:  right shoulder  03/31/2010: Colon polyps      Comment:  also 5/2018: colon polyps  No date: Depression  07/19/2013: Diarrhea      Comment:  D/t alpha gal allergy says patient  6/5/2015: Epigastric pain  No date: GERD (gastroesophageal reflux disease)      Comment:  occasiional only; controlled with med  No stroke: Hemispheric carotid artery syndrome      Comment:  Neuro: Leanna Ndiaye  (CT scan head/neck negative 4/2018               in [facility])  3/31/2010: Hyperlipidemia  3/31/2010: Hypothyroidism  No date: Long term current use of anticoagulant therapy  No date: L[facility] ([facility]  03/31/2010: Migraine      Comment:  has them x2 a month: last one 5/23/2018  No date: Psychotic disorder ([facility]  05/13/2014: S/P ablation of atrial fibrillation  9/16/2014: S/P cardiac pacemaker procedure      Comment:  9/16/14 [employer] MRI compatible dual chamber pacemaker                implant  No date: Sleep apnea      Comment:  unable to tolerate CPAP  No date: Stool color black  No date: Thyroid disease  No date: Tick-borne disease      Comment:  Alpha Gal allergy: no eat pork, beef, milk (Meat allergy               showed up on a allergy test)  5/73/4514: Umbilical hernia without obstruction and without gangrene  3/31/2010: Urge incontinence  3/31/2015: Vitamin D deficiency  Past Surgical History:  5/2014: CARDIAC SURG PROCEDURE UNLIST      Comment:  ablation  4/27/2018: COLONOSCOPY; N/A      Comment:  COLONOSCOPY performed by Rasheeda Garcia MD at [facility]                AMBULATORY OR  4/27/2018: Verl Blocker      Comment:     4/27/2018: Nancy Berumen      Comment:     No date: Chrystal De Guzman  2010, 5/2018: HX GI      Comment:  egd, colonoscopy  No date: HX HYSTERECTOMY  2000's: HX ORTHOPAEDIC      Comment:  removed bone spurs from [employer] hand  1990s?: HX ORTHOPAEDIC      Comment:  left bunionectomy   07/2014: HX OTHER SURGICAL      Comment:  2 shocks to heart & ablations  2014: HX PACEMAKER      Comment:  On the right side  2015: HX PACEMAKER PLACEMENT  15 yrs old: HX TONSILLECTOMY  Social History    Socioeconomic History      Marital status:       Spouse name: Not on file      Number of children: Not on file      Years of education: Not on file      Highest education level: Not on file    Tobacco Use      Smoking status: Never Smoker      Smokeless tobacco: Never Used    Substance and Sexual Activity      Alcohol use:  Yes        Alcohol/week: 1.0 standard drinks        Types: 1 Cans of beer per week        Frequency: Monthly or less        Comment: weekly      Drug use: Yes        Types: Prescription      Sexual activity: Never    Review of patient's family history indicates:  Problem: Arrhythmia      Relation: Mother          Age of Onset: (Not Specified)          Comment: afib  Problem: Stroke      Relation: Mother          Age of Onset: (Not Specified)  Problem: Heart Failure      Relation: Mother          Age of Onset: (Not Specified)  Problem: Colon Cancer      Relation: Father          Age of Onset: (Not Specified)  Problem: Heart Disease      Relation: Father          Age of Onset: (Not Specified)  Problem: Arrhythmia      Relation: Brother          Age of Onset: (Not Specified)          Comment: afib  Problem: Asthma      Relation: Brother          Age of Onset: (Not Specified)  Problem: COPD      Relation: Brother          Age of Onset: (Not Specified)    Current Outpatient Medications:  pregabalin (LYRICA) 100 mg capsule, TAKE 1 CAPSULE BY MOUTH TWICE A DAY  hydrOXYzine HCL (ATARAX) 25 mg tablet, TAKE 1 TAB BY MOUTH THREE (3) TIMES DAILY AS NEEDED FOR ITCHING FOR UP TO 10 DAYS.  ergocalciferol (ERGOCALCIFEROL) 1,250 mcg (50,000 unit) capsule, TAKE 1 CAPSULE BY MOUTH ONE TIME PER WEEK  levothyroxine (SYNTHROID) 50 mcg tablet, TAKE 1 TABLET BY MOUTH EVERY DAY  betamethasone valerate (VALISONE) 0.1 % topical cream, APPLY TO AFFECTED AREA TWICE A DAY  primidone (MYSOLINE) 50 mg tablet, TAKE 1/2 TABLET BY MOUTH NIGHTLY  latanoprost (XALATAN) 0.005 % ophthalmic solution, Administer 1 Drop to both eyes two (2) times a day. nitrofurantoin, macrocrystal-monohydrate, (MACROBID) 100 mg capsule, nitrofurantoin monohydrate/macrocrystals 100 mg capsule  trimethoprim (TRIMPEX) 100 mg tablet, TAKE 1 TABLET BY MOUTH EVERY DAY  zolpidem (AMBIEN) 10 mg tablet, zolpidem 10 mg tablet  warfarin (COUMADIN) 5 mg tablet, Take 3 pills on Tue, Fri and 2 pills all other days. tiZANidine (ZANAFLEX) 2 mg tablet, Take 1 Tab by mouth three (3) times daily. topiramate (Topamax) 100 mg tablet, Take 1 Tab by mouth two (2) times a day. lithium 300 mg tablet, Take 300 mg by mouth three (3) times daily. alprazolam (XANAX) 0.5 mg tablet, Take 0.5 mg by mouth nightly.   diphenoxylate-atropine (LOMOTIL) 2.5-0.025 mg per tablet, Take 1 Tab by mouth two (2) times daily as needed for Diarrhea. No current facility-administered medications for this visit. Allergies:  -- Latex -- Swelling    --  Swelling of lips says patient   -- Beef Containing Products -- Nausea and Vomiting    --  diarrhea   -- Ciprofloxacin -- Unknown (comments)   -- Pork Derived (Porcine) -- Nausea and Vomiting    --  diarrhea   -- Milk -- Nausea and Vomiting    --  diarrhea             diarrhea   -- Sulfa (Sulfonamide Antibiotics) -- Hives   -- Xarelto (Rivaroxaban) -- Other (comments)    --  GI problems  _____________________________________________________________________________      Possible Hernia   The history is provided by the patient. This is a chronic problem. The current episode started more than 1 week ago. The problem occurs constantly. The problem has been gradually worsening. Pertinent negatives include no chest pain, no abdominal pain, no headaches and no shortness of breath. The symptoms are aggravated by exertion. The symptoms are relieved by rest. The treatment provided no relief. Review of Systems   Constitutional: Negative for chills, fever and weight loss. HENT: Negative for ear pain. Eyes: Negative for pain. Respiratory: Negative for shortness of breath. Cardiovascular: Negative for chest pain. Gastrointestinal: Negative for abdominal pain and blood in stool. Genitourinary: Negative for hematuria. Musculoskeletal: Negative for joint pain. Skin: Negative for rash. Neurological: Negative for dizziness, focal weakness, seizures and headaches. Endo/Heme/Allergies: Does not bruise/bleed easily. Psychiatric/Behavioral: The patient does not have insomnia. Physical Exam  Vitals signs and nursing note reviewed. Constitutional:       General: She is not in acute distress. Appearance: Normal appearance. She is well-developed. She is obese. She is not ill-appearing, toxic-appearing or diaphoretic. HENT:      Head: Normocephalic and atraumatic. Right Ear: External ear normal.      Left Ear: External ear normal.      Nose: Nose normal.      Mouth/Throat:      Mouth: Mucous membranes are moist.      Pharynx: No oropharyngeal exudate. Eyes:      Extraocular Movements: Extraocular movements intact. Pupils: Pupils are equal, round, and reactive to light. Neck:      Musculoskeletal: Normal range of motion. Trachea: No tracheal deviation. Cardiovascular:      Rate and Rhythm: Normal rate and regular rhythm. Heart sounds: Normal heart sounds. No murmur. Pulmonary:      Effort: Pulmonary effort is normal. No respiratory distress. Breath sounds: Normal breath sounds. No stridor. No wheezing. Abdominal:      General: Bowel sounds are normal. There is no distension. Palpations: Abdomen is soft. There is no mass. Tenderness: There is abdominal tenderness. There is no guarding or rebound. Comments: Diastases with at least 2 hernias. Lower hernias associated with a small periumbilical incision from her hysterectomy. No acute skin changes   Musculoskeletal: Normal range of motion. General: No tenderness. Lymphadenopathy:      Cervical: No cervical adenopathy. Skin:     General: Skin is warm. Findings: No erythema or rash. Neurological:      General: No focal deficit present. Mental Status: She is alert and oriented to person, place, and time. Psychiatric:         Mood and Affect: Mood normal.         Behavior: Behavior normal.         Thought Content: Thought content normal.         Judgment: Judgment normal.         ASSESSMENT and PLAN    ICD-10-CM ICD-9-CM    1. Incisional hernia, incarcerated  K43.0 552.21    2. Obesity, Class II, BMI 35-39.9  E66.9 278.00         Hernia currently contains fat. Short term is low risk for strangulated bowel.     I had an extensive discussion with her regarding the risks, benefits, and alternatives of proceeding with a(n) Laparoscopic Incisional Hernia Repair with Mesh with separation of compone robot-assisted. Risks,benefits, and alternatives were discussed including the risk of anesthesia, bleeding, infection, injury to bowel, chronic pain, and recurrence were discussed. I reviewed with Luana Boycerita her increased risk of bleeding secondary to Coumadin use. I have asked her to discontinue the Coumadin for 5 days prior to surgery to reduce this risk. Will ask for risk assessment from cardiology and we will proceed with surgery provided she is an acceptable surgical risk. We discussed her risk factors including: obese, advancement flaps. These related to perioperative morbidity including the increased risk of wound infection, and wound breakdown, and hernia reoccurrence requiring intervention. She was counceled on wt loss. She has made some progress recently. In the interim, we discussed continuing to stay active including core strengthening exercises, technique to reduce the hernia if needed. Call or return to the office sooner if these symptoms worsen. Discussed typical symptoms of strangulation. Thank you for this consult.

## 2020-10-22 NOTE — PROGRESS NOTES
Chief Complaint   Patient presents with    Incisional Hernia     Seen at the request of Dr. Jaylon Puentes for evaulation of incisional hernia      Discussed advanced directive. Patient states that she does not have an advanced directive.

## 2020-10-22 NOTE — Clinical Note
10/22/20 Patient: Germania Guzman YOB: 1950 Date of Visit: 10/22/2020 Sudeep Fulton MD 
[address] [address] VIA In Basket Dear Sudeep Fulton MD, Thank you for referring Ms. Lorena iMller to  [facility] for evaluation. My notes for this consultation are attached. If you have questions, please do not hesitate to call me. I look forward to following your patient along with you. Sincerely, Dima Delgadillo MD

## 2020-10-22 NOTE — PROGRESS NOTES
Chief Complaint   Patient presents with    Incisional Hernia     Seen at the request of Dr. Mallory Whitehead for evaulation of incisional hernia     1. Have you been to the ER, urgent care clinic since your last visit? Hospitalized since your last visit? No    2. Have you seen or consulted any other health care providers outside of the [address] [place] [address] since your last visit? Include any pap smears or colon screening.  No

## 2020-10-28 ENCOUNTER — VIRTUAL VISIT (OUTPATIENT)
Dept: INTERNAL MEDICINE CLINIC | Age: 70
End: 2020-10-28
Payer: MEDICARE

## 2020-10-28 DIAGNOSIS — K21.9 GASTROESOPHAGEAL REFLUX DISEASE WITHOUT ESOPHAGITIS: ICD-10-CM

## 2020-10-28 DIAGNOSIS — I48.91 ATRIAL FIBRILLATION, UNSPECIFIED TYPE (HCC): ICD-10-CM

## 2020-10-28 DIAGNOSIS — E55.9 VITAMIN D DEFICIENCY: ICD-10-CM

## 2020-10-28 DIAGNOSIS — R73.9 HYPERGLYCEMIA: ICD-10-CM

## 2020-10-28 DIAGNOSIS — E53.8 B12 DEFICIENCY: ICD-10-CM

## 2020-10-28 DIAGNOSIS — E03.9 HYPOTHYROIDISM, UNSPECIFIED TYPE: Primary | ICD-10-CM

## 2020-10-28 PROCEDURE — 99214 OFFICE O/P EST MOD 30 MIN: CPT | Performed by: INTERNAL MEDICINE

## 2020-10-28 PROCEDURE — 1101F PT FALLS ASSESS-DOCD LE1/YR: CPT | Performed by: INTERNAL MEDICINE

## 2020-10-28 PROCEDURE — 3017F COLORECTAL CA SCREEN DOC REV: CPT | Performed by: INTERNAL MEDICINE

## 2020-10-28 PROCEDURE — G9717 DOC PT DX DEP/BP F/U NT REQ: HCPCS | Performed by: INTERNAL MEDICINE

## 2020-10-28 PROCEDURE — G8536 NO DOC ELDER MAL SCRN: HCPCS | Performed by: INTERNAL MEDICINE

## 2020-10-28 PROCEDURE — G0463 HOSPITAL OUTPT CLINIC VISIT: HCPCS | Performed by: INTERNAL MEDICINE

## 2020-10-28 PROCEDURE — G8756 NO BP MEASURE DOC: HCPCS | Performed by: INTERNAL MEDICINE

## 2020-10-28 PROCEDURE — G8417 CALC BMI ABV UP PARAM F/U: HCPCS | Performed by: INTERNAL MEDICINE

## 2020-10-28 PROCEDURE — G8400 PT W/DXA NO RESULTS DOC: HCPCS | Performed by: INTERNAL MEDICINE

## 2020-10-28 PROCEDURE — 1090F PRES/ABSN URINE INCON ASSESS: CPT | Performed by: INTERNAL MEDICINE

## 2020-10-28 PROCEDURE — G8428 CUR MEDS NOT DOCUMENT: HCPCS | Performed by: INTERNAL MEDICINE

## 2020-10-28 RX ORDER — HYDROXYCHLOROQUINE SULFATE 200 MG/1
200 TABLET, FILM COATED ORAL 2 TIMES DAILY
COMMUNITY
Start: 2020-10-01

## 2020-10-28 NOTE — PATIENT INSTRUCTIONS
Office Policies Phone calls/patient messages: Please allow up to 24 hours for someone in the office to contact you about your call or message. Be mindful your provider may be out of the office or your message may require further review. We encourage you to use [employer] for your messages as this is a faster, more efficient way to communicate with our office Medication Refills: 
         
Prescription medications require 48-72 business hours to process. We encourage you to use [facility] for your refills. For controlled medications: Please allow 72 business hours to process. Certain medications may require you to  a written prescription at our office. NO narcotic/controlled medications will be prescribed after 4pm Monday through Friday or on weekends Form/Paperwork Completion: 
         
Please note a $25 fee may incur for all paperwork for completed by our providers. We ask that you allow 7-10 business days. Pre-payment is due prior to picking up/faxing the completed form. You may also download your forms to [employer] to have your doctor print off. 
 
 
1. Have you been to the ER, urgent care clinic since your last visit? Hospitalized since your last visit?no 2. Have you seen or consulted any other health care providers outside of the [address] [place] [address] since your last visit? Include any pap smears or colon screening.  no

## 2020-10-28 NOTE — PROGRESS NOTES
Dexter Plummer is a 69 y.o. female who was seen by synchronous (real-time) audio-video technology on 10/28/2020 for Results (discuss lab results)        Progress Note       Subjective:  Ms. Lisbeth Arriola is here for follow up. Chief Complaint   Patient presents with    Results     discuss lab results       Hernia: Evaluated by Dr. Jared Fall. Surgery planned in November. She hasn't yet been to allergist about the possible meat allergy. Edema L > R. Worsening. \"That damned tendon! I went to see Dr. Ritika Staton for that foot, he said that there was no need for surgery. I'm going to start with a brace. \"  For the foot pain, \"nothing worked. \" Got another opinion from \"Dr. STYLES.\"  \"He didn't like how swollen my legs were, and wants me to see my cardiologist and PCP before he does any cutting. \"   We note from 2018: Dr. Suly Van is seeing her for her foot pain. Her L foot is in a boot, and \"I have several torn tendons. \"    A fib:  She has had two EP studies in 2014, and ablation for a fib, with Dr. Karthik Yanez. She also sees Dr. Janice Sanchez. She sees Dr. Marta Watson for GI issues/GERD. On PPI. She has colonoscopy planned for next month. Blood testing has revealed allergies to pork, beef, egg white, milk. She has been referred by Harry Gonzalez in Dr. Cristiana Lovell office, to go see an allergist.  She is concerned about weight, which is going up. Insomnia is a little better. ASHELY no longer on CPAP. Has trouble tolerating the mask--takes it off early in the morning. Depression. Stable. Currently following with NP. Also Truman Rodriguez, psychologist.   Migraines: Sometimes has aura without headache. Had been having a frequent unilateral HA, associated with photophobia and phonophobia. Tried botox injections in scalp, for migraines--\"it didn't work. \" She had been treated by Dr. Nick Lau, neurologist, with meds including topamax and hydrocodone. Just about the only thing that has worked for her for abortive therapy is hydrocodone.    Yefri informed her that he can't prescribe narcotics anymore, and to get them from PCP: \"and he explained what's going on with the government and everything. \"    Gets botox injections into bladder--\"It's amazing\". She is not having much urinary incontinence. Dr. Elza Ann sees her. PMH: Anxiety, Depression/BPAD (seeing a psychiatrist; hospiatalized in 2007), Migraines (seeing Dr. Boubacar Bryan), hypothyroidism, hyperlipidemia, A fib (sees Dr. Humera Persaud and Dr. Yobani Wilson). Vit D deficiency. Colon polyps2009 [facility]. Hand pain s/p injections by Dr. Hermelindo Kamara. GERD--Sees Dr. Anjel Gaspar. Obesity. PSH: Hysterectomy due to endometriosis. Bunionectomy, neuroma. Carpal tunnel surgery. EP ablation x 2. Colon polypectomy in 2009. Sigmoidoscopy in 2015. EGD and dilatation in 2015. Hammertoe surgery 2016. Ventral hernia repair. All: Sulfahives. Had a reaction to one of the meds for BPAD prior to starting lithium. Meds:    Current Outpatient Medications on File Prior to Visit   Medication Sig Dispense Refill    hydrOXYchloroQUINE (PLAQUENIL) 200 mg tablet TAKE 2 TABLETS BY MOUTH EVERY DAY      pregabalin (LYRICA) 100 mg capsule TAKE 1 CAPSULE BY MOUTH TWICE A DAY 60 Cap 5    hydrOXYzine HCL (ATARAX) 25 mg tablet TAKE 1 TAB BY MOUTH THREE (3) TIMES DAILY AS NEEDED FOR ITCHING FOR UP TO 10 DAYS. 270 Tab 2    ergocalciferol (ERGOCALCIFEROL) 1,250 mcg (50,000 unit) capsule TAKE 1 CAPSULE BY MOUTH ONE TIME PER WEEK 4 Cap 0    levothyroxine (SYNTHROID) 50 mcg tablet TAKE 1 TABLET BY MOUTH EVERY DAY 90 Tab 3    betamethasone valerate (VALISONE) 0.1 % topical cream APPLY TO AFFECTED AREA TWICE A DAY 15 g 0    primidone (MYSOLINE) 50 mg tablet TAKE 1/2 TABLET BY MOUTH NIGHTLY 45 Tab 5    latanoprost (XALATAN) 0.005 % ophthalmic solution Administer 1 Drop to both eyes two (2) times a day.       nitrofurantoin, macrocrystal-monohydrate, (MACROBID) 100 mg capsule nitrofurantoin monohydrate/macrocrystals 100 mg capsule      trimethoprim (TRIMPEX) 100 mg tablet TAKE 1 TABLET BY MOUTH EVERY DAY      zolpidem (AMBIEN) 10 mg tablet zolpidem 10 mg tablet      warfarin (COUMADIN) 5 mg tablet Take 3 pills on Tue, Fri and 2 pills all other days. 204 Tab 1    tiZANidine (ZANAFLEX) 2 mg tablet Take 1 Tab by mouth three (3) times daily. 90 Tab 5    topiramate (Topamax) 100 mg tablet Take 1 Tab by mouth two (2) times a day. 60 Tab 3    lithium 300 mg tablet Take 300 mg by mouth three (3) times daily.  alprazolam (XANAX) 0.5 mg tablet Take 0.5 mg by mouth nightly.  diphenoxylate-atropine (LOMOTIL) 2.5-0.025 mg per tablet Take 1 Tab by mouth two (2) times daily as needed for Diarrhea. No current facility-administered medications on file prior to visit. 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 FH:  Father, colon cancer. Mother, stroke. Grandmother, DM.  ROS: Otherwise negative except as noted elsewhere. Has a chronic burning sensation in her mouth and neck. Objective:  Vitals: There were no vitals taken for this visit. Assessment/Plan:      1. Hypothyroidism: Clinically euthyroid. Continue synthroid. Recheck labs. 2. Atrial Fibrillation: F/U as per Dr. Anu Hager.  On coumadin. 3. HLP:  Continue OTC fish oil. Also encouraged lifestyle efforts. 4. Migraines: Sees Dr. Melani Mccall. Has prn triptan, and topamax, and prn oxycodone. 5. BPAD/Anxiety/Stress. Continue psychiatry follow up. Owen Quiroga, psychology. 6. Urge incontinence: Ongoing. Continue f/u with Dr. Jan Sotomayor. 7. Hand pain: F/U as per Dr. Iza Christie. 8. Epigastric burning / GERD: On Prilosec OTC. Follows with Dr. Maricruz Purdy. 9. Hyperglycemia: A1C normal now. She has in the past met criteria for DM (fasting glc > 125 on more than 1 occasion). For now, work on diet and exercise. 10. Edema: On daily lasix. Follow up in 4 - 6 months              Objective:   No flowsheet data found.      [INSTRUCTIONS:  \"[x]\" Indicates a positive item  \"[]\" Indicates a negative item  -- DELETE ALL ITEMS NOT EXAMINED]    Constitutional: [x] Appears well-developed and well-nourished [x] No apparent distress      [] Abnormal -     Mental status: [x] Alert and awake  [x] Oriented to person/place/time [x] Able to follow commands    [] Abnormal -     Eyes:   EOM    [x]  Normal    [] Abnormal -   Sclera  [x]  Normal    [] Abnormal -          Discharge [x]  None visible   [] Abnormal -     HENT: [x] Normocephalic, atraumatic  [] Abnormal -   [x] Mouth/Throat: Mucous membranes are moist    External Ears [x] Normal  [] Abnormal -    Neck: [x] No visualized mass [] Abnormal -     Pulmonary/Chest: [x] Respiratory effort normal   [x] No visualized signs of difficulty breathing or respiratory distress        [] Abnormal -      Musculoskeletal:   [x] Normal gait with no signs of ataxia         [x] Normal range of motion of neck        [] Abnormal -     Neurological:        [x] No Facial Asymmetry (Cranial nerve 7 motor function) (limited exam due to video visit)          [x] No gaze palsy        [] Abnormal -          Skin:        [x] No significant exanthematous lesions or discoloration noted on facial skin         [] Abnormal -            Psychiatric:       [x] Normal Affect [] Abnormal -       Other pertinent observable physical exam findings:-        We discussed the expected course, resolution and complications of the diagnosis(es) in detail. Medication risks, benefits, costs, interactions, and alternatives were discussed as indicated. I advised her to contact the office if her condition worsens, changes or fails to improve as anticipated. She expressed understanding with the diagnosis(es) and plan. Marlydi Sakina, who was evaluated through a patient-initiated, synchronous (real-time) audio-video encounter, and/or her healthcare decision maker, is aware that it is a billable service, with coverage as determined by her insurance carrier.  She provided verbal consent to proceed: YES, and patient identification was verified. It was conducted pursuant to the emergency declaration under the [address] [place] [address] and the [employer] and [employer] Act. A caregiver was present when appropriate. Ability to conduct physical exam was limited. I was in office. The patient was at home or otherwise outside the office.       Keith Sullivan MD

## 2020-11-02 ENCOUNTER — TELEPHONE (OUTPATIENT)
Dept: INTERNAL MEDICINE CLINIC | Age: 70
End: 2020-11-02

## 2020-11-02 NOTE — TELEPHONE ENCOUNTER
Identified patient 2 identifiers verified. Patient reports that she was around her daughter and grandchildren, but do to her age age patient recommended patient be tested. Patient agreed.

## 2020-11-02 NOTE — TELEPHONE ENCOUNTER
#[phone] pt states her daughters, boyfriend has tested positive for Covid19. Her daughter and grandchildren have tested negative. Bronson Garcia needs to know what they need to do? Please call this morning she ask to advise.

## 2020-11-04 ENCOUNTER — HOSPITAL ENCOUNTER (EMERGENCY)
Age: 70
Discharge: HOME | End: 2020-11-05
Attending: EMERGENCY MEDICINE
Payer: MEDICARE

## 2020-11-04 ENCOUNTER — APPOINTMENT (OUTPATIENT)
Dept: CT IMAGING | Age: 70
End: 2020-11-04
Attending: EMERGENCY MEDICINE
Payer: MEDICARE

## 2020-11-04 VITALS
SYSTOLIC BLOOD PRESSURE: 137 MMHG | TEMPERATURE: 98.2 F | BODY MASS INDEX: 38.57 KG/M2 | HEIGHT: 65 IN | DIASTOLIC BLOOD PRESSURE: 77 MMHG | HEART RATE: 77 BPM | WEIGHT: 231.48 LBS | OXYGEN SATURATION: 100 % | RESPIRATION RATE: 22 BRPM

## 2020-11-04 DIAGNOSIS — K92.1 MELENA: ICD-10-CM

## 2020-11-04 DIAGNOSIS — K92.2 UPPER GI BLEED: Primary | ICD-10-CM

## 2020-11-04 LAB
ABO + RH BLD: NORMAL
ALBUMIN SERPL-MCNC: 3.6 G/DL (ref 3.5–5)
ALBUMIN/GLOB SERPL: 1.2 {RATIO} (ref 1.1–2.2)
ALP SERPL-CCNC: 75 U/L (ref 45–117)
ALT SERPL-CCNC: 25 U/L (ref 12–78)
ANION GAP SERPL CALC-SCNC: 3 MMOL/L (ref 5–15)
AST SERPL-CCNC: 16 U/L (ref 15–37)
BASOPHILS # BLD: 0.1 K/UL (ref 0–0.1)
BASOPHILS NFR BLD: 1 % (ref 0–1)
BILIRUB SERPL-MCNC: 0.3 MG/DL (ref 0.2–1)
BLOOD GROUP ANTIBODIES SERPL: NORMAL
BUN SERPL-MCNC: 19 MG/DL (ref 6–20)
BUN/CREAT SERPL: 20 (ref 12–20)
CALCIUM SERPL-MCNC: 9.4 MG/DL (ref 8.5–10.1)
CHLORIDE SERPL-SCNC: 113 MMOL/L (ref 97–108)
CO2 SERPL-SCNC: 27 MMOL/L (ref 21–32)
CREAT SERPL-MCNC: 0.96 MG/DL (ref 0.55–1.02)
DIFFERENTIAL METHOD BLD: NORMAL
EOSINOPHIL # BLD: 0.3 K/UL (ref 0–0.4)
EOSINOPHIL NFR BLD: 4 % (ref 0–7)
ERYTHROCYTE [DISTWIDTH] IN BLOOD BY AUTOMATED COUNT: 14.2 % (ref 11.5–14.5)
GLOBULIN SER CALC-MCNC: 3.1 G/DL (ref 2–4)
GLUCOSE SERPL-MCNC: 87 MG/DL (ref 65–100)
HCT VFR BLD AUTO: 41.7 % (ref 35–47)
HGB BLD-MCNC: 13.5 G/DL (ref 11.5–16)
IMM GRANULOCYTES # BLD AUTO: 0 K/UL (ref 0–0.04)
IMM GRANULOCYTES NFR BLD AUTO: 0 % (ref 0–0.5)
INR PPP: 2.2 (ref 0.9–1.1)
LYMPHOCYTES # BLD: 2.2 K/UL (ref 0.8–3.5)
LYMPHOCYTES NFR BLD: 37 % (ref 12–49)
MAGNESIUM SERPL-MCNC: 2.1 MG/DL (ref 1.6–2.4)
MCH RBC QN AUTO: 30.1 PG (ref 26–34)
MCHC RBC AUTO-ENTMCNC: 32.4 G/DL (ref 30–36.5)
MCV RBC AUTO: 92.9 FL (ref 80–99)
MONOCYTES # BLD: 0.5 K/UL (ref 0–1)
MONOCYTES NFR BLD: 8 % (ref 5–13)
NEUTS SEG # BLD: 2.9 K/UL (ref 1.8–8)
NEUTS SEG NFR BLD: 50 % (ref 32–75)
NRBC # BLD: 0 K/UL (ref 0–0.01)
NRBC BLD-RTO: 0 PER 100 WBC
PLATELET # BLD AUTO: 228 K/UL (ref 150–400)
PMV BLD AUTO: 10.8 FL (ref 8.9–12.9)
POTASSIUM SERPL-SCNC: 4.5 MMOL/L (ref 3.5–5.1)
PROT SERPL-MCNC: 6.7 G/DL (ref 6.4–8.2)
PROTHROMBIN TIME: 22.4 SEC (ref 9–11.1)
RBC # BLD AUTO: 4.49 M/UL (ref 3.8–5.2)
SODIUM SERPL-SCNC: 143 MMOL/L (ref 136–145)
SPECIMEN EXP DATE BLD: NORMAL
WBC # BLD AUTO: 5.9 K/UL (ref 3.6–11)

## 2020-11-04 PROCEDURE — 74178 CT ABD&PLV WO CNTR FLWD CNTR: CPT

## 2020-11-04 PROCEDURE — 83735 ASSAY OF MAGNESIUM: CPT

## 2020-11-04 PROCEDURE — 99283 EMERGENCY DEPT VISIT LOW MDM: CPT

## 2020-11-04 PROCEDURE — 99282 EMERGENCY DEPT VISIT SF MDM: CPT

## 2020-11-04 PROCEDURE — 36415 COLL VENOUS BLD VENIPUNCTURE: CPT

## 2020-11-04 PROCEDURE — 85025 COMPLETE CBC W/AUTO DIFF WBC: CPT

## 2020-11-04 PROCEDURE — 74011000636 HC RX REV CODE- 636: Performed by: EMERGENCY MEDICINE

## 2020-11-04 PROCEDURE — 80053 COMPREHEN METABOLIC PANEL: CPT

## 2020-11-04 PROCEDURE — 85610 PROTHROMBIN TIME: CPT

## 2020-11-04 PROCEDURE — 86900 BLOOD TYPING SEROLOGIC ABO: CPT

## 2020-11-04 PROCEDURE — 99284 EMERGENCY DEPT VISIT MOD MDM: CPT

## 2020-11-04 RX ORDER — SODIUM CHLORIDE 0.9 % (FLUSH) 0.9 %
10 SYRINGE (ML) INJECTION
Status: COMPLETED | OUTPATIENT
Start: 2020-11-04 | End: 2020-11-04

## 2020-11-04 RX ADMIN — IOPAMIDOL 100 ML: 755 INJECTION, SOLUTION INTRAVENOUS at 23:15

## 2020-11-04 RX ADMIN — Medication 10 ML: at 23:15

## 2020-11-05 ENCOUNTER — TELEPHONE (OUTPATIENT)
Dept: CARDIOLOGY CLINIC | Age: 70
End: 2020-11-05

## 2020-11-05 NOTE — TELEPHONE ENCOUNTER
Identified patient 2 identifiers verified. Patient is going to be tested today for COVID. Reports going to ER for black tarry stools. patient will schedule with Dr Pierce Martines with GI. Was diagnosed with upper GI bleed in ER and [place]. CT shoe wd no evidence  Of acute GI hemorrhage. Colonic diverticulosis without diverticulitis. Fat containing  Umbilical hernia.

## 2020-11-05 NOTE — ED NOTES
Pt states she has been having diarrhea for a few months. For a couple months it was yellow, but over the past 3 weeks or so, it has turned black. She is scheduled for a colonoscopy next week.  GI sent her to ER today for eval.

## 2020-11-05 NOTE — TELEPHONE ENCOUNTER
Pt was seen in the ER last night; pt has concerns about stopping her coumadin a little early      Thanks

## 2020-11-05 NOTE — ED PROVIDER NOTES
EMERGENCY DEPARTMENT HISTORY AND PHYSICAL EXAM      Date: 11/4/2020  Patient Name: Juana Ambrocio    History of Presenting Illness     Chief Complaint   Patient presents with    Diarrhea     loose black stools x 2 weeks- 3x today- sent in by GI MD       History Provided By: Patient    HPI: Juana Ambrocio, 69 y.o. female  With past medical history of sick sinus syndrome with a pacemaker presenting today with melenic stool. The patient states that she is been having dark stool for about 3 weeks. She states that she takes Coumadin for previous history of atrial fibrillation but she had an ablation for. She notes that she has been having diarrhea that was initially normal in color, then changed to be a more dark color. She has a scheduled endoscopy and colonoscopy next week. She states that she has chronic epigastric abdominal discomfort and has had to have balloon dilation in the past due to esophageal stricture. She continues to have this intermittent pain, but now has been experiencing dark stool. She called her primary care provider who referred her to the emergency department today for evaluation. She states that a couple of days ago she took some Pepto-Bismol, but does not take iron. No prior history of GI bleeding. There are no other complaints, changes, or physical findings at this time. PCP: Sisi Keene MD    No current facility-administered medications on file prior to encounter. Current Outpatient Medications on File Prior to Encounter   Medication Sig Dispense Refill    hydrOXYchloroQUINE (PLAQUENIL) 200 mg tablet TAKE 2 TABLETS BY MOUTH EVERY DAY      pregabalin (LYRICA) 100 mg capsule TAKE 1 CAPSULE BY MOUTH TWICE A DAY 60 Cap 5    hydrOXYzine HCL (ATARAX) 25 mg tablet TAKE 1 TAB BY MOUTH THREE (3) TIMES DAILY AS NEEDED FOR ITCHING FOR UP TO 10 DAYS.  270 Tab 2    ergocalciferol (ERGOCALCIFEROL) 1,250 mcg (50,000 unit) capsule TAKE 1 CAPSULE BY MOUTH ONE TIME PER WEEK 4 Cap 0    levothyroxine (SYNTHROID) 50 mcg tablet TAKE 1 TABLET BY MOUTH EVERY DAY 90 Tab 3    betamethasone valerate (VALISONE) 0.1 % topical cream APPLY TO AFFECTED AREA TWICE A DAY 15 g 0    primidone (MYSOLINE) 50 mg tablet TAKE 1/2 TABLET BY MOUTH NIGHTLY 45 Tab 5    latanoprost (XALATAN) 0.005 % ophthalmic solution Administer 1 Drop to both eyes two (2) times a day.  nitrofurantoin, macrocrystal-monohydrate, (MACROBID) 100 mg capsule nitrofurantoin monohydrate/macrocrystals 100 mg capsule      trimethoprim (TRIMPEX) 100 mg tablet TAKE 1 TABLET BY MOUTH EVERY DAY      zolpidem (AMBIEN) 10 mg tablet zolpidem 10 mg tablet      warfarin (COUMADIN) 5 mg tablet Take 3 pills on Tue, Fri and 2 pills all other days. 204 Tab 1    tiZANidine (ZANAFLEX) 2 mg tablet Take 1 Tab by mouth three (3) times daily. 90 Tab 5    topiramate (Topamax) 100 mg tablet Take 1 Tab by mouth two (2) times a day. 60 Tab 3    diphenoxylate-atropine (LOMOTIL) 2.5-0.025 mg per tablet Take 1 Tab by mouth two (2) times daily as needed for Diarrhea.  lithium 300 mg tablet Take 300 mg by mouth three (3) times daily.  alprazolam (XANAX) 0.5 mg tablet Take 0.5 mg by mouth nightly.          Past History     Past Medical History:  Past Medical History:   Diagnosis Date    Arrhythmia 2014    atrial fibrillation 2014, sick sinus syndrome: [facility]    Arthritis     Bipolar affective disorder ([facility] [address] 3/31/2010    Chronic pain 2018    right shoulder    Colon polyps 03/31/2010    also 5/2018: colon polyps    Depression     Diarrhea 07/19/2013    D/t alpha gal allergy says patient    Epigastric pain 6/5/2015    GERD (gastroesophageal reflux disease)     occasiional only; controlled with med    Hemispheric carotid artery syndrome No stroke    Neuro: Wellington Kay  (CT scan head/neck negative 4/2018 in [facility])    Hyperlipidemia 3/31/2010    Hypothyroidism 3/31/2010    Long term current use of anticoagulant therapy     Lupus ([facility] [address]     Migraine 03/31/2010    has them x2 a month: last one 5/23/2018    Psychotic disorder ([facility] [address]     S/P ablation of atrial fibrillation 05/13/2014    S/P cardiac pacemaker procedure 9/16/2014 9/16/14 [employer] MRI compatible dual chamber pacemaker implant    Sleep apnea     unable to tolerate CPAP    Stool color black     Thyroid disease     Tick-borne disease     Alpha Gal allergy: no eat pork, beef, milk (Meat allergy showed up on a allergy test)    Umbilical hernia without obstruction and without gangrene 6/22/2020    Urge incontinence 3/31/2010    Vitamin D deficiency 3/31/2015       Past Surgical History:  Past Surgical History:   Procedure Laterality Date    CARDIAC SURG PROCEDURE UNLIST  5/2014    ablation    COLONOSCOPY N/A 4/27/2018    COLONOSCOPY performed by Constantino Arnold MD at [address] [place] [address]  4/27/2018         COLONOSCOPY,RENALDO Hebert  4/27/2018         HX BUNIONECTOMY      HX GI  2010, 5/2018    egd, colonoscopy    HX HYSTERECTOMY      HX ORTHOPAEDIC  2000's    removed bone spurs from R & [facility] hand    HX ORTHOPAEDIC  1990s? left bunionectomy     HX OTHER SURGICAL  07/2014    2 shocks to heart & ablations    HX PACEMAKER  2014    On the right side    HX PACEMAKER PLACEMENT  2015    HX TONSILLECTOMY  15 yrs old       Family History:  Family History   Problem Relation Age of Onset    Arrhythmia Mother         afib    Stroke Mother     Heart Failure Mother     Colon Cancer Father     Heart Disease Father     Arrhythmia Brother         afib    Asthma Brother     COPD Brother        Social History:  Social History     Tobacco Use    Smoking status: Never Smoker    Smokeless tobacco: Never Used   Substance Use Topics    Alcohol use:  Yes     Alcohol/week: 1.0 standard drinks     Types: 1 Cans of beer per week     Frequency: Monthly or less     Comment: weekly    Drug use: Yes     Types: Prescription Allergies: Allergies   Allergen Reactions    Latex Swelling     Swelling of lips says patient    Beef Containing Products Nausea and Vomiting     diarrhea    Ciprofloxacin Unknown (comments)    Pork Derived (Porcine) Nausea and Vomiting     diarrhea    Milk Nausea and Vomiting     diarrhea  diarrhea    Sulfa (Sulfonamide Antibiotics) Hives    Xarelto [Rivaroxaban] Other (comments)     GI problems         Review of Systems   Constitutional: No  fever  Skin: No  rash  HEENT: No  nasal congestion  Resp: No cough  CV: No chest pain  GI: No vomiting  : No dysuria  MSK: No joint pain  Neuro: No numbness  Psych: No suicidal      Physical Exam     Patient Vitals for the past 12 hrs:   Temp Pulse Resp BP SpO2   11/04/20 2249     100 %   11/04/20 1853    137/77    11/04/20 1850 98.2 °F (36.8 °C) 77 22  98 %     General: alert, No acute distress  Eyes: EOMI, normal conjunctiva  ENT: moist mucous membranes. Neck: Active, full ROM of neck. Skin: No rashes. no jaundice              Lungs: Equal chest expansion. no respiratory distress. clear to auscultation bilaterally No accessory muscle usage  Heart: regular rate     no peripheral edema   2+ radial pulses and DPs bilaterally  Abd:  non distended soft, nontender. No rebound tenderness. No guarding  Back: Full ROM  MSK: Full, active ROM in all 4 extremities.    Neuro: Person, Place, Time and Situation; normal speech;   Psych: Cooperative with exam; Appropriate mood and affect           Diagnostic Study Results     Labs -     Recent Results (from the past 12 hour(s))   CBC WITH AUTOMATED DIFF    Collection Time: 11/04/20  7:48 PM   Result Value Ref Range    WBC 5.9 3.6 - 11.0 K/uL    RBC 4.49 3.80 - 5.20 M/uL    HGB 13.5 11.5 - 16.0 g/dL    HCT 41.7 35.0 - 47.0 %    MCV 92.9 80.0 - 99.0 FL    MCH 30.1 26.0 - 34.0 PG    MCHC 32.4 30.0 - 36.5 g/dL    RDW 14.2 11.5 - 14.5 %    PLATELET 518 149 - 461 K/uL    MPV 10.8 8.9 - 12.9 FL    NRBC 0.0 0  WBC ABSOLUTE NRBC 0.00 0.00 - 0.01 K/uL    NEUTROPHILS 50 32 - 75 %    LYMPHOCYTES 37 12 - 49 %    MONOCYTES 8 5 - 13 %    EOSINOPHILS 4 0 - 7 %    BASOPHILS 1 0 - 1 %    IMMATURE GRANULOCYTES 0 0.0 - 0.5 %    ABS. NEUTROPHILS 2.9 1.8 - 8.0 K/UL    ABS. LYMPHOCYTES 2.2 0.8 - 3.5 K/UL    ABS. MONOCYTES 0.5 0.0 - 1.0 K/UL    ABS. EOSINOPHILS 0.3 0.0 - 0.4 K/UL    ABS. BASOPHILS 0.1 0.0 - 0.1 K/UL    ABS. IMM. GRANS. 0.0 0.00 - 0.04 K/UL    DF AUTOMATED     METABOLIC PANEL, COMPREHENSIVE    Collection Time: 11/04/20  7:48 PM   Result Value Ref Range    Sodium 143 136 - 145 mmol/L    Potassium 4.5 3.5 - 5.1 mmol/L    Chloride 113 (H) 97 - 108 mmol/L    CO2 27 21 - 32 mmol/L    Anion gap 3 (L) 5 - 15 mmol/L    Glucose 87 65 - 100 mg/dL    BUN 19 6 - 20 MG/DL    Creatinine 0.96 0.55 - 1.02 MG/DL    BUN/Creatinine ratio 20 12 - 20      GFR est AA >60 >60 ml/min/1.73m2    GFR est non-AA 57 (L) >60 ml/min/1.73m2    Calcium 9.4 8.5 - 10.1 MG/DL    Bilirubin, total 0.3 0.2 - 1.0 MG/DL    ALT (SGPT) 25 12 - 78 U/L    AST (SGOT) 16 15 - 37 U/L    Alk. phosphatase 75 45 - 117 U/L    Protein, total 6.7 6.4 - 8.2 g/dL    Albumin 3.6 3.5 - 5.0 g/dL    Globulin 3.1 2.0 - 4.0 g/dL    A-G Ratio 1.2 1.1 - 2.2     MAGNESIUM    Collection Time: 11/04/20  7:48 PM   Result Value Ref Range    Magnesium 2.1 1.6 - 2.4 mg/dL   TYPE & SCREEN    Collection Time: 11/04/20  7:48 PM   Result Value Ref Range    Crossmatch Expiration 11/07/2020,2359     ABO/Rh(D) Wero Narinder POSITIVE     Antibody screen NEG    PROTHROMBIN TIME + INR    Collection Time: 11/04/20  7:48 PM   Result Value Ref Range    INR 2.2 (H) 0.9 - 1.1      Prothrombin time 22.4 (H) 9.0 - 11.1 sec       Radiologic Studies -   CT ABD PELV W WO CONT   Final Result   IMPRESSION:      1. No CT evidence of acute gastrointestinal hemorrhage. 2. Colonic diverticulosis without diverticulitis. 3. Fat-containing umbilical hernia.         CT Results  (Last 48 hours)               11/04/20 1165  CT ABD PELV W WO CONT Final result    Impression:  IMPRESSION:       1. No CT evidence of acute gastrointestinal hemorrhage. 2. Colonic diverticulosis without diverticulitis. 3. Fat-containing umbilical hernia. Narrative:  INDICATION: Active GI bleeding       EXAM:  CT ABDOMEN, PELVIS WITH/WITHOUT CONTRAST (GI Bleed PROTOCOL)       COMPARISON: November 22, 2017       TECHNIQUE: Noncontrast 2.5 mm axial images were obtained through the abdomen and   pelvis. After the uneventful administration of IV contrast, 2.5 mm axial images   were obtained through the abdomen and pelvis during arterial and portal venous   phases. Delayed images were also obtained through the abdomen and pelvis. Coronal and sagittal reconstructions were generated. CT dose reduction was achieved through use of a standardized protocol tailored   for this examination and automatic exposure control for dose modulation. FINDINGS:        LUNG BASES: No abnormality. LIVER: No mass or biliary dilatation. GALLBLADDER: Unremarkable. SPLEEN: No enlargement or lesion. PANCREAS: No mass or ductal dilatation. ADRENALS: No mass. KIDNEYS: Several small cystic structures in both kidneys. No nephrolithiasis or   hydronephrosis. GI TRACT: No bowel obstruction. No bowel wall thickening. Colonic diverticulosis   without diverticulitis. No active contrast extravasation within the bowel lumen   PERITONEUM: No free air or free fluid. APPENDIX: Unremarkable. RETROPERITONEUM: No lymphadenopathy or aortic aneurysm. ADDITIONAL COMMENTS: Small fat-containing umbilical hernia. URINARY BLADDER: Unremarkable. REPRODUCTIVE ORGANS: Prior hysterectomy. LYMPH NODES: None enlarged. FREE FLUID: None. BONES: No destructive bone lesion. ADDITIONAL COMMENTS: N/A. CXR Results  (Last 48 hours)    None          Medical Decision Making   I am the first provider for this patient.     I reviewed the vital signs, available nursing notes, past medical history, past surgical history, family history and social history. Vital Signs-Reviewed the patient's vital signs. Patient Vitals for the past 12 hrs:   Temp Pulse Resp BP SpO2   11/04/20 2249     100 %   11/04/20 1853    137/77    11/04/20 1850 98.2 °F (36.8 °C) 77 22  98 %       Pulse Oximetry Analysis - 98% on room air    Cardiac Monitor:   Rate: 77 bpm  Rhythm: Normal Sinus Rhythm      Provider Notes (Medical Decision Making):     Differential Diagnosis: Upper GI bleeding, lower GI bleeding, gastritis, peptic ulcer disease, electrolyte arrangement    Initial Plan: Will obtain CT imaging of the abdomen pelvis, laboratory studies, type and screen, and reassess. Patient has normal vital signs at this time. No acute distress. Benign exam    ED Course:   Initial assessment performed. The patients presenting problems have been discussed, and they are in agreement with the care plan formulated and outlined with them. I have encouraged them to ask questions as they arise throughout their visit. ED Course as of Nov 05 0553   u Nov 05, 2020 0038 CONSULT NOTE:   12:38 AM  I spoke with Dr. Anders Rosen  Specialty: GI  Discussed pt's hx, disposition, and available diagnostic and imaging results. Reviewed care plans. Plan: Stopping Coumadin, patient will follow-up for upper GI and colonoscopy with Dr. Baron Fuchs next week. [NW]   0038 My interpretation of the patient's laboratory studies INR is therapeutic at 2.2, metabolic panel unremarkable, CBC shows a hemoglobin of 13.5.    [NW]   0038 On my interpretation of the patient CT abdomen pelvis no acute GI bleeding at this time. [NW]      ED Course User Index  [NW] Cameron Trejo MD     Patient initially presented today with dark stools, on Coumadin. She had a laboratory work-up that showed a normal hemoglobin, she had normal blood pressure and no elevation in heart rate.   CT angiography without evidence of active bleeding. I spoke with GI who agrees with stopping the patient's anticoagulation in preparation for her endoscopy and colonoscopy. Ultimately patient is discharged with return precautions. Carmen Richey MD, am the attending of record for this patient encounter. Dispo: Discharged. The patient has been re-evaluated and is ready for discharge. Reviewed available results with patient. Counseled patient on diagnosis and care plan. Patient has expressed understanding, and all questions have been answered. Patient agrees with plan and agrees to follow up as recommended, or to return to the ED if their symptoms worsen. Discharge instructions have been provided and explained to the patient, along with reasons to return to the ED. PLAN:  Discharge Medication List as of 11/4/2020 11:57 PM        2. Follow-up Information     Follow up With Specialties Details Why Raghu Cary MD Gastroenterology   [address] [facility]  [phone]          3. Return to ED if worse       Diagnosis     Clinical Impression:   1. Upper GI bleed    2. Melena        Attestations:    Kelly Keenan MD    Please note that this dictation was completed with [employer]nik[employer], the computer voice recognition software. Quite often unanticipated grammatical, syntax, homophones, and other interpretive errors are inadvertently transcribed by the computer software. Please disregard these errors. Please excuse any errors that have escaped final proofreading. Thank you.

## 2020-11-05 NOTE — ED NOTES
Patient was provided with discharge instructions. Instructions and any medications were reviewed with the patient &/or family by Dr Lois Brewer. Questions and concerns addressed by the provider. Patient discharged in stable condition via Kathy, RN and was escorted by her  to the car.

## 2020-11-06 RX ORDER — WARFARIN SODIUM 5 MG/1
TABLET ORAL
Qty: 204 TAB | Refills: 1 | Status: SHIPPED | OUTPATIENT
Start: 2020-11-06 | End: 2021-03-02

## 2020-11-17 ENCOUNTER — OFFICE VISIT (OUTPATIENT)
Dept: CARDIOLOGY CLINIC | Age: 70
End: 2020-11-17
Payer: MEDICARE

## 2020-11-17 VITALS
OXYGEN SATURATION: 100 % | DIASTOLIC BLOOD PRESSURE: 82 MMHG | WEIGHT: 233.8 LBS | HEIGHT: 65 IN | HEART RATE: 63 BPM | BODY MASS INDEX: 38.95 KG/M2 | RESPIRATION RATE: 16 BRPM | SYSTOLIC BLOOD PRESSURE: 120 MMHG

## 2020-11-17 DIAGNOSIS — I10 BENIGN ESSENTIAL HYPERTENSION: ICD-10-CM

## 2020-11-17 DIAGNOSIS — M79.89 LEG SWELLING: Primary | ICD-10-CM

## 2020-11-17 DIAGNOSIS — E78.2 MIXED HYPERLIPIDEMIA: ICD-10-CM

## 2020-11-17 DIAGNOSIS — I48.0 PAROXYSMAL ATRIAL FIBRILLATION (HCC): ICD-10-CM

## 2020-11-17 DIAGNOSIS — R52 PAIN: ICD-10-CM

## 2020-11-17 PROCEDURE — 99204 OFFICE O/P NEW MOD 45 MIN: CPT | Performed by: INTERNAL MEDICINE

## 2020-11-17 PROCEDURE — 3017F COLORECTAL CA SCREEN DOC REV: CPT | Performed by: INTERNAL MEDICINE

## 2020-11-17 PROCEDURE — G8752 SYS BP LESS 140: HCPCS | Performed by: INTERNAL MEDICINE

## 2020-11-17 PROCEDURE — 1101F PT FALLS ASSESS-DOCD LE1/YR: CPT | Performed by: INTERNAL MEDICINE

## 2020-11-17 PROCEDURE — G8427 DOCREV CUR MEDS BY ELIG CLIN: HCPCS | Performed by: INTERNAL MEDICINE

## 2020-11-17 PROCEDURE — G8536 NO DOC ELDER MAL SCRN: HCPCS | Performed by: INTERNAL MEDICINE

## 2020-11-17 PROCEDURE — G9717 DOC PT DX DEP/BP F/U NT REQ: HCPCS | Performed by: INTERNAL MEDICINE

## 2020-11-17 PROCEDURE — G0463 HOSPITAL OUTPT CLINIC VISIT: HCPCS | Performed by: INTERNAL MEDICINE

## 2020-11-17 PROCEDURE — G8417 CALC BMI ABV UP PARAM F/U: HCPCS | Performed by: INTERNAL MEDICINE

## 2020-11-17 PROCEDURE — G8754 DIAS BP LESS 90: HCPCS | Performed by: INTERNAL MEDICINE

## 2020-11-17 PROCEDURE — G8400 PT W/DXA NO RESULTS DOC: HCPCS | Performed by: INTERNAL MEDICINE

## 2020-11-17 PROCEDURE — 1090F PRES/ABSN URINE INCON ASSESS: CPT | Performed by: INTERNAL MEDICINE

## 2020-11-17 RX ORDER — HYDROCODONE BITARTRATE AND ACETAMINOPHEN 5; 325 MG/1; MG/1
TABLET ORAL
Refills: 0 | OUTPATIENT
Start: 2020-11-17 | End: 2020-11-20

## 2020-11-17 RX ORDER — POLYETHYLENE GLYCOL 3350, SODIUM SULFATE ANHYDROUS, SODIUM BICARBONATE, SODIUM CHLORIDE, POTASSIUM CHLORIDE 227.1; 21.5; 6.36; 5.53; .754 G/L; G/L; G/L; G/L; G/L
POWDER, FOR SOLUTION ORAL
COMMUNITY
Start: 2020-10-22 | End: 2020-12-30 | Stop reason: CLARIF

## 2020-11-17 RX ORDER — OMEPRAZOLE 40 MG/1
CAPSULE, DELAYED RELEASE ORAL
COMMUNITY
Start: 2020-09-24

## 2020-11-17 RX ORDER — HYDROCODONE BITARTRATE AND ACETAMINOPHEN 5; 325 MG/1; MG/1
1 TABLET ORAL
Qty: 30 TAB | Refills: 0 | Status: SHIPPED | OUTPATIENT
Start: 2020-11-17 | End: 2021-03-08 | Stop reason: SDUPTHER

## 2020-11-17 RX ORDER — DICLOFENAC SODIUM 10 MG/G
GEL TOPICAL
COMMUNITY
Start: 2020-09-21 | End: 2021-01-21

## 2020-11-17 RX ORDER — TIMOLOL MALEATE 5 MG/ML
SOLUTION/ DROPS OPHTHALMIC
COMMUNITY
Start: 2020-08-25

## 2020-11-17 NOTE — TELEPHONE ENCOUNTER
----- Message from Chayo Garner sent at 11/17/2020  3:06 PM EST -----  Regarding: Dr Enrique Colbert  Medication Refill    Caller (if not patient):n/a      Relationship of caller (if not patient): n/a      Best contact number(s):[phone]      Name of medication and dosage if known: hydrocodone       Is patient out of this medication (yes/no): no, 2 days left      Pharmacy name: [facility]    Pharmacy listed in chart? (yes/no): yes  Pharmacy phone number:n/a      Message from [facility]

## 2020-11-17 NOTE — PROGRESS NOTES
1. Have you been to the ER, urgent care clinic since your last visit? Hospitalized since your last visit? No    2. Have you seen or consulted any other health care providers outside of the [address] [place] [address] since your last visit? Include any pap smears or colon screening. No    Chief Complaint   Patient presents with    Leg Swelling    Establish Care     new pt     Leg swelling:   Onset x 2 years ago  Pt c/o swelling to L leg; pain at times  Denies numbness/tingling; experiences cramping at times, but getting worse  Reports leg weakness and losing balance  Reports hx of RA

## 2020-11-18 DIAGNOSIS — E55.9 VITAMIN D DEFICIENCY: ICD-10-CM

## 2020-11-18 RX ORDER — ERGOCALCIFEROL 1.25 MG/1
CAPSULE ORAL
Qty: 4 CAP | Refills: 0 | Status: SHIPPED | OUTPATIENT
Start: 2020-11-18 | End: 2020-12-14

## 2020-11-20 ENCOUNTER — ANTI-COAG VISIT (OUTPATIENT)
Dept: CARDIOLOGY CLINIC | Age: 70
End: 2020-11-20
Payer: MEDICARE

## 2020-11-20 DIAGNOSIS — I48.0 PAROXYSMAL ATRIAL FIBRILLATION (HCC): ICD-10-CM

## 2020-11-20 DIAGNOSIS — Z79.01 LONG TERM (CURRENT) USE OF ANTICOAGULANTS: Primary | ICD-10-CM

## 2020-11-20 LAB
INR BLD: 2.3 (ref 1–1.5)
PT POC: 27.5 SECONDS (ref 9.1–12)
VALID INTERNAL CONTROL?: YES

## 2020-11-20 PROCEDURE — 85610 PROTHROMBIN TIME: CPT | Performed by: INTERNAL MEDICINE

## 2020-11-20 NOTE — PROGRESS NOTES
A full discussion of the nature of anticoagulants has been carried out. A benefit risk analysis has been presented to the patient, so that they understand the justification for choosing anticoagulation at this time. The need for frequent and regular monitoring, precise dosage adjustment and compliance is stressed. Side effects of potential bleeding are discussed. The patient should avoid any OTC items containing aspirin, naproxen or ibuprofen, and should avoid great swings in general diet. Avoid alcohol consumption. Advised to notify the office if antibiotic or steroid therapy is initiated. Call if any signs of abnormal bleeding are present. Next PT/INR test in 1 week after colonoscopy.

## 2020-11-21 ENCOUNTER — HOSPITAL ENCOUNTER (OUTPATIENT)
Dept: LAB | Age: 70
Discharge: HOME | End: 2020-11-21
Payer: MEDICARE

## 2020-11-21 ENCOUNTER — TRANSCRIBE ORDER (OUTPATIENT)
Dept: REGISTRATION | Age: 70
End: 2020-11-21

## 2020-11-21 DIAGNOSIS — U07.1 COVID-19: ICD-10-CM

## 2020-11-21 DIAGNOSIS — U07.1 COVID-19: Primary | ICD-10-CM

## 2020-11-21 PROCEDURE — 87635 SARS-COV-2 COVID-19 AMP PRB: CPT

## 2020-11-22 LAB — SARS-COV-2, COV2NT: NOT DETECTED

## 2020-11-23 NOTE — PROGRESS NOTES
[facility] - [place], [place]  Endoscopy Preprocedure Instructions      1. On the day of your surgery, please report to registration located on the 2nd floor of the  [facility]. yes    2. You must have a responsible adult to drive you to the hospital, stay at the hospital during your procedure and drive you home. If they leave your procedure will not be started (no exceptions). yes    3. Do not have anything to eat or drink (including water, gum, mints, coffee, and juice) after midnight. This does not apply to the medications you were instructed to take by your physician. yesIf you are currently taking Plavix, Coumadin, Aspirin, or other blood-thinning agents, contact your physician for special instructions. yes,    4. If you are having a procedure that requires bowel prep: We recommend that you have only clear liquids the day before your procedure and begin your bowel prep by 5:00 pm.  You may continue to drink clear liquids until midnight. If for any reason you are not able to complete your prep please notify your physician immediately. yes    5. Have a list of all current medications, including vitamins, herbal supplements and any other over the counter medications. yes    6. If you wear glasses, contacts, dentures and/or hearing aids, they may be removed prior to procedure, please bring a case to store them in. yes    7. You should understand that if you do not follow these instructions your procedure may be cancelled. If your physical condition changes (I.e. fever, cold or flu) please contact your doctor as soon as possible. 8. It is important that you be on time.   If for any reason you are unable to keep your appointment please call )- the day of or your physicians office prior to your scheduled procedure

## 2020-11-25 ENCOUNTER — ANESTHESIA (OUTPATIENT)
Dept: ENDOSCOPY | Age: 70
End: 2020-11-25
Payer: MEDICARE

## 2020-11-25 ENCOUNTER — ANESTHESIA EVENT (OUTPATIENT)
Dept: ENDOSCOPY | Age: 70
End: 2020-11-25
Payer: MEDICARE

## 2020-11-25 ENCOUNTER — TELEPHONE (OUTPATIENT)
Dept: SURGERY | Age: 70
End: 2020-11-25

## 2020-11-25 ENCOUNTER — HOSPITAL ENCOUNTER (OUTPATIENT)
Age: 70
Setting detail: OUTPATIENT SURGERY
Discharge: HOME | End: 2020-11-25
Attending: SPECIALIST | Admitting: SPECIALIST
Payer: MEDICARE

## 2020-11-25 VITALS
HEART RATE: 76 BPM | OXYGEN SATURATION: 100 % | HEIGHT: 65 IN | TEMPERATURE: 97.8 F | SYSTOLIC BLOOD PRESSURE: 117 MMHG | BODY MASS INDEX: 38.2 KG/M2 | WEIGHT: 229.28 LBS | RESPIRATION RATE: 15 BRPM | DIASTOLIC BLOOD PRESSURE: 71 MMHG

## 2020-11-25 PROCEDURE — 74011250636 HC RX REV CODE- 250/636: Performed by: NURSE ANESTHETIST, CERTIFIED REGISTERED

## 2020-11-25 PROCEDURE — 88305 TISSUE EXAM BY PATHOLOGIST: CPT

## 2020-11-25 PROCEDURE — 77030021593 HC FCPS BIOP ENDOSC BSC -A: Performed by: SPECIALIST

## 2020-11-25 PROCEDURE — 77030013992 HC SNR POLYP ENDOSC BSC -B: Performed by: SPECIALIST

## 2020-11-25 PROCEDURE — 74011250636 HC RX REV CODE- 250/636: Performed by: SPECIALIST

## 2020-11-25 PROCEDURE — 76040000007: Performed by: SPECIALIST

## 2020-11-25 PROCEDURE — 2709999900 HC NON-CHARGEABLE SUPPLY: Performed by: SPECIALIST

## 2020-11-25 PROCEDURE — 74011000250 HC RX REV CODE- 250: Performed by: NURSE ANESTHETIST, CERTIFIED REGISTERED

## 2020-11-25 PROCEDURE — 76060000032 HC ANESTHESIA 0.5 TO 1 HR: Performed by: SPECIALIST

## 2020-11-25 RX ORDER — MIDAZOLAM HYDROCHLORIDE 1 MG/ML
.25-5 INJECTION, SOLUTION INTRAMUSCULAR; INTRAVENOUS AS NEEDED
Status: DISCONTINUED | OUTPATIENT
Start: 2020-11-25 | End: 2020-11-25 | Stop reason: HOSPADM

## 2020-11-25 RX ORDER — PROPOFOL 10 MG/ML
INJECTION, EMULSION INTRAVENOUS AS NEEDED
Status: DISCONTINUED | OUTPATIENT
Start: 2020-11-25 | End: 2020-11-25 | Stop reason: HOSPADM

## 2020-11-25 RX ORDER — DEXTROMETHORPHAN/PSEUDOEPHED 2.5-7.5/.8
1.2 DROPS ORAL
Status: DISCONTINUED | OUTPATIENT
Start: 2020-11-25 | End: 2020-11-25 | Stop reason: HOSPADM

## 2020-11-25 RX ORDER — FENTANYL CITRATE 50 UG/ML
25 INJECTION, SOLUTION INTRAMUSCULAR; INTRAVENOUS AS NEEDED
Status: DISCONTINUED | OUTPATIENT
Start: 2020-11-25 | End: 2020-11-25 | Stop reason: HOSPADM

## 2020-11-25 RX ORDER — NALOXONE HYDROCHLORIDE 0.4 MG/ML
0.4 INJECTION, SOLUTION INTRAMUSCULAR; INTRAVENOUS; SUBCUTANEOUS
Status: DISCONTINUED | OUTPATIENT
Start: 2020-11-25 | End: 2020-11-25 | Stop reason: HOSPADM

## 2020-11-25 RX ORDER — PROPOFOL 10 MG/ML
INJECTION, EMULSION INTRAVENOUS
Status: DISCONTINUED | OUTPATIENT
Start: 2020-11-25 | End: 2020-11-25 | Stop reason: HOSPADM

## 2020-11-25 RX ORDER — FLUMAZENIL 0.1 MG/ML
0.2 INJECTION INTRAVENOUS
Status: DISCONTINUED | OUTPATIENT
Start: 2020-11-25 | End: 2020-11-25 | Stop reason: HOSPADM

## 2020-11-25 RX ORDER — SODIUM CHLORIDE 9 MG/ML
50 INJECTION, SOLUTION INTRAVENOUS CONTINUOUS
Status: DISCONTINUED | OUTPATIENT
Start: 2020-11-25 | End: 2020-11-25 | Stop reason: HOSPADM

## 2020-11-25 RX ORDER — LIDOCAINE HYDROCHLORIDE 20 MG/ML
INJECTION, SOLUTION EPIDURAL; INFILTRATION; INTRACAUDAL; PERINEURAL AS NEEDED
Status: DISCONTINUED | OUTPATIENT
Start: 2020-11-25 | End: 2020-11-25 | Stop reason: HOSPADM

## 2020-11-25 RX ADMIN — LIDOCAINE HYDROCHLORIDE 50 MG: 20 INJECTION, SOLUTION INTRAVENOUS at 08:21

## 2020-11-25 RX ADMIN — PROPOFOL 120 MCG/KG/MIN: 10 INJECTION, EMULSION INTRAVENOUS at 08:20

## 2020-11-25 RX ADMIN — PROPOFOL 80 MG: 10 INJECTION, EMULSION INTRAVENOUS at 08:20

## 2020-11-25 RX ADMIN — SODIUM CHLORIDE: 9 INJECTION, SOLUTION INTRAVENOUS at 07:50

## 2020-11-25 NOTE — PROGRESS NOTES
Endoscopy discharge instructions have been reviewed and given to patient. The patient verbalized understanding and acceptance of instructions. Dr. Virgie Nunes discussed with spouse procedure findings and next steps.

## 2020-11-25 NOTE — INTERVAL H&P NOTE
Pre-Endoscopy H&P Update Chief complaint/HPI/ROS:  The indication for the procedure, the patient's history and the patient's current medications are reviewed prior to the procedure and that data is reported on the H&P to which this document is attached. Any significant complaints with regard to organ systems will be noted, and if not mentioned then a review of systems is not contributory. Past Medical History:  
Diagnosis Date  Arrhythmia 2014  
 atrial fibrillation 2014, sick sinus syndrome: [facility]  
 Arthritis  Bipolar affect, depressed ([address]  Bipolar affective disorder ([address] 3/31/2010  Chronic pain 2018  
 right shoulder  Colon polyps 03/31/2010  
 also 5/2018: colon polyps  Depression  Diarrhea 07/19/2013 D/t alpha gal allergy says patient  Epigastric pain 6/5/2015  GERD (gastroesophageal reflux disease)   
 occasiional only; controlled with med  Hemispheric carotid artery syndrome No stroke Neuro: Dariana Weaver  (CT scan head/neck negative 4/2018 in [facility])  Hyperlipidemia 3/31/2010  Hypothyroidism 3/31/2010  Long term current use of anticoagulant therapy  Lupus [facility])   
 patient denies-says she has RA  
 Migraine 03/31/2010  
 has them x2 a month: last one 5/23/2018  Psychotic disorder ([facility]  S/P ablation of atrial fibrillation 05/13/2014  S/P cardiac pacemaker procedure 9/16/2014 9/16/14 [employer] MRI compatible dual chamber pacemaker implant  Sleep apnea   
 unable to tolerate CPAP  Stool color black  Thyroid disease  Tick-borne disease Alpha Gal allergy: no eat pork, beef, milk (Meat allergy showed up on a allergy test)  Umbilical hernia without obstruction and without gangrene 6/22/2020  Urge incontinence 3/31/2010  Vitamin D deficiency 3/31/2015 Past Surgical History:  
Procedure Laterality Date  CARDIAC SURG PROCEDURE UNLIST  5/2014  
 ablation  COLONOSCOPY N/A 4/27/2018 COLONOSCOPY performed by Seferino Sutton MD at [address] [place] [address]  4/27/2018  COLONOSCOPY,RENALDO MITCHELL,SNARE  4/27/2018  HX BUNIONECTOMY  HX GI  2010, 5/2018  
 egd, colonoscopy  HX HYSTERECTOMY  HX ORTHOPAEDIC  2000's  
 removed bone spurs from R & L hand  HX ORTHOPAEDIC  1990s? left bunionectomy  HX OTHER SURGICAL  07/2014  
 2 shocks to heart & ablations  HX PACEMAKER  2014 On the right side  HX PACEMAKER PLACEMENT  2015  HX SHOULDER REPLACEMENT Right 2017  HX TONSILLECTOMY  15 yrs old Social  
Social History Tobacco Use  Smoking status: Never Smoker  Smokeless tobacco: Never Used Substance Use Topics  Alcohol use: Yes Alcohol/week: 1.0 standard drinks Types: 1 Cans of beer per week Frequency: Monthly or less Comment: weekly Family History Problem Relation Age of Onset  Arrhythmia Mother   
     afib  Stroke Mother  Heart Failure Mother  Colon Cancer Father  Heart Disease Father  Cancer Father   
     colon cancer  Arrhythmia Brother   
     afib  Asthma Brother  COPD Brother Allergies Allergen Reactions  Latex Swelling Swelling of lips says patient  Beef Containing Products Nausea and Vomiting  
  diarrhea  Ciprofloxacin Unknown (comments)  Pork Derived (Porcine) Nausea and Vomiting  
  diarrhea  Bovine Cartilage Nausea and Vomiting  
  diarrhea  Milk Nausea and Vomiting  
  diarrhea 
diarrhea  Sulfa (Sulfonamide Antibiotics) Hives  Xarelto [Rivaroxaban] Other (comments) GI problems Prior to Admission Medications Prescriptions Last Dose Informant Patient Reported? Taking? Golytely 227.1-21.5-6.36 gram pwpk 11/24/2020 at Unknown time  Yes Yes Sig: TAKE AS DIRECTED HYDROcodone-acetaminophen (NORCO) 5-325 mg per tablet 11/24/2020 at Unknown time  No Yes Sig: Take 1 Tab by mouth every eight (8) hours as needed for Pain for up to 30 days. Max Daily Amount: 3 Tabs. alprazolam (XANAX) 0.5 mg tablet 11/24/2020 at Unknown time  Yes Yes Sig: Take 0.5 mg by mouth nightly. betamethasone valerate (VALISONE) 0.1 % topical cream 11/24/2020 at Unknown time  No Yes Sig: APPLY TO AFFECTED AREA TWICE A DAY  
cyanocobalamin (Vitamin B-12) 100 mcg tablet 11/24/2020 at Unknown time  Yes Yes Sig: Take 100 mcg by mouth daily. diclofenac (Voltaren) 1 % gel 11/24/2020 at Unknown time  Yes Yes Sig: Apply two grams by topical route four times daily to the affected area (s). ergocalciferol (ERGOCALCIFEROL) 1,250 mcg (50,000 unit) capsule 11/24/2020 at Unknown time  No Yes Sig: TAKE 1 CAPSULE BY MOUTH ONE TIME PER WEEK  
hydrOXYchloroQUINE (PLAQUENIL) 200 mg tablet 11/24/2020 at Unknown time  Yes Yes Sig: TAKE 2 TABLETS BY MOUTH EVERY DAY  
hydrOXYzine HCL (ATARAX) 25 mg tablet 11/24/2020 at Unknown time  No Yes Sig: TAKE 1 TAB BY MOUTH THREE (3) TIMES DAILY AS NEEDED FOR ITCHING FOR UP TO 10 DAYS. latanoprost (XALATAN) 0.005 % ophthalmic solution 11/24/2020 at Unknown time  Yes Yes Sig: Administer 1 Drop to both eyes two (2) times a day. levothyroxine (SYNTHROID) 50 mcg tablet 11/24/2020 at Unknown time  No Yes Sig: TAKE 1 TABLET BY MOUTH EVERY DAY  
lithium 300 mg tablet 11/24/2020 at Unknown time  Yes Yes Sig: Take 300 mg by mouth three (3) times daily. omeprazole (PRILOSEC) 40 mg capsule 11/24/2020 at Unknown time  Yes Yes Sig: TAKE 1 CAPSULE BY MOUTH EVERY DAY IN THE MORNING  
pregabalin (LYRICA) 100 mg capsule 11/24/2020 at Unknown time  No Yes Sig: TAKE 1 CAPSULE BY MOUTH TWICE A DAY  
primidone (MYSOLINE) 50 mg tablet 11/24/2020 at Unknown time  No Yes Sig: TAKE 1/2 TABLET BY MOUTH NIGHTLY  
tiZANidine (ZANAFLEX) 2 mg tablet Not Taking at Unknown time  No No  
Sig: Take 1 Tab by mouth three (3) times daily. timolol (TIMOPTIC) 0.5 % ophthalmic solution 11/24/2020 at Unknown time  Yes Yes Sig: INSTILL 1 DROP 1 TIME EVERY DAY INTO BOTH EYES EVERY MORNING  
topiramate (Topamax) 100 mg tablet 11/24/2020 at Unknown time  No Yes Sig: Take 1 Tab by mouth two (2) times a day. trimethoprim (TRIMPEX) 100 mg tablet 11/24/2020 at Unknown time  Yes Yes Sig: TAKE 1 TABLET BY MOUTH EVERY DAY  
warfarin (COUMADIN) 5 mg tablet 11/21/2020  No Yes Sig: TAKE 3 TABLETS BY MOUTH ON TUESDAY, FRIDAY AND 2 TABS ALL OTHER DAYS. zolpidem (AMBIEN) 10 mg tablet 11/24/2020 at Unknown time  Yes Yes Sig: zolpidem 10 mg tablet Facility-Administered Medications: None PHYSICAL EXAM:  The patient is examined immediately prior to the procedure. Visit Vitals BP (!) 112/52 Pulse 76 Temp 97.9 °F (36.6 °C) Resp 17 Ht 5' 5\" (1.651 m) Wt 104 kg (229 lb 4.5 oz) SpO2 99% Breastfeeding No  
BMI 38.15 kg/m² Gen: Appears comfortable, no distress. Pulm: comfortable respirations with no abnormal audible breath sounds HEART: well perfused, no abnormal audible heart sounds GI: abdomen flat. PLAN:  Informed consent discussion held, patient afforded an opportunity to ask questions and all questions answered. After being advised of the risks, benefits, and alternatives, the patient requested that we proceed and indicated so on a written consent form. Will proceed with procedure as planned.  
Brayden Sequeira MD

## 2020-11-25 NOTE — H&P
Date: 2020 10:45 AM   Patient Name: Marni Shrestha   Account #: [id number]    Gender: Female    (age): 1950 (70)       Provider:     Gladys Maynard PA-C        Referring Physician:     Grace Barnhart MD  [address]  [phone] (phone)  [phone] (fax)        Chief Complaint: difficulty swallowing, diarrhea           History of Present Illness:   Patient of Dr. Alfred Hickey, last seen by Gio George PA-C in 2020 for diarrhea, now presents for the above problems. At her last OV, she was given an empirical treatment with a 14-day course of Xifaxan. She had taken it in the past with some relief, but this time the diarrhea recurred as soon as she finished the course. She is generally having 2-3 BMs/day, with loose stools all the time. This morning she saw yellow stool, which she has never seen before, but she denies oily or greasy appearance to it. She denies hematochezia and melena as well. She has occasional abdominal cramping attributed to her IBS, but denies actual abdominal pain. Last colonoscopy was screening in 2018 for FHx of CRC - two ascending colon polyps, HP and TA, plan for repeat in 3 yrs. No random biopsies were taken. She also reports that she has difficulty swallowing things like bread, chicken, anything dense or dry. It feels like it sticks in the back of the throat. This has been a recent recurrence. She has to regurgitate. She has nausea occasionally, but pit is random. She denies vomiting. She has heartburn and indigestion. She stopped taking Omeprazole  a while ago, but has been taking a lot of Pepto. She has lost about 5 lbs unintentionally over the course of about 2 months, without change in diet or exercise. Last EGD 2015 - esophageal dysmotility, saliva in the esophagus c/w that;  Empirical dilatation; erythema in duodenum but negative biopsies    She takes Warfarin for A. Fib. She has a pacemaker. Her cardiologist is Dr. Doroteo Beth.  ?Patient of Dr. Alfred Hickey, last seen by Gio George PA-C in 5/2020 for diarrhea, now presents for the above problems. At her last OV, she was given an empirical treatment with a 14-day course of Xifaxan. She had taken it in the past with some relief, but this time the diarrhea recurred as soon as she finished the course. She is generally having 2-3 BMs/day, with loose stools all the time. This morning she saw yellow stool, which she has never seen before, but she denies oily or greasy appearance to it. She denies hematochezia and melena as well. She has occasional abdominal cramping attributed to her IBS, but denies actual abdominal pain. Last colonoscopy was screening in 4/2018 for FHx of CRC - two ascending colon polyps, HP and TA, plan for repeat in 3 yrs. No random biopsies were taken. She also reports that she has difficulty swallowing things like bread, chicken, anything dense or dry. It feels like it sticks in the back of the throat. This has been a recent recurrence. She has to regurgitate. She has nausea occasionally, but pit is random. She denies vomiting. She has heartburn and indigestion. She stopped taking Omeprazole  a while ago, but has been taking a lot of Pepto. She has lost about 5 lbs unintentionally over the course of about 2 months, without change in diet or exercise. Last EGD 6/2015 - esophageal dysmotility, saliva in the esophagus c/w that;  Empirical dilatation; erythema in duodenum but negative biopsies    She takes Warfarin for A. Fib. She has a pacemaker.  Her cardiologist is Dr. Doroteo Beth.  ?       Past Medical History      Medical Conditions: Arthritis  Asthma  Atrial Fibrilation  Atrial Fibrillation  Bipolar disorder  Bradycardia  Name of blood thinner  Pacemaker  Thyroid disease  Thyroid problems   Surgical Procedures: R Shoulder Surgery  Hysterectomy  Surgery on broken wrist  a-fib ablation  pacemaker   Dx Studies: Abdominal U/S  CAT Scan  Colonoscopy, 04/27/2018  Endoscopy  Gastric Emptying Scan, \"gastroparesis\"   Medications: alprazolam 0.5 mg  cyanocobalamin (vitamin B-12) 1,000 mcg Take 1 tablet by mouth every morning  ergocalciferol (vitamin D2) 1,250 mcg (50,000 unit) Take 1 capsule by mouth once a week  hydroxychloroquine 200 mg Take 1 tablet by mouth twice a day  lithium carbonate 300 mg Take 3 tablet by mouth every night  Lyrica 200 mg Take 2 capsule by mouth twice a day  omeprazole 40 mg TAKE 1 CAPSULE BY MOUTH EVERY DAY IN THE MORNING  primidone 50 mg Take 1 tablet by mouth every night  Synthroid 50 mcg Take 1 tablet by mouth once a day  Topamax 100 mg Take 1 tablet by mouth every night  trimethoprim 100 mg Take 1 tablet by mouth every night  warfarin 5 mg TAKE 1 1/2 TABLETS MON, TUE, THUR, SAT AND 1 PILL ALL OTHER DAYS  Xanax 0.5 mg Take 1 tablet by mouth every night   Allergies: Alpha Gale  Latex  Sulfa (Sulfonamide Antibiotics)  trazodone   Immunizations: Influenza, seasonal, injectable, 10/01/2019  Flu vaccine, 2017      Social History      Alcohol: Alcohol consumption: Monthly. Consume Alcohol times a week. Other:__________________. Tobacco: Never smoker   Drugs: None   Exercise: Exercise less than 3 times a week. Walking/ Running. Caffeine: 3 cups. Daily. Marital Status:          Occupation:               Family History No history of Esophogeal Cancer, GI Cancers, IBD (Crohn's or UC), Liver disease  Father: Diagnosed with colon cancer, colon polyps; Mother: Diagnosed with colon polyps; Review of Systems:   Cardiovascular: Denies chest pain, irregular heart beat, palpitations, peripheral edema, syncope, Sweats. Constitutional: Denies fatigue, fever, loss of appetite, weight gain, weight loss. ENMT: Denies nose bleeds, sore throat, hearing loss. Endocrine: Denies excessive thirst, heat intolerance. Eyes: Denies loss of vision.    Gastrointestinal: Presents suffers from change in bowel habits, constipation, diarrhea. Denies abdominal pain, abdominal swelling, Bloating/gas, heartburn, jaundice, nausea, rectal bleeding, stomach cramps, vomiting, dysphagia, rectal pain, Stool incontinence, hematemesis. Genitourinary: Denies dark urine, dysuria, frequent urination, hematuria, incontinence. Hematologic/Lymphatic: Denies easy bruising, prolonged bleeding. Integumentary: Denies itching, rashes, sun sensitivity. Musculoskeletal: Presents suffers from arthritis, back pain, stiffness. Denies gout, joint pain, muscle weakness. Neurological: Denies dizziness, fainting, frequent headaches, memory loss. Psychiatric: Presents suffers from anxiety, depression. Denies difficulty sleeping, hallucinations, nervousness, panic attacks, paranoia. Respiratory: Denies cough, dyspnea, wheezing. Vital Signs:   BP  (mmHg)  Pulse  (ppm) Weight (lbs/oz) Height (ft/in) BMI Temp   118/96 86 231 / 2 5 / 5 38.46 96.8 (F)      Physical Exam:   Constitutional:      Appearance: No distress, appears comfortable. Communication: Understands/receives spoken information. Skin:      Inspection: No rash, no jaundice. Palpation: No subcutaneous nodules. Head/face: Inspection: Normacephalic, atraumatic. Palpation: normal.   Eyes:      Conjunctivae/lids: Normal.   Pupils/irises: Pupils equal, round and normal.   ENMT:      External: Normal.   Hearing: Normal.   Neck:      Neck: Normal appearance, trachea midline. Jugular veins: No JVD noted. Respiratory:      Effort: Normal respiratory effort, comfortable, speaks in complete sentences. Auscultation: normal breath sounds, no rubs, wheezes or rhonchi. Cardiovascular:      Palpation: normal rythym. Auscultation: normal, S1 and S2, no gallops , no rubs or murmurs . Gastrointestinal/Abdomen:      Abdomen: non-distended, nontender, soft, nl BS. Liver/Spleen: normal, normal size, Liver size and consistency normal, spleen is non-palpable.    Musculoskeletal: Gait/station: normal.   Back: no kyphosis or scoliosis. Psychiatric:      Judgment/insight: Normal, normal judgement, normal insight. Orientation: oriented to time, space and person. Memory: normal short term memory, normal long term memory, no memory loss. Mood and affect: Normal mood, affect full, no evidence of depression, anxiety or agitation. Lymphatic:      Neck: No lymphadenopathy in the cervical chain. Other: No periumbilic lymphadenopathy.         Lab Results:      Test 5/16/2019 8/8/2016 7/5/2016 10/13/2014 8/20/2014 Units Limits   GI Profile, Stool, PCR          Adenovirus F 40/41 Not Detected      Not Detected   Astrovirus Not Detected      Not Detected   C difficile toxin A/B Not Detected      Not Detected   Campylobacter Not Detected      Not Detected   Cryptosporidium Not Detected      Not Detected   Cyclospora cayetanensis Not Detected      Not Detected   E coli I396 Not applicable      Not Detected   Entamoeba histolytica Not Detected      Not Detected   Enteroaggregative E coli Not Detected      Not Detected   Enteropathogenic E coli Not Detected      Not Detected   Enterotoxigenic E coli Not Detected      Not Detected   Giardia lamblia Not Detected      Not Detected   Norovirus GI/GII Not Detected      Not Detected   Plesiomonas shigelloides Not Detected      Not Detected   Rotavirus A Not Detected      Not Detected   Salmonella Not Detected      Not Detected   Sapovirus Not Detected      Not Detected   Shiga-toxin-producing E coli Not Detected      Not Detected   Shigella/Enteroinvasive E coli Not Detected      Not Detected   Vibrio Not Detected      Not Detected   Vibrio cholerae Not Detected      Not Detected   Yersinia enterocolitica Not Detected      Not Detected   C difficile Toxins A+B, EIA          C difficile Toxins A+B, EIA  Negative   Negative  Negative   Chromogranin A          Chromogranin A   1   nmol/L 0 - 5   Food Allergy Profile          Class Description  [facility] SPRCS       Z398-CgU Egg White  NSER 0.52   kU/L Class I   W034-GxR Milk  NSER 0.62   kU/L Class II   L597-MqU Codfish  NSER <0.10   kU/L Class 0   B477-RtD Wheat  NSER <0.10   kU/L Class 0   C198-JnE Corn  NSER <0.10   kU/L Class 0   C825-WzG Sesame Seed  NSER <0.10   kU/L Class 0   G472-NvT Peanut  NSER <0.10   kU/L Class 0   T129-WnW Soybean  NSER <0.10   kU/L Class 0   S002-GlI Shrimp  NSER <0.10   kU/L Class 0   K117-KyX Clam  NSER <0.10   kU/L Class 0   K854-UeE Cotton[place]  NSER <0.10   kU/L Class 0   D122-LzK Scallop  NSER <0.10   kU/L Class 0   Allergen Profile, Food-Meat          H625-UxL Pork  NSER 0.31   kU/L > 0   V090-UzO Beef  NSER 0.71   kU/L Class II   B170-SuO Chicken  NSER <0.10   kU/L Class 0   Fecal Fat, Qualitative          Fats, Neutral    Normal      Fats, Total    Normal      Gastrin, Serum          Gastrin, Serum     199 pg/mL 0 - 115   Giardia, EIA, Ova/Parasite          Giardia lamblia Ag, EIA    Negative   Negative   Ova + Parasite Exam    Final report      Ova + Parasite Exam          Ova + Parasite Exam  Final report        Pancreatic Elastase, Fecal          Pancreatic Elastase, Fecal    356  ug Elast./g >200   Request Problem          Request Problem  NSER        Result          Result 1  NOCP1        Result 1    NOCP1      TSH          TSH   1.500   uIU/mL 0.45 - 4.5     Impressions: Dysphagia  Diarrhea - chronic  Personal history of colonic polyps  GERD  Longterm [current] use of anticoagulants? ? Dysphagia? ?  ? ? Diarrhea? - chronic?  ? ?Personal history of colonic polyps? ?  ? ?GERD? ?  ? ? Longterm [current] use of anticoagulants? Assessment: 77-yo woman with a h/o esophageal dysmotility, but with dysphagia relief after dilatation, last done in 2015 with only a recent recurrence of problems. Plan for repeat EGD.   She has chronic diarrhea likely IBS-D, but she did not have results with a course of Xifaxan like she had in the past.  This is not likely microscopic colitis, but I think random biopsies are warranted to rule it out. She is due for her next screening colonoscopy in 4/2021 anyway, so we will arrange for a colonoscopy now particularly since we will be holding the Coumadin for the EGD. ? 77-yo woman with a h/o esophageal dysmotility, but with dysphagia relief after dilatation, last done in 2015 with only a recent recurrence of problems. Plan for repeat EGD. She has chronic diarrhea likely IBS-D, but she did not have results with a course of Xifaxan like she had in the past.  This is not likely microscopic colitis, but I think random biopsies are warranted to rule it out. She is due for her next screening colonoscopy in 4/2021 anyway, so we will arrange for a colonoscopy now particularly since we will be holding the Coumadin for the EGD. Plan: EGD with possible dilatation and biopsies  Colonoscopy with random biopsies  TriLyte With Flavor Packets 420 gram Take as directed  We will get clearance from Dr. Gemma Fonseca, cardiologist, to hold the Coumadin (Warfarin) prior to procedures. Renew omeprazole 40 mg TAKE 1 CAPSULE BY MOUTH EVERY DAY IN THE MORNING  Resume Omeprazole for heartburn and indigestion. I sent a new prescription. Follow-up post procedure? ?EGD with possible dilatation and biopsies? ?  ? ? Colonoscopy with random biopsies? ?  ? ? TriLyte With Flavor Packets? ?420 gram? ?Take as directed? ? ?  ? ? We will get clearance from Dr. Gemma Fonseca, cardiologist, to hold the Coumadin (Warfarin) prior to procedures. ? ? ? Renew ?omeprazole? 40 mg?TAKE 1 CAPSULE BY MOUTH EVERY DAY IN THE MORNING? ?  ? ? Resume Omeprazole for heartburn and indigestion. I sent a new prescription. ? ?  ? ? ? Follow-up post procedure? ? Risk & Medical Necessity: The patient requires Moderate to High Severity care for this visit. Diagnosis and management options are Extensive. The amount of data reviewed and/or ordered is Limited. The level of risk is Moderate.            Notes: Dr. Moira Carvajal was available for consultation during this visit. Yasir Carver PA-C     Electronically signed on 2020 11:42:10 AM by Dortha Rud, PA-C Addenda Marja Boxer.  Susan Kauffman, MRN [id number],  1950 IPP Follow Up, Friday, 2020                                                                                                                                                        New     Modify          Delete     Delete all     Edit Wording          Sign     [email or web address]

## 2020-11-25 NOTE — PERIOP NOTES
Received report from [address] [place] [address], see anesthesia note. Scopes all washed at bedside by Didi. Pt transferred to recovery and report given to [facility] regarding procedures, diagnosis, etc. Family updated on POC over phone by Dr. Dinorah Beltran. VSS, abdoman soft. Savory 20mm Greenlandic used for Esophageal dilation with good results.

## 2020-11-25 NOTE — ANESTHESIA PREPROCEDURE EVALUATION
Anesthetic History   No history of anesthetic complications            Review of Systems / Medical History  Patient summary reviewed and pertinent labs reviewed    Pulmonary        Sleep apnea (uses mouthpiece): No treatment           Neuro/Psych         Headaches (migraines) and psychiatric history    Comments: Bipolar Affective d/o Cardiovascular    Hypertension: well controlled        Dysrhythmias : atrial fibrillation  Pacemaker ( PM for SSS) and hyperlipidemia    Exercise tolerance: <4 METS  Comments: Not pacemaker dependent   GI/Hepatic/Renal     GERD ( occasional only)          Comments: Colon polyps Endo/Other      Hypothyroidism: well controlled  Morbid obesity and arthritis     Other Findings   Comments: Vitamin D Deficiency  Urge incontinence  Hemispheric carotid artery syndrome   Osteoarthritis of right shoulder           Physical Exam    Airway  Mallampati: I  TM Distance: 4 - 6 cm  Neck ROM: normal range of motion   Mouth opening: Normal     Cardiovascular  Regular rate and rhythm,  S1 and S2 normal,  no murmur, click, rub, or gallop  Rhythm: regular  Rate: normal      Pertinent negatives: No murmur   Dental  No notable dental hx       Pulmonary  Breath sounds clear to auscultation               Abdominal  GI exam deferred       Other Findings            Anesthetic Plan    ASA: 3  Anesthesia type: MAC          Induction: Intravenous  Anesthetic plan and risks discussed with: Patient

## 2020-11-25 NOTE — ANESTHESIA POSTPROCEDURE EVALUATION
Procedure(s):  COLONOSCOPY, EGD  ESOPHAGOGASTRODUODENOSCOPY (EGD)  ESOPHAGOGASTRODUODENAL (EGD) BIOPSY  ENDOSCOPIC POLYPECTOMY  COLON BIOPSY  ESOPHAGEAL DILATION. MAC    Anesthesia Post Evaluation      Multimodal analgesia: multimodal analgesia not used between 6 hours prior to anesthesia start to PACU discharge  Patient location during evaluation: PACU  Patient participation: complete - patient participated  Level of consciousness: awake  Pain management: adequate  Airway patency: patent  Anesthetic complications: no  Cardiovascular status: acceptable, blood pressure returned to baseline and hemodynamically stable  Respiratory status: acceptable  Hydration status: acceptable  Post anesthesia nausea and vomiting:  controlled      INITIAL Post-op Vital signs:   Vitals Value Taken Time   /79 11/25/2020  9:13 AM   Temp 36.6 °C (97.8 °F) 11/25/2020  8:58 AM   Pulse 75 11/25/2020  9:16 AM   Resp 16 11/25/2020  9:16 AM   SpO2 99 % 11/25/2020  9:16 AM   Vitals shown include unvalidated device data.

## 2020-11-25 NOTE — DISCHARGE INSTRUCTIONS
[address] [facility] D. Michael Leventhal, MD  [phone]      November 25, 2020    Rubén Terry  YOB: 1950    COLONOSCOPY DISCHARGE INSTRUCTIONS    If there is redness at IV site you should apply warm compress to area. If redness or soreness persist contact Dr. Michael Leventhal' or your primary care doctor. There may be a slight amount of blood passed from the rectum. Gaseous discomfort may develop, but walking, belching will help relieve this. You may not operate a vehicle for 12 hours  You may not operate machinery or dangerous appliances for rest of today  You may not drink alcoholic beverages for 12 hours  Avoid making any critical decisions for 24 hours    DIET:  You may resume your normal diet, but some patients find that heavy or large meals may lead to indigestion or vomiting. I suggest a light meal as first food intake. MEDICATIONS:  The use of some over-the-counter pain medication may lead to bleeding after colon biopsies or polyp removal.  Tylenol (also called acetaminophen) is safe to take even if you have had colonoscopy with polyp removal.  Based on the procedure you had today you may not safely take aspirin or aspirin-like products for the next ten (10) days. Remember that Tylenol (also called acetaminophen) is safe to take after colonoscopy even if you have had biopsies or polyps removed. ACTIVITY:  You may resume your normal household activities, but it is recommended that you spend the remainder of the day resting -  avoid any strenuous activity. CALL DR. Janett May' OFFICE IF:  Increasing pain, nausea, vomiting  Abdominal distension (swelling)  Significant new or increased bleeding (oral or rectal)  Fever/Chills  Chest pain/shortness of breath                       Additional instructions:   No aspirin 10 days. We found several polyps and removed them today.   We found that the esophagus and upper GI tract was healthy but we dilated the esophagus to see if that helps with your swallowing symptoms. I will reach out with the biopsy results in about a week. You should resume coumadin tonight as that will take several days to return to normal levels. Call if any bleeding develops. It was an honor to be your doctor today. Please let me or my office staff know if you have any feedback about today's procedure. Collins Reilly MD    Colonoscopy saves lives, and can prevent colon cancer. Everyone aged 48 or older needs colonoscopy.   Tell your family and friends: get the test!

## 2020-11-25 NOTE — PROCEDURES
[address] [facility] DARRIAN Potter MD  [phone]      2020    Esophagogastroduodenoscopy & Colonoscopy Procedure Note  Germania Guzman  : 1950  [facility] Medical Record Number: [id number]      Indications:    Dysphagia/odynophagia Personal history of colon polyps (screening only)  and Screening Colonoscopy   Referring Physician:  Naif Sahu MD  Anesthesia/Sedation: Conscious Sedation/Moderate Sedation/MAC  Endoscopist:  Dr. Rolando Beyer  Complications:  None  Estimated Blood Loss:  None    Permit:  The indications, risks, benefits and alternatives were reviewed with the patient or their decision maker who was provided an opportunity to ask questions and all questions were answered. The specific risks of esophagogastroduodenoscopy with conscious sedation were reviewed, including but not limited to anesthetic complication, bleeding, adverse drug reaction, missed lesion, infection, IV site reactions, and intestinal perforation which would lead to the need for surgical repair. Alternatives to EGD and colonoscopy including radiographic imaging, observation without testing, or laboratory testing were reviewed as well as the limitations of those alternatives discussed. After considering the options and having all their questions answered, the patient or their decision maker provided both verbal and written consent to proceed. -----------EGD------------   Procedure in Detail:  After obtaining informed consent, positioning of the patient in the left lateral decubitus position, and conduction of a pre-procedure pause or \"time out\" the endoscope was introduced into the mouth and advanced to the duodenum. A careful inspection was made, and findings or interventions are described below.     Findings:   Esophagus:normal.  Cold forceps biopsies from mid esophagus and EG junction are obtained for histopathology. Empiric dilation with 20mm savary over guidewire. Stomach: normal   Duodenum/jejunum: normal.  A biopsy was taken from the duodenal mucosa for evaluation of villous atrophy or giardiasis. Hemostasis is confirmed from biopsy sites.          ----------Colonoscopy-----------    Procedure in Detail:  After obtaining informed consent, positioning of the patient in the left lateral decubitus position, and conduction of a pre-procedure pause or \"time out\" the endoscope was introduced into the anus and advanced to the cecum, which was identified by the ileocecal valve and appendiceal orifice. The quality of the colonic preparation was good. A careful inspection was made as the colonoscope was withdrawn, findings and interventions are described below. Findings: Three polyps today: transverse 6mm, ascending 6mm and 7mm. All taken with cold snare, retrieved, and hemostasis confirmed. There is diverticulosis in the sigmoid colon without complications such as bleeding, inflammatory change, or luminal narrowing. Random biopsies of normal appearing colon mucosa are obtained for histology. ------------------------------  Specimens:    See above    Complications:   None; patient tolerated the procedure well. Impressions:  EGD:  Normal EGD with empiric dilation and biopsies taken  Colonoscopy: Colon polyps, diverticulosis. Recommendations:     - Await pathology. Thank you for entrusting me with this patient's care. Please do not hesitate to contact me with any questions or if I can be of assistance with any of your other patients' GI needs. Signed By: Sarah Parr MD                        November 25, 2020    Surgical assistant none. Implants none unless specified.

## 2020-11-28 ENCOUNTER — HOSPITAL ENCOUNTER (OUTPATIENT)
Dept: PREADMISSION TESTING | Age: 70
Discharge: HOME | End: 2020-11-28
Payer: MEDICARE

## 2020-11-28 NOTE — PROGRESS NOTES
Placed call to patient when she did not arrive during testing hours for COVID test, patient states that she was never told that she had a test scheduled.   Patient instructed to come in tomorrow for testing

## 2020-11-29 PROCEDURE — 87635 SARS-COV-2 COVID-19 AMP PRB: CPT

## 2020-11-30 LAB
HEALTH STATUS, XMCV2T: NORMAL
SARS-COV-2, COV2NT: NOT DETECTED
SOURCE, COVRS: NORMAL
SPECIMEN SOURCE, FCOV2M: NORMAL
SPECIMEN TYPE, XMCV1T: NORMAL

## 2020-12-01 ENCOUNTER — TELEPHONE (OUTPATIENT)
Dept: CARDIOLOGY CLINIC | Age: 70
End: 2020-12-01

## 2020-12-01 NOTE — TELEPHONE ENCOUNTER
Spoke with Susy Stauffer at [facility]. Patient is scheduled to have laparoscopic surgery tomorrow AM at 7:30 am and needed clearance. Susy Stauffer states she never did fax us for clearance because patient told her that patient already spoke with us and asked us to fax clearance. No evidence in chart noting this. Last seen here by Dr Karthik Yanez on 6/9/20. Echo was done in June. Advised Ran[place] patient may have been cleared however Dr Karthik Yanez and Skylar Mendoza NP has left for the day. Advised I would not have and answer tonight.

## 2020-12-01 NOTE — TELEPHONE ENCOUNTER
[facility]  specialist  Dr. Sadiq Weiss office     [facility]  [phone]  Please call her back       Thanks

## 2020-12-02 NOTE — TELEPHONE ENCOUNTER
Kristen Sheth, ANP  You 18 hours ago (9:13 PM)      EF normal 6/2020. Pt is considered low risk for cardiac complications for upcoming procedures.      Message text

## 2020-12-03 ENCOUNTER — TELEPHONE (OUTPATIENT)
Dept: INTERNAL MEDICINE CLINIC | Age: 70
End: 2020-12-03

## 2020-12-03 NOTE — TELEPHONE ENCOUNTER
----- Message from [employer] sent at 12/3/2020  9:47 AM EST -----  Regarding: /Telephone  Contact: [phone]  General Message/Vendor Calls    Caller's first and last name:pt      Reason for call:lab results to another MD      Callback required yes/no and why:yes to confirm labs have been sent      Best contact number(s):[phone]      Details to clarify the request: Pt is wanting to have labs from 11/4 sent to \"Dr. Sarah Stephens", psychiatry, please advise     Jyothi Potts

## 2020-12-04 NOTE — TELEPHONE ENCOUNTER
Identified patient 2 identifiers verified. Patient requested that  labs done on 11/4/20  Be faxed to Dr. Reji Boston office. Labs printed and faxed to [phone]. Identified patient 2 identifiers verified. Patient aware.

## 2020-12-14 ENCOUNTER — TELEPHONE (OUTPATIENT)
Dept: INTERNAL MEDICINE CLINIC | Age: 70
End: 2020-12-14

## 2020-12-14 DIAGNOSIS — E55.9 VITAMIN D DEFICIENCY: ICD-10-CM

## 2020-12-14 RX ORDER — ERGOCALCIFEROL 1.25 MG/1
CAPSULE ORAL
Qty: 4 CAP | Refills: 0 | Status: SHIPPED | OUTPATIENT
Start: 2020-12-14 | End: 2021-01-05

## 2020-12-14 NOTE — TELEPHONE ENCOUNTER
Lab results have been faxed to [phone], Dr Sukhjinder Watts office. Identified patient 2 identifiers verified. Patient aware labs have been faxed.

## 2020-12-14 NOTE — TELEPHONE ENCOUNTER
----- Message from Laisha Lackey sent at 12/14/2020  8:22 AM EST -----  Regarding: Dr Miguel Angel Narvaez  Medication Refill    Caller (if not patient):n/a      Relationship of caller (if not patient): n/a      Best contact number(s):[phone]      Name of medication and dosage if known: vitamin D2 1.25 mg 5,000, B12, 1,000 mcg prolonged release      Is patient out of this medication (yes/no): yes      Pharmacy name: [facility]    Pharmacy listed in chart? (yes/no): yes  Pharmacy phone number: n/a      Message from [facility]

## 2020-12-14 NOTE — TELEPHONE ENCOUNTER
Future Appointments:  Future Appointments   Date Time Provider Radha Yoon   12/16/2020 12:30 PM VASCULAR, [facility] BS AMB   12/17/2020  1:00 PM Rahul Omer MD [facility] BS AMB   12/21/2020  8:00 AM REMOTE_[facility] BS AMB   1/6/2021  3:00 PM Hannah Otero MD [facility] AMB   3/29/2021  8:00 AM REMOTE_[facility] AMB        Last Appointment With Me:  10/28/2020     Requested Prescriptions     Pending Prescriptions Disp Refills    ergocalciferol (ERGOCALCIFEROL) 1,250 mcg (50,000 unit) capsule [Pharmacy Med Name: VITAMIN D2 1.25MG(50,000 UNIT)] 4 Cap 0     Sig: TAKE 1 CAPSULE BY MOUTH ONE TIME PER WEEK

## 2020-12-14 NOTE — TELEPHONE ENCOUNTER
----- Message from [facility] sent at 12/14/2020  8:14 AM EST -----  Regarding: Dr. Nolan Goodell: [phone]  General Message/Vendor Calls    Caller's first and last name: N/A      Reason for call: Lab work       Callback required yes/no and why: Yes, Confirmation       Best contact number(s): [phone]      Details to clarify the request: Pt would like to know if you sent her latest lab work to Dr. Silvino Means her Psychiatrist Ph# [phone] and if not can you send ASAP. Please call to confirm that it has been sent. It is very important. ..       Message from [facility]

## 2020-12-21 ENCOUNTER — OFFICE VISIT (OUTPATIENT)
Dept: CARDIOLOGY CLINIC | Age: 70
End: 2020-12-21
Payer: MEDICARE

## 2020-12-21 DIAGNOSIS — Z95.0 CARDIAC PACEMAKER IN SITU: Primary | ICD-10-CM

## 2020-12-21 DIAGNOSIS — I49.5 SSS (SICK SINUS SYNDROME) (HCC): ICD-10-CM

## 2020-12-21 PROCEDURE — 93294 REM INTERROG EVL PM/LDLS PM: CPT | Performed by: INTERNAL MEDICINE

## 2020-12-21 PROCEDURE — 93296 REM INTERROG EVL PM/IDS: CPT | Performed by: INTERNAL MEDICINE

## 2020-12-30 NOTE — PERIOP NOTES
[facility]  Preoperative Instructions        Surgery Date 1/11/21          Time of Arrival 0545    1. On the day of your surgery, please report to the Surgical Services Registration Desk and sign in at your designated time. The Surgery Center is located to the right of the Emergency Room. 2. You must have someone with you to drive you home. You should not drive a car for 24 hours following surgery. Please make arrangements for a friend or family member to stay with you for the first 24 hours after your surgery. 3. Do not have anything to eat or drink (including water, gum, mints, coffee, juice) after midnight ? 1/10/21? Terri Peterson ? This may not apply to medications prescribed by your physician. ?(Please note below the special instructions with medications to take the morning of your procedure.)    4. We recommend you do not drink any alcoholic beverages for 24 hours before and after your surgery. 5. Contact your surgeons office for instructions on the following medications: non-steroidal anti-inflammatory drugs (i.e. Advil, Aleve), vitamins, and supplements. (Some surgeons will want you to stop these medications prior to surgery and others may allow you to take them)  **If you are currently taking Plavix, Coumadin, Aspirin and/or other blood-thinning agents, contact your surgeon for instructions. ** Your surgeon will partner with the physician prescribing these medications to determine if it is safe to stop or if you need to continue taking. Please do not stop taking these medications without instructions from your surgeon    6. Wear comfortable clothes. Wear glasses instead of contacts. Do not bring any money or jewelry. Please bring picture ID, insurance card, and any prearranged co-payment or hospital payment. Do not wear make-up, particularly mascara the morning of your surgery. Do not wear nail polish, particularly if you are having foot /hand surgery.   Wear your hair loose or down, no ponytails, buns, yesi pins or clips. All body piercings must be removed. Please shower with antibacterial soap for three consecutive days before and on the morning of surgery, but do not apply any lotions, powders or deodorants after the shower on the day of surgery. Please use a fresh towels after each shower. Please sleep in clean clothes and change bed linens the night before surgery. Please do not shave for 48 hours prior to surgery. Shaving of the face is acceptable. 7. You should understand that if you do not follow these instructions your surgery may be cancelled. If your physical condition changes (I.e. fever, cold or flu) please contact your surgeon as soon as possible. 8. It is important that you be on time. If a situation occurs where you may be late, please call [phone] (OR Holding Area). 9. If you have any questions and or problems, please call [phone] (Pre-admission Testing). 10. Your surgery time may be subject to change. You will receive a phone call the evening prior if your time changes. 11.  If having outpatient surgery, you must have someone to drive you here, stay with you during the duration of your stay, and to drive you home at time of discharge. Special Instructions: stop coumadin 5 days prior to DOS (take last dose 1/5)  covid test 1/7/21 7-11:45am    TAKE ALL MEDICATIONS DAY OF SURGERY EXCEPT:  Coumadin, vitamins, gels/ointments    I understand a pre-operative phone call will be made to verify my surgery time. In the event that I am not available, I give permission for a message to be left on my answering service and/or with another person?   Yes [phone]         ___________________      __________   _________    (Signature of Patient)             (Witness)                (Date and Time)

## 2021-01-05 DIAGNOSIS — E55.9 VITAMIN D DEFICIENCY: ICD-10-CM

## 2021-01-05 RX ORDER — ERGOCALCIFEROL 1.25 MG/1
CAPSULE ORAL
Qty: 4 CAP | Refills: 0 | Status: SHIPPED | OUTPATIENT
Start: 2021-01-05 | End: 2021-01-13 | Stop reason: SDUPTHER

## 2021-01-07 ENCOUNTER — HOSPITAL ENCOUNTER (OUTPATIENT)
Dept: PREADMISSION TESTING | Age: 71
Discharge: HOME | End: 2021-01-07
Payer: MEDICARE

## 2021-01-07 PROCEDURE — 87635 SARS-COV-2 COVID-19 AMP PRB: CPT

## 2021-01-11 ENCOUNTER — HOSPITAL ENCOUNTER (OUTPATIENT)
Age: 71
Setting detail: OUTPATIENT SURGERY
Discharge: HOME | End: 2021-01-11
Attending: SURGERY | Admitting: SURGERY
Payer: MEDICARE

## 2021-01-11 ENCOUNTER — ANESTHESIA EVENT (OUTPATIENT)
Dept: SURGERY | Age: 71
End: 2021-01-11
Payer: MEDICARE

## 2021-01-11 ENCOUNTER — ANESTHESIA (OUTPATIENT)
Dept: SURGERY | Age: 71
End: 2021-01-11
Payer: MEDICARE

## 2021-01-11 VITALS
RESPIRATION RATE: 18 BRPM | SYSTOLIC BLOOD PRESSURE: 125 MMHG | TEMPERATURE: 98 F | BODY MASS INDEX: 38.02 KG/M2 | WEIGHT: 228.18 LBS | DIASTOLIC BLOOD PRESSURE: 53 MMHG | HEART RATE: 72 BPM | OXYGEN SATURATION: 100 % | HEIGHT: 65 IN

## 2021-01-11 DIAGNOSIS — R52 PAIN: Primary | ICD-10-CM

## 2021-01-11 LAB — INR BLD: 1.1 (ref 0.9–1.2)

## 2021-01-11 PROCEDURE — 76210000017 HC OR PH I REC 1.5 TO 2 HR: Performed by: SURGERY

## 2021-01-11 PROCEDURE — 77030002933 HC SUT MCRYL J&J -A: Performed by: SURGERY

## 2021-01-11 PROCEDURE — 74011000250 HC RX REV CODE- 250

## 2021-01-11 PROCEDURE — 77030036554: Performed by: SURGERY

## 2021-01-11 PROCEDURE — S2900 ROBOTIC SURGICAL SYSTEM: HCPCS | Performed by: SURGERY

## 2021-01-11 PROCEDURE — 77030035277 HC OBTRTR BLDELSS DISP INTU -B: Performed by: SURGERY

## 2021-01-11 PROCEDURE — 77030040361 HC SLV COMPR DVT MDII -B: Performed by: SURGERY

## 2021-01-11 PROCEDURE — 77030040922 HC BLNKT HYPOTHRM STRY -A

## 2021-01-11 PROCEDURE — 77030013079 HC BLNKT BAIR HGGR 3M -A: Performed by: NURSE ANESTHETIST, CERTIFIED REGISTERED

## 2021-01-11 PROCEDURE — 77030016151 HC PROTCTR LNS DFOG COVD -B: Performed by: SURGERY

## 2021-01-11 PROCEDURE — 76060000034 HC ANESTHESIA 1.5 TO 2 HR: Performed by: SURGERY

## 2021-01-11 PROCEDURE — 85610 PROTHROMBIN TIME: CPT

## 2021-01-11 PROCEDURE — 76210000022 HC REC RM PH II 1.5 TO 2 HR: Performed by: SURGERY

## 2021-01-11 PROCEDURE — 88302 TISSUE EXAM BY PATHOLOGIST: CPT

## 2021-01-11 PROCEDURE — 15734 MUSCLE-SKIN GRAFT TRUNK: CPT | Performed by: SURGERY

## 2021-01-11 PROCEDURE — 74011000250 HC RX REV CODE- 250: Performed by: SURGERY

## 2021-01-11 PROCEDURE — 77030010507 HC ADH SKN DERMBND J&J -B: Performed by: SURGERY

## 2021-01-11 PROCEDURE — 76010000875 HC OR TIME 1.5 TO 2HR INTENSV - TIER 2: Performed by: SURGERY

## 2021-01-11 PROCEDURE — 74011250636 HC RX REV CODE- 250/636: Performed by: ANESTHESIOLOGY

## 2021-01-11 PROCEDURE — 74011250636 HC RX REV CODE- 250/636: Performed by: SURGERY

## 2021-01-11 PROCEDURE — 77030035236 HC SUT PDS STRATFX BARB J&J -B: Performed by: SURGERY

## 2021-01-11 PROCEDURE — 74011250636 HC RX REV CODE- 250/636: Performed by: NURSE ANESTHETIST, CERTIFIED REGISTERED

## 2021-01-11 PROCEDURE — 2709999900 HC NON-CHARGEABLE SUPPLY: Performed by: SURGERY

## 2021-01-11 PROCEDURE — 77030026438 HC STYL ET INTUB CARD -A: Performed by: NURSE ANESTHETIST, CERTIFIED REGISTERED

## 2021-01-11 PROCEDURE — 77030008684 HC TU ET CUF COVD -B: Performed by: NURSE ANESTHETIST, CERTIFIED REGISTERED

## 2021-01-11 PROCEDURE — 77030038613 HC SUT PDS STRATA SPIRL J&J -B: Performed by: SURGERY

## 2021-01-11 PROCEDURE — 77030020703 HC SEAL CANN DISP INTU -B: Performed by: SURGERY

## 2021-01-11 PROCEDURE — 77030002966 HC SUT PDS J&J -A: Performed by: SURGERY

## 2021-01-11 PROCEDURE — 2709999900 HC NON-CHARGEABLE SUPPLY

## 2021-01-11 PROCEDURE — 49655 PR LAP, INCISIONAL HERNIA REPAIR,INCARCERATED: CPT | Performed by: SURGERY

## 2021-01-11 PROCEDURE — 74011000250 HC RX REV CODE- 250: Performed by: NURSE ANESTHETIST, CERTIFIED REGISTERED

## 2021-01-11 PROCEDURE — 77030008771 HC TU NG SALEM SUMP -A: Performed by: NURSE ANESTHETIST, CERTIFIED REGISTERED

## 2021-01-11 PROCEDURE — C1781 MESH (IMPLANTABLE): HCPCS | Performed by: SURGERY

## 2021-01-11 PROCEDURE — 77030018673: Performed by: SURGERY

## 2021-01-11 PROCEDURE — 77030008756 HC TU IRR SUC STRY -B: Performed by: SURGERY

## 2021-01-11 PROCEDURE — 74011250637 HC RX REV CODE- 250/637: Performed by: SURGERY

## 2021-01-11 DEVICE — MESH HERN W20XL25CM RECT PREPERI BIOMATERIAL COMP POLYPR: Type: IMPLANTABLE DEVICE | Site: ABDOMEN | Status: FUNCTIONAL

## 2021-01-11 RX ORDER — MORPHINE SULFATE 10 MG/ML
2 INJECTION, SOLUTION INTRAMUSCULAR; INTRAVENOUS
Status: DISCONTINUED | OUTPATIENT
Start: 2021-01-11 | End: 2021-01-11 | Stop reason: HOSPADM

## 2021-01-11 RX ORDER — DEXAMETHASONE SODIUM PHOSPHATE 4 MG/ML
INJECTION, SOLUTION INTRA-ARTICULAR; INTRALESIONAL; INTRAMUSCULAR; INTRAVENOUS; SOFT TISSUE AS NEEDED
Status: DISCONTINUED | OUTPATIENT
Start: 2021-01-11 | End: 2021-01-11 | Stop reason: HOSPADM

## 2021-01-11 RX ORDER — SUCCINYLCHOLINE CHLORIDE 20 MG/ML
INJECTION INTRAMUSCULAR; INTRAVENOUS AS NEEDED
Status: DISCONTINUED | OUTPATIENT
Start: 2021-01-11 | End: 2021-01-11 | Stop reason: HOSPADM

## 2021-01-11 RX ORDER — LIDOCAINE HYDROCHLORIDE 20 MG/ML
INJECTION, SOLUTION EPIDURAL; INFILTRATION; INTRACAUDAL; PERINEURAL AS NEEDED
Status: DISCONTINUED | OUTPATIENT
Start: 2021-01-11 | End: 2021-01-11 | Stop reason: HOSPADM

## 2021-01-11 RX ORDER — ONDANSETRON 2 MG/ML
4 INJECTION INTRAMUSCULAR; INTRAVENOUS AS NEEDED
Status: DISCONTINUED | OUTPATIENT
Start: 2021-01-11 | End: 2021-01-11 | Stop reason: HOSPADM

## 2021-01-11 RX ORDER — SODIUM CHLORIDE, SODIUM LACTATE, POTASSIUM CHLORIDE, CALCIUM CHLORIDE 600; 310; 30; 20 MG/100ML; MG/100ML; MG/100ML; MG/100ML
25 INJECTION, SOLUTION INTRAVENOUS CONTINUOUS
Status: DISCONTINUED | OUTPATIENT
Start: 2021-01-11 | End: 2021-01-11 | Stop reason: HOSPADM

## 2021-01-11 RX ORDER — OXYCODONE HYDROCHLORIDE 5 MG/1
5 TABLET ORAL
Qty: 15 TAB | Refills: 0 | Status: SHIPPED | OUTPATIENT
Start: 2021-01-11 | End: 2021-01-18

## 2021-01-11 RX ORDER — SODIUM CHLORIDE 0.9 % (FLUSH) 0.9 %
5-40 SYRINGE (ML) INJECTION EVERY 8 HOURS
Status: DISCONTINUED | OUTPATIENT
Start: 2021-01-11 | End: 2021-01-11 | Stop reason: HOSPADM

## 2021-01-11 RX ORDER — FENTANYL CITRATE 50 UG/ML
INJECTION, SOLUTION INTRAMUSCULAR; INTRAVENOUS AS NEEDED
Status: DISCONTINUED | OUTPATIENT
Start: 2021-01-11 | End: 2021-01-11 | Stop reason: HOSPADM

## 2021-01-11 RX ORDER — FENTANYL CITRATE 50 UG/ML
25 INJECTION, SOLUTION INTRAMUSCULAR; INTRAVENOUS
Status: DISCONTINUED | OUTPATIENT
Start: 2021-01-11 | End: 2021-01-11 | Stop reason: HOSPADM

## 2021-01-11 RX ORDER — HYDROMORPHONE HYDROCHLORIDE 2 MG/ML
.2-.5 INJECTION, SOLUTION INTRAMUSCULAR; INTRAVENOUS; SUBCUTANEOUS
Status: DISCONTINUED | OUTPATIENT
Start: 2021-01-11 | End: 2021-01-11 | Stop reason: HOSPADM

## 2021-01-11 RX ORDER — BUPIVACAINE HYDROCHLORIDE AND EPINEPHRINE 5; 5 MG/ML; UG/ML
INJECTION, SOLUTION EPIDURAL; INTRACAUDAL; PERINEURAL AS NEEDED
Status: DISCONTINUED | OUTPATIENT
Start: 2021-01-11 | End: 2021-01-11 | Stop reason: HOSPADM

## 2021-01-11 RX ORDER — SODIUM CHLORIDE 0.9 % (FLUSH) 0.9 %
5-40 SYRINGE (ML) INJECTION AS NEEDED
Status: DISCONTINUED | OUTPATIENT
Start: 2021-01-11 | End: 2021-01-11 | Stop reason: HOSPADM

## 2021-01-11 RX ORDER — GABAPENTIN 300 MG/1
300 CAPSULE ORAL 2 TIMES DAILY
Qty: 10 CAP | Refills: 0 | Status: SHIPPED | OUTPATIENT
Start: 2021-01-11 | End: 2021-01-16

## 2021-01-11 RX ORDER — DIPHENHYDRAMINE HYDROCHLORIDE 50 MG/ML
12.5 INJECTION, SOLUTION INTRAMUSCULAR; INTRAVENOUS AS NEEDED
Status: DISCONTINUED | OUTPATIENT
Start: 2021-01-11 | End: 2021-01-11 | Stop reason: HOSPADM

## 2021-01-11 RX ORDER — ONDANSETRON 4 MG/1
4 TABLET, ORALLY DISINTEGRATING ORAL
Qty: 8 TAB | Refills: 0 | Status: SHIPPED | OUTPATIENT
Start: 2021-01-11 | End: 2021-01-21

## 2021-01-11 RX ORDER — LIDOCAINE HYDROCHLORIDE 10 MG/ML
0.1 INJECTION, SOLUTION EPIDURAL; INFILTRATION; INTRACAUDAL; PERINEURAL AS NEEDED
Status: DISCONTINUED | OUTPATIENT
Start: 2021-01-11 | End: 2021-01-11 | Stop reason: HOSPADM

## 2021-01-11 RX ORDER — DEXMEDETOMIDINE HYDROCHLORIDE 100 UG/ML
INJECTION, SOLUTION INTRAVENOUS AS NEEDED
Status: DISCONTINUED | OUTPATIENT
Start: 2021-01-11 | End: 2021-01-11 | Stop reason: HOSPADM

## 2021-01-11 RX ORDER — POLYETHYLENE GLYCOL 3350 17 G/17G
17 POWDER, FOR SOLUTION ORAL DAILY
Status: SHIPPED | COMMUNITY
Start: 2021-01-11 | End: 2021-01-21

## 2021-01-11 RX ORDER — ALBUTEROL SULFATE 0.83 MG/ML
2.5 SOLUTION RESPIRATORY (INHALATION)
Status: COMPLETED | OUTPATIENT
Start: 2021-01-11 | End: 2021-01-11

## 2021-01-11 RX ORDER — BETHANECHOL CHLORIDE 10 MG/1
10 TABLET ORAL
Status: DISCONTINUED | OUTPATIENT
Start: 2021-01-11 | End: 2021-01-11 | Stop reason: HOSPADM

## 2021-01-11 RX ORDER — ALBUTEROL SULFATE 0.83 MG/ML
SOLUTION RESPIRATORY (INHALATION)
Status: COMPLETED
Start: 2021-01-11 | End: 2021-01-11

## 2021-01-11 RX ORDER — OXYCODONE HYDROCHLORIDE 5 MG/1
5 TABLET ORAL ONCE
Status: COMPLETED | OUTPATIENT
Start: 2021-01-11 | End: 2021-01-11

## 2021-01-11 RX ORDER — ROCURONIUM BROMIDE 10 MG/ML
INJECTION, SOLUTION INTRAVENOUS AS NEEDED
Status: DISCONTINUED | OUTPATIENT
Start: 2021-01-11 | End: 2021-01-11 | Stop reason: HOSPADM

## 2021-01-11 RX ORDER — ACETAMINOPHEN 500 MG
1000 TABLET ORAL 4 TIMES DAILY
Status: SHIPPED | COMMUNITY
Start: 2021-01-11 | End: 2021-01-21

## 2021-01-11 RX ORDER — PROPOFOL 10 MG/ML
INJECTION, EMULSION INTRAVENOUS AS NEEDED
Status: DISCONTINUED | OUTPATIENT
Start: 2021-01-11 | End: 2021-01-11 | Stop reason: HOSPADM

## 2021-01-11 RX ORDER — NALOXONE HYDROCHLORIDE 4 MG/.1ML
SPRAY NASAL
Qty: 2 EACH | Refills: 0 | Status: SHIPPED | OUTPATIENT
Start: 2021-01-11 | End: 2021-01-21

## 2021-01-11 RX ADMIN — DEXMEDETOMIDINE HYDROCHLORIDE 4 MCG: 100 INJECTION, SOLUTION, CONCENTRATE INTRAVENOUS at 09:31

## 2021-01-11 RX ADMIN — Medication 3 AMPULE: at 07:04

## 2021-01-11 RX ADMIN — SODIUM CHLORIDE, POTASSIUM CHLORIDE, SODIUM LACTATE AND CALCIUM CHLORIDE: 600; 310; 30; 20 INJECTION, SOLUTION INTRAVENOUS at 09:15

## 2021-01-11 RX ADMIN — ROCURONIUM BROMIDE 10 MG: 10 INJECTION INTRAVENOUS at 08:10

## 2021-01-11 RX ADMIN — BETHANECHOL CHLORIDE 10 MG: 10 TABLET ORAL at 10:19

## 2021-01-11 RX ADMIN — SUCCINYLCHOLINE CHLORIDE 160 MG: 20 INJECTION, SOLUTION INTRAMUSCULAR; INTRAVENOUS at 07:47

## 2021-01-11 RX ADMIN — FENTANYL CITRATE 100 MCG: 50 INJECTION, SOLUTION INTRAMUSCULAR; INTRAVENOUS at 07:47

## 2021-01-11 RX ADMIN — DEXMEDETOMIDINE HYDROCHLORIDE 8 MCG: 100 INJECTION, SOLUTION, CONCENTRATE INTRAVENOUS at 09:15

## 2021-01-11 RX ADMIN — SODIUM CHLORIDE, POTASSIUM CHLORIDE, SODIUM LACTATE AND CALCIUM CHLORIDE: 600; 310; 30; 20 INJECTION, SOLUTION INTRAVENOUS at 07:41

## 2021-01-11 RX ADMIN — WATER 2 G: 1 INJECTION INTRAMUSCULAR; INTRAVENOUS; SUBCUTANEOUS at 07:55

## 2021-01-11 RX ADMIN — SUGAMMADEX 200 MG: 100 INJECTION, SOLUTION INTRAVENOUS at 09:18

## 2021-01-11 RX ADMIN — ROCURONIUM BROMIDE 10 MG: 10 INJECTION INTRAVENOUS at 09:05

## 2021-01-11 RX ADMIN — SODIUM CHLORIDE, POTASSIUM CHLORIDE, SODIUM LACTATE AND CALCIUM CHLORIDE 25 ML/HR: 600; 310; 30; 20 INJECTION, SOLUTION INTRAVENOUS at 07:05

## 2021-01-11 RX ADMIN — ALBUTEROL SULFATE 2.5 MG: 2.5 SOLUTION RESPIRATORY (INHALATION) at 09:40

## 2021-01-11 RX ADMIN — OXYCODONE 5 MG: 5 TABLET ORAL at 11:08

## 2021-01-11 RX ADMIN — ROCURONIUM BROMIDE 25 MG: 10 INJECTION INTRAVENOUS at 07:55

## 2021-01-11 RX ADMIN — SODIUM CHLORIDE 80 MCG/MIN: 900 INJECTION, SOLUTION INTRAVENOUS at 07:50

## 2021-01-11 RX ADMIN — ROCURONIUM BROMIDE 5 MG: 10 INJECTION INTRAVENOUS at 07:47

## 2021-01-11 RX ADMIN — DEXAMETHASONE SODIUM PHOSPHATE 8 MG: 4 INJECTION, SOLUTION INTRAMUSCULAR; INTRAVENOUS at 08:00

## 2021-01-11 RX ADMIN — DEXMEDETOMIDINE HYDROCHLORIDE 8 MCG: 100 INJECTION, SOLUTION, CONCENTRATE INTRAVENOUS at 09:21

## 2021-01-11 RX ADMIN — PROPOFOL 160 MG: 10 INJECTION, EMULSION INTRAVENOUS at 07:47

## 2021-01-11 RX ADMIN — BETHANECHOL CHLORIDE 10 MG: 10 TABLET ORAL at 11:09

## 2021-01-11 RX ADMIN — ALBUTEROL SULFATE 2.5 MG: 0.83 SOLUTION RESPIRATORY (INHALATION) at 09:40

## 2021-01-11 RX ADMIN — LIDOCAINE HYDROCHLORIDE 100 MG: 20 INJECTION, SOLUTION INTRAVENOUS at 07:47

## 2021-01-11 NOTE — DISCHARGE INSTRUCTIONS
Dr. Efra Fernandez Discharge Instructions after  Ventral/Insicional Hernia Repair  for:  Pepito Gonzalez    MRN: [id number] : 1950    Admitted: 2021  Discharged: 2021     ** restart your Coumadin tomorrow (2021) **      What to do at Home      Recommended diet: resume your normal diet    Recommended activity: Lift less that 10 pounds for 6 weeks. Wear Abdominal Binder for support    Follow-up with Dr. Serenity Pruett in 2-3 weeks. Call [phone] for an appointment. Hernia Repair: What to Expect at [address] are likely to have pain for the next few days. You may also feel like you have the flu, and you may have a low fever and feel tired and nauseated. This is common. You should feel better after a few days and will probably feel much better in 7 days. For several weeks you may feel twinges or pulling in the hernia repair when you move. You may have some bruising and swelling. This is normal.    This care sheet gives you a general idea about how long it will take for you to recover. But each person recovers at a different pace. Follow the steps below to get better as quickly as possible. How can you care for yourself at home? Activity  Rest when you feel tired. Getting enough sleep will help you recover. You can sleep with your head up by using three or four pillows. You can also try to sleep with your head up in a recliner chair. Try to walk each day. Start by walking a little more than you did the day before. Bit by bit, increase the amount you walk. Walking boosts blood flow and helps prevent pneumonia and constipation. Put ice or a cold pack on the area of your hernia repair for 10 to 15 minutes at a time. Try to do this every 1 to 2 hours for the first 24 hours (when you are awake) or until the swelling goes down. Put a thin cloth between the ice and your skin.   Avoid strenuous activities, such as biking, jogging, weight lifting, or aerobic exercise, until your doctor says it is okay. You are encouraged to cough and deep breath or use your incentive spirometer if you were given one, every 15-30 minutes when awake. This will help prevent respiratory complications and low grade fevers post-operatively. You may want to hug a pillow when coughing and sneezing to add additional support to the surgical area which will decrease pain during these times. Avoid lifting anything that would make you strain. This may include heavy grocery bags, a heavy briefcase or backpack, cat litter or dog food bags, a child, or a vacuum . You may drive when you are no longer taking pain medicine and can quickly move your foot from the gas pedal to the brake. You must also be able to sit comfortably for a long period of time, even if you do not plan to go far. Most people are able to return to work within 1 to 2 weeks after surgery. You may shower 24 to 48 hours after surgery. Pat the cut (incision) dry. Do not take a bath for the first 2 weeks. Diet  You can eat your normal diet. If your stomach is upset, try bland, low-fat foods like plain rice, broiled chicken, toast, and yogurt. Drink plenty of fluids (unless your doctor tells you not to). You may notice that your bowel movements are not regular right after your surgery. This is common. Avoid constipation and straining with bowel movements. You may want to take a fiber supplement every day. If you have not had a bowel movement after a couple of days, ask your doctor about taking a mild laxative. Medicines  Take pain medicines exactly as directed. If the doctor gave you a prescription medicine for pain, take it as prescribed. If you are not taking a prescription pain medicine, take an over-the-counter medicine such as acetaminophen (Tylenol), ibuprofen (Advil, Motrin), or naproxen (Aleve). Read and follow all instructions on the label.   Do not take two or more pain medicines at the same time unless the doctor told you to. Many pain medicines have acetaminophen, which is Tylenol. Too much acetaminophen (Tylenol) can be harmful. If your doctor prescribed antibiotics, take them as directed. Do not stop taking them just because you feel better. You need to take the full course of antibiotics. If you think your pain medicine is making you sick to your stomach: Take your medicine after meals (unless your doctor has told you not to). Ask your doctor for a different pain medicine. Incision care  If you have staples closing the cut, you will need to visit your doctor in 1 to 2 weeks to have them removed. Wash the area daily with warm, soapy water and pat it dry. Follow-up care is a key part of your treatment and safety. Be sure to make and go to all appointments, and call your doctor if you are having problems. Its also a good idea to know your test results and keep a list of the medicines you take. When should you call for help? Call 911 anytime you think you may need emergency care. For example, call if:  You pass out (lose consciousness). You have sudden chest pain and shortness of breath, or you cough up blood. Call your doctor now or seek immediate medical care if:    You have any fevers greater than 102.5  You have drainage from you wound that is not clear or looks infected  You have persistent bleeding  You have problems urinating  You have persistent nausea/vomiting  TO PREVENT AN INFECTION      1. [address] YOUR HANDS     To prevent infection, good handwashing is the most important thing you or your caregiver can do.  Wash your hands with soap and water or use the hand  we gave you before you touch any wounds. 2. SHOWER     Use the antibacterial soap we gave you when you take a shower.  Shower with this soap until your wounds are healed.  To reach all areas of your body, you may need someone to help you.  Dont forget to clean your belly button with every shower.     3.  USE CLEAN SHEETS     Use freshly cleaned sheets on your bed after surgery.  To keep the surgery site clean, do not allow pets to sleep with you while your wound is still healing. 4. STOP SMOKING     Stop smoking, or at least cut back on smoking     Smoking slows your healing. 5.  CONTROL YOUR BLOOD SUGAR     High blood sugars slow wound healing. If you are diabetic, control your blood sugar levels before and after your surgery. How to Care for Your Wound After Its Treated With  DERMABOND* Topical Skin Adhesive  DERMABOND* Topical Skin Adhesive (2-octyl cyanoacrylate) is a sterile, liquid skin adhesive  that holds wound edges together. The film will usually remain in place for 5 to 10 days, then  naturally fall off your skin. The following will answer some of your questions and provide instructions for proper care for your  wound while it is healing:    CHECK WOUND APPEARANCE   Some swelling, redness, and pain are common with all wounds and normally will go away as the  wound heals. If swelling, redness, or pain increases or if the wound feels warm to the touch,  contact a doctor. Also contact a doctor if the wound edges reopen or separate. REPLACE BANDAGES   If your wound is bandaged, keep the bandage dry.  Replace the dressing daily until the adhesive film has fallen off or if the  bandage should become wet, unless otherwise instructed by your  physician.  When changing the dressing, do not place tape directly over the  DERMABOND adhesive film, because removing the tape later may also  remove the film. AVOID TOPICAL MEDICATIONS   Do not apply liquid or ointment medications or any other product to your wound while the  DERMABOND adhesive film is in place. These may loosen the film before your wound is healed. KEEP WOUND DRY AND PROTECTED   You may occasionally and briefly wet your wound in the shower or bath.  Do not soak or scrub  your wound, do not swim, and avoid periods of heavy perspiration until the DERMABOND  adhesive has naturally fallen off. After showering or bathing, gently blot your wound dry with a  soft towel. If a protective dressing is being used, apply a fresh, dry bandage, being sure to keep  the tape off the DERMABOND adhesive film.  Apply a clean, dry bandage over the wound if necessary to protect it.  Protect your wound from injury until the skin has had sufficient time to heal.   Do not scratch, rub, or pick at the DERMABOND adhesive film. This may loosen the film before  your wound is healed.  Protect the wound from prolonged exposure to sunlight or tanning lamps while the film is in  place. If you have any questions or concerns about this product, please consult your doctor. *Trademark ©[employer] 2002DISCHARGE SUMMARY from Nurse    The following personal items are in your possession at time of discharge:    Dental Appliances: None  Visual Aid: None  Home Medications: None  Jewelry: None  Clothing: None (left in in pt. room)  Other Valuables: None             PATIENT INSTRUCTIONS:    After general anesthesia or intravenous sedation, for 24 hours or while taking prescription Narcotics:  Limit your activities  Do not drive and operate hazardous machinery  Do not make important personal or business decisions  Do  not drink alcoholic beverages  If you have not urinated within 8 hours after discharge, please contact your surgeon on call. Report the following to your surgeon:  Excessive pain, swelling, redness or odor of or around the surgical area  Temperature over 100.5  Nausea and vomiting lasting longer than 4 hours or if unable to take medications  Any signs of decreased circulation or nerve impairment to extremity: change in color, persistent  numbness, tingling, coldness or increase pain  Any questions    To prevent infection remember to refer to your handout on handwashing given to you by your nurse.     *  Please give a list of your current medications to your Primary Care Provider. *  Please update this list whenever your medications are discontinued, doses are      changed, or new medications (including over-the-counter products) are added. *  Please carry medication information at all times in case of emergency situations. These are general instructions for a healthy lifestyle:    No smoking/ No tobacco products/ Avoid exposure to second hand smoke    Surgeon General's Warning:  Quitting smoking now greatly reduces serious risk to your health. Obesity, smoking, and sedentary lifestyle greatly increases your risk for illness    A healthy diet, regular physical exercise & weight monitoring are important for maintaining a healthy lifestyle    You may be retaining fluid if you have a history of heart failure or if you experience any of the following symptoms:  Weight gain of 3 pounds or more overnight or 5 pounds in a week, increased swelling in our hands or feet or shortness of breath while lying flat in bed. Please call your doctor as soon as you notice any of these symptoms; do not wait until your next office visit. Recognize signs and symptoms of STROKE:    F-face looks uneven    A-arms unable to move or move unevenly    S-speech slurred or non-existent    T-time-call 911 as soon as signs and symptoms begin-DO NOT go       Back to bed or wait to see if you get better-TIME IS BRAIN. The discharge information has been reviewed with the patient. The Patient Education      Oxycodone, Rapid Release (ETH-Oxydose, Oxy IR, Roxicodone) - (By mouth)   Why this medicine is used:   Treats moderate to severe pain. This medicine is a narcotic pain reliever. Contact a nurse or doctor right away if you have:   Fast or slow heart beat, shallow breathing, blue lips, skin or fingernails  Anxiety, restlessness, fever, sweating, muscle spasms, twitching, seeing or hearing things that are not there  Extreme weakness, shallow breathing, slow heartbeat  Severe confusion, lightheadedness, dizziness, fainting  Sweating or cold, clammy skin, seizures  Severe constipation, stomach pain, nausea, vomiting     Common side effects:  Mild constipation  Sleepiness, tiredness  © 2017 [address] Information is for End User's use only and may not be sold, redistributed or otherwise used for commercial purposes.   verbalized understanding.

## 2021-01-11 NOTE — ANESTHESIA POSTPROCEDURE EVALUATION
Procedure(s):  ROBOTIC ASSISTED LAPAROSCOPIC INCISIONAL HERNIA REPAIR WITH MESH WITH SEPERATION OF COMPONENTS.    general    Anesthesia Post Evaluation        Patient location during evaluation: PACU  Note status: Adequate. Level of consciousness: responsive to verbal stimuli and sleepy but conscious  Pain management: satisfactory to patient  Airway patency: patent  Anesthetic complications: no  Cardiovascular status: acceptable  Respiratory status: acceptable  Hydration status: acceptable  Comments: +Post-Anesthesia Evaluation and Assessment    Patient: Haydee Sullivan MRN: [id number]  SSN: [id number]   YOB: 1950  Age: 66 y.o. Sex: female      Cardiovascular Function/Vital Signs    /67 (BP 1 Location: Left arm, BP Patient Position: At rest)   Pulse 75   Temp 36.8 °C (98.2 °F)   Resp 18   Ht 5' 5\" (1.651 m)   Wt 103.5 kg (228 lb 2.8 oz)   SpO2 97%   BMI 37.97 kg/m²     Patient is status post Procedure(s):  ROBOTIC ASSISTED LAPAROSCOPIC INCISIONAL HERNIA REPAIR WITH MESH WITH SEPERATION OF COMPONENTS. Nausea/Vomiting: Controlled. Postoperative hydration reviewed and adequate. Pain:  Pain Scale 1: Visual (01/11/21 1100)  Pain Intensity 1: 0 (01/11/21 1045)   Managed. Neurological Status:   Neuro (WDL): Exceptions to WDL (01/11/21 0935)   At baseline. Mental Status and Level of Consciousness: Arousable. Pulmonary Status:   O2 Device: Room air (01/11/21 1100)   Adequate oxygenation and airway patent. Complications related to anesthesia: None    Post-anesthesia assessment completed. No concerns. Signed By: Michael Hernandez MD    1/11/2021  Post anesthesia nausea and vomiting:  controlled      INITIAL Post-op Vital signs:   Vitals Value Taken Time   /67 01/11/21 1100   Temp 36.8 °C (98.2 °F) 01/11/21 0941   Pulse 75 01/11/21 1104   Resp 17 01/11/21 1104   SpO2 94 % 01/11/21 1104   Vitals shown include unvalidated device data.

## 2021-01-11 NOTE — OP NOTES
OPERATIVE NOTE  1/11/2021    Preperative Diagnosis: INCISIONAL HERNIA  Post-operative Diagnosis: INCARCERATED INCISIONAL HERNIA      Surgeon: Amairani Lima MD [facility]    Procedures:    1. Right retrorectus release (myocutaneous flap of the trunk)  2. Left retrorectus release (myocutaneous flap of the trunk)  3. Laparoscopic incarcerated incisional hernia repair with placement of mesh. 4. Robot assisted. Anesthesia: Gen + Local  Estimated Blood Loss: Minimal  Specimens:   ID Type Source Tests Collected by Time Destination   1 : Old suture and abdominal hernia sac  Preservative Hernia Sac  Camilla Hernández MD 1/11/2021 9206 Pathology          Implants:   Implant Name Type Inv. Item Serial No.  Lot No. LRB No. Used Action   MESH JACQUES [id number] RECT [employer] POLYPR - [id number] Mesh MESH JACQUES [id number] RECT [employer] POLYPR [id number] [employer]_WD N/A N/A 1 Implanted       FINDINGS: The patient is noted to have a 2 CM defect in the epigastrium and a 3 cm defect in the upper midline, and a small umbilical hernia. These are associated with a 4 CM diastasis. This was noted to contain incarcerated omentum. This was able to be reduced. The fascia was able to be approximated only after recreation of the anterior abdominal wall with a myocutaneous advancement flap of the right and left rectus muscle. Remaining abdominal cavity appeared normal.     INDICATION: The patient's w/o is constant with a symptomatic hernia. After  discussing risks, benefits and alteratives, she has agreed to undergo  surgery as above. Consent form was placed on the chart      DESCRIPTION OF PROCEDURE: After satisfactory induction of general  anesthesia, the patient was kept in the supine position in a slight flexed position with appropriate padding. The patient's abdomen was prepped and draped sterilely.  After using local anesthetic, an 8 mm optical entry trocar was advanced through the anterior rectus sheath in the left upper quadrant and pneumoperitoneum was gently applied as the retrorectus space was carefully defined using the trocar. Once sufficient mobility was achieved, 3 additional 8 mm ports were placed on the left side after the original entry port was replaced with the 12 mm Airseal port. The robot was docked, robotic instruments inserted. The retrorectus release was completed on the left side up to the midline hernias and diastasis. The medial posterior rectus sheath was divided, entering the preperitoneal space at the midline, and this was carried out across the midline to the opposite side. Retrorectus release on the right side was initiated superiorly and carried inferiorly. As the hernias were identified, they were carefully reduced. The largest hernia was entered. It was noted to contain incarcerated omentum. It was carefully reduced, taking care to avoid any injury to any incarcerated intraperitoneal structures. Some old suture material was encountered in the epigastric hernia. This was removed and sent to pathology. The defect left by this largest hernia was ultimately closed with a 3-0 Stratafix PDS suture. Once release was accomplished on the right side, hemostasis was noted and the posterior fascia was noted to be completely intact. I used Stratafix Symmetric to close the anterior fascia, plicate the diastasis, and incorporate repair of the hernias. Once this was accomplished, the repair appeared quite solid. The mesh was inserted. I used a 20 x 25 cm piece of mesh that was tapered to an oval shape. The mesh was inserted and secured at the apexes with 2-0 PDS. It was noted to be lying quite nicely in the retrorectus space. The pneumo was slowly evacuated, confirming the mesh to be lying flat without any folding. The ports were removed sequentially under visualization and the remaining pneumo was allowed to escape.   The skin incisions were closed with subcuticular Monocryl and Dermabond. The patient remained stable during my presence in the operating room.       Kaila Blake MD

## 2021-01-11 NOTE — PERIOP NOTES
Handoff Report from Operating Room to PACU    Report received from Marino Michele CRNA and Trung Cobb RN regarding Ilah Hamman. Surgeon(s):  Jorge Luque MD  And Procedure(s) (LRB):  ROBOTIC ASSISTED LAPAROSCOPIC INCISIONAL HERNIA REPAIR WITH MESH WITH SEPERATION OF COMPONENTS (N/A)  confirmed   with allergies and dressings discussed. Anesthesia type, drugs, patient history, complications, estimated blood loss, vital signs, intake and output, and last pain medication, lines, reversal medications and temperature were reviewed. Patient audible expiratory wheezing upon arrival to PACU. Spoke with Dr JACKSON [facility] and received order for albuterol neb treatment (see MAR).

## 2021-01-11 NOTE — ANESTHESIA PREPROCEDURE EVALUATION
Anesthetic History   No history of anesthetic complications            Review of Systems / Medical History  Patient summary reviewed, nursing notes reviewed and pertinent labs reviewed    Pulmonary        Sleep apnea (uses mouthpiece): No treatment           Neuro/Psych         TIA, headaches (migraines) and psychiatric history    Comments: Bipolar Affective d/o Cardiovascular    Hypertension: well controlled        Dysrhythmias : atrial fibrillation  Pacemaker ( PM for SSS) and hyperlipidemia    Exercise tolerance: <4 METS  Comments: Not pacemaker dependent   GI/Hepatic/Renal     GERD ( occasional only)          Comments: Colon polyps Endo/Other      Hypothyroidism: well controlled  Morbid obesity and arthritis     Other Findings   Comments: Vitamin D Deficiency  Urge incontinence  Hemispheric carotid artery syndrome   Osteoarthritis of right shoulder           Physical Exam    Airway  Mallampati: II  TM Distance: 4 - 6 cm  Neck ROM: normal range of motion   Mouth opening: Normal     Cardiovascular  Regular rate and rhythm,  S1 and S2 normal,  no murmur, click, rub, or gallop  Rhythm: regular  Rate: normal      Pertinent negatives: No murmur   Dental    Dentition: Caps/crowns     Pulmonary  Breath sounds clear to auscultation               Abdominal  GI exam deferred       Other Findings            Anesthetic Plan    ASA: 3  Anesthesia type: general    Monitoring Plan: BIS      Induction: Intravenous  Anesthetic plan and risks discussed with: Patient

## 2021-01-11 NOTE — H&P
Pre-Op History and Physical    Subjective:     Kesha Quiroga is a 66 y.o. female who is here for Procedure(s):  ROBOTIC ASSISTED LAPAROSCOPIC INCISIONAL HERNIA REPAIR WITH MESH WITH SEPERATION OF COMPONENTS    Past Medical History:   Diagnosis Date    Arrhythmia 2014    atrial fibrillation 2014, sick sinus syndrome: [facility]    Arthritis     Bipolar affect, depressed ([address]     Bipolar affective disorder ([address] 3/31/2010    Chronic pain 2018    right shoulder    Colon polyps 03/31/2010    also 5/2018: colon polyps    Depression     Diarrhea 07/19/2013    D/t alpha gal allergy says patient    Epigastric pain 6/5/2015    GERD (gastroesophageal reflux disease)     occasiional only; controlled with med    Hemispheric carotid artery syndrome No stroke    Neuro: Sly Marie  (CT scan head/neck negative 4/2018 in [facility])    Hyperlipidemia 3/31/2010    Hypothyroidism 3/31/2010    Long term current use of anticoagulant therapy     Lupus ([address]     patient denies-says she has RA    Migraine 03/31/2010    has them x2 a month: last one 5/23/2018    Psychotic disorder ([address]     S/P ablation of atrial fibrillation 05/13/2014    S/P cardiac pacemaker procedure 9/16/2014 9/16/14 Medtronic MRI compatible dual chamber pacemaker implant    Sleep apnea     unable to tolerate CPAP    Stool color black     Thyroid disease     Tick-borne disease     Alpha Gal allergy: no eat pork, beef, milk (Meat allergy showed up on a allergy test)    Umbilical hernia without obstruction and without gangrene 6/22/2020    Urge incontinence 3/31/2010    Vitamin D deficiency 3/31/2015      Past Surgical History:   Procedure Laterality Date    COLONOSCOPY N/A 4/27/2018    COLONOSCOPY performed by Rey Jauregui [facility] AM[facility]    COLONOSCOPY N/A 11/25/2020    COLONOSCOPY, EGD performed by Kelvin Stover MD at [address] [place] [address]  4/27/2018        [facility] JOVANY MITCHELL  4/27/2018         HX BUNIONECTOMY      HX GI  2010, 5/2018    egd, colonoscopy    HX HYSTERECTOMY      HX ORTHOPAEDIC  2000's    removed bone spurs from R & L hand    HX ORTHOPAEDIC  1990s? left bunionectomy     HX OTHER SURGICAL  07/2014    2 shocks to heart & ablations    HX PACEMAKER  2014    On the right side    HX PACEMAKER PLACEMENT  2015    HX SHOULDER REPLACEMENT Right 2017    HX TONSILLECTOMY  15 yrs old   [address] [employer]  5/2014    ablation     Family History   Problem Relation Age of Onset    Arrhythmia Mother         afib    Stroke Mother     Heart Failure Mother     Colon Cancer Father     Heart Disease Father     Cancer Father         colon cancer    Arrhythmia Brother         afib    Asthma Brother     COPD Brother       Social History     Tobacco Use    Smoking status: Never Smoker    Smokeless tobacco: Never Used   Substance Use Topics    Alcohol use: Yes     Alcohol/week: 1.0 standard drinks     Types: 1 Cans of beer per week     Frequency: Monthly or less     Comment: weekly       Prior to Admission medications    Medication Sig Start Date End Date Taking? Authorizing Provider   cyanocobalamin (Vitamin B-12) 100 mcg tablet Take 100 mcg by mouth daily. Yes Provider, Historical   omeprazole (PRILOSEC) 40 mg capsule TAKE 1 CAPSULE BY MOUTH EVERY DAY IN THE MORNING 9/24/20  Yes Provider, Historical   diclofenac (Voltaren) 1 % gel Apply two grams by topical route four times daily to the affected area (s). 9/21/20  Yes Provider, Historical   timolol (TIMOPTIC) 0.5 % ophthalmic solution INSTILL 1 DROP 1 TIME EVERY DAY INTO BOTH EYES EVERY MORNING 8/25/20  Yes Provider, Historical   warfarin (COUMADIN) 5 mg tablet TAKE 3 TABLETS BY MOUTH ON TUESDAY, FRIDAY AND 2 TABS ALL OTHER DAYS.  11/6/20  Yes Kristen Sheth ANP   hydrOXYchloroQUINE (PLAQUENIL) 200 mg tablet TAKE 2 TABLETS BY MOUTH EVERY DAY 10/1/20  Yes Provider, Historical   pregabalin (Abiel Fitting) 100 mg capsule TAKE 1 CAPSULE BY MOUTH TWICE A DAY 9/24/20  Yes Kavita Asif MD   hydrOXYzine HCL (ATARAX) 25 mg tablet TAKE 1 TAB BY MOUTH THREE (3) TIMES DAILY AS NEEDED FOR ITCHING FOR UP TO 10 DAYS. 9/18/20  Yes Keyur Smith MD   levothyroxine (SYNTHROID) 50 mcg tablet TAKE 1 TABLET BY MOUTH EVERY DAY 8/25/20  Yes Keyur Smith MD   primidone (MYSOLINE) 50 mg tablet TAKE 1/2 TABLET BY MOUTH NIGHTLY 6/12/20  Yes Celina Belcher MD   latanoprost (XALATAN) 0.005 % ophthalmic solution Administer 1 Drop to both eyes two (2) times a day. 4/26/20  Yes Provider, Historical   trimethoprim (TRIMPEX) 100 mg tablet TAKE 1 TABLET BY MOUTH EVERY DAY 3/25/20  Yes Provider, Historical   zolpidem (AMBIEN) 10 mg tablet zolpidem 10 mg tablet   Yes Provider, Historical   tiZANidine (ZANAFLEX) 2 mg tablet Take 1 Tab by mouth three (3) times daily. 4/28/20  Yes Keyur Smith MD   topiramate (Topamax) 100 mg tablet Take 1 Tab by mouth two (2) times a day. 4/7/20  Yes Celina Belcher MD   lithium 300 mg tablet Take 300 mg by mouth three (3) times daily. Yes Provider, Historical   alprazolam (XANAX) 0.5 mg tablet Take 0.5 mg by mouth nightly. 3/23/10  Yes Provider, Historical   ergocalciferol (ERGOCALCIFEROL) 1,250 mcg (50,000 unit) capsule TAKE 1 CAPSULE BY MOUTH ONE TIME PER WEEK 1/5/21   Keyur Smith MD     Allergies   Allergen Reactions    Latex Swelling     Swelling of lips says patient    Beef Containing Products Nausea and Vomiting     diarrhea    Ciprofloxacin Unknown (comments)    Pork Derived (Porcine) Nausea and Vomiting     diarrhea    Bovine Cartilage Nausea and Vomiting     diarrhea    Milk Nausea and Vomiting     diarrhea  diarrhea    Sulfa (Sulfonamide Antibiotics) Hives    Xarelto [Rivaroxaban] Other (comments)     GI problems        Review of Systems:  A comprehensive review of systems was negative except for that written in the History of Present Illness. Objective:      Intake and Output:    No intake/output data recorded. No intake/output data recorded. Physical Exam:   Lungs: clear to auscultation bilaterally  Heart: regular rate and rhythm  Abdomen: soft, non-tender. +hernia          Data Review:   No results found for this or any previous visit (from the past 24 hour(s)). Assessment:     Active Problems:    * No active hospital problems. *      Plan:     Hernia currently contains fat. Short term is low risk for strangulated bowel.     I had an extensive discussion with her regarding the risks, benefits, and alternatives of proceeding with a(n) Laparoscopic Incisional Hernia Repair with Mesh with separation of compone robot-assisted. Risks,benefits, and alternatives were discussed including the risk of anesthesia, bleeding, infection, injury to bowel, chronic pain, and recurrence were discussed. I reviewed with Kim Mcneill her increased risk of bleeding secondary to Coumadin use. INR 1.1 this morning. The patient was counseled at length about the risks of keke Covid-19 during their perioperative period and any recovery window from their procedure. She was made aware that keke Covid-19  may worsen their prognosis for recovering from their procedure and lend to a higher morbidity and/or mortality risk. All material risks, benefits, and reasonable alternatives including postponing the procedure were discussed. She wishes to proceed with the procedure at this time.     Ready to proceed.

## 2021-01-13 ENCOUNTER — TELEPHONE (OUTPATIENT)
Dept: INTERNAL MEDICINE CLINIC | Age: 71
End: 2021-01-13

## 2021-01-13 DIAGNOSIS — E55.9 VITAMIN D DEFICIENCY: ICD-10-CM

## 2021-01-13 NOTE — TELEPHONE ENCOUNTER
HIPAA verified  With patient's   Tonia Davis. Tonia Davis is concerned because patient had surgery  with anethesia recently and has been having psychotic episodes. Patient is receiving Lithium  Which is prescribed by her psychiatrist. Lithium was not held before surgery per Bill. Dejacy Davis wants to know why medications were not reviewed before [facility] and this has happened before. Tonia Davis was encouraged  To contact patient's  Psychiatrist  About  behaviors and to monitor Lithium levels.  Tonia Davis agreed and will contact Psychiatrist.

## 2021-01-13 NOTE — TELEPHONE ENCOUNTER
----- Message from Turner Peterson sent at 1/13/2021 10:38 AM EST -----  Regarding: Dr. Alford [facility]: [phone]  General Message/Vendor Calls    Caller's first and last name: Beth Bernal spouse      Reason for call: She had a surgery on Monday and she seems to be having some issues with bipolar. Callback required yes/no and why: Yes to further discuss her symptoms.        Best contact number(s):[phone]      Message from [facility]

## 2021-01-14 RX ORDER — ERGOCALCIFEROL 1.25 MG/1
CAPSULE ORAL
Qty: 12 CAP | Refills: 1 | Status: SHIPPED | OUTPATIENT
Start: 2021-01-14 | End: 2021-07-28

## 2021-01-21 ENCOUNTER — OFFICE VISIT (OUTPATIENT)
Dept: SURGERY | Age: 71
End: 2021-01-21
Payer: MEDICARE

## 2021-01-21 VITALS
DIASTOLIC BLOOD PRESSURE: 70 MMHG | BODY MASS INDEX: 38.24 KG/M2 | HEIGHT: 65 IN | TEMPERATURE: 96.8 F | SYSTOLIC BLOOD PRESSURE: 110 MMHG | OXYGEN SATURATION: 99 % | HEART RATE: 87 BPM | WEIGHT: 229.5 LBS

## 2021-01-21 DIAGNOSIS — Z09 POSTOPERATIVE EXAMINATION: Primary | ICD-10-CM

## 2021-01-21 PROCEDURE — 99024 POSTOP FOLLOW-UP VISIT: CPT | Performed by: SURGERY

## 2021-01-21 NOTE — PROGRESS NOTES
Identified pt with two pt identifiers(name and ). Reviewed record in preparation for visit and have obtained necessary documentation. All patient medications has been reviewed. Chief Complaint   Patient presents with    Surgical Follow-up     10 day s/p hernia repair on 21       Health Maintenance Due   Topic    COVID-19 Vaccine (1 of 2)    DTaP/Tdap/Td series (1 - Tdap)    Shingrix Vaccine Age 50> (1 of 2)    Bone Densitometry (Dexa) Screening     Breast Cancer Screen Mammogram     Flu Vaccine (1)    Medicare Yearly Exam        Vitals:    21 1059   BP: 110/70   Pulse: 87   Temp: 96.8 °F (36 °C)   TempSrc: Temporal   SpO2: 99%   Weight: 104.1 kg (229 lb 8 oz)   Height: 5' 5\" (1.651 m)   PainSc:   5   PainLoc: Abdomen       4. Have you been to the ER, urgent care clinic since your last visit? Hospitalized since your last visit? No    5. Have you seen or consulted any other health care providers outside of the [address] [place] [address] since your last visit? Include any pap smears or colon screening. No      Patient is accompanied by self I have received verbal consent from Mario Bose to discuss any/all medical information while they are present in the room.

## 2021-01-21 NOTE — PROGRESS NOTES
Chief Complaint   Patient presents with    Surgical Follow-up     10 day s/p hernia repair on 1/11/21   s/p  1. Right retrorectus release (myocutaneous flap of the trunk)  2. Left retrorectus release (myocutaneous flap of the trunk)  3. Laparoscopic incarcerated incisional hernia repair with placement of mesh. 4. Robot assisted. Tolerating PO  BMs 4-5 x/day same as before surgery    Taking no pain meds  C/o some discomfort right side that started yesterday        Physical Exam:   Abdominal exam: soft  non-distended  appropriatly tender. Wound: clean, dry, no drainage    Doing well  reassured her that her abdomen looks perfectly fine. She is looking remarkably well for just having major surgery 10 days ago. It is okay to take pain medicine after surgery. She will try Tylenol 4 times a day and take oxycodone if she needs one    She will call if symptoms worsen. I expect her to continue to improve.   Keep her follow-up appointment as scheduled for 1-2 weeks    Continue restricted activity for a total of 4 weeks        Arnoldo Oliver MD FACS

## 2021-01-22 ENCOUNTER — TELEPHONE (OUTPATIENT)
Dept: SURGERY | Age: 71
End: 2021-01-22

## 2021-01-22 NOTE — TELEPHONE ENCOUNTER
Patient would like to speak to the anthesiologists that gave her the anesthesia during her surgery because she said her hair is falling out and wants to know if that is normal or not because she's heard different things. Please call her.

## 2021-01-27 ENCOUNTER — CLINICAL SUPPORT (OUTPATIENT)
Dept: CARDIOLOGY CLINIC | Age: 71
End: 2021-01-27

## 2021-01-27 ENCOUNTER — ANCILLARY PROCEDURE (OUTPATIENT)
Dept: CARDIOLOGY CLINIC | Age: 71
End: 2021-01-27
Payer: MEDICARE

## 2021-01-27 DIAGNOSIS — I49.5 SSS (SICK SINUS SYNDROME) (HCC): ICD-10-CM

## 2021-01-27 DIAGNOSIS — Z95.0 CARDIAC PACEMAKER IN SITU: Primary | ICD-10-CM

## 2021-01-27 PROCEDURE — 93970 EXTREMITY STUDY: CPT | Performed by: INTERNAL MEDICINE

## 2021-01-28 ENCOUNTER — DOCUMENTATION ONLY (OUTPATIENT)
Dept: CARDIOLOGY CLINIC | Age: 71
End: 2021-01-28

## 2021-01-28 LAB
LEFT GSV AT KNEE DIAM: 0.45 CM
LEFT GSV AT KNEE RFX: 2103 S
LEFT GSV BK MID DIAM: 0.34 CM
LEFT GSV BK MID RFX: 0 S
LEFT GSV JUNC DIAM: 0.83 CM
LEFT GSV JUNC RFX: 0 S
LEFT GSV THIGH PROX DIAM: 0.88 CM
LEFT GSV THIGH PROX RFX: 0 S
LEFT PERFORATOR DIAM: 0.29 CM
LEFT PERFORATOR RFX: 0 S
LEFT SSV PROX DIAM: 0.61 CM
LEFT SSV PROX RFX: 1299 S
RIGHT GSV AK RFX: 0 S
RIGHT GSV AT KNEE DIAM: 0.43 CM
RIGHT GSV BK MID DIAM: 0.35 CM
RIGHT GSV BK MID RFX: 2059 S
RIGHT GSV JUNC DIAM: 1.01 CM
RIGHT GSV JUNC RFX: 0 S
RIGHT GSV THIGH PROX DIAM: 0.81 CM
RIGHT GSV THIGH PROX RFX: 0 S
RIGHT SSV PROX DIAM: 0.37 CM
RIGHT SSV PROX RFX: 0 S

## 2021-01-28 NOTE — PROGRESS NOTES
Message sent via [employer]  The study shows some leakiness of the valves in the legs. Our office will call to schedule an appointment with Dr Anuja Oro to discuss results.

## 2021-01-28 NOTE — PROGRESS NOTES
Message  Received: Today  Message Contents   Song Dutta NP sent to Denis Griggs LPN   Cc: Brook Watt             Message sent via [employer]   The study shows some leakiness of the valves in the legs. Our office will call to schedule an appointment with Dr Isa Zhang to discuss results. Pt was notified thru my chart. Will wait for call if needed. Otherwise pt will be scheduled for an appt with .

## 2021-01-29 ENCOUNTER — ANTI-COAG VISIT (OUTPATIENT)
Dept: CARDIOLOGY CLINIC | Age: 71
End: 2021-01-29
Payer: MEDICARE

## 2021-01-29 DIAGNOSIS — I48.0 PAROXYSMAL ATRIAL FIBRILLATION (HCC): ICD-10-CM

## 2021-01-29 DIAGNOSIS — Z79.01 LONG TERM (CURRENT) USE OF ANTICOAGULANTS: Primary | ICD-10-CM

## 2021-01-29 LAB
INR BLD: 2.3 (ref 1–1.5)
PT POC: 28.2 SECONDS (ref 9.1–12)
VALID INTERNAL CONTROL?: YES

## 2021-01-29 PROCEDURE — 85610 PROTHROMBIN TIME: CPT | Performed by: INTERNAL MEDICINE

## 2021-02-01 ENCOUNTER — OFFICE VISIT (OUTPATIENT)
Dept: SURGERY | Age: 71
End: 2021-02-01
Payer: MEDICARE

## 2021-02-01 VITALS
HEIGHT: 65 IN | BODY MASS INDEX: 38.24 KG/M2 | DIASTOLIC BLOOD PRESSURE: 78 MMHG | TEMPERATURE: 97 F | SYSTOLIC BLOOD PRESSURE: 112 MMHG | HEART RATE: 78 BPM | RESPIRATION RATE: 16 BRPM | OXYGEN SATURATION: 100 % | WEIGHT: 229.5 LBS

## 2021-02-01 DIAGNOSIS — Z09 POSTOPERATIVE EXAMINATION: Primary | ICD-10-CM

## 2021-02-01 PROCEDURE — 99024 POSTOP FOLLOW-UP VISIT: CPT | Performed by: SURGERY

## 2021-02-01 NOTE — PROGRESS NOTES
Identified pt with two pt identifiers(name and ). Reviewed record in preparation for visit and have obtained necessary documentation. All patient medications has been reviewed. Chief Complaint   Patient presents with    Surgical Follow-up     p/o incarcerated incisional hernia surgery 21           Vitals:    21 1334   BP: 112/78   Pulse: 78   Resp: 16   Temp: 97 °F (36.1 °C)   TempSrc: Temporal   SpO2: 100%   Weight: 104.1 kg (229 lb 8 oz)   Height: 5' 5\" (1.651 m)   PainSc:   0 - No pain       Have you been to the ER?   NO

## 2021-02-01 NOTE — PROGRESS NOTES
Chief Complaint   Patient presents with    Surgical Follow-up     p/o incarcerated incisional hernia surgery 1/11/21   s/p  1. Right retrorectus release (myocutaneous flap of the trunk)  2. Left retrorectus release (myocutaneous flap of the trunk)  3. Laparoscopic incarcerated incisional hernia repair with placement of mesh. 4. Robot assisted.         Tolerating PO  Pain controlled  Taking no pain meds  \" I am happy\"    Physical Exam:   Abdominal exam: soft  non-distended  appropriatly tender.   Wound: clean, dry, no drainage   Still with some glue on the incisions    Doing well  Reviewed incision care  Continue restricted activity for a total of 4 weeks  Follow-up: mychal Jo MD FACS

## 2021-02-09 ENCOUNTER — TELEPHONE (OUTPATIENT)
Dept: CARDIOLOGY CLINIC | Age: 71
End: 2021-02-09

## 2021-02-09 ENCOUNTER — OFFICE VISIT (OUTPATIENT)
Dept: CARDIOLOGY CLINIC | Age: 71
End: 2021-02-09
Payer: MEDICARE

## 2021-02-09 VITALS
BODY MASS INDEX: 38.39 KG/M2 | WEIGHT: 230.4 LBS | SYSTOLIC BLOOD PRESSURE: 114 MMHG | HEART RATE: 64 BPM | DIASTOLIC BLOOD PRESSURE: 90 MMHG | OXYGEN SATURATION: 100 % | RESPIRATION RATE: 16 BRPM | HEIGHT: 65 IN

## 2021-02-09 DIAGNOSIS — I10 ESSENTIAL HYPERTENSION: ICD-10-CM

## 2021-02-09 DIAGNOSIS — I48.0 PAF (PAROXYSMAL ATRIAL FIBRILLATION) (HCC): ICD-10-CM

## 2021-02-09 DIAGNOSIS — I83.899 VARICOSE VEINS WITH SWELLING: ICD-10-CM

## 2021-02-09 DIAGNOSIS — I10 BENIGN ESSENTIAL HYPERTENSION: ICD-10-CM

## 2021-02-09 DIAGNOSIS — I87.2 VENOUS INSUFFICIENCY OF BOTH LOWER EXTREMITIES: Primary | ICD-10-CM

## 2021-02-09 DIAGNOSIS — E78.2 MIXED HYPERLIPIDEMIA: ICD-10-CM

## 2021-02-09 DIAGNOSIS — I48.91 ATRIAL FIBRILLATION, UNSPECIFIED TYPE (HCC): ICD-10-CM

## 2021-02-09 PROCEDURE — G9717 DOC PT DX DEP/BP F/U NT REQ: HCPCS | Performed by: INTERNAL MEDICINE

## 2021-02-09 PROCEDURE — G8400 PT W/DXA NO RESULTS DOC: HCPCS | Performed by: INTERNAL MEDICINE

## 2021-02-09 PROCEDURE — G8752 SYS BP LESS 140: HCPCS | Performed by: INTERNAL MEDICINE

## 2021-02-09 PROCEDURE — 3017F COLORECTAL CA SCREEN DOC REV: CPT | Performed by: INTERNAL MEDICINE

## 2021-02-09 PROCEDURE — G0463 HOSPITAL OUTPT CLINIC VISIT: HCPCS | Performed by: INTERNAL MEDICINE

## 2021-02-09 PROCEDURE — G8755 DIAS BP > OR = 90: HCPCS | Performed by: INTERNAL MEDICINE

## 2021-02-09 PROCEDURE — 99214 OFFICE O/P EST MOD 30 MIN: CPT | Performed by: INTERNAL MEDICINE

## 2021-02-09 PROCEDURE — G8417 CALC BMI ABV UP PARAM F/U: HCPCS | Performed by: INTERNAL MEDICINE

## 2021-02-09 PROCEDURE — 1101F PT FALLS ASSESS-DOCD LE1/YR: CPT | Performed by: INTERNAL MEDICINE

## 2021-02-09 PROCEDURE — G8427 DOCREV CUR MEDS BY ELIG CLIN: HCPCS | Performed by: INTERNAL MEDICINE

## 2021-02-09 PROCEDURE — G8536 NO DOC ELDER MAL SCRN: HCPCS | Performed by: INTERNAL MEDICINE

## 2021-02-09 PROCEDURE — 1090F PRES/ABSN URINE INCON ASSESS: CPT | Performed by: INTERNAL MEDICINE

## 2021-02-09 NOTE — PROGRESS NOTES
2/9/2021 3:27 PM      Subjective:     Golden Badillo is seen in vascular clinic to discuss LE venous reflux study. She denies chest pain, chest pressure/discomfort, dyspnea, palpitations, irregular heart beats, near-syncope, syncope, fatigue, orthopnea, paroxysmal nocturnal dyspnea, exertional chest pressure/discomfort, tachypnea. Visit Vitals  BP (!) 114/90 (BP 1 Location: Right upper arm, BP Patient Position: Sitting, BP Cuff Size: Large adult)   Pulse 64   Resp 16   Ht 5' 5\" (1.651 m)   Wt 230 lb 6.4 oz (104.5 kg)   SpO2 100%   BMI 38.34 kg/m²       Current Outpatient Medications   Medication Sig    cyanocobalamin (Vitamin B-12) 100 mcg tablet Take 100 mcg by mouth daily.  omeprazole (PRILOSEC) 40 mg capsule TAKE 1 CAPSULE BY MOUTH EVERY DAY IN THE MORNING    timolol (TIMOPTIC) 0.5 % ophthalmic solution INSTILL 1 DROP 1 TIME EVERY DAY INTO BOTH EYES EVERY MORNING    warfarin (COUMADIN) 5 mg tablet TAKE 3 TABLETS BY MOUTH ON TUESDAY, FRIDAY AND 2 TABS ALL OTHER DAYS.  hydrOXYchloroQUINE (PLAQUENIL) 200 mg tablet TAKE 2 TABLETS BY MOUTH EVERY DAY    pregabalin (LYRICA) 100 mg capsule TAKE 1 CAPSULE BY MOUTH TWICE A DAY    levothyroxine (SYNTHROID) 50 mcg tablet TAKE 1 TABLET BY MOUTH EVERY DAY    primidone (MYSOLINE) 50 mg tablet TAKE 1/2 TABLET BY MOUTH NIGHTLY    latanoprost (XALATAN) 0.005 % ophthalmic solution Administer 1 Drop to both eyes two (2) times a day.  zolpidem (AMBIEN) 10 mg tablet Take 10 mg by mouth nightly as needed.  lithium 300 mg tablet Take 300 mg by mouth three (3) times daily.  alprazolam (XANAX) 0.5 mg tablet Take 0.5 mg by mouth nightly.  ergocalciferol (ERGOCALCIFEROL) 1,250 mcg (50,000 unit) capsule Take one capsule weekly (Patient not taking: Reported on 2/1/2021)    trimethoprim (TRIMPEX) 100 mg tablet TAKE 1 TABLET BY MOUTH EVERY DAY    tiZANidine (ZANAFLEX) 2 mg tablet Take 1 Tab by mouth three (3) times daily.  (Patient not taking: Reported on 2/1/2021)    topiramate (Topamax) 100 mg tablet Take 1 Tab by mouth two (2) times a day. (Patient not taking: Reported on 2/1/2021)     No current facility-administered medications for this visit.           Objective:      Visit Vitals  BP (!) 114/90 (BP 1 Location: Right upper arm, BP Patient Position: Sitting, BP Cuff Size: Large adult)   Pulse 64   Resp 16   Ht 5' 5\" (1.651 m)   Wt 230 lb 6.4 oz (104.5 kg)   SpO2 100%   BMI 38.34 kg/m²       Data Review:     Reviewed and/or ordered active problem list, medication list tests    Past Medical History:   Diagnosis Date    Arrhythmia 2014    atrial fibrillation 2014, sick sinus syndrome: [facility]    Arthritis     B[facility] affect, depressed [facility]     Bipolar affective disorder ([facility] 3/31/2010    Chronic pain 2018    right shoulder    Colon polyps 03/31/2010    also 5/2018: colon polyps    Depression     Diarrhea 07/19/2013    D/t alpha gal allergy says patient    Epigastric pain 6/5/2015    GERD (gastroesophageal reflux disease)     occasiional only; controlled with med    Hemispheric carotid artery syndrome No stroke    Neuro: Sly Marie  (CT scan head/neck negative 4/2018 in [facility])    Hyperlipidemia 3/31/2010    Hypothyroidism 3/31/2010    Long term current use of anticoagulant therapy     Lupus ([address]     patient denies-says she has RA    Migraine 03/31/2010    has them x2 a month: last one 5/23/2018    Psychotic disorder ([facility]     S/P ablation of atrial fibrillation 05/13/2014    S/P cardiac pacemaker procedure 9/16/2014 9/16/14 Medtronic MRI compatible dual chamber pacemaker implant    Sleep apnea     unable to tolerate CPAP    Stool color black     Thyroid disease     Tick-borne disease     Alpha Gal allergy: no eat pork, beef, milk (Meat allergy showed up on a allergy test)    Umbilical hernia without obstruction and without gangrene 6/22/2020    Urge incontinence 3/31/2010    Vitamin D deficiency 3/31/2015      Past Surgical History:   Procedure Laterality Date    COLONOSCOPY N/A 4/27/2018    COLONOSCOPY performed by Cynthia Coe [facility] at [facility] AM[facility]    COLONOSCOPY N/A 11/25/2020    COLONOSCOPY, EGD performed by Ovi Myers MD at [address] [place] [address]  4/27/2018         [facility]         HX BUNIONECTOMY      HX GI  2010, 5/2018    egd, colonoscopy    HX HERNIA REPAIR  01/11/2021    HX HYSTERECTOMY      HX ORTHOPAEDIC  2000's    removed bone spurs from R [employer] [facility] hand    HX ORTHOPAEDIC  1990s?     left bunionectomy     HX OTHER SURGICAL  07/2014    2 shocks to heart & ablations    HX OTHER SURGICAL  01/11/2021    incarcerated incisional hernia surgery    HX PACEMAKER  2014    On the right side    HX PACEMAKER PLACEMENT  2015    HX SHOULDER REPLACEMENT Right 2017    HX TONSILLECTOMY  15 yrs old    MI CARDIAC SURG PROCEDURE UNLIST  5/2014    ablation     Allergies   Allergen Reactions    Latex Swelling     Swelling of lips says patient    Beef Containing Products Nausea and Vomiting     diarrhea    Ciprofloxacin Unknown (comments)    Pork Derived (Porcine) Nausea and Vomiting     diarrhea    Bovine Cartilage Nausea and Vomiting     diarrhea    Milk Nausea and Vomiting     diarrhea  diarrhea    Sulfa (Sulfonamide Antibiotics) Hives    Xarelto [Rivaroxaban] Other (comments)     GI problems      Family History   Problem Relation Age of Onset    Arrhythmia Mother         afib    Stroke Mother     Heart Failure Mother     Colon Cancer Father     Heart Disease Father     Cancer Father         colon cancer    Arrhythmia Brother         afib    Asthma Brother     COPD Brother       Social History     Socioeconomic History    Marital status:      Spouse name: Not on file    Number of children: Not on file    Years of education: Not on file    Highest education level: Not on file   Occupational History    Not on file   Social Needs    Financial resource strain: Not on file    Food insecurity     Worry: Not on file     Inability: Not on file    Transportation needs     Medical: Not on file     Non-medical: Not on file   Tobacco Use    Smoking status: Never Smoker    Smokeless tobacco: Never Used   Substance and Sexual Activity    Alcohol use: Yes     Alcohol/week: 1.0 standard drinks     Types: 1 Cans of beer per week     Frequency: Monthly or less     Comment: weekly    Drug use: Yes     Types: Prescription    Sexual activity: Never   Lifestyle    Physical activity     Days per week: Not on file     Minutes per session: Not on file    Stress: Not on file   Relationships    Social connections     Talks on phone: Not on file     Gets together: Not on file     Attends [employer] service: Not on file     Active member of club or organization: Not on file     Attends meetings of clubs or organizations: Not on file     Relationship status: Not on file    Intimate partner violence     Fear of current or ex partner: Not on file     Emotionally abused: Not on file     Physically abused: Not on file     Forced sexual activity: Not on file   Other Topics Concern    Not on file   Social History Narrative    Not on file       Review of Systems     General: Not Present- Anorexia, Chills, Dietary Changes, Fatigue, Fever, Medication Changes, Night Sweats, Weight Gain > 10lbs. and Weight Loss > 10lbs. .  Skin: Not Present- Bruising and Excessive Sweating. HEENT: Not Present- Headache, Visual Loss and Vertigo. Respiratory: Not Present- Cough, Decreased Exercise Tolerance, Difficulty Breathing, Snoring and Wheezing. Cardiovascular: Not Present- Abnormal Blood Pressure, Chest Pain, Difficulty Breathing On Exertion, Fainting / Blacking Out, Irregular Heart Beat, Night Cramps, Orthopnea, Palpitations, Paroxysmal Nocturnal Dyspnea, Rapid Heart Rate, Shortness of Breath.   Gastrointestinal: Not Present- Black, Tarry Stool, Bloody Stool, Diarrhea, Hematemesis, Rectal Bleeding and Vomiting. Musculoskeletal: Not Present- Muscle Pain and Muscle Weakness. Neurological: Not Present- Dizziness. Psychiatric: Not Present- Depression. Endocrine: Not Present- Cold Intolerance, Heat Intolerance and Thyroid Problems. Hematology: Not Present- Abnormal Bleeding, Anemia, Blood Clots and Easy Bruising. Physical Exam   The physical exam findings are as follows:     General   Mental Status - Alert. General Appearance - Cooperative and Well groomed. Not in acute distress. Orientation - Oriented to time, Oriented to place and Oriented to person. Build & Nutrition - Well developed. Skin   General: - Normal.      HEENT  Head - Normal.  Eye - Normal.  Mouth & Throat - Normal.      Neck   Carotid Arteries - normal upstroke. No Bruits. Thyroid: Gland - Normal size and consistency. Chest and Lung Exam   Inspection:   Chest Wall: - Normal. Accessory muscles - No use of accessory muscles in breathing. Auscultation:   Breath sounds: - Normal.      Cardiovascular   Inspection: Jugular vein - Bilateral - Inspection Normal.  Palpation/Percussion:   Apical Impulse: - Normal.  Auscultation: Rhythm - Regular. Heart Sounds - S1 WNL and S2 WNL. No S3 or S4. Murmurs & Other Heart Sounds: Auscultation of the heart reveals - No Murmurs. Abdomen   Palpation/Percussion: Palpation and Percussion of the abdomen reveal - No Palpable abdominal masses. Liver: - Normal.  Spleen: - Normal.  Auscultation: Auscultation of the abdomen reveals - Bowel sounds normal.      Neurologic   Mental Status: Affect - normal.  Motor: - Normal. Gait - Normal.           PHYSICIAN TO COMPLETE BELOW THIS POINT    Date of Initial Physician Evaluation:_______________________  Check all that apply:  [x] Reviewed Venous history  [x] Physical examination of the affected leg(s)   [x] Duplex or Doppler Scan results reviewed.      Duplex or Doppler Ultrasound of the venous system demonstrate:  [x] Absence of deep venous thrombosis  [x] Greater and/or lesser saphenous vein or  valvular incompetence/reflux that correlates with        patients symptoms  []   valvular incompetence/reflux that correlates with patients symptoms    [x] Graduated, elasticized compression stockings (20-30 mmHg), has been prescribed. [x] Standing Photos taken of leg(s)  [] Front     [] Back     [x] Front and back   [x] Other causes of patients leg(s) symptoms have been ruled out             Assessment:       ICD-10-CM ICD-9-CM    1. Venous insufficiency of both lower extremities  I87.2 459.81    2. PAF (paroxysmal atrial fibrillation) (HCC)  I48.0 427.31    3. Essential hypertension  I10 401.9    4. Mixed hyperlipidemia  E78.2 272.2    5. Atrial fibrillation, unspecified type ([facility]  I48.91 427.31    6. Benign essential hypertension  I10 401.1    7. Varicose veins with swelling  I83.899 454.8        CEAP class:      []C1   []C2   [x]C3    []C4    []4a    []4b   []C5   []C6           Plan:     Varicose veins with swelling and venous insuff: compression stockings. F/u in 3 months. 1.  - Instruction given on daily leg elevation   2.  - Instruction given for mild exercise  3.  - Instruction given for weight reduction    Paroxysmal atrial fibrillation (HCC)  Continue warfarin; care per EP  S/p PPM implant     Benign essential hypertension  BP controlled.   Continue anti-hypertensive therapy and low sodium diet    Lennox Lone, MD  2/9/2021

## 2021-02-09 NOTE — TELEPHONE ENCOUNTER
Please call patient back concerning her meds warfarin (COUMADIN) 5 mg tablet. She has been having some hair loss. Thanks.

## 2021-02-09 NOTE — TELEPHONE ENCOUNTER
Verified patient with 2 identifiers   Pt called regarding hair loss - stated has been on warfarin for \"a few years\" and noticed hair loss beginning roughly around same time warfarin started. States approx. 6 months ago hair loss increased in severity, now has \"bald patches. \" Pt states she will be going to [place] [id number] for work up. Would like more information regarding what to do at this time. Pt is not requesting to go off warfarin at this time. Please advise.

## 2021-02-09 NOTE — PROGRESS NOTES
1. Have you been to the ER, urgent care clinic since your last visit? Hospitalized since your last visit? No.    2. Have you seen or consulted any other health care providers outside of the [address] [place] [address] since your last visit? Include any pap smears or colon screening. On 1/11/21 had hernia repair surgery.         Chief Complaint   Patient presents with    Results     discuss le venous doppler results-

## 2021-02-10 ENCOUNTER — TELEPHONE (OUTPATIENT)
Dept: INTERNAL MEDICINE CLINIC | Age: 71
End: 2021-02-10

## 2021-02-10 ENCOUNTER — VIRTUAL VISIT (OUTPATIENT)
Dept: INTERNAL MEDICINE CLINIC | Age: 71
End: 2021-02-10
Payer: MEDICARE

## 2021-02-10 DIAGNOSIS — Z09 HOSPITAL DISCHARGE FOLLOW-UP: Primary | ICD-10-CM

## 2021-02-10 DIAGNOSIS — E03.9 HYPOTHYROIDISM, UNSPECIFIED TYPE: ICD-10-CM

## 2021-02-10 DIAGNOSIS — R73.9 HYPERGLYCEMIA: ICD-10-CM

## 2021-02-10 DIAGNOSIS — E55.9 VITAMIN D DEFICIENCY: ICD-10-CM

## 2021-02-10 DIAGNOSIS — I10 BENIGN ESSENTIAL HYPERTENSION: ICD-10-CM

## 2021-02-10 DIAGNOSIS — E78.2 MIXED HYPERLIPIDEMIA: ICD-10-CM

## 2021-02-10 DIAGNOSIS — I48.91 ATRIAL FIBRILLATION, UNSPECIFIED TYPE (HCC): ICD-10-CM

## 2021-02-10 DIAGNOSIS — L65.9 HAIR LOSS: ICD-10-CM

## 2021-02-10 DIAGNOSIS — K21.9 GASTROESOPHAGEAL REFLUX DISEASE WITHOUT ESOPHAGITIS: ICD-10-CM

## 2021-02-10 PROCEDURE — G8756 NO BP MEASURE DOC: HCPCS | Performed by: INTERNAL MEDICINE

## 2021-02-10 PROCEDURE — 1101F PT FALLS ASSESS-DOCD LE1/YR: CPT | Performed by: INTERNAL MEDICINE

## 2021-02-10 PROCEDURE — 3017F COLORECTAL CA SCREEN DOC REV: CPT | Performed by: INTERNAL MEDICINE

## 2021-02-10 PROCEDURE — G8427 DOCREV CUR MEDS BY ELIG CLIN: HCPCS | Performed by: INTERNAL MEDICINE

## 2021-02-10 PROCEDURE — 1111F DSCHRG MED/CURRENT MED MERGE: CPT | Performed by: INTERNAL MEDICINE

## 2021-02-10 PROCEDURE — G8536 NO DOC ELDER MAL SCRN: HCPCS | Performed by: INTERNAL MEDICINE

## 2021-02-10 PROCEDURE — G8417 CALC BMI ABV UP PARAM F/U: HCPCS | Performed by: INTERNAL MEDICINE

## 2021-02-10 PROCEDURE — 99213 OFFICE O/P EST LOW 20 MIN: CPT | Performed by: INTERNAL MEDICINE

## 2021-02-10 PROCEDURE — G9717 DOC PT DX DEP/BP F/U NT REQ: HCPCS | Performed by: INTERNAL MEDICINE

## 2021-02-10 PROCEDURE — G8400 PT W/DXA NO RESULTS DOC: HCPCS | Performed by: INTERNAL MEDICINE

## 2021-02-10 PROCEDURE — G0463 HOSPITAL OUTPT CLINIC VISIT: HCPCS | Performed by: INTERNAL MEDICINE

## 2021-02-10 PROCEDURE — 1090F PRES/ABSN URINE INCON ASSESS: CPT | Performed by: INTERNAL MEDICINE

## 2021-02-10 NOTE — TELEPHONE ENCOUNTER
Deon Raymond, ORLANDO Worthy LPN   Cc: Stacey Ross RN   Caller: Unspecified Kenny Kaur,  3:40 PM)             May change to xarelto or eliquis. Will need to coordinate with Pilar re: INR and timing.    Thanks

## 2021-02-10 NOTE — PROGRESS NOTES
Golden Badillo is a 66 y.o. female who was seen by synchronous (real-time) audio-video technology on 2/10/2021 for Hospital Follow Up (pt f.u from [facility] ER for hernia on 1/11/21) and Alopecia (pt concerned of hair loss- pt notice this issue 18 months ago; the last 6 months problem is worse )        Progress Note       SUBJECTIVE  Ms. Golden Badillo presents today acutely for     Chief Complaint   Patient presents with   [facility] Follow Up     pt f.u from [facility] ER for hernia on 1/11/21    Alopecia     pt concerned of hair loss- pt notice this issue 18 months ago; the last 6 months problem is worse      She was in hospital 1/11/2021 for incisional hernia repair, by Dr. Ryan Moss. Now, she is doing fine. She also has a complaint of hair loss. \"I have chunks of hair coming off the back of my head. She saw dermatologist, and \"he didn't find anything. \"     She has upcoming appt with Oliver Wuzzuf.      Past Medical History:   Diagnosis Date    Arrhythmia 2014    atrial fibrillation 2014, sick sinus syndrome: [facility]    Arthritis     Bipolar affect, depressed ([address]     Bipolar affective disorder ([address] 3/31/2010    Chronic pain 2018    right shoulder    Colon polyps 03/31/2010    also 5/2018: colon polyps    Depression     Diarrhea 07/19/2013    D/t alpha gal allergy says patient    Epigastric pain 6/5/2015    GERD (gastroesophageal reflux disease)     occasiional only; controlled with med    Hemispheric carotid artery syndrome No stroke    Neuro: Darryn Sanon  ([facility]    Hyperlipidemia 3/31/2010    Hypothyroidism 3/31/2010    Long term current use of anticoagulant therapy     Lupus ([address]     patient denies-says she has RA    Migraine 03/31/2010    has them x2 a month: last one 5/23/2018    Psychotic disorder ([address]     S/P ablation of atrial fibrillation 05/13/2014    S/P cardiac pacemaker procedure 9/16/2014 9/16/14 Medtronic MRI compatible dual chamber pacemaker implant    Sleep apnea     unable to tolerate CPAP    Stool color black     Thyroid disease     Tick-borne disease     Alpha Gal allergy: no eat pork, beef, milk (Meat allergy showed up on a allergy test)    Umbilical hernia without obstruction and without gangrene 6/22/2020    Urge incontinence 3/31/2010    Vitamin D deficiency 3/31/2015     Current Outpatient Medications on File Prior to Visit   Medication Sig Dispense Refill    cyanocobalamin (Vitamin B-12) 100 mcg tablet Take 100 mcg by mouth daily.  omeprazole (PRILOSEC) 40 mg capsule TAKE 1 CAPSULE BY MOUTH EVERY DAY IN THE MORNING      timolol (TIMOPTIC) 0.5 % ophthalmic solution INSTILL 1 DROP 1 TIME EVERY DAY INTO BOTH EYES EVERY MORNING      hydrOXYchloroQUINE (PLAQUENIL) 200 mg tablet TAKE 2 TABLETS BY MOUTH EVERY DAY      pregabalin (LYRICA) 100 mg capsule TAKE 1 CAPSULE BY MOUTH TWICE A DAY 60 Cap 5    levothyroxine (SYNTHROID) 50 mcg tablet TAKE 1 TABLET BY MOUTH EVERY DAY 90 Tab 3    primidone (MYSOLINE) 50 mg tablet TAKE 1/2 TABLET BY MOUTH NIGHTLY 45 Tab 5    latanoprost (XALATAN) 0.005 % ophthalmic solution Administer 1 Drop to both eyes two (2) times a day.  zolpidem (AMBIEN) 10 mg tablet Take 10 mg by mouth nightly as needed.  lithium 300 mg tablet Take 300 mg by mouth three (3) times daily.  alprazolam (XANAX) 0.5 mg tablet Take 0.5 mg by mouth nightly.  ergocalciferol (ERGOCALCIFEROL) 1,250 mcg (50,000 unit) capsule Take one capsule weekly (Patient not taking: Reported on 2/1/2021) 12 Cap 1    warfarin (COUMADIN) 5 mg tablet TAKE 3 TABLETS BY MOUTH ON TUESDAY, FRIDAY AND 2 TABS ALL OTHER DAYS. 204 Tab 1    trimethoprim (TRIMPEX) 100 mg tablet TAKE 1 TABLET BY MOUTH EVERY DAY      tiZANidine (ZANAFLEX) 2 mg tablet Take 1 Tab by mouth three (3) times daily.  (Patient not taking: Reported on 2/1/2021) 90 Tab 5    topiramate (Topamax) 100 mg tablet Take 1 Tab by mouth two (2) times a day. (Patient not taking: Reported on 2/1/2021) 60 Tab 3     No current facility-administered medications on file prior to visit. OBJECTIVE  There were no vitals taken for this visit. Gen: Pleasant 66 y.o.  female in NAD.        ASSESSMENT / PLAN  Diagnoses and all orders for this visit:    Hospital discharge follow-up  -     [place] DISCHARGE MEDS RECONCILED W/ CURRENT OUTPATIENT MED LIST    Vitamin D deficiency  -     METABOLIC PANEL, COMPREHENSIVE  -     CBC WITH AUTOMATED DIFF  -     VITAMIN D, 25 HYDROXY    Hypothyroidism, unspecified type  -     METABOLIC PANEL, COMPREHENSIVE  -     CBC WITH AUTOMATED DIFF  -     TSH 3RD GENERATION  -     T4, FREE    Hyperglycemia  -     HEMOGLOBIN A1C WITH EAG  -     METABOLIC PANEL, COMPREHENSIVE  -     CBC WITH AUTOMATED DIFF    Mixed hyperlipidemia  -     LIPID PANEL  -     METABOLIC PANEL, COMPREHENSIVE  -     CBC WITH AUTOMATED DIFF    Gastroesophageal reflux disease without esophagitis  -     METABOLIC PANEL, COMPREHENSIVE  -     CBC WITH AUTOMATED DIFF    Benign essential hypertension  -     LIPID PANEL  -     METABOLIC PANEL, COMPREHENSIVE  -     CBC WITH AUTOMATED DIFF    Atrial fibrillation, unspecified type (HCC)  -     METABOLIC PANEL, COMPREHENSIVE  -     CBC WITH AUTOMATED DIFF    Hair loss  -     TESTOSTERONE, TOTAL, FEMALE/CHILD  -     PROLACTIN  -     DHEA SULFATE  -     TSH 3RD GENERATION  -     T4, FREE  -     JANNET BY MULTIPLEX FLOW IA, QL  -     SED RATE (ESR)      F/U in 6 months      I have reviewed with the patient details of the assessment and plan and all questions were answered. Relevant patient education was performed. Objective:   No flowsheet data found.      [INSTRUCTIONS:  \"[x]\" Indicates a positive item  \"[]\" Indicates a negative item  -- DELETE ALL ITEMS NOT EXAMINED]    Constitutional: [x] Appears well-developed and well-nourished [x] No apparent distress      [] Abnormal -     Mental status: [x] Alert and awake  [x] Oriented to person/place/time [x] Able to follow commands    [] Abnormal -     Eyes:   EOM    [x]  Normal    [] Abnormal -   Sclera  [x]  Normal    [] Abnormal -          Discharge [x]  None visible   [] Abnormal -     HENT: [x] Normocephalic, atraumatic  [] Abnormal -   [x] Mouth/Throat: Mucous membranes are moist    External Ears [x] Normal  [] Abnormal -    Neck: [x] No visualized mass [] Abnormal -     Pulmonary/Chest: [x] Respiratory effort normal   [x] No visualized signs of difficulty breathing or respiratory distress        [] Abnormal -      Musculoskeletal:   [x] Normal gait with no signs of ataxia         [x] Normal range of motion of neck        [] Abnormal -     Neurological:        [x] No Facial Asymmetry (Cranial nerve 7 motor function) (limited exam due to video visit)          [x] No gaze palsy        [] Abnormal -          Skin:        [x] No significant exanthematous lesions or discoloration noted on facial skin         [] Abnormal -            Psychiatric:       [x] Normal Affect [] Abnormal -       Other pertinent observable physical exam findings:-        We discussed the expected course, resolution and complications of the diagnosis(es) in detail. Medication risks, benefits, costs, interactions, and alternatives were discussed as indicated. I advised her to contact the office if her condition worsens, changes or fails to improve as anticipated. She expressed understanding with the diagnosis(es) and plan. Ilah Hamman, who was evaluated through a patient-initiated, synchronous (real-time) audio-video encounter, and/or her healthcare decision maker, is aware that it is a billable service, with coverage as determined by her insurance carrier. She provided verbal consent to proceed: YES, and patient identification was verified.  It was conducted pursuant to the emergency declaration under the [facility] waiver authority and the Coronavirus Preparedness and Response Supplemental Appropriations Act. A caregiver was present when appropriate. Ability to conduct physical exam was limited. I was in office. The patient was at home or otherwise outside the office.       Kai Silverio MD

## 2021-02-12 ENCOUNTER — VIRTUAL VISIT (OUTPATIENT)
Dept: NEUROLOGY | Age: 71
End: 2021-02-12
Payer: MEDICARE

## 2021-02-12 ENCOUNTER — TELEPHONE (OUTPATIENT)
Dept: INTERNAL MEDICINE CLINIC | Age: 71
End: 2021-02-12

## 2021-02-12 DIAGNOSIS — M79.7 FIBROMYALGIA: Primary | ICD-10-CM

## 2021-02-12 DIAGNOSIS — G25.0 ESSENTIAL TREMOR: ICD-10-CM

## 2021-02-12 DIAGNOSIS — F31.70 BIPOLAR AFFECTIVE DISORDER IN REMISSION (HCC): ICD-10-CM

## 2021-02-12 DIAGNOSIS — E78.2 MIXED HYPERLIPIDEMIA: ICD-10-CM

## 2021-02-12 DIAGNOSIS — R53.81 MALAISE: Primary | ICD-10-CM

## 2021-02-12 DIAGNOSIS — E03.9 ACQUIRED HYPOTHYROIDISM: ICD-10-CM

## 2021-02-12 DIAGNOSIS — G43.719 INTRACTABLE CHRONIC MIGRAINE WITHOUT AURA AND WITHOUT STATUS MIGRAINOSUS: ICD-10-CM

## 2021-02-12 PROCEDURE — G8417 CALC BMI ABV UP PARAM F/U: HCPCS | Performed by: PSYCHIATRY & NEUROLOGY

## 2021-02-12 PROCEDURE — G8428 CUR MEDS NOT DOCUMENT: HCPCS | Performed by: PSYCHIATRY & NEUROLOGY

## 2021-02-12 PROCEDURE — G8400 PT W/DXA NO RESULTS DOC: HCPCS | Performed by: PSYCHIATRY & NEUROLOGY

## 2021-02-12 PROCEDURE — 3017F COLORECTAL CA SCREEN DOC REV: CPT | Performed by: PSYCHIATRY & NEUROLOGY

## 2021-02-12 PROCEDURE — 1090F PRES/ABSN URINE INCON ASSESS: CPT | Performed by: PSYCHIATRY & NEUROLOGY

## 2021-02-12 PROCEDURE — G8536 NO DOC ELDER MAL SCRN: HCPCS | Performed by: PSYCHIATRY & NEUROLOGY

## 2021-02-12 PROCEDURE — 1101F PT FALLS ASSESS-DOCD LE1/YR: CPT | Performed by: PSYCHIATRY & NEUROLOGY

## 2021-02-12 PROCEDURE — G9717 DOC PT DX DEP/BP F/U NT REQ: HCPCS | Performed by: PSYCHIATRY & NEUROLOGY

## 2021-02-12 PROCEDURE — 99214 OFFICE O/P EST MOD 30 MIN: CPT | Performed by: PSYCHIATRY & NEUROLOGY

## 2021-02-12 PROCEDURE — G8756 NO BP MEASURE DOC: HCPCS | Performed by: PSYCHIATRY & NEUROLOGY

## 2021-02-12 NOTE — PROGRESS NOTES
Follow-up Visit    Name Therese Segura Age 66 y.o. MRN [id number]  1950       Chief Complaint: headaches  She recently had four hernia surgeries with  Mesh. One umbilical and three direct. She feels that she is doing better. She is having frequent headaches almost daily. She is able to function with them and they are not debilitating. She occur mostly at 3 - 4 pm.  They started after her surgery. She feels that she is losing her hair. She has bald patches in the back. She was not tested for lyme disease. She is due for work-up by her PCP after which she is to follow-up here and will readdress her medical conditions in the context of her labs. Assesment and Plan  1. Fibromyalgia  conitnue lyrica    2. Intractable chronic migraine without aura and without status migrainosus  Continue Fioricet prn and topiramate    3. Bipolar affective disorder in remission ([facility] [address]  Continue lithium    4. Obstructive sleep apnea  Not a factor for her fatigue according to the sleep doctor    5. Acquired hypothyroidism  continue synthroid. Does not believe that this is contributing to her fatigue    6. Mixed hyperlipidemia    7. Essential tremor  Continue primidone reluctant to increase the dose because of fatigue    8. Memory loss  Stable    9. Hair loss  She is being worked up by her PCP with focus on lithium, TSH and Lyme. Allergies  Latex, Beef containing products, Ciprofloxacin, Pork derived (porcine), Bovine cartilage, Milk, Sulfa (sulfonamide antibiotics), and Xarelto [rivaroxaban]     Medications  Current Outpatient Medications   Medication Sig    cyanocobalamin (Vitamin B-12) 100 mcg tablet Take 100 mcg by mouth daily.     omeprazole (PRILOSEC) 40 mg capsule TAKE 1 CAPSULE BY MOUTH EVERY DAY IN THE MORNING    timolol (TIMOPTIC) 0.5 % ophthalmic solution INSTILL 1 DROP 1 TIME EVERY DAY INTO BOTH EYES EVERY MORNING    warfarin (COUMADIN) 5 mg tablet TAKE 3 TABLETS BY MOUTH ON TUESDAY, FRIDAY AND 2 TABS ALL OTHER DAYS.  hydrOXYchloroQUINE (PLAQUENIL) 200 mg tablet TAKE 2 TABLETS BY MOUTH EVERY DAY    pregabalin (LYRICA) 100 mg capsule TAKE 1 CAPSULE BY MOUTH TWICE A DAY    levothyroxine (SYNTHROID) 50 mcg tablet TAKE 1 TABLET BY MOUTH EVERY DAY    primidone (MYSOLINE) 50 mg tablet TAKE 1/2 TABLET BY MOUTH NIGHTLY    latanoprost (XALATAN) 0.005 % ophthalmic solution Administer 1 Drop to both eyes two (2) times a day.  lithium 300 mg tablet Take 300 mg by mouth three (3) times daily.  alprazolam (XANAX) 0.5 mg tablet Take 0.5 mg by mouth nightly.  ergocalciferol (ERGOCALCIFEROL) 1,250 mcg (50,000 unit) capsule Take one capsule weekly (Patient not taking: Reported on 2/1/2021)    trimethoprim (TRIMPEX) 100 mg tablet TAKE 1 TABLET BY MOUTH EVERY DAY    zolpidem (AMBIEN) 10 mg tablet Take 10 mg by mouth nightly as needed.  tiZANidine (ZANAFLEX) 2 mg tablet Take 1 Tab by mouth three (3) times daily. (Patient not taking: Reported on 2/1/2021)    topiramate (Topamax) 100 mg tablet Take 1 Tab by mouth two (2) times a day. (Patient not taking: Reported on 2/1/2021)     No current facility-administered medications for this visit.          Medical History  Past Medical History:   Diagnosis Date    Arrhythmia 2014    atrial fibrillation 2014, sick sinus syndrome: [facility]    Arthritis     Bipolar affect, depressed ([address]     Bipolar affective disorder ([address] 3/31/2010    Chronic pain 2018    right shoulder    Colon polyps 03/31/2010    also 5/2018: colon polyps    Depression     Diarrhea 07/19/2013    D/t alpha gal allergy says patient    Epigastric pain 6/5/2015    GERD (gastroesophageal reflux disease)     occasiional only; controlled with med    Hemispheric carotid artery syndrome No stroke    Neuro: Nayely Ortiz  (CT scan head/neck negative 4/2018 [facility])    Hyperlipidemia 3/31/2010    Hypothyroidism 3/31/2010    Long term current use of anticoagulant therapy     Lupus [facility])     patient denies-says she has RA    Migraine 03/31/2010    has them x2 a month: last one 5/23/2018    Psychotic disorder ([address]     S/P ablation of atrial fibrillation 05/13/2014    S/P cardiac pacemaker procedure 9/16/2014 9/16/14 Medtronic MRI compatible dual chamber pacemaker implant    Sleep apnea     unable to tolerate CPAP    Stool color black     Thyroid disease     Tick-borne disease     Alpha Gal allergy: no eat pork, beef, milk (Meat allergy showed up on a allergy test)    Umbilical hernia without obstruction and without gangrene 6/22/2020    Urge incontinence 3/31/2010    Vitamin D deficiency 3/31/2015       Review of Systems   HENT: Positive for congestion. Eyes: Negative for blurred vision and double vision. Respiratory: Negative for cough and shortness of breath. Cardiovascular: Negative for chest pain, palpitations and orthopnea. Gastrointestinal: Negative for nausea and vomiting. Skin: Rash: hair loss. Neurological: Positive for headaches. Negative for dizziness. Psychiatric/Behavioral: Positive for memory loss. Negative for depression. The patient is nervous/anxious. Exam:    General: Well developed, well nourished. tired   Head: Normocephalic, atraumatic, anicteric sclera   Neck Normal ROM, No thyromegally   Lungs:  Normal effort   Ext: No pedal edema   Skin: Supple no rash     NeurologicExam:  Mental Status: Alert and oriented to person place and time   Speech: Fluent no aphasia or dysarthria. Cranial Nerves:  II - XII Intact   Motor:  Full and symmetric strength   Sensory:   Symmetric and intact    Gait:  Gait is balanced   Tremor:   No tremor noted. Cerebellar:  Coordination intact.            Lab Review  Lab Results   Component Value Date/Time    WBC 5.9 11/04/2020 07:48 PM    HCT 41.7 11/04/2020 07:48 PM    HGB 13.5 11/04/2020 07:48 PM    PLATELET 242 67/72/0889 07:48 PM       Lab Results   Component Value Date/Time    Sodium 143 11/04/2020 07:48 PM    Potassium 4.5 11/04/2020 07:48 PM    Chloride 113 (H) 11/04/2020 07:48 PM    CO2 27 11/04/2020 07:48 PM    Glucose 87 11/04/2020 07:48 PM    BUN 19 11/04/2020 07:48 PM    Creatinine 0.96 11/04/2020 07:48 PM    Calcium 9.4 11/04/2020 07:48 PM       Lab Results   Component Value Date/Time    Hemoglobin A1c 5.3 10/08/2020 11:51 AM    Hemoglobin A1c (POC) 5.9 (A) 04/28/2014 04:04 PM        Lab Results   Component Value Date/Time    Vitamin B12 1,074 10/08/2020 11:51 AM         Lab Results   Component Value Date/Time    Cholesterol, total 209 (H) 10/08/2020 11:51 AM    HDL Cholesterol 40 10/08/2020 11:51 AM    LDL,Direct 124 (H) 03/30/2010 08:40 AM    LDL, calculated 118 (H) 10/08/2020 11:51 AM    LDL, calculated 125 (H) 11/06/2018 08:19 AM    VLDL, calculated 51 (H) 10/08/2020 11:51 AM    VLDL, calculated 42 (H) 11/06/2018 08:19 AM    Triglyceride 290 (H) 10/08/2020 11:51 AM    CHOL/HDL Ratio 5.3 (H) 03/30/2010 08:40 AM       Kim Mcneill was seen by synchronous (real-time) audio-video technology on 02/12/21. Consent:  She and/or her healthcare decision maker is aware that this patient-initiated Telehealth encounter is a billable service, with coverage as determined by her insurance carrier. She is aware that she may receive a bill and has provided verbal consent to proceed: Yes    I was in the office while conducting this encounter. Pursuant to the emergency declaration under the [facility] [address] waiver authority and the [employer] and [employer] Act, this Virtual  Visit was conducted, with patient's consent, to reduce the patient's risk of exposure to COVID-19 and provide continuity of care for an established patient. Services were provided through a video synchronous discussion virtually to substitute for in-person clinic visit.

## 2021-02-12 NOTE — TELEPHONE ENCOUNTER
Patient states she needs to request per her Neurologist/Dr. River Brunson that a test for Lymes Disease be added to her Lab Orders. Patient states Dr. River Brunson also wants a copy of the results once received. Please call to discuss.  Thank you

## 2021-02-15 NOTE — TELEPHONE ENCOUNTER
Verified patient with 2 identifiers   Spoke with pt regarding new medication options, eliquis vs. Xarelto. Advised pt new medications were more expensive than warfarin, but I had not heard of hair loss side effects with either medication. Advised pt she would need to set up an appt with Pilar to coordinate switching medications. Pt voiced she would research meds and call her pharmacy about potential price of meds. If pt decides to switch, will make appt with Pilar.

## 2021-02-17 ENCOUNTER — HOSPITAL ENCOUNTER (OUTPATIENT)
Dept: LAB | Age: 71
Discharge: HOME | End: 2021-02-17
Payer: MEDICARE

## 2021-02-17 PROCEDURE — 82306 VITAMIN D 25 HYDROXY: CPT

## 2021-02-17 PROCEDURE — 83036 HEMOGLOBIN GLYCOSYLATED A1C: CPT

## 2021-02-17 PROCEDURE — 82627 DEHYDROEPIANDROSTERONE: CPT

## 2021-02-17 PROCEDURE — 84443 ASSAY THYROID STIM HORMONE: CPT

## 2021-02-17 PROCEDURE — 85025 COMPLETE CBC W/AUTO DIFF WBC: CPT

## 2021-02-17 PROCEDURE — 80053 COMPREHEN METABOLIC PANEL: CPT

## 2021-02-17 PROCEDURE — 84439 ASSAY OF FREE THYROXINE: CPT

## 2021-02-17 PROCEDURE — 84146 ASSAY OF PROLACTIN: CPT

## 2021-02-17 PROCEDURE — 86038 ANTINUCLEAR ANTIBODIES: CPT

## 2021-02-17 PROCEDURE — 80061 LIPID PANEL: CPT

## 2021-02-17 PROCEDURE — 84403 ASSAY OF TOTAL TESTOSTERONE: CPT

## 2021-02-17 PROCEDURE — 36415 COLL VENOUS BLD VENIPUNCTURE: CPT

## 2021-02-17 PROCEDURE — 85651 RBC SED RATE NONAUTOMATED: CPT

## 2021-02-21 LAB
25(OH)D3+25(OH)D2 SERPL-MCNC: 34.1 NG/ML (ref 30–100)
ALBUMIN SERPL-MCNC: 4.1 G/DL (ref 3.7–4.7)
ALBUMIN/GLOB SERPL: 1.8 {RATIO} (ref 1.2–2.2)
ALP SERPL-CCNC: 69 IU/L (ref 39–117)
ALT SERPL-CCNC: 15 IU/L (ref 0–32)
ANA SER QL: POSITIVE
AST SERPL-CCNC: 14 IU/L (ref 0–40)
BASOPHILS # BLD AUTO: 0.1 X10E3/UL (ref 0–0.2)
BASOPHILS NFR BLD AUTO: 1 %
BILIRUB SERPL-MCNC: 0.3 MG/DL (ref 0–1.2)
BUN SERPL-MCNC: 16 MG/DL (ref 8–27)
BUN/CREAT SERPL: 16 (ref 12–28)
CALCIUM SERPL-MCNC: 10.2 MG/DL (ref 8.7–10.3)
CHLORIDE SERPL-SCNC: 107 MMOL/L (ref 96–106)
CHOLEST SERPL-MCNC: 248 MG/DL (ref 100–199)
CO2 SERPL-SCNC: 26 MMOL/L (ref 20–29)
CREAT SERPL-MCNC: 0.98 MG/DL (ref 0.57–1)
DHEA-S SERPL-MCNC: 91.4 UG/DL (ref 20.4–186.6)
EOSINOPHIL # BLD AUTO: 0.3 X10E3/UL (ref 0–0.4)
EOSINOPHIL NFR BLD AUTO: 7 %
ERYTHROCYTE [DISTWIDTH] IN BLOOD BY AUTOMATED COUNT: 13 % (ref 11.7–15.4)
ERYTHROCYTE [SEDIMENTATION RATE] IN BLOOD BY WESTERGREN METHOD: 2 MM/HR (ref 0–40)
EST. AVERAGE GLUCOSE BLD GHB EST-MCNC: 105 MG/DL
GLOBULIN SER CALC-MCNC: 2.3 G/DL (ref 1.5–4.5)
GLUCOSE SERPL-MCNC: 91 MG/DL (ref 65–99)
HBA1C MFR BLD: 5.3 % (ref 4.8–5.6)
HCT VFR BLD AUTO: 41.4 % (ref 34–46.6)
HDLC SERPL-MCNC: 48 MG/DL
HGB BLD-MCNC: 13.8 G/DL (ref 11.1–15.9)
IMM GRANULOCYTES # BLD AUTO: 0 X10E3/UL (ref 0–0.1)
IMM GRANULOCYTES NFR BLD AUTO: 0 %
LDLC SERPL CALC-MCNC: 152 MG/DL (ref 0–99)
LYMPHOCYTES # BLD AUTO: 1.5 X10E3/UL (ref 0.7–3.1)
LYMPHOCYTES NFR BLD AUTO: 31 %
MCH RBC QN AUTO: 30.1 PG (ref 26.6–33)
MCHC RBC AUTO-ENTMCNC: 33.3 G/DL (ref 31.5–35.7)
MCV RBC AUTO: 90 FL (ref 79–97)
MONOCYTES # BLD AUTO: 0.4 X10E3/UL (ref 0.1–0.9)
MONOCYTES NFR BLD AUTO: 9 %
NEUTROPHILS # BLD AUTO: 2.5 X10E3/UL (ref 1.4–7)
NEUTROPHILS NFR BLD AUTO: 52 %
PLATELET # BLD AUTO: 211 X10E3/UL (ref 150–450)
POTASSIUM SERPL-SCNC: 5 MMOL/L (ref 3.5–5.2)
PROLACTIN SERPL-MCNC: 9.6 NG/ML (ref 4.8–23.3)
PROT SERPL-MCNC: 6.4 G/DL (ref 6–8.5)
RBC # BLD AUTO: 4.58 X10E6/UL (ref 3.77–5.28)
SODIUM SERPL-SCNC: 141 MMOL/L (ref 134–144)
T4 FREE SERPL-MCNC: 0.83 NG/DL (ref 0.82–1.77)
TESTOST SERPL-MCNC: 21 NG/DL (ref 7–40)
TRIGL SERPL-MCNC: 260 MG/DL (ref 0–149)
TSH SERPL DL<=0.005 MIU/L-ACNC: 2.67 UIU/ML (ref 0.45–4.5)
VLDLC SERPL CALC-MCNC: 48 MG/DL (ref 5–40)
WBC # BLD AUTO: 4.8 X10E3/UL (ref 3.4–10.8)

## 2021-02-22 ENCOUNTER — TELEPHONE (OUTPATIENT)
Dept: NEUROLOGY | Age: 71
End: 2021-02-22

## 2021-02-22 DIAGNOSIS — R76.0 ABNORMAL ANTINUCLEAR ANTIBODY TITER: Primary | ICD-10-CM

## 2021-02-22 NOTE — TELEPHONE ENCOUNTER
----- Message from Ingrid Child sent at 2/22/2021  3:14 PM EST -----  Regarding: Dr Cintia Thompson first and last name:      Reason for call:pt said her [facility] pharmacy told her that her  primidone  50 mg medication did not get approved yet she  wants to know the status     Callback required yes/no and why:yes for reason given above       Best contact number(s):[phone]      Details to clarify the request:pt is out of the mediation  and hopes to get it approved as soon as possible       Ignrid Child

## 2021-02-24 ENCOUNTER — TELEPHONE (OUTPATIENT)
Dept: INTERNAL MEDICINE CLINIC | Age: 71
End: 2021-02-24

## 2021-02-24 ENCOUNTER — ANTI-COAG VISIT (OUTPATIENT)
Dept: CARDIOLOGY CLINIC | Age: 71
End: 2021-02-24
Payer: MEDICARE

## 2021-02-24 DIAGNOSIS — Z79.01 LONG TERM (CURRENT) USE OF ANTICOAGULANTS: Primary | ICD-10-CM

## 2021-02-24 DIAGNOSIS — I48.0 PAF (PAROXYSMAL ATRIAL FIBRILLATION) (HCC): ICD-10-CM

## 2021-02-24 LAB
INR BLD: 2.1 (ref 1–1.5)
PT POC: 24.6 SECONDS (ref 9.1–12)
VALID INTERNAL CONTROL?: YES

## 2021-02-24 PROCEDURE — 85610 PROTHROMBIN TIME: CPT | Performed by: INTERNAL MEDICINE

## 2021-02-24 NOTE — TELEPHONE ENCOUNTER
Identified patient 2 identifiers verified. Patient was called about lab results Call dropped. [employer] message was sent to patient to contact this office about labs.

## 2021-02-24 NOTE — TELEPHONE ENCOUNTER
----- Message from Soha Atkinson sent at 2021  3:56 PM EST -----  Regarding:   Patient return call  To: [facility]  Subject: Dr. Lexa Angelo / telephone  Patient's first and last name:Martita Stewart  :1950  MRN OYLADX:[id number]    Caller's first and last name and relationship (if not the patient): Patient  Best contact number(s):[phone]  Whose call is being returned:Dr. Omari Humphrey nurse Benigno Lopez.   Details to clarify the request: Please return patients call as she advised she was disconnected and nurse laila didn't call her back.      Message from [facility]

## 2021-02-24 NOTE — PROGRESS NOTES
Identified patient 2 identifiers verified. Call dropped sent [employer] message to patient to contact this office.

## 2021-02-24 NOTE — TELEPHONE ENCOUNTER
----- Message from Racquel Alvarez MD sent at 2/22/2021  6:27 PM EST -----  JANNET was abnormal. I have ordered a follow up test.  Everything else seems okay.   BJF

## 2021-02-25 NOTE — TELEPHONE ENCOUNTER
Left a detailed message on patient's voicemail, and informed her that a [email or web address] message has been sent. Message was left for patient to contact this office.

## 2021-02-25 NOTE — PROGRESS NOTES
Lab orders and results from recent labs mailed to patient. Attempted to contact patient on the phone and the voice mail picks up and then cut off before message is completed. [employer] message was sent also.

## 2021-02-26 ENCOUNTER — TELEPHONE (OUTPATIENT)
Dept: NEUROLOGY | Age: 71
End: 2021-02-26

## 2021-03-05 ENCOUNTER — TELEPHONE (OUTPATIENT)
Dept: CARDIOLOGY CLINIC | Age: 71
End: 2021-03-05

## 2021-03-05 ENCOUNTER — VIRTUAL VISIT (OUTPATIENT)
Dept: INTERNAL MEDICINE CLINIC | Age: 71
End: 2021-03-05
Payer: MEDICARE

## 2021-03-05 ENCOUNTER — ANTI-COAG VISIT (OUTPATIENT)
Dept: CARDIOLOGY CLINIC | Age: 71
End: 2021-03-05
Payer: MEDICARE

## 2021-03-05 DIAGNOSIS — L65.9 HAIR LOSS: ICD-10-CM

## 2021-03-05 DIAGNOSIS — R53.81 MALAISE: Primary | ICD-10-CM

## 2021-03-05 DIAGNOSIS — I48.0 PAROXYSMAL ATRIAL FIBRILLATION (HCC): ICD-10-CM

## 2021-03-05 DIAGNOSIS — Z79.01 LONG TERM (CURRENT) USE OF ANTICOAGULANTS: Primary | ICD-10-CM

## 2021-03-05 LAB
INR BLD: 1.1 (ref 1–1.5)
PT POC: 13.5 SECONDS (ref 9.1–12)
VALID INTERNAL CONTROL?: YES

## 2021-03-05 PROCEDURE — G9717 DOC PT DX DEP/BP F/U NT REQ: HCPCS | Performed by: INTERNAL MEDICINE

## 2021-03-05 PROCEDURE — G8428 CUR MEDS NOT DOCUMENT: HCPCS | Performed by: INTERNAL MEDICINE

## 2021-03-05 PROCEDURE — G0463 HOSPITAL OUTPT CLINIC VISIT: HCPCS | Performed by: INTERNAL MEDICINE

## 2021-03-05 PROCEDURE — G8400 PT W/DXA NO RESULTS DOC: HCPCS | Performed by: INTERNAL MEDICINE

## 2021-03-05 PROCEDURE — 1090F PRES/ABSN URINE INCON ASSESS: CPT | Performed by: INTERNAL MEDICINE

## 2021-03-05 PROCEDURE — G8756 NO BP MEASURE DOC: HCPCS | Performed by: INTERNAL MEDICINE

## 2021-03-05 PROCEDURE — G8417 CALC BMI ABV UP PARAM F/U: HCPCS | Performed by: INTERNAL MEDICINE

## 2021-03-05 PROCEDURE — 99213 OFFICE O/P EST LOW 20 MIN: CPT | Performed by: INTERNAL MEDICINE

## 2021-03-05 PROCEDURE — 1101F PT FALLS ASSESS-DOCD LE1/YR: CPT | Performed by: INTERNAL MEDICINE

## 2021-03-05 PROCEDURE — 85610 PROTHROMBIN TIME: CPT | Performed by: INTERNAL MEDICINE

## 2021-03-05 PROCEDURE — G8536 NO DOC ELDER MAL SCRN: HCPCS | Performed by: INTERNAL MEDICINE

## 2021-03-05 PROCEDURE — 3017F COLORECTAL CA SCREEN DOC REV: CPT | Performed by: INTERNAL MEDICINE

## 2021-03-05 NOTE — TELEPHONE ENCOUNTER
Verified patient with 2 identifiers   Advised patient of johnny recommendations. Advised [facility] would [facility] to check is xarelto has the same reaction.

## 2021-03-05 NOTE — PROGRESS NOTES
Kim Mcneill is a 66 y.o. female who was seen by synchronous (real-time) audio-video technology on 3/5/2021 for Hair/Scalp Problem (pt concerned that her hair is falling out), Results (pt wants to discuss lab results), and Headache (pt states that she noticed she has been getting headaches; today pt rates pain 5/10)        Progress Note     SUBJECTIVE  Ms. Kim Mcneill presents today acutely for     Chief Complaint   Patient presents with    Hair/Scalp Problem     pt concerned that her hair is falling out    Results     pt wants to discuss lab results    Headache     pt states that she noticed she has been getting headaches; today pt rates pain 5/10       When we saw her on 2/10/21 she noted that she has had hair loss over the past 6 months or so: She also has a complaint of hair loss. \"I have chunks of hair coming off the back of my head. She saw dermatologist, and \"he didn't find anything. \"     This is ongoing. We did a recent slew of lab work which was unrevealing. She did have a \"positive\" JANNET, but no titer. We have ordered follow up panel. Fatigue is ongoing. \"I just sleep all the time. \"     Rarely takes [employer]. Takes xanax about once a day. She sees a rheumatologist, and is on plaquenil. OBJECTIVE  There were no vitals taken for this visit. Gen: Pleasant 66 y.o.  female in NAD.          ASSESSMENT / PLAN  1. Hair loss: Ongoing. 2. Abnormal JANNET: Further lab pending. 3. Fatigue/malaise: Differential diagnosis for this is broad, and includes mood disorder, rheumatologic condition (eg fibromyalgia), and medication effects. I note that almost all of her meds can cause sleepiness. Discuss also with Dr Jose Steiner and Dr. Simone Nance. Follow-up and Dispositions    · Return if symptoms worsen or fail to improve. Objective:   No flowsheet data found.      [INSTRUCTIONS:  \"[x]\" Indicates a positive item  \"[]\" Indicates a negative item  -- DELETE ALL ITEMS NOT EXAMINED]    Constitutional: [x] Appears well-developed and well-nourished [x] No apparent distress      [] Abnormal -     Mental status: [x] Alert and awake  [x] Oriented to person/place/time [x] Able to follow commands    [] Abnormal -     Eyes:   EOM    [x]  Normal    [] Abnormal -   Sclera  [x]  Normal    [] Abnormal -          Discharge [x]  None visible   [] Abnormal -     HENT: [x] Normocephalic, atraumatic  [] Abnormal -   [x] Mouth/Throat: Mucous membranes are moist    External Ears [x] Normal  [] Abnormal -    Neck: [x] No visualized mass [] Abnormal -     Pulmonary/Chest: [x] Respiratory effort normal   [x] No visualized signs of difficulty breathing or respiratory distress        [] Abnormal -      Musculoskeletal:   [x] Normal gait with no signs of ataxia         [x] Normal range of motion of neck        [] Abnormal -     Neurological:        [x] No Facial Asymmetry (Cranial nerve 7 motor function) (limited exam due to video visit)          [x] No gaze palsy        [] Abnormal -          Skin:        [x] No significant exanthematous lesions or discoloration noted on facial skin         [] Abnormal -            Psychiatric:       [x] Normal Affect [] Abnormal -       Other pertinent observable physical exam findings:-        We discussed the expected course, resolution and complications of the diagnosis(es) in detail. Medication risks, benefits, costs, interactions, and alternatives were discussed as indicated. I advised her to contact the office if her condition worsens, changes or fails to improve as anticipated. She expressed understanding with the diagnosis(es) and plan. Haydee Laurie, who was evaluated through a patient-initiated, synchronous (real-time) audio-video encounter, and/or her healthcare decision maker, is aware that it is a billable service, with coverage as determined by her insurance carrier.  She provided verbal consent to proceed: YES, and patient identification was verified. It was conducted pursuant to the emergency declaration under the [facility], [address] [place] [address] authority and the [employer] and [employer] General Act. A caregiver was present when appropriate. Ability to conduct physical exam was limited. I was in office. The patient was at home or otherwise outside the office.       Julian Boston MD

## 2021-03-05 NOTE — TELEPHONE ENCOUNTER
Mahendra Sam, Aria Browning, LPN   Caller: Unspecified (4 days ago,  9:20 AM)             Per [facility], there is an interaction between eliquis and primidone. Her options are 1) change primidone to something else or change eliquis to something else. I think she was on warfarin with no problem. She needs to check with pharmacy on if xarelto is ok. Thanks.

## 2021-03-05 NOTE — TELEPHONE ENCOUNTER
Spoke with pharmacist at [facility]  He states that Rip Serve has similar interactions with the primidone as eliquis.

## 2021-03-05 NOTE — TELEPHONE ENCOUNTER
Spoke with pharmacist at [facility] patient has not taken the primidone in 2 weeks and will not see the prescribing MD until 3/25/21  Advised per Kristen PERRY to fill the eliquis. Patient will discuss with  on 3/25/21 what to do about the primidone. Pharmacist verified understanding and will fill the eliquis.

## 2021-03-05 NOTE — TELEPHONE ENCOUNTER
Verified patient with 2 identifiers   Patient called to inform us that pharmacist would not fill her eliquis because it interacted with one of her other medications  Advised I would call to speak with pharmacist.

## 2021-03-05 NOTE — TELEPHONE ENCOUNTER
Verified patient with 2 identifiers  Patient advised Charisma Quintana has same reaction as eliquis with primidone per [facility] pharmacist. Patient states she has not taken primidone in two weeks and  will not refill until she is seen on 3/25/21  Patient is requesting to take eliquis until then in hopes she will be able to switch from primidone to something else  Please advise. Yes

## 2021-03-08 DIAGNOSIS — R52 PAIN: ICD-10-CM

## 2021-03-08 NOTE — TELEPHONE ENCOUNTER
Patient states she [facility] indicated. Please call if any questions. Thank you      Patient was reminded of [address] on refills.

## 2021-03-09 NOTE — TELEPHONE ENCOUNTER
Future Appointments:  Future Appointments   Date Time Provider Radha Yoon   3/24/2021  1:00 PM COUMADIN, WINSTON KHOURYMB BS AMB   3/25/2021  3:20 PM MD PETR Xavier BS AMB   3/29/2021  8:00 AM REMOTE_WINSTON KHOURYMB BS AMB   5/11/2021  1:30 PM Inessa Hurley MD RCAMB BS AMB   5/12/2021 11:40 AM MD PETR Xavier BS AMB   6/24/2021 10:30 AM PACEMAKER, WINSTON KHOURYMB BS AMB   6/24/2021 10:40 AM Kristen Sheth, ANP [facility] BS AMB   8/11/2021 11:00 AM Gina Byrd MD [facility] BS AMB        Last Appointment With Me:  3/5/2021     Requested Prescriptions     Pending Prescriptions Disp Refills    HYDROcodone-acetaminophen (NORCO) 5-325 mg per tablet 30 Tab 0     Sig: Take 1 Tab by mouth every eight (8) hours as needed for Pain for up to 30 days. Max Daily Amount: 3 Tabs.

## 2021-03-12 RX ORDER — HYDROCODONE BITARTRATE AND ACETAMINOPHEN 5; 325 MG/1; MG/1
1 TABLET ORAL
Qty: 30 TAB | Refills: 0 | Status: SHIPPED | OUTPATIENT
Start: 2021-03-12 | End: 2021-04-28 | Stop reason: SDUPTHER

## 2021-03-12 NOTE — TELEPHONE ENCOUNTER
Re: Primidone    Dr. Inge Menjivar - please see notes from pt call  On 2/26 [facility] pharmacy:     Details to clarify the request: pt is needing a refill on her Primidone.  She stated that the pharmacy is unable to fill it due to it not being ready for refill but patient stated that the provider changed her amount to 1 tablet per day instead of half a tablet per day

## 2021-03-15 RX ORDER — PRIMIDONE 50 MG/1
50 TABLET ORAL DAILY
Qty: 90 TAB | Refills: 3 | Status: SHIPPED | OUTPATIENT
Start: 2021-03-15 | End: 2021-03-26

## 2021-03-18 ENCOUNTER — HOSPITAL ENCOUNTER (OUTPATIENT)
Dept: LAB | Age: 71
Discharge: HOME | End: 2021-03-18
Payer: MEDICARE

## 2021-03-18 PROCEDURE — 36415 COLL VENOUS BLD VENIPUNCTURE: CPT

## 2021-03-18 PROCEDURE — 86225 DNA ANTIBODY NATIVE: CPT

## 2021-03-18 PROCEDURE — 86618 LYME DISEASE ANTIBODY: CPT

## 2021-03-19 LAB
B BURGDOR IGG+IGM SER-ACNC: <0.91 ISR (ref 0–0.9)
CENTROMERE B AB SER-ACNC: 0.4 AI (ref 0–0.9)
CHROMATIN AB SERPL-ACNC: <0.2 AI (ref 0–0.9)
DSDNA AB SER-ACNC: 19 IU/ML (ref 0–9)
ENA JO1 AB SER-ACNC: <0.2 AI (ref 0–0.9)
ENA RNP AB SER-ACNC: 1.8 AI (ref 0–0.9)
ENA SCL70 AB SER-ACNC: 0.2 AI (ref 0–0.9)
ENA SM AB SER-ACNC: <0.2 AI (ref 0–0.9)
ENA SM+RNP AB SER-ACNC: <0.2 AI (ref 0–0.9)
ENA SS-A AB SER-ACNC: <0.2 AI (ref 0–0.9)
ENA SS-B AB SER-ACNC: <0.2 AI (ref 0–0.9)
RIBOSOMAL P AB SER-ACNC: <0.2 AI (ref 0–0.9)
SEE BELOW:, 164879: ABNORMAL

## 2021-03-26 ENCOUNTER — VIRTUAL VISIT (OUTPATIENT)
Dept: NEUROLOGY | Age: 71
End: 2021-03-26
Payer: MEDICARE

## 2021-03-26 ENCOUNTER — TELEPHONE (OUTPATIENT)
Dept: INTERNAL MEDICINE CLINIC | Age: 71
End: 2021-03-26

## 2021-03-26 DIAGNOSIS — G43.719 INTRACTABLE CHRONIC MIGRAINE WITHOUT AURA AND WITHOUT STATUS MIGRAINOSUS: ICD-10-CM

## 2021-03-26 DIAGNOSIS — G25.0 ESSENTIAL TREMOR: Primary | ICD-10-CM

## 2021-03-26 DIAGNOSIS — M79.7 FIBROMYALGIA: ICD-10-CM

## 2021-03-26 DIAGNOSIS — F31.70 BIPOLAR AFFECTIVE DISORDER IN REMISSION (HCC): ICD-10-CM

## 2021-03-26 DIAGNOSIS — E78.2 MIXED HYPERLIPIDEMIA: ICD-10-CM

## 2021-03-26 DIAGNOSIS — E03.9 ACQUIRED HYPOTHYROIDISM: ICD-10-CM

## 2021-03-26 PROCEDURE — G9717 DOC PT DX DEP/BP F/U NT REQ: HCPCS | Performed by: PSYCHIATRY & NEUROLOGY

## 2021-03-26 PROCEDURE — 1101F PT FALLS ASSESS-DOCD LE1/YR: CPT | Performed by: PSYCHIATRY & NEUROLOGY

## 2021-03-26 PROCEDURE — 3017F COLORECTAL CA SCREEN DOC REV: CPT | Performed by: PSYCHIATRY & NEUROLOGY

## 2021-03-26 PROCEDURE — G8417 CALC BMI ABV UP PARAM F/U: HCPCS | Performed by: PSYCHIATRY & NEUROLOGY

## 2021-03-26 PROCEDURE — G8536 NO DOC ELDER MAL SCRN: HCPCS | Performed by: PSYCHIATRY & NEUROLOGY

## 2021-03-26 PROCEDURE — 99214 OFFICE O/P EST MOD 30 MIN: CPT | Performed by: PSYCHIATRY & NEUROLOGY

## 2021-03-26 PROCEDURE — G8400 PT W/DXA NO RESULTS DOC: HCPCS | Performed by: PSYCHIATRY & NEUROLOGY

## 2021-03-26 PROCEDURE — 1090F PRES/ABSN URINE INCON ASSESS: CPT | Performed by: PSYCHIATRY & NEUROLOGY

## 2021-03-26 PROCEDURE — G8756 NO BP MEASURE DOC: HCPCS | Performed by: PSYCHIATRY & NEUROLOGY

## 2021-03-26 PROCEDURE — G8427 DOCREV CUR MEDS BY ELIG CLIN: HCPCS | Performed by: PSYCHIATRY & NEUROLOGY

## 2021-03-26 RX ORDER — HYDROXYZINE 25 MG/1
25 TABLET, FILM COATED ORAL 3 TIMES DAILY
COMMUNITY
Start: 2021-02-16 | End: 2022-04-27 | Stop reason: ALTCHOICE

## 2021-03-26 RX ORDER — LITHIUM CARBONATE 300 MG/1
300 TABLET, FILM COATED, EXTENDED RELEASE ORAL 3 TIMES DAILY
COMMUNITY
Start: 2021-02-24

## 2021-03-26 NOTE — PROGRESS NOTES
Follow-up Visit    Name Imani Blackman Age 66 y.o. MRN [id number]  1950       Chief Complaint: headaches    Off the warfarin and now on eliquis. She can no longer take primidone. Requesting an alternative. We discussed prescribing propranolol instead but she will have to talk it over with the cardiologist.   Since coming off the primidone. She has been having almost daily headaches. We discussed the possibility of barbiturate withdrawal.         Assesment and Plan  1. Fibromyalgia  conitnue lyrica    2. Intractable chronic migraine without aura and without status migrainosus  Continue Fioricet prn and topiramate  Current increase in migraines most likely secondary to barbituate withdrawal    3. Bipolar affective disorder in remission ([facility] [address]  Continue lithium    4. Obstructive sleep apnea  Not a factor for her fatigue according to the sleep doctor    5. Acquired hypothyroidism  continue synthroid. Does not believe that this is contributing to her fatigue    6. Mixed hyperlipidemia    7. Essential tremor  Primidone discontinued because of eliquis. May start propranolol if ok with cardiology. 8. Memory loss  Stable    9. Hair loss  She is being worked up by her PCP with focus on lithium    Allergies  Latex, Beef containing products, Ciprofloxacin, Pork derived (porcine), Bovine cartilage, Milk, Sulfa (sulfonamide antibiotics), and Xarelto [rivaroxaban]     Medications  Current Outpatient Medications   Medication Sig    lithium carbonate SR (LITHOBID) 300 mg CR tablet Take 300 mg by mouth three (3) times daily.  hydrOXYzine HCL (ATARAX) 25 mg tablet Take 25 mg by mouth three (3) times daily.  HYDROcodone-acetaminophen (NORCO) 5-325 mg per tablet Take 1 Tab by mouth every eight (8) hours as needed for Pain for up to 30 days. Max Daily Amount: 3 Tabs.  apixaban (ELIQUIS) 5 mg tablet Take 1 Tab by mouth two (2) times a day.     ergocalciferol (ERGOCALCIFEROL) 1,250 mcg (50,000 unit) capsule Take one capsule weekly    cyanocobalamin (Vitamin B-12) 100 mcg tablet Take 100 mcg by mouth daily.  omeprazole (PRILOSEC) 40 mg capsule TAKE 1 CAPSULE BY MOUTH EVERY DAY IN THE MORNING    timolol (TIMOPTIC) 0.5 % ophthalmic solution INSTILL 1 DROP 1 TIME EVERY DAY INTO BOTH EYES EVERY MORNING    hydrOXYchloroQUINE (PLAQUENIL) 200 mg tablet TAKE 2 TABLETS BY MOUTH EVERY DAY    pregabalin (LYRICA) 100 mg capsule TAKE 1 CAPSULE BY MOUTH TWICE A DAY    levothyroxine (SYNTHROID) 50 mcg tablet TAKE 1 TABLET BY MOUTH EVERY DAY    latanoprost (XALATAN) 0.005 % ophthalmic solution Administer 1 Drop to both eyes two (2) times a day.  zolpidem (AMBIEN) 10 mg tablet Take 10 mg by mouth nightly as needed.  tiZANidine (ZANAFLEX) 2 mg tablet Take 1 Tab by mouth three (3) times daily.  topiramate (Topamax) 100 mg tablet Take 1 Tab by mouth two (2) times a day.  lithium 300 mg tablet Take 300 mg by mouth three (3) times daily.  alprazolam (XANAX) 0.5 mg tablet Take 0.5 mg by mouth nightly. No current facility-administered medications for this visit.          Medical History  Past Medical History:   Diagnosis Date    Arrhythmia 2014    atrial fibrillation 2014, sick sinus syndrome: [facility]    Arthritis     Bipolar affect, depressed ([address]     Bipolar affective disorder ([facility] [address] 3/31/2010    Chronic pain 2018    right shoulder    Colon polyps 03/31/2010    also 5/2018: colon polyps    Depression     Diarrhea 07/19/2013    D/t alpha gal allergy says patient    Epigastric pain 6/5/2015    GERD (gastroesophageal reflux disease)     occasiional only; controlled with med    Hemispheric carotid artery syndrome No stroke    Neuro: S[place] Marie  (CT scan head/neck negative 4/2018 in [facility])    Hyperlipidemia 3/31/2010    Hypothyroidism 3/31/2010    Long term current use of anticoagulant therapy     Lupus ([facility] [address]     patient denies-says she has RA    Migraine 03/31/2010    has them x2 a month: last one 5/23/2018    Psychotic disorder ([facility] [address]     S/P ablation of atrial fibrillation 05/13/2014    S/P cardiac pacemaker procedure 9/16/2014 9/16/14 Medtronic MRI compatible dual chamber pacemaker implant    Sleep apnea     unable to tolerate CPAP    Stool color black     Thyroid disease     Tick-borne disease     Alpha Gal allergy: no eat pork, beef, milk (Meat allergy showed up on a allergy test)    Umbilical hernia without obstruction and without gangrene 6/22/2020    Urge incontinence 3/31/2010    Vitamin D deficiency 3/31/2015       Review of Systems   HENT: Positive for tinnitus. Eyes: Negative for blurred vision and double vision. Respiratory: Negative for cough and shortness of breath. Cardiovascular: Negative for chest pain, palpitations and orthopnea. Gastrointestinal: Negative for nausea and vomiting. Musculoskeletal: Positive for myalgias. Skin: Rash: hair loss. Neurological: Positive for headaches. Negative for dizziness. Psychiatric/Behavioral: Positive for memory loss. Negative for depression. The patient is nervous/anxious. Exam:    General: Well developed, well nourished. tired   Head: Normocephalic, atraumatic, anicteric sclera   Neck Normal ROM, No thyromegally   Lungs:  Normal effort   Ext: No pedal edema   Skin: Supple no rash     NeurologicExam:  Mental Status: Alert and oriented to person place and time   Speech: Fluent no aphasia or dysarthria. Cranial Nerves:  II - XII Intact   Motor:  Full and symmetric strength   Sensory:   Symmetric and intact    Gait:  Gait is balanced   Tremor:   Significant bilateral upper extremity tremor along with head and vocal cord tremor. Cerebellar:  Coordination intact.            Lab Review  Lab Results   Component Value Date/Time    WBC 4.8 02/17/2021 10:38 AM    HCT 41.4 02/17/2021 10:38 AM    HGB 13.8 02/17/2021 10:38 AM    PLATELET 966 79/73/0923 10:38 AM       Lab Results   Component Value Date/Time Sodium 141 02/17/2021 10:38 AM    Potassium 5.0 02/17/2021 10:38 AM    Chloride 107 (H) 02/17/2021 10:38 AM    CO2 26 02/17/2021 10:38 AM    Glucose 91 02/17/2021 10:38 AM    BUN 16 02/17/2021 10:38 AM    Creatinine 0.98 02/17/2021 10:38 AM    Calcium 10.2 02/17/2021 10:38 AM       Lab Results   Component Value Date/Time    Hemoglobin A1c 5.3 02/17/2021 10:38 AM    Hemoglobin A1c (POC) 5.9 (A) 04/28/2014 04:04 PM        Lab Results   Component Value Date/Time    Vitamin B12 1,074 10/08/2020 11:51 AM         Lab Results   Component Value Date/Time    Cholesterol, total 248 (H) 02/17/2021 10:38 AM    HDL Cholesterol 48 02/17/2021 10:38 AM    LDL,Direct 124 (H) 03/30/2010 08:40 AM    LDL, calculated 152 (H) 02/17/2021 10:38 AM    LDL, calculated 125 (H) 11/06/2018 08:19 AM    VLDL, calculated 48 (H) 02/17/2021 10:38 AM    VLDL, calculated 42 (H) 11/06/2018 08:19 AM    Triglyceride 260 (H) 02/17/2021 10:38 AM    CHOL/HDL Ratio 5.3 (H) 03/30/2010 08:40 AM       Elvi Love was seen by synchronous (real-time) audio-video technology on 03/26/21. Consent:  She and/or her healthcare decision maker is aware that this patient-initiated Telehealth encounter is a billable service, with coverage as determined by her insurance carrier. She is aware that she may receive a bill and has provided verbal consent to proceed: Yes    I was in the office while conducting this encounter. Pursuant to the emergency declaration under the [facility], 1135 waiver authority and the [employer] and [employer] Act, this Virtual  Visit was conducted, with patient's consent, to reduce the patient's risk of exposure to COVID-19 and provide continuity of care for an established patient. Services were provided through a video synchronous discussion virtually to substitute for in-person clinic visit.

## 2021-03-26 NOTE — TELEPHONE ENCOUNTER
2 pt identifiers verified- pt informed per Dr Wesley Lob: The lyme was negative. The JANNET panel revealed a positive RNP antibody. In the absence of other markers this is of uncertain significance. She could consider rheumatologist follow up.

## 2021-03-26 NOTE — TELEPHONE ENCOUNTER
----- Message from Mary Mcclendon sent at 3/26/2021  4:16 PM EDT -----  Regarding: Dr. Adin Bear: [phone]  General Message/Vendor Calls    Caller's first and last name: N/A      Reason for call: Returning missed call from [facility] required yes/no and why: yes/follow up      Best contact number(s): [phone]      Details to clarify the request: PT states she is available by phone for the rest of the day.       Message from [facility]

## 2021-03-26 NOTE — PROGRESS NOTES
The lyme was negative. The JANNET panel revealed a positive RNP antibody. In the absence of other markers this is of uncertain significance. She could consider rheumatologist follow up.

## 2021-03-26 NOTE — PROGRESS NOTES
2 pt identifiers verified- pt informed per Dr Lexus Pereira: The lyme was negative. The JANNET panel revealed a positive RNP antibody. In the absence of other markers this is of uncertain significance. She could consider rheumatologist follow up.

## 2021-03-29 ENCOUNTER — OFFICE VISIT (OUTPATIENT)
Dept: CARDIOLOGY CLINIC | Age: 71
End: 2021-03-29
Payer: MEDICARE

## 2021-03-29 DIAGNOSIS — I49.5 SSS (SICK SINUS SYNDROME) (HCC): ICD-10-CM

## 2021-03-29 DIAGNOSIS — M79.7 FIBROMYALGIA: ICD-10-CM

## 2021-03-29 DIAGNOSIS — Z95.0 CARDIAC PACEMAKER IN SITU: Primary | ICD-10-CM

## 2021-03-29 PROCEDURE — 93296 REM INTERROG EVL PM/IDS: CPT | Performed by: INTERNAL MEDICINE

## 2021-03-29 PROCEDURE — 93294 REM INTERROG EVL PM/LDLS PM: CPT | Performed by: INTERNAL MEDICINE

## 2021-03-29 RX ORDER — PREGABALIN 100 MG/1
CAPSULE ORAL
Qty: 60 CAP | Refills: 5 | Status: SHIPPED | OUTPATIENT
Start: 2021-03-29 | End: 2021-10-07 | Stop reason: SDUPTHER

## 2021-04-01 ENCOUNTER — VIRTUAL VISIT (OUTPATIENT)
Dept: NEUROLOGY | Age: 71
End: 2021-04-01
Payer: MEDICARE

## 2021-04-01 DIAGNOSIS — G25.0 ESSENTIAL TREMOR: Primary | ICD-10-CM

## 2021-04-01 DIAGNOSIS — G44.321 INTRACTABLE CHRONIC POST-TRAUMATIC HEADACHE: ICD-10-CM

## 2021-04-01 DIAGNOSIS — E53.8 B12 DEFICIENCY: Primary | ICD-10-CM

## 2021-04-01 PROCEDURE — G8428 CUR MEDS NOT DOCUMENT: HCPCS | Performed by: PSYCHIATRY & NEUROLOGY

## 2021-04-01 PROCEDURE — G8417 CALC BMI ABV UP PARAM F/U: HCPCS | Performed by: PSYCHIATRY & NEUROLOGY

## 2021-04-01 PROCEDURE — G8536 NO DOC ELDER MAL SCRN: HCPCS | Performed by: PSYCHIATRY & NEUROLOGY

## 2021-04-01 PROCEDURE — 99213 OFFICE O/P EST LOW 20 MIN: CPT | Performed by: PSYCHIATRY & NEUROLOGY

## 2021-04-01 PROCEDURE — 1090F PRES/ABSN URINE INCON ASSESS: CPT | Performed by: PSYCHIATRY & NEUROLOGY

## 2021-04-01 PROCEDURE — G9717 DOC PT DX DEP/BP F/U NT REQ: HCPCS | Performed by: PSYCHIATRY & NEUROLOGY

## 2021-04-01 PROCEDURE — G8756 NO BP MEASURE DOC: HCPCS | Performed by: PSYCHIATRY & NEUROLOGY

## 2021-04-01 PROCEDURE — 1101F PT FALLS ASSESS-DOCD LE1/YR: CPT | Performed by: PSYCHIATRY & NEUROLOGY

## 2021-04-01 PROCEDURE — G8400 PT W/DXA NO RESULTS DOC: HCPCS | Performed by: PSYCHIATRY & NEUROLOGY

## 2021-04-01 PROCEDURE — 3017F COLORECTAL CA SCREEN DOC REV: CPT | Performed by: PSYCHIATRY & NEUROLOGY

## 2021-04-01 NOTE — TELEPHONE ENCOUNTER
----- Message from Neocleustiannaie Kidney sent at 3/30/2021 12:42 PM EDT -----  Regarding: Dr. Samira Pratt telephone  Caller's first and last name: Self    Reason for call: Medication r/x questions    Callback required yes/no and why: Yes, to verify r/x and vitamin info    Best contact number(s): [phone]    Details to clarify the request: Dr. Jagdish Hazel suggested a vitamin or r/x for pt to take, but pt has misplaced r/x and vitamin information. Pt is having a difficult time finding the vitamin, and would like Dr. Jagdish Hazel to call in the r/x for her instead of trying to find it locally over the counter.

## 2021-04-01 NOTE — TELEPHONE ENCOUNTER
Please call patient regarding another prescription    Propranolol -  This is for tremors is this one ok to take    [phone]    Thanks  Vicky Medina

## 2021-04-01 NOTE — PROGRESS NOTES
Follow-up Visit    Name Anahi Nunez Age 66 y.o. MRN [id number]  1950       Chief Complaint: headaches    Lost the name of the drugs which were provided to her for tremors. Tremors still consisitent. Assesment and Plan  1. Essential tremor  Primidone discontinued because of eliquis. May start propranolol if ok with cardiology. 2. Intractable chronic migraine without aura and without status migrainosus  Continue Fioricet prn and topiramate  Current increase in migraines most likely secondary to barbituate withdrawal        Allergies  Latex, Beef containing products, Ciprofloxacin, Pork derived (porcine), Bovine cartilage, Milk, Sulfa (sulfonamide antibiotics), and Xarelto [rivaroxaban]     Medications  Current Outpatient Medications   Medication Sig    pregabalin (LYRICA) 100 mg capsule TAKE 1 CAPSULE BY MOUTH TWICE A DAY    lithium carbonate SR (LITHOBID) 300 mg CR tablet Take 300 mg by mouth three (3) times daily.  hydrOXYzine HCL (ATARAX) 25 mg tablet Take 25 mg by mouth three (3) times daily.  HYDROcodone-acetaminophen (NORCO) 5-325 mg per tablet Take 1 Tab by mouth every eight (8) hours as needed for Pain for up to 30 days. Max Daily Amount: 3 Tabs.  apixaban (ELIQUIS) 5 mg tablet Take 1 Tab by mouth two (2) times a day.  ergocalciferol (ERGOCALCIFEROL) 1,250 mcg (50,000 unit) capsule Take one capsule weekly    cyanocobalamin (Vitamin B-12) 100 mcg tablet Take 100 mcg by mouth daily.  omeprazole (PRILOSEC) 40 mg capsule TAKE 1 CAPSULE BY MOUTH EVERY DAY IN THE MORNING    timolol (TIMOPTIC) 0.5 % ophthalmic solution INSTILL 1 DROP 1 TIME EVERY DAY INTO BOTH EYES EVERY MORNING    hydrOXYchloroQUINE (PLAQUENIL) 200 mg tablet TAKE 2 TABLETS BY MOUTH EVERY DAY    levothyroxine (SYNTHROID) 50 mcg tablet TAKE 1 TABLET BY MOUTH EVERY DAY    latanoprost (XALATAN) 0.005 % ophthalmic solution Administer 1 Drop to both eyes two (2) times a day.     zolpidem (AMBIEN) 10 mg tablet Take 10 mg by mouth nightly as needed.  tiZANidine (ZANAFLEX) 2 mg tablet Take 1 Tab by mouth three (3) times daily.  topiramate (Topamax) 100 mg tablet Take 1 Tab by mouth two (2) times a day.  lithium 300 mg tablet Take 300 mg by mouth three (3) times daily.  alprazolam (XANAX) 0.5 mg tablet Take 0.5 mg by mouth nightly. No current facility-administered medications for this visit. Medical History  Past Medical History:   Diagnosis Date    Arrhythmia 2014    atrial fibrillation 2014, sick sinus syndrome: [facility]    Arthritis     [facility], [facility] [address]     Bipolar affective disorder ([facility] [address] 3/31/2010    Chronic pain 2018    right shoulder    Colon polyps 03/31/2010    also 5/2018: colon polyps    Depression     Diarrhea 07/19/2013    D/t alpha gal allergy says patient    Epigastric pain 6/5/2015    GERD (gastroesophageal reflux disease)     occasiional only; controlled with med    Hemispheric carotid artery syndrome No stroke    Neuro: [place]  (CT scan head[facility]neck [facility] 4/2018 [facility])    Hyperlipidemia 3/31/2010    Hypothyroidism 3/31/2010    Long term current use of anticoagulant therapy     Lupus ([facility] [address]     patient denies-says she has RA    Migraine 03/31/2010    has them x2 a month: last one 5/23/2018    Psychotic disorder ([facility] [address]     S/P ablation of atrial fibrillation 05/13/2014    S/P cardiac pacemaker procedure 9/16/2014 9/16/14 Medtronic MRI compatible dual chamber pacemaker implant    Sleep apnea     unable to tolerate CPAP    Stool color black     Thyroid disease     Tick-borne disease     Alpha Gal allergy: no eat pork, beef, milk (Meat allergy showed up on a allergy test)    Umbilical hernia without obstruction and without gangrene 6/22/2020    Urge incontinence 3/31/2010    Vitamin D deficiency 3/31/2015       Review of Systems   HENT: Positive for tinnitus.     Eyes: Negative for blurred vision and double vision. Respiratory: Negative for cough and shortness of breath. Cardiovascular: Negative for chest pain, palpitations and orthopnea. Gastrointestinal: Negative for nausea and vomiting. Musculoskeletal: Positive for myalgias. Skin: Rash: hair loss. Neurological: Positive for headaches. Negative for dizziness. Psychiatric/Behavioral: Positive for memory loss. Negative for depression. The patient is nervous/anxious. Exam:    General: Well developed, well nourished. tired   Head: Normocephalic, atraumatic, anicteric sclera   Neck Normal ROM, No thyromegally   Lungs:  Normal effort   Ext: No pedal edema   Skin: Supple no rash     NeurologicExam:  Mental Status: Alert and oriented to person place and time   Speech: Fluent no aphasia or dysarthria. Cranial Nerves:  II - XII Intact   Motor:  Full and symmetric strength   Sensory:   Symmetric and intact    Gait:  Gait is balanced   Tremor:   Significant bilateral upper extremity tremor along with head and vocal cord tremor. Cerebellar:  Coordination intact.            Lab Review  Lab Results   Component Value Date/Time    WBC 4.8 02/17/2021 10:38 AM    HCT 41.4 02/17/2021 10:38 AM    HGB 13.8 02/17/2021 10:38 AM    PLATELET 403 28/64/1789 10:38 AM       Lab Results   Component Value Date/Time    Sodium 141 02/17/2021 10:38 AM    Potassium 5.0 02/17/2021 10:38 AM    Chloride 107 (H) 02/17/2021 10:38 AM    CO2 26 02/17/2021 10:38 AM    Glucose 91 02/17/2021 10:38 AM    BUN 16 02/17/2021 10:38 AM    Creatinine 0.98 02/17/2021 10:38 AM    Calcium 10.2 02/17/2021 10:38 AM       Lab Results   Component Value Date/Time    Hemoglobin A1c 5.3 02/17/2021 10:38 AM    Hemoglobin A1c (POC) 5.9 (A) 04/28/2014 04:04 PM        Lab Results   Component Value Date/Time    Vitamin B12 1,074 10/08/2020 11:51 AM         Lab Results   Component Value Date/Time    Cholesterol, total 248 (H) 02/17/2021 10:38 AM    HDL Cholesterol 48 02/17/2021 10:38 AM    LDL,Direct 124 (H) 03/30/2010 08:40 AM    LDL, calculated 152 (H) 02/17/2021 10:38 AM    LDL, calculated 125 (H) 11/06/2018 08:19 AM    VLDL, calculated 48 (H) 02/17/2021 10:38 AM    VLDL, calculated 42 (H) 11/06/2018 08:19 AM    Triglyceride 260 (H) 02/17/2021 10:38 AM    CHOL/HDL Ratio 5.3 (H) 03/30/2010 08:40 AM       Sary Gomez was seen by synchronous (real-time) audio-video technology on 04/01/21. Consent:  She and/or her healthcare decision maker is aware that this patient-initiated Telehealth encounter is a billable service, with coverage as determined by her insurance carrier. She is aware that she may receive a bill and has provided verbal consent to proceed: Yes    I was in the office while conducting this encounter. Pursuant to the emergency declaration under the [facility], 1135 waiver authority and the [employer] and [employer] Act, this Virtual  Visit was conducted, with patient's consent, to reduce the patient's risk of exposure to COVID-19 and provide continuity of care for an established patient. Services were provided through a video synchronous discussion virtually to substitute for in-person clinic visit.

## 2021-04-02 NOTE — TELEPHONE ENCOUNTER
Encounter Date: 4/10/2020       History     Chief Complaint   Patient presents with    Headache     Mr Rasmussen is a 26yo gentleman here today via CQ with complaint of new onset HA, diffuse myalgias and chest tightness with deep inhalation and shortness of breath x1d. Denies cough. Afebrile although he has felt chilled. Denies N/V/D or other UR s/s. Known COVID exposure, his father tested (+) 2 days ago and is on home quarantine.         Review of patient's allergies indicates:   Allergen Reactions    Oyster shell      No past medical history on file.  No past surgical history on file.  No family history on file.  Social History     Tobacco Use    Smoking status: Not on file   Substance Use Topics    Alcohol use: Not on file    Drug use: Not on file     Review of Systems   Constitutional: Positive for chills and fatigue. Negative for fever.   Respiratory: Positive for shortness of breath. Negative for cough.    Gastrointestinal: Negative for diarrhea, nausea and vomiting.   Neurological: Positive for headaches.   All other systems reviewed and are negative.      Physical Exam     Initial Vitals [04/10/20 1017]   BP Pulse Resp Temp SpO2   (!) 145/82 60 18 98.5 °F (36.9 °C) 99 %      MAP       --         Physical Exam    Nursing note and vitals reviewed.  Constitutional: He appears well-developed and well-nourished.   HENT:   Head: Normocephalic.   Eyes: Conjunctivae and EOM are normal. Pupils are equal, round, and reactive to light.   Neck: Normal range of motion. Neck supple.   Cardiovascular: Normal rate and regular rhythm.   Murmur heard.   Systolic murmur is present with a grade of 1/6.  Pulmonary/Chest: Breath sounds normal. No respiratory distress. He has no wheezes. He has no rales.   Musculoskeletal: Normal range of motion.   Neurological: He is alert and oriented to person, place, and time.   Skin: Skin is warm and dry. Capillary refill takes less than 2 seconds.   Psychiatric: He has a normal mood and  Verified patient with 2 identifiers  Advised per Kristen PERRY  Propanolol is fine to take for her tremors  Patient verified understanding. affect.         ED Course   Procedures  Labs Reviewed - No data to display       Imaging Results    None                                          Clinical Impression:       ICD-10-CM ICD-9-CM   1. Viral syndrome B34.9 079.99   2. SOB (shortness of breath) R06.02 786.05   3. Nonintractable headache, unspecified chronicity pattern, unspecified headache type R51 784.0                                Lorena Wyatt NP  04/10/20 1118

## 2021-04-05 RX ORDER — UREA 10 %
100 LOTION (ML) TOPICAL DAILY
Qty: 90 TAB | Refills: 3 | Status: SHIPPED | OUTPATIENT
Start: 2021-04-05 | End: 2021-08-11 | Stop reason: SDUPTHER

## 2021-04-06 ENCOUNTER — TELEPHONE (OUTPATIENT)
Dept: NEUROLOGY | Age: 71
End: 2021-04-06

## 2021-04-06 RX ORDER — PROPRANOLOL HYDROCHLORIDE 60 MG/1
60 CAPSULE, EXTENDED RELEASE ORAL DAILY
Qty: 30 CAP | Refills: 4 | Status: SHIPPED | OUTPATIENT
Start: 2021-04-06 | End: 2021-06-28

## 2021-04-06 NOTE — TELEPHONE ENCOUNTER
----- Message from [employer] sent at 4/5/2021  1:47 PM EDT -----  Regarding: Dr. Jose Hansen first and last name:      Reason for call: Cardiologist approved medication       Callback required yes/no and why: Yes       Best contact number(s): [phone]       Details to clarify the request: pt states that  her cardiologist Dr. Pedro Rosen approved the medication \"Propranolol\" and would like for Dr. Ron Gaitan to please call it.  Pt also said that she reached out to provider via email and did not receive a response     Burnice China

## 2021-04-28 DIAGNOSIS — R52 PAIN: ICD-10-CM

## 2021-04-28 NOTE — TELEPHONE ENCOUNTER
Pt requesting refill of :    HYDROcodone-acetaminophen (NORCO) 5-325 mg per tablet     Please send to:    [facility]/pharmacy #[id number]- [place], [place] - [address]

## 2021-04-29 RX ORDER — HYDROCODONE BITARTRATE AND ACETAMINOPHEN 5; 325 MG/1; MG/1
1 TABLET ORAL
Qty: 30 TAB | Refills: 0 | Status: SHIPPED | OUTPATIENT
Start: 2021-04-29 | End: 2021-05-20 | Stop reason: SDUPTHER

## 2021-04-29 NOTE — TELEPHONE ENCOUNTER
Future Appointments:  Future Appointments   Date Time Provider Radha Yoon   5/11/2021  1:30 PM Michael Yost MD [facility] BS AMB   6/24/2021 10:30 AM PACEMAKER, RCAM [facility] BS AMB   6/24/2021 10:40 AM Kristen Sheth, ANP [facility] BS AMB   8/11/2021 11:00 AM Dulce Baldwin MD [facility] BS AMB        Last Appointment With Me:  3/5/2021     Requested Prescriptions     Pending Prescriptions Disp Refills    HYDROcodone-acetaminophen (NORCO) 5-325 mg per tablet 30 Tab 0     Sig: Take 1 Tab by mouth every eight (8) hours as needed for Pain for up to 30 days. Max Daily Amount: 3 Tabs.

## 2021-05-20 DIAGNOSIS — R52 PAIN: ICD-10-CM

## 2021-05-20 DIAGNOSIS — Z79.891 LONG TERM (CURRENT) USE OF OPIATE ANALGESIC: Primary | ICD-10-CM

## 2021-05-20 NOTE — TELEPHONE ENCOUNTER
----- Message from Nikki Raymond sent at 5/20/2021 12:35 PM EDT -----  Regarding: /Refill  Medication Refill    Caller (if not patient):      Relationship of caller (if not patient):      Best contact number(s): [phone]      Name of medication and dosage if known: HYDROCODONE 5 325MG       Is patient out of this medication (yes/no): NO       Pharmacy name: [address] [place], [place] [address]on [place] listed in chart? (yes/no): yes   Pharmacy phone number: on file       Details to clarify the request: N/A       Nikki Raymond    [employer]

## 2021-05-21 RX ORDER — HYDROCODONE BITARTRATE AND ACETAMINOPHEN 5; 325 MG/1; MG/1
1 TABLET ORAL
Qty: 30 TABLET | Refills: 0 | Status: SHIPPED | OUTPATIENT
Start: 2021-05-21 | End: 2021-07-02 | Stop reason: SDUPTHER

## 2021-06-28 RX ORDER — PROPRANOLOL HYDROCHLORIDE 60 MG/1
CAPSULE, EXTENDED RELEASE ORAL
Qty: 90 CAPSULE | Refills: 1 | Status: SHIPPED | OUTPATIENT
Start: 2021-06-28 | End: 2021-12-23

## 2021-06-29 DIAGNOSIS — R52 PAIN: ICD-10-CM

## 2021-06-29 NOTE — TELEPHONE ENCOUNTER
----- Message from Argenis Levine sent at 6/29/2021  2:01 PM EDT -----  Regarding: /telephone  Contact: [phone]  Medication Refill    Caller (if not patient): N/A      Relationship of caller (if not patient): N/A      Best contact number(s):[phone]      Name of medication and dosage if known: Hydrocodone 5/325mg      Is patient out of this medication (yes/no): Yes      Pharmacy name: [facility]    Pharmacy listed in chart? (yes/no): Yes  Pharmacy phone number: [phone]      Message from [facility]

## 2021-06-30 NOTE — TELEPHONE ENCOUNTER
----- Message from Fariha Ferrer sent at 6/30/2021  4:07 PM EDT -----  Regarding: Dr. Suarez [place]  Patient return call    Caller's first and last name and relationship (if not the patient): n/a      Best contact number(s):[phone]      Whose call is being returned: [facility] from [facility]

## 2021-07-01 NOTE — TELEPHONE ENCOUNTER
----- Message from Kole Boone sent at 7/1/2021  9:08 AM EDT -----  Regarding: Dr. Nataly Lopez  Patient return call    Caller's first and last name and relationship (if not the patient): Pt       Best contact number(s): [phone]      Whose call is being returned: Deborah Lopez      Details to clarify the request: Returning call, unsure reason.        Kole Boone  [employer]

## 2021-07-02 NOTE — TELEPHONE ENCOUNTER
Pt needs callback, states returning call from office.  [phone]        Needs refill of:  HYDROcodone-acetaminophen (NORCO) 5-325 mg per tablet       Needs sent to:  [facility]/pharmacy #[id number]- [place], [place] - [address]

## 2021-07-06 NOTE — TELEPHONE ENCOUNTER
Pt still needs callback, states returning call from office Julianne Mtz from 6/30) .  [phone]           Needs refill of:  HYDROcodone-acetaminophen (NORCO) 5-325 mg per tablet         Needs sent to:  [facility]/pharmacy #[id number]- JAQUELINE RIDDLE - [address]  [phone]        Visits:  Previous    3/5/21  Upcoming  8/11/21

## 2021-07-07 NOTE — TELEPHONE ENCOUNTER
Future Appointments:  Future Appointments   Date Time Provider Radha Yoon   7/12/2021 10:30 AM PACEMAKER, RCAM [facility] BS AMB   7/12/2021 10:40 AM Kristen Sheth ANP [facility] BS AMB   8/11/2021 11:00 AM Radha Fleming MD [facility] BS AMB   9/16/2021 11:20 AM Janette Mcelroy MD [facility] BS AMB        Last Appointment With Me:  3/5/2021     Requested Prescriptions     Pending Prescriptions Disp Refills    HYDROcodone-acetaminophen (NORCO) 5-325 mg per tablet 30 Tablet 0     Sig: Take 1 Tablet by mouth every eight (8) hours as needed for Pain for up to 30 days. Max Daily Amount: 3 Tablets.

## 2021-07-08 RX ORDER — HYDROCODONE BITARTRATE AND ACETAMINOPHEN 5; 325 MG/1; MG/1
1 TABLET ORAL
Qty: 30 TABLET | Refills: 0 | Status: SHIPPED | OUTPATIENT
Start: 2021-07-08 | End: 2021-08-05 | Stop reason: SDUPTHER

## 2021-07-28 DIAGNOSIS — E55.9 VITAMIN D DEFICIENCY: ICD-10-CM

## 2021-07-28 RX ORDER — ERGOCALCIFEROL 1.25 MG/1
CAPSULE ORAL
Qty: 12 CAPSULE | Refills: 1 | Status: SHIPPED | OUTPATIENT
Start: 2021-07-28 | End: 2022-01-25

## 2021-08-05 ENCOUNTER — TELEPHONE (OUTPATIENT)
Dept: INTERNAL MEDICINE CLINIC | Age: 71
End: 2021-08-05

## 2021-08-05 DIAGNOSIS — R52 PAIN: ICD-10-CM

## 2021-08-05 NOTE — TELEPHONE ENCOUNTER
Future Appointments:  Future Appointments   Date Time Provider Radha Yoon   8/11/2021  1:00 PM PACEMAKER, RCAM [facility] BS AMB   8/11/2021  1:20 PM Kristen Sheth, ANP [facility] BS AMB   9/16/2021 11:20 AM MD PETR Doan BS AMB   10/25/2021 10:00 AM Ganesh Schilling MD [facility] BS AMB        Last Appointment With Me:  3/5/2021     Requested Prescriptions     Pending Prescriptions Disp Refills    HYDROcodone-acetaminophen (NORCO) 5-325 mg per tablet 30 Tablet 0     Sig: Take 1 Tablet by mouth every eight (8) hours as needed for Pain for up to 30 days. Max Daily Amount: 3 Tablets.

## 2021-08-05 NOTE — TELEPHONE ENCOUNTER
Called out and spoke to pt. Two pt identifiers confirmed. Pt informed me that she is still having her regular migraines but having more frequent and more brutal. Is there anything she can do?

## 2021-08-05 NOTE — TELEPHONE ENCOUNTER
----- Message from Jeff Guevara sent at 8/5/2021  2:13 PM EDT -----  Regarding: Dr Salazar Chain 1  Level 1/Escalated Issue      Caller's first and last name and relationship (if not the patient):pt      Best contact number(s):[phone]      What are the symptoms:frequent headackes      Transfer successful - yes/no (include outcome):no/ no answer      Transfer declined - yes/no (include reason):no      Was caller advised to seek appropriate level of care - yes/no: yes      Details to clarify the request:Pt is requesting a call back for assistance with frequent headaches        Message from [facility]

## 2021-08-05 NOTE — TELEPHONE ENCOUNTER
----- Message from Moustapha Grajeda sent at 8/5/2021  2:06 PM EDT -----  Regarding: Dr Kesha Enamorado  Medication Refill    Caller (if not patient):pt      Relationship of caller (if not patient):pt      Best contact number(s):[phone]      Name of medication and dosage if known:Hydrocodone 5-325 mg      Is patient out of this medication (yes/no):yes      Pharmacy name:[facility]    Pharmacy listed in chart? (yes/no):  Pharmacy phone NBSZUX:[phone]      Details to clarify the request: Pt is requesting a refill for Hydrocodone 5-325 mg to be called to the pharmacy on file      Message from [facility]

## 2021-08-06 RX ORDER — HYDROCODONE BITARTRATE AND ACETAMINOPHEN 5; 325 MG/1; MG/1
1 TABLET ORAL
Qty: 30 TABLET | Refills: 0 | Status: SHIPPED | OUTPATIENT
Start: 2021-08-06 | End: 2021-09-01 | Stop reason: SDUPTHER

## 2021-08-06 NOTE — TELEPHONE ENCOUNTER
Called out and spoke to pt. Two pt identifiers confirmed. Pt informed me she has an appt with her neurologist.   Pt verbalized understanding of information discussed w/ no further questions at this time.

## 2021-08-09 NOTE — PROGRESS NOTES
ELECTROPHYSIOLOGY        Subjective:      Bree Hylton is a 66 y.o. female is here for EP follow up. The patient denies chest pain/ shortness of breath, orthopnea, PND, LE edema, palpitations, syncope, presyncope or fatigue. Her non-pitting edema is unchanges; not present in the mornings, worse in the heat. Her hair has come back after stopping coumadin. Is pleased. Doing well with eliquis however she is in the donut hole and her meds are expensive.      Patient Active Problem List    Diagnosis Date Noted    Venous insufficiency of both lower extremities 02/09/2021    Varicose veins with swelling 02/09/2021    Essential hypertension 15/60/9585    Umbilical hernia without obstruction and without gangrene 06/22/2020    Osteoarthritis of right shoulder 06/06/2018    Incisional hernia, without obstruction or gangrene 10/20/2017    Obesity, Class II, BMI 35-39.9 10/20/2017    Fibromyalgia 03/29/2017    Epigastric pain 06/05/2015    Vitamin D deficiency 03/31/2015    A-fib ([address] 01/28/2015    Esophageal spasm 12/16/2014    S/P cardiac pacemaker procedure 09/16/2014    SSS (sick sinus syndrome) ([address] 09/02/2014    S/P ablation of atrial fibrillation 05/13/2014    Morbid obesity with BMI of 40.0-44.9, adult ([address] 03/14/2014    Diarrhea 07/19/2013    GERD (gastroesophageal reflux disease) 04/13/2012    Colon polyps 03/31/2010    Urge incontinence 03/31/2010    Migraine 03/31/2010    Bipolar affective disorder ([address] 03/31/2010    Hypothyroidism 03/31/2010    Hyperglycemia 03/31/2010    Hyperlipidemia 03/31/2010      Ada Chen MD  Past Medical History:   Diagnosis Date    Arrhythmia 2014    atrial fibrillation 2014, sick sinus syndrome: [facility]    Arthritis     Bipolar affect, depressed ([address]     Bipolar affective disorder ([address] 3/31/2010    Chronic pain 2018    right shoulder    Colon polyps 03/31/2010    also 5/2018: colon polyps    Depression     Diarrhea 07/19/2013    D/t alpha gal allergy says patient    Epigastric pain 6/5/2015    GERD (gastroesophageal reflux disease)     occasiional only; controlled with med    Hemispheric carotid artery syndrome No stroke    Neuro: Otilia Grewal  ([facility] head[facility])    Hyperlipidemia 3/31/2010    Hypothyroidism 3/31/2010    Long term current use of anticoagulant therapy     Lupus ([facility]     patient denies-says she has RA    Migraine 03/31/2010    has them x2 a month: last one 5/23/2018    Psychotic disorder ([facility] [address] [facility]     S/P ablation of atrial fibrillation 05/13/2014    S/P cardiac pacemaker procedure 9/16/2014 9/16/14 [employer] MRI compatible dual chamber pacemaker implant    Sleep apnea     unable to tolerate CPAP    Stool color black     Thyroid disease     Tick-borne disease     Alpha Gal allergy: no eat pork, beef, milk (Meat allergy showed up on a allergy test)    Umbilical hernia without obstruction and without gangrene 6/22/2020    Urge incontinence 3/31/2010    Vitamin D deficiency 3/31/2015      Past Surgical History:   Procedure Laterality Date    COLONOSCOPY N/A 4/27/2018    COLONOSCOPY performed by Shefali Gilman MD at hospitals AMBULATORY OR    COLONOSCOPY N/A 11/25/2020    COLONOSCOPY, EGD performed by Jyaden Xie MD at [facility]  4/27/2018         COLONOSCOPY,[facility]  4/27/2018         HX BUNIONECTOMY      HX GI  2010, 5/2018    egd, colonoscopy    HX HERNIA REPAIR  01/11/2021    HX HERNIA REPAIR  01/2021    HX HERNIA REPAIR  03/0498    4 umbillical     HX HYSTERECTOMY      HX ORTHOPAEDIC  2000's    removed bone spurs from R & L hand    HX ORTHOPAEDIC  1990s?     left bunionectomy     HX OTHER SURGICAL  07/2014    2 shocks to heart & ablations    HX OTHER SURGICAL  01/11/2021    incarcerated incisional hernia surgery    HX PACEMAKER  2014    On the right side    HX PACEMAKER PLACEMENT  2015    HX SHOULDER REPLACEMENT Right 2017    HX TONSILLECTOMY  15 yrs old    NM CARDIAC SURG PROCEDURE UNLIST  5/2014    ablation     Allergies   Allergen Reactions    Latex Swelling     Swelling of lips says patient    Beef Containing Products Nausea and Vomiting     diarrhea    Ciprofloxacin Unknown (comments)    Pork Derived (Porcine) Nausea and Vomiting     diarrhea    Bovine Cartilage Nausea and Vomiting     diarrhea    Milk Nausea and Vomiting     diarrhea  diarrhea    Sulfa (Sulfonamide Antibiotics) Hives    Xarelto [Rivaroxaban] Other (comments)     GI problems      Family History   Problem Relation Age of Onset    Arrhythmia Mother         afib    Stroke Mother     Heart Failure Mother     Colon Cancer Father     Heart Disease Father     Cancer Father         colon cancer    Arrhythmia Brother         afib    Asthma Brother     COPD Brother     negative for cardiac disease  Social History     Socioeconomic History    Marital status:      Spouse name: Not on file    Number of children: Not on file    Years of education: Not on file    Highest education level: Not on file   Tobacco Use    Smoking status: Never Smoker    Smokeless tobacco: Never Used   Vaping Use    Vaping Use: Never used   Substance and Sexual Activity    Alcohol use: Yes     Alcohol/week: 1.0 standard drinks     Types: 1 Cans of beer per week     Comment: weekly    Drug use: Yes     Types: Prescription    Sexual activity: Never     Social Determinants of Health     Financial Resource Strain:     Difficulty of Paying Living Expenses:    Food Insecurity:     Worried About Running Out of Food in the Last Year:     [address] in the Last Year:    Transportation Needs:     Lack of Transportation (Medical):      Lack of Transportation (Non-Medical):    Physical Activity:     Days of Exercise per Week:     Minutes of Exercise per Session:    Stress:     Feeling of Stress :    Social Connections:     Frequency of Communication with Friends and Family:     Frequency of Social Gatherings with Friends and Family:     Attends Sabianist Services:     Active Member of Clubs or Organizations:     Attends Club or Organization Meetings:     Marital Status:      Current Outpatient Medications   Medication Sig    HYDROcodone-acetaminophen (NORCO) 5-325 mg per tablet Take 1 Tablet by mouth every eight (8) hours as needed for Pain for up to 30 days. Max Daily Amount: 3 Tablets.  ergocalciferol (ERGOCALCIFEROL) 1,250 mcg (50,000 unit) capsule TAKE 1 CAPSULE WEEKLY    propranolol LA (INDERAL LA) 60 mg SR capsule TAKE 1 CAPSULE BY MOUTH EVERY DAY    pregabalin (LYRICA) 100 mg capsule TAKE 1 CAPSULE BY MOUTH TWICE A DAY    lithium carbonate SR (LITHOBID) 300 mg CR tablet Take 300 mg by mouth three (3) times daily.  hydrOXYzine HCL (ATARAX) 25 mg tablet Take 25 mg by mouth three (3) times daily.  apixaban (ELIQUIS) 5 mg tablet Take 1 Tab by mouth two (2) times a day.  cyanocobalamin (Vitamin B-12) 100 mcg tablet Take 100 mcg by mouth daily.  timolol (TIMOPTIC) 0.5 % ophthalmic solution INSTILL 1 DROP 1 TIME EVERY DAY INTO BOTH EYES EVERY MORNING    hydrOXYchloroQUINE (PLAQUENIL) 200 mg tablet TAKE 2 TABLETS BY MOUTH EVERY DAY    levothyroxine (SYNTHROID) 50 mcg tablet TAKE 1 TABLET BY MOUTH EVERY DAY    latanoprost (XALATAN) 0.005 % ophthalmic solution Administer 1 Drop to both eyes two (2) times a day.  zolpidem (AMBIEN) 10 mg tablet Take 10 mg by mouth nightly as needed.  topiramate (Topamax) 100 mg tablet Take 1 Tab by mouth two (2) times a day.  alprazolam (XANAX) 0.5 mg tablet Take 0.5 mg by mouth nightly.  omeprazole (PRILOSEC) 40 mg capsule TAKE 1 CAPSULE BY MOUTH EVERY DAY IN THE MORNING (Patient not taking: Reported on 8/11/2021)    tiZANidine (ZANAFLEX) 2 mg tablet Take 1 Tab by mouth three (3) times daily. No current facility-administered medications for this visit.       Vitals: 21 1329   BP: 110/80   Pulse: 76   Resp: 18   SpO2: 97%   Weight: 234 lb 6.4 oz (106.3 kg)   Height: 5' 5\" (1.651 m)       I have reviewed the nurses notes, vitals, problem list, allergy list, medical history, family, social history and medications. Review of Symptoms:  11 systems reviewed, negative other than as stated in the HPI      Physical Exam:      General: Well developed, in no acute distress. HEENT: Eyes - PERRL  Heart:  Normal S1/S2 negative S3 or S4. Regular, no murmur  Respiratory: Clear bilaterally x 4, no wheezing or rales  Extremities:  Non-pitting edema, no cyanosis. Musculoskeletal: No clubbing  Neuro: A&Ox3, speech clear  Skin: No visible rashes or lesions. Non diaphoretic.  No visible ulcers  Vascular: 2+ pulses symmetric in all extremities  Psych - judgement intact and orientation is wnl       Cardiographics    Ek21  V paced, pvc    Results for orders placed or performed during the hospital encounter of 18   EKG, 12 LEAD, INITIAL   Result Value Ref Range    Ventricular Rate 75 BPM    Atrial Rate 75 BPM    P-R Interval 326 ms    QRS Duration 170 ms    Q-T Interval 450 ms    QTC Calculation (Bezet) 502 ms    Calculated R Axis -29 degrees    Calculated T Axis 93 degrees    Diagnosis       Atrial-paced rhythm with prolonged AV conduction  Right bundle branch block  When compared with ECG of 2015 14:28,  Electronic atrial pacemaker has replaced Sinus rhythm  Right bundle branch block has replaced Nonspecific intraventricular   conduction delay  Confirmed by Gayla Neither, P.V. ([id number]) on 2018 1:38:30 PM     Results for orders placed or performed in visit on 14   CARDIAC HOLTER MONITOR, 24 HOURS    Narrative    ECG Monitor/24 hours, Complete    Reason for Holter Monitor   A-FIB    Heartbeat    Slowest 42  Average 60  Fastest  99        Results:   Underlying Rhythm: Normal sinus rhythm      Atrial Arrhythmias: premature atrial contractions; occasional,  atrial couplets and atrial triplets            AV Conduction: normal    Ventricular Arrhythmias: premature ventricular contractions;  occasional     ST Segment Analysis:normal     Symptom Correlation:  Headache did not correlate with any arrhythmias. Fatigue/dizziness correlated with sinus bradycardia in the 40s    Comment:   Sinus rhythm with symptomatic sinus bradycardia. Clinical  correlation advised. Radha Vaughan MD, [facility]        *Note: Due to a large number of results and/or encounters for the requested time period, some results have not been displayed. A complete set of results can be found in Results Review. Lab Results   Component Value Date/Time    WBC 4.8 02/17/2021 10:38 AM    HGB 13.8 02/17/2021 10:38 AM    HCT 41.4 02/17/2021 10:38 AM    PLATELET 243 33/02/9706 10:38 AM    MCV 90 02/17/2021 10:38 AM      Lab Results   Component Value Date/Time    Sodium 141 02/17/2021 10:38 AM    Potassium 5.0 02/17/2021 10:38 AM    Chloride 107 (H) 02/17/2021 10:38 AM    CO2 26 02/17/2021 10:38 AM    Anion gap 3 (L) 11/04/2020 07:48 PM    Glucose 91 02/17/2021 10:38 AM    BUN 16 02/17/2021 10:38 AM    Creatinine 0.98 02/17/2021 10:38 AM    BUN/Creatinine ratio 16 02/17/2021 10:38 AM    GFR est AA 67 02/17/2021 10:38 AM    GFR est non-AA 58 (L) 02/17/2021 10:38 AM    Calcium 10.2 02/17/2021 10:38 AM    Bilirubin, total 0.3 02/17/2021 10:38 AM    Alk. phosphatase 69 02/17/2021 10:38 AM    Protein, total 6.4 02/17/2021 10:38 AM    Albumin 4.1 02/17/2021 10:38 AM    Globulin 3.1 11/04/2020 07:48 PM    A-G Ratio 1.8 02/17/2021 10:38 AM    ALT (SGPT) 15 02/17/2021 10:38 AM      Lab Results   Component Value Date/Time    TSH 2.670 02/17/2021 10:38 AM      Echo 6/29/20  · Normal cavity size. Mild concentric hypertrophy. Low normal systolic function. Estimated left ventricular ejection fraction is 50 - 55%. Visually measured ejection fraction. No regional wall motion abnormality noted.  Mild (grade 1) left ventricular diastolic dysfunction. · Moderately dilated left atrium. · Mildly dilated right atrium. · Trace mitral valve regurgitation is present. · Image quality for this study was technically difficult. Assessment:           ICD-10-CM ICD-9-CM    1. SSS (sick sinus syndrome) ([facility])  I49.5 427.81    2. PAF (paroxysmal atrial fibrillation) ([facility])  I48.0 427.31 AMB POC EKG ROUTINE W/ 12 LEADS, INTER & REP   3. Cardiac pacemaker in situ  Z95.0 V45.01    4. Long term (current) use of anticoagulants  Z79.01 V58.61      Orders Placed This Encounter    AMB POC EKG ROUTINE W/ 12 LEADS, INTER & REP     Order Specific Question:   Reason for Exam:     Answer:   routine        Plan:     Ms Gregoria Martinez is here for annual follow up and device interrogation. Hx of  WACA of the 5 PVs. Mitral annulus, septal, roof and CS lines    Successful RFA of the CTI with confirmed bi-directional isthmus block, 5/12/14. RFA of the roof, posterior box set, anterior to the MANJINDER, mitral isthmus, septum and CS, 7/28/14. S/P dual chamber ppm for sss and af, 9/16/2014. She is 100% AP, 98.7% RVP. No events. Last echo 2020 with normal EF. During this visit,  the patient and I had a comprehensive discussion of device management using principles of shared decision making. we reviewed device therapy, including the potential risks and benefits of device management. These risks include death, myocardial infarction, stroke, cardiac perforation, vascular injury, injury, pacing induced cardiomyopathy, inappropriate shocks (defibrillator) and other less severe complications. The patient demonstrated a clear understanding of these issues during out discussion. Our plans, determined together after thorough consideration, are outlined else where in this note. Enrolled in remote monitoring and I will see her back in 1 year. Addressed all patient questions and concerns at this visit. Continue medical management for AF.  Discussed side effects, efficacy and good medication compliance with pt re: [facility]. One month of samples provided to pt. Thank you for allowing me to participate in Cata Laboy 's care.       Mani Berman NP

## 2021-08-11 ENCOUNTER — OFFICE VISIT (OUTPATIENT)
Dept: CARDIOLOGY CLINIC | Age: 71
End: 2021-08-11
Payer: MEDICARE

## 2021-08-11 VITALS
DIASTOLIC BLOOD PRESSURE: 80 MMHG | WEIGHT: 234.4 LBS | HEIGHT: 65 IN | BODY MASS INDEX: 39.05 KG/M2 | RESPIRATION RATE: 18 BRPM | SYSTOLIC BLOOD PRESSURE: 110 MMHG | HEART RATE: 76 BPM | OXYGEN SATURATION: 97 %

## 2021-08-11 DIAGNOSIS — Z95.0 CARDIAC PACEMAKER IN SITU: ICD-10-CM

## 2021-08-11 DIAGNOSIS — I49.5 SSS (SICK SINUS SYNDROME) (HCC): Primary | ICD-10-CM

## 2021-08-11 DIAGNOSIS — I48.0 PAF (PAROXYSMAL ATRIAL FIBRILLATION) (HCC): ICD-10-CM

## 2021-08-11 DIAGNOSIS — I49.5 SSS (SICK SINUS SYNDROME) (HCC): ICD-10-CM

## 2021-08-11 DIAGNOSIS — Z79.01 LONG TERM (CURRENT) USE OF ANTICOAGULANTS: ICD-10-CM

## 2021-08-11 DIAGNOSIS — Z95.0 CARDIAC PACEMAKER IN SITU: Primary | ICD-10-CM

## 2021-08-11 PROCEDURE — G8400 PT W/DXA NO RESULTS DOC: HCPCS | Performed by: NURSE PRACTITIONER

## 2021-08-11 PROCEDURE — G8536 NO DOC ELDER MAL SCRN: HCPCS | Performed by: NURSE PRACTITIONER

## 2021-08-11 PROCEDURE — G8752 SYS BP LESS 140: HCPCS | Performed by: NURSE PRACTITIONER

## 2021-08-11 PROCEDURE — 93005 ELECTROCARDIOGRAM TRACING: CPT | Performed by: NURSE PRACTITIONER

## 2021-08-11 PROCEDURE — 99214 OFFICE O/P EST MOD 30 MIN: CPT | Performed by: NURSE PRACTITIONER

## 2021-08-11 PROCEDURE — G9717 DOC PT DX DEP/BP F/U NT REQ: HCPCS | Performed by: NURSE PRACTITIONER

## 2021-08-11 PROCEDURE — G8754 DIAS BP LESS 90: HCPCS | Performed by: NURSE PRACTITIONER

## 2021-08-11 PROCEDURE — G0463 HOSPITAL OUTPT CLINIC VISIT: HCPCS | Performed by: NURSE PRACTITIONER

## 2021-08-11 PROCEDURE — 1101F PT FALLS ASSESS-DOCD LE1/YR: CPT | Performed by: NURSE PRACTITIONER

## 2021-08-11 PROCEDURE — G8417 CALC BMI ABV UP PARAM F/U: HCPCS | Performed by: NURSE PRACTITIONER

## 2021-08-11 PROCEDURE — G8427 DOCREV CUR MEDS BY ELIG CLIN: HCPCS | Performed by: NURSE PRACTITIONER

## 2021-08-11 PROCEDURE — 1090F PRES/ABSN URINE INCON ASSESS: CPT | Performed by: NURSE PRACTITIONER

## 2021-08-11 PROCEDURE — 93280 PM DEVICE PROGR EVAL DUAL: CPT | Performed by: INTERNAL MEDICINE

## 2021-08-11 PROCEDURE — 3017F COLORECTAL CA SCREEN DOC REV: CPT | Performed by: NURSE PRACTITIONER

## 2021-08-11 NOTE — PROGRESS NOTES
Chief Complaint   Patient presents with    Pacemaker Check     annual follow up     Foot Swelling     all the time - not heart related per patient      1. Have you been to the ER, urgent care clinic since your last visit? Hospitalized since your last visit? Yes [facility]     2. Have you seen or consulted any other health care providers outside of the [address] [place] [address] since your last visit? Include any pap smears or colon screening.   Yes see above and Dr Deangelo Bernard - Surgeon

## 2021-08-12 ENCOUNTER — TELEPHONE (OUTPATIENT)
Dept: SURGERY | Age: 71
End: 2021-08-12

## 2021-08-12 NOTE — TELEPHONE ENCOUNTER
Pt called to schedule an appt on 8/25 to discuss cosmetic surgery after all her hernia operations. Pt stated that Dr. Janalyn Essex said it was something he could do. If Dr. Janalyn Essex can't see her, please call her with a name of a dr that can.  [phone]

## 2021-08-13 NOTE — TELEPHONE ENCOUNTER
You can tell her I am not a cosmetic surgeon. If she think she has another hernia, I would be happy to see her. If she wants to see a plastic surgeon for cosmetic work, you can giver her Dr Olmos Frame number.

## 2021-08-13 NOTE — TELEPHONE ENCOUNTER
Spoke with patient and she would like to see a cosmetic surgeron for her previous hernia surgeries. Does the patient need to come in to discuss this?

## 2021-08-17 NOTE — TELEPHONE ENCOUNTER
Spoke with patient and gave her the name and telephone number for Dr Ирина Talbot and Wilner Packer.

## 2021-09-01 DIAGNOSIS — R52 PAIN: ICD-10-CM

## 2021-09-01 NOTE — TELEPHONE ENCOUNTER
----- Message from Burgess Wilkins sent at 9/1/2021  1:30 PM EDT -----  Regarding: Dr. Dona Foster  Medication Refill    Caller (if not patient): NA       Relationship of caller (if not patient): Self       Best contact number(s): [phone]      Name of medication and dosage if known: Hydrocodone 5-325 mg.       Is patient out of this medication (yes/no): Yes      Pharmacy name:  [facility]     Pharmacy listed in chart? (yes/no): Yes  Pharmacy phone number: NA

## 2021-09-03 RX ORDER — HYDROCODONE BITARTRATE AND ACETAMINOPHEN 5; 325 MG/1; MG/1
1 TABLET ORAL
Qty: 30 TABLET | Refills: 0 | Status: SHIPPED | OUTPATIENT
Start: 2021-09-03 | End: 2021-10-25 | Stop reason: SDUPTHER

## 2021-09-16 ENCOUNTER — OFFICE VISIT (OUTPATIENT)
Dept: NEUROLOGY | Age: 71
End: 2021-09-16
Payer: MEDICARE

## 2021-09-16 VITALS
HEART RATE: 83 BPM | BODY MASS INDEX: 37.32 KG/M2 | DIASTOLIC BLOOD PRESSURE: 76 MMHG | HEIGHT: 65 IN | WEIGHT: 224 LBS | SYSTOLIC BLOOD PRESSURE: 121 MMHG | OXYGEN SATURATION: 100 %

## 2021-09-16 DIAGNOSIS — E03.9 ACQUIRED HYPOTHYROIDISM: ICD-10-CM

## 2021-09-16 DIAGNOSIS — M79.7 FIBROMYALGIA: ICD-10-CM

## 2021-09-16 DIAGNOSIS — G44.321 INTRACTABLE CHRONIC POST-TRAUMATIC HEADACHE: ICD-10-CM

## 2021-09-16 DIAGNOSIS — E66.01 SEVERE OBESITY (BMI 35.0-35.9 WITH COMORBIDITY) (HCC): ICD-10-CM

## 2021-09-16 DIAGNOSIS — F31.70 BIPOLAR AFFECTIVE DISORDER IN REMISSION (HCC): ICD-10-CM

## 2021-09-16 DIAGNOSIS — E78.2 MIXED HYPERLIPIDEMIA: ICD-10-CM

## 2021-09-16 DIAGNOSIS — G25.0 ESSENTIAL TREMOR: Primary | ICD-10-CM

## 2021-09-16 DIAGNOSIS — R42 DIZZINESS: ICD-10-CM

## 2021-09-16 DIAGNOSIS — G47.33 OBSTRUCTIVE SLEEP APNEA: ICD-10-CM

## 2021-09-16 PROCEDURE — G8417 CALC BMI ABV UP PARAM F/U: HCPCS | Performed by: PSYCHIATRY & NEUROLOGY

## 2021-09-16 PROCEDURE — 1101F PT FALLS ASSESS-DOCD LE1/YR: CPT | Performed by: PSYCHIATRY & NEUROLOGY

## 2021-09-16 PROCEDURE — G9717 DOC PT DX DEP/BP F/U NT REQ: HCPCS | Performed by: PSYCHIATRY & NEUROLOGY

## 2021-09-16 PROCEDURE — 3017F COLORECTAL CA SCREEN DOC REV: CPT | Performed by: PSYCHIATRY & NEUROLOGY

## 2021-09-16 PROCEDURE — G8752 SYS BP LESS 140: HCPCS | Performed by: PSYCHIATRY & NEUROLOGY

## 2021-09-16 PROCEDURE — G8400 PT W/DXA NO RESULTS DOC: HCPCS | Performed by: PSYCHIATRY & NEUROLOGY

## 2021-09-16 PROCEDURE — G8536 NO DOC ELDER MAL SCRN: HCPCS | Performed by: PSYCHIATRY & NEUROLOGY

## 2021-09-16 PROCEDURE — 99215 OFFICE O/P EST HI 40 MIN: CPT | Performed by: PSYCHIATRY & NEUROLOGY

## 2021-09-16 PROCEDURE — G8427 DOCREV CUR MEDS BY ELIG CLIN: HCPCS | Performed by: PSYCHIATRY & NEUROLOGY

## 2021-09-16 PROCEDURE — 1090F PRES/ABSN URINE INCON ASSESS: CPT | Performed by: PSYCHIATRY & NEUROLOGY

## 2021-09-16 PROCEDURE — G8754 DIAS BP LESS 90: HCPCS | Performed by: PSYCHIATRY & NEUROLOGY

## 2021-09-16 RX ORDER — TRIMETHOPRIM 100 MG/1
100 TABLET ORAL DAILY
COMMUNITY
Start: 2021-08-26 | End: 2022-07-11

## 2021-09-16 NOTE — PROGRESS NOTES
Follow-up Visit    Name Madiha Sullivan Age 66 y.o. MRN [id number]  1950       Chief Complaint: headaches    She is here for follow up visit. She has recovered from the hernia surgery. She has lost her balance and she is having trouble walking straight. She finds her self wobbling. SHe has had it in the past couple of years. Slhe sleeps excessively sleeps for 48 hours sometimes. Assesment and Plan  1. Essential tremor  controlled    2. Fibromyalgia  continue lyrica    4. Bipolar affective disorder in remission (HCC)  Lithium carbonate continue    5. Acquired hypothyroidism  Continue hypothyroidism    6. Obstructive sleep apnea  Continue cpap    7. Dizziness  MRI brain  Referral to ENT    Allergies  Latex, Beef containing products, Ciprofloxacin, Pork derived (porcine), Bovine cartilage, Milk, Sulfa (sulfonamide antibiotics), and Xarelto [rivaroxaban]     Medications  Current Outpatient Medications   Medication Sig    trimethoprim (TRIMPEX) 100 mg tablet Take 100 mg by mouth daily.  HYDROcodone-acetaminophen (NORCO) 5-325 mg per tablet Take 1 Tablet by mouth every eight (8) hours as needed for Pain for up to 30 days. Max Daily Amount: 3 Tablets.  apixaban (ELIQUIS) 5 mg tablet Take 1 Tablet by mouth two (2) times a day.  ergocalciferol (ERGOCALCIFEROL) 1,250 mcg (50,000 unit) capsule TAKE 1 CAPSULE WEEKLY    propranolol LA (INDERAL LA) 60 mg SR capsule TAKE 1 CAPSULE BY MOUTH EVERY DAY    pregabalin (LYRICA) 100 mg capsule TAKE 1 CAPSULE BY MOUTH TWICE A DAY (Patient taking differently: Take 200 mg by mouth two (2) times a day.)    lithium carbonate SR (LITHOBID) 300 mg CR tablet Take 300 mg by mouth three (3) times daily.  cyanocobalamin (Vitamin B-12) 100 mcg tablet Take 100 mcg by mouth daily.  hydrOXYchloroQUINE (PLAQUENIL) 200 mg tablet Take 200 mg by mouth two (2) times a day.     levothyroxine (SYNTHROID) 50 mcg tablet TAKE 1 TABLET BY MOUTH EVERY DAY    latanoprost (XALATAN) 0.005 % ophthalmic solution Administer 1 Drop to both eyes two (2) times a day.  zolpidem (AMBIEN) 10 mg tablet Take 10 mg by mouth nightly as needed. Rarely    topiramate (Topamax) 100 mg tablet Take 1 Tab by mouth two (2) times a day. (Patient taking differently: Take 50 mg by mouth daily.)    alprazolam (XANAX) 0.5 mg tablet Take 0.5 mg by mouth nightly.  hydrOXYzine HCL (ATARAX) 25 mg tablet Take 25 mg by mouth three (3) times daily. (Patient not taking: Reported on 9/16/2021)    omeprazole (PRILOSEC) 40 mg capsule TAKE 1 CAPSULE BY MOUTH EVERY DAY IN THE MORNING (Patient not taking: Reported on 8/11/2021)    timolol (TIMOPTIC) 0.5 % ophthalmic solution INSTILL 1 DROP 1 TIME EVERY DAY INTO BOTH EYES EVERY MORNING (Patient not taking: Reported on 9/16/2021)    tiZANidine (ZANAFLEX) 2 mg tablet Take 1 Tab by mouth three (3) times daily. (Patient not taking: Reported on 9/16/2021)     No current facility-administered medications for this visit.         Medical History  Past Medical History:   Diagnosis Date    Arrhythmia 2014    atrial fibrillation 2014, sick sinus syndrome: [facility]    Arthritis     Rheumatoid    Bipolar affect, depressed ([address]     Bipolar affective disorder ([address] 3/31/2010    Chronic pain 2018    right shoulder    Colon polyps 03/31/2010    also 5/2018: colon polyps    Depression     Diarrhea 07/19/2013    D/t alpha gal allergy says patient    Epigastric pain 6/5/2015    GERD (gastroesophageal reflux disease)     occasiional only; controlled with med    Hemispheric carotid artery syndrome No stroke    Neuro: Mi Herrera  (CT scan head/neck negative 4/2018 in [facility])    Hyperlipidemia 3/31/2010    Hypothyroidism 3/31/2010    Long term current use of anticoagulant therapy     Lupus ([address]     patient denies-says she has RA    Migraine 03/31/2010    has them x2 a month: last one 5/23/2018    Psychotic disorder ([address]     S/P ablation of atrial fibrillation 05/13/2014    S/P cardiac pacemaker procedure 9/16/2014 9/16/14 [employer] MRI compatible dual chamber pacemaker implant    Sleep apnea     unable to tolerate CPAP    Stool color black     Thyroid disease     Tick-borne disease     Alpha Gal allergy: no eat pork, beef, milk (Meat allergy showed up on a allergy test)    Umbilical hernia without obstruction and without gangrene 6/22/2020    Urge incontinence 3/31/2010    Vitamin D deficiency 3/31/2015       Review of Systems   HENT: Positive for congestion. Eyes: Negative for blurred vision and double vision. Respiratory: Negative for cough and shortness of breath. Cardiovascular: Negative for chest pain, palpitations and orthopnea. Gastrointestinal: Negative for nausea and vomiting. Skin: Rash: hair loss. Neurological: Positive for headaches. Negative for dizziness. Psychiatric/Behavioral: Positive for memory loss. Negative for depression. The patient is nervous/anxious. Exam:    General: Well developed, well nourished. tired   Head: Normocephalic, atraumatic, anicteric sclera   Neck Normal ROM, No thyromegally   Lungs:  Normal effort   Ext: No pedal edema   Skin: Supple no rash     NeurologicExam:  Mental Status: Alert and oriented to person place and time   Speech: Fluent no aphasia or dysarthria. Cranial Nerves:  II - XII Intact   Motor:  Full and symmetric strength   Sensory:   Symmetric and intact    Gait:  Gait, laterao pulsion to the right. Tremor:   No tremor noted. Cerebellar:  Coordination intact.            Lab Review  Lab Results   Component Value Date/Time    WBC 4.8 02/17/2021 10:38 AM    HCT 41.4 02/17/2021 10:38 AM    HGB 13.8 02/17/2021 10:38 AM    PLATELET 029 50/21/0593 10:38 AM       Lab Results   Component Value Date/Time    Sodium 141 02/17/2021 10:38 AM    Potassium 5.0 02/17/2021 10:38 AM    Chloride 107 (H) 02/17/2021 10:38 AM    CO2 26 02/17/2021 10:38 AM    Glucose 91 02/17/2021 10:38 AM BUN 16 02/17/2021 10:38 AM    Creatinine 0.98 02/17/2021 10:38 AM    Calcium 10.2 02/17/2021 10:38 AM       Lab Results   Component Value Date/Time    Hemoglobin A1c 5.3 02/17/2021 10:38 AM    Hemoglobin A1c (POC) 5.9 (A) 04/28/2014 04:04 PM        Lab Results   Component Value Date/Time    Vitamin B12 1,074 10/08/2020 11:51 AM         Lab Results   Component Value Date/Time    Cholesterol, total 248 (H) 02/17/2021 10:38 AM    HDL Cholesterol 48 02/17/2021 10:38 AM    LDL,Direct 124 (H) 03/30/2010 08:40 AM    LDL, calculated 152 (H) 02/17/2021 10:38 AM    LDL, calculated 125 (H) 11/06/2018 08:19 AM    VLDL, calculated 48 (H) 02/17/2021 10:38 AM    VLDL, calculated 42 (H) 11/06/2018 08:19 AM    Triglyceride 260 (H) 02/17/2021 10:38 AM    CHOL/HDL Ratio 5.3 (H) 03/30/2010 08:40 AM

## 2021-09-17 ENCOUNTER — HOSPITAL ENCOUNTER (OUTPATIENT)
Dept: GENERAL RADIOLOGY | Age: 71
Discharge: HOME | End: 2021-09-17
Payer: MEDICARE

## 2021-09-17 ENCOUNTER — TRANSCRIBE ORDER (OUTPATIENT)
Dept: REGISTRATION | Age: 71
End: 2021-09-17

## 2021-09-17 DIAGNOSIS — K58.9 IRRITABLE BOWEL SYNDROME: ICD-10-CM

## 2021-09-17 DIAGNOSIS — R07.9 CHEST PAIN: ICD-10-CM

## 2021-09-17 DIAGNOSIS — R10.84 GENERALIZED ABDOMINAL PAIN: ICD-10-CM

## 2021-09-17 DIAGNOSIS — K21.9 GERD (GASTROESOPHAGEAL REFLUX DISEASE): ICD-10-CM

## 2021-09-17 DIAGNOSIS — R07.9 CHEST PAIN: Primary | ICD-10-CM

## 2021-09-17 PROCEDURE — 74018 RADEX ABDOMEN 1 VIEW: CPT

## 2021-09-19 DIAGNOSIS — E03.9 HYPOTHYROIDISM, UNSPECIFIED TYPE: ICD-10-CM

## 2021-09-20 RX ORDER — LEVOTHYROXINE SODIUM 50 UG/1
TABLET ORAL
Qty: 90 TABLET | Refills: 3 | Status: SHIPPED | OUTPATIENT
Start: 2021-09-20

## 2021-09-23 ENCOUNTER — TELEPHONE (OUTPATIENT)
Dept: SLEEP MEDICINE | Age: 71
End: 2021-09-23

## 2021-09-23 NOTE — TELEPHONE ENCOUNTER
Phoned the patient to schedule a yearly follow up. However, she stated that she was driving and will call back to schedule.

## 2021-10-07 DIAGNOSIS — M79.7 FIBROMYALGIA: ICD-10-CM

## 2021-10-07 RX ORDER — PREGABALIN 100 MG/1
200 CAPSULE ORAL 2 TIMES DAILY
Qty: 120 CAPSULE | Refills: 5 | Status: SHIPPED | OUTPATIENT
Start: 2021-10-07 | End: 2021-11-06

## 2021-10-19 ENCOUNTER — NURSE TRIAGE (OUTPATIENT)
Dept: OTHER | Facility: CLINIC | Age: 71
End: 2021-10-19

## 2021-10-19 NOTE — TELEPHONE ENCOUNTER
Received call from Bertha at [facility] with [employer] Complaint. Brief description of triage: Patient reports feeling weak and tired. States is sleeping more than usual and can do normal activities but does not want to. Triage indicates for patient to be seen in the office within 3 days. Triage RN advised patient that if she cannot be seen in the office within 3 days to go to an [facility] [address] Care advice provided, patient verbalizes understanding; denies any other questions or concerns; instructed to call back for any new or worsening symptoms. Writer provided warm transfer to [place] at [facility] for appointment scheduling. Attention Provider: Thank you for allowing me to participate in the care of your patient. The patient was connected to triage in response to information provided to the [facility]. Please do not respond through this encounter as the response is not directed to a shared pool. Reason for Disposition   MILD weakness (i.e., does not interfere with ability to work, go to school, normal activities) and persists > 1 week    Answer Assessment - Initial Assessment Questions  1. DESCRIPTION: \"Describe how you are feeling. \"  \"I feel weak and I sleep all the time\"    2. SEVERITY: \"How bad is it? \"  \"Can you stand and walk? \"    - MILD - Feels weak or tired, but does not interfere with work, school or normal activities    - [facility] to stand and walk; weakness interferes with work, school, or normal activities    - SEVERE - Unable to stand or walk  Mild    3. ONSET:  \"When did the weakness begin? \"   4 weeks ago    4. CAUSE: \"What do you think is causing the weakness? \"   Eating pork    5. MEDICINES: [employer] you recently started a new medicine or had a change in the amount of a medicine? \"      Denies    6. OTHER SYMPTOMS: \"Do you have any other symptoms? \" (e.g., chest pain, fever, cough, SOB, vomiting, diarrhea, bleeding, other areas of pain)  Constipation    7.  PREGNANCY: \"Is there any chance you are pregnant? \" \"When was your last menstrual period? \"  N/a    Protocols used: WEAKNESS (GENERALIZED) AND FATIGUE-ADULT-[place]

## 2021-10-25 DIAGNOSIS — R52 PAIN: ICD-10-CM

## 2021-10-25 RX ORDER — HYDROCODONE BITARTRATE AND ACETAMINOPHEN 5; 325 MG/1; MG/1
1 TABLET ORAL
Qty: 30 TABLET | Refills: 0 | Status: SHIPPED | OUTPATIENT
Start: 2021-10-25 | End: 2021-11-24

## 2021-10-25 NOTE — TELEPHONE ENCOUNTER
Patient states she needs refill [facility] [address] on file/Indicated. Thank you    Patient was reminded of [address].

## 2021-10-25 NOTE — TELEPHONE ENCOUNTER
PCP: Jojo Larsen MD    Last appt: 3/5/2021  Future Appointments   Date Time Provider Radha Yoon   10/29/2021 10:30 AM [facility]   11/5/2021 11:15 AM Jojo Larsen MD [facility] BS AMB   11/15/2021 10:45 AM [facility] BS AMB   1[facility]26[facility]2022 11[facility]   1/28/2022 11:20 AM CODY Gerber [address]  AMB   2/14/2022  9:00 AM REMOTE_[facility]AM [facility] BS AMB   5/16/2022  8:45 AM REMOTE[facility] BS AMB   8/31/2022  2:30 PM PACEMAKER, RCAM RCAMB  AMB   8/31/2022  2:40 PM Kristen Sheth, ANP [facility] BS AMB       Requested Prescriptions     Pending Prescriptions Disp Refills    HYDROcodone-acetaminophen (NORCO) 5-325 mg per tablet 30 Tablet 0     Sig: Take 1 Tablet by mouth every eight (8) hours as needed for Pain for up to 30 days. Max Daily Amount: 3 Tablets.

## 2021-10-26 ENCOUNTER — VIRTUAL VISIT (OUTPATIENT)
Dept: INTERNAL MEDICINE CLINIC | Age: 71
End: 2021-10-26
Payer: MEDICARE

## 2021-10-26 DIAGNOSIS — G44.209 TENSION HEADACHE: ICD-10-CM

## 2021-10-26 DIAGNOSIS — R53.83 FATIGUE, UNSPECIFIED TYPE: Primary | ICD-10-CM

## 2021-10-26 DIAGNOSIS — R10.31 RIGHT GROIN PAIN: ICD-10-CM

## 2021-10-26 DIAGNOSIS — R11.0 NAUSEA: ICD-10-CM

## 2021-10-26 PROCEDURE — G9717 DOC PT DX DEP/BP F/U NT REQ: HCPCS | Performed by: INTERNAL MEDICINE

## 2021-10-26 PROCEDURE — 99213 OFFICE O/P EST LOW 20 MIN: CPT | Performed by: INTERNAL MEDICINE

## 2021-10-26 PROCEDURE — G8427 DOCREV CUR MEDS BY ELIG CLIN: HCPCS | Performed by: INTERNAL MEDICINE

## 2021-10-26 PROCEDURE — 1090F PRES/ABSN URINE INCON ASSESS: CPT | Performed by: INTERNAL MEDICINE

## 2021-10-26 PROCEDURE — 3017F COLORECTAL CA SCREEN DOC REV: CPT | Performed by: INTERNAL MEDICINE

## 2021-10-26 PROCEDURE — G8400 PT W/DXA NO RESULTS DOC: HCPCS | Performed by: INTERNAL MEDICINE

## 2021-10-26 PROCEDURE — G8756 NO BP MEASURE DOC: HCPCS | Performed by: INTERNAL MEDICINE

## 2021-10-26 PROCEDURE — 1101F PT FALLS ASSESS-DOCD LE1/YR: CPT | Performed by: INTERNAL MEDICINE

## 2021-10-26 PROCEDURE — G0463 HOSPITAL OUTPT CLINIC VISIT: HCPCS | Performed by: INTERNAL MEDICINE

## 2021-10-26 RX ORDER — ESOMEPRAZOLE MAGNESIUM 40 MG/1
40 CAPSULE, DELAYED RELEASE ORAL DAILY
COMMUNITY
Start: 2021-10-06

## 2021-10-26 NOTE — PATIENT INSTRUCTIONS
This is an established visit conducted via telemedicine. The patient has been instructed that this meets HIPAA criteria and acknowledges and agrees to this method of visitation.     Alyssa Reid [place]  38/[birth year]34  57:67 AM

## 2021-10-26 NOTE — PROGRESS NOTES
HISTORY OF PRESENT ILLNESS  Nikki Morales is a 66 y.o. female. HPI     Nikki Morales, was evaluated through a synchronous (real-time) audio-video encounter. The patient (or guardian if applicable) is aware that this is a billable service. Verbal consent to proceed has been obtained within the past 12 months. The visit was conducted pursuant to the emergency declaration under the [address] [place] [address] and the [employer] and [employer] General Act. Patient identification was verified, and a caregiver was present when appropriate. The patient was located in a state where the provider was credentialed to provide care. --Perlita Stevenson MD on 10/26/2021 at 10:40 AM    An electronic signature was used to authenticate this note.   PCP Dr Lexa Angelo  Hx HTN sss s/p PMPAF s/p ablation ASHELY hypothyroid HLD Obesity GERD Migraines Fibromyagia    Has fu Dr Lexa Angelo 11/5/21  C/o low energy x 1 week, constant heaedaches not typical of migraines last few days, intermittent loose stools not typical of her IBS, some nausea without emesis, pain in right groin -sharp last 2-3 days  No f/c cp sob or any urinary symptoms      Patient Active Problem List    Diagnosis Date Noted    Venous insufficiency of both lower extremities 02/09/2021    Varicose veins with swelling 02/09/2021    Essential hypertension 91/44/9650    Umbilical hernia without obstruction and without gangrene 06/22/2020    Osteoarthritis of right shoulder 06/06/2018    Incisional hernia, without obstruction or gangrene 10/20/2017    Obesity, Class II, BMI 35-39.9 10/20/2017    Fibromyalgia 03/29/2017    Epigastric pain 06/05/2015    Vitamin D deficiency 03/31/2015    A-fib ([address] 01/28/2015    Esophageal spasm 12/16/2014    S/P cardiac pacemaker procedure 09/16/2014    SSS (sick sinus syndrome) ([address] 09/02/2014    S/P ablation of atrial fibrillation 05/13/2014    Morbid obesity with BMI of 40.0-44.9, adult ([facility] 03/14/2014    Diarrhea 07/19/2013    GERD (gastroesophageal reflux disease) 04/13/2012    Colon polyps 03/31/2010    Urge incontinence 03/31/2010    Migraine 03/31/2010    Bipolar affective disorder ([facility] 03/31/2010    Hypothyroidism 03/31/2010    Hyperglycemia 03/31/2010    Hyperlipidemia 03/31/2010     Current Outpatient Medications   Medication Sig Dispense Refill    esomeprazole (NEXIUM) 40 mg capsule       pregabalin (LYRICA) 100 mg capsule Take 2 Capsules by mouth two (2) times a day for 30 days. Max Daily Amount: 400 mg. 120 Capsule 5    levothyroxine (SYNTHROID) 50 mcg tablet TAKE 1 TABLET BY MOUTH EVERY DAY 90 Tablet 3    apixaban (ELIQUIS) 5 mg tablet Take 1 Tablet by mouth two (2) times a day. 56 Tablet 0    ergocalciferol (ERGOCALCIFEROL) 1,250 mcg (50,000 unit) capsule TAKE 1 CAPSULE WEEKLY 12 Capsule 1    propranolol LA (INDERAL LA) 60 mg SR capsule TAKE 1 CAPSULE BY MOUTH EVERY DAY 90 Capsule 1    lithium carbonate SR (LITHOBID) 300 mg CR tablet Take 300 mg by mouth three (3) times daily.  cyanocobalamin (Vitamin B-12) 100 mcg tablet Take 100 mcg by mouth daily.  hydrOXYchloroQUINE (PLAQUENIL) 200 mg tablet Take 200 mg by mouth two (2) times a day.  latanoprost (XALATAN) 0.005 % ophthalmic solution Administer 1 Drop to both eyes two (2) times a day.  zolpidem (AMBIEN) 10 mg tablet Take 10 mg by mouth nightly as needed. Rarely      topiramate (Topamax) 100 mg tablet Take 1 Tab by mouth two (2) times a day. (Patient taking differently: Take 50 mg by mouth daily.) 60 Tab 3    alprazolam (XANAX) 0.5 mg tablet Take 0.5 mg by mouth nightly.  HYDROcodone-acetaminophen (NORCO) 5-325 mg per tablet Take 1 Tablet by mouth every eight (8) hours as needed for Pain for up to 30 days. Max Daily Amount: 3 Tablets. 30 Tablet 0    trimethoprim (TRIMPEX) 100 mg tablet Take 100 mg by mouth daily.  (Patient not taking: Reported on 10/26/2021)  hydrOXYzine HCL (ATARAX) 25 mg tablet Take 25 mg by mouth three (3) times daily. (Patient not taking: Reported on 9/16/2021)      omeprazole (PRILOSEC) 40 mg capsule TAKE 1 CAPSULE BY MOUTH EVERY DAY IN THE MORNING (Patient not taking: Reported on 8/11/2021)      timolol (TIMOPTIC) 0.5 % ophthalmic solution INSTILL 1 DROP 1 TIME EVERY DAY INTO BOTH EYES EVERY MORNING (Patient not taking: Reported on 9/16/2021)      tiZANidine (ZANAFLEX) 2 mg tablet Take 1 Tab by mouth three (3) times daily.  (Patient not taking: Reported on 9/16/2021) 90 Tab 5     Allergies   Allergen Reactions    Latex Swelling     Swelling of lips says patient    Beef Containing Products Nausea and Vomiting     diarrhea    Ciprofloxacin Unknown (comments)    Pork Derived (Porcine) Nausea and Vomiting     diarrhea    Bovine Cartilage Nausea and Vomiting     diarrhea    Milk Nausea and Vomiting     diarrhea  diarrhea    Sulfa (Sulfonamide Antibiotics) Hives    Xarelto [Rivaroxaban] Other (comments)     GI problems      Lab Results   Component Value Date/Time    WBC 4.8 02/17/2021 10:38 AM    HGB 13.8 02/17/2021 10:38 AM    HCT 41.4 02/17/2021 10:38 AM    PLATELET 276 84/65/8913 10:38 AM    MCV 90 02/17/2021 10:38 AM     Lab Results   Component Value Date/Time    Hemoglobin A1c 5.3 02/17/2021 10:38 AM    Hemoglobin A1c 5.3 10/08/2020 11:51 AM    Hemoglobin A1c 5.5 11/06/2018 08:19 AM    Glucose 91 02/17/2021 10:38 AM    Glucose (POC) 97 01/28/2015 06:22 AM    LDL,Direct 124 (H) 03/30/2010 08:40 AM    LDL, calculated 152 (H) 02/17/2021 10:38 AM    LDL, calculated 125 (H) 11/06/2018 08:19 AM    Creatinine 0.98 02/17/2021 10:38 AM      Lab Results   Component Value Date/Time    GFR est non-AA 58 (L) 02/17/2021 10:38 AM    GFR est AA 67 02/17/2021 10:38 AM    Creatinine 0.98 02/17/2021 10:38 AM    BUN 16 02/17/2021 10:38 AM    Sodium 141 02/17/2021 10:38 AM    Potassium 5.0 02/17/2021 10:38 AM    Chloride 107 (H) 02/17/2021 10:38 AM    CO2 26 02/17/2021 10:38 AM    Magnesium 2.1 11/04/2020 07:48 PM    Phosphorus 3.0 01/29/2015 05:15 AM     Lab Results   Component Value Date/Time    TSH 2.670 02/17/2021 10:38 AM    T4, Free 0.83 02/17/2021 10:38 AM         ROS    Physical Exam  Constitutional:       Appearance: Normal appearance. She is obese. Pulmonary:      Effort: Pulmonary effort is normal.   Neurological:      Mental Status: She is alert. ASSESSMENT and PLAN  Diagnoses and all orders for this visit:    1. Fatigue, unspecified type   Recommended temp check and vitals   Labs ordered but pt prefers to see Dr Shannan Carrillo - has appt next week   Can to to [facility] or ER if feeling worse -dicussed  2. Tension headache   Tylenol prn  3. Nausea   Declines medication rx   Will stay hydrated  4.  Right groin pain   Check labs and UA    Will fu PCP next week--Dr Shannan Carrillo

## 2021-10-29 ENCOUNTER — HOSPITAL ENCOUNTER (OUTPATIENT)
Dept: MRI IMAGING | Age: 71
Discharge: HOME | End: 2021-10-29
Attending: PSYCHIATRY & NEUROLOGY
Payer: MEDICARE

## 2021-10-29 DIAGNOSIS — R42 DIZZINESS: ICD-10-CM

## 2021-10-29 PROCEDURE — 70551 MRI BRAIN STEM W/O DYE: CPT

## 2021-11-02 ENCOUNTER — TELEPHONE (OUTPATIENT)
Dept: NEUROLOGY | Age: 71
End: 2021-11-02

## 2021-11-02 NOTE — TELEPHONE ENCOUNTER
----- Message from Angela Galicia sent at 11/2/2021  9:40 AM EDT -----  Regarding: FW: /telephone  Contact: [phone]    ----- Message -----  From: Nuvia Lopez  Sent: 11/2/2021   9:26 AM EDT  To: [facility] Front Office  Subject: /telephone                               General Message/Vendor Calls    Caller's first and last name: n/a      Reason for call: Needs results from MRI from 10/29/2021      Callback required yes/no and why: Yes with results      Best contact number(s): [phone]      Details to clarify the request: n/a      Nica Vences

## 2021-11-15 ENCOUNTER — OFFICE VISIT (OUTPATIENT)
Dept: CARDIOLOGY CLINIC | Age: 71
End: 2021-11-15
Payer: MEDICARE

## 2021-11-15 DIAGNOSIS — I49.5 SSS (SICK SINUS SYNDROME) (HCC): ICD-10-CM

## 2021-11-15 DIAGNOSIS — I48.0 PAF (PAROXYSMAL ATRIAL FIBRILLATION) (HCC): ICD-10-CM

## 2021-11-15 DIAGNOSIS — Z95.0 CARDIAC PACEMAKER IN SITU: Primary | ICD-10-CM

## 2021-11-15 PROCEDURE — 93296 REM INTERROG EVL PM/IDS: CPT | Performed by: INTERNAL MEDICINE

## 2021-11-15 PROCEDURE — 93294 REM INTERROG EVL PM/LDLS PM: CPT | Performed by: INTERNAL MEDICINE

## 2021-11-17 ENCOUNTER — TELEPHONE (OUTPATIENT)
Dept: NEUROLOGY | Age: 71
End: 2021-11-17

## 2021-11-17 NOTE — TELEPHONE ENCOUNTER
----- Message from Apalya sent at 11/17/2021  9:27 AM EST -----  Regarding: Dr. Wero Duvall Message/Vendor Calls    Caller's first and last name: self       Reason for call: Patient needs to speak to Dr. Garett Small about what he had lined up for her. Callback required yes/no and why: Yes please have the nurse call. Best contact number(s): [phone]      Details to clarify the request: The patient needs to speak to Dr. Garett Small about what his plans were for her.  (Doctors and MRI)      [facility]

## 2021-11-18 NOTE — TELEPHONE ENCOUNTER
Called and spoke with patient. Verified name/ of patient. Patient stated she had MRI done and she would like to have more detail of her results as she was given a strange answer she stated. Also stated Dr. Alexandra Hernandez is sending her to two specialist. Stated those reports can be faxed back to our office for when she follows up with the our NP in march. Provided patient with office fax number. Notified patient to give the office a call if she has any questions or concerns. Patient verbalized understanding.

## 2021-12-07 DIAGNOSIS — R52 PAIN: Primary | ICD-10-CM

## 2021-12-07 RX ORDER — HYDROCODONE BITARTRATE AND ACETAMINOPHEN 5; 325 MG/1; MG/1
1 TABLET ORAL
Qty: 30 TABLET | Refills: 0 | Status: SHIPPED | OUTPATIENT
Start: 2021-12-07 | End: 2021-12-10

## 2021-12-07 NOTE — TELEPHONE ENCOUNTER
Future Appointments:  Future Appointments   Date Time Provider Radha Yoon   12/10/2021  3:45 PM Jalyn Leon MD [facility] BS AMB   1/28/2022 11:20 AM CODY Oliveira BS AMB   2/14/2022  9:00 AM REMOTE_RCA [facility] BS AMB   3/7/2022  2:00 PM Rubén Chapa, CODY HUMPHREYS BS AMB   5/16/2022  8:45 AM REMOTE[facility]CA [facility] BS AMB   8/31/2022  2:30 PM PACEMAKER, RCAM [facility] BS AMB   8/31/2022  2:40 PM Kristen Sheth, ANP [facility] BS AMB        Last Appointment With Me:  Visit date not found     Requested Prescriptions     Pending Prescriptions Disp Refills    HYDROcodone-acetaminophen (NORCO) 5-325 mg per tablet 30 Tablet 0     Sig: Take 1 Tablet by mouth every eight (8) hours as needed for Pain for up to 3 days. Max Daily Amount: 3 Tablets.

## 2021-12-07 NOTE — TELEPHONE ENCOUNTER
----- Message from Susan Flores sent at 12/7/2021  9:48 AM EST -----  Subject: Refill Request    QUESTIONS  Name of Medication? HYDROcodone-acetaminophen (NORCO) 5-325 mg per tablet  Patient-reported dosage and instructions? as needed   How many days do you have left? 2  [id number]  Pharmacy phone number (if available)? [phone]  ---------------------------------------------------------------------------  --------------  CALL BACK INFO  What is the best way for the office to contact you? OK to leave message on   voicemail  Preferred Call Back Phone Number?  [phone]

## 2021-12-10 ENCOUNTER — VIRTUAL VISIT (OUTPATIENT)
Dept: INTERNAL MEDICINE CLINIC | Age: 71
End: 2021-12-10
Payer: MEDICARE

## 2021-12-10 DIAGNOSIS — E03.9 HYPOTHYROIDISM, UNSPECIFIED TYPE: ICD-10-CM

## 2021-12-10 DIAGNOSIS — R41.3 MEMORY IMPAIRMENT: ICD-10-CM

## 2021-12-10 DIAGNOSIS — R73.9 HYPERGLYCEMIA: ICD-10-CM

## 2021-12-10 DIAGNOSIS — E55.9 VITAMIN D DEFICIENCY: ICD-10-CM

## 2021-12-10 DIAGNOSIS — E78.2 MIXED HYPERLIPIDEMIA: ICD-10-CM

## 2021-12-10 DIAGNOSIS — R52 PAIN: Primary | ICD-10-CM

## 2021-12-10 DIAGNOSIS — E53.8 B12 DEFICIENCY: ICD-10-CM

## 2021-12-10 DIAGNOSIS — I10 BENIGN ESSENTIAL HYPERTENSION: ICD-10-CM

## 2021-12-10 DIAGNOSIS — G44.209 TENSION HEADACHE: ICD-10-CM

## 2021-12-10 PROCEDURE — 1101F PT FALLS ASSESS-DOCD LE1/YR: CPT | Performed by: INTERNAL MEDICINE

## 2021-12-10 PROCEDURE — G9717 DOC PT DX DEP/BP F/U NT REQ: HCPCS | Performed by: INTERNAL MEDICINE

## 2021-12-10 PROCEDURE — G8536 NO DOC ELDER MAL SCRN: HCPCS | Performed by: INTERNAL MEDICINE

## 2021-12-10 PROCEDURE — 3017F COLORECTAL CA SCREEN DOC REV: CPT | Performed by: INTERNAL MEDICINE

## 2021-12-10 PROCEDURE — 99214 OFFICE O/P EST MOD 30 MIN: CPT | Performed by: INTERNAL MEDICINE

## 2021-12-10 PROCEDURE — 1090F PRES/ABSN URINE INCON ASSESS: CPT | Performed by: INTERNAL MEDICINE

## 2021-12-10 PROCEDURE — G8427 DOCREV CUR MEDS BY ELIG CLIN: HCPCS | Performed by: INTERNAL MEDICINE

## 2021-12-10 PROCEDURE — G0463 HOSPITAL OUTPT CLINIC VISIT: HCPCS | Performed by: INTERNAL MEDICINE

## 2021-12-10 PROCEDURE — G8756 NO BP MEASURE DOC: HCPCS | Performed by: INTERNAL MEDICINE

## 2021-12-10 PROCEDURE — G8417 CALC BMI ABV UP PARAM F/U: HCPCS | Performed by: INTERNAL MEDICINE

## 2021-12-10 PROCEDURE — G8400 PT W/DXA NO RESULTS DOC: HCPCS | Performed by: INTERNAL MEDICINE

## 2021-12-10 RX ORDER — PREGABALIN 100 MG/1
CAPSULE ORAL 2 TIMES DAILY
COMMUNITY
End: 2021-12-10 | Stop reason: SDUPTHER

## 2021-12-10 RX ORDER — PREGABALIN 100 MG/1
100 CAPSULE ORAL 2 TIMES DAILY
Qty: 60 CAPSULE | Refills: 5 | Status: SHIPPED | OUTPATIENT
Start: 2021-12-10 | End: 2022-01-05 | Stop reason: SDUPTHER

## 2021-12-10 NOTE — PROGRESS NOTES
Dean Young is a 66 y.o. female  Chief Complaint   Patient presents with    Follow-up    Medication Refill     Health Maintenance Due   Topic Date Due    DTaP/Tdap/Td series (1 - Tdap) Never done    Shingrix Vaccine Age 50> (1 of 2) Never done    Bone Densitometry (Dexa) Screening  Never done    Breast Cancer Screen Mammogram  06/01/2019    Medicare Yearly Exam  09/23/2020    Flu Vaccine (1) 09/01/2021    COVID-19 Vaccine (3 - Booster for Moderna series) 10/08/2021     There were no vitals taken for this visit. No flowsheet data found.     Patient Phone Number/Email:  [phone]

## 2021-12-10 NOTE — PROGRESS NOTES
Rachel Baker is a 66 y.o. female who was seen by synchronous (real-time) audio-video technology on 12/10/2021 for Follow-up and Medication Refill        Progress Note         PROGRESS NOTE  Name: Rachel Baker   : 1950       ASSESSMENT/ PLAN:     1. Hypothyroidism: Clinically euthyroid. Continue synthroid. Recheck labs. 2. Atrial Fibrillation: F/U as per Dr. Perlita Sandoval.  On coumadin. 3. HLP:  Continue OTC fish oil. Also encouraged lifestyle efforts. 4. Migraines: She has been seeing Dr. Silvio Zacarias. Has prn triptan, and topamax, and prn oxycodone. 5. BPAD/Anxiety/Stress. Continue psychiatry follow up. Sebastian Sanchez, psychology. 6. Urge incontinence: Ongoing. Continue f/u with Dr. Oni Luna. 7. Hand pain: F/U as per Dr. Ximena Mcginnis. 8. Epigastric burning / GERD: On Prilosec OTC. Follows with Dr. Gricelda Flores. 9. Hyperglycemia: A1C normal now. She has in the past met criteria for DM (fasting glc > 125 on more than 1 occasion). For now, work on diet and exercise. 10. Edema: On daily lasix. 11. Memory disturbance: Refer to psych, Dr. Sara Carvalho. Follow up in 4 - 6 months    I have reviewed the patient's medications and risks/side effects/benefits were discussed. Diagnosis(-es) explained to patient and questions answered. Literature provided where appropriate. SUBJECTIVE:   Ms. Rachel Baker is a 66 y.o. female who is here for follow up of routine medical issues. Chief Complaint   Patient presents with    Follow-up    Medication Refill       For the last 6-8 months, \"my memory has gotten pretty bad. \" She has trouble keeping appointments, even with a calendar. She will ask the same question 5 times. \"My therapist thinks the migraines are related to stress. \" Sebastian Sanchez. She also sees Dr. Betsy Mckeon, psychiatrist, and is on lithium. Migraines: Sometimes has aura without headache. Had been having a frequent unilateral HA, associated with photophobia and phonophobia.   Tried botox injections in scalp, for migraines--\"it didn't work. \" She had been treated by Dr. Cameron Padilla, neurologist, with meds including topamax and hydrocodone. Just about the only thing that has worked for her for abortive therapy is hydrocodone. Dr. Anshul Alfaro informed her that he can't prescribe narcotics anymore, and to get them from PCP: \"and he explained what's going on with the government and everything. \"      She is getting PT, due to some weakness. She hasn't yet been to allergist about the possible meat allergy. For the foot pain, this is ongoing. Dr. Babita Gaytan currently. \"I've seen four foot doctors and none of them recommended surgery. So far, nothing worked. I hobble around. \" We note from 2018: Dr. Army Lucas is seeing her for her foot pain. Her L foot is in a boot, and \"I have several torn tendons. \"      A fib:  She has had two EP studies in 2014, and ablation for a fib, with Dr. Ethan Winkler. She also sees Dr. Jesus Rodriguez. She sees Dr. Jacinta Snow for GI issues/GERD. On PPI. Blood testing has revealed allergies to pork, beef, egg white, milk. She had been referred by Nicolas Aceves in Dr. Lauren Martinez office, to go see an allergist.    Weight: 240    Insomnia is a little better. ASHELY no longer on CPAP. Has trouble tolerating the mask--takes it off early in the morning. She no longer gets botox injections into bladder--This was working; plans to go back in Jan.  She is not having much urinary incontinence. Dr. Pratima Tompkins sees her. At this time, she is otherwise doing well and has brought no other complaints to my attention today. For a list of the medical issues addressed today, see the assessment and plan below.     PMH:   Past Medical History:   Diagnosis Date    Arrhythmia 2014    atrial fibrillation 2014, sick sinus syndrome: [facility]    Arthritis     Rheumatoid    B[facility] affect, depressed [facility]     Bipolar affective disorder ([facility] 3/31/2010    Chronic pain 2018    right shoulder    Colon polyps 03/31/2010    also 5/2018: colon polyps    Depression     Diarrhea 07/19/2013    D/t alpha gal allergy says patient    Epigastric pain 6/5/2015    GERD (gastroesophageal reflux disease)     occasiional only; controlled with med    Hemispheric carotid artery syndrome No stroke    Neuro: Claus Bobo  (CT scan head/neck negative 4/2018 in [facility])    Hyperlipidemia 3/31/2010    Hypothyroidism 3/31/2010    Long term current use of anticoagulant therapy     Lupus ([facility]     patient denies-says she has RA    Migraine 03/31/2010    has them x2 a month: last one 5/23/2018    Psychotic disorder ([facility]     S/P ablation of atrial fibrillation 05/13/2014    S/P cardiac pacemaker procedure 9/16/2014 9/16/14 Medtronic MRI compatible dual chamber pacemaker implant    Sleep apnea     unable to tolerate CPAP    Stool color black     Thyroid disease     Tick-borne disease     Alpha Gal allergy: no eat pork, beef, milk (Meat allergy showed up on a allergy test)    Umbilical hernia without obstruction and without gangrene 6/22/2020    Urge incontinence 3/31/2010    Vitamin D deficiency 3/31/2015       Past Surgical History:   Procedure Laterality Date    COLONOSCOPY N/A 4/27/2018    COLONOSCOPY performed [facility] at [facility]    COLONOSCOPY N/A 11/25/2020    COLONOSCOPY, EGD performed [facility]into [facility]  4/27/2018         COLONOSCOPY,RENALDO Christie  4/27/2018         HX BUNIONECTOMY      HX GI  2010, 5/2018    egd, colonoscopy    HX HERNIA REPAIR  01/11/2021    HX HERNIA REPAIR  01/2021    HX HERNIA REPAIR  28/4078    4 umbillical     HX HYSTERECTOMY      HX ORTHOPAEDIC  2000's    removed bone spurs from R & L hand    HX ORTHOPAEDIC  1990s?     left bunionectomy     HX OTHER SURGICAL  07/2014    2 shocks to heart & ablations    HX OTHER SURGICAL  01/11/2021    incarcerated incisional hernia surgery    HX PACEMAKER  2014    On the right side    HX PACEMAKER PLACEMENT  2015    HX SHOULDER REPLACEMENT Right 2017    HX TONSILLECTOMY  15 yrs old    AL CARDIAC SURG PROCEDURE UNLIST  5/2014    ablation       All: She is allergic to latex, beef containing products, ciprofloxacin, pork derived (porcine), bovine cartilage, milk, sulfa (sulfonamide antibiotics), and xarelto [rivaroxaban]. Meds:    Current Outpatient Medications on File Prior to Visit   Medication Sig Dispense Refill    HYDROcodone-acetaminophen (NORCO) 5-325 mg per tablet Take 1 Tablet by mouth every eight (8) hours as needed for Pain for up to 3 days. Max Daily Amount: 3 Tablets. 30 Tablet 0    esomeprazole (NEXIUM) 40 mg capsule       levothyroxine (SYNTHROID) 50 mcg tablet TAKE 1 TABLET BY MOUTH EVERY DAY 90 Tablet 3    trimethoprim (TRIMPEX) 100 mg tablet Take 100 mg by mouth daily.  apixaban (ELIQUIS) 5 mg tablet Take 1 Tablet by mouth two (2) times a day. 56 Tablet 0    ergocalciferol (ERGOCALCIFEROL) 1,250 mcg (50,000 unit) capsule TAKE 1 CAPSULE WEEKLY 12 Capsule 1    propranolol LA (INDERAL LA) 60 mg SR capsule TAKE 1 CAPSULE BY MOUTH EVERY DAY 90 Capsule 1    lithium carbonate SR (LITHOBID) 300 mg CR tablet Take 300 mg by mouth three (3) times daily.  cyanocobalamin (Vitamin B-12) 100 mcg tablet Take 100 mcg by mouth daily.  omeprazole (PRILOSEC) 40 mg capsule TAKE 1 CAPSULE BY MOUTH EVERY DAY IN THE MORNING      timolol (TIMOPTIC) 0.5 % ophthalmic solution INSTILL 1 DROP 1 TIME EVERY DAY INTO BOTH EYES EVERY MORNING      hydrOXYchloroQUINE (PLAQUENIL) 200 mg tablet Take 200 mg by mouth two (2) times a day.  latanoprost (XALATAN) 0.005 % ophthalmic solution Administer 1 Drop to both eyes two (2) times a day.  topiramate (Topamax) 100 mg tablet Take 1 Tab by mouth two (2) times a day. 60 Tab 3    alprazolam (XANAX) 0.5 mg tablet Take 0.5 mg by mouth nightly.       hydrOXYzine HCL (ATARAX) 25 mg tablet Take 25 mg by mouth three (3) times daily. (Patient not taking: Reported on 9/16/2021)      zolpidem (AMBIEN) 10 mg tablet Take 10 mg by mouth nightly as needed. Rarely (Patient not taking: Reported on 12/10/2021)      tiZANidine (ZANAFLEX) 2 mg tablet Take 1 Tab by mouth three (3) times daily. (Patient not taking: Reported on 9/16/2021) 90 Tab 5     No current facility-administered medications on file prior to visit. 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 FH:  Father, colon cancer. Mother, stroke. Grandmother, DM.  ROS: See above; Complete ROS otherwise negative. OBJECTIVE:   Vitals: There were no vitals taken for this visit. Gen: Pleasant 66 y.o.  female in NAD. Lab Results   Component Value Date/Time    Sodium 141 02/17/2021 10:38 AM    Potassium 5.0 02/17/2021 10:38 AM    Chloride 107 (H) 02/17/2021 10:38 AM    CO2 26 02/17/2021 10:38 AM    Anion gap 3 (L) 11/04/2020 07:48 PM    Glucose 91 02/17/2021 10:38 AM    BUN 16 02/17/2021 10:38 AM    Creatinine 0.98 02/17/2021 10:38 AM    BUN/Creatinine ratio 16 02/17/2021 10:38 AM    GFR est AA 67 02/17/2021 10:38 AM    GFR est non-AA 58 (L) 02/17/2021 10:38 AM    Calcium 10.2 02/17/2021 10:38 AM    Bilirubin, total 0.3 02/17/2021 10:38 AM    ALT (SGPT) 15 02/17/2021 10:38 AM    Alk.  phosphatase 69 02/17/2021 10:38 AM    Protein, total 6.4 02/17/2021 10:38 AM    Albumin 4.1 02/17/2021 10:38 AM    Globulin 3.1 11/04/2020 07:48 PM    A-G Ratio 1.8 02/17/2021 10:38 AM       Lab Results   Component Value Date/Time    Cholesterol, total 248 (H) 02/17/2021 10:38 AM    HDL Cholesterol 48 02/17/2021 10:38 AM    LDL,Direct 124 (H) 03/30/2010 08:40 AM    LDL, calculated 152 (H) 02/17/2021 10:38 AM    LDL, calculated 125 (H) 11/06/2018 08:19 AM    Triglyceride 260 (H) 02/17/2021 10:38 AM    CHOL/HDL Ratio 5.3 (H) 03/30/2010 08:40 AM        Lab Results   Component Value Date/Time    Hemoglobin A1c 5.3 02/17/2021 10:38 AM       Lab Results   Component Value Date/Time    WBC 4.8 02/17/2021 10:38 AM    HGB 13.8 02/17/2021 10:38 AM    HCT 41.4 02/17/2021 10:38 AM    PLATELET 282 90/70/5359 10:38 AM    MCV 90 02/17/2021 10:38 AM           Objective:   No flowsheet data found. [INSTRUCTIONS:  \"[x]\" Indicates a positive item  \"[]\" Indicates a negative item  -- DELETE ALL ITEMS NOT EXAMINED]    Constitutional: [x] Appears well-developed and well-nourished [x] No apparent distress      [] Abnormal -     Mental status: [x] Alert and awake  [x] Oriented to person/place/time [x] Able to follow commands    [] Abnormal -     Eyes:   EOM    [x]  Normal    [] Abnormal -   Sclera  [x]  Normal    [] Abnormal -          Discharge [x]  None visible   [] Abnormal -     HENT: [x] Normocephalic, atraumatic  [] Abnormal -   [x] Mouth/Throat: Mucous membranes are moist    External Ears [x] Normal  [] Abnormal -    Neck: [x] No visualized mass [] Abnormal -     Pulmonary/Chest: [x] Respiratory effort normal   [x] No visualized signs of difficulty breathing or respiratory distress        [] Abnormal -      Musculoskeletal:   [x] Normal gait with no signs of ataxia         [x] Normal range of motion of neck        [] Abnormal -     Neurological:        [x] No Facial Asymmetry (Cranial nerve 7 motor function) (limited exam due to video visit)          [x] No gaze palsy        [] Abnormal -          Skin:        [x] No significant exanthematous lesions or discoloration noted on facial skin         [] Abnormal -            Psychiatric:       [x] Normal Affect [] Abnormal -       Other pertinent observable physical exam findings:-        We discussed the expected course, resolution and complications of the diagnosis(es) in detail. Medication risks, benefits, costs, interactions, and alternatives were discussed as indicated. I advised her to contact the office if her condition worsens, changes or fails to improve as anticipated.  She expressed understanding with the diagnosis(es) and plan. Carle Schwab, who was evaluated through a patient-initiated, synchronous (real-time) audio-video encounter, and/or her healthcare decision maker, is aware that it is a billable service, with coverage as determined by her insurance carrier. She provided verbal consent to proceed: YES, and patient identification was verified. It was conducted pursuant to the emergency declaration under the [address] [place] [address] and the [employer] General Act. A caregiver was present when appropriate. Ability to conduct physical exam was limited. I was in office. The patient was at home or otherwise outside the office.       Ron Simon MD

## 2021-12-15 ENCOUNTER — TELEPHONE (OUTPATIENT)
Dept: INTERNAL MEDICINE CLINIC | Age: 71
End: 2021-12-15

## 2021-12-15 DIAGNOSIS — R41.3 MEMORY DISTURBANCE: Primary | ICD-10-CM

## 2021-12-15 NOTE — TELEPHONE ENCOUNTER
----- Message from Carlie Ramos sent at 12/15/2021 10:28 AM EST -----  Subject: Referral Request    QUESTIONS   Reason for referral request? neurologist   Has the physician seen you for this condition before? No   Preferred Specialist (if applicable)? Do you already have an appointment scheduled? No  Additional Information for Provider? Patient would like a referral to a   neurologist  ---------------------------------------------------------------------------  --------------  [address]  What is the best way for the office to contact you? OK to leave message on   voicemail  Preferred Call Back Phone Number?  [phone]

## 2021-12-15 NOTE — TELEPHONE ENCOUNTER
Dr. Nati Morgan is psychologist, for neuropsych testing. Could try Dr. Pablito Lott, [facility] neurology.    BJF

## 2021-12-15 NOTE — TELEPHONE ENCOUNTER
----- Message from Lisbeth Cornejo sent at 12/15/2021 10:26 AM EST -----  Subject: Message to Provider    QUESTIONS  Information for Provider? Dr. Annia Collado referred patient to Dr. Trace Longoria. No   appts until July 21st. Patient would like to know if Dr. Annia Collado can call   Dr. Melida Camacho office so she can get in sooner. Or refer her to someone else. Please call patient back and advise.   ---------------------------------------------------------------------------  --------------  CALL BACK INFO  What is the best way for the office to contact you? OK to leave message on   voicemail  Preferred Call Back Phone Number? [phone]  ---------------------------------------------------------------------------  --------------  SCRIPT ANSWERS  Relationship to Patient?  Self

## 2021-12-23 RX ORDER — PROPRANOLOL HYDROCHLORIDE 60 MG/1
CAPSULE, EXTENDED RELEASE ORAL
Qty: 90 CAPSULE | Refills: 3 | Status: SHIPPED | OUTPATIENT
Start: 2021-12-23 | End: 2022-01-26 | Stop reason: DRUGHIGH

## 2022-01-05 DIAGNOSIS — R52 PAIN: ICD-10-CM

## 2022-01-05 RX ORDER — PREGABALIN 100 MG/1
100 CAPSULE ORAL 2 TIMES DAILY
Qty: 60 CAPSULE | Refills: 5 | Status: SHIPPED | OUTPATIENT
Start: 2022-01-05 | End: 2022-07-20

## 2022-01-05 NOTE — TELEPHONE ENCOUNTER
----- Message from [employer] sent at 1/5/2022 10:50 AM EST -----  Subject: Refill Request    QUESTIONS  Name of Medication? pregabalin (LYRICA) 100 mg capsule  Patient-reported dosage and instructions? 1 capsule by 2 x a day  How many days do you have left? 7  [id number]  Pharmacy phone number (if available)? [phone]  ---------------------------------------------------------------------------  --------------  CALL BACK INFO  What is the best way for the office to contact you? OK to leave message on   voicemail  Preferred Call Back Phone Number?  [phone]

## 2022-01-05 NOTE — TELEPHONE ENCOUNTER
PCP: Syed Yousif MD    Last appt: 12/10/2021  Future Appointments   Date Time Provider Radha Yoon   1/28/2022 11:20 AM Marylou Vazquez NP [facility] BS AMB   2/14/2022  9:00 AM REMOTE_[facility]AM [facility] BS AMB   3/7/2022  2:00 PM Deep Chapa NP [facility] BS AMB   5/16/2022  8:45 AM REMOTE_RCAM [facility] BS AMB   8/31/2022  2:30 PM PACEMAKER, RCAM [facility] BS AMB   8/31/2022  2:40 PM Kristen Sheth, ANP [facility] BS AMB       Requested Prescriptions     Pending Prescriptions Disp Refills    pregabalin (Lyrica) 100 mg capsule 60 Capsule 5     Sig: Take 1 Capsule by mouth two (2) times a day.  Max Daily Amount: 200 mg.       Prior labs and Blood pressures:  BP Readings from Last 3 Encounters:   09/16/21 121/76   08/11/21 110/80   02/09/21 (!) 114/90     Lab Results   Component Value Date/Time    Sodium 141 02/17/2021 10:38 AM    Potassium 5.0 02/17/2021 10:38 AM    Chloride 107 (H) 02/17/2021 10:38 AM    CO2 26 02/17/2021 10:38 AM    Anion gap 3 (L) 11/04/2020 07:48 PM    Glucose 91 02/17/2021 10:38 AM    BUN 16 02/17/2021 10:38 AM    Creatinine 0.98 02/17/2021 10:38 AM    BUN/Creatinine ratio 16 02/17/2021 10:38 AM    GFR est AA 67 02/17/2021 10:38 AM    GFR est non-AA 58 (L) 02/17/2021 10:38 AM    Calcium 10.2 02/17/2021 10:38 AM     Lab Results   Component Value Date/Time    Hemoglobin A1c 5.3 02/17/2021 10:38 AM    Hemoglobin A1c (POC) 5.9 (A) 04/28/2014 04:04 PM     Lab Results   Component Value Date/Time    Cholesterol, total 248 (H) 02/17/2021 10:38 AM    HDL Cholesterol 48 02/17/2021 10:38 AM    LDL,Direct 124 (H) 03/30/2010 08:40 AM    LDL, calculated 152 (H) 02/17/2021 10:38 AM    LDL, calculated 125 (H) 11/06/2018 08:19 AM    VLDL, calculated 48 (H) 02/17/2021 10:38 AM    VLDL, calculated 42 (H) 11/06/2018 08:19 AM    Triglyceride 260 (H) 02/17/2021 10:38 AM    CHOL/HDL Ratio 5.3 (H) 03/30/2010 08:40 AM     Lab Results   Component Value Date/Time    VITAMIN D, 25-HYDROXY 34.1 02/17/2021 10:38 AM       Lab Results Component Value Date/Time    TSH 2.670 02/17/2021 10:38 AM

## 2022-01-14 ENCOUNTER — NURSE TRIAGE (OUTPATIENT)
Dept: OTHER | Facility: CLINIC | Age: 72
End: 2022-01-14

## 2022-01-14 NOTE — TELEPHONE ENCOUNTER
Received call from Dean at [facility] with Red Flag Complaint. Subjective: Caller states \"fatigue, back pain, ankles are hurting, diarrhea\" pt has arthritis     Current Symptoms: extreme fatigue    Onset: 5 days    Associated Symptoms: diarrhea    Pain Severity: 4/10; aching; constant    Temperature: no temp    What has been tried: steroids several weeks for her back pain, drinking water to stay hydrated    LMP: NA Pregnant: NA    Recommended disposition: to see PCP if no appts available advised pt to go to [facility] or ER. Care advice provided, patient verbalizes understanding; denies any other questions or concerns; instructed to call back for any new or worsening symptoms. Writer provided warm transfer to [place] at [facility] for appointment scheduling    Attention Provider: Thank you for allowing me to participate in the care of your patient. The patient was connected to triage in response to information provided to the ECC. Please do not respond through this encounter as the response is not directed to a shared pool.     Reason for Disposition   MODERATE weakness (i.e., interferes with work, school, normal activities) and cause unknown (Exceptions: weakness with acute minor illness, or weakness from poor fluid intake)    Protocols used: WEAKNESS (GENERALIZED) AND FATIGUE-ADULT-[place]

## 2022-01-25 DIAGNOSIS — E55.9 VITAMIN D DEFICIENCY: ICD-10-CM

## 2022-01-25 RX ORDER — ERGOCALCIFEROL 1.25 MG/1
CAPSULE ORAL
Qty: 12 CAPSULE | Refills: 1 | Status: SHIPPED | OUTPATIENT
Start: 2022-01-25 | End: 2022-04-27 | Stop reason: ALTCHOICE

## 2022-01-26 ENCOUNTER — TELEPHONE (OUTPATIENT)
Dept: NEUROLOGY | Age: 72
End: 2022-01-26

## 2022-01-26 DIAGNOSIS — G25.0 ESSENTIAL TREMOR: Primary | ICD-10-CM

## 2022-01-26 RX ORDER — PROPRANOLOL HYDROCHLORIDE 80 MG/1
80 CAPSULE, EXTENDED RELEASE ORAL DAILY
Qty: 30 CAPSULE | Refills: 2 | Status: SHIPPED | OUTPATIENT
Start: 2022-01-26 | End: 2022-02-28

## 2022-01-26 NOTE — TELEPHONE ENCOUNTER
Patient called in regards to her recent symptoms stated that she has gotten worse since her last visit with dr Ricardo Rather are bad and shes unable to perform simple tasks medication is no longer working [phone]

## 2022-01-26 NOTE — TELEPHONE ENCOUNTER
We will increase the propranolol to 80 mg/day    Please reinforce to the patient to make sure that all of her other medical conditions are under good control.   She has not gotten a lithium level checked she may want to have that checked and ask her prescriber to order that and also make sure that her thyroid is stable she may need to get blood work for that as well    Additionally remind her if she is stressed fatigued or anxious that will make the tremor worse      If she has an infection that can also number worse so she is showing any signs or symptoms of upper respiratory infection lower respiratory infection urinary tract infection she may want to go to urgent care or see her PCP

## 2022-01-26 NOTE — TELEPHONE ENCOUNTER
Called and spoke with patient. Verified name/. Notified patient of Janice's message. She stated she currently has COVID. Patient was going to have blood work done but then go sick. She is waiting until she is feeling better. Patient will get medication from pharmacy. Stated to give us a call if she needs anything further.

## 2022-01-27 ENCOUNTER — HOME CARE VISIT (OUTPATIENT)
Dept: HOME HEALTH SERVICES | Facility: HOME HEALTH | Age: 72
End: 2022-01-27

## 2022-01-28 ENCOUNTER — VIRTUAL VISIT (OUTPATIENT)
Dept: INTERNAL MEDICINE CLINIC | Age: 72
End: 2022-01-28
Payer: MEDICARE

## 2022-01-28 DIAGNOSIS — R19.7 DIARRHEA, UNSPECIFIED TYPE: ICD-10-CM

## 2022-01-28 DIAGNOSIS — N30.00 ACUTE CYSTITIS WITHOUT HEMATURIA: ICD-10-CM

## 2022-01-28 DIAGNOSIS — E66.01 SEVERE OBESITY (BMI 35.0-35.9 WITH COMORBIDITY) (HCC): ICD-10-CM

## 2022-01-28 DIAGNOSIS — U07.1 COVID-19: Primary | ICD-10-CM

## 2022-01-28 DIAGNOSIS — R21 RASH: ICD-10-CM

## 2022-01-28 PROCEDURE — G8400 PT W/DXA NO RESULTS DOC: HCPCS | Performed by: INTERNAL MEDICINE

## 2022-01-28 PROCEDURE — G9717 DOC PT DX DEP/BP F/U NT REQ: HCPCS | Performed by: INTERNAL MEDICINE

## 2022-01-28 PROCEDURE — 1101F PT FALLS ASSESS-DOCD LE1/YR: CPT | Performed by: INTERNAL MEDICINE

## 2022-01-28 PROCEDURE — 3017F COLORECTAL CA SCREEN DOC REV: CPT | Performed by: INTERNAL MEDICINE

## 2022-01-28 PROCEDURE — 99214 OFFICE O/P EST MOD 30 MIN: CPT | Performed by: INTERNAL MEDICINE

## 2022-01-28 PROCEDURE — G8756 NO BP MEASURE DOC: HCPCS | Performed by: INTERNAL MEDICINE

## 2022-01-28 PROCEDURE — G0463 HOSPITAL OUTPT CLINIC VISIT: HCPCS | Performed by: INTERNAL MEDICINE

## 2022-01-28 PROCEDURE — 1090F PRES/ABSN URINE INCON ASSESS: CPT | Performed by: INTERNAL MEDICINE

## 2022-01-28 PROCEDURE — G8427 DOCREV CUR MEDS BY ELIG CLIN: HCPCS | Performed by: INTERNAL MEDICINE

## 2022-01-28 RX ORDER — CEPHALEXIN 500 MG/1
500 CAPSULE ORAL 3 TIMES DAILY
Qty: 15 CAPSULE | Refills: 0 | Status: SHIPPED | OUTPATIENT
Start: 2022-01-28 | End: 2022-02-02

## 2022-01-28 NOTE — PROGRESS NOTES
HISTORY OF PRESENT ILLNESS  Jeane Green is a 67 y.o. female. HPI     Pt usually follows with Dr. Navi Bean last visit 12/10/21. Pt is here for acute care.    This is an established visit completed with telemedicine was completed with video assist  the patient acknowledges and agrees to this method of visitation micheleyme    She started feeling sick 2 weeks ago  This started with a cough  She c/o diarrhea, she reports feeling more tired and sluggish as the day goes on  She also c/o some congestion  Having no more than 4 BM per day, denies blood in stool  She notes a rash as well, was told by a friend that this is \"covid rash\"  This rash is itchy and red per pt  On exam: some swelling in legs, discussed bumps on legs likely due to this and dryness  Will order cxr to check for covid pneumonia and will order labs to check on kidney function    Discussed since she is living with someone who has covid and has the same sxs does not need to get covid test    She also c/o UTI sxs has had frequent urination she has a history of recurrent urinary tract infections in the past she sees a urogynecologist and has an appointment scheduled to see her she has an allergy to Cipro she is not sure which antibiotic she has used in the past  Will order urinalysis and keflex TID for 5 days    Has had 2 doses of moderna covid vaccine    Reminded pt to complete labs    Reviewed last labs and medication list        Patient Active Problem List    Diagnosis Date Noted    Venous insufficiency of both lower extremities 02/09/2021    Varicose veins with swelling 02/09/2021    Essential hypertension 09/05/0723    Umbilical hernia without obstruction and without gangrene 06/22/2020    Osteoarthritis of right shoulder 06/06/2018    Incisional hernia, without obstruction or gangrene 10/20/2017    Obesity, Class II, BMI 35-39.9 10/20/2017    Fibromyalgia 03/29/2017    Epigastric pain 06/05/2015    Vitamin D deficiency 03/31/2015    A-fib ([facility] 01/28/2015    Esophageal spasm 12/16/2014    S/P cardiac pacemaker procedure 09/16/2014    SSS (sick sinus syndrome) ([place] [address].) 09/02/2014    S/P ablation of atrial fibrillation 05/13/2014    Morbid obesity with BMI of 40.0-44.9, adult ([facility] 03/14/2014    Diarrhea 07/19/2013    GERD (gastroesophageal reflux disease) 04/13/2012    Colon polyps 03/31/2010    Urge incontinence 03/31/2010    Migraine 03/31/2010    Bipolar affective disorder ([facility] 03/31/2010    Hypothyroidism 03/31/2010    Hyperglycemia 03/31/2010    Hyperlipidemia 03/31/2010     Current Outpatient Medications   Medication Sig Dispense Refill    propranolol LA (INDERAL LA) 80 mg SR capsule Take 1 Capsule by mouth daily. Indications: essential tremor 30 Capsule 2    ergocalciferol (ERGOCALCIFEROL) 1,250 mcg (50,000 unit) capsule TAKE 1 CAPSULE BY MOUTH ONE TIME PER WEEK 12 Capsule 1    pregabalin (Lyrica) 100 mg capsule Take 1 Capsule by mouth two (2) times a day. Max Daily Amount: 200 mg. 60 Capsule 5    esomeprazole (NEXIUM) 40 mg capsule Take 40 mg by mouth daily.  levothyroxine (SYNTHROID) 50 mcg tablet TAKE 1 TABLET BY MOUTH EVERY DAY 90 Tablet 3    trimethoprim (TRIMPEX) 100 mg tablet Take 100 mg by mouth daily.  apixaban (ELIQUIS) 5 mg tablet Take 1 Tablet by mouth two (2) times a day. 56 Tablet 0    lithium carbonate SR (LITHOBID) 300 mg CR tablet Take 300 mg by mouth three (3) times daily.  cyanocobalamin (Vitamin B-12) 100 mcg tablet Take 100 mcg by mouth daily.  hydrOXYchloroQUINE (PLAQUENIL) 200 mg tablet Take 200 mg by mouth two (2) times a day.  latanoprost (XALATAN) 0.005 % ophthalmic solution Administer 1 Drop to both eyes two (2) times a day.  zolpidem (AMBIEN) 10 mg tablet Take 10 mg by mouth nightly as needed. Rarely      tiZANidine (ZANAFLEX) 2 mg tablet Take 1 Tab by mouth three (3) times daily. 90 Tab 5    topiramate (Topamax) 100 mg tablet Take 1 Tab by mouth two (2) times a day. 60 Tab 3    hydrOXYzine HCL (ATARAX) 25 mg tablet Take 25 mg by mouth three (3) times daily. (Patient not taking: Reported on 9/16/2021)      omeprazole (PRILOSEC) 40 mg capsule TAKE 1 CAPSULE BY MOUTH EVERY DAY IN THE MORNING      timolol (TIMOPTIC) 0.5 % ophthalmic solution INSTILL 1 DROP 1 TIME EVERY DAY INTO BOTH EYES EVERY MORNING      alprazolam (XANAX) 0.5 mg tablet Take 0.5 mg by mouth nightly. Past Surgical History:   Procedure Laterality Date    COLONOSCOPY N/A 4/27/2018    COL[facility]    COLONOSCOPY N/A 11/25/2020    COLONOSCOPY, EGD performed by Maryjane Rogers MD at [address] [place] [address]  4/27/2018         Sarahy Dugan  4/27/2018         HX BUNIONECTOMY      HX GI  2010, 5/2018    egd, colonoscopy    HX HERNIA REPAIR  01/11/2021    HX HERNIA REPAIR  01/2021    HX HERNIA REPAIR  26/0251    4 umbillical     HX HYSTERECTOMY      HX ORTHOPAEDIC  2000's    removed bone spurs from R & L hand    HX ORTHOPAEDIC  1990s?     left bunionectomy     HX OTHER SURGICAL  07/2014    2 shocks to heart & ablations    HX OTHER SURGICAL  01/11/2021    incarcerated incisional hernia surgery    HX PACEMAKER  2014    On the right side    HX PACEMAKER PLACEMENT  2015    HX SHOULDER REPLACEMENT Right 2017    HX TONSILLECTOMY  15 yrs old    DE CARDIAC SURG PROCEDURE UNLIST  5/2014    ablation      Lab Results   Component Value Date/Time    WBC 4.8 02/17/2021 10:38 AM    HGB 13.8 02/17/2021 10:38 AM    HCT 41.4 02/17/2021 10:38 AM    PLATELET 212 08/00/1486 10:38 AM    MCV 90 02/17/2021 10:38 AM     Lab Results   Component Value Date/Time    Cholesterol, total 248 (H) 02/17/2021 10:38 AM    HDL Cholesterol 48 02/17/2021 10:38 AM    LDL,Direct 124 (H) 03/30/2010 08:40 AM    LDL, calculated 152 (H) 02/17/2021 10:38 AM    LDL, calculated 125 (H) 11/06/2018 08:19 AM    Triglyceride 260 (H) 02/17/2021 10:38 AM CHOL/HDL Ratio 5.3 (H) 03/30/2010 08:40 AM     Lab Results   Component Value Date/Time    GFR est non-AA 58 (L) 02/17/2021 10:38 AM    GFR est AA 67 02/17/2021 10:38 AM    Creatinine 0.98 02/17/2021 10:38 AM    BUN 16 02/17/2021 10:38 AM    Sodium 141 02/17/2021 10:38 AM    Potassium 5.0 02/17/2021 10:38 AM    Chloride 107 (H) 02/17/2021 10:38 AM    CO2 26 02/17/2021 10:38 AM    Magnesium 2.1 11/04/2020 07:48 PM    Phosphorus 3.0 01/29/2015 05:15 AM        Review of Systems   Constitutional: Positive for malaise/fatigue. Respiratory: Positive for cough. Negative for shortness of breath. Cardiovascular: Negative for chest pain. Gastrointestinal: Positive for diarrhea. Physical Exam  Constitutional:       General: She is not in acute distress. Appearance: Normal appearance. She is not ill-appearing, toxic-appearing or diaphoretic. HENT:      Head: Normocephalic and atraumatic. Right Ear: External ear normal.      Left Ear: External ear normal.   Eyes:      General:         Right eye: No discharge. Left eye: No discharge. Conjunctiva/sclera: Conjunctivae normal.      Pupils: Pupils are equal, round, and reactive to light. Cardiovascular:      Rate and Rhythm: Normal rate and regular rhythm. Heart sounds: No murmur heard. No friction rub. No gallop. Pulmonary:      Effort: No respiratory distress. Breath sounds: Normal breath sounds. No wheezing or rales. Chest:      Chest wall: No tenderness. Musculoskeletal:         General: Normal range of motion. Cervical back: Normal range of motion and neck supple. Right lower leg: Edema present. Left lower leg: Edema present. Skin:     General: Skin is warm and dry. Comments: Bumps on skin on legs   Neurological:      Mental Status: She is alert and oriented to person, place, and time. Mental status is at baseline.       Coordination: Coordination normal.      Gait: Gait normal.   Psychiatric: Mood and Affect: Mood normal.         Behavior: Behavior normal.         ASSESSMENT and PLAN    ICD-10-CM ICD-9-CM    1. COVID-19     Patient likely with COVID-19    Her  is positive she got sick around the same time that he did    She had symptoms for at least 2 weeks    She has mild congestion at this time just generalized fatigue and some mild diarrhea    We will check a chest x-ray to ensure no Covid pneumonia    Of note she has had her Covid vaccine despite the booster    She is out of the window for antiviral treat    No steroids needed at this time as she is not having significant trouble breathing or cough or wheezing    We will check basic labs to ensure renal function okay     U07.1 079.89    2. Severe obesity (BMI 35.0-35.9 with comorbidity) ([facility])  E66.01 278.01        Patient with stable chronic obesity increases her risk of decompensation Z68.35 V85.35    3. Diarrhea, unspecified type       Patient with mild loose stools since having likely COVID    Less than 4/day    Discussed binding foods    Sodium as needed    Discussed staying hydrated    We will check renal function to ensure no evidence of renal failure R19.7 787.91     4 rash--stasis dermatitis discussed related to chronic edema with leg elevation and compression hose to assist with this    5 UTI--patient with history of recurrent UTI in the past sees urogynecology has appointment pending for next month we will check UA with lab we will treat with Keflex reviewed her allergy list    Scribed by Nanci Steen of [address] [place] [address] [address], as dictated by Dr. Rajiv Garcia. Current diagnosis and concerns discussed with pt at length. Pt understands risks and benefits or current treatment plan and medications, and accepts the treatment and medication with any possible risks. Pt asks appropriate questions, which were answered. Pt was instructed to call with any concerns or problems. I have reviewed the note documented by the scribe.   The services provided are my own. The documentation is accurate     Sima Cifuentes, was evaluated through a synchronous (real-time) audio-video encounter. The patient (or guardian if applicable) is aware that this is a billable service. Verbal consent to proceed has been obtained. The visit was conducted pursuant to the emergency declaration under the [address] [place] [address] authority and the [employer] Act. Patient identification was verified, and a caregiver was present when appropriate. The patient was located at home in a state where the provider was licensed to provide care.

## 2022-01-28 NOTE — PROGRESS NOTES
vv    Identified pt with two pt identifiers(name and ). Reviewed record in preparation for visit and have obtained necessary documentation. All patient medications has been reviewed.     Chief Complaint   Patient presents with    Follow-up       Health Maintenance Due   Topic    DTaP/Tdap/Td series (1 - Tdap)    Shingrix Vaccine Age 50> (1 of 2)    Bone Densitometry (Dexa) Screening     Breast Cancer Screen Mammogram     Medicare Yearly Exam     Flu Vaccine (1)    COVID-19 Vaccine (3 - Booster for Moderna series)     covid vaccine; not completed     Flu vaccine: not completed

## 2022-02-07 ENCOUNTER — HOSPITAL ENCOUNTER (OUTPATIENT)
Dept: GENERAL RADIOLOGY | Age: 72
Discharge: HOME | End: 2022-02-07
Payer: MEDICARE

## 2022-02-07 DIAGNOSIS — U07.1 COVID-19: ICD-10-CM

## 2022-02-07 PROCEDURE — 71046 X-RAY EXAM CHEST 2 VIEWS: CPT

## 2022-02-08 LAB
ALBUMIN SERPL-MCNC: 4.1 G/DL (ref 3.7–4.7)
ALBUMIN/GLOB SERPL: 2 {RATIO} (ref 1.2–2.2)
ALP SERPL-CCNC: 100 IU/L (ref 44–121)
ALT SERPL-CCNC: 16 IU/L (ref 0–32)
AST SERPL-CCNC: 19 IU/L (ref 0–40)
BILIRUB SERPL-MCNC: 0.3 MG/DL (ref 0–1.2)
BUN SERPL-MCNC: 20 MG/DL (ref 8–27)
BUN/CREAT SERPL: 24 (ref 12–28)
CALCIUM SERPL-MCNC: 10 MG/DL (ref 8.7–10.3)
CHLORIDE SERPL-SCNC: 110 MMOL/L (ref 96–106)
CO2 SERPL-SCNC: 23 MMOL/L (ref 20–29)
CREAT SERPL-MCNC: 0.82 MG/DL (ref 0.57–1)
ERYTHROCYTE [DISTWIDTH] IN BLOOD BY AUTOMATED COUNT: 12.7 % (ref 11.7–15.4)
GLOBULIN SER CALC-MCNC: 2.1 G/DL (ref 1.5–4.5)
GLUCOSE SERPL-MCNC: 98 MG/DL (ref 65–99)
HCT VFR BLD AUTO: 40.6 % (ref 34–46.6)
HGB BLD-MCNC: 13.5 G/DL (ref 11.1–15.9)
MCH RBC QN AUTO: 31.5 PG (ref 26.6–33)
MCHC RBC AUTO-ENTMCNC: 33.3 G/DL (ref 31.5–35.7)
MCV RBC AUTO: 95 FL (ref 79–97)
PLATELET # BLD AUTO: 281 X10E3/UL (ref 150–450)
POTASSIUM SERPL-SCNC: 4.1 MMOL/L (ref 3.5–5.2)
PROT SERPL-MCNC: 6.2 G/DL (ref 6–8.5)
RBC # BLD AUTO: 4.29 X10E6/UL (ref 3.77–5.28)
SODIUM SERPL-SCNC: 141 MMOL/L (ref 134–144)
WBC # BLD AUTO: 7.3 X10E3/UL (ref 3.4–10.8)

## 2022-02-10 NOTE — PROGRESS NOTES
Please call patient back about results  Let her know , no pna on cxr  Does she have any sx of sob, possible slight fluid build up, if no sx no further intervention  If breathing issues move up her appointment with her cardiologist

## 2022-02-11 NOTE — PROGRESS NOTES
Please call patient back about results  See below pt never contacted RW delivered to bedside , commode to be delivered to home

## 2022-02-12 LAB
ALBUMIN SERPL-MCNC: 3.9 G/DL (ref 3.7–4.7)
ALBUMIN/GLOB SERPL: 2 {RATIO} (ref 1.2–2.2)
ALP SERPL-CCNC: 84 IU/L (ref 44–121)
ALT SERPL-CCNC: 14 IU/L (ref 0–32)
AST SERPL-CCNC: 16 IU/L (ref 0–40)
BASOPHILS # BLD AUTO: 0.1 X10E3/UL (ref 0–0.2)
BASOPHILS NFR BLD AUTO: 1 %
BILIRUB SERPL-MCNC: 0.4 MG/DL (ref 0–1.2)
BUN SERPL-MCNC: 14 MG/DL (ref 8–27)
BUN/CREAT SERPL: 16 (ref 12–28)
CALCIUM SERPL-MCNC: 9.9 MG/DL (ref 8.7–10.3)
CHLORIDE SERPL-SCNC: 112 MMOL/L (ref 96–106)
CHOLEST SERPL-MCNC: 198 MG/DL (ref 100–199)
CO2 SERPL-SCNC: 21 MMOL/L (ref 20–29)
CREAT SERPL-MCNC: 0.89 MG/DL (ref 0.57–1)
EOSINOPHIL # BLD AUTO: 0.2 X10E3/UL (ref 0–0.4)
EOSINOPHIL NFR BLD AUTO: 4 %
ERYTHROCYTE [DISTWIDTH] IN BLOOD BY AUTOMATED COUNT: 12.8 % (ref 11.7–15.4)
EST. AVERAGE GLUCOSE BLD GHB EST-MCNC: 94 MG/DL
GLOBULIN SER CALC-MCNC: 2 G/DL (ref 1.5–4.5)
GLUCOSE SERPL-MCNC: 93 MG/DL (ref 65–99)
HBA1C MFR BLD: 4.9 % (ref 4.8–5.6)
HCT VFR BLD AUTO: 40.2 % (ref 34–46.6)
HDLC SERPL-MCNC: 53 MG/DL
HGB BLD-MCNC: 13.2 G/DL (ref 11.1–15.9)
IMM GRANULOCYTES # BLD AUTO: 0 X10E3/UL (ref 0–0.1)
IMM GRANULOCYTES NFR BLD AUTO: 0 %
LDLC SERPL CALC-MCNC: 121 MG/DL (ref 0–99)
LYMPHOCYTES # BLD AUTO: 1.5 X10E3/UL (ref 0.7–3.1)
LYMPHOCYTES NFR BLD AUTO: 26 %
MCH RBC QN AUTO: 30.8 PG (ref 26.6–33)
MCHC RBC AUTO-ENTMCNC: 32.8 G/DL (ref 31.5–35.7)
MCV RBC AUTO: 94 FL (ref 79–97)
MONOCYTES # BLD AUTO: 0.5 X10E3/UL (ref 0.1–0.9)
MONOCYTES NFR BLD AUTO: 9 %
NEUTROPHILS # BLD AUTO: 3.3 X10E3/UL (ref 1.4–7)
NEUTROPHILS NFR BLD AUTO: 60 %
PLATELET # BLD AUTO: 240 X10E3/UL (ref 150–450)
POTASSIUM SERPL-SCNC: 4.4 MMOL/L (ref 3.5–5.2)
PROT SERPL-MCNC: 5.9 G/DL (ref 6–8.5)
RBC # BLD AUTO: 4.29 X10E6/UL (ref 3.77–5.28)
SODIUM SERPL-SCNC: 142 MMOL/L (ref 134–144)
T4 FREE SERPL-MCNC: 1.12 NG/DL (ref 0.82–1.77)
TRIGL SERPL-MCNC: 134 MG/DL (ref 0–149)
TSH SERPL DL<=0.005 MIU/L-ACNC: 3.81 UIU/ML (ref 0.45–4.5)
VIT B12 SERPL-MCNC: >2000 PG/ML (ref 232–1245)
VLDLC SERPL CALC-MCNC: 24 MG/DL (ref 5–40)
WBC # BLD AUTO: 5.5 X10E3/UL (ref 3.4–10.8)

## 2022-02-12 NOTE — PROGRESS NOTES
Called, spoke with Pt. Two pt identifiers confirmed. Pt informed of cxr results per Dr. Quin Borja. Pt asked was she sob still. Pt stated no she is much better just dealing with a cough. Let pt know I am glad she is getting better and to call if she needed anything else. Pt verbalized understanding of information discussed w/ no further questions at this time.

## 2022-02-14 ENCOUNTER — OFFICE VISIT (OUTPATIENT)
Dept: CARDIOLOGY CLINIC | Age: 72
End: 2022-02-14
Payer: MEDICARE

## 2022-02-14 DIAGNOSIS — Z95.0 CARDIAC PACEMAKER IN SITU: Primary | ICD-10-CM

## 2022-02-14 DIAGNOSIS — I49.5 SSS (SICK SINUS SYNDROME) (HCC): ICD-10-CM

## 2022-02-14 PROCEDURE — 93296 REM INTERROG EVL PM/IDS: CPT | Performed by: INTERNAL MEDICINE

## 2022-02-14 NOTE — PROGRESS NOTES
Please call the patient and let the patient know that her test result(s) is/are basically normal. She can cut her dose of Vitamin B12 or change to every other day. Thanks. Luz Elena Cherry.

## 2022-02-15 PROCEDURE — 93294 REM INTERROG EVL PM/LDLS PM: CPT | Performed by: INTERNAL MEDICINE

## 2022-02-25 DIAGNOSIS — G25.0 ESSENTIAL TREMOR: ICD-10-CM

## 2022-02-28 ENCOUNTER — VIRTUAL VISIT (OUTPATIENT)
Dept: SLEEP MEDICINE | Age: 72
End: 2022-02-28

## 2022-02-28 RX ORDER — PROPRANOLOL HYDROCHLORIDE 80 MG/1
80 CAPSULE, EXTENDED RELEASE ORAL DAILY
Qty: 90 CAPSULE | Refills: 3 | Status: SHIPPED | OUTPATIENT
Start: 2022-02-28 | End: 2022-04-27 | Stop reason: SDUPTHER

## 2022-03-09 RX ORDER — PROPRANOLOL HYDROCHLORIDE 60 MG/1
CAPSULE, EXTENDED RELEASE ORAL
Qty: 90 CAPSULE | Refills: 1 | Status: SHIPPED | OUTPATIENT
Start: 2022-03-09 | End: 2022-04-27 | Stop reason: ALTCHOICE

## 2022-03-13 NOTE — TELEPHONE ENCOUNTER
Called and spoke with patient. Verified name/. Notified of test results per Dr. Ly Rogel. She verbalized understand and will continue with her 2021 appointment. hypertension        SURGICALHISTORY      has a past surgical history that includes Foot surgery and hernia repair. CURRENT MEDICATIONS       Discharge Medication List as of 3/12/2022 12:12 AM      CONTINUE these medications which have NOT CHANGED    Details   ibuprofen (IBU) 800 MG tablet Take 1 tablet by mouth every 8 hours as needed for Pain for 30 doses. , Disp-30 tablet, R-0             ALLERGIES     is allergic to codeine. FAMILY HISTORY     He indicated that his mother is alive. He indicated that his father is alive. He indicated that his sister is alive. family history includes Heart Disease in his father. SOCIAL HISTORY       Social History     Socioeconomic History    Marital status:      Spouse name: Not on file    Number of children: Not on file    Years of education: Not on file    Highest education level: Not on file   Occupational History    Not on file   Tobacco Use    Smoking status: Current Every Day Smoker     Packs/day: 1.00     Types: Cigarettes    Smokeless tobacco: Never Used   Substance and Sexual Activity    Alcohol use: Not Currently     Comment: socially    Drug use: Yes     Types: Marijuana Fredick Copping)     Comment: today    Sexual activity: Not on file   Other Topics Concern    Not on file   Social History Narrative    Not on file     Social Determinants of Health     Financial Resource Strain:     Difficulty of Paying Living Expenses: Not on file   Food Insecurity:     Worried About Running Out of Food in the Last Year: Not on file    Rosangela of Food in the Last Year: Not on file   Transportation Needs:     Lack of Transportation (Medical): Not on file    Lack of Transportation (Non-Medical):  Not on file   Physical Activity:     Days of Exercise per Week: Not on file    Minutes of Exercise per Session: Not on file   Stress:     Feeling of Stress : Not on file   Social Connections:     Frequency of Communication with Friends and Family: Not on file    Frequency of Social Gatherings with Friends and Family: Not on file    Attends Sikhism Services: Not on file    Active Member of Clubs or Organizations: Not on file    Attends Club or Organization Meetings: Not on file    Marital Status: Not on file   Intimate Partner Violence:     Fear of Current or Ex-Partner: Not on file    Emotionally Abused: Not on file    Physically Abused: Not on file    Sexually Abused: Not on file   Housing Stability:     Unable to Pay for Housing in the Last Year: Not on file    Number of [place] in the Last Year: Not on file    Unstable Housing in the Last Year: Not on file       PHYSICAL EXAM     INITIAL VITALS:  height is 6' 1\" (1.854 m) and weight is 225 lb (102.1 kg). His oral temperature is 97.7 °F (36.5 °C). His blood pressure is 157/107 (abnormal) and his pulse is 91. His respiration is 18 and oxygen saturation is 96%. Physical Exam  Vitals and nursing note reviewed. Constitutional:       General: He is not in acute distress. Appearance: Normal appearance. He is well-developed. He is not ill-appearing or diaphoretic. HENT:      Head: Normocephalic and atraumatic. Nose: Nose normal.      Mouth/Throat:      Mouth: Mucous membranes are moist.      Pharynx: Oropharynx is clear. Eyes:      General:         Right eye: No discharge. Left eye: No discharge. Conjunctiva/sclera: Conjunctivae normal.   Neck:      Trachea: No tracheal deviation. Cardiovascular:      Rate and Rhythm: Normal rate and regular rhythm. Pulses: Normal pulses. Heart sounds: Normal heart sounds. No murmur heard. No gallop. Comments: Normal capillary refill  Pulmonary:      Effort: Pulmonary effort is normal. No respiratory distress. Breath sounds: Normal breath sounds. No stridor. Abdominal:      General: Bowel sounds are normal.      Palpations: Abdomen is soft. Musculoskeletal:         General: No tenderness or deformity.  Normal range of motion. Cervical back: Normal range of motion. Skin:     General: Skin is warm and dry. Capillary Refill: Capillary refill takes less than 2 seconds. Coloration: Skin is not pale. Findings: No erythema or rash. Neurological:      General: No focal deficit present. Mental Status: He is alert and oriented to person, place, and time. Cranial Nerves: No cranial nerve deficit. Psychiatric:         Mood and Affect: Mood is depressed. Affect is flat. Behavior: Behavior is slowed and withdrawn. Thought Content: Thought content is paranoid. DIFFERENTIAL DIAGNOSIS:   Anxiety, depression, mood disorder    DIAGNOSTIC RESULTS     EKG: All EKG's are interpreted by the Emergency Department Physician who eithersigns or Co-signs this chart in the absence of a cardiologist.        RADIOLOGY: non-plainfilm images(s) such as CT, Ultrasound and MRI are read by the radiologist.  Plain radiographic images are visualized and preliminarily interpreted by the emergency physician unless otherwise stated below. CT HEAD WO CONTRAST   Final Result   Impression:      Probable right maxillary sinusitis. No acute intracranial process      This document has been electronically signed by: Padmini Linder MD on    03/11/2022 11:42 PM      All CTs at this facility use dose modulation techniques and iterative    reconstructions, and/or weight-based dosing   when appropriate to reduce radiation to a low as reasonably achievable.             LABS:   Labs Reviewed   BASIC METABOLIC PANEL - Abnormal; Notable for the following components:       Result Value    Sodium 133 (*)     Potassium 3.0 (*)     CO2 21 (*)     All other components within normal limits   CBC WITH AUTO DIFFERENTIAL - Abnormal; Notable for the following components:    WBC 14.9 (*)     Lymphocytes Absolute 5.5 (*)     All other components within normal limits   SALICYLATE LEVEL - Abnormal; Notable for the following components:    Salicylate, Serum < 0.3 (*)     All other components within normal limits   URINE WITH REFLEXED MICRO - Abnormal; Notable for the following components:    Ketones, Urine TRACE (*)     Leukocyte Esterase, Urine TRACE (*)     All other components within normal limits   GLOMERULAR FILTRATION RATE, ESTIMATED - Abnormal; Notable for the following components:    Est, Glom Filt Rate 78 (*)     All other components within normal limits   OSMOLALITY - Abnormal; Notable for the following components:    Osmolality Calc 265.0 (*)     All other components within normal limits   ACETAMINOPHEN LEVEL   HEPATIC FUNCTION PANEL   ETHANOL   TSH   URINE DRUG SCREEN   TROPONIN   SCAN OF BLOOD SMEAR   ANION GAP       EMERGENCY DEPARTMENT COURSE:   Vitals:    Vitals:    03/11/22 2009 03/12/22 0027   BP: (!) 174/113 (!) 157/107   Pulse: 94 91   Resp: 16 18   Temp: 97.7 °F (36.5 °C)    TempSrc: Oral    SpO2: 96% 96%   Weight: 225 lb (102.1 kg)    Height: 6' 1\" (1.854 m)                                  Internal Administration   First Dose      Second Dose           Last COVID Lab No results found for: SARS-COV-2, SARS-COV-2 RNA, SARS-COV-2, SARS-COV-2, SARS-COV-2 BY PCR, SARS-COV-2, SARS-COV-2, SARS-COV-2         MDM    Patient seen evaluate emergency room for dysphoric mood, insomnia, depression, ringing in the ears. Appropriate testing is ordered and reviewed. BRISSA evaluated the patient and consulted with the psychiatrist.  They advised patient could follow-up outpatient. I did complete a head CT given the patient's recurrent issues with sleep and the ringing in the ears. Head CT was negative. Advised patient on close follow-up. Discharged home stable condition. Medications - No data to display    Please note that the patient was evaluated during a pandemic. All efforts were made for HIPPA compliance as well as provision of appropriate care. Patient was seen independently by myself.  The patient's final impression and disposition and plan was determined by myself. Strict return precautions and follow up instructions were discussed with the patient prior to discharge, with which the patient agrees. Physical assessment findings, diagnostic testing(s) if applicable, and vital signs reviewed with patient/patient representative. Questions answered. Medications asdirected, including OTC medications for supportive care. Education provided on medications. Differential diagnosis(s) discussed with patient/patient representative. Home care/self care instructions reviewed withpatient/patient representative. Patient is to follow-up with family care provider in 2-3 days if no improvement. Patient is to go to the emergency department if symptoms worsen. Patient/patient representative isaware of care plan, questions answered, verbalizes understanding and is in agreement. CRITICAL CARE:   None    CONSULTS:  BAC    PROCEDURES:  None    FINAL IMPRESSION     1. Insomnia, unspecified type    2. Anxiety    3. Tinnitus, unspecified laterality    4. Delusions [facility])          DISPOSITION/PLAN   DISPOSITION Decision To Discharge 03/12/2022 12:10:54 AM      PATIENT REFERREDTO:  Janay Swain MD  [address] [place] [address]  [phone]    Schedule an appointment as soon as possible for a visit in 2 days  For follow up    [address] [place] [address]  [address] [facility] [address]  [phone]    Call in 1 day  For follow up    [facility]  [phone]  Call   As needed if thoughts of suicide develop or return. Or return to [facility] hospital or go to [facility].        DISCHARGE MEDICATIONS:  Discharge Medication List as of 3/12/2022 12:12 AM      START taking these medications    Details   hydrOXYzine (VISTARIL) 50 MG capsule Take 1 capsule by mouth 3 times daily as needed for Anxiety (insomnia), Disp-30 capsule, R-0Print             (Please note that portions of this note were completed with a voice recognition program.  Efforts were made to edit the dictations but occasionally words are mis-transcribed.)         NORAH Peck - NORAH Almodovar CNP  03/13/22 0567

## 2022-03-19 PROBLEM — E66.812 OBESITY, CLASS II, BMI 35-39.9: Status: ACTIVE | Noted: 2017-10-20

## 2022-03-19 PROBLEM — I87.2 VENOUS INSUFFICIENCY OF BOTH LOWER EXTREMITIES: Status: ACTIVE | Noted: 2021-02-09

## 2022-03-19 PROBLEM — M19.011 OSTEOARTHRITIS OF RIGHT SHOULDER: Status: ACTIVE | Noted: 2018-06-06

## 2022-03-19 PROBLEM — E66.9 OBESITY, CLASS II, BMI 35-39.9: Status: ACTIVE | Noted: 2017-10-20

## 2022-03-19 PROBLEM — K43.2 INCISIONAL HERNIA, WITHOUT OBSTRUCTION OR GANGRENE: Status: ACTIVE | Noted: 2017-10-20

## 2022-03-19 PROBLEM — I83.899 VARICOSE VEINS WITH SWELLING: Status: ACTIVE | Noted: 2021-02-09

## 2022-03-19 PROBLEM — I10 ESSENTIAL HYPERTENSION: Status: ACTIVE | Noted: 2021-02-09

## 2022-03-19 PROBLEM — K42.9 UMBILICAL HERNIA WITHOUT OBSTRUCTION AND WITHOUT GANGRENE: Status: ACTIVE | Noted: 2020-06-22

## 2022-03-19 PROBLEM — M79.7 FIBROMYALGIA: Status: ACTIVE | Noted: 2017-03-29

## 2022-03-23 ENCOUNTER — TELEPHONE (OUTPATIENT)
Dept: INTERNAL MEDICINE CLINIC | Age: 72
End: 2022-03-23

## 2022-03-23 NOTE — TELEPHONE ENCOUNTER
----- Message from Terrie Sandhoff sent at 3/23/2022 10:23 AM EDT -----  Subject: Message to Provider    QUESTIONS  Information for Provider? Patient just had visit with Dr. Prince Garcia   (psychologist) and she is requesting most recent lithium levels. Dr. Prince Garcia is with [facility] Fax # [phone]   Phone? [phone]. Please advise.  ---------------------------------------------------------------------------  --------------  CALL BACK INFO  What is the best way for the office to contact you? OK to leave message on   voicemail  Preferred Call Back Phone Number? [phone]  ---------------------------------------------------------------------------  --------------  SCRIPT ANSWERS  Relationship to Patient?  Self

## 2022-03-25 ENCOUNTER — NURSE TRIAGE (OUTPATIENT)
Dept: OTHER | Facility: CLINIC | Age: 72
End: 2022-03-25

## 2022-03-25 NOTE — TELEPHONE ENCOUNTER
Received call from [place] at [facility] with Red Flag Complaint. Subjective: Caller states \"I have been feeling tired lately\"     Current Symptoms: Caller reports she just left the orthopedic office and was told her BP was low. She states she was not told the reading. Patient also reports she has been tired for the past several weeks. Denies any other symptoms     Onset: Several weeks    Associated Symptoms: Low BP, fatigue    Pain Severity: Chronic joint pain    Temperature: Denies fever    What has been tried: NA    LMP: NA Pregnant: NA    Recommended disposition: See now. Care advice provided, patient verbalizes understanding; denies any other questions or concerns; instructed to call back for any new or worsening symptoms. Caller and family member state they are on their way to [facility] now on way home from [facility]. Will call the office if needed. Attention Provider: Thank you for allowing me to participate in the care of your patient. The patient was connected to triage in response to information provided to the [facility]. Please do not respond through this encounter as the response is not directed to a shared pool.       Reason for Disposition   MODERATE weakness (i.e., interferes with work, school, normal activities) and cause unknown (Exceptions: weakness with acute minor illness, or weakness from poor fluid intake)    Protocols used: WEAKNESS (GENERALIZED) AND FATIGUE-ADULT-OH

## 2022-04-11 ENCOUNTER — TELEPHONE (OUTPATIENT)
Dept: CARDIOLOGY CLINIC | Age: 72
End: 2022-04-11

## 2022-04-27 ENCOUNTER — OFFICE VISIT (OUTPATIENT)
Dept: NEUROLOGY | Age: 72
End: 2022-04-27
Payer: MEDICARE

## 2022-04-27 VITALS
TEMPERATURE: 97.6 F | DIASTOLIC BLOOD PRESSURE: 86 MMHG | WEIGHT: 240.2 LBS | BODY MASS INDEX: 40.02 KG/M2 | HEART RATE: 76 BPM | HEIGHT: 65 IN | OXYGEN SATURATION: 99 % | RESPIRATION RATE: 16 BRPM | SYSTOLIC BLOOD PRESSURE: 112 MMHG

## 2022-04-27 DIAGNOSIS — G25.0 ESSENTIAL TREMOR: Primary | ICD-10-CM

## 2022-04-27 DIAGNOSIS — G44.321 INTRACTABLE CHRONIC POST-TRAUMATIC HEADACHE: ICD-10-CM

## 2022-04-27 DIAGNOSIS — G62.9 NEUROPATHY: ICD-10-CM

## 2022-04-27 PROCEDURE — G8752 SYS BP LESS 140: HCPCS | Performed by: NURSE PRACTITIONER

## 2022-04-27 PROCEDURE — G8400 PT W/DXA NO RESULTS DOC: HCPCS | Performed by: NURSE PRACTITIONER

## 2022-04-27 PROCEDURE — G9717 DOC PT DX DEP/BP F/U NT REQ: HCPCS | Performed by: NURSE PRACTITIONER

## 2022-04-27 PROCEDURE — G8417 CALC BMI ABV UP PARAM F/U: HCPCS | Performed by: NURSE PRACTITIONER

## 2022-04-27 PROCEDURE — G8428 CUR MEDS NOT DOCUMENT: HCPCS | Performed by: NURSE PRACTITIONER

## 2022-04-27 PROCEDURE — G8754 DIAS BP LESS 90: HCPCS | Performed by: NURSE PRACTITIONER

## 2022-04-27 PROCEDURE — 1090F PRES/ABSN URINE INCON ASSESS: CPT | Performed by: NURSE PRACTITIONER

## 2022-04-27 PROCEDURE — 3017F COLORECTAL CA SCREEN DOC REV: CPT | Performed by: NURSE PRACTITIONER

## 2022-04-27 PROCEDURE — 1101F PT FALLS ASSESS-DOCD LE1/YR: CPT | Performed by: NURSE PRACTITIONER

## 2022-04-27 PROCEDURE — G8536 NO DOC ELDER MAL SCRN: HCPCS | Performed by: NURSE PRACTITIONER

## 2022-04-27 PROCEDURE — 99215 OFFICE O/P EST HI 40 MIN: CPT | Performed by: NURSE PRACTITIONER

## 2022-04-27 RX ORDER — PROPRANOLOL HYDROCHLORIDE 80 MG/1
80 CAPSULE, EXTENDED RELEASE ORAL
Qty: 90 CAPSULE | Refills: 3 | Status: SHIPPED | OUTPATIENT
Start: 2022-04-27 | End: 2022-08-04 | Stop reason: ALTCHOICE

## 2022-04-27 RX ORDER — TOPIRAMATE 100 MG/1
100 TABLET, FILM COATED ORAL 2 TIMES DAILY
Qty: 60 TABLET | Refills: 5 | Status: SHIPPED | OUTPATIENT
Start: 2022-04-27 | End: 2022-10-17

## 2022-04-27 RX ORDER — FUROSEMIDE 20 MG/1
TABLET ORAL
Qty: 30 TABLET | Refills: 5 | Status: SHIPPED | OUTPATIENT
Start: 2022-04-27 | End: 2022-10-17

## 2022-04-27 NOTE — PROGRESS NOTES
1. Have you been to the ER, urgent care clinic since your last visit? Hospitalized since your last visit? No.    2. Have you seen or consulted any other health care providers outside of the [address] [place] [address] since your last visit? Include any pap smears or colon screening.    No.        Chief Complaint   Patient presents with    Follow-up     6 month- headaches on daily basis

## 2022-04-27 NOTE — PROGRESS NOTES
Colton Downs is a 67 y.o. female who presents with the following  Chief Complaint   Patient presents with    Follow-up     6 month- headaches on daily basis       HPI        FU for migraine, ET, gait abnormality. She feels things are worse since COVID in January   She is not on Topamax or Inderal LA   Not sure why  She states the office never re-sent for her  Her tremor is worse  Her headaches are worse  Her neuropathy is worse  Has had a few falls since last visit. Normally on Topamax 100 mg BID   Inderal LA 80 mg   She has trouble with fine motor skills, writing, buttoning, etc.   She does shake worse when anxious, sleep deprived, put on the spot. She has noticed this is worse  She is noticing headaches are more frequent too   A few times a week   No to the extent of her migraines years ago, but still bad  Neuropathy is also worse  She has significant trouble with balance, coordination, gait  Has fallen multiple times  Is on Eliquis for AFIB     She feels like things are all getting worse. Memory is foggy. Allergies   Allergen Reactions    Latex Swelling     Swelling of lips says patient    Beef Containing Products Nausea and Vomiting     diarrhea    Ciprofloxacin Unknown (comments)    Pork Derived (Porcine) Nausea and Vomiting     diarrhea    Bovine Cartilage Nausea and Vomiting     diarrhea    Milk Nausea and Vomiting     diarrhea  diarrhea    Sulfa (Sulfonamide Antibiotics) Hives    Xarelto [Rivaroxaban] Other (comments)     GI problems       Current Outpatient Medications   Medication Sig    topiramate (Topamax) 100 mg tablet Take 1 Tablet by mouth two (2) times a day.  propranolol LA (INDERAL LA) 80 mg SR capsule Take 1 Capsule by mouth nightly. Indications: essential tremor    furosemide (Lasix) 20 mg tablet Take 1 tablet by mouth daily PRN    pregabalin (Lyrica) 100 mg capsule Take 1 Capsule by mouth two (2) times a day. Max Daily Amount: 200 mg.     esomeprazole (NEXIUM) 40 mg capsule Take 40 mg by mouth daily.  levothyroxine (SYNTHROID) 50 mcg tablet TAKE 1 TABLET BY MOUTH EVERY DAY    apixaban (ELIQUIS) 5 mg tablet Take 1 Tablet by mouth two (2) times a day.  lithium carbonate SR (LITHOBID) 300 mg CR tablet Take 300 mg by mouth three (3) times daily.  cyanocobalamin (Vitamin B-12) 100 mcg tablet Take 100 mcg by mouth daily.  omeprazole (PRILOSEC) 40 mg capsule TAKE 1 CAPSULE BY MOUTH EVERY DAY IN THE MORNING    timolol (TIMOPTIC) 0.5 % ophthalmic solution INSTILL 1 DROP 1 TIME EVERY DAY INTO BOTH EYES EVERY MORNING    hydrOXYchloroQUINE (PLAQUENIL) 200 mg tablet Take 200 mg by mouth two (2) times a day.  latanoprost (XALATAN) 0.005 % ophthalmic solution Administer 1 Drop to both eyes two (2) times a day.  zolpidem (AMBIEN) 10 mg tablet Take 10 mg by mouth nightly as needed. Rarely    tiZANidine (ZANAFLEX) 2 mg tablet Take 1 Tab by mouth three (3) times daily.  alprazolam (XANAX) 0.5 mg tablet Take 0.5 mg by mouth nightly.  trimethoprim (TRIMPEX) 100 mg tablet Take 100 mg by mouth daily. No current facility-administered medications for this visit.        Social History     Tobacco Use   Smoking Status Never Smoker   Smokeless Tobacco Never Used       Past Medical History:   Diagnosis Date    Arrhythmia 2014    atrial fibrillation 2014, sick sinus syndrome: [facility]    Arthritis     Rheumatoid    [facility], [facility]     Bipolar affective disorder ([facility] [address] 3/31/2010    Chronic pain 2018    right shoulder    Colon polyps 03/31/2010    also 5/2018: colon polyps    Depression     Diarrhea 07/19/2013    D/t alpha gal allergy says patient    Epigastric pain 6/5/2015    GERD (gastroesophageal reflux disease)     occasiional only; controlled with med    Hemispheric carotid artery syndrome No stroke    Neuro: Amber Parker  (CT scan head/neck negative 4/2018 in [facility])    Hyperlipidemia 3/31/2010    Hypothyroidism 3/31/2010  Long term current use of anticoagulant therapy     Lupus ([facility]     patient denies-says she has RA    Migraine 03/31/2010    has them x2 a month: last one 5/23/2018    Psychotic disorder ([facility]     S/P ablation of atrial fibrillation 05/13/2014    S/P cardiac pacemaker procedure 9/16/2014 9/16/14 Medtronic MRI compatible dual chamber pacemaker implant    Sleep apnea     unable to tolerate CPAP    Stool color black     Thyroid disease     Tick-borne disease     Alpha Gal allergy: no eat pork, beef, milk (Meat allergy showed up on a allergy test)    Umbilical hernia without obstruction and without gangrene 6/22/2020    Urge incontinence 3/31/2010    Vitamin D deficiency 3/31/2015       Past Surgical History:   Procedure Laterality Date    COLONOSCOPY N/A 4/27/2018    COLONOSCOPY performed by Juan Alberto Valentin [facility] at [facility]    COLONOSCOPY N/A 11/25/2020    COLONOSCOPY, EGD performed [facility]b [facility] at [facility]  4/27/2018         COLONOSCOPY,RENALDO Chauhan  4/27/2018         HX BUNIONECTOMY      HX GI  2010, 5/2018    egd, colonoscopy    HX HERNIA REPAIR  01/11/2021    HX HERNIA REPAIR  01/2021    HX HERNIA REPAIR  07/3112    4 umbillical     HX HYSTERECTOMY      HX ORTHOPAEDIC  2000's    removed bone spurs from R [employer] [facility] hand    HX ORTHOPAEDIC  1990s?     left bunionectomy     HX OTHER SURGICAL  07/2014    2 shocks to heart & ablations    HX OTHER SURGICAL  01/11/2021    incarcerated incisional hernia surgery    HX PACEMAKER  2014    On the right side    HX PACEMAKER PLACEMENT  2015    HX SHOULDER REPLACEMENT Right 2017    HX TONSILLECTOMY  15 yrs old    CA CARDIAC SURG PROCEDURE UNLIST  5/2014    ablation       Family History   Problem Relation Age of Onset    Arrhythmia Mother         afib    Stroke Mother     Heart Failure Mother     Colon Cancer Father     Heart Disease Father     Cancer Father         colon cancer    Arrhythmia Brother         afib    Asthma Brother     COPD Brother        Social History     Socioeconomic History    Marital status:    Tobacco Use    Smoking status: Never Smoker    Smokeless tobacco: Never Used   Vaping Use    Vaping Use: Never used   Substance and Sexual Activity    Alcohol use: Not Currently     Alcohol/week: 1.0 standard drink     Types: 1 Cans of beer per week     Comment: weekly    Drug use: Yes     Types: Prescription    Sexual activity: Never       Review of Systems   Constitutional: Positive for malaise/fatigue. Eyes: Positive for photophobia. Negative for blurred vision and double vision. Musculoskeletal: Positive for falls. Neurological: Positive for tingling, tremors, sensory change, weakness and headaches. Remainder of comprehensive review is negative. Physical Exam :    Visit Vitals  /86 (BP 1 Location: Left upper arm, BP Patient Position: Sitting, BP Cuff Size: Large adult)   Pulse 76   Temp 97.6 °F (36.4 °C) (Oral)   Resp 16   Ht 5' 5\" (1.651 m)   Wt 240 lb 3.2 oz (109 kg)   SpO2 99%   BMI 39.97 kg/m²       General: Well defined, nourished, and groomed individual in no acute distress.    Neck: Supple, nontender, no bruits, no pain with resistance to active range of motion.    Musculoskeletal: Extremities revealed no edema and had full range of motion of joints.    Psych: Good mood and bright affect    NEUROLOGICAL EXAMINATION:    Mental Status: Alert and oriented to person, place, and time    Cranial Nerves:    II, III, IV, VI: Visual acuity grossly intact. Visual fields are normal.    Pupils are equal, round, and reactive to light and accommodation.    Extra-ocular movements are full and fluid. Fundoscopic exam was benign, no ptosis or nystagmus.    V-XII: Hearing is grossly intact. Facial features are symmetric, with normal sensation and strength. The palate rises symmetrically and the tongue protrudes midline. Sternocleidomastoids 5/5.      Motor Examination: Normal tone, bulk, and strength, 3/5 muscle strength throughout bilateral LE, 4/5 UE     Coordination: mild to moderate ET     Gait and Station: Slow, shuffled gait. Reflexes: DTRs 1+ throughout. Neurosensory Exam:  Stocking glove sensory loss to temperature, vibration, and pinprick to the thighs. Results for orders placed or performed in visit on 02/11/22   CBC WITH AUTOMATED DIFF   Result Value Ref Range    WBC 5.5 3.4 - 10.8 x10E3/uL    RBC 4.29 3.77 - 5.28 x10E6/uL    HGB 13.2 11.1 - 15.9 g/dL    HCT 40.2 34.0 - 46.6 %    MCV 94 79 - 97 fL    MCH 30.8 26.6 - 33.0 pg    MCHC 32.8 31.5 - 35.7 g/dL    RDW 12.8 11.7 - 15.4 %    PLATELET 724 490 - 654 x10E3/uL    NEUTROPHILS 60 Not Estab. %    Lymphocytes 26 Not Estab. %    MONOCYTES 9 Not Estab. %    EOSINOPHILS 4 Not Estab. %    BASOPHILS 1 Not Estab. %    ABS. NEUTROPHILS 3.3 1.4 - 7.0 x10E3/uL    Abs Lymphocytes 1.5 0.7 - 3.1 x10E3/uL    ABS. MONOCYTES 0.5 0.1 - 0.9 x10E3/uL    ABS. EOSINOPHILS 0.2 0.0 - 0.4 x10E3/uL    ABS. BASOPHILS 0.1 0.0 - 0.2 x10E3/uL    IMMATURE GRANULOCYTES 0 Not Estab. %    ABS. IMM. GRANS. 0.0 0.0 - 0.1 Y56Y1/RD   METABOLIC PANEL, COMPREHENSIVE   Result Value Ref Range    Glucose 93 65 - 99 mg/dL    BUN 14 8 - 27 mg/dL    Creatinine 0.89 0.57 - 1.00 mg/dL    GFR est non-AA 65 >59 mL/min/1.73    GFR est AA 74 >59 mL/min/1.73    BUN/Creatinine ratio 16 12 - 28    Sodium 142 134 - 144 mmol/L    Potassium 4.4 3.5 - 5.2 mmol/L    Chloride 112 (H) 96 - 106 mmol/L    CO2 21 20 - 29 mmol/L    Calcium 9.9 8.7 - 10.3 mg/dL    Protein, total 5.9 (L) 6.0 - 8.5 g/dL    Albumin 3.9 3.7 - 4.7 g/dL    GLOBULIN, TOTAL 2.0 1.5 - 4.5 g/dL    A-G Ratio 2.0 1.2 - 2.2    Bilirubin, total 0.4 0.0 - 1.2 mg/dL    Alk.  phosphatase 84 44 - 121 IU/L    AST (SGOT) 16 0 - 40 IU/L    ALT (SGPT) 14 0 - 32 IU/L   LIPID PANEL   Result Value Ref Range    Cholesterol, total 198 100 - 199 mg/dL    Triglyceride 134 0 - 149 mg/dL    HDL Cholesterol 53 >39 mg/dL    VLDL, calculated 24 5 - 40 mg/dL    LDL, calculated 121 (H) 0 - 99 mg/dL   HEMOGLOBIN A1C WITH EAG   Result Value Ref Range    Hemoglobin A1c 4.9 4.8 - 5.6 %    Estimated average glucose 94 mg/dL   T4, FREE   Result Value Ref Range    T4, Free 1.12 0.82 - 1.77 ng/dL   TSH 3RD GENERATION   Result Value Ref Range    TSH 3.810 0.450 - 4.500 uIU/mL   VITAMIN B12   Result Value Ref Range    Vitamin B12 >2000 (H) 232 - 1245 pg/mL     *Note: Due to a large number of results and/or encounters for the requested time period, some results have not been displayed. A complete set of results can be found in Results Review. Orders Placed This Encounter    REFERRAL TO PHYSICAL THERAPY     Referral Priority:   Routine     Referral Type:   PT/OT/ST     Referral Reason:   Specialty Services Required     Number of Visits Requested:   1    topiramate (Topamax) 100 mg tablet     Sig: Take 1 Tablet by mouth two (2) times a day. Dispense:  60 Tablet     Refill:  5    propranolol LA (INDERAL LA) 80 mg SR capsule     Sig: Take 1 Capsule by mouth nightly. Indications: essential tremor     Dispense:  90 Capsule     Refill:  3    furosemide (Lasix) 20 mg tablet     Sig: Take 1 tablet by mouth daily PRN     Dispense:  30 Tablet     Refill:  5       1. Essential tremor    2. Intractable chronic post-traumatic headache    3. Neuropathy      Discussed POC further   She has been off Topamax and Inderal for some reason- not sure whether office or what. She will restart these as tremor, HA have gotten worse  Back to Inderal LA 80 mg   And Topamax 100 mg BID   This will help HA, tremor, neuropathy  With increasing gait disturbance, falls, muscle weakness send to [place] [id number] for PT with gait, balance, safety training  She will stay active.                  This note will not be viewable in [facility]

## 2022-04-28 ENCOUNTER — DOCUMENTATION ONLY (OUTPATIENT)
Dept: NEUROLOGY | Age: 72
End: 2022-04-28

## 2022-04-28 NOTE — PROGRESS NOTES
Faxed referral, last office note to [facility] at [phone] and received fax confirmation.

## 2022-05-14 DIAGNOSIS — E55.9 VITAMIN D DEFICIENCY: ICD-10-CM

## 2022-05-16 RX ORDER — ERGOCALCIFEROL 1.25 MG/1
CAPSULE ORAL
Qty: 12 CAPSULE | Refills: 1 | Status: SHIPPED | OUTPATIENT
Start: 2022-05-16

## 2022-06-24 ENCOUNTER — TELEPHONE (OUTPATIENT)
Dept: INTERNAL MEDICINE CLINIC | Age: 72
End: 2022-06-24

## 2022-06-24 NOTE — TELEPHONE ENCOUNTER
Patient needs a call back to get an Acute appt Asap or be advised Plan of Care for Big Toe Pain. Please call.  Thank you

## 2022-06-30 NOTE — TELEPHONE ENCOUNTER
Patient contact [facility] pharmacy and their phone systems are now working, please contact pts pharmacy in order to fill the lyrica prescription
Patient requests refill be done for migraines. Pharmacy is [facility] Please call patient if any questions.  Thank you
Pt notified that script for hydrocodone is ready for .
Requested Prescriptions     Pending Prescriptions Disp Refills    HYDROcodone-acetaminophen (NORCO) 5-325 mg per tablet 30 Tab 0     Sig: Take 1 Tab by mouth every eight (8) hours as needed for Pain. Max Daily Amount: 3 Tabs.
show

## 2022-07-11 ENCOUNTER — OFFICE VISIT (OUTPATIENT)
Dept: INTERNAL MEDICINE CLINIC | Age: 72
End: 2022-07-11
Payer: MEDICARE

## 2022-07-11 VITALS
HEART RATE: 76 BPM | SYSTOLIC BLOOD PRESSURE: 100 MMHG | BODY MASS INDEX: 38.99 KG/M2 | HEIGHT: 65 IN | TEMPERATURE: 98.2 F | OXYGEN SATURATION: 99 % | RESPIRATION RATE: 19 BRPM | DIASTOLIC BLOOD PRESSURE: 60 MMHG | WEIGHT: 234 LBS

## 2022-07-11 DIAGNOSIS — F31.70 BIPOLAR AFFECTIVE DISORDER IN REMISSION (HCC): ICD-10-CM

## 2022-07-11 DIAGNOSIS — I48.0 PAF (PAROXYSMAL ATRIAL FIBRILLATION) (HCC): ICD-10-CM

## 2022-07-11 DIAGNOSIS — Z00.00 MEDICARE ANNUAL WELLNESS VISIT, SUBSEQUENT: Primary | ICD-10-CM

## 2022-07-11 DIAGNOSIS — Z78.0 POSTMENOPAUSAL: ICD-10-CM

## 2022-07-11 PROCEDURE — G8536 NO DOC ELDER MAL SCRN: HCPCS | Performed by: INTERNAL MEDICINE

## 2022-07-11 PROCEDURE — G8427 DOCREV CUR MEDS BY ELIG CLIN: HCPCS | Performed by: INTERNAL MEDICINE

## 2022-07-11 PROCEDURE — G8754 DIAS BP LESS 90: HCPCS | Performed by: INTERNAL MEDICINE

## 2022-07-11 PROCEDURE — G9717 DOC PT DX DEP/BP F/U NT REQ: HCPCS | Performed by: INTERNAL MEDICINE

## 2022-07-11 PROCEDURE — G0439 PPPS, SUBSEQ VISIT: HCPCS | Performed by: INTERNAL MEDICINE

## 2022-07-11 PROCEDURE — G8752 SYS BP LESS 140: HCPCS | Performed by: INTERNAL MEDICINE

## 2022-07-11 PROCEDURE — G8417 CALC BMI ABV UP PARAM F/U: HCPCS | Performed by: INTERNAL MEDICINE

## 2022-07-11 PROCEDURE — 1101F PT FALLS ASSESS-DOCD LE1/YR: CPT | Performed by: INTERNAL MEDICINE

## 2022-07-11 PROCEDURE — 3017F COLORECTAL CA SCREEN DOC REV: CPT | Performed by: INTERNAL MEDICINE

## 2022-07-11 PROCEDURE — G8400 PT W/DXA NO RESULTS DOC: HCPCS | Performed by: INTERNAL MEDICINE

## 2022-07-11 NOTE — PROGRESS NOTES
PROGRESS NOTE  Name: Nikki Morales   : 1950       ASSESSMENT/ PLAN:     1. Cataract: OK to proceed based on guidelines. 2. Hypothyroidism: Clinically euthyroid. Continue synthroid. Recheck labs periodically. 3. Atrial Fibrillation: F/U as per Dr. Chad Bales.  On eliquis. 4. HLP:  Continue OTC fish oil. Also encouraged lifestyle efforts. 5. Migraines: She has been seeing neurologist, Dr. aLura Neely (formerly Dr. Omar Leong). Has prn triptan, and topamax, and prn oxycodone. 6. BPAD/Anxiety/Stress. Continue psychiatry follow up. Ashley Aldana, psychology. 7. Urge incontinence: Ongoing. Continue f/u with Dr. Davis Hameed. 8. Epigastric burning / GERD: On Prilosec OTC. Follows with Dr. Renetta Bell. 9. Hyperglycemia: A1C normal now. She has in the past met criteria for DM (fasting glc > 125 on more than 1 occasion). For now, work on diet and exercise. 10. Edema: On daily lasix. 11. Memory disturbance: Referred previously to psych, Dr. Catracho Tian. Follow up in 4 - 6 months    I have reviewed the patient's medications and risks/side effects/benefits were discussed. Diagnosis(-es) explained to patient and questions answered. Literature provided where appropriate. SUBJECTIVE:   Ms. Nikki Morales is a 67 y.o. female who is here for follow up of routine medical issues. Chief Complaint   Patient presents with    Pre-op Exam      cataract , form with patient        She has had decreased vision due cataract; extraction planned by Dr. Minh Simon. She is seeing Dr. Lorena Weathers for L knee DJD; has had cortisone shot. Plans to see him tomorrow. \"The veins in my legs\"--Dr. Hector Acosta has done US on legs. Memory: Doing the same. \"Dr. Cleo Dougherty tested on me on a couple of things and I was fine. \" As noted before:  She has trouble keeping appointments, even with a calendar. She will ask the same question 5 times. She also sees Dr. Cleo Dougherty, psychiatrist, and is on lithium.  Her therapist is Tristan English Love.     Migraines: Sometimes has aura without headache. Had been having a frequent unilateral HA, associated with photophobia and phonophobia. Tried botox injections in scalp, for migraines--\"it didn't work. \" She had been treated by Dr. Elvi Tolentino, neurologist, with meds including topamax and hydrocodone. Just about the only thing that has worked for her for abortive therapy is hydrocodone. Dr. Hal Caruso informed her that he can't prescribe narcotics anymore, and to get them from PCP: \"and he explained what's going on with the government and everything. \"      She is still getting some PT, due to some leg weakness. She hasn't yet been to allergist about the possible meat allergy. For the foot pain, this is ongoing. Dr. Kim Mccain currently. \"I've seen four foot doctors and none of them recommended surgery. So far, nothing worked. I hobble around. \" We note from 2018: Dr. Bobo Del Rosario is seeing her for her foot pain. Her L foot is in a boot, and \"I have several torn tendons. \"      A fib:  She has had two EP studies in 2014, and ablation for a fib, with Dr. Beti Webb. She also sees Dr. Robb Cosby. She was seeing Dr. Moise Trinh for GI issues/GERD. On PPI. Blood testing has revealed allergies to pork, beef, egg white, milk. She had been referred by [facility] in Dr. Molly Roberts office, to go see an allergist.    Weight: 240    Insomnia is a little better. ASHELY no longer on CPAP. Has trouble tolerating the mask--takes it off early in the morning. She no longer gets botox injections into bladder--This was working; plans to go back in Jan.  She is not having much urinary incontinence. Dr. Tor Tavares sees her. At this time, she is otherwise doing well and has brought no other complaints to my attention today. For a list of the medical issues addressed today, see the assessment and plan below.     PMH:   Past Medical History:   Diagnosis Date    Arrhythmia 2014    atrial fibrillation 2014, sick sinus syndrome: Heart Maurilio Rancho    Arthritis     Rheumatoid    [facility], depressed ([facility]     Bipolar affective disorder ([facility] 3/31/2010    Chronic pain 2018    right shoulder    Colon polyps 03/31/2010    also 5/2018: colon polyps    Depression     Diarrhea 07/19/2013    D/t alpha gal allergy says patient    Epigastric pain 6/5/2015    GERD (gastroesophageal reflux disease)     occasiional only; controlled with med    Hemispheric carotid artery syndrome No stroke    Neuro: Awais Clinton  (CT scan head/neck negative 4/2018 [facility])    Hyperlipidemia 3/31/2010    Hypothyroidism 3/31/2010    Long term current use of anticoagulant therapy     Lupus ([facility]     patient denies-says she has RA    Migraine 03/31/2010    has them x2 a month: last one 5/23/2018    Psychotic disorder ([facility]     S/P ablation of atrial fibrillation 05/13/2014    S/P cardiac pacemaker procedure 9/16/2014 9/16/14 Medtronic MRI compatible dual chamber pacemaker implant    Sleep apnea     unable to tolerate CPAP    Stool color black     Thyroid disease     Tick-borne disease     Alpha Gal allergy: no eat pork, beef, milk (Meat allergy showed up on a allergy test)    Umbilical hernia without obstruction and without gangrene 6/22/2020    Urge incontinence 3/31/2010    Vitamin D deficiency 3/31/2015       Past Surgical History:   Procedure Laterality Date    COLONOSCOPY N/A 4/27/2018    COLONOSCOPY performed by Bernadine Mai MD at [facility] OR    COLONOSCOPY N/A 11/25/2020    COLONOSCOPY, EGD performed by Anibal Gallagher MD at [place] [address]  4/27/2018         COLONOSCOPY,RENALDO Ervin  4/27/2018         HX BUNIONECTOMY      HX GI  2010, 5/2018    egd, colonoscopy    HX HERNIA REPAIR  01/11/2021    HX HERNIA REPAIR  01/2021    HX HERNIA REPAIR  38/1778    4 umbillical     HX HYSTERECTOMY      HX ORTHOPAEDIC  2000's    removed bone spurs from R & L hand    HX ORTHOPAEDIC  1990s? left bunionectomy     HX OTHER SURGICAL  07/2014    2 shocks to heart & ablations    HX OTHER SURGICAL  01/11/2021    incarcerated incisional hernia surgery    HX PACEMAKER  2014    On the right side    HX PACEMAKER PLACEMENT  2015    HX SHOULDER REPLACEMENT Right 2017    HX TONSILLECTOMY  15 yrs old   [facility]  5/2014    ablation       All: She is allergic to latex, beef containing products, ciprofloxacin, pork derived (porcine), bovine cartilage, milk, sulfa (sulfonamide antibiotics), and xarelto [rivaroxaban]. Meds:    Current Outpatient Medications on File Prior to Visit   Medication Sig Dispense Refill    ergocalciferol (ERGOCALCIFEROL) 1,250 mcg (50,000 unit) capsule TAKE 1 CAPSULE BY MOUTH ONE TIME PER WEEK 12 Capsule 1    topiramate (Topamax) 100 mg tablet Take 1 Tablet by mouth two (2) times a day. 60 Tablet 5    propranolol LA (INDERAL LA) 80 mg SR capsule Take 1 Capsule by mouth nightly. Indications: essential tremor 90 Capsule 3    furosemide (Lasix) 20 mg tablet Take 1 tablet by mouth daily PRN 30 Tablet 5    pregabalin (Lyrica) 100 mg capsule Take 1 Capsule by mouth two (2) times a day. Max Daily Amount: 200 mg. 60 Capsule 5    esomeprazole (NEXIUM) 40 mg capsule Take 40 mg by mouth daily.  levothyroxine (SYNTHROID) 50 mcg tablet TAKE 1 TABLET BY MOUTH EVERY DAY 90 Tablet 3    apixaban (ELIQUIS) 5 mg tablet Take 1 Tablet by mouth two (2) times a day. 56 Tablet 0    lithium carbonate SR (LITHOBID) 300 mg CR tablet Take 300 mg by mouth three (3) times daily.  cyanocobalamin (Vitamin B-12) 100 mcg tablet Take 100 mcg by mouth daily.  omeprazole (PRILOSEC) 40 mg capsule TAKE 1 CAPSULE BY MOUTH EVERY DAY IN THE MORNING      timolol (TIMOPTIC) 0.5 % ophthalmic solution INSTILL 1 DROP 1 TIME EVERY DAY INTO BOTH EYES EVERY MORNING      hydrOXYchloroQUINE (PLAQUENIL) 200 mg tablet Take 200 mg by mouth two (2) times a day.       latanoprost (XALATAN) 0.005 % ophthalmic solution Administer 1 Drop to both eyes two (2) times a day.  alprazolam (XANAX) 0.5 mg tablet Take 0.5 mg by mouth nightly.  trimethoprim (TRIMPEX) 100 mg tablet Take 100 mg by mouth daily.  zolpidem (AMBIEN) 10 mg tablet Take 10 mg by mouth nightly as needed. Rarely (Patient not taking: Reported on 7/11/2022)      tiZANidine (ZANAFLEX) 2 mg tablet Take 1 Tab by mouth three (3) times daily. (Patient not taking: Reported on 7/11/2022) 90 Tab 5     No current facility-administered medications on file prior to visit. 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 FH:  Father, colon cancer. Mother, stroke. Grandmother, DM.  ROS: See above; Complete ROS otherwise negative. OBJECTIVE:   Vitals:   Visit Vitals  /60 (BP 1 Location: Left arm) Comment (BP 1 Location): manual   Pulse 76   Temp 98.2 °F (36.8 °C) (Temporal)   Resp 19   Ht 5' 5\" (1.651 m)   Wt 234 lb (106.1 kg)   SpO2 99%   BMI 38.94 kg/m²     Gen: Pleasant 67 y.o.  female in NAD.   HEENT: PERRLA. EOMI. OP moist and pink.  Neck: Supple.  No LAD.  HEART: RRR, No M/G/R.   LUNGS: CTAB No W/R.   ABDOMEN: S, NT, ND, BS+.   EXTREMITIES: Warm. No C/C/E. MUSCULOSKELETAL: Normal ROM, muscle strength 5/5 all groups. NEURO: Alert and oriented x 3.  Cranial nerves grossly intact.  No focal sensory or motor deficits noted. SKIN: Warm. Dry. No rashes or other lesions noted.       Lab Results   Component Value Date/Time    Sodium 142 02/11/2022 09:29 AM    Potassium 4.4 02/11/2022 09:29 AM    Chloride 112 (H) 02/11/2022 09:29 AM    CO2 21 02/11/2022 09:29 AM    Anion gap 3 (L) 11/04/2020 07:48 PM    Glucose 93 02/11/2022 09:29 AM    BUN 14 02/11/2022 09:29 AM    Creatinine 0.89 02/11/2022 09:29 AM    BUN/Creatinine ratio 16 02/11/2022 09:29 AM    GFR est AA 74 02/11/2022 09:29 AM    GFR est non-AA 65 02/11/2022 09:29 AM    Calcium 9.9 02/11/2022 09:29 AM    Bilirubin, total 0.4 02/11/2022 09:29 AM ALT (SGPT) 14 02/11/2022 09:29 AM    Alk.  phosphatase 84 02/11/2022 09:29 AM    Protein, total 5.9 (L) 02/11/2022 09:29 AM    Albumin 3.9 02/11/2022 09:29 AM    Globulin 3.1 11/04/2020 07:48 PM    A-G Ratio 2.0 02/11/2022 09:29 AM       Lab Results   Component Value Date/Time    Cholesterol, total 198 02/11/2022 09:29 AM    HDL Cholesterol 53 02/11/2022 09:29 AM    LDL,Direct 124 (H) 03/30/2010 08:40 AM    LDL, calculated 121 (H) 02/11/2022 09:29 AM    LDL, calculated 125 (H) 11/06/2018 08:19 AM    Triglyceride 134 02/11/2022 09:29 AM    CHOL/HDL Ratio 5.3 (H) 03/30/2010 08:40 AM        Lab Results   Component Value Date/Time    Hemoglobin A1c 4.9 02/11/2022 09:29 AM       Lab Results   Component Value Date/Time    WBC 5.5 02/11/2022 09:29 AM    HGB 13.2 02/11/2022 09:29 AM    HCT 40.2 02/11/2022 09:29 AM    PLATELET 978 20/90/4299 09:29 AM    MCV 94 02/11/2022 09:29 AM

## 2022-07-11 NOTE — PROGRESS NOTES
This is the Subsequent Medicare Annual Wellness Exam, performed 12 months or more after the Initial AWV or the last Subsequent AWV    I have reviewed the patient's medical history in detail and updated the computerized patient record. Assessment/Plan   Education and counseling provided:  Are appropriate based on today's review and evaluation         Depression Risk Factor Screening     3 most recent PHQ Screens 7/11/2022   Little interest or pleasure in doing things Several days   Feeling down, depressed, irritable, or hopeless Several days   Total Score PHQ 2 2       Alcohol & Drug Abuse Risk Screen    Do you average more than 1 drink per night or more than 7 drinks a week:  No    On any one occasion in the past three months have you have had more than 3 drinks containing alcohol:  No          Functional Ability and Level of Safety    Hearing: Hearing is good. Activities of Daily Living: The home contains: grab bars  Patient does total self care      Ambulation: with mild difficulty     Fall Risk:  Fall Risk Assessment, last 12 mths 7/11/2022   Able to walk? Yes   Fall in past 12 months? 1   Do you feel unsteady? 0   Are you worried about falling 0   Is the gait abnormal? 1   Number of falls in past 12 months 1   Fall with injury?  0      Abuse Screen:  Patient is not abused       Cognitive Screening    Has your family/caregiver stated any concerns about your memory: yes - Under investigation     Cognitive Screening: Normal orientation    Health Maintenance Due     Health Maintenance Due   Topic Date Due    DTaP/Tdap/Td series (1 - Tdap) Never done    Shingrix Vaccine Age 50> (1 of 2) Never done    Bone Densitometry (Dexa) Screening  Never done    Medicare Yearly Exam  09/23/2020    Pneumococcal 65+ years (2 - PCV) 09/23/2020    COVID-19 Vaccine (3 - Booster for Joycelyn Burden series) 09/08/2021    Breast Cancer Screen Mammogram  08/07/2022       Patient Care Team   Patient Care Team:  Fabian Bustos, MD as PCP - General (Internal Medicine Physician)  Andrew Carroll MD as PCP - [facility]  Jeb Ordoñez RN as [facility] [place]  Rivera Rasheed MD as Referring Provider (Cardiovascular Disease Physician)  Ray Acevedo MD (Obstetrics & Gynecology)  General Theo MD (Neurology)  ANA MazariegosM (Iline Carter)  Gail Florez MD as Surgeon (General Surgery)  Aj Greer MD as Physician (Sleep Medicine Physician)  Andrew Carroll MD as Referring Provider (Internal Medicine Physician)  Andrew Carroll MD as Referring Provider (Internal Medicine Physician)  Rosy Li MD as Physician (Cardiovascular Disease Physician)  ORLANDO Nunez as Nurse Practitioner (Nurse Practitioner)    History     Patient Active Problem List   Diagnosis Code    Colon polyps K63.5    Urge incontinence N39.41    Migraine G43.909    Bipolar affective disorder ([facility] F31.9    Hypothyroidism E03.9    Hyperglycemia R73.9    Hyperlipidemia E78.5    GERD (gastroesophageal reflux disease) K21.9    Diarrhea R19.7    Morbid obesity with BMI of 40.0-44.9, adult ([facility] E66.01, Z68.41    S/P ablation of atrial fibrillation Z98.890, Z86.79    SSS (sick sinus syndrome) ([facility] I49.5    S/P cardiac pacemaker procedure Z95.0    Esophageal spasm K22.4    A-fib ([facility] I48.91    Vitamin D deficiency E55.9    Epigastric pain R10.13    Fibromyalgia M79.7    Incisional hernia, without obstruction or gangrene K43.2    Obesity, Class II, BMI 35-39.9 E66.9    Osteoarthritis of right shoulder K35.535    Umbilical hernia without obstruction and without gangrene K42.9    Venous insufficiency of both lower extremities I87.2    Varicose veins with swelling I83.899    Essential hypertension I10     Past Medical History:   Diagnosis Date    Arrhythmia 2014    atrial fibrillation 2014, sick sinus syndrome: Heart [facility]o    Arthritis     Rheumatoid    Bipolar affect, depressed ([facility]  Bipolar affective disorder ([facility] 3/31/2010    Chronic pain 2018    right shoulder    Colon polyps 03/31/2010    also 5/2018: colon polyps    Depression     Diarrhea 07/19/2013    D/t alpha gal allergy says patient    Epigastric pain 6/5/2015    GERD (gastroesophageal reflux disease)     occasiional only; controlled with med    Hemispheric carotid artery syndrome No stroke    Neuro: Merline Delay  (CT scan head/neck negative 4/2018 in [facility])    Hyperlipidemia 3/31/2010    Hypothyroidism 3/31/2010    Long term current use of anticoagulant therapy     Lupus ([facility]     patient denies-says she has RA    Migraine 03/31/2010    has them x2 a month: last one 5/23/2018    Psychotic disorder ([facility]     S/P ablation of atrial fibrillation 05/13/2014    S/P cardiac pacemaker procedure 9/16/2014 9/16/14 Medtronic MRI compatible dual chamber pacemaker implant    Sleep apnea     unable to tolerate CPAP    Stool color black     Thyroid disease     Tick-borne disease     Alpha Gal allergy: no eat pork, beef, milk (Meat allergy showed up on a allergy test)    Umbilical hernia without obstruction and without gangrene 6/22/2020    Urge incontinence 3/31/2010    Vitamin D deficiency 3/31/2015      Past Surgical History:   Procedure Laterality Date    COLONOSCOPY N/A 4/27/2018    COLON[facility]    COLONOSCOPY N/A 11/25/2020    COLONOSCOPY, EGD performed by Alberto Bautista MD at [address]  4/27/2018         COLONOSCOPY,[facility] Sujit Boards  4/27/2018         HX BUNIONECTOMY      HX GI  2010, 5/2018    egd, colonoscopy    HX HERNIA REPAIR  01/11/2021    HX HERNIA REPAIR  01/2021    HX HERNIA REPAIR  37/2446    4 umbillical     HX HYSTERECTOMY      HX ORTHOPAEDIC  2000's    removed bone spurs from R & L hand    HX ORTHOPAEDIC  1990s?     left bunionectomy     HX OTHER SURGICAL  07/2014    2 shocks to heart & ablations  HX OTHER SURGICAL  01/11/2021    incarcerated incisional hernia surgery    HX PACEMAKER  2014    On the right side    HX PACEMAKER PLACEMENT  2015    HX SHOULDER REPLACEMENT Right 2017    HX TONSILLECTOMY  15 yrs old    [place] CARDIAC SURG PROCEDURE UNLIST  5/2014    ablation     Current Outpatient Medications   Medication Sig Dispense Refill    ergocalciferol (ERGOCALCIFEROL) 1,250 mcg (50,000 unit) capsule TAKE 1 CAPSULE BY MOUTH ONE TIME PER WEEK 12 Capsule 1    topiramate (Topamax) 100 mg tablet Take 1 Tablet by mouth two (2) times a day. 60 Tablet 5    propranolol LA (INDERAL LA) 80 mg SR capsule Take 1 Capsule by mouth nightly. Indications: essential tremor 90 Capsule 3    furosemide (Lasix) 20 mg tablet Take 1 tablet by mouth daily PRN 30 Tablet 5    pregabalin (Lyrica) 100 mg capsule Take 1 Capsule by mouth two (2) times a day. Max Daily Amount: 200 mg. 60 Capsule 5    esomeprazole (NEXIUM) 40 mg capsule Take 40 mg by mouth daily.  levothyroxine (SYNTHROID) 50 mcg tablet TAKE 1 TABLET BY MOUTH EVERY DAY 90 Tablet 3    apixaban (ELIQUIS) 5 mg tablet Take 1 Tablet by mouth two (2) times a day. 56 Tablet 0    lithium carbonate SR (LITHOBID) 300 mg CR tablet Take 300 mg by mouth three (3) times daily.  cyanocobalamin (Vitamin B-12) 100 mcg tablet Take 100 mcg by mouth daily.  omeprazole (PRILOSEC) 40 mg capsule TAKE 1 CAPSULE BY MOUTH EVERY DAY IN THE MORNING      timolol (TIMOPTIC) 0.5 % ophthalmic solution INSTILL 1 DROP 1 TIME EVERY DAY INTO BOTH EYES EVERY MORNING      hydrOXYchloroQUINE (PLAQUENIL) 200 mg tablet Take 200 mg by mouth two (2) times a day.  latanoprost (XALATAN) 0.005 % ophthalmic solution Administer 1 Drop to both eyes two (2) times a day.  alprazolam (XANAX) 0.5 mg tablet Take 0.5 mg by mouth nightly.        Allergies   Allergen Reactions    Latex Swelling     Swelling of lips says patient    Beef Containing Products Nausea and Vomiting     diarrhea  Ciprofloxacin Unknown (comments)    Pork Derived (Porcine) Nausea and Vomiting     diarrhea    Bovine Cartilage Nausea and Vomiting     diarrhea    Milk Nausea and Vomiting     diarrhea  diarrhea    Sulfa (Sulfonamide Antibiotics) Hives    Xarelto [Rivaroxaban] Other (comments)     GI problems       Family History   Problem Relation Age of Onset    Arrhythmia Mother         afib    Stroke Mother     Heart Failure Mother     Colon Cancer Father     Heart Disease Father     Cancer Father         colon cancer    Arrhythmia Brother         afib    Asthma Brother     COPD Brother      Social History     Tobacco Use    Smoking status: Never Smoker    Smokeless tobacco: Never Used   Substance Use Topics    Alcohol use: Not Currently     Alcohol/week: 1.0 standard drink     Types: 1 Cans of beer per week         Harry Arguello MD

## 2022-07-11 NOTE — LETTER
7/11/2022 10:04 AM    Ms. Papito Gardner  [address] [facility] [address]      Allergies: She is allergic to latex, beef containing products, ciprofloxacin, pork derived (porcine), bovine cartilage, milk, sulfa (sulfonamide antibiotics), and xarelto [rivaroxaban]. Current Outpatient Medications on File Prior to Visit   Medication Sig Dispense Refill    ergocalciferol (ERGOCALCIFEROL) 1,250 mcg (50,000 unit) capsule TAKE 1 CAPSULE BY MOUTH ONE TIME PER WEEK 12 Capsule 1    topiramate (Topamax) 100 mg tablet Take 1 Tablet by mouth two (2) times a day. 60 Tablet 5    propranolol LA (INDERAL LA) 80 mg SR capsule Take 1 Capsule by mouth nightly. Indications: essential tremor 90 Capsule 3    furosemide (Lasix) 20 mg tablet Take 1 tablet by mouth daily PRN 30 Tablet 5    pregabalin (Lyrica) 100 mg capsule Take 1 Capsule by mouth two (2) times a day. Max Daily Amount: 200 mg. 60 Capsule 5    esomeprazole (NEXIUM) 40 mg capsule Take 40 mg by mouth daily.  levothyroxine (SYNTHROID) 50 mcg tablet TAKE 1 TABLET BY MOUTH EVERY DAY 90 Tablet 3    apixaban (ELIQUIS) 5 mg tablet Take 1 Tablet by mouth two (2) times a day. 56 Tablet 0    lithium carbonate SR (LITHOBID) 300 mg CR tablet Take 300 mg by mouth three (3) times daily.  cyanocobalamin (Vitamin B-12) 100 mcg tablet Take 100 mcg by mouth daily.  omeprazole (PRILOSEC) 40 mg capsule TAKE 1 CAPSULE BY MOUTH EVERY DAY IN THE MORNING      timolol (TIMOPTIC) 0.5 % ophthalmic solution INSTILL 1 DROP 1 TIME EVERY DAY INTO BOTH EYES EVERY MORNING      hydrOXYchloroQUINE (PLAQUENIL) 200 mg tablet Take 200 mg by mouth two (2) times a day.  latanoprost (XALATAN) 0.005 % ophthalmic solution Administer 1 Drop to both eyes two (2) times a day.  alprazolam (XANAX) 0.5 mg tablet Take 0.5 mg by mouth nightly. No current facility-administered medications on file prior to visit.

## 2022-07-11 NOTE — PATIENT INSTRUCTIONS

## 2022-07-11 NOTE — PROGRESS NOTES
.1. Have you been to the ER, urgent care clinic since your last visit? Hospitalized since your last visit? No    2. Have you seen or consulted any other health care providers outside of the [address] [place] [address] since your last visit? Include any pap smears or colon screening.  No         Em lpn

## 2022-07-14 ENCOUNTER — TELEPHONE (OUTPATIENT)
Dept: NEUROLOGY | Age: 72
End: 2022-07-14

## 2022-07-14 NOTE — TELEPHONE ENCOUNTER
Patient called stating that yesterday she had the worst burning sensation in her toe and she would like a call back from Nurse to discuss. Please advise.  [phone]

## 2022-07-14 NOTE — TELEPHONE ENCOUNTER
Spoke with patient she been having this C/O for a few months was getting pedicure and recommend they not get to close to  left toe thinking may be ingrown toe nail her legs and feet are swollen most of the time. This left toe feels like it is on fire now not been able to ambulate well and can't stand for anything to touch it .  Please advise

## 2022-07-20 DIAGNOSIS — R52 PAIN: ICD-10-CM

## 2022-07-20 RX ORDER — PREGABALIN 100 MG/1
100 CAPSULE ORAL 2 TIMES DAILY
Qty: 60 CAPSULE | Refills: 5 | Status: SHIPPED | OUTPATIENT
Start: 2022-07-20 | End: 2022-10-17

## 2022-07-26 ENCOUNTER — TELEPHONE (OUTPATIENT)
Dept: INTERNAL MEDICINE CLINIC | Age: 72
End: 2022-07-26

## 2022-08-02 ENCOUNTER — OFFICE VISIT (OUTPATIENT)
Dept: INTERNAL MEDICINE CLINIC | Age: 72
End: 2022-08-02
Payer: MEDICARE

## 2022-08-02 VITALS
HEIGHT: 65 IN | HEART RATE: 73 BPM | BODY MASS INDEX: 37.92 KG/M2 | RESPIRATION RATE: 18 BRPM | TEMPERATURE: 97.1 F | DIASTOLIC BLOOD PRESSURE: 46 MMHG | WEIGHT: 227.6 LBS | OXYGEN SATURATION: 98 % | SYSTOLIC BLOOD PRESSURE: 66 MMHG

## 2022-08-02 DIAGNOSIS — R52 PAIN: ICD-10-CM

## 2022-08-02 DIAGNOSIS — R60.0 LEG EDEMA: Primary | ICD-10-CM

## 2022-08-02 DIAGNOSIS — M79.675 GREAT TOE PAIN, LEFT: ICD-10-CM

## 2022-08-02 PROCEDURE — G9717 DOC PT DX DEP/BP F/U NT REQ: HCPCS | Performed by: INTERNAL MEDICINE

## 2022-08-02 PROCEDURE — G8752 SYS BP LESS 140: HCPCS | Performed by: INTERNAL MEDICINE

## 2022-08-02 PROCEDURE — G0463 HOSPITAL OUTPT CLINIC VISIT: HCPCS | Performed by: INTERNAL MEDICINE

## 2022-08-02 PROCEDURE — G8427 DOCREV CUR MEDS BY ELIG CLIN: HCPCS | Performed by: INTERNAL MEDICINE

## 2022-08-02 PROCEDURE — G8417 CALC BMI ABV UP PARAM F/U: HCPCS | Performed by: INTERNAL MEDICINE

## 2022-08-02 PROCEDURE — 99213 OFFICE O/P EST LOW 20 MIN: CPT | Performed by: INTERNAL MEDICINE

## 2022-08-02 PROCEDURE — G8536 NO DOC ELDER MAL SCRN: HCPCS | Performed by: INTERNAL MEDICINE

## 2022-08-02 PROCEDURE — 1101F PT FALLS ASSESS-DOCD LE1/YR: CPT | Performed by: INTERNAL MEDICINE

## 2022-08-02 PROCEDURE — G8400 PT W/DXA NO RESULTS DOC: HCPCS | Performed by: INTERNAL MEDICINE

## 2022-08-02 PROCEDURE — G8754 DIAS BP LESS 90: HCPCS | Performed by: INTERNAL MEDICINE

## 2022-08-02 PROCEDURE — 3017F COLORECTAL CA SCREEN DOC REV: CPT | Performed by: INTERNAL MEDICINE

## 2022-08-02 PROCEDURE — 1090F PRES/ABSN URINE INCON ASSESS: CPT | Performed by: INTERNAL MEDICINE

## 2022-08-02 RX ORDER — FLUOCINONIDE 0.5 MG/G
CREAM TOPICAL 2 TIMES DAILY
Qty: 60 G | Refills: 0 | Status: SHIPPED | OUTPATIENT
Start: 2022-08-02 | End: 2022-10-17

## 2022-08-02 RX ORDER — HYDROCODONE BITARTRATE AND ACETAMINOPHEN 5; 325 MG/1; MG/1
1 TABLET ORAL
Qty: 30 TABLET | Refills: 0 | Status: SHIPPED | OUTPATIENT
Start: 2022-08-02 | End: 2022-09-01

## 2022-08-02 NOTE — PROGRESS NOTES
PROGRESS NOTE  Name: Daralyn Boxer   : 1950       ASSESSMENT/ PLAN:     Diagnoses and all orders for this visit:    Leg edema  -     REFERRAL TO LYMPHEDEMA CLINIC    Great toe pain, left  -     REFERRAL TO ORTHOPEDICS    Follow-up and Dispositions    Return if symptoms worsen or fail to improve. I have reviewed the patient's medications and risks/side effects/benefits were discussed. Diagnosis(-es) explained to patient and questions answered. Literature provided where appropriate. SUBJECTIVE  Ms. Daralyn Boxer presents today acutely for     Chief Complaint   Patient presents with    Leg Swelling     X 2 months     Toe Pain     Left great toe x 2 months       She has had edema for \"quite a while. \" She has tried compression and elevation. She has seen seen Dr. Komal Martin, for L knee pain and feels she may need a TKR, but wants her to lose weight. When she came back for follow up, \"He wasn't happy with my weight, and he walked out of the visit. \"     We note that her BP is low today, but she is asymptomatic. I am not sure that her reading is accurate. She is under care of cardiologist across. OBJECTIVE  Visit Vitals  BP (!) 66/46 (BP 1 Location: Left upper arm, BP Patient Position: Sitting, BP Cuff Size: Adult)   Pulse 73   Temp 97.1 °F (36.2 °C) (Temporal)   Resp 18   Ht 5' 5\" (1.651 m)   Wt 227 lb 9.6 oz (103.2 kg)   SpO2 98%   BMI 37.87 kg/m²     Gen: Pleasant 67 y.o.  female in NAD. HEENT: PERRLA. EOMI. OP moist and pink. Neck: Supple. No LAD. HEART: RRR, No M/G/R.   LUNGS: CTAB No W/R. ABDOMEN: S, NT, ND, BS+. EXTREMITIES: Warm. She has massive edema of both LE, and skin changes of stasis dermatitis.

## 2022-08-02 NOTE — PROGRESS NOTES
1. \"Have you been to the ER, urgent care clinic since your last visit? Hospitalized since your last visit? \" No    2. \"Have you seen or consulted any other health care providers outside of the [address] [place] [address] since your last visit? See care team     3. For patients aged 39-70: Has the patient had a colonoscopy / FIT/ Cologuard? Yes - no Care Gap present      If the patient is female:    4. For patients aged 41-77: Has the patient had a mammogram within the past 2 years? Scheduled 8/22      5. For patients aged 21-65: Has the patient had a pap smear?  NA - based on age or sex

## 2022-08-04 ENCOUNTER — TELEPHONE (OUTPATIENT)
Dept: INTERNAL MEDICINE CLINIC | Age: 72
End: 2022-08-04

## 2022-08-04 ENCOUNTER — OFFICE VISIT (OUTPATIENT)
Dept: NEUROLOGY | Age: 72
End: 2022-08-04
Payer: MEDICARE

## 2022-08-04 ENCOUNTER — DOCUMENTATION ONLY (OUTPATIENT)
Dept: INTERNAL MEDICINE CLINIC | Age: 72
End: 2022-08-04

## 2022-08-04 VITALS
RESPIRATION RATE: 16 BRPM | DIASTOLIC BLOOD PRESSURE: 62 MMHG | SYSTOLIC BLOOD PRESSURE: 96 MMHG | BODY MASS INDEX: 37.82 KG/M2 | OXYGEN SATURATION: 99 % | TEMPERATURE: 97.6 F | HEIGHT: 65 IN | HEART RATE: 75 BPM | WEIGHT: 227 LBS

## 2022-08-04 DIAGNOSIS — R26.9 GAIT DIFFICULTY: ICD-10-CM

## 2022-08-04 DIAGNOSIS — G25.0 ESSENTIAL TREMOR: Primary | ICD-10-CM

## 2022-08-04 DIAGNOSIS — G43.719 INTRACTABLE CHRONIC MIGRAINE WITHOUT AURA AND WITHOUT STATUS MIGRAINOSUS: ICD-10-CM

## 2022-08-04 DIAGNOSIS — M79.7 FIBROMYALGIA: ICD-10-CM

## 2022-08-04 PROCEDURE — 99214 OFFICE O/P EST MOD 30 MIN: CPT | Performed by: NURSE PRACTITIONER

## 2022-08-04 PROCEDURE — 1123F ACP DISCUSS/DSCN MKR DOCD: CPT | Performed by: NURSE PRACTITIONER

## 2022-08-04 RX ORDER — SACUBITRIL AND VALSARTAN 24; 26 MG/1; MG/1
1 TABLET, FILM COATED ORAL 2 TIMES DAILY
COMMUNITY
Start: 2022-07-12

## 2022-08-04 RX ORDER — PROPRANOLOL HYDROCHLORIDE 20 MG/1
20 TABLET ORAL
Qty: 30 TABLET | Refills: 5 | Status: SHIPPED | OUTPATIENT
Start: 2022-08-04 | End: 2022-10-14 | Stop reason: ALTCHOICE

## 2022-08-04 NOTE — TELEPHONE ENCOUNTER
Called, spoke with Pt. Two pt identifiers confirmed. Patient notified she may bring need pre-op paperwork for completion. Pt verbalized understanding of information discussed w/ no further questions at this time.

## 2022-08-04 NOTE — PROGRESS NOTES
Patient presents to the office to drop off [address] form for Rhea Lyle MD. Patient advised of 7-10 business day turnaround time for form completion & may incur a fee of $25.00. This has been fully explained to the patient, who indicates understanding.

## 2022-08-04 NOTE — TELEPHONE ENCOUNTER
#[phone]  pt states she forgot to bring a pre op form for Dr. Ritchie Gomez to fill out for upcoming eye surgery on 22. Pt states her old one  and she needs the new one filled out. I did inform that this is usually a pre op visit to get this filled out. It would be up to Dr. Ritchie Gomez if he chooses to fill it out. Pt states she would like a call back about this.

## 2022-08-04 NOTE — PROGRESS NOTES
1. Have you been to the ER, urgent care clinic since your last visit? Hospitalized since your last visit? No.    2. Have you seen or consulted any other health care providers outside of the [address] [place] [address] since your last visit? Include any pap smears or colon screening.    No.        Chief Complaint   Patient presents with    Tremors     4 month- neuropathy- both have increased

## 2022-08-04 NOTE — PROGRESS NOTES
[facility]  [facility] [address] [place] [address] Drive  Tel: [phone]  Fax: [phone]      Date:  22     Name:  Crow Martines  :  1950  MRN:  [id number]     PCP:  Annette Weiss MD    Chief Complaint   Patient presents with    Tremors     4 month- neuropathy- both have increased       HISTORY OF PRESENT ILLNESS:  Patient presents today for follow up on her shakes. Feels like they are worse. The whole body can shake at times. It's annoying. It interferes with combing hair,  has to help with earring. Shakes a lot while eating, drops things. She also complains of worsening headaches. Resume Topamax 100mg BID: doing well. Denies any side effects  Resume Inderal 80: seems to be helping some, however there is concerned that her blood pressure is dropping. [facility] for Gait, falls, weakness: still doing it, it's very helpful. Working on her legs. Last visit 2022 with Karon Gonsalez NP. PCP is Dr Alix Smiley:     Review of Systems   Constitutional:  Positive for malaise/fatigue. Cardiovascular:  Positive for leg swelling. Musculoskeletal:  Positive for falls. Neurological:  Positive for tremors, sensory change and headaches. Negative for dizziness. Psychiatric/Behavioral:  Positive for memory loss. All other systems reviewed and are negative. Current Outpatient Medications   Medication Sig    Entresto 24-26 mg tablet Take 1 Tablet by mouth two (2) times a day.  propranoloL (INDERAL) 20 mg tablet Take 1 Tablet by mouth nightly.  fluocinoNIDE (LIDEX) 0.05 % topical cream Apply  to affected area two (2) times a day.  HYDROcodone-acetaminophen (NORCO) 5-325 mg per tablet Take 1 Tablet by mouth every eight (8) hours as needed for Pain for up to 30 days. Max Daily Amount: 3 Tablets.  pregabalin (LYRICA) 100 mg capsule TAKE 1 CAPSULE BY MOUTH TWO (2) TIMES A DAY. MAX DAILY AMOUNT: 200 MG.     ergocalciferol (ERGOCALCIFEROL) 1,250 mcg (50,000 unit) capsule TAKE 1 CAPSULE BY MOUTH ONE TIME PER WEEK    topiramate (Topamax) 100 mg tablet Take 1 Tablet by mouth two (2) times a day.  furosemide (Lasix) 20 mg tablet Take 1 tablet by mouth daily PRN    esomeprazole (NEXIUM) 40 mg capsule Take 40 mg by mouth daily.  levothyroxine (SYNTHROID) 50 mcg tablet TAKE 1 TABLET BY MOUTH EVERY DAY    apixaban (ELIQUIS) 5 mg tablet Take 1 Tablet by mouth two (2) times a day.  lithium carbonate SR (LITHOBID) 300 mg CR tablet Take 300 mg by mouth three (3) times daily.  cyanocobalamin (VITAMIN B12) 100 mcg tablet Take 100 mcg by mouth daily.  timolol (TIMOPTIC) 0.5 % ophthalmic solution INSTILL 1 DROP 1 TIME EVERY DAY INTO BOTH EYES EVERY MORNING    hydrOXYchloroQUINE (PLAQUENIL) 200 mg tablet Take 200 mg by mouth two (2) times a day.  latanoprost (XALATAN) 0.005 % ophthalmic solution Administer 1 Drop to both eyes two (2) times a day.  ALPRAZolam (XANAX) 0.5 mg tablet Take 0.5 mg by mouth nightly.  omeprazole (PRILOSEC) 40 mg capsule TAKE 1 CAPSULE BY MOUTH EVERY DAY IN THE MORNING     No current facility-administered medications for this visit.      Allergies   Allergen Reactions    Latex Swelling     Swelling of lips says patient    Beef Containing Products Nausea and Vomiting     diarrhea    Ciprofloxacin Unknown (comments)    Pork Derived (Porcine) Nausea and Vomiting     diarrhea    Bovine Cartilage Nausea and Vomiting     diarrhea    Milk Nausea and Vomiting     diarrhea  diarrhea    Sulfa (Sulfonamide Antibiotics) Hives    Xarelto [Rivaroxaban] Other (comments)     GI problems     Past Medical History:   Diagnosis Date    Arrhythmia 2014    atrial fibrillation 2014, sick sinus syndrome: Heart [facility]    Arthritis     Rheumatoid    Bipolar affect, depressed ([address]     Bipolar affective disorder ([facility] 3/31/2010    Chronic pain 2018    right shoulder    Colon polyps 03/31/2010    also 5/2018: colon polyps    Depression     Diarrhea 07/19/2013    D/t alpha gal allergy says patient    Epigastric pain 6/5/2015    GERD (gastroesophageal reflux disease)     occasiional only; controlled with med    Hemispheric carotid artery syndrome No stroke    Neuro: Marito Lugo  (CT scan head/neck negative 4/2018 in [facility])    Hyperlipidemia 3/31/2010    Hypothyroidism 3/31/2010    Long term current use of anticoagulant therapy     Lupus ([facility]     patient denies-says she has RA    Migraine 03/31/2010    has them x2 a month: last one 5/23/2018    Psychotic disorder ([facility]     S/P ablation of atrial fibrillation 05/13/2014    S/P cardiac pacemaker procedure 9/16/2014 9/16/14 Medtronic MRI compatible dual chamber pacemaker implant    Sleep apnea     unable to tolerate CPAP    Stool color black     Thyroid disease     Tick-borne disease     Alpha Gal allergy: no eat pork, beef, milk (Meat allergy showed up on a allergy test)    Umbilical hernia without obstruction and without gangrene 6/22/2020    Urge incontinence 3/31/2010    Vitamin D deficiency 3/31/2015     Past Surgical History:   Procedure Laterality Date    COLONOSCOPY N/A 4/27/2018    COLON[facility]    COLONOSCOPY N/A 11/25/2020    COLONOSCOPY, EGD performed [facility] Ovi Myers MD at [facility]  4/27/2018         COLONOSCOPY,REMPINO [place]  4/27/2018         HX BUNIONECTOMY      HX GI  2010, 5/2018    egd, colonoscopy    HX HERNIA REPAIR  01/11/2021    HX HERNIA REPAIR  01/2021    HX HERNIA REPAIR  82/1795    4 umbillical     HX HYSTERECTOMY      HX ORTHOPAEDIC  2000's    removed bone spurs from R & L hand    HX ORTHOPAEDIC  1990s?     left bunionectomy     HX OTHER SURGICAL  07/2014    2 shocks to heart & ablations    HX OTHER SURGICAL  01/11/2021    incarcerated incisional hernia surgery    HX PACEMAKER  2014    On the right side    HX PACEMAKER PLACEMENT  2015    HX SHOULDER REPLACEMENT Right 2017    HX TONSILLECTOMY  15 yrs old   [address] [place] [address] CARDIAC SURG PROCEDURE UNLIST  5/2014    ablation     Social History     Socioeconomic History    Marital status:      Spouse name: Not on file    Number of children: Not on file    Years of education: Not on file    Highest education level: Not on file   Occupational History    Not on file   Tobacco Use    Smoking status: Never    Smokeless tobacco: Never   Vaping Use    Vaping Use: Never used   Substance and Sexual Activity    Alcohol use: Not Currently     Alcohol/week: 1.0 standard drink     Types: 1 Cans of beer per week    Drug use: Yes     Types: Prescription    Sexual activity: Never   Other Topics Concern    Not on file   Social History Narrative    Not on file     Social Determinants of Health     Financial Resource Strain: Low Risk     Difficulty of Paying Living Expenses: Not hard at all   Food Insecurity: No Food Insecurity    Worried About Running Out of Food in the Last Year: Never true    Elsie of Food in the Last Year: Never true   Transportation Needs: Not on file   Physical Activity: Not on file   Stress: Not on file   Social Connections: Not on file   Intimate Partner Violence: Not on file   Housing Stability: Not on file     Family History   Problem Relation Age of Onset    Arrhythmia Mother         afib    Stroke Mother     Heart Failure Mother     Colon Cancer Father     Heart Disease Father     Cancer Father         colon cancer    Arrhythmia Brother         afib    Asthma Brother     COPD Brother          PHYSICAL EXAMINATION:    Visit Vitals  BP 96/62 (BP 1 Location: Left upper arm, BP Patient Position: Sitting, BP Cuff Size: Large adult)   Pulse 75   Temp 97.6 °F (36.4 °C) (Temporal)   Resp 16   Ht 5' 5\" (1.651 m)   Wt 227 lb (103 kg)   SpO2 99%   BMI 37.77 kg/m²       General:  Well defined, nourished, and well groomed individual in no acute distress. Musculoskeletal: Bilateral lower extremities revealed pitting edema and had full range of motion of joints. Psych:  Good mood and bright affect    NEUROLOGICAL EXAMINATION:     Mental Status:   Alert and oriented to person, place, and time with recent and remote memory intact. Attention span and concentration are normal. Clear speech. Fund of knowledge preserved. Cranial Nerves:   PERRLA. Visual fields were full  EOM: no evidence of nystagmus  Facial sensation:  normal and symmetric  Facial motor: normal and symmetric, no facial droop noted. Hearing intact  SCM strength intact  Tongue: midline without fasciculations    Motor Examination: Normal tone. 4+ /5 muscle strength in bilateral upper and lower extremities. No cogwheel rigidity. No muscle wasting, no twitching or fasciculation noted. Sensory exam:  Normal throughout to light touch in bilateral upper and lower extremities. Coordination:   Finger to nose and rapid arm movement testing was normal.  Mild resting tremor in bilateral hands and mild intention tremor. Negative Romberg, negative pronator drift. Gait and Station:  Steady, patient had significant difficulty with tandem walking. Normal arm swing. Reflexes:  DTRs 1+ in bilateral biceps, brachioradialis, patella. ASSESSMENT AND PLAN      ICD-10-CM ICD-9-CM    1. Essential tremor  G25.0 333.1 propranoloL (INDERAL) 20 mg tablet      2. Intractable chronic migraine without aura and without status migrainosus  G43.719 346.71       3. Fibromyalgia  M79.7 729.1       4. Gait difficulty  R26.9 781.2         1. Essential tremor: Upon review of her medical record, it was noted that the patient was on Eliquis which limits medications we can use for the tremor, therefore I will modify her dosage of the propanolol to 20 mg at night to see if that will be beneficial, and minimize her hypotension.   She is to monitor for any worsening or limitations on self-care, feeding self hygiene etc.  -     propranoloL (INDERAL) 20 mg tablet; Take 1 Tablet by mouth nightly., Normal, Disp-30 Tablet, R-5  2. Intractable chronic migraine without aura and without status migrainosus: Continue the topiramate as prescribed no changes made. Patient is continue monitor signs symptoms identifying triggers etc.  3. Fibromyalgia: Healthy lifestyle encouraged, good sleep-wake cycle, continue medications as prescribed. 4. Gait difficulty: Fall precautions recommended, continue with physical therapy. Patient and/or family verbalized understand of all instructions and all questions/concerns were addressed. Safety/side effects of medications discussed. Patient remains a complex patient secondary to polypharmacy, significant comorbid conditions, and use of high-risk medications which complicate the decision making process related to patient's neurologic diagnosis. I will see the patient back in 6 to 8 weeks, sooner if needed.     Ashley Garcia, JAYLEN-BC

## 2022-08-04 NOTE — PATIENT INSTRUCTIONS
I am  going to lower the Propanolol to 20mg at night, which still should help with the tremor, but will have less effects on your blood pressure. I would like to see you back in about 6 weeks.

## 2022-08-16 DIAGNOSIS — L29.9 PRURITUS: ICD-10-CM

## 2022-08-17 RX ORDER — HYDROXYZINE 25 MG/1
TABLET, FILM COATED ORAL
Qty: 270 TABLET | Refills: 2 | Status: SHIPPED | OUTPATIENT
Start: 2022-08-17 | End: 2022-10-17

## 2022-08-22 ENCOUNTER — TELEPHONE (OUTPATIENT)
Dept: INTERNAL MEDICINE CLINIC | Age: 72
End: 2022-08-22

## 2022-08-22 NOTE — TELEPHONE ENCOUNTER
#[phone] Donato  with [facility] states that the H&P that was faxed over is not complete. They will need the answers in the columns circled and faxed back to them as soon as possible.     Fax #[phone]

## 2022-08-24 ENCOUNTER — TELEPHONE (OUTPATIENT)
Dept: INTERNAL MEDICINE CLINIC | Age: 72
End: 2022-08-24

## 2022-08-24 NOTE — TELEPHONE ENCOUNTER
Patient needs a call back to get an update ion her Surgical Clearance Forms being completed for hr upcoming Surgery on 8/30/22. Please call to discuss.  Thank you

## 2022-08-24 NOTE — TELEPHONE ENCOUNTER
Attempted to call patient, voicemail box is full, unable to leave a message.     Pre-Op H&P Faxed yesterday

## 2022-10-06 ENCOUNTER — OFFICE VISIT (OUTPATIENT)
Dept: INTERNAL MEDICINE CLINIC | Age: 72
End: 2022-10-06
Payer: MEDICARE

## 2022-10-06 VITALS
OXYGEN SATURATION: 97 % | TEMPERATURE: 97.7 F | RESPIRATION RATE: 18 BRPM | HEIGHT: 65 IN | BODY MASS INDEX: 36.32 KG/M2 | HEART RATE: 77 BPM | DIASTOLIC BLOOD PRESSURE: 60 MMHG | SYSTOLIC BLOOD PRESSURE: 93 MMHG | WEIGHT: 218 LBS
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
## 2022-10-06 PROCEDURE — G0463 HOSPITAL OUTPT CLINIC VISIT: HCPCS | Performed by: STUDENT IN AN ORGANIZED HEALTH CARE EDUCATION/TRAINING PROGRAM

## 2022-10-06 PROCEDURE — 90715 TDAP VACCINE 7 YRS/> IM: CPT | Performed by: STUDENT IN AN ORGANIZED HEALTH CARE EDUCATION/TRAINING PROGRAM

## 2022-10-06 PROCEDURE — 99213 OFFICE O/P EST LOW 20 MIN: CPT | Performed by: STUDENT IN AN ORGANIZED HEALTH CARE EDUCATION/TRAINING PROGRAM

## 2022-10-06 RX ORDER — AMOXICILLIN AND CLAVULANATE POTASSIUM 875; 125 MG/1; MG/1
1 TABLET, FILM COATED ORAL EVERY 12 HOURS
Qty: 20 TABLET | Refills: 0 | Status: SHIPPED | OUTPATIENT
Start: 2022-10-06 | End: 2022-10-17

## 2022-10-06 NOTE — PROGRESS NOTES
1. \"Have you been to the ER, urgent care clinic since your last visit? Hospitalized since your last visit? \" No    2. \"Have you seen or consulted any other health care providers outside of the [address] [place] [address] since your last visit? See care team     3. For patients aged 39-70: Has the patient had a colonoscopy / FIT/ Cologuard? Yes - no Care Gap present      If the patient is female:    4. For patients aged 41-77: Has the patient had a mammogram within the past 2 years? Yes - no Care Gap present      5. For patients aged 21-65: Has the patient had a pap smear?  NA - based on age or sex

## 2022-10-12 ENCOUNTER — TELEPHONE (OUTPATIENT)
Dept: INTERNAL MEDICINE CLINIC | Age: 72
End: 2022-10-12

## 2022-10-12 NOTE — TELEPHONE ENCOUNTER
**TRIAGE**    Received call from patient  Two pt identifiers confirmed. Complaint: Swelling of ankle and a non healing wound  States she was seen 10/6/2022 and there has bee no improvement of area.     Wants an appt ASAP    Scheduled for Friday 10/14/2022 @ 9:15 am

## 2022-10-14 ENCOUNTER — OFFICE VISIT (OUTPATIENT)
Dept: INTERNAL MEDICINE CLINIC | Age: 72
DRG: 603 | End: 2022-10-14
Payer: MEDICARE

## 2022-10-14 VITALS
WEIGHT: 222.6 LBS | HEART RATE: 84 BPM | RESPIRATION RATE: 16 BRPM | OXYGEN SATURATION: 99 % | SYSTOLIC BLOOD PRESSURE: 95 MMHG | TEMPERATURE: 97 F | DIASTOLIC BLOOD PRESSURE: 62 MMHG | HEIGHT: 65 IN | BODY MASS INDEX: 37.09 KG/M2

## 2022-10-14 DIAGNOSIS — S81.832D PUNCTURE WOUND OF LEFT LOWER LEG, SUBSEQUENT ENCOUNTER: Primary | ICD-10-CM

## 2022-10-14 PROCEDURE — G9717 DOC PT DX DEP/BP F/U NT REQ: HCPCS | Performed by: INTERNAL MEDICINE

## 2022-10-14 PROCEDURE — 99213 OFFICE O/P EST LOW 20 MIN: CPT | Performed by: INTERNAL MEDICINE

## 2022-10-14 PROCEDURE — G8536 NO DOC ELDER MAL SCRN: HCPCS | Performed by: INTERNAL MEDICINE

## 2022-10-14 PROCEDURE — G8427 DOCREV CUR MEDS BY ELIG CLIN: HCPCS | Performed by: INTERNAL MEDICINE

## 2022-10-14 PROCEDURE — G8752 SYS BP LESS 140: HCPCS | Performed by: INTERNAL MEDICINE

## 2022-10-14 PROCEDURE — G0463 HOSPITAL OUTPT CLINIC VISIT: HCPCS | Performed by: INTERNAL MEDICINE

## 2022-10-14 PROCEDURE — 1090F PRES/ABSN URINE INCON ASSESS: CPT | Performed by: INTERNAL MEDICINE

## 2022-10-14 PROCEDURE — 3017F COLORECTAL CA SCREEN DOC REV: CPT | Performed by: INTERNAL MEDICINE

## 2022-10-14 PROCEDURE — 1101F PT FALLS ASSESS-DOCD LE1/YR: CPT | Performed by: INTERNAL MEDICINE

## 2022-10-14 PROCEDURE — G8754 DIAS BP LESS 90: HCPCS | Performed by: INTERNAL MEDICINE

## 2022-10-14 PROCEDURE — G8400 PT W/DXA NO RESULTS DOC: HCPCS | Performed by: INTERNAL MEDICINE

## 2022-10-14 PROCEDURE — G8417 CALC BMI ABV UP PARAM F/U: HCPCS | Performed by: INTERNAL MEDICINE

## 2022-10-14 NOTE — PROGRESS NOTES
1. \"Have you been to the ER, urgent care clinic since your last visit? Hospitalized since your last visit? \" No    2. \"Have you seen or consulted any other health care providers outside of the [address] [place] [address] since your last visit? See care team    3. For patients aged 39-70: Has the patient had a colonoscopy / FIT/ Cologuard? Yes - no Care Gap present      If the patient is female:    4. For patients aged 41-77: Has the patient had a mammogram within the past 2 years? Yes - no Care Gap present      5. For patients aged 21-65: Has the patient had a pap smear?  NA - based on age or sex

## 2022-10-14 NOTE — PROGRESS NOTES
SUBJECTIVE  Ms. Megan Balderrama presents today acutely for     Chief Complaint   Patient presents with    Puncture Wound     She had a puncture wound caused by her dog scratching her on the leg. She now has a wound in her left anterior mid shin. This is leaking serous fluid at the time. She denies any purulent drainage, pain, redness, bleeding. She was seen by my colleague Dr. Richard Linares on 10/6/2022 and was given a course of Augmentin. OBJECTIVE  Visit Vitals  BP 95/62 (BP 1 Location: Left upper arm, BP Patient Position: Sitting, BP Cuff Size: Adult)   Pulse 84   Temp 97 °F (36.1 °C) (Temporal)   Resp 16   Ht 5' 5\" (1.651 m)   Wt 222 lb 9.6 oz (101 kg)   SpO2 99%   BMI 37.04 kg/m²     Gen: Pleasant 67 y.o.  female in NAD. EXTREMITIES: Warm. 2+ edema bilaterally. SKIN: Warm. Dry. <1 mm puncture wound noted, weeping serous fluid. Bilaterally she has skin changes of stasis dermatitis. ASSESSMENT / PLAN  Puncture wound L shin: At this time there is no evidence of infection. She was advised to keep the wound covered with gauze and and use antibiotic ointment such as Neosporin. Lower extremity edema with stasis dermatitis: I have suggested compression and elevation. It might also be worth considering lymphedema clinic at some point. Follow-up as planned.

## 2022-10-17 ENCOUNTER — APPOINTMENT (OUTPATIENT)
Dept: GENERAL RADIOLOGY | Age: 72
DRG: 603 | End: 2022-10-17
Attending: EMERGENCY MEDICINE
Payer: MEDICARE

## 2022-10-17 ENCOUNTER — HOSPITAL ENCOUNTER (INPATIENT)
Age: 72
LOS: 4 days | Discharge: HOME | DRG: 603 | End: 2022-10-21
Attending: EMERGENCY MEDICINE | Admitting: GENERAL ACUTE CARE HOSPITAL
Payer: MEDICARE

## 2022-10-17 ENCOUNTER — APPOINTMENT (OUTPATIENT)
Dept: ULTRASOUND IMAGING | Age: 72
DRG: 603 | End: 2022-10-17
Attending: GENERAL ACUTE CARE HOSPITAL
Payer: MEDICARE

## 2022-10-17 DIAGNOSIS — L03.116 CELLULITIS OF LEFT LOWER EXTREMITY: Primary | ICD-10-CM

## 2022-10-17 DIAGNOSIS — R60.0 BILATERAL LEG EDEMA: ICD-10-CM

## 2022-10-17 DIAGNOSIS — J81.0 ACUTE PULMONARY EDEMA (HCC): ICD-10-CM

## 2022-10-17 PROBLEM — L03.90 CELLULITIS: Status: ACTIVE | Noted: 2022-10-17

## 2022-10-17 LAB
ALBUMIN SERPL-MCNC: 3.5 G/DL (ref 3.5–5)
ALBUMIN/GLOB SERPL: 1.3 {RATIO} (ref 1.1–2.2)
ALP SERPL-CCNC: 86 U/L (ref 45–117)
ALT SERPL-CCNC: 21 U/L (ref 12–78)
ANION GAP SERPL CALC-SCNC: 4 MMOL/L (ref 5–15)
APPEARANCE UR: CLEAR
AST SERPL-CCNC: 14 U/L (ref 15–37)
ATRIAL RATE: 71 BPM
BACTERIA URNS QL MICRO: NEGATIVE /HPF
BASE EXCESS BLD CALC-SCNC: 0.5 MMOL/L
BASOPHILS # BLD: 0 K/UL (ref 0–0.1)
BASOPHILS NFR BLD: 0 % (ref 0–1)
BILIRUB SERPL-MCNC: 1.2 MG/DL (ref 0.2–1)
BILIRUB UR QL: NEGATIVE
BNP SERPL-MCNC: 2175 PG/ML
BUN SERPL-MCNC: 29 MG/DL (ref 6–20)
BUN/CREAT SERPL: 28 (ref 12–20)
CA-I BLD-MCNC: 1.36 MMOL/L (ref 1.12–1.32)
CALCIUM SERPL-MCNC: 10.1 MG/DL (ref 8.5–10.1)
CALCULATED R AXIS, ECG10: -97 DEGREES
CALCULATED T AXIS, ECG11: 85 DEGREES
CHLORIDE BLD-SCNC: 107 MMOL/L (ref 100–108)
CHLORIDE SERPL-SCNC: 113 MMOL/L (ref 97–108)
CO2 BLD-SCNC: 27 MMOL/L (ref 19–24)
CO2 SERPL-SCNC: 28 MMOL/L (ref 21–32)
COLOR UR: ABNORMAL
COMMENT, HOLDF: NORMAL
COMMENT, HOLDF: NORMAL
CREAT SERPL-MCNC: 1.05 MG/DL (ref 0.55–1.02)
CREAT UR-MCNC: 1 MG/DL (ref 0.6–1.3)
CRP SERPL-MCNC: 7.6 MG/DL (ref 0–0.6)
DATE LAST DOSE: NORMAL
DIAGNOSIS, 93000: NORMAL
DIFFERENTIAL METHOD BLD: ABNORMAL
EOSINOPHIL # BLD: 0 K/UL (ref 0–0.4)
EOSINOPHIL NFR BLD: 0 % (ref 0–7)
EPITH CASTS URNS QL MICRO: ABNORMAL /LPF
ERYTHROCYTE [DISTWIDTH] IN BLOOD BY AUTOMATED COUNT: 14.2 % (ref 11.5–14.5)
ERYTHROCYTE [SEDIMENTATION RATE] IN BLOOD: 12 MM/HR (ref 0–30)
GLOBULIN SER CALC-MCNC: 2.7 G/DL (ref 2–4)
GLUCOSE BLD STRIP.AUTO-MCNC: 135 MG/DL (ref 74–106)
GLUCOSE SERPL-MCNC: 140 MG/DL (ref 65–100)
GLUCOSE UR STRIP.AUTO-MCNC: NEGATIVE MG/DL
HCO3 BLDA-SCNC: 27 MMOL/L
HCT VFR BLD AUTO: 40.4 % (ref 35–47)
HGB BLD-MCNC: 13.3 G/DL (ref 11.5–16)
HGB UR QL STRIP: NEGATIVE
IMM GRANULOCYTES # BLD AUTO: 0 K/UL (ref 0–0.04)
IMM GRANULOCYTES NFR BLD AUTO: 0 % (ref 0–0.5)
KETONES UR QL STRIP.AUTO: NEGATIVE MG/DL
LACTATE BLD-SCNC: 1.19 MMOL/L (ref 0.4–2)
LEUKOCYTE ESTERASE UR QL STRIP.AUTO: ABNORMAL
LITHIUM SERPL-SCNC: 1.04 MMOL/L (ref 0.6–1.2)
LYMPHOCYTES # BLD: 0.7 K/UL (ref 0.8–3.5)
LYMPHOCYTES NFR BLD: 6 % (ref 12–49)
MCH RBC QN AUTO: 31 PG (ref 26–34)
MCHC RBC AUTO-ENTMCNC: 32.9 G/DL (ref 30–36.5)
MCV RBC AUTO: 94.2 FL (ref 80–99)
MONOCYTES # BLD: 0.7 K/UL (ref 0–1)
MONOCYTES NFR BLD: 6 % (ref 5–13)
MUCOUS THREADS URNS QL MICRO: ABNORMAL /LPF
NEUTS SEG # BLD: 10.6 K/UL (ref 1.8–8)
NEUTS SEG NFR BLD: 88 % (ref 32–75)
NITRITE UR QL STRIP.AUTO: NEGATIVE
NRBC # BLD: 0 K/UL (ref 0–0.01)
NRBC BLD-RTO: 0 PER 100 WBC
PCO2 BLDV: 47.6 MMHG (ref 41–51)
PH BLDV: 7.36 [PH] (ref 7.32–7.42)
PH UR STRIP: 7.5 [PH] (ref 5–8)
PLATELET # BLD AUTO: 179 K/UL (ref 150–400)
PMV BLD AUTO: 11.6 FL (ref 8.9–12.9)
PO2 BLDV: <13 MMHG (ref 25–40)
POTASSIUM BLD-SCNC: 4 MMOL/L (ref 3.5–5.5)
POTASSIUM SERPL-SCNC: 3.9 MMOL/L (ref 3.5–5.1)
PROCALCITONIN SERPL-MCNC: 0.08 NG/ML
PROT SERPL-MCNC: 6.2 G/DL (ref 6.4–8.2)
PROT UR STRIP-MCNC: 30 MG/DL
Q-T INTERVAL, ECG07: 606 MS
QRS DURATION, ECG06: 184 MS
QTC CALCULATION (BEZET), ECG08: 685 MS
RBC # BLD AUTO: 4.29 M/UL (ref 3.8–5.2)
RBC #/AREA URNS HPF: ABNORMAL /HPF (ref 0–5)
REPORTED DOSE,DOSE: NORMAL UNITS
REPORTED DOSE/TIME,TMG: NORMAL
SAMPLES BEING HELD,HOLD: NORMAL
SAMPLES BEING HELD,HOLD: NORMAL
SODIUM BLD-SCNC: 147 MMOL/L (ref 136–145)
SODIUM SERPL-SCNC: 145 MMOL/L (ref 136–145)
SP GR UR REFRACTOMETRY: 1.02
SPECIMEN SITE: ABNORMAL
TROPONIN-HIGH SENSITIVITY: 14 NG/L (ref 0–51)
TSH SERPL DL<=0.05 MIU/L-ACNC: 0.44 UIU/ML (ref 0.36–3.74)
UA: UC IF INDICATED,UAUC: ABNORMAL
UROBILINOGEN UR QL STRIP.AUTO: 1 EU/DL (ref 0.2–1)
VENTRICULAR RATE, ECG03: 77 BPM
WBC # BLD AUTO: 12.1 K/UL (ref 3.6–11)
WBC URNS QL MICRO: ABNORMAL /HPF (ref 0–4)

## 2022-10-17 PROCEDURE — 73590 X-RAY EXAM OF LOWER LEG: CPT

## 2022-10-17 PROCEDURE — 93005 ELECTROCARDIOGRAM TRACING: CPT

## 2022-10-17 PROCEDURE — 80053 COMPREHEN METABOLIC PANEL: CPT

## 2022-10-17 PROCEDURE — 71045 X-RAY EXAM CHEST 1 VIEW: CPT

## 2022-10-17 PROCEDURE — 81001 URINALYSIS AUTO W/SCOPE: CPT

## 2022-10-17 PROCEDURE — 85652 RBC SED RATE AUTOMATED: CPT

## 2022-10-17 PROCEDURE — 80178 ASSAY OF LITHIUM: CPT

## 2022-10-17 PROCEDURE — 74011250636 HC RX REV CODE- 250/636: Performed by: EMERGENCY MEDICINE

## 2022-10-17 PROCEDURE — 96365 THER/PROPH/DIAG IV INF INIT: CPT

## 2022-10-17 PROCEDURE — 36415 COLL VENOUS BLD VENIPUNCTURE: CPT

## 2022-10-17 PROCEDURE — 86140 C-REACTIVE PROTEIN: CPT

## 2022-10-17 PROCEDURE — 74011000250 HC RX REV CODE- 250: Performed by: GENERAL ACUTE CARE HOSPITAL

## 2022-10-17 PROCEDURE — 74011250637 HC RX REV CODE- 250/637: Performed by: EMERGENCY MEDICINE

## 2022-10-17 PROCEDURE — 87040 BLOOD CULTURE FOR BACTERIA: CPT

## 2022-10-17 PROCEDURE — 99285 EMERGENCY DEPT VISIT HI MDM: CPT

## 2022-10-17 PROCEDURE — 93970 EXTREMITY STUDY: CPT

## 2022-10-17 PROCEDURE — 84145 PROCALCITONIN (PCT): CPT

## 2022-10-17 PROCEDURE — 84484 ASSAY OF TROPONIN QUANT: CPT

## 2022-10-17 PROCEDURE — 84443 ASSAY THYROID STIM HORMONE: CPT

## 2022-10-17 PROCEDURE — 96366 THER/PROPH/DIAG IV INF ADDON: CPT

## 2022-10-17 PROCEDURE — 82947 ASSAY GLUCOSE BLOOD QUANT: CPT

## 2022-10-17 PROCEDURE — 85025 COMPLETE CBC W/AUTO DIFF WBC: CPT

## 2022-10-17 PROCEDURE — 65270000046 HC RM TELEMETRY

## 2022-10-17 PROCEDURE — 83880 ASSAY OF NATRIURETIC PEPTIDE: CPT

## 2022-10-17 PROCEDURE — 96368 THER/DIAG CONCURRENT INF: CPT

## 2022-10-17 PROCEDURE — 74011000258 HC RX REV CODE- 258: Performed by: EMERGENCY MEDICINE

## 2022-10-17 PROCEDURE — 74011250637 HC RX REV CODE- 250/637: Performed by: GENERAL ACUTE CARE HOSPITAL

## 2022-10-17 RX ORDER — FUROSEMIDE 20 MG/1
20 TABLET ORAL 2 TIMES DAILY
COMMUNITY

## 2022-10-17 RX ORDER — TOPIRAMATE 100 MG/1
100 TABLET, FILM COATED ORAL
COMMUNITY

## 2022-10-17 RX ORDER — HYDROXYZINE HYDROCHLORIDE 10 MG/1
10 TABLET, FILM COATED ORAL
Status: DISCONTINUED | OUTPATIENT
Start: 2022-10-17 | End: 2022-10-21 | Stop reason: HOSPADM

## 2022-10-17 RX ORDER — ONDANSETRON 4 MG/1
4 TABLET, ORALLY DISINTEGRATING ORAL
Status: DISCONTINUED | OUTPATIENT
Start: 2022-10-17 | End: 2022-10-21 | Stop reason: HOSPADM

## 2022-10-17 RX ORDER — MIDODRINE HYDROCHLORIDE 5 MG/1
2.5 TABLET ORAL
Status: DISCONTINUED | OUTPATIENT
Start: 2022-10-17 | End: 2022-10-21 | Stop reason: HOSPADM

## 2022-10-17 RX ORDER — ONDANSETRON 2 MG/ML
4 INJECTION INTRAMUSCULAR; INTRAVENOUS
Status: DISCONTINUED | OUTPATIENT
Start: 2022-10-17 | End: 2022-10-21 | Stop reason: HOSPADM

## 2022-10-17 RX ORDER — UREA 10 %
100 LOTION (ML) TOPICAL DAILY
Status: DISCONTINUED | OUTPATIENT
Start: 2022-10-17 | End: 2022-10-21 | Stop reason: HOSPADM

## 2022-10-17 RX ORDER — VANCOMYCIN/0.9 % SOD CHLORIDE 1.5G/250ML
1500 PLASTIC BAG, INJECTION (ML) INTRAVENOUS EVERY 24 HOURS
Status: DISCONTINUED | OUTPATIENT
Start: 2022-10-18 | End: 2022-10-19

## 2022-10-17 RX ORDER — SODIUM CHLORIDE 0.9 % (FLUSH) 0.9 %
5-40 SYRINGE (ML) INJECTION EVERY 8 HOURS
Status: DISCONTINUED | OUTPATIENT
Start: 2022-10-17 | End: 2022-10-21 | Stop reason: HOSPADM

## 2022-10-17 RX ORDER — LITHIUM CARBONATE 300 MG/1
300 TABLET, FILM COATED, EXTENDED RELEASE ORAL 3 TIMES DAILY
Status: DISCONTINUED | OUTPATIENT
Start: 2022-10-17 | End: 2022-10-21 | Stop reason: HOSPADM

## 2022-10-17 RX ORDER — TOPIRAMATE 100 MG/1
100 TABLET, FILM COATED ORAL DAILY
Status: DISCONTINUED | OUTPATIENT
Start: 2022-10-17 | End: 2022-10-21 | Stop reason: HOSPADM

## 2022-10-17 RX ORDER — SODIUM CHLORIDE 0.9 % (FLUSH) 0.9 %
5-40 SYRINGE (ML) INJECTION AS NEEDED
Status: DISCONTINUED | OUTPATIENT
Start: 2022-10-17 | End: 2022-10-21 | Stop reason: HOSPADM

## 2022-10-17 RX ORDER — ACETAMINOPHEN 325 MG/1
650 TABLET ORAL
Status: DISCONTINUED | OUTPATIENT
Start: 2022-10-17 | End: 2022-10-21 | Stop reason: HOSPADM

## 2022-10-17 RX ORDER — ERGOCALCIFEROL 1.25 MG/1
50000 CAPSULE ORAL
Status: DISCONTINUED | OUTPATIENT
Start: 2022-10-21 | End: 2022-10-21 | Stop reason: HOSPADM

## 2022-10-17 RX ORDER — HYDROCODONE BITARTRATE AND ACETAMINOPHEN 5; 325 MG/1; MG/1
1 TABLET ORAL
COMMUNITY
End: 2022-11-14 | Stop reason: SDUPTHER

## 2022-10-17 RX ORDER — MIDODRINE HYDROCHLORIDE 5 MG/1
5 TABLET ORAL
Status: COMPLETED | OUTPATIENT
Start: 2022-10-17 | End: 2022-10-17

## 2022-10-17 RX ORDER — SODIUM CHLORIDE 0.9 % (FLUSH) 0.9 %
5-10 SYRINGE (ML) INJECTION AS NEEDED
Status: DISCONTINUED | OUTPATIENT
Start: 2022-10-17 | End: 2022-10-17 | Stop reason: SDUPTHER

## 2022-10-17 RX ORDER — ALPRAZOLAM 0.5 MG/1
0.5 TABLET ORAL
Status: DISCONTINUED | OUTPATIENT
Start: 2022-10-17 | End: 2022-10-21 | Stop reason: HOSPADM

## 2022-10-17 RX ORDER — ZOLPIDEM TARTRATE 10 MG/1
10 TABLET ORAL
COMMUNITY

## 2022-10-17 RX ORDER — LATANOPROST 50 UG/ML
1 SOLUTION/ DROPS OPHTHALMIC
Status: DISCONTINUED | OUTPATIENT
Start: 2022-10-17 | End: 2022-10-21 | Stop reason: HOSPADM

## 2022-10-17 RX ORDER — PREGABALIN 200 MG/1
200 CAPSULE ORAL 2 TIMES DAILY
COMMUNITY

## 2022-10-17 RX ORDER — PANTOPRAZOLE SODIUM 40 MG/1
40 TABLET, DELAYED RELEASE ORAL
Status: DISCONTINUED | OUTPATIENT
Start: 2022-10-18 | End: 2022-10-21 | Stop reason: HOSPADM

## 2022-10-17 RX ORDER — POLYETHYLENE GLYCOL 3350 17 G/17G
17 POWDER, FOR SOLUTION ORAL DAILY PRN
Status: DISCONTINUED | OUTPATIENT
Start: 2022-10-17 | End: 2022-10-21 | Stop reason: HOSPADM

## 2022-10-17 RX ORDER — PREGABALIN 25 MG/1
100 CAPSULE ORAL 2 TIMES DAILY
Status: DISCONTINUED | OUTPATIENT
Start: 2022-10-17 | End: 2022-10-17

## 2022-10-17 RX ORDER — ACETAMINOPHEN 650 MG/1
650 SUPPOSITORY RECTAL
Status: DISCONTINUED | OUTPATIENT
Start: 2022-10-17 | End: 2022-10-21 | Stop reason: HOSPADM

## 2022-10-17 RX ORDER — PROPRANOLOL HYDROCHLORIDE 60 MG/1
60 CAPSULE, EXTENDED RELEASE ORAL DAILY
COMMUNITY

## 2022-10-17 RX ORDER — TIMOLOL MALEATE 5 MG/ML
1 SOLUTION/ DROPS OPHTHALMIC DAILY
Status: DISCONTINUED | OUTPATIENT
Start: 2022-10-17 | End: 2022-10-21 | Stop reason: HOSPADM

## 2022-10-17 RX ORDER — LEVOTHYROXINE SODIUM 50 UG/1
50 TABLET ORAL DAILY
Status: DISCONTINUED | OUTPATIENT
Start: 2022-10-17 | End: 2022-10-21 | Stop reason: HOSPADM

## 2022-10-17 RX ORDER — PREGABALIN 100 MG/1
200 CAPSULE ORAL 2 TIMES DAILY
Status: DISCONTINUED | OUTPATIENT
Start: 2022-10-17 | End: 2022-10-21 | Stop reason: HOSPADM

## 2022-10-17 RX ORDER — ACETAMINOPHEN 325 MG/1
650 TABLET ORAL
Status: DISCONTINUED | OUTPATIENT
Start: 2022-10-17 | End: 2022-10-17 | Stop reason: SDUPTHER

## 2022-10-17 RX ADMIN — Medication 100 MCG: at 12:58

## 2022-10-17 RX ADMIN — SODIUM CHLORIDE, PRESERVATIVE FREE 10 ML: 5 INJECTION INTRAVENOUS at 17:06

## 2022-10-17 RX ADMIN — PREGABALIN 100 MG: 25 CAPSULE ORAL at 12:18

## 2022-10-17 RX ADMIN — LEVOTHYROXINE SODIUM 50 MCG: 0.05 TABLET ORAL at 12:20

## 2022-10-17 RX ADMIN — MIDODRINE HYDROCHLORIDE 2.5 MG: 5 TABLET ORAL at 17:06

## 2022-10-17 RX ADMIN — SODIUM CHLORIDE 500 ML: 9 INJECTION, SOLUTION INTRAVENOUS at 06:59

## 2022-10-17 RX ADMIN — LATANOPROST 1 DROP: 50 SOLUTION OPHTHALMIC at 21:06

## 2022-10-17 RX ADMIN — APIXABAN 5 MG: 5 TABLET, FILM COATED ORAL at 12:18

## 2022-10-17 RX ADMIN — MIDODRINE HYDROCHLORIDE 2.5 MG: 5 TABLET ORAL at 12:19

## 2022-10-17 RX ADMIN — APIXABAN 5 MG: 5 TABLET, FILM COATED ORAL at 21:04

## 2022-10-17 RX ADMIN — SODIUM CHLORIDE, PRESERVATIVE FREE 10 ML: 5 INJECTION INTRAVENOUS at 21:04

## 2022-10-17 RX ADMIN — ALPRAZOLAM 0.5 MG: 0.5 TABLET ORAL at 21:03

## 2022-10-17 RX ADMIN — MIDODRINE HYDROCHLORIDE 5 MG: 5 TABLET ORAL at 10:00

## 2022-10-17 RX ADMIN — VANCOMYCIN HYDROCHLORIDE 2500 MG: 10 INJECTION, POWDER, LYOPHILIZED, FOR SOLUTION INTRAVENOUS at 06:59

## 2022-10-17 RX ADMIN — LITHIUM CARBONATE 300 MG: 300 TABLET, FILM COATED, EXTENDED RELEASE ORAL at 21:04

## 2022-10-17 RX ADMIN — LITHIUM CARBONATE 300 MG: 300 TABLET, FILM COATED, EXTENDED RELEASE ORAL at 17:35

## 2022-10-17 RX ADMIN — CEFEPIME 2 G: 2 INJECTION, POWDER, FOR SOLUTION INTRAVENOUS at 06:59

## 2022-10-17 RX ADMIN — TOPIRAMATE 100 MG: 100 TABLET, FILM COATED ORAL at 12:58

## 2022-10-17 RX ADMIN — PREGABALIN 200 MG: 100 CAPSULE ORAL at 21:03

## 2022-10-17 NOTE — PROGRESS NOTES
Pharmacy Medication Reconciliation     The patient's  Adeola Hutson was interviewed regarding current PTA medication list, use and drug allergies; obtained permission from patient to discuss drug regimen with visitor(s) present. The patient's  was questioned regarding use of any other inhalers, topical products, over the counter medications, herbal medications, vitamin products or ophthalmic/nasal/otic medication use. Allergy Update: Latex, Beef containing products, Ciprofloxacin, Pork derived (porcine), Bovine cartilage, Milk, Sulfa (sulfonamide antibiotics), and Xarelto [rivaroxaban]    Recommendations/Findings: The following amendments were made to the patient's active medication list on file at [address]:   1) Additions:   Norco 5-325 mg every day PRN  Propranolol 60 mg ER every day   Zolpidem 10 mg QHS PRN    2) Deletions:   Augmentin 875-125 mg  Fluocinonide 0.05% cream   Hydroxyzine 25 mg    3) Changes:   Furosemide 20 mg every day to 20 mg BID  Pregabalin 100 mg BID to 200 mg BID  Topiramate 100 mg BID to 100 mg every day      Pertinent Findings:   - has morning meds in baggie in room, instructed not to take at this time as some meds may be held from provider. Clarified PTA med list with patient's . PTA medication list was corrected to the following:     Prior to Admission Medications   Prescriptions Last Dose Informant Taking? ALPRAZolam (XANAX) 0.5 mg tablet  Significant Other Yes   Sig: Take 0.5 mg by mouth nightly. Entresto 24-26 mg tablet  Significant Other Yes   Sig: Take 1 Tablet by mouth two (2) times a day. HYDROcodone-acetaminophen (NORCO) 5-325 mg per tablet  Significant Other Yes   Sig: Take 1 Tablet by mouth daily as needed for Pain. apixaban (ELIQUIS) 5 mg tablet  Significant Other Yes   Sig: Take 1 Tablet by mouth two (2) times a day. cyanocobalamin (VITAMIN B12) 100 mcg tablet  Significant Other Yes   Sig: Take 100 mcg by mouth daily.    ergocalciferol (ERGOCALCIFEROL) 1,250 mcg (50,000 unit) capsule  Significant Other Yes   Sig: TAKE 1 CAPSULE BY MOUTH ONE TIME PER WEEK   esomeprazole (NEXIUM) 40 mg capsule  Significant Other Yes   Sig: Take 40 mg by mouth daily. furosemide (LASIX) 20 mg tablet  Significant Other Yes   Sig: Take 20 mg by mouth two (2) times a day.   hydrOXYchloroQUINE (PLAQUENIL) 200 mg tablet  Significant Other Yes   Sig: Take 200 mg by mouth two (2) times a day. latanoprost (XALATAN) 0.005 % ophthalmic solution  Significant Other Yes   Sig: Administer 1 Drop to both eyes two (2) times a day. levothyroxine (SYNTHROID) 50 mcg tablet  Significant Other Yes   Sig: TAKE 1 TABLET BY MOUTH EVERY DAY   lithium carbonate SR (LITHOBID) 300 mg CR tablet  Significant Other Yes   Sig: Take 300 mg by mouth three (3) times daily. omeprazole (PRILOSEC) 40 mg capsule  Significant Other Yes   Sig: TAKE 1 CAPSULE BY MOUTH EVERY DAY IN THE MORNING   pregabalin (LYRICA) 200 mg capsule  Significant Other Yes   Sig: Take 200 mg by mouth two (2) times a day. propranolol LA (INDERAL LA) 60 mg SR capsule  Significant Other Yes   Sig: Take 60 mg by mouth daily. timolol (TIMOPTIC) 0.5 % ophthalmic solution  Significant Other Yes   Sig: INSTILL 1 DROP 1 TIME EVERY DAY INTO BOTH EYES EVERY MORNING   topiramate (TOPAMAX) 100 mg tablet  Significant Other Yes   Sig: Take 100 mg by mouth nightly. zolpidem (AMBIEN) 10 mg tablet  Significant Other Yes   Sig: Take 10 mg by mouth nightly as needed for Sleep.       Facility-Administered Medications: None        Thank you,  Kendell Harris

## 2022-10-17 NOTE — PROGRESS NOTES
1530 TRANSFER - IN REPORT:    Verbal report received from Juliane(name) on Kath Marie  being received from ED(unit) for routine progression of care      Report consisted of patients Situation, Background, Assessment and   Recommendations(SBAR). Information from the following report(s) SBAR, Kardex, ED Summary, and MAR was reviewed with the receiving nurse. Opportunity for questions and clarification was provided. Assessment completed upon patients arrival to unit and care assumed. 1900 End of Shift Note    Bedside shift change report given to Briseyda (oncoming nurse) by Tish Chavez RN (offgoing nurse). Report included the following information SBAR, Kardex, and MAR    Shift worked:  7a-7p     Shift summary and any significant changes:          Concerns for physician to address:       Zone phone for oncoming shift:          Activity:  Activity Level: Bed Rest  Number times ambulated in hallways past shift: 0  Number of times OOB to chair past shift: 0    Cardiac:   Cardiac Monitoring: Yes      Cardiac Rhythm: Sinus Rhythm    Access:  Current line(s): PIV     Genitourinary:   Urinary status: incontinent and external catheter    Respiratory:   O2 Device: None (Room air)  Chronic home O2 use?: NO  Incentive spirometer at bedside: NO       GI:  Last Bowel Movement Date: 10/17/22  Current diet:  ADULT DIET Regular; Low Fat/Low Chol/High Fiber/2 gm Na; Low Sodium (2 gm); 1800 ml  Passing flatus: YES  Tolerating current diet: YES       Pain Management:   Patient states pain is manageable on current regimen: N/A    Skin:  Mustapha Score: 17  Interventions: PT/OT consult    Patient Safety:  Fall Score:  Total Score: 4  Interventions: bed/chair alarm, assistive device (walker, cane, etc), and pt to call before getting OOB  High Fall Risk: Yes    Length of Stay:  Expected LOS: - - -  Actual LOS: 0      Tish Chavez RN

## 2022-10-17 NOTE — PROGRESS NOTES
Pharmacy Antimicrobial Kinetic Dosing    Indication for Antimicrobials: ssti, cellulitis     Current Regimen of Each Antimicrobial:  Vancomycin pharmacy to dose (Start Date 10/17; Day # )  Cefepime 2g q 24h (start: 10/17, day )    Previous Antimicrobial Therapy:    Goal Level: AUC: 400-600 mg/hr/Liter/day    Date Dose & Interval Measured (mcg/mL) Predicted AUC/NILSA                       Date & time of next level: 10/19    Dosing calculator used: [employer] calculator    Significant Positive Cultures:   10/17: blood; prelim    Conditions for Dosing Consideration: None    Labs:  Recent Labs     10/17/22  0535   CREA 1.05*   BUN 29*     Recent Labs     10/17/22  0535   WBC 12.1*     Temp (24hrs), Av °F (36.7 °C), Min:98 °F (36.7 °C), Max:98 °F (36.7 °C)    Creatinine Clearance (mL/min):   CrCl (Ideal Body Weight): 42.9   If actual weight < IBW: CrCl (Actual Body Weight) 73.7    Impression/Plan:   Vancomycin 2500mg, then 1500mg  24h for auc 497 and tr ~15mcg/mL  Renal dose cefepime to 2g q 24h per protocol  CMP for 10/18  Antimicrobial stop date 7 days     Pharmacy will follow daily and adjust medications as appropriate for renal function and/or serum levels.     Thank you,  Clif Chan PHARMD    Vancomycin Dosing Document    Documents located on pharmacy Teams site: Clinical Practice -> Antimicrobial Stewardship -> Antibiotics_Vancomycin     Aminoglycoside Dosing Document    Documents located on pharmacy Teams site: Clinical Practice -> Antimicrobial Stewardship -> Antibiotics_Aminoglycosides

## 2022-10-17 NOTE — H&P
Hospitalist Admission Note    NAME: Sima Cifuentes   :  1950   MRN:  [id number]     Date/Time:  10/17/2022 9:44 AM    Patient PCP: Meagan Salomon MD  ______________________________________________________________________  Given the patient's current clinical presentation, I have a high level of concern for decompensation if discharged from the emergency department. Complex decision making was performed, which includes reviewing the patient's available past medical records, laboratory results, and x-ray films. My assessment of this patient's clinical condition and my plan of care is as follows. Assessment / Plan:    LLE cellulitis  LE edema   Immunocompromised, on Plaquenil   Hypotension  Admit to step down  Hold entresto and lasix given low BP  Given 1 L of IVF in ED, trial of midodrine for 24hr and re assess in Am  IV Abx  F/up Cx  Hold off further IVF given h/o CHF and edema on CXR    Elevated Cr  Hold entresto and diuretics   Given 1 L in ED  Monitor renal function, if worse then obtain renal US and nephro consult    A fib, s/p ablation   S/p PPM  CHF, compensated  CXR with Pulm edema, lungs clear on exam, no SOB, on RA  LE pitting edema, on eliquis, low risk for DVT as she is on eliquis, but  is not on bedside to confirm med compliance and probably not that mobile, check Venous duplex to be on the safe side  Last ECHO in chart back to  EF was 50%, not sure if EF became low and was started on entresto for that. Check echo and pharm to verify home meds  Cont eliquis     Bipolar  Depression   On lithium, check level    GERD  Hypothyroidism   Incontinence     Lupus  Hold plaquenil for few days till infection under control     Code Status: full   Surrogate Decision Maker:   DVT Prophylaxis: eliquis  GI Prophylaxis: not indicated  Baseline:       Subjective:   CHIEF COMPLAINT: leg redness/pain/confusion     HISTORY OF PRESENT ILLNESS:     Scott Amin is a 67 y.o.  female who presents with the above CC. Pt has been feeling sick for few days, had a puncture wound by her dog 2 weeks ago. Pt saw her PCP X2 for Left leg redness, Augmentin completed with out much benefit. Leg swelling and redness was getting worse.  reported multiple falls, no head trauma, no LOC.  also reported confusion over last 2 days. No CP, SOB, N/V/diarrhea. XR TIB/FIB LT    Result Date: 10/17/2022  Diffuse soft tissue edema without acute osseous abnormality. XR CHEST PORT    Result Date: 10/17/2022  Pulmonary edema. We were asked to admit for work up and evaluation of the above problems.      Past Medical History:   Diagnosis Date    Arrhythmia 2014    atrial fibrillation 2014, sick sinus syndrome: [facility]    Arthritis     Rheumatoid    Bipolar affect, depressed ([address]     Bipolar affective disorder ([address] 3/31/2010    Chronic pain 2018    right shoulder    Colon polyps 03/31/2010    also 5/2018: colon polyps    Depression     Diarrhea 07/19/2013    D/t alpha gal allergy says patient    Epigastric pain 6/5/2015    GERD (gastroesophageal reflux disease)     occasiional only; controlled with med    Hemispheric carotid artery syndrome No stroke    Neuro: Maurizio Rutherford  (CT scan head/neck negative 4/2018 in [facility])    Hyperlipidemia 3/31/2010    Hypothyroidism 3/31/2010    Long term current use of anticoagulant therapy     Lupus ([address]     patient denies-says she has RA    Migraine 03/31/2010    has them x2 a month: last one 5/23/2018    Psychotic disorder (HCC)     S/P ablation of atrial fibrillation 05/13/2014    S/P cardiac pacemaker procedure 9/16/2014 9/16/14 [employer] MRI compatible dual chamber pacemaker implant    Sleep apnea     unable to tolerate CPAP    Stool color black     Thyroid disease     Tick-borne disease     Alpha Gal allergy: no eat pork, beef, milk (Meat allergy showed up on a allergy test)    Umbilical hernia without obstruction and without gangrene 6/22/2020    Urge incontinence 3/31/2010    Vitamin D deficiency 3/31/2015        Past Surgical History:   Procedure Laterality Date    COLONOSCOPY N/A 4/27/2018    COLONOSCOPY performed by Hosea Morales MD at [facility]    COLONOSCOPY N/A 11/25/2020    COLONOSCOPY, EGD performed by Jennie Velasquez MD at [address]  4/27/2018         Abdi Fraire  4/27/2018         HX BUNIONECTOMY      HX GI  2010, 5/2018    egd, colonoscopy    HX HERNIA REPAIR  01/11/2021    HX HERNIA REPAIR  01/2021    HX HERNIA REPAIR  75/8327    4 umbillical     HX HYSTERECTOMY      HX ORTHOPAEDIC  2000's    removed bone spurs from [facility] [employer] hand    HX ORTHOPAEDIC  1990s?     left bunionectomy     HX OTHER SURGICAL  07/2014    2 shocks to heart & ablations    HX OTHER SURGICAL  01/11/2021    incarcerated incisional hernia surgery    HX PACEMAKER  2014    On the right side    HX PACEMAKER PLACEMENT  2015    HX SHOULDER REPLACEMENT Right 2017    HX TONSILLECTOMY  15 yrs old    [facility] CARDIAC SURG PROCEDURE UNLIST  5/2014    ablation       Social History     Tobacco Use    Smoking status: Never    Smokeless tobacco: Never   Substance Use Topics    Alcohol use: Not Currently     Alcohol/week: 1.0 standard drink     Types: 1 Cans of beer per week        Family History   Problem Relation Age of Onset    Arrhythmia Mother         afib    Stroke Mother     Heart Failure Mother     Colon Cancer Father     Heart Disease Father     Cancer Father         colon cancer    Arrhythmia Brother         afib    Asthma Brother     COPD Brother      Allergies   Allergen Reactions    Latex Swelling     Swelling of lips says patient    Beef Containing Products Nausea and Vomiting     diarrhea    Ciprofloxacin Unknown (comments)    Pork Derived (Porcine) Nausea and Vomiting     diarrhea    Bovine Cartilage Nausea and Vomiting     diarrhea    Milk Nausea and Vomiting     diarrhea  diarrhea    Sulfa (Sulfonamide Antibiotics) Hives    Xarelto [Rivaroxaban] Other (comments)     GI problems        Prior to Admission medications    Medication Sig Start Date End Date Taking? Authorizing Provider   amoxicillin-clavulanate (AUGMENTIN) 875-125 mg per tablet Take 1 Tablet by mouth every twelve (12) hours for 10 days. 10/6/22 10/16/22  Matthew Alexandre MD   hydrOXYzine HCL (ATARAX) 25 mg tablet TAKE 1 TAB BY MOUTH THREE (3) TIMES DAILY AS NEEDED FOR ITCHING FOR UP TO 10 DAYS. 8/17/22   Blanka Osborne MD   Entresto 24-26 mg tablet Take 1 Tablet by mouth two (2) times a day. 7/12/22   Provider, Historical   fluocinoNIDE (LIDEX) 0.05 % topical cream Apply  to affected area two (2) times a day. 8/2/22   Blanka Osborne MD   pregabalin (LYRICA) 100 mg capsule TAKE 1 CAPSULE BY MOUTH TWO (2) TIMES A DAY. MAX DAILY AMOUNT: 200 MG. 7/20/22   Blanka Osborne MD   ergocalciferol (ERGOCALCIFEROL) 1,250 mcg (50,000 unit) capsule TAKE 1 CAPSULE BY MOUTH ONE TIME PER WEEK 5/16/22   Blanka Osborne MD   topiramate (Topamax) 100 mg tablet Take 1 Tablet by mouth two (2) times a day. 4/27/22   O'Laughlin, Saratha Osgood, NP   furosemide (Lasix) 20 mg tablet Take 1 tablet by mouth daily PRN 4/27/22   O'Laughlin, Saratha Osgood, NP   esomeprazole (NEXIUM) 40 mg capsule Take 40 mg by mouth daily. 10/6/21   Provider, Historical   levothyroxine (SYNTHROID) 50 mcg tablet TAKE 1 TABLET BY MOUTH EVERY DAY 9/20/21   Blanka Osborne MD   apixaban (ELIQUIS) 5 mg tablet Take 1 Tablet by mouth two (2) times a day. 8/11/21   Kristen Sheth ANP   lithium carbonate SR (LITHOBID) 300 mg CR tablet Take 300 mg by mouth three (3) times daily. 2/24/21   Provider, Historical   cyanocobalamin (VITAMIN B12) 100 mcg tablet Take 100 mcg by mouth daily.     Provider, Historical   omeprazole (PRILOSEC) 40 mg capsule TAKE 1 CAPSULE BY MOUTH EVERY DAY IN THE MORNING 9/24/20   Provider, Historical   timolol (TIMOPTIC) 0.5 % ophthalmic solution INSTILL 1 DROP 1 TIME EVERY DAY INTO BOTH EYES EVERY MORNING 8/25/20   Provider, Historical   hydrOXYchloroQUINE (PLAQUENIL) 200 mg tablet Take 200 mg by mouth two (2) times a day. 10/1/20   Provider, Historical   latanoprost (XALATAN) 0.005 % ophthalmic solution Administer 1 Drop to both eyes two (2) times a day. 4/26/20   Provider, Historical   ALPRAZolam Cindia Muss) 0.5 mg tablet Take 0.5 mg by mouth nightly. 3/23/10   Provider, Historical       REVIEW OF SYSTEMS:     Total of 12 systems reviewed, negative except for the above. I am not able to complete the review of systems because:    The patient is intubated and sedated    The patient has altered mental status due to his acute medical problems    The patient has baseline aphasia from prior stroke(s)    The patient has baseline dementia and is not reliable historian    The patient is in acute medical distress and unable to provide information             POSITIVE= underlined text  Negative = text not underlined  General:  fever, chills, sweats, generalized weakness, weight loss/gain,      loss of appetite   Eyes:    blurred vision, eye pain, loss of vision, double vision  ENT:    rhinorrhea, pharyngitis   Respiratory:   cough, sputum production, SOB, BIANCHI, wheezing, pleuritic pain   Cardiology:   chest pain, palpitations, orthopnea, PND, edema, syncope   Gastrointestinal:  abdominal pain , N/V, diarrhea, dysphagia, constipation, bleeding   Genitourinary:  frequency, urgency, dysuria, hematuria, incontinence   Muskuloskeletal :  arthralgia, myalgia, back pain, leg swelling, left leg redness  Hematology:  easy bruising, nose or gum bleeding, lymphadenopathy   Dermatological: rash, ulceration, pruritis, color change / jaundice  Endocrine:   hot flashes or polydipsia   Neurological:  headache, dizziness, confusion, focal weakness, paresthesia,     Speech difficulties, memory loss, gait difficulty  Psychological: Feelings of anxiety, depression, agitation    Objective:   VITALS:    Visit Vitals  BP (!) 90/55   Pulse 74   Temp 98 °F (36.7 °C)   Resp 24   Ht 5' 5\" (1.651 m)   Wt 97.8 kg (215 lb 9.8 oz)   SpO2 97%   BMI 35.88 kg/m²       Intake/Output Summary (Last 24 hours) at 10/17/2022 0944  Last data filed at 10/17/2022 0855  Gross per 24 hour   Intake 1100 ml   Output --   Net 1100 ml      Wt Readings from Last 10 Encounters:   10/17/22 97.8 kg (215 lb 9.8 oz)   10/14/22 101 kg (222 lb 9.6 oz)   10/06/22 98.9 kg (218 lb)   08/04/22 103 kg (227 lb)   08/02/22 103.2 kg (227 lb 9.6 oz)   07/11/22 106.1 kg (234 lb)   04/27/22 109 kg (240 lb 3.2 oz)   09/16/21 101.6 kg (224 lb)   08/11/21 106.3 kg (234 lb 6.4 oz)   02/09/21 104.5 kg (230 lb 6.4 oz)       PHYSICAL EXAM:    General:    Alert, cooperative, no distress, appears stated age. HEENT: Atraumatic, anicteric sclerae, pink conjunctivae, MMM  Neck:  Supple, symmetrical  Lungs:   CTA. No Wheezing/Rhonchi. No rales. No tenderness  No Accessory muscle use. CVS:   Regular rhythm. No murmur. No JVD   GI/:   Soft, NT. ND.  BS normal  Extremities:  No cyanosis. No clubbing. Skin:     Not pale. Not Jaundiced. LLE redness and B/L edema  Psych:  Good insight. Not depressed. Not anxious or agitated. Neurologic: Alert and oriented X 4. EOMs intact. No facial asymmetry. No slurred speech.  Symmetrical strength, Sensation grossly intact.               _______________________________________________________________________  Care Plan discussed with:    Comments   Patient x    Family      RN     Care Manager                    Consultant:  x ED provider    _______________________________________________________________________  Expected  Disposition:   Home with Family x   HH/PT/OT/RN    SNF/LTC    SUGAR    ________________________________________________________________________  TOTAL TIME:  54 Minutes    Critical Care Provided     Minutes non procedure based      Comments    x Reviewed previous records   >50% of visit spent in counseling and coordination of care x Discussion with patient and/or family and questions answered       ________________________________________________________________________  Signed: Tino Patten MD    Procedures: see electronic medical records for all procedures/Xrays and details which were not copied into this note but were reviewed prior to creation of Plan. LAB DATA REVIEWED:    Recent Results (from the past 24 hour(s))   SAMPLES BEING HELD    Collection Time: 10/17/22  5:35 AM   Result Value Ref Range    SAMPLES BEING HELD BLUE,DRKGRN     COMMENT        Add-on orders for these samples will be processed based on acceptable specimen integrity and analyte stability, which may vary by analyte. SAMPLES BEING HELD    Collection Time: 10/17/22  5:35 AM   Result Value Ref Range    SAMPLES BEING HELD N/A     COMMENT        Add-on orders for these samples will be processed based on acceptable specimen integrity and analyte stability, which may vary by analyte.    URINALYSIS W/ REFLEX CULTURE    Collection Time: 10/17/22  5:35 AM    Specimen: Urine   Result Value Ref Range    Color YELLOW/STRAW      Appearance CLEAR CLEAR      Specific gravity 1.017      pH (UA) 7.5 5.0 - 8.0      Protein 30 (A) NEG mg/dL    Glucose Negative NEG mg/dL    Ketone Negative NEG mg/dL    Bilirubin Negative NEG      Blood Negative NEG      Urobilinogen 1.0 0.2 - 1.0 EU/dL    Nitrites Negative NEG      Leukocyte Esterase TRACE (A) NEG      WBC 5-10 0 - 4 /hpf    RBC 0-5 0 - 5 /hpf    Epithelial cells MODERATE (A) FEW /lpf    Bacteria Negative NEG /hpf    UA:UC IF INDICATED CULTURE NOT INDICATED BY UA RESULT CNI      Mucus 1+ (A) NEG /lpf   SED RATE (ESR)    Collection Time: 10/17/22  5:35 AM   Result Value Ref Range    Sed rate, automated 12 0 - 30 mm/hr   C REACTIVE PROTEIN, QT    Collection Time: 10/17/22  5:35 AM   Result Value Ref Range    C-Reactive protein 7.60 (H) 0.00 - 0.60 mg/dL   CBC WITH AUTOMATED DIFF    Collection Time: 10/17/22  5:35 AM   Result Value Ref Range    WBC 12.1 (H) 3.6 - 11.0 K/uL    RBC 4.29 3.80 - 5.20 M/uL    HGB 13.3 11.5 - 16.0 g/dL    HCT 40.4 35.0 - 47.0 %    MCV 94.2 80.0 - 99.0 FL    MCH 31.0 26.0 - 34.0 PG    MCHC 32.9 30.0 - 36.5 g/dL    RDW 14.2 11.5 - 14.5 %    PLATELET 455 338 - 423 K/uL    MPV 11.6 8.9 - 12.9 FL    NRBC 0.0 0  WBC    ABSOLUTE NRBC 0.00 0.00 - 0.01 K/uL    NEUTROPHILS 88 (H) 32 - 75 %    LYMPHOCYTES 6 (L) 12 - 49 %    MONOCYTES 6 5 - 13 %    EOSINOPHILS 0 0 - 7 %    BASOPHILS 0 0 - 1 %    IMMATURE GRANULOCYTES 0 0.0 - 0.5 %    ABS. NEUTROPHILS 10.6 (H) 1.8 - 8.0 K/UL    ABS. LYMPHOCYTES 0.7 (L) 0.8 - 3.5 K/UL    ABS. MONOCYTES 0.7 0.0 - 1.0 K/UL    ABS. EOSINOPHILS 0.0 0.0 - 0.4 K/UL    ABS. BASOPHILS 0.0 0.0 - 0.1 K/UL    ABS. IMM. GRANS. 0.0 0.00 - 0.04 K/UL    DF AUTOMATED     TROPONIN-HIGH SENSITIVITY    Collection Time: 10/17/22  5:35 AM   Result Value Ref Range    Troponin-High Sensitivity 14 0 - 51 ng/L   METABOLIC PANEL, COMPREHENSIVE    Collection Time: 10/17/22  5:35 AM   Result Value Ref Range    Sodium 145 136 - 145 mmol/L    Potassium 3.9 3.5 - 5.1 mmol/L    Chloride 113 (H) 97 - 108 mmol/L    CO2 28 21 - 32 mmol/L    Anion gap 4 (L) 5 - 15 mmol/L    Glucose 140 (H) 65 - 100 mg/dL    BUN 29 (H) 6 - 20 MG/DL    Creatinine 1.05 (H) 0.55 - 1.02 MG/DL    BUN/Creatinine ratio 28 (H) 12 - 20      eGFR 56 (L) >60 ml/min/1.73m2    Calcium 10.1 8.5 - 10.1 MG/DL    Bilirubin, total 1.2 (H) 0.2 - 1.0 MG/DL    ALT (SGPT) 21 12 - 78 U/L    AST (SGOT) 14 (L) 15 - 37 U/L    Alk.  phosphatase 86 45 - 117 U/L    Protein, total 6.2 (L) 6.4 - 8.2 g/dL    Albumin 3.5 3.5 - 5.0 g/dL    Globulin 2.7 2.0 - 4.0 g/dL    A-G Ratio 1.3 1.1 - 2.2     EKG, 12 LEAD, INITIAL    Collection Time: 10/17/22  5:40 AM   Result Value Ref Range    Ventricular Rate 77 BPM    Atrial Rate 71 BPM    QRS Duration 184 ms    Q-T Interval 606 ms    QTC Calculation (Bezet) 685 ms    Calculated R Axis -97 degrees    Calculated T Axis 85 degrees Diagnosis       AV dual-paced rhythm with frequent ventricular-paced complexes  When compared with ECG of 27-APR-2018 20:28,  Electronic ventricular pacemaker has replaced Electronic atrial pacemaker     BLOOD GAS,CHEM8,LACTIC ACID POC    Collection Time: 10/17/22  5:49 AM   Result Value Ref Range    Calcium, ionized (POC) 1.36 (H) 1.12 - 1.32 mmol/L    BICARBONATE 27 mmol/L    Base excess (POC) 0.5 mmol/L    Sample source VENOUS BLOOD      CO2, POC 27 (H) 19 - 24 MMOL/L    Sodium,  (H) 136 - 145 MMOL/L    Potassium, POC 4.0 3.5 - 5.5 MMOL/L    Chloride,  100 - 108 MMOL/L    Glucose,  (H) 74 - 106 MG/DL    Creatinine, POC 1.0 0.6 - 1.3 MG/DL    Lactic Acid (POC) 1.19 0.40 - 2.00 mmol/L    pH, venous (POC) 7.36 7.32 - 7.42      pCO2, venous (POC) 47.6 41 - 51 MMHG    pO2, venous (POC) <13 (L) 25 - 40 mmHg     XR TIB/FIB LT    Result Date: 10/17/2022  Diffuse soft tissue edema without acute osseous abnormality. XR CHEST PORT    Result Date: 10/17/2022  Pulmonary edema.     06/29/20    ECHO ADULT COMPLETE 07/02/2020 7/2/2020    Interpretation Summary  · Normal cavity size. Mild concentric hypertrophy. Low normal systolic function. Estimated left ventricular ejection fraction is 50 - 55%. Visually measured ejection fraction. No regional wall motion abnormality noted. Mild (grade 1) left ventricular diastolic dysfunction. · Moderately dilated left atrium. · Mildly dilated right atrium. · Trace mitral valve regurgitation is present. · Image quality for this study was technically difficult.     Signed by: Velma Guillen MD on 7/2/2020  3:49 PM        Current Medications:     Current Facility-Administered Medications:     sodium chloride (NS) flush 5-10 mL, 5-10 mL, IntraVENous, PRN, Floria Clines, DO    acetaminophen (TYLENOL) tablet 650 mg, 650 mg, Oral, Q6H PRN, Senthil Andersen,     midodrine (PROAMATINE) tablet 5 mg, 5 mg, Oral, NOW, Floria Clines, DO    Current Outpatient Medications: hydrOXYzine HCL (ATARAX) 25 mg tablet, TAKE 1 TAB BY MOUTH THREE (3) TIMES DAILY AS NEEDED FOR ITCHING FOR UP TO 10 DAYS., Disp: 270 Tablet, Rfl: 2    Entresto 24-26 mg tablet, Take 1 Tablet by mouth two (2) times a day., Disp: , Rfl:     fluocinoNIDE (LIDEX) 0.05 % topical cream, Apply  to affected area two (2) times a day., Disp: 60 g, Rfl: 0    pregabalin (LYRICA) 100 mg capsule, TAKE 1 CAPSULE BY MOUTH TWO (2) TIMES A DAY. MAX DAILY AMOUNT: 200 MG., Disp: 60 Capsule, Rfl: 5    ergocalciferol (ERGOCALCIFEROL) 1,250 mcg (50,000 unit) capsule, TAKE 1 CAPSULE BY MOUTH ONE TIME PER WEEK, Disp: 12 Capsule, Rfl: 1    topiramate (Topamax) 100 mg tablet, Take 1 Tablet by mouth two (2) times a day., Disp: 60 Tablet, Rfl: 5    furosemide (Lasix) 20 mg tablet, Take 1 tablet by mouth daily PRN, Disp: 30 Tablet, Rfl: 5    esomeprazole (NEXIUM) 40 mg capsule, Take 40 mg by mouth daily. , Disp: , Rfl:     levothyroxine (SYNTHROID) 50 mcg tablet, TAKE 1 TABLET BY MOUTH EVERY DAY, Disp: 90 Tablet, Rfl: 3    apixaban (ELIQUIS) 5 mg tablet, Take 1 Tablet by mouth two (2) times a day., Disp: 56 Tablet, Rfl: 0    lithium carbonate SR (LITHOBID) 300 mg CR tablet, Take 300 mg by mouth three (3) times daily. , Disp: , Rfl:     cyanocobalamin (VITAMIN B12) 100 mcg tablet, Take 100 mcg by mouth daily. , Disp: , Rfl:     omeprazole (PRILOSEC) 40 mg capsule, TAKE 1 CAPSULE BY MOUTH EVERY DAY IN THE MORNING, Disp: , Rfl:     timolol (TIMOPTIC) 0.5 % ophthalmic solution, INSTILL 1 DROP 1 TIME EVERY DAY INTO BOTH EYES EVERY MORNING, Disp: , Rfl:     hydrOXYchloroQUINE (PLAQUENIL) 200 mg tablet, Take 200 mg by mouth two (2) times a day., Disp: , Rfl:     latanoprost (XALATAN) 0.005 % ophthalmic solution, Administer 1 Drop to both eyes two (2) times a day., Disp: , Rfl:     ALPRAZolam (XANAX) 0.5 mg tablet, Take 0.5 mg by mouth nightly., Disp: , Rfl:

## 2022-10-17 NOTE — ED NOTES
3563: Bedside shift change report given to TASHA Machado RN (oncoming nurse) by Carlitos Ordoñez RN (offgoing nurse). Report included the following information SBAR, ED Summary, MAR, and Recent Results. Patient resting in bed. SR x 2 . Call bell within reach. [employer] updated. 7565: Hospitalist at bedside. 0945: Patient placed on external cath system. 1330: Patient resting in stretcher at this time. SR x 2. Call bell in reach. 1416: Attempted to call report, nurse unable to take report at this time. 1442: TRANSITION OF CARE - SBAR OUT    Patient is being transferred to [address], Room# 2270. Report GIVEN TO Breana Jason RN on [facility] for routine progression of care. Report is consisted of the following information SBAR, Kardex, ED Summary, Intake/Output, and Recent Results. Patient transferred to receiving unit by: PCU Staff (RN or Tech Name)     Called outstanding consults: Yes   Collected routine labs: Yes     All current orders reviewed with accepting nurse: Yes    The following personal items will be sent with the patient during transfer to the floor:   All valuables:                          CARDIAC MONITORING ORDERED: Yes     The following CURRENT information were reported to the receiving RN:    CODE STATUS: Full Code    NIH SCORE:    BLAKE SCREENING: Swallow Screening  Is the Patient Unable to Remain Alert for Testing?: No (10/17/22 0930)  Is the Patient on a Modified Diet (Thickened Liquids) Due to Pre-existing Dysphagia?: No (10/17/22 0930)  Is There Presence of Existing Enteral Tube Feeding via the Stomach or Nose?: No (10/17/22 0930)  Is There Presence of Head-of-Bed Restrictions (Less than 30 Degrees)?: No (10/17/22 0930)  Is There Presence of Tracheotomy Tube?: No (10/17/22 0930)  Is the Patient Ordered Nothing-by-Mouth Status?: No (10/17/22 0930)  3 oz Water Swallow Screen: Pass (10/17/22 0930)    NEURO ASSESSMENT:        RESTRAINTS IN USE: No      IS DOCUMENTATION COMPLETE: Yes      Vital Signs  Level of Consciousness: Alert (0) (10/17/22 0523)  Temp: 98 °F (36.7 °C) (10/17/22 0523)  Temp Source: Oral (10/17/22 0523)  Pulse (Heart Rate): 76 (10/17/22 1330)  Resp Rate: 30 (10/17/22 1330)  BP: (!) 101/54 (10/17/22 1330)  MAP (Monitor): 69 (10/17/22 1330)  MAP (Calculated): 70 (10/17/22 1330)  BP 1 Location: Left upper arm (10/17/22 0523)  MEWS Score: 2 (10/17/22 0523)  Pain 1  Pain Scale 1: Numeric (0 - 10) (10/17/22 0523)  Pain Intensity 1: 0 (10/17/22 0523)  Patient Stated Pain Goal: 0 (10/17/22 0523)      OXYGEN: Oxygen Therapy  O2 Device: None (Room air) (10/17/22 0558)    KINDER FALL ASSESSMENT:  Presents to emergency department  because of falls (Syncope, seizure, or loss of consciousness): Yes, Age > 79: Yes, Altered Mental Status, Intoxication with alcohol or substance confusion (Disorientation, impaired judgment, poor safety awaremess, or inability to follow instructions): No, Impaired Mobility: Ambulates or transfers with assistive devices or assistance; Unable to ambulate or transer.: Yes, Nursing Judgement : Yes    WOUNDS: Yes     URINARY CATHETER: voiding and external catheter    LINE ACCESS:   Peripheral IV 10/17/22 Right Antecubital (Active)       Peripheral IV 10/17/22 Left Antecubital (Active)   Site Assessment Clean, dry, & intact 10/17/22 0914   Phlebitis Assessment 0 10/17/22 0914   Infiltration Assessment 0 10/17/22 0914   Dressing Status Clean, dry, & intact 10/17/22 0914   Dressing Type 4 X 4;Tape;Transparent 10/17/22 0914   Hub Color/Line Status Pink;Capped;Flushed;Patent 10/17/22 0914   Action Taken Dressing reinforced 10/17/22 0914   Alcohol Cap Used Yes 10/17/22 0914        Opportunity for questions and clarification were provided.   Abdifatah Sapp

## 2022-10-17 NOTE — ED PROVIDER NOTES
EMERGENCY DEPARTMENT HISTORY AND PHYSICAL EXAM      Date: 10/17/2022  Patient Name: Ava Lakhani    History of Presenting Illness     Chief Complaint   Patient presents with    Fall       History Provided By: Patient and Patient's     HPI: Ava Lakhani, 67 y.o. female presents to the ED with cc of weakness and confusion. Pt states she has hx of Bipolar d/o. She stated that she has not been feeling well for the past few days. She had sustained a puncture wound by her dog 2 weeks ago. She has chronic leg edema bilateral. She had been seen by her PCP twice, last seen in 10/14, and did not feel there was any infection.  states for the past 2 days she has been more confused. The left leg had appeared more swollen, and is now erythematous and hot to the touch. There has been no fever or chills. She denies any CP or SOA. There has been no abd pain n/v/d. She has been compliant with her medications. Pt had 2 falls in the past 24hrs, witnessed by , no report injuries, she denies any head injury, neck or back pain. There are no other complaints, changes, or physical findings at this time. PCP: Abdiel Taylor MD    No current facility-administered medications on file prior to encounter. Current Outpatient Medications on File Prior to Encounter   Medication Sig Dispense Refill    [] amoxicillin-clavulanate (AUGMENTIN) 875-125 mg per tablet Take 1 Tablet by mouth every twelve (12) hours for 10 days. 20 Tablet 0    hydrOXYzine HCL (ATARAX) 25 mg tablet TAKE 1 TAB BY MOUTH THREE (3) TIMES DAILY AS NEEDED FOR ITCHING FOR UP TO 10 DAYS. 270 Tablet 2    Entresto 24-26 mg tablet Take 1 Tablet by mouth two (2) times a day. fluocinoNIDE (LIDEX) 0.05 % topical cream Apply  to affected area two (2) times a day. 60 g 0    pregabalin (LYRICA) 100 mg capsule TAKE 1 CAPSULE BY MOUTH TWO (2) TIMES A DAY.  MAX DAILY AMOUNT: 200 MG. 60 Capsule 5    ergocalciferol (ERGOCALCIFEROL) 1,250 mcg (50,000 unit) capsule TAKE 1 CAPSULE BY MOUTH ONE TIME PER WEEK 12 Capsule 1    topiramate (Topamax) 100 mg tablet Take 1 Tablet by mouth two (2) times a day. 60 Tablet 5    furosemide (Lasix) 20 mg tablet Take 1 tablet by mouth daily PRN 30 Tablet 5    esomeprazole (NEXIUM) 40 mg capsule Take 40 mg by mouth daily. levothyroxine (SYNTHROID) 50 mcg tablet TAKE 1 TABLET BY MOUTH EVERY DAY 90 Tablet 3    apixaban (ELIQUIS) 5 mg tablet Take 1 Tablet by mouth two (2) times a day. 56 Tablet 0    lithium carbonate SR (LITHOBID) 300 mg CR tablet Take 300 mg by mouth three (3) times daily. cyanocobalamin (VITAMIN B12) 100 mcg tablet Take 100 mcg by mouth daily. omeprazole (PRILOSEC) 40 mg capsule TAKE 1 CAPSULE BY MOUTH EVERY DAY IN THE MORNING      timolol (TIMOPTIC) 0.5 % ophthalmic solution INSTILL 1 DROP 1 TIME EVERY DAY INTO BOTH EYES EVERY MORNING      hydrOXYchloroQUINE (PLAQUENIL) 200 mg tablet Take 200 mg by mouth two (2) times a day. latanoprost (XALATAN) 0.005 % ophthalmic solution Administer 1 Drop to both eyes two (2) times a day. ALPRAZolam (XANAX) 0.5 mg tablet Take 0.5 mg by mouth nightly.          Past History     Past Medical History:  Past Medical History:   Diagnosis Date    Arrhythmia 2014    atrial fibrillation 2014, sick sinus syndrome: [facility]    Arthritis     Rheumatoid    Bipolar affect, depressed ([address]     Bipolar affective disorder ([facility] 3/31/2010    Chronic pain 2018    right shoulder    Colon polyps 03/31/2010    also 5/2018: colon polyps    Depression     Diarrhea 07/19/2013    D/t alpha gal allergy says patient    Epigastric pain 6/5/2015    GERD (gastroesophageal reflux disease)     occasiional only; controlled with med    Hemispheric carotid artery syndrome No stroke    Neuro: Yogi Allen  (CT scan head/neck negative 4/2018 in [facility])    Hyperlipidemia 3/31/2010    Hypothyroidism 3/31/2010    Long term current use of anticoagulant therapy Lupus ([address]     patient denies-says she has RA    Migraine 03/31/2010    has them x2 a month: last one 5/23/2018    Psychotic disorder (HCC)     S/P ablation of atrial fibrillation 05/13/2014    S/P cardiac pacemaker procedure 9/16/2014 9/16/14 [employer] MRI compatible dual chamber pacemaker implant    Sleep apnea     unable to tolerate CPAP    Stool color black     Thyroid disease     Tick-borne disease     Alpha Gal allergy: no eat pork, beef, milk (Meat allergy showed up on a allergy test)    Umbilical hernia without obstruction and without gangrene 6/22/2020    Urge incontinence 3/31/2010    Vitamin D deficiency 3/31/2015       Past Surgical History:  Past Surgical History:   Procedure Laterality Date    COLONOSCOPY N/A 4/27/2018    COLONOSCOPY performed by Greer Cope MD at [facility] AMBULATORY OR    COLONOSCOPY N/A 11/25/2020    COLONOSCOPY, EGD performed by Joe Christianson MD at [address]  4/27/2018         Venkatesh Key  4/27/2018         HX BUNIONECTOMY      HX GI  2010, 5/2018    egd, colonoscopy    HX HERNIA REPAIR  01/11/2021    HX HERNIA REPAIR  01/2021    HX HERNIA REPAIR  74/0007    4 umbillical     HX HYSTERECTOMY      HX ORTHOPAEDIC  2000's    removed bone spurs from R [employer] hand    HX ORTHOPAEDIC  1990s?     left bunionectomy     HX OTHER SURGICAL  07/2014    2 shocks to heart & ablations    HX OTHER SURGICAL  01/11/2021    incarcerated incisional hernia surgery    HX PACEMAKER  2014    On the right side    HX PACEMAKER PLACEMENT  2015    HX SHOULDER REPLACEMENT Right 2017    HX TONSILLECTOMY  15 yrs old    IN CARDIAC SURG PROCEDURE UNLIST  5/2014    ablation       Family History:  Family History   Problem Relation Age of Onset    Arrhythmia Mother         afib    Stroke Mother     Heart Failure Mother     Colon Cancer Father     Heart Disease Father     Cancer Father         colon cancer    Arrhythmia Brother         afib    Asthma Brother     COPD Brother        Social History:  Social History     Tobacco Use    Smoking status: Never    Smokeless tobacco: Never   Vaping Use    Vaping Use: Never used   Substance Use Topics    Alcohol use: Not Currently     Alcohol/week: 1.0 standard drink     Types: 1 Cans of beer per week    Drug use: Yes     Types: Prescription       Allergies: Allergies   Allergen Reactions    Latex Swelling     Swelling of lips says patient    Beef Containing Products Nausea and Vomiting     diarrhea    Ciprofloxacin Unknown (comments)    Pork Derived (Porcine) Nausea and Vomiting     diarrhea    Bovine Cartilage Nausea and Vomiting     diarrhea    Milk Nausea and Vomiting     diarrhea  diarrhea    Sulfa (Sulfonamide Antibiotics) Hives    Xarelto [Rivaroxaban] Other (comments)     GI problems         Review of Systems   Review of Systems   Constitutional:  Positive for activity change and fatigue. Negative for appetite change, chills and fever. HENT: Negative. Negative for congestion, rhinorrhea, sinus pressure and sore throat. Eyes: Negative. Respiratory: Negative. Negative for cough, choking, chest tightness, shortness of breath and wheezing. Cardiovascular:  Positive for leg swelling. Negative for chest pain and palpitations. Gastrointestinal:  Negative for abdominal pain, constipation, diarrhea, nausea and vomiting. Endocrine: Negative. Genitourinary: Negative. Negative for difficulty urinating, dysuria, flank pain and urgency. Musculoskeletal: Negative. Skin:  Positive for wound (left ant lower leg with redness). Neurological: Negative. Negative for dizziness, speech difficulty, weakness, light-headedness, numbness and headaches. Psychiatric/Behavioral:  Positive for confusion. All other systems reviewed and are negative. Physical Exam   Physical Exam  Vitals and nursing note reviewed. Constitutional:       General: She is not in acute distress. Appearance: She is well-developed.  She is obese. She is not diaphoretic. HENT:      Head: Normocephalic and atraumatic. Mouth/Throat:      Mouth: Mucous membranes are dry. Pharynx: No oropharyngeal exudate. Eyes:      Extraocular Movements: Extraocular movements intact. Conjunctiva/sclera: Conjunctivae normal.      Pupils: Pupils are equal, round, and reactive to light. Neck:      Vascular: No JVD. Trachea: No tracheal deviation. Cardiovascular:      Rate and Rhythm: Normal rate and regular rhythm. Heart sounds: Normal heart sounds. No murmur heard. Pulmonary:      Effort: Pulmonary effort is normal. No respiratory distress. Breath sounds: Normal breath sounds. No stridor. No wheezing or rales. Abdominal:      General: There is no distension. Palpations: Abdomen is soft. Tenderness: There is no abdominal tenderness. There is no guarding or rebound. Musculoskeletal:         General: No tenderness. Normal range of motion. Cervical back: Normal range of motion and neck supple. Comments: Anasarca, 2+ pitting edema to angel lower legs, left leg more swollen than right, there is a single puncture wound with surrounding erythema and warmth   Skin:     General: Skin is warm and dry. Capillary Refill: Capillary refill takes less than 2 seconds. Neurological:      Mental Status: She is alert and oriented to person, place, and time. Cranial Nerves: No cranial nerve deficit. Comments: No gross motor or sensory deficits    Psychiatric:         Behavior: Behavior normal.      Comments: Flat affect         Diagnostic Study Results     Labs -     Recent Results (from the past 12 hour(s))   SAMPLES BEING HELD    Collection Time: 10/17/22  5:35 AM   Result Value Ref Range    SAMPLES BEING HELD BLUE,DRKGRN     COMMENT        Add-on orders for these samples will be processed based on acceptable specimen integrity and analyte stability, which may vary by analyte.    [facility]    Collection Time: 10/17/22  5:35 AM   Result Value Ref Range    SAMPLES BEING HELD N/A     COMMENT        Add-on orders for these samples will be processed based on acceptable specimen integrity and analyte stability, which may vary by analyte. URINALYSIS W/ REFLEX CULTURE    Collection Time: 10/17/22  5:35 AM    Specimen: Urine   Result Value Ref Range    Color YELLOW/STRAW      Appearance CLEAR CLEAR      Specific gravity 1.017      pH (UA) 7.5 5.0 - 8.0      Protein 30 (A) NEG mg/dL    Glucose Negative NEG mg/dL    Ketone Negative NEG mg/dL    Bilirubin Negative NEG      Blood Negative NEG      Urobilinogen 1.0 0.2 - 1.0 EU/dL    Nitrites Negative NEG      Leukocyte Esterase TRACE (A) NEG      WBC 5-10 0 - 4 /hpf    RBC 0-5 0 - 5 /hpf    Epithelial cells MODERATE (A) FEW /lpf    Bacteria Negative NEG /hpf    UA:UC IF INDICATED CULTURE NOT INDICATED BY UA RESULT CNI      Mucus 1+ (A) NEG /lpf   CBC WITH AUTOMATED DIFF    Collection Time: 10/17/22  5:35 AM   Result Value Ref Range    WBC 12.1 (H) 3.6 - 11.0 K/uL    RBC 4.29 3.80 - 5.20 M/uL    HGB 13.3 11.5 - 16.0 g/dL    HCT 40.4 35.0 - 47.0 %    MCV 94.2 80.0 - 99.0 FL    MCH 31.0 26.0 - 34.0 PG    MCHC 32.9 30.0 - 36.5 g/dL    RDW 14.2 11.5 - 14.5 %    PLATELET 819 003 - 321 K/uL    MPV 11.6 8.9 - 12.9 FL    NRBC 0.0 0  WBC    ABSOLUTE NRBC 0.00 0.00 - 0.01 K/uL    NEUTROPHILS 88 (H) 32 - 75 %    LYMPHOCYTES 6 (L) 12 - 49 %    MONOCYTES 6 5 - 13 %    EOSINOPHILS 0 0 - 7 %    BASOPHILS 0 0 - 1 %    IMMATURE GRANULOCYTES 0 0.0 - 0.5 %    ABS. NEUTROPHILS 10.6 (H) 1.8 - 8.0 K/UL    ABS. LYMPHOCYTES 0.7 (L) 0.8 - 3.5 K/UL    ABS. MONOCYTES 0.7 0.0 - 1.0 K/UL    ABS. EOSINOPHILS 0.0 0.0 - 0.4 K/UL    ABS. BASOPHILS 0.0 0.0 - 0.1 K/UL    ABS. IMM.  GRANS. 0.0 0.00 - 0.04 K/UL    DF AUTOMATED     METABOLIC PANEL, COMPREHENSIVE    Collection Time: 10/17/22  5:35 AM   Result Value Ref Range    Sodium 145 136 - 145 mmol/L    Potassium 3.9 3.5 - 5.1 mmol/L    Chloride 113 (H) 97 - 108 mmol/L    CO2 28 21 - 32 mmol/L    Anion gap 4 (L) 5 - 15 mmol/L    Glucose 140 (H) 65 - 100 mg/dL    BUN 29 (H) 6 - 20 MG/DL    Creatinine 1.05 (H) 0.55 - 1.02 MG/DL    BUN/Creatinine ratio 28 (H) 12 - 20      eGFR 56 (L) >60 ml/min/1.73m2    Calcium 10.1 8.5 - 10.1 MG/DL    Bilirubin, total 1.2 (H) 0.2 - 1.0 MG/DL    ALT (SGPT) 21 12 - 78 U/L    AST (SGOT) 14 (L) 15 - 37 U/L    Alk. phosphatase 86 45 - 117 U/L    Protein, total 6.2 (L) 6.4 - 8.2 g/dL    Albumin 3.5 3.5 - 5.0 g/dL    Globulin 2.7 2.0 - 4.0 g/dL    A-G Ratio 1.3 1.1 - 2.2     EKG, 12 LEAD, INITIAL    Collection Time: 10/17/22  5:40 AM   Result Value Ref Range    Ventricular Rate 77 BPM    Atrial Rate 71 BPM    QRS Duration 184 ms    Q-T Interval 606 ms    QTC Calculation (Bezet) 685 ms    Calculated R Axis -97 degrees    Calculated T Axis 85 degrees    Diagnosis       AV dual-paced rhythm with frequent ventricular-paced complexes  When compared with ECG of 27-APR-2018 20:28,  Electronic ventricular pacemaker has replaced Electronic atrial pacemaker     BLOOD GAS,CHEM8,LACTIC ACID POC    Collection Time: 10/17/22  5:49 AM   Result Value Ref Range    Calcium, ionized (POC) 1.36 (H) 1.12 - 1.32 mmol/L    BICARBONATE 27 mmol/L    Base excess (POC) 0.5 mmol/L    Sample source VENOUS BLOOD      CO2, POC 27 (H) 19 - 24 MMOL/L    Sodium,  (H) 136 - 145 MMOL/L    Potassium, POC 4.0 3.5 - 5.5 MMOL/L    Chloride,  100 - 108 MMOL/L    Glucose,  (H) 74 - 106 MG/DL    Creatinine, POC 1.0 0.6 - 1.3 MG/DL    Lactic Acid (POC) 1.19 0.40 - 2.00 mmol/L    pH, venous (POC) 7.36 7.32 - 7.42      pCO2, venous (POC) 47.6 41 - 51 MMHG    pO2, venous (POC) <13 (L) 25 - 40 mmHg       Radiologic Studies -   XR TIB/FIB LT   Final Result   Diffuse soft tissue edema without acute osseous abnormality. XR CHEST PORT   Final Result      Pulmonary edema.            CT Results  (Last 48 hours)      None          CXR Results  (Last 48 hours) 10/17/22 0656  XR CHEST PORT Final result    Impression:      Pulmonary edema. Narrative:  EXAM:  XR CHEST PORT       INDICATION: Multiple falls       COMPARISON: 2/7/2022       TECHNIQUE: Upright portable chest AP view       FINDINGS: Stable cardiomegaly. Pacer leads have not changed in position. The   pulmonary vasculature is within normal limits. Pulmonary edema is noted. The visualized bones and upper abdomen are   age-appropriate. Medical Decision Making   I am the first provider for this patient. I reviewed the vital signs, available nursing notes, past medical history, past surgical history, family history and social history. Vital Signs-Reviewed the patient's vital signs. Patient Vitals for the past 12 hrs:   Temp Pulse Resp BP SpO2   10/17/22 0700 -- 75 24 (!) 78/66 100 %   10/17/22 0558 -- -- -- -- 94 %   10/17/22 0546 -- 81 27 (!) 108/40 100 %   10/17/22 0523 98 °F (36.7 °C) 76 19 (!) 94/50 100 %       EKG interpretation: (Preliminary)  AV paced rate 77, Hannah Gomez,       Records Reviewed: Nursing Notes, Old Medical Records, Ambulance Run Sheet, Previous Radiology Studies, and Previous Laboratory Studies, patient with a past medical history of CHF, recently had been seen by her PCP on 10/14/2022 secondary to a puncture wound of the left lower leg was felt not to be infected at that time. Provider Notes (Medical Decision Making):   DDx-cellulitis, electrolyte abnormality, dehydration, UTI,    ED Course:   Initial assessment performed. The patients presenting problems have been discussed, and they are in agreement with the care plan formulated and outlined with them. I have encouraged them to ask questions as they arise throughout their visit. Pt change in mental status, puncture wound of left leg with surrounding edema, erythema and warmth concerning for infection. Labs, [address] ordered, will start pt on Cefepime and Vancomycin.      Slight leukocytosis, Lactate <2. Lithium level pending. CXR- pulm edema, Xray of leg no ST gas. Pt with low BP, pt given 500ml fluid bolus with some in improvement in BP. MAPs 63-68. Pt with generalized anasarca and pulm edema on CXR, no acute resp distress, will order additional 500ml fluid bolus. Discussed with pt and  concern for volume overload, may need pressors if BP does not improve with additional fluids. With 2nd bolus BP did improve with MAPs staying above 65. Consult:  Case discussed with hospitalist. Pt will be evaluated and admitted. Critical Care Time:   CRITICAL CARE NOTE :    7:25 AM    IMPENDING DETERIORATION -Cardiovascular and CNS  ASSOCIATED RISK FACTORS - Hypotension and CNS Decompensation  MANAGEMENT- Bedside Assessment and Supervision of Care  INTERPRETATION -  Xrays, ECG, Blood Pressure, Cardiac Output Measures , and labs  INTERVENTIONS - hemodynamic mngmt and IV Ab  CASE REVIEW - Hospitalist/Intensivist, Nursing, and Family  TREATMENT RESPONSE -Improved and Stable  PERFORMED BY - Self    NOTES   :  I have spent 40 minutes of critical care time involved in lab review, consultations with specialist, family decision- making, bedside attention and documentation. This time excludes time spent in any separate billed procedures. During this entire length of time I was immediately available to the patient . Jessika Aguero DO      Disposition:  Admit     Diagnosis     Clinical Impression:   1. Cellulitis of left lower extremity    2. Bilateral leg edema    3. Acute pulmonary edema ([address]        Attestations:    Jessika Aguero DO        Please note that this dictation was completed with [employer]der, the computer voice recognition software. Quite often unanticipated grammatical, syntax, homophones, and other interpretive errors are inadvertently transcribed by the computer software. Please disregard these errors. Please excuse any errors that have escaped final proofreading.   Thank you.

## 2022-10-18 ENCOUNTER — APPOINTMENT (OUTPATIENT)
Dept: NON INVASIVE DIAGNOSTICS | Age: 72
DRG: 603 | End: 2022-10-18
Attending: GENERAL ACUTE CARE HOSPITAL
Payer: MEDICARE

## 2022-10-18 LAB
ALBUMIN SERPL-MCNC: 2.5 G/DL (ref 3.5–5)
ALBUMIN/GLOB SERPL: 0.9 {RATIO} (ref 1.1–2.2)
ALP SERPL-CCNC: 65 U/L (ref 45–117)
ALT SERPL-CCNC: 15 U/L (ref 12–78)
ANION GAP SERPL CALC-SCNC: 5 MMOL/L (ref 5–15)
AST SERPL-CCNC: 11 U/L (ref 15–37)
BILIRUB SERPL-MCNC: 0.7 MG/DL (ref 0.2–1)
BUN SERPL-MCNC: 19 MG/DL (ref 6–20)
BUN/CREAT SERPL: 30 (ref 12–20)
CALCIUM SERPL-MCNC: 9.4 MG/DL (ref 8.5–10.1)
CHLORIDE SERPL-SCNC: 118 MMOL/L (ref 97–108)
CO2 SERPL-SCNC: 23 MMOL/L (ref 21–32)
CREAT SERPL-MCNC: 0.64 MG/DL (ref 0.55–1.02)
ECHO AO ASC DIAM: 2.9 CM
ECHO AO ASCENDING AORTA INDEX: 1.4 CM/M2
ECHO AO ROOT DIAM: 3.1 CM
ECHO AO ROOT INDEX: 1.5 CM/M2
ECHO AV AREA PEAK VELOCITY: 4.3 CM2
ECHO AV AREA PEAK VELOCITY: 420 CM2
ECHO AV AREA VTI: 4.7 CM2
ECHO AV AREA/BSA VTI: 2.3 CM2/M2
ECHO AV MEAN GRADIENT: 5 MMHG
ECHO AV MEAN VELOCITY: 1.1 M/S
ECHO AV PEAK GRADIENT: 0 MMHG
ECHO AV PEAK GRADIENT: 9 MMHG
ECHO AV PEAK VELOCITY: 0 M/S
ECHO AV PEAK VELOCITY: 1.5 M/S
ECHO AV VTI: 26.9 CM
ECHO LA DIAMETER INDEX: 1.69 CM/M2
ECHO LA DIAMETER: 3.5 CM
ECHO LA TO AORTIC ROOT RATIO: 1.13
ECHO LA VOL 2C: 66 ML (ref 22–52)
ECHO LA VOLUME INDEX A2C: 32 ML/M2 (ref 16–34)
ECHO LV EDV A2C: 128 ML
ECHO LV EDV A4C: 131 ML
ECHO LV EDV BP: 131 ML (ref 56–104)
ECHO LV EDV INDEX A4C: 63 ML/M2
ECHO LV EDV INDEX BP: 63 ML/M2
ECHO LV EDV NDEX A2C: 62 ML/M2
ECHO LV EJECTION FRACTION A2C: 3 %
ECHO LV EJECTION FRACTION A4C: 10 %
ECHO LV EJECTION FRACTION BIPLANE: 4 % (ref 55–100)
ECHO LV ESV A2C: 132 ML
ECHO LV ESV A4C: 118 ML
ECHO LV ESV BP: 125 ML (ref 19–49)
ECHO LV ESV INDEX A2C: 64 ML/M2
ECHO LV ESV INDEX A4C: 57 ML/M2
ECHO LV ESV INDEX BP: 60 ML/M2
ECHO LV FRACTIONAL SHORTENING: 2 % (ref 28–44)
ECHO LV INTERNAL DIMENSION DIASTOLE INDEX: 2.8 CM/M2
ECHO LV INTERNAL DIMENSION DIASTOLIC: 5.8 CM (ref 3.9–5.3)
ECHO LV INTERNAL DIMENSION SYSTOLIC INDEX: 2.75 CM/M2
ECHO LV INTERNAL DIMENSION SYSTOLIC: 5.7 CM
ECHO LV IVSD: 1.1 CM (ref 0.6–0.9)
ECHO LV MASS 2D: 297 G (ref 67–162)
ECHO LV MASS INDEX 2D: 143.5 G/M2 (ref 43–95)
ECHO LV POSTERIOR WALL DIASTOLIC: 1.3 CM (ref 0.6–0.9)
ECHO LV RELATIVE WALL THICKNESS RATIO: 0.45
ECHO LVOT AREA: 4.9 CM2
ECHO LVOT AV VTI INDEX: 0.93
ECHO LVOT DIAM: 2.5 CM
ECHO LVOT MEAN GRADIENT: 4 MMHG
ECHO LVOT PEAK GRADIENT: 7 MMHG
ECHO LVOT PEAK VELOCITY: 1.3 M/S
ECHO LVOT STROKE VOLUME INDEX: 59 ML/M2
ECHO LVOT SV: 122.2 ML
ECHO LVOT VTI: 24.9 CM
ECHO MV AREA VTI: 5.8 CM2
ECHO MV E DECELERATION TIME (DT): 110.7 MS
ECHO MV E VELOCITY: 0.76 M/S
ECHO MV LVOT VTI INDEX: 0.85
ECHO MV MAX VELOCITY: 0.9 M/S
ECHO MV MEAN GRADIENT: 1 MMHG
ECHO MV MEAN VELOCITY: 0.6 M/S
ECHO MV PEAK GRADIENT: 3 MMHG
ECHO MV REGURGITANT PEAK GRADIENT: 49 MMHG
ECHO MV REGURGITANT PEAK VELOCITY: 3.5 M/S
ECHO MV VTI: 21.2 CM
ECHO TV REGURGITANT MAX VELOCITY: 2.56 M/S
ECHO TV REGURGITANT PEAK GRADIENT: 26 MMHG
GLOBULIN SER CALC-MCNC: 2.7 G/DL (ref 2–4)
GLUCOSE SERPL-MCNC: 101 MG/DL (ref 65–100)
MAGNESIUM SERPL-MCNC: 2.2 MG/DL (ref 1.6–2.4)
POTASSIUM SERPL-SCNC: 3.8 MMOL/L (ref 3.5–5.1)
PROT SERPL-MCNC: 5.2 G/DL (ref 6.4–8.2)
SODIUM SERPL-SCNC: 146 MMOL/L (ref 136–145)

## 2022-10-18 PROCEDURE — 97535 SELF CARE MNGMENT TRAINING: CPT | Performed by: OCCUPATIONAL THERAPIST

## 2022-10-18 PROCEDURE — 36415 COLL VENOUS BLD VENIPUNCTURE: CPT

## 2022-10-18 PROCEDURE — 80053 COMPREHEN METABOLIC PANEL: CPT

## 2022-10-18 PROCEDURE — 83735 ASSAY OF MAGNESIUM: CPT

## 2022-10-18 PROCEDURE — 97116 GAIT TRAINING THERAPY: CPT

## 2022-10-18 PROCEDURE — 74011250636 HC RX REV CODE- 250/636: Performed by: GENERAL ACUTE CARE HOSPITAL

## 2022-10-18 PROCEDURE — 74011000250 HC RX REV CODE- 250: Performed by: GENERAL ACUTE CARE HOSPITAL

## 2022-10-18 PROCEDURE — 74011000258 HC RX REV CODE- 258: Performed by: INTERNAL MEDICINE

## 2022-10-18 PROCEDURE — 97161 PT EVAL LOW COMPLEX 20 MIN: CPT

## 2022-10-18 PROCEDURE — 74011000258 HC RX REV CODE- 258: Performed by: GENERAL ACUTE CARE HOSPITAL

## 2022-10-18 PROCEDURE — 99233 SBSQ HOSP IP/OBS HIGH 50: CPT | Performed by: INTERNAL MEDICINE

## 2022-10-18 PROCEDURE — 97165 OT EVAL LOW COMPLEX 30 MIN: CPT | Performed by: OCCUPATIONAL THERAPIST

## 2022-10-18 PROCEDURE — 65270000046 HC RM TELEMETRY

## 2022-10-18 PROCEDURE — 93306 TTE W/DOPPLER COMPLETE: CPT

## 2022-10-18 PROCEDURE — 74011250636 HC RX REV CODE- 250/636: Performed by: INTERNAL MEDICINE

## 2022-10-18 PROCEDURE — 77030038269 HC DRN EXT URIN PURWCK BARD -A

## 2022-10-18 PROCEDURE — 74011250637 HC RX REV CODE- 250/637: Performed by: GENERAL ACUTE CARE HOSPITAL

## 2022-10-18 RX ADMIN — Medication 100 MCG: at 08:32

## 2022-10-18 RX ADMIN — VANCOMYCIN HYDROCHLORIDE 1500 MG: 10 INJECTION, POWDER, LYOPHILIZED, FOR SOLUTION INTRAVENOUS at 06:00

## 2022-10-18 RX ADMIN — MIDODRINE HYDROCHLORIDE 2.5 MG: 5 TABLET ORAL at 16:46

## 2022-10-18 RX ADMIN — CEFEPIME 2 G: 2 INJECTION, POWDER, FOR SOLUTION INTRAVENOUS at 20:08

## 2022-10-18 RX ADMIN — LITHIUM CARBONATE 300 MG: 300 TABLET, FILM COATED, EXTENDED RELEASE ORAL at 21:25

## 2022-10-18 RX ADMIN — SODIUM CHLORIDE, PRESERVATIVE FREE 10 ML: 5 INJECTION INTRAVENOUS at 21:25

## 2022-10-18 RX ADMIN — LITHIUM CARBONATE 300 MG: 300 TABLET, FILM COATED, EXTENDED RELEASE ORAL at 16:46

## 2022-10-18 RX ADMIN — CEFEPIME 2 G: 2 INJECTION, POWDER, FOR SOLUTION INTRAVENOUS at 08:30

## 2022-10-18 RX ADMIN — PREGABALIN 200 MG: 100 CAPSULE ORAL at 20:08

## 2022-10-18 RX ADMIN — LATANOPROST 1 DROP: 50 SOLUTION OPHTHALMIC at 20:09

## 2022-10-18 RX ADMIN — TOPIRAMATE 100 MG: 100 TABLET, FILM COATED ORAL at 08:29

## 2022-10-18 RX ADMIN — APIXABAN 5 MG: 5 TABLET, FILM COATED ORAL at 20:08

## 2022-10-18 RX ADMIN — LEVOTHYROXINE SODIUM 50 MCG: 0.05 TABLET ORAL at 05:54

## 2022-10-18 RX ADMIN — ACETAMINOPHEN 650 MG: 325 TABLET ORAL at 20:08

## 2022-10-18 RX ADMIN — APIXABAN 5 MG: 5 TABLET, FILM COATED ORAL at 08:29

## 2022-10-18 RX ADMIN — TIMOLOL MALEATE 1 DROP: 5 SOLUTION OPHTHALMIC at 13:47

## 2022-10-18 RX ADMIN — ACETAMINOPHEN 650 MG: 325 TABLET ORAL at 05:54

## 2022-10-18 RX ADMIN — MIDODRINE HYDROCHLORIDE 2.5 MG: 5 TABLET ORAL at 08:30

## 2022-10-18 RX ADMIN — MIDODRINE HYDROCHLORIDE 2.5 MG: 5 TABLET ORAL at 11:58

## 2022-10-18 RX ADMIN — ALPRAZOLAM 0.5 MG: 0.5 TABLET ORAL at 20:08

## 2022-10-18 RX ADMIN — LITHIUM CARBONATE 300 MG: 300 TABLET, FILM COATED, EXTENDED RELEASE ORAL at 08:29

## 2022-10-18 RX ADMIN — PANTOPRAZOLE SODIUM 40 MG: 40 TABLET, DELAYED RELEASE ORAL at 08:29

## 2022-10-18 RX ADMIN — SODIUM CHLORIDE, PRESERVATIVE FREE 10 ML: 5 INJECTION INTRAVENOUS at 13:51

## 2022-10-18 RX ADMIN — PREGABALIN 200 MG: 100 CAPSULE ORAL at 08:30

## 2022-10-18 RX ADMIN — SODIUM CHLORIDE, PRESERVATIVE FREE 10 ML: 5 INJECTION INTRAVENOUS at 06:01

## 2022-10-18 NOTE — PROGRESS NOTES
Problem: Self Care Deficits Care Plan (Adult)  Goal: *Acute Goals and Plan of Care (Insert Text)  Description: FUNCTIONAL STATUS PRIOR TO ADMISSION: Patient was Mod I with basic self-care and light IADL. She uses a cane or RW intermittently. Patient uses a separate tub with a chair and grab bars, but says she has access to a walk-in shower, if needed. Patient wears slip-on shoes and no socks most of the time. She is retired; reports she still drives. HOME SUPPORT: Lives with her , who is also retired and available to assist as needed. Occupational Therapy Goals  Initiated 10/18/2022  1. Patient will perform grooming standing at the sink with modified independence within 7 day(s). 2.  Patient will perform bathing with supervision/SBA within 7 day(s). 3.  Patient will perform lower body dressing with supervision/set-up within 7 day(s). 4.  Patient will perform toilet transfers with modified independence within 7 day(s). 5.  Patient will perform all aspects of toileting with modified independence within 7 day(s). 6.  Patient will independently utilize energy conservation and recommended equipment during ADL within 7 day(s). Outcome: Not Met    OCCUPATIONAL THERAPY EVALUATION  Patient: Clau Staton (42 y.o. female)  Date: 10/18/2022  Primary Diagnosis: Cellulitis [L03.90]       Precautions:  Fall    ASSESSMENT  Based on the objective data described below, the patient presents with deficits in self-care, primarily due to decreased activity tolerance, general weakness, impaired balance, and mildly decreased safety. She has sustained a few falls recently. Her  was present and confirms he is available to assist upon discharge as needed. Patient is functioning slightly below her functional baseline, and will benefit from skilled OT treatment to maximize independence and safety in ADL. Functional Outcome Measure:   The patient scored 45/100 on the Barthel Index      Other factors to consider for discharge: N/A     Patient will benefit from skilled therapy intervention to address the above noted impairments. PLAN :  Recommendations and Planned Interventions: self care training, functional mobility training, therapeutic exercise, balance training, therapeutic activities, endurance activities, patient education, home safety training, and family training/education    Frequency/Duration: Patient will be followed by occupational therapy 3 times a week to address goals. Recommendation for discharge: (in order for the patient to meet his/her long term goals)  No skilled occupational therapy/ follow up rehabilitation needs identified at this time. This discharge recommendation:  A follow-up discussion with the attending provider and/or case management is planned    IF patient discharges home will need the following DME: TBD       SUBJECTIVE:   Patient stated I don't usually wear them (socks), but if I do, he can help me.     OBJECTIVE DATA SUMMARY:   HISTORY:   Past Medical History:   Diagnosis Date    Arrhythmia 2014    atrial fibrillation 2014, sick sinus syndrome: [facility]    Arthritis     Rheumatoid    Bipolar affect, depressed ([address]     Bipolar affective disorder ([facility] [address] 3/31/2010    Chronic pain 2018    right shoulder    Colon polyps 03/31/2010    also 5/2018: colon polyps    Depression     Diarrhea 07/19/2013    D/t alpha gal allergy says patient    Epigastric pain 6/5/2015    GERD (gastroesophageal reflux disease)     occasiional only; controlled with med    Hemispheric carotid artery syndrome No stroke    Neuro: Jose Lent  (CT scan head/neck negative 4/2018 in [facility])    Hyperlipidemia 3/31/2010    Hypothyroidism 3/31/2010    Long term current use of anticoagulant therapy     Lupus ([facility] [address]     patient denies-says she has RA    Migraine 03/31/2010    has them x2 a month: last one 5/23/2018    Psychotic disorder (HCC)     S/P ablation of atrial fibrillation 05/13/2014    S/P cardiac pacemaker procedure 9/16/2014 9/16/14 Medtronic MRI compatible dual chamber pacemaker implant    Sleep apnea     unable to tolerate CPAP    Stool color black     Thyroid disease     Tick-borne disease     Alpha Gal allergy: no eat pork, beef, milk (Meat allergy showed up on a allergy test)    Umbilical hernia without obstruction and without gangrene 6/22/2020    Urge incontinence 3/31/2010    Vitamin D deficiency 3/31/2015     Past Surgical History:   Procedure Laterality Date    COLONOSCOPY N/A 4/27/2018    COLONOSCOPY performed by Tres Foster MD at [facility] AM[facility]UL[facility]    COLONOSCOPY N/A 11/25/2020    COLONOSCOPY, EGD performed by Kelly Stockton MD at [address]  4/27/2018         Lum Lacy  4/27/2018         HX BUNIONECTOMY      HX GI  2010, 5/2018    egd, colonoscopy    HX HERNIA REPAIR  01/11/2021    HX HERNIA REPAIR  01/2021    HX HERNIA REPAIR  97/0485    4 umbillical     HX HYSTERECTOMY      HX ORTHOPAEDIC  2000's    removed bone spurs from R & L hand    HX ORTHOPAEDIC  1990s?     left bunionectomy     HX OTHER SURGICAL  07/2014    2 shocks to heart & ablations    HX OTHER SURGICAL  01/11/2021    incarcerated incisional hernia surgery    HX PACEMAKER  2014    On the right side    HX PACEMAKER PLACEMENT  2015    HX SHOULDER REPLACEMENT Right 2017    HX TONSILLECTOMY  15 yrs old    TX CARDIAC SURG PROCEDURE UNLIST  5/2014    ablation       Expanded or extensive additional review of patient history:     Home Situation  Home Environment: Private residence  # Steps to Enter: 5  Rails to Enter: Yes  Hand Rails : Right  One/Two Story Residence: One story  Current DME Used/Available at Home: Will Klippel, rolling, Cane, straight, Grab bars  Tub or Shower Type: Tub (has a walk-in shower available)    Hand dominance: Left    EXAMINATION OF PERFORMANCE DEFICITS:  Cognitive/Behavioral Status:  Neurologic State: Alert  Orientation Level: Oriented to person;Oriented to place;Oriented to situation  Cognition: Appropriate decision making; Appropriate for age attention/concentration; Appropriate safety awareness  Perception: Appears intact  Perseveration: No perseveration noted  Safety/Judgement: Decreased awareness of need for safety    Vision/Perceptual:                           Acuity: Within Defined Limits    Corrective Lenses: Glasses    Range of Motion:  AROM: Generally decreased, functional                         Strength:  Strength: Generally decreased, functional                Coordination:  Coordination: Within functional limits            Tone & Sensation:  Tone: Normal  Sensation: Impaired                      Balance:  Sitting: Intact  Standing: Impaired; With support  Standing - Static: Fair;Constant support  Standing - Dynamic : Fair;Constant support    Functional Mobility and Transfers for ADLs:  Bed Mobility:  Supine to Sit: Stand-by assistance; Additional time  Scooting: Stand-by assistance; Additional time    Transfers:  Sit to Stand: Minimum assistance  Stand to Sit: Contact guard assistance  Toilet Transfer : Minimum assistance  Assistive Device : Walker, rolling    ADL Assessment:  Feeding: Independent    Oral Facial Hygiene/Grooming: Setup    Bathing: Minimum assistance    Type of Bath: Basin/Soap/Water    Upper Body Dressing: Setup;Supervision    Lower Body Dressing: Minimum assistance    Toileting: Minimum assistance                  ADL Intervention and task modifications:  Discussed safety and fall prevention. Encouraged out of bed 3x/day, for AT LEAST an hour each time. Patient is agreeable. Initiated ADL training.          Cognitive Retraining  Safety/Judgement: Decreased awareness of need for safety      Functional Measure:    Barthel Index:  Bathin  Bladder: 0  Bowels: 10  Groomin  Dressin  Feeding: 10  Mobility: 0  Stairs: 0  Toilet Use: 5  Transfer (Bed to Chair and Back): 10  Total: 45/100      The Barthel ADL Index: Guidelines  1. The index should be used as a record of what a patient does, not as a record of what a patient could do. 2. The main aim is to establish degree of independence from any help, physical or verbal, however minor and for whatever reason. 3. The need for supervision renders the patient not independent. 4. A patient's performance should be established using the best available evidence. Asking the patient, friends/relatives and nurses are the usual sources, but direct observation and common sense are also important. However direct testing is not needed. 5. Usually the patient's performance over the preceding 24-48 hours is important, but occasionally longer periods will be relevant. 6. Middle categories imply that the patient supplies over 50 per cent of the effort. 7. Use of aids to be independent is allowed. Score Interpretation (from [address] [address])    Independent   60-79 Minimally independent   40-59 Partially dependent   20-39 Very dependent   <20 Totally dependent     -Jaelyn Orellana., Barthel, DJanineW. (1965). Functional evaluation: the Barthel Index. [facility] ([address] (1997). The Barthel activities of daily living index: self-reporting versus actual performance in the old (> or = 75 years). Journal of [address] [place] [address] 457), [facility], J.OSVALDO, Turner Davey., Rehan Brownlee. (1999). Measuring the change in disability after inpatient rehabilitation; comparison of the responsiveness of the Barthel Index and Functional [place] Measure. Journal of Neurology, Neurosurgery, and Psychiatry, 66(4), 063-291. Sean Reed, N.J.A, PATRICK Brooks, & Wei Amaro MJanineA. (2004) Assessment of post-stroke quality of life in cost-effectiveness studies: The usefulness of the Barthel Index and the EuroQoL-5D.  Quality of Life Research, 15, 261-59        Occupational Therapy Evaluation Charge Determination   History Examination Decision-Making   LOW Complexity : Brief history review  MEDIUM Complexity : 3-5 performance deficits relating to physical, cognitive , or psychosocial skils that result in activity limitations and / or participation restrictions LOW Complexity : No comorbidities that affect functional and no verbal or physical assistance needed to complete eval tasks       Based on the above components, the patient evaluation is determined to be of the following complexity level: LOW   Pain Rating:  3/10 initially in LE's, increasing to 5/10 with activity. Activity Tolerance:   Fair    After treatment patient left in no apparent distress:    Sitting in chair, Call bell within reach, Bed / chair alarm activated, and Caregiver / family present    COMMUNICATION/EDUCATION:   The patients plan of care was discussed with: Physical therapist and Registered nurse. Patient/family agree to work toward stated goals and plan of care. This patients plan of care is appropriate for delegation to \[facility] [place] [facility]\"".     Thank you for this referral.  Ludy Conteh OTR/L  Time Calculation: 26 mins

## 2022-10-18 NOTE — PROGRESS NOTES
Physician Progress Note      PATIENT:               Malika Dougherty  CSN #:                  [id number]  :                       1950  ADMIT DATE:       10/17/2022 5:21 AM  DISCH DATE:  RESPONDING  PROVIDER #:        Chris Elliott MD          QUERY TEXT:    Patient admitted with LLE cellulitis, noted to have atrial fibrillation and is maintained on Eliquis. Please document in progress notes and discharge summary if you are evaluating and/or treating any of the following:     The medical record reflects the following:  Risk Factors: 67year old female with Afib s/p ablation on Eliquis PTA and during this admission  Clinical Indicators: Atrial Fibrillation  Treatment: Eliquis 5 mg po bid    Thank You,  Jj Byrne RN, CDI  Options provided:  -- Secondary hypercoagulable state in a patient with atrial fibrillation  -- Other - I will add my own diagnosis  -- Disagree - Not applicable / Not valid  -- Disagree - Clinically unable to determine / Unknown  -- Refer to Clinical Documentation Reviewer    PROVIDER RESPONSE TEXT:    On Eliquis for stroke prevention in atrial fibrillation    Query created by: Sukh Valiente on 10/18/2022 2:26 PM      Electronically signed by:  Chris Elliott MD 10/18/2022 2:44 PM

## 2022-10-18 NOTE — PROGRESS NOTES
INTERNAL MEDICINE ATTENDING NOTE     Patient Name: Ashley Avery   : 1950   Admit: 10/17/2022    ASSESSMENT / PLAN    Cellulitis (10/17/2022) L lower extremity: On empiric cefepime and vancomycin, d2. Hypotension: She has been treated with IVF and midodrine. KI: Improved with hydration. Cr back to baseline. Hypothyroidism: Continue synthroid. Atrial Fibrillation: S/p ablation. Outpatient F/U as per Dr. Tsering Azar.  On eliquis. Chronic LE edema. CHF: Compensated. Migraines: She has been seeing neurologist, Dr. Nu Still (formerly Dr. Hebert Goodell). Has prn triptan, and topamax, and prn oxycodone. BPAD/Anxiety/Stress. Continue psychiatry follow up. Shakir Castillo, psychology. Edema: On daily lasix. CODE: Full. Clarisse Daugherty MD, FACP  Best contact is via Pager: [phone], or via hospital  at [phone]. Do NOT use Perfect Serve. SUBJECTIVE:   Ms. Ashley Avery is a patient of mine with past medical history of A fib, hypothyroidism, migraines, BPAD, and chronic LE edema, who had a recent puncture wound of L mid shin on 10/2 when scratched by her dog and was treated with a course of augmentin, now admitted with worsened cellulitis of L leg. She was seen by me today during rounds. At this time, she is resting comfortably. She complains of pain in her leg. She has had a headache this morning treated with tylenol. ROS otherwise unremarkable except as noted elsewhere. OBJECTIVE:   Blood pressure (!) 129/52, pulse 73, temperature 97.5 °F (36.4 °C), resp. rate 26, height 5' 5\" (1.651 m), weight 222 lb 14.2 oz (101.1 kg), SpO2 98 %. Gen: Patient is in no acute distress. Lungs: CTAB. Heart: RRR. Abd: S, NT, ND, BS present. Exremities: Warm. She has 2-3+ edema bilaterally. L LE erythematous, tender, and warm.      Recent Results (from the past 12 hour(s))   METABOLIC PANEL, COMPREHENSIVE    Collection Time: 10/18/22  4:00 AM   Result Value Ref Range    Sodium 146 (H) 136 - 145 mmol/L    Potassium 3.8 3.5 - 5.1 mmol/L    Chloride 118 (H) 97 - 108 mmol/L    CO2 23 21 - 32 mmol/L    Anion gap 5 5 - 15 mmol/L    Glucose 101 (H) 65 - 100 mg/dL    BUN 19 6 - 20 MG/DL    Creatinine 0.64 0.55 - 1.02 MG/DL    BUN/Creatinine ratio 30 (H) 12 - 20      eGFR >60 >60 ml/min/1.73m2    Calcium 9.4 8.5 - 10.1 MG/DL    Bilirubin, total 0.7 0.2 - 1.0 MG/DL    ALT (SGPT) 15 12 - 78 U/L    AST (SGOT) 11 (L) 15 - 37 U/L    Alk. phosphatase 65 45 - 117 U/L    Protein, total 5.2 (L) 6.4 - 8.2 g/dL    Albumin 2.5 (L) 3.5 - 5.0 g/dL    Globulin 2.7 2.0 - 4.0 g/dL    A-G Ratio 0.9 (L) 1.1 - 2.2     MAGNESIUM    Collection Time: 10/18/22  4:00 AM   Result Value Ref Range    Magnesium 2.2 1.6 - 2.4 mg/dL        CXR: Pulmonary edema. XR Tib/fib left: Diffuse soft tissue edema without acute osseous abnormality. Duplex LE left: No evidence of DVT. Echo: Pending. Kitty Martins MD, FACP  Best contact is via Pager: [phone], or via hospital  at [phone]. Do NOT use Perfect Serve.

## 2022-10-18 NOTE — PROGRESS NOTES
1930 Bedside and Verbal shift change report given to Briseyda (oncoming nurse) by Bruce Bowers (offgoing nurse). Report included the following information SBAR, ED Summary, MAR, and Cardiac Rhythm Paced Rythm . Problem: Pressure Injury - Risk of  Goal: *Prevention of pressure injury  Description: Document Mustapha Scale and appropriate interventions in the flowsheet. Outcome: Progressing Towards Goal  Note: Pressure Injury Interventions:  Moisture Interventions: Absorbent underpads, Apply protective barrier, creams and emollients, Internal/External urinary devices, Limit adult briefs, Maintain skin hydration (lotion/cream), Minimize layers, Moisture barrier, Check for incontinence Q2 hours and as needed  Activity Interventions: Increase time out of bed, PT/OT evaluation  Mobility Interventions: Assess need for specialty bed, HOB 30 degrees or less, PT/OT evaluation, Turn and reposition approx. every two hours(pillow and wedges)  Nutrition Interventions: Document food/fluid/supplement intake, Offer support with meals,snacks and hydration  Friction and Shear Interventions: Minimize layers, Apply protective barrier, creams and emollients     Problem: Falls - Risk of  Goal: *Absence of Falls  Description: Document Jenna Fall Risk and appropriate interventions in the flowsheet.   Outcome: Progressing Towards Goal  Note: Fall Risk Interventions:  Mobility Interventions: Bed/chair exit alarm, Communicate number of staff needed for ambulation/transfer, Patient to call before getting OOB  Medication Interventions: Bed/chair exit alarm, Patient to call before getting OOB, Teach patient to arise slowly  Elimination Interventions: Bed/chair exit alarm, Call light in reach, Patient to call for help with toileting needs  History of Falls Interventions: Bed/chair exit alarm, Door open when patient unattended, Consult care management for discharge planning    End of Shift Note    Bedside shift change report given to Trace carrera nurse) by Lauren Hawk. Briseyda Garcia RN (offgoing nurse). Report included the following information SBAR, ED Summary, MAR, and Cardiac Rhythm Paced Rythm    Shift worked:  7p-7a     Shift summary and any significant changes:     Complaint of headache - see MAR     Concerns for physician to address:       Zone phone for oncoming shift:          Activity:  Activity Level: Bed Rest  Number times ambulated in hallways past shift: 0  Number of times OOB to chair past shift: 0    Cardiac:   Cardiac Monitoring: Yes      Cardiac Rhythm: Sinus Rhythm    Access:  Current line(s): PIV     Genitourinary:   Urinary status: voiding    Respiratory:   O2 Device: None (Room air)  Chronic home O2 use?: NO  Incentive spirometer at bedside: NO       GI:  Last Bowel Movement Date: 10/17/22  Current diet:  ADULT DIET Regular; Low Fat/Low Chol/High Fiber/2 gm Na; Low Sodium (2 gm); 1800 ml  Passing flatus: YES  Tolerating current diet: YES       Pain Management:   Patient states pain is manageable on current regimen: YES    Skin:  Mustapha Score: 16  Interventions: increase time out of bed, PT/OT consult, limit briefs, internal/external urinary devices, and nutritional support     Patient Safety:  Fall Score: Total Score: 4  Interventions: bed/chair alarm, assistive device (walker, cane, etc), gripper socks, and pt to call before getting OOB  High Fall Risk: Yes    Length of Stay:  Expected LOS: - - -  Actual LOS: 1      Ma.  Sathish Hancock RN

## 2022-10-18 NOTE — PROGRESS NOTES
Problem: Mobility Impaired (Adult and Pediatric)  Goal: *Acute Goals and Plan of Care (Insert Text)  Description: FUNCTIONAL STATUS PRIOR TO ADMISSION: Patient was independent and active without use of DME. Patient was modified independent for basic and instrumental ADLs. Reports using SPC or RW as needed recently. Received HHPT ~ 1 month ago, however was discharged. Reported two GLF PTA. HOME SUPPORT PRIOR TO ADMISSION: The patient lived with  and required min A to faviola jewelry. Physical Therapy Goals  Initiated 10/18/2022  1. Patient will move from supine to sit and sit to supine , scoot up and down, and roll side to side in bed with modified independence within 7 day(s). 2.  Patient will transfer from bed to chair and chair to bed with modified independence using the least restrictive device within 7 day(s). 3.  Patient will perform sit to stand with modified independence within 7 day(s). 4.  Patient will ambulate with modified independence for 150 feet with the least restrictive device within 7 day(s). 5.  Patient will ascend/descend 5 stairs with 1 handrail(s) with supervision/set-up within 7 day(s). Outcome: Progressing Towards Goal   PHYSICAL THERAPY EVALUATION  Patient: Kaelyn Weiner (89 y.o. female)  Date: 10/18/2022  Primary Diagnosis: Cellulitis [L03.90]       Precautions:  Fall    ASSESSMENT  Based on the objective data described below, the patient presents with impaired functional mobility and decreased independence secondary to impaired balance, generalized weakness, increased LLE edema and pain, impaired gait mechanics, increased confusion, limited endurance, and decreased ROM. Received supine in bed with  at bedside. VSS throughout session, however BP remained soft throughout. LLE pain 3/10 at rest and 5/10 with activity. Needed VCs for safe hand placement with use of RW during transfers.  Patient stood from EOB with CGA, however experienced posterior LOB onto EOB, needing min A to control descent. Fair tolerance for short distance gait training with RW support, however limited due to increased fatigue. Patient with increased confusion throughout session, however with good participation and command following. Pt was left sitting in bedside chair with all needs met, RN aware, chair alarm on,  present, and VSS following session. Recommend HHPT, use of RW, and 24/7 assistance at discharge. Current Level of Function Impacting Discharge (mobility/balance): SBA for bed mobility; CGA/min A for transfers; CGA for gait training with RW support    Functional Outcome Measure: The patient scored 45/100 on the Barthel Index outcome measure which is indicative of 55% impairment in ADLs and mobility. Other factors to consider for discharge: increased risk for falls; decline from baseline mobility; increased confusion     Patient will benefit from skilled therapy intervention to address the above noted impairments. PLAN :  Recommendations and Planned Interventions: bed mobility training, transfer training, gait training, therapeutic exercises, patient and family training/education, and therapeutic activities      Frequency/Duration: Patient will be followed by physical therapy:  4 times a week to address goals. Recommendation for discharge: (in order for the patient to meet his/her long term goals)  Physical therapy at least 2 days/week in the home AND ensure assist and/or supervision for safety with mobility and ADLs    This discharge recommendation:  A follow-up discussion with the attending provider and/or case management is planned    IF patient discharges home will need the following DME: rolling walker         SUBJECTIVE:   Patient stated I am incontinent.     OBJECTIVE DATA SUMMARY:   HISTORY:    Past Medical History:   Diagnosis Date    Arrhythmia 2014    atrial fibrillation 2014, sick sinus syndrome: [facility]    Arthritis     Rheumatoid Bipolar affect, depressed ([facility] [address]     Bipolar affective disorder ([facility] [address] 3/31/2010    Chronic pain 2018    right shoulder    Colon polyps 03/31/2010    also 5/2018: colon polyps    Depression     Diarrhea 07/19/2013    D/t alpha gal allergy says patient    Epigastric pain 6/5/2015    GERD (gastroesophageal reflux disease)     occasiional only; controlled with med    Hemispheric carotid artery syndrome No stroke    Neuro: Silvino Carrillo  (CT scan head/neck negative 4/2018 in [facility])    Hyperlipidemia 3/31/2010    Hypothyroidism 3/31/2010    Long term current use of anticoagulant therapy     Lupus ([facility] [address]     patient denies-says she has RA    Migraine 03/31/2010    has them x2 a month: last one 5/23/2018    Psychotic disorder (HCC)     S/P ablation of atrial fibrillation 05/13/2014    S/P cardiac pacemaker procedure 9/16/2014 9/16/14 Medtronic MRI compatible dual chamber pacemaker implant    Sleep apnea     unable to tolerate CPAP    Stool color black     Thyroid disease     Tick-borne disease     Alpha Gal allergy: no eat pork, beef, milk (Meat allergy showed up on a allergy test)    Umbilical hernia without obstruction and without gangrene 6/22/2020    Urge incontinence 3/31/2010    Vitamin D deficiency 3/31/2015     Past Surgical History:   Procedure Laterality Date    COLONOSCOPY N/A 4/27/2018    COLONOSCOPY performed by Dixon Nair MD at [facility] AMBULATORY OR    COLONOSCOPY N/A 11/25/2020    COLONOSCOPY, EGD performed by Too Lee MD at [address]  4/27/2018         Stewart Self  4/27/2018         HX BUNIONECTOMY      HX GI  2010, 5/2018    egd, colonoscopy    HX HERNIA REPAIR  01/11/2021    HX HERNIA REPAIR  01/2021    HX HERNIA REPAIR  81/2942    4 umbillical     HX HYSTERECTOMY      HX ORTHOPAEDIC  2000's    removed bone spurs from R & L hand    HX ORTHOPAEDIC  1990s?     left bunionectomy     HX OTHER SURGICAL  07/2014    2 shocks to heart & ablations HX OTHER SURGICAL  01/11/2021    incarcerated incisional hernia surgery    HX PACEMAKER  2014    On the right side    HX PACEMAKER PLACEMENT  2015    HX SHOULDER REPLACEMENT Right 2017    HX TONSILLECTOMY  15 yrs old    RI CARDIAC SURG PROCEDURE UNLIST  5/2014    ablation       Personal factors and/or comorbidities impacting plan of care: chronic pain; arthritis; lupus    Home Situation  Home Environment: Private residence  # Steps to Enter: 5  Rails to Enter: Yes  Hand Rails : Right  One/Two Story Residence: One story  Current DME Used/Available at Home: Walker, rolling, Cane, straight, Grab bars  Tub or Shower Type: Tub (has a walk-in shower available)    EXAMINATION/PRESENTATION/DECISION MAKING:   Critical Behavior:  Neurologic State: Alert  Orientation Level: Oriented to person, Oriented to place, Oriented to situation  Cognition: Appropriate decision making, Appropriate for age attention/concentration, Appropriate safety awareness  Safety/Judgement: Decreased awareness of need for safety  Hearing:     Skin:  Intact  Edema: Moderate LLE edema  Range Of Motion:  AROM: Generally decreased, functional                       Strength:    Strength: Generally decreased, functional                    Tone & Sensation:   Tone: Normal              Sensation: Impaired               Coordination:  Coordination: Within functional limits  Vision:   Acuity: Within Defined Limits  Corrective Lenses: Glasses  Functional Mobility:  Bed Mobility:     Supine to Sit: Stand-by assistance; Additional time     Scooting: Stand-by assistance; Additional time  Transfers:  Sit to Stand: Minimum assistance  Stand to Sit: Contact guard assistance                       Balance:   Sitting: Intact  Standing: Impaired; With support  Standing - Static: Fair;Constant support  Standing - Dynamic : Fair;Constant support  Ambulation/Gait Training:  Distance (ft): 30 Feet (ft)  Assistive Device: Gait belt;Walker, rolling  Ambulation - Level of Assistance: Contact guard assistance;Assist x1;Additional time; Adaptive equipment     Gait Description (WDL): Exceptions to WDL  Gait Abnormalities: Decreased step clearance; Step to gait; Antalgic        Base of Support: Widened     Speed/Raisa: Slow;Pace decreased (<100 feet/min)  Step Length: Left shortened;Right shortened               Functional Measure:  Barthel Index:    Bathin  Bladder: 0  Bowels: 10  Groomin  Dressin  Feeding: 10  Mobility: 0  Stairs: 0  Toilet Use: 5  Transfer (Bed to Chair and Back): 10  Total: 45/100       The Barthel ADL Index: Guidelines  1. The index should be used as a record of what a patient does, not as a record of what a patient could do. 2. The main aim is to establish degree of independence from any help, physical or verbal, however minor and for whatever reason. 3. The need for supervision renders the patient not independent. 4. A patient's performance should be established using the best available evidence. Asking the patient, friends/relatives and nurses are the usual sources, but direct observation and common sense are also important. However direct testing is not needed. 5. Usually the patient's performance over the preceding 24-48 hours is important, but occasionally longer periods will be relevant. 6. Middle categories imply that the patient supplies over 50 per cent of the effort. 7. Use of aids to be independent is allowed. Score Interpretation (from [address] [address])    Independent   60-79 Minimally independent   40-59 Partially dependent   20-39 Very dependent   <20 Totally dependent     -Jaelyn Orellana., Barthel, D.W. (1965). Functional evaluation: the Barthel Index. [facility] ([address] (1997). The Barthel activities of daily living index: self-reporting versus actual performance in the old (> or = 75 years).  Journal of [address] [facility] 45(7), [address] [facility], REBECCA, Emmanuel Zacarias., Andreia Montes (1999). Measuring the change in disability after inpatient rehabilitation; comparison of the responsiveness of the Barthel Index and Functional [place] Measure. Journal of Neurology, Neurosurgery, and Psychiatry, 66(4), 806-785. MALIK Wall, PATRICK Brooks, & Carlo Montes M.A. (2004) Assessment of post-stroke quality of life in cost-effectiveness studies: The usefulness of the Barthel Index and the EuroQoL-5D. Quality of Life Research, 15, 163-13           Physical Therapy Evaluation Charge Determination   History Examination Presentation Decision-Making   HIGH Complexity :3+ comorbidities / personal factors will impact the outcome/ POC  HIGH Complexity : 4+ Standardized tests and measures addressing body structure, function, activity limitation and / or participation in recreation  LOW Complexity : Stable, uncomplicated  Other outcome measures Barthel Index  MEDIUM      Based on the above components, the patient evaluation is determined to be of the following complexity level: MEDIUM    Pain Rating:  3/10 LLE pain at rest; 5/10 LLE pain with activity    Activity Tolerance:   Fair, SpO2 stable on RA, and requires rest breaks    After treatment patient left in no apparent distress:   Sitting in chair, Call bell within reach, Bed / chair alarm activated, and Caregiver / family present    COMMUNICATION/EDUCATION:   The patients plan of care was discussed with: Physical therapist, Occupational therapist, and Registered nurse. Fall prevention education was provided and the patient/caregiver indicated understanding., Patient/family have participated as able in goal setting and plan of care. , and Patient/family agree to work toward stated goals and plan of care.     Thank you for this referral.  Catherine Pearl, PT, DPT   Time Calculation: 30 mins

## 2022-10-18 NOTE — PROGRESS NOTES
0700: Bedside shift change report given to [id number] (oncoming nurse) by Tayla Ragsdale (offgoing nurse). Report included the following information SBAR, Kardex, Intake/Output, and MAR. Pt resting comfortably with no s/xx of distress. 0830: Pt off the floor to echo. 1008: Pt back on the floor from echo. Pt resting comfortably. [id number]: RN removed pt's R AC 20g IV d/t bleeding. RN attempted to insert another but was unsuccessful. Another RN tried again and was unsuccessful. RN called PICC team to request assistance and they agreed to come. 1500: PICC team arrived and decided to not place another IV, told RN to retime abx.     4483-1406: Tech took pt back from BR and checked her Bp, BP was 75/38. PCT checked again and it increased to 106/38. PCT escalated to RN who check pt's BP again and it was 100/53, MAP 60. Pt reports a hx of hypotension and that MD is aware, but RN messaged MD just to be safe. Pt remains asymptomatic and HR is normal.     1645: Pt's BP up to 116/45, MAP 63. RN gave Pt scheduled dosage of midodrine. Pt remains asymptomatic.     1900: MD has not put any new orders in at this time for BP. Pt remains asymptomatic. End of Shift Note    Bedside shift change report given to Briseyda DIAMOND  (oncoming nurse) by Gris Alexandre RN (offgoing nurse). Report included the following information SBAR, Kardex, Intake/Output, and MAR    Shift worked:  7a-7p     Shift summary and any significant changes:     Episode of hypotension at [id number] but pt remained asymptomatic and pt has hx of hypotension. Pt had echo today.       Concerns for physician to address:  N/a     Zone phone for oncoming shift:          Activity:  Activity Level: Bed Rest  Number times ambulated in hallways past shift: 0  Number of times OOB to chair past shift: 2    Cardiac:   Cardiac Monitoring: Yes      Cardiac Rhythm: AV Paced    Access:  Current line(s): PIV     Genitourinary:   Urinary status: voiding    Respiratory:   O2 Device: None (Room air)  Chronic home O2 use?: NO  Incentive spirometer at bedside: N/A       GI:  Last Bowel Movement Date: 10/18/22  Current diet:  ADULT DIET Regular; Low Fat/Low Chol/High Fiber/2 gm Na; Low Sodium (2 gm); 1800 ml  Passing flatus: YES  Tolerating current diet: YES       Pain Management:   Patient states pain is manageable on current regimen: YES    Skin:  Mustapha Score: 14  Interventions: increase time out of bed    Patient Safety:  Fall Score:  Total Score: 4  Interventions: bed/chair alarm, assistive device (walker, cane, etc), and gripper socks  High Fall Risk: Yes    Length of Stay:  Expected LOS: 3d 4h  Actual LOS: 1      Maggy Hart RN

## 2022-10-19 ENCOUNTER — APPOINTMENT (OUTPATIENT)
Dept: NON INVASIVE DIAGNOSTICS | Age: 72
DRG: 603 | End: 2022-10-19
Attending: NURSE PRACTITIONER
Payer: MEDICARE

## 2022-10-19 ENCOUNTER — APPOINTMENT (OUTPATIENT)
Dept: NUCLEAR MEDICINE | Age: 72
DRG: 603 | End: 2022-10-19
Attending: NURSE PRACTITIONER
Payer: MEDICARE

## 2022-10-19 LAB
ANION GAP SERPL CALC-SCNC: 4 MMOL/L (ref 5–15)
BASOPHILS # BLD: 0 K/UL (ref 0–0.1)
BASOPHILS NFR BLD: 1 % (ref 0–1)
BUN SERPL-MCNC: 16 MG/DL (ref 6–20)
BUN/CREAT SERPL: 24 (ref 12–20)
CALCIUM SERPL-MCNC: 9.5 MG/DL (ref 8.5–10.1)
CHLORIDE SERPL-SCNC: 118 MMOL/L (ref 97–108)
CO2 SERPL-SCNC: 23 MMOL/L (ref 21–32)
CREAT SERPL-MCNC: 0.66 MG/DL (ref 0.55–1.02)
DATE LAST DOSE: NORMAL
DIFFERENTIAL METHOD BLD: ABNORMAL
EOSINOPHIL # BLD: 0.3 K/UL (ref 0–0.4)
EOSINOPHIL NFR BLD: 7 % (ref 0–7)
ERYTHROCYTE [DISTWIDTH] IN BLOOD BY AUTOMATED COUNT: 14 % (ref 11.5–14.5)
GLUCOSE SERPL-MCNC: 90 MG/DL (ref 65–100)
HCT VFR BLD AUTO: 35.3 % (ref 35–47)
HGB BLD-MCNC: 11.4 G/DL (ref 11.5–16)
IMM GRANULOCYTES # BLD AUTO: 0 K/UL (ref 0–0.04)
IMM GRANULOCYTES NFR BLD AUTO: 0 % (ref 0–0.5)
LYMPHOCYTES # BLD: 1.6 K/UL (ref 0.8–3.5)
LYMPHOCYTES NFR BLD: 36 % (ref 12–49)
MCH RBC QN AUTO: 31.7 PG (ref 26–34)
MCHC RBC AUTO-ENTMCNC: 32.3 G/DL (ref 30–36.5)
MCV RBC AUTO: 98.1 FL (ref 80–99)
MONOCYTES # BLD: 0.3 K/UL (ref 0–1)
MONOCYTES NFR BLD: 7 % (ref 5–13)
NEUTS SEG # BLD: 2.2 K/UL (ref 1.8–8)
NEUTS SEG NFR BLD: 49 % (ref 32–75)
NRBC # BLD: 0 K/UL (ref 0–0.01)
NRBC BLD-RTO: 0 PER 100 WBC
PLATELET # BLD AUTO: 114 K/UL (ref 150–400)
PMV BLD AUTO: 11.3 FL (ref 8.9–12.9)
POTASSIUM SERPL-SCNC: 4.2 MMOL/L (ref 3.5–5.1)
RBC # BLD AUTO: 3.6 M/UL (ref 3.8–5.2)
REPORTED DOSE,DOSE: NORMAL UNITS
REPORTED DOSE/TIME,TMG: NORMAL
SODIUM SERPL-SCNC: 145 MMOL/L (ref 136–145)
VANCOMYCIN TROUGH SERPL-MCNC: 9.3 UG/ML (ref 5–10)
WBC # BLD AUTO: 4.5 K/UL (ref 3.6–11)

## 2022-10-19 PROCEDURE — 74011250636 HC RX REV CODE- 250/636: Performed by: GENERAL ACUTE CARE HOSPITAL

## 2022-10-19 PROCEDURE — 65270000046 HC RM TELEMETRY

## 2022-10-19 PROCEDURE — 74011000250 HC RX REV CODE- 250: Performed by: GENERAL ACUTE CARE HOSPITAL

## 2022-10-19 PROCEDURE — 80202 ASSAY OF VANCOMYCIN: CPT

## 2022-10-19 PROCEDURE — 74011250637 HC RX REV CODE- 250/637: Performed by: GENERAL ACUTE CARE HOSPITAL

## 2022-10-19 PROCEDURE — 97116 GAIT TRAINING THERAPY: CPT

## 2022-10-19 PROCEDURE — 36415 COLL VENOUS BLD VENIPUNCTURE: CPT

## 2022-10-19 PROCEDURE — 99233 SBSQ HOSP IP/OBS HIGH 50: CPT | Performed by: INTERNAL MEDICINE

## 2022-10-19 PROCEDURE — 78452 HT MUSCLE IMAGE SPECT MULT: CPT

## 2022-10-19 PROCEDURE — 80048 BASIC METABOLIC PNL TOTAL CA: CPT

## 2022-10-19 PROCEDURE — A9500 TC99M SESTAMIBI: HCPCS

## 2022-10-19 PROCEDURE — 97110 THERAPEUTIC EXERCISES: CPT

## 2022-10-19 PROCEDURE — 85025 COMPLETE CBC W/AUTO DIFF WBC: CPT

## 2022-10-19 PROCEDURE — 74011250636 HC RX REV CODE- 250/636: Performed by: INTERNAL MEDICINE

## 2022-10-19 PROCEDURE — 74011000258 HC RX REV CODE- 258: Performed by: INTERNAL MEDICINE

## 2022-10-19 RX ORDER — TETRAKIS(2-METHOXYISOBUTYLISOCYANIDE)COPPER(I) TETRAFLUOROBORATE 1 MG/ML
31.4 INJECTION, POWDER, LYOPHILIZED, FOR SOLUTION INTRAVENOUS
Status: COMPLETED | OUTPATIENT
Start: 2022-10-19 | End: 2022-10-19

## 2022-10-19 RX ADMIN — Medication 100 MCG: at 08:55

## 2022-10-19 RX ADMIN — MIDODRINE HYDROCHLORIDE 2.5 MG: 5 TABLET ORAL at 16:31

## 2022-10-19 RX ADMIN — SODIUM CHLORIDE, PRESERVATIVE FREE 10 ML: 5 INJECTION INTRAVENOUS at 21:19

## 2022-10-19 RX ADMIN — APIXABAN 5 MG: 5 TABLET, FILM COATED ORAL at 20:46

## 2022-10-19 RX ADMIN — PREGABALIN 200 MG: 100 CAPSULE ORAL at 08:55

## 2022-10-19 RX ADMIN — LITHIUM CARBONATE 300 MG: 300 TABLET, FILM COATED, EXTENDED RELEASE ORAL at 21:00

## 2022-10-19 RX ADMIN — VANCOMYCIN HYDROCHLORIDE 1000 MG: 1 INJECTION, POWDER, LYOPHILIZED, FOR SOLUTION INTRAVENOUS at 18:46

## 2022-10-19 RX ADMIN — VANCOMYCIN HYDROCHLORIDE 1500 MG: 10 INJECTION, POWDER, LYOPHILIZED, FOR SOLUTION INTRAVENOUS at 06:36

## 2022-10-19 RX ADMIN — LATANOPROST 1 DROP: 50 SOLUTION OPHTHALMIC at 20:47

## 2022-10-19 RX ADMIN — APIXABAN 5 MG: 5 TABLET, FILM COATED ORAL at 08:55

## 2022-10-19 RX ADMIN — MIDODRINE HYDROCHLORIDE 2.5 MG: 5 TABLET ORAL at 08:38

## 2022-10-19 RX ADMIN — LITHIUM CARBONATE 300 MG: 300 TABLET, FILM COATED, EXTENDED RELEASE ORAL at 16:31

## 2022-10-19 RX ADMIN — SODIUM CHLORIDE, PRESERVATIVE FREE 10 ML: 5 INJECTION INTRAVENOUS at 06:36

## 2022-10-19 RX ADMIN — ALPRAZOLAM 0.5 MG: 0.5 TABLET ORAL at 20:46

## 2022-10-19 RX ADMIN — MIDODRINE HYDROCHLORIDE 2.5 MG: 5 TABLET ORAL at 12:00

## 2022-10-19 RX ADMIN — LEVOTHYROXINE SODIUM 50 MCG: 0.05 TABLET ORAL at 06:36

## 2022-10-19 RX ADMIN — LITHIUM CARBONATE 300 MG: 300 TABLET, FILM COATED, EXTENDED RELEASE ORAL at 08:55

## 2022-10-19 RX ADMIN — PREGABALIN 200 MG: 100 CAPSULE ORAL at 20:46

## 2022-10-19 RX ADMIN — TETRAKIS(2-METHOXYISOBUTYLISOCYANIDE)COPPER(I) TETRAFLUOROBORATE 31.4 MILLICURIE: 1 INJECTION, POWDER, LYOPHILIZED, FOR SOLUTION INTRAVENOUS at 12:45

## 2022-10-19 RX ADMIN — CEFEPIME 2 G: 2 INJECTION, POWDER, FOR SOLUTION INTRAVENOUS at 20:47

## 2022-10-19 RX ADMIN — TOPIRAMATE 100 MG: 100 TABLET, FILM COATED ORAL at 08:55

## 2022-10-19 RX ADMIN — TIMOLOL MALEATE 1 DROP: 5 SOLUTION OPHTHALMIC at 08:38

## 2022-10-19 RX ADMIN — CEFEPIME 2 G: 2 INJECTION, POWDER, FOR SOLUTION INTRAVENOUS at 08:55

## 2022-10-19 RX ADMIN — ACETAMINOPHEN 650 MG: 325 TABLET ORAL at 20:46

## 2022-10-19 RX ADMIN — SODIUM CHLORIDE, PRESERVATIVE FREE 5 ML: 5 INJECTION INTRAVENOUS at 14:52

## 2022-10-19 RX ADMIN — PANTOPRAZOLE SODIUM 40 MG: 40 TABLET, DELAYED RELEASE ORAL at 08:38

## 2022-10-19 NOTE — PROGRESS NOTES
Pharmacy Antimicrobial Kinetic Dosing    Indication for Antimicrobials: ssti, cellulitis     Current Regimen of Each Antimicrobial:  Vancomycin pharmacy to dose (Start Date 10/17; Day # 3/7)  Cefepime 2g q 24h (start: 10/17, day 3)    Previous Antimicrobial Therapy:    Goal Level: AUC: 400-600 mg/hr/Liter/day    Date Dose & Interval Measured (mcg/mL) Predicted AUC/NILSA                       Date & time of next level: TBD    Dosing calculator used: [employer] calculator    Significant Positive Cultures:   10/17: blood; prelim    Conditions for Dosing Consideration: None    Labs:  Recent Labs     10/19/22  0535 10/18/22  0400 10/17/22  1450 10/17/22  0535   CREA 0.66 0.64  --  1.05*   BUN 16   --  29*   PCT  --   --  0.08  --      Recent Labs     10/19/22  0535 10/17/22  0535   WBC 4.5 12.1*     Temp (24hrs), Av.9 °F (36.6 °C), Min:97.4 °F (36.3 °C), Max:98.6 °F (37 °C)    Creatinine Clearance (mL/min):   CrCl (Ideal Body Weight): 64.4   If actual weight < IBW: CrCl (Actual Body Weight) 112.5    Impression/Plan:   Vancomycin level resulted, extrapolated to , will increase regimen to 1000 mg IV Q12H, expected   C/w cefepime  Afebrile  Scr trending down  Antimicrobial stop date 7 days     Pharmacy will follow daily and adjust medications as appropriate for renal function and/or serum levels.     Thank you,  Vinnie Maier, PHARMD    Vancomycin Dosing Document    Documents located on pharmacy Teams site: Clinical Practice -> Antimicrobial Stewardship -> Antibiotics_Vancomycin     Aminoglycoside Dosing Document    Documents located on pharmacy Teams site: Clinical Practice -> Antimicrobial Stewardship -> Antibiotics_Aminoglycosides

## 2022-10-19 NOTE — PROGRESS NOTES
Problem: Mobility Impaired (Adult and Pediatric)  Goal: *Acute Goals and Plan of Care (Insert Text)  Description: FUNCTIONAL STATUS PRIOR TO ADMISSION: Patient was independent and active without use of DME. Patient was modified independent for basic and instrumental ADLs. Reports using SPC or RW as needed recently. Received HHPT ~ 1 month ago, however was discharged. Reported two GLF PTA. HOME SUPPORT PRIOR TO ADMISSION: The patient lived with  and required min A to faviola jewelry. Physical Therapy Goals  Initiated 10/18/2022  1. Patient will move from supine to sit and sit to supine , scoot up and down, and roll side to side in bed with modified independence within 7 day(s). 2.  Patient will transfer from bed to chair and chair to bed with modified independence using the least restrictive device within 7 day(s). 3.  Patient will perform sit to stand with modified independence within 7 day(s). 4.  Patient will ambulate with modified independence for 150 feet with the least restrictive device within 7 day(s). 5.  Patient will ascend/descend 5 stairs with 1 handrail(s) with supervision/set-up within 7 day(s). Outcome: Progressing Towards Goal   PHYSICAL THERAPY TREATMENT  Patient: Feroz Martinez (30 y.o. female)  Date: 10/19/2022  Diagnosis: Cellulitis [L03.90] <principal problem not specified>      Precautions: Fall  Chart, physical therapy assessment, plan of care and goals were reviewed. ASSESSMENT  Patient continues with skilled PT services and is progressing towards goals. Pt presents with decreased strength and endurance. Pt performed sit to stand transfer at [facility] with additional time, and cueing for hand placement. Pt ambulated 95ft with RW at [facility]/SBA. Pt educate don using UEs to off weight LEs to improve foot clearance. Pt able to improve with time. Pt with improved mobility tolerance and safety. Pt performed seated exercises with verbal and visual cueing.       Current Level of Function Impacting Discharge (mobility/balance): [facility] ambulation CGA/SBA with RW     Other factors to consider for discharge: decreased endurance and strength          PLAN :  Patient continues to benefit from skilled intervention to address the above impairments. Continue treatment per established plan of care. to address goals. Recommendation for discharge: (in order for the patient to meet his/her long term goals)  Physical therapy at least 2 days/week in the home AND ensure assist and/or supervision for safety with mobility     This discharge recommendation:  Has been made in collaboration with the attending provider and/or case management    IF patient discharges home will need the following DME: rolling walker       SUBJECTIVE:   Patient stated I do feel a lot better now that I moved .     OBJECTIVE DATA SUMMARY:   Critical Behavior:  Neurologic State: Alert  Orientation Level: Oriented X4  Cognition: Follows commands  Safety/Judgement: Decreased awareness of need for safety  Functional Mobility Training:  Bed Mobility:                    Transfers:  Sit to Stand: Contact guard assistance; Additional time  Stand to Sit: Contact guard assistance;Stand-by assistance                             Balance:  Sitting: Intact  Standing: Impaired  Standing - Static: Good;Constant support  Standing - Dynamic : Fair;Good;Constant support  Ambulation/Gait Training:  Distance (ft): 95 Feet (ft)  Assistive Device: Gait belt;Walker, rolling  Ambulation - Level of Assistance: Contact guard assistance;Stand-by assistance        Gait Abnormalities: Decreased step clearance        Base of Support: Widened     Speed/Raisa: Pace decreased (<100 feet/min)  Step Length: Left shortened;Right shortened       Therapeutic Exercises:   Seated  LAQ x10  Marching x10  Ankle pumps x10  Shoulder flexion x10  Shoulder abduction x5  Shoulder retraction x10  Shoulder shrugs x10  Pain Rating:  Pt reported pain of L ankle with mobility. Activity Tolerance:   WNL and Good    After treatment patient left in no apparent distress:   Sitting in chair, Call bell within reach, and Caregiver / family present    COMMUNICATION/COLLABORATION:   The patients plan of care was discussed with: Registered nurse.      Fco Sellers PTA   Time Calculation: 27 mins

## 2022-10-19 NOTE — CONSULTS
[facility]  [address], [address] [facility]  [phone]  www. [employer]       CARDIOLOGY CONSULTATION    PLEASE NOTE THAT WE CONFIRMED WITH THE REFERRING PHYSICIAN PRIOR TO SEEING THE PATIENT THAT THE PATIENT IS BEING REFERRED FOR INITIAL HOSPITAL EVALUATION AND FOR LONG-TERM ONGOING CARDIAC CARE     Date of  Admission: 10/17/2022  5:21 AM     Admission type:Emergency   Primary Care Physician:Anjelica David MD     Attending Provider: Lety Brandt MD  Cardiology Provider: Dr. Blake Baumgarten: Cardiomyopathy     Subjective:     Nu Deleon is a 67 y.o. female admitted for Cellulitis [L03.90]. She has a PMHx of PAF s/p hybrid ablation, SSS s/p PM, non-ischemic cardiomyopathy, HTN, HLD, GERD, hypothyroidism. She was admitted to [address] [place] [address] with LLE cellulitis. Was hypotensive during admission, so PTA meds held, started on Midodrine. Echo done during admission showed reduced LVEF 15-20%. Cardiology consulted as this is a new change per records. Pt is known to our practice. Had an echo done in our office in 6/2022 which showed reduced LVEF 20-25%. Was started on GDMT at that time, and also recommended lexiscan stress test.  She had not yet gotten stress test done (this was post-poned as she was sick the day it was scheduled). Was due to reassess LVEF this month, with plans to proceed with BiV ICD upgrade if EF remains low. Currently, pt denies symptoms. Feels like leg is doing better. Denies complaints of chest pains, shortness of breath or orthopnea. Young Chin to get her cardiac status stabilized so she can get her knee replacement surgery.       Patient Active Problem List    Diagnosis Date Noted    Cellulitis 10/17/2022    Venous insufficiency of both lower extremities 02/09/2021    Varicose veins with swelling 02/09/2021    Essential hypertension 60/89/3559    Umbilical hernia without obstruction and without gangrene 06/22/2020    Osteoarthritis of right shoulder 06/06/2018    Incisional hernia, without obstruction or gangrene 10/20/2017    Obesity, Class II, BMI 35-39.9 10/20/2017    Fibromyalgia 03/29/2017    Epigastric pain 06/05/2015    Vitamin D deficiency 03/31/2015    A-fib ([address] 01/28/2015    Esophageal spasm 12/16/2014    S/P cardiac pacemaker procedure 09/16/2014    SSS (sick sinus syndrome) ([address] 09/02/2014    S/P ablation of atrial fibrillation 05/13/2014    Morbid obesity with BMI of 40.0-44.9, adult ([address] 03/14/2014    Diarrhea 07/19/2013    GERD (gastroesophageal reflux disease) 04/13/2012    Colon polyps 03/31/2010    Urge incontinence 03/31/2010    Migraine 03/31/2010    Bipolar affective disorder ([address] 03/31/2010    Hypothyroidism 03/31/2010    Hyperglycemia 03/31/2010    Hyperlipidemia 03/31/2010      Ruth Culp MD  Past Medical History:   Diagnosis Date    Arrhythmia 2014    atrial fibrillation 2014, sick sinus syndrome: [facility]    Arthritis     Rheumatoid    Bipolar affect, depressed ([address]     Bipolar affective disorder ([address] 3/31/2010    Chronic pain 2018    right shoulder    Colon polyps 03/31/2010    also 5/2018: colon polyps    Depression     Diarrhea 07/19/2013    D/t alpha gal allergy says patient    Epigastric pain 6/5/2015    GERD (gastroesophageal reflux disease)     occasiional only; controlled with med    Hemispheric carotid artery syndrome No stroke    Neuro: Weston Flaming  (CT scan head/neck negative 4/2018 in [facility])    Hyperlipidemia 3/31/2010    Hypothyroidism 3/31/2010    Long term current use of anticoagulant therapy     Lupus ([address]     patient denies-says she has RA    Migraine 03/31/2010    has them x2 a month: last one 5/23/2018    Psychotic disorder (HCC)     S/P ablation of atrial fibrillation 05/13/2014    S/P cardiac pacemaker procedure 9/16/2014 9/16/14 Med[employer]onic MRI compatible dual chamber pacemaker implant    Sleep apnea     unable to tolerate CPAP Stool color black     Thyroid disease     Tick-borne disease     Alpha Gal allergy: no eat pork, beef, milk (Meat allergy showed up on a allergy test)    Umbilical hernia without obstruction and without gangrene 6/22/2020    Urge incontinence 3/31/2010    Vitamin D deficiency 3/31/2015      Social History     Socioeconomic History    Marital status:    Tobacco Use    Smoking status: Never    Smokeless tobacco: Never   Vaping Use    Vaping Use: Never used   Substance and Sexual Activity    Alcohol use: Not Currently     Alcohol/week: 1.0 standard drink     Types: 1 Cans of beer per week    Drug use: Yes     Types: Prescription    Sexual activity: Never     Social Determinants of Health     Financial Resource Strain: Low Risk     Difficulty of Paying Living Expenses: Not hard at all   Food Insecurity: No Food Insecurity    Worried About Running Out of Food in the Last Year: Never true    Ran Out of Food in the Last Year: Never true     Allergies   Allergen Reactions    Latex Swelling     Swelling of lips says patient    Beef Containing Products Nausea and Vomiting     diarrhea    Ciprofloxacin Unknown (comments)    Pork Derived (Porcine) Nausea and Vomiting     diarrhea    Bovine Cartilage Nausea and Vomiting     diarrhea    Milk Nausea and Vomiting     diarrhea  diarrhea    Sulfa (Sulfonamide Antibiotics) Hives    Xarelto [Rivaroxaban] Other (comments)     GI problems      Family History   Problem Relation Age of Onset    Arrhythmia Mother         afib    Stroke Mother     Heart Failure Mother     Colon Cancer Father     Heart Disease Father     Cancer Father         colon cancer    Arrhythmia Brother         afib    Asthma Brother     COPD Brother       Current Facility-Administered Medications   Medication Dose Route Frequency    vancomycin (VANCOCIN) 1,000 mg in 0.9% sodium chloride 250 mL ([id number])  1,000 mg IntraVENous Q12H    cefepime (MAXIPIME) 2 g in 0.9% sodium chloride (MBP/ADV) 100 mL MBP  2 g IntraVENous Q12H    ALPRAZolam (XANAX) tablet 0.5 mg  0.5 mg Oral QHS    apixaban (ELIQUIS) tablet 5 mg  5 mg Oral BID    cyanocobalamin (VITAMIN B12) tablet 100 mcg  100 mcg Oral DAILY    [START ON 10/21/2022] ergocalciferol capsule 50,000 Units  50,000 Units Oral Q7D    pantoprazole (PROTONIX) tablet 40 mg  40 mg Oral ACB    hydrOXYzine HCL (ATARAX) tablet 10 mg  10 mg Oral TID PRN    latanoprost (XALATAN) 0.005 % ophthalmic solution 1 Drop  1 Drop Both Eyes QHS    levothyroxine (SYNTHROID) tablet 50 mcg  50 mcg Oral DAILY    lithium carbonate SR (LITHOBID) tablet 300 mg  300 mg Oral TID    timolol (TIMOPTIC) 0.5 % ophthalmic solution 1 Drop  1 Drop Both Eyes DAILY    topiramate (TOPAMAX) tablet 100 mg  100 mg Oral DAILY    sodium chloride (NS) flush 5-40 mL  5-40 mL IntraVENous Q8H    sodium chloride (NS) flush 5-40 mL  5-40 mL IntraVENous PRN    acetaminophen (TYLENOL) tablet 650 mg  650 mg Oral Q6H PRN    Or    acetaminophen (TYLENOL) suppository 650 mg  650 mg Rectal Q6H PRN    polyethylene glycol (MIRALAX) packet 17 g  17 g Oral DAILY PRN    ondansetron (ZOFRAN ODT) tablet 4 mg  4 mg Oral Q8H PRN    Or    ondansetron (ZOFRAN) injection 4 mg  4 mg IntraVENous Q6H PRN    midodrine (PROAMATINE) tablet 2.5 mg  2.5 mg Oral TID WITH MEALS    pregabalin (LYRICA) capsule 200 mg  200 mg Oral BID        Review of Symptoms:   11 systems reviewed, negative other than as stated in the HPI        Objective:      Visit Vitals  BP 99/62 (BP 1 Location: Left lower arm, BP Patient Position: At rest)   Pulse 76   Temp 97.4 °F (36.3 °C)   Resp 19   Ht 5' 5\" (1.651 m)   Wt 99.6 kg (219 lb 9.3 oz)   SpO2 100%   BMI 36.54 kg/m²         Physical Exam:  General: Well developed, in no acute distress, cooperative and alert  HEENT: No carotid bruits, PEERL, EOM intact. Heart:  reg rate and rhythm; normal S1/S2; no murmurs  Respiratory: Clear bilaterally x 4, no wheezing or rales  Abdomen:   Soft, non-tender, no distention, no masses.  + BS.   Extremities:  Normal cap refill, no cyanosis, atraumatic. No edema. Neuro: A&Ox3, speech clear  Skin: Skin color is normal. Non diaphoretic  Vascular: 2+ pulses symmetric in all extremities    Data Review:   Recent Labs     10/19/22  0535 10/17/22  0535   WBC 4.5 12.1*   HGB 11.4* 13.3   HCT 35.3 40.4   * 179     Recent Labs     10/19/22  0535 10/18/22  0400 10/17/22  0535    146* 145   K 4.2 3.8 3.9   * 118* 113*   CO2 23 23 28   GLU 90 101* 140*   BUN 16 19 29*   CREA 0.66 0.64 1.05*   CA 9.5 9.4 10.1   MG  --  2.2  --    ALB  --  2.5* 3.5   TBILI  --  0.7 1.2*   ALT  --  15 21       No results for input(s): TROIQ, CPK, CKMB in the last 72 hours. Intake/Output Summary (Last 24 hours) at 10/19/2022 1155  Last data filed at 10/19/2022 0905  Gross per 24 hour   Intake 1360 ml   Output 500 ml   Net 860 ml        Cardiographics    Telemetry: paced rhythm    ECG: AV paced rhythm    Echocardiogram (10/18/22): Left Ventricle: Reduced left ventricular systolic function with a visually estimated EF of 15 - 20%. Left ventricle size is normal. Increased wall thickness. There are regional wall motion abnormalities. Abnormal diastolic function. Mitral Valve: Mild regurgitation. Left Atrium: Left atrium is dilated. Right Atrium: Right atrium is dilated. Technical qualifiers: Echo study was a technically difficult Doppler study and technically difficult due to patient's heart rhythm. Radiology Results in the past 24 hours:  No results found. Assessment:       Active Problems:    Cellulitis (10/17/2022)         Plan:     Non-ischemic cardiomyopathy  Has had EF 20-25% on last echo in our office in 6/2022  Has been on GDMT since 7/2022  Was to get lexiscan stress test as outpatient, but never got done  Will obtain lexiscan stress test; if negative, then will be a candidate for BiV ICD upgrade. Will proceed with Dr. Marie Antonio during this admission if able.   Hold BB, Entresto due to hypotension  Not in CHF exacerbation at this time    2. PAF  S/p hybrid ablation in 1/2015  Maintaining sinus rhythm  Continue Eliquis    3. SSS - s/p PPM    4. Hypotension  Continue midodrine    5. Cellulitis  Care per primary team    Thank you for this consultation. We will continue to follow with you. Gregor Carty NP  CC: Renu Zepeda MD    Patient seen, examined by me personally. Plan discussed as detailed. Agree with note as outlined by NP with modifications as noted.      Matthew Uriarte MD

## 2022-10-19 NOTE — PROGRESS NOTES
INTERNAL MEDICINE ATTENDING NOTE     Patient Name: Amber Mccord   : 1950   Admit: 10/17/2022    ASSESSMENT / PLAN    Cellulitis (10/17/2022) L lower extremity: On empiric cefepime and vancomycin, d3. Hypotension: Improved. She has been treated with IVF and midodrine. KI: Improved with hydration. Cr back to baseline. Hypothyroidism: Continue synthroid. Atrial Fibrillation: S/p ablation. Outpatient F/U as per Dr. Annalisa Alatorre.  On eliquis. Chronic LE edema. CHF: Compensated. However, the EF has dropped from 45-50% to 15-20%. I'll consult Dr. Jossue Shea for further evaluation and management. Migraines. BPAD/Anxiety/Stress. Stable. Continue outpatient psychiatry follow up. Edema: Chronic. Not on diuretic due to low BP. CODE: Full. Edwin Tijerina MD, FACP  Best contact is via Pager: [phone], or via hospital  at [phone]. Do NOT use Perfect Serve. SUBJECTIVE:   Ms. Amber Mccord is a patient of mine with past medical history of A fib, hypothyroidism, migraines, BPAD, and chronic LE edema, who had a recent puncture wound of L mid shin on 10/2 when scratched by her dog and was treated with a course of augmentin, now admitted with worsened cellulitis of L leg. She was seen by me today during rounds. At this time, she is resting comfortably. She has less pain in her leg. She has had a headache this morning treated with tylenol. ROS otherwise unremarkable except as noted elsewhere. OBJECTIVE:   Blood pressure (!) 117/47, pulse 75, temperature 97.4 °F (36.3 °C), resp. rate 21, height 5' 5\" (1.651 m), weight 219 lb 9.3 oz (99.6 kg), SpO2 100 %. Gen: Patient is in no acute distress. Lungs: CTAB. Heart: RRR. Abd: S, NT, ND, BS present. Exremities: Warm. She has 2-3+ edema bilaterally. L LE exhibits improvement in erythema and tenderness to palpation.      Recent Results (from the past 12 hour(s))   METABOLIC PANEL, BASIC    Collection Time: 10/19/22  5:35 AM Result Value Ref Range    Sodium 145 136 - 145 mmol/L    Potassium 4.2 3.5 - 5.1 mmol/L    Chloride 118 (H) 97 - 108 mmol/L    CO2 23 21 - 32 mmol/L    Anion gap 4 (L) 5 - 15 mmol/L    Glucose 90 65 - 100 mg/dL    BUN 16 6 - 20 MG/DL    Creatinine 0.66 0.55 - 1.02 MG/DL    BUN/Creatinine ratio 24 (H) 12 - 20      eGFR >60 >60 ml/min/1.73m2    Calcium 9.5 8.5 - 10.1 MG/DL   CBC WITH AUTOMATED DIFF    Collection Time: 10/19/22  5:35 AM   Result Value Ref Range    WBC 4.5 3.6 - 11.0 K/uL    RBC 3.60 (L) 3.80 - 5.20 M/uL    HGB 11.4 (L) 11.5 - 16.0 g/dL    HCT 35.3 35.0 - 47.0 %    MCV 98.1 80.0 - 99.0 FL    MCH 31.7 26.0 - 34.0 PG    MCHC 32.3 30.0 - 36.5 g/dL    RDW 14.0 11.5 - 14.5 %    PLATELET 866 (L) 132 - 400 K/uL    MPV 11.3 8.9 - 12.9 FL    NRBC 0.0 0  WBC    ABSOLUTE NRBC 0.00 0.00 - 0.01 K/uL    NEUTROPHILS 49 32 - 75 %    LYMPHOCYTES 36 12 - 49 %    MONOCYTES 7 5 - 13 %    EOSINOPHILS 7 0 - 7 %    BASOPHILS 1 0 - 1 %    IMMATURE GRANULOCYTES 0 0.0 - 0.5 %    ABS. NEUTROPHILS 2.2 1.8 - 8.0 K/UL    ABS. LYMPHOCYTES 1.6 0.8 - 3.5 K/UL    ABS. MONOCYTES 0.3 0.0 - 1.0 K/UL    ABS. EOSINOPHILS 0.3 0.0 - 0.4 K/UL    ABS. BASOPHILS 0.0 0.0 - 0.1 K/UL    ABS. IMM. GRANS. 0.0 0.00 - 0.04 K/UL    DF AUTOMATED     VANCOMYCIN, TROUGH    Collection Time: 10/19/22  5:35 AM   Result Value Ref Range    Vancomycin,trough 9.3 5.0 - 10.0 ug/mL    Reported dose date NOT PROVIDED      Reported dose time: NOT PROVIDED      Reported dose: NOT PROVIDED UNITS        CXR: Pulmonary edema. XR Tib/fib left: Diffuse soft tissue edema without acute osseous abnormality. Duplex LE left: No evidence of DVT. Echo:     Left Ventricle: Reduced left ventricular systolic function with a visually estimated EF of 15 - 20%. Left ventricle size is normal. Increased wall thickness. There are regional wall motion abnormalities. Abnormal diastolic function. Mitral Valve: Mild regurgitation.     Left Atrium: Left atrium is dilated. Right Atrium: Right atrium is dilated. Technical qualifiers: Echo study was a technically difficult Doppler study and technically difficult due to patient's heart rhythm. Jes Bradley MD, FACP  Best contact is via Pager: [phone], or via hospital  at [phone]. Do NOT use Perfect Serve.

## 2022-10-19 NOTE — PROGRESS NOTES
1930 Bedside and Verbal shift change report given to Briseyda (oncoming nurse) by Yazmin Busch (offgoing nurse). Report included the following information SBAR, MAR, and Cardiac Rhythm AV Paced . Problem: Pressure Injury - Risk of  Goal: *Prevention of pressure injury  Description: Document Mustapha Scale and appropriate interventions in the flowsheet. Outcome: Progressing Towards Goal  Note: Pressure Injury Interventions:     Moisture Interventions: Absorbent underpads, Apply protective barrier, creams and emollients, Internal/External urinary devices, Limit adult briefs, Maintain skin hydration (lotion/cream), Minimize layers, Moisture barrier    Activity Interventions: Chair cushion, Increase time out of bed, Pressure redistribution bed/mattress(bed type), PT/OT evaluation    Mobility Interventions: Float heels, HOB 30 degrees or less, Pressure redistribution bed/mattress (bed type), PT/OT evaluation    Nutrition Interventions: Document food/fluid/supplement intake, Offer support with meals,snacks and hydration    Friction and Shear Interventions: Apply protective barrier, creams and emollients, Feet elevated on foot rest, HOB 30 degrees or less, Lift team/patient mobility team, Minimize layers     Problem: Falls - Risk of  Goal: *Absence of Falls  Description: Document Jenna Fall Risk and appropriate interventions in the flowsheet.   Outcome: Progressing Towards Goal  Note: Fall Risk Interventions:  Mobility Interventions: Bed/chair exit alarm, Patient to call before getting OOB, PT Consult for mobility concerns, Utilize walker, cane, or other assistive device    Medication Interventions: Bed/chair exit alarm, Evaluate medications/consider consulting pharmacy, Patient to call before getting OOB, Teach patient to arise slowly    Elimination Interventions: Bed/chair exit alarm, Call light in reach, Patient to call for help with toileting needs    History of Falls Interventions: Bed/chair exit alarm, Consult care management for discharge planning, Door open when patient unattended    End of Shift Note    Bedside shift change report given to Kalin Delgadillo  (oncoming nurse) by Linda Raymond. Briseyda Nunes RN (offgoing nurse). Report included the following information SBAR, MAR, and Cardiac Rhythm AV Paced    Shift worked:  7p-7a     Shift summary and any significant changes:     Complaint of headache - Acetaminophen given   Hard stick - can PICC team r/v patient   Concerns for physician to address:       Zone phone for oncoming shift:          Activity:  Activity Level: Up with Assistance  Number times ambulated in hallways past shift: 0  Number of times OOB to chair past shift: 0    Cardiac:   Cardiac Monitoring: Yes      Cardiac Rhythm: AV Paced    Access:  Current line(s): PIV     Genitourinary:   Urinary status: voiding    Respiratory:   O2 Device: None (Room air)  Chronic home O2 use?: NO  Incentive spirometer at bedside: NO       GI:  Last Bowel Movement Date: 10/18/22  Current diet:  ADULT DIET Regular; Low Fat/Low Chol/High Fiber/2 gm Na; Low Sodium (2 gm); 1800 ml  Passing flatus: YES  Tolerating current diet: NO       Pain Management:   Patient states pain is manageable on current regimen: YES    Skin:  Mustapha Score: 17  Interventions: float heels, increase time out of bed, PT/OT consult, limit briefs, and internal/external urinary devices    Patient Safety:  Fall Score: Total Score: 4  Interventions: bed/chair alarm, assistive device (walker, cane, etc), gripper socks, and pt to call before getting OOB  High Fall Risk: Yes    Length of Stay:  Expected LOS: 3d 4h  Actual LOS: 2      Ma.  Aristides Grimes RN

## 2022-10-19 NOTE — PROGRESS NOTES
Problem: Pressure Injury - Risk of  Goal: *Prevention of pressure injury  Description: Document Mustapha Scale and appropriate interventions in the flowsheet. Outcome: Progressing Towards Goal  Note: Pressure Injury Interventions:       Moisture Interventions: Absorbent underpads, Check for incontinence Q2 hours and as needed, Internal/External urinary devices    Activity Interventions: PT/OT evaluation    Mobility Interventions: Float heels, PT/OT evaluation    Nutrition Interventions: Document food/fluid/supplement intake, Offer support with meals,snacks and hydration    Friction and Shear Interventions: Minimize layers, Apply protective barrier, creams and emollients                Problem: Patient Education: Go to Patient Education Activity  Goal: Patient/Family Education  Outcome: Progressing Towards Goal     Problem: Falls - Risk of  Goal: *Absence of Falls  Description: Document Jenna Fall Risk and appropriate interventions in the flowsheet.   Outcome: Progressing Towards Goal  Note: Fall Risk Interventions:  Mobility Interventions: Bed/chair exit alarm, Communicate number of staff needed for ambulation/transfer         Medication Interventions: Bed/chair exit alarm    Elimination Interventions: Bed/chair exit alarm, Call light in reach    History of Falls Interventions: Bed/chair exit alarm, Door open when patient unattended         Problem: Patient Education: Go to Patient Education Activity  Goal: Patient/Family Education  Outcome: Progressing Towards Goal     Problem: Patient Education: Go to Patient Education Activity  Goal: Patient/Family Education  Outcome: Progressing Towards Goal

## 2022-10-19 NOTE — PROGRESS NOTES
Bedside and Verbal shift change report given to Aaron Barnes RN (oncoming nurse) by Delano Church RN (offgoing nurse). Report included the following information SBAR, Kardex, Intake/Output, MAR, Recent Results, and Cardiac Rhythm AV-Paced . End of Shift Note    Bedside shift change report given to Deedee Armando (oncoming nurse) by Marlena Goodpasture, RN (offgoing nurse). Report included the following information SBAR, Kardex, Intake/Output, MAR, Recent Results, and Cardiac Rhythm AV Paced    Shift worked:  7A-7P     Shift summary and any significant changes: For stress test tomorrow AM, NPO at midnight. BLE still swollen and left leg warm and red. BP soft, scheduled Midodrine given. No other changes noted during the shift. Concerns for physician to address:       Zone phone for oncoming shift:          Activity:  Activity Level: Up with Assistance  Number times ambulated in hallways past shift: 0, 6x ambulated to the bathroom  Number of times OOB to chair past shift: 5    Cardiac:   Cardiac Monitoring: Yes      Cardiac Rhythm: AV Paced    Access:  Current line(s): PIV     Genitourinary:   Urinary status: voiding    Respiratory:   O2 Device: None (Room air)  Chronic home O2 use?: NO  Incentive spirometer at bedside: NO       GI:  Last Bowel Movement Date: 10/19/22  Current diet:  ADULT DIET Regular; Low Fat/Low Chol/High Fiber/2 gm Na; Low Sodium (2 gm); 1800 ml  DIET NPO  Passing flatus: YES  Tolerating current diet: YES       Pain Management:   Patient states pain is manageable on current regimen: YES    Skin:  Mustapha Score: 19  Interventions: float heels, PT/OT consult, and limit briefs    Patient Safety:  Fall Score:  Total Score: 4  Interventions: bed/chair alarm, assistive device (walker, cane, etc), gripper socks, pt to call before getting OOB, and stay with me (per policy)  High Fall Risk: Yes    Length of Stay:  Expected LOS: 3d 4h  Actual LOS: 2      Marlena Goodpasture, RN

## 2022-10-20 ENCOUNTER — APPOINTMENT (OUTPATIENT)
Dept: NUCLEAR MEDICINE | Age: 72
DRG: 603 | End: 2022-10-20
Attending: NURSE PRACTITIONER
Payer: MEDICARE

## 2022-10-20 LAB
ANION GAP SERPL CALC-SCNC: 4 MMOL/L (ref 5–15)
BUN SERPL-MCNC: 14 MG/DL (ref 6–20)
BUN/CREAT SERPL: 22 (ref 12–20)
CALCIUM SERPL-MCNC: 9.1 MG/DL (ref 8.5–10.1)
CHLORIDE SERPL-SCNC: 118 MMOL/L (ref 97–108)
CO2 SERPL-SCNC: 23 MMOL/L (ref 21–32)
CREAT SERPL-MCNC: 0.63 MG/DL (ref 0.55–1.02)
GLUCOSE BLD STRIP.AUTO-MCNC: 95 MG/DL (ref 65–117)
GLUCOSE SERPL-MCNC: 106 MG/DL (ref 65–100)
POTASSIUM SERPL-SCNC: 4 MMOL/L (ref 3.5–5.1)
SERVICE CMNT-IMP: NORMAL
SODIUM SERPL-SCNC: 145 MMOL/L (ref 136–145)
STRESS BASELINE DIAS BP: 62 MMHG
STRESS BASELINE HR: 75 BPM
STRESS BASELINE ST DEPRESSION: 0 MM
STRESS BASELINE SYS BP: 116 MMHG
STRESS ESTIMATED WORKLOAD: 1 METS
STRESS EXERCISE DUR MIN: 1 MIN
STRESS EXERCISE DUR SEC: 7 SEC
STRESS O2 SAT PEAK: 100 %
STRESS O2 SAT REST: 100 %
STRESS PEAK DIAS BP: 62 MMHG
STRESS PEAK SYS BP: 116 MMHG
STRESS PERCENT HR ACHIEVED: 52 %
STRESS POST PEAK HR: 76 BPM
STRESS RATE PRESSURE PRODUCT: 8816 BPM*MMHG
STRESS TARGET HR: 147 BPM

## 2022-10-20 PROCEDURE — 74011250636 HC RX REV CODE- 250/636: Performed by: INTERNAL MEDICINE

## 2022-10-20 PROCEDURE — 80048 BASIC METABOLIC PNL TOTAL CA: CPT

## 2022-10-20 PROCEDURE — 74011000250 HC RX REV CODE- 250: Performed by: GENERAL ACUTE CARE HOSPITAL

## 2022-10-20 PROCEDURE — 99233 SBSQ HOSP IP/OBS HIGH 50: CPT | Performed by: INTERNAL MEDICINE

## 2022-10-20 PROCEDURE — 74011000258 HC RX REV CODE- 258: Performed by: INTERNAL MEDICINE

## 2022-10-20 PROCEDURE — 82962 GLUCOSE BLOOD TEST: CPT

## 2022-10-20 PROCEDURE — 36415 COLL VENOUS BLD VENIPUNCTURE: CPT

## 2022-10-20 PROCEDURE — 74011000250 HC RX REV CODE- 250: Performed by: INTERNAL MEDICINE

## 2022-10-20 PROCEDURE — 74011250637 HC RX REV CODE- 250/637: Performed by: GENERAL ACUTE CARE HOSPITAL

## 2022-10-20 PROCEDURE — 65270000046 HC RM TELEMETRY

## 2022-10-20 RX ORDER — TETRAKIS(2-METHOXYISOBUTYLISOCYANIDE)COPPER(I) TETRAFLUOROBORATE 1 MG/ML
30.6 INJECTION, POWDER, LYOPHILIZED, FOR SOLUTION INTRAVENOUS
Status: COMPLETED | OUTPATIENT
Start: 2022-10-20 | End: 2022-10-20

## 2022-10-20 RX ORDER — SODIUM CHLORIDE 0.9 % (FLUSH) 0.9 %
10 SYRINGE (ML) INJECTION AS NEEDED
Status: DISCONTINUED | OUTPATIENT
Start: 2022-10-20 | End: 2022-10-21 | Stop reason: HOSPADM

## 2022-10-20 RX ADMIN — CEFEPIME 2 G: 2 INJECTION, POWDER, FOR SOLUTION INTRAVENOUS at 20:23

## 2022-10-20 RX ADMIN — LATANOPROST 1 DROP: 50 SOLUTION OPHTHALMIC at 20:23

## 2022-10-20 RX ADMIN — PREGABALIN 200 MG: 100 CAPSULE ORAL at 20:21

## 2022-10-20 RX ADMIN — LEVOTHYROXINE SODIUM 50 MCG: 0.05 TABLET ORAL at 06:21

## 2022-10-20 RX ADMIN — MIDODRINE HYDROCHLORIDE 2.5 MG: 5 TABLET ORAL at 16:12

## 2022-10-20 RX ADMIN — ACETAMINOPHEN 650 MG: 325 TABLET ORAL at 20:21

## 2022-10-20 RX ADMIN — PANTOPRAZOLE SODIUM 40 MG: 40 TABLET, DELAYED RELEASE ORAL at 11:03

## 2022-10-20 RX ADMIN — APIXABAN 5 MG: 5 TABLET, FILM COATED ORAL at 11:03

## 2022-10-20 RX ADMIN — LITHIUM CARBONATE 300 MG: 300 TABLET, FILM COATED, EXTENDED RELEASE ORAL at 11:03

## 2022-10-20 RX ADMIN — ACETAMINOPHEN 650 MG: 325 TABLET ORAL at 11:22

## 2022-10-20 RX ADMIN — SODIUM CHLORIDE, PRESERVATIVE FREE 10 ML: 5 INJECTION INTRAVENOUS at 15:13

## 2022-10-20 RX ADMIN — SODIUM CHLORIDE, PRESERVATIVE FREE 10 ML: 5 INJECTION INTRAVENOUS at 09:22

## 2022-10-20 RX ADMIN — CEFEPIME 2 G: 2 INJECTION, POWDER, FOR SOLUTION INTRAVENOUS at 11:05

## 2022-10-20 RX ADMIN — TETRAKIS(2-METHOXYISOBUTYLISOCYANIDE)COPPER(I) TETRAFLUOROBORATE 30.6 MILLICURIE: 1 INJECTION, POWDER, LYOPHILIZED, FOR SOLUTION INTRAVENOUS at 10:00

## 2022-10-20 RX ADMIN — ALPRAZOLAM 0.5 MG: 0.5 TABLET ORAL at 20:22

## 2022-10-20 RX ADMIN — PREGABALIN 200 MG: 100 CAPSULE ORAL at 11:05

## 2022-10-20 RX ADMIN — REGADENOSON 0.4 MG: 0.08 INJECTION, SOLUTION INTRAVENOUS at 09:22

## 2022-10-20 RX ADMIN — SODIUM CHLORIDE, PRESERVATIVE FREE 10 ML: 5 INJECTION INTRAVENOUS at 06:23

## 2022-10-20 RX ADMIN — APIXABAN 5 MG: 5 TABLET, FILM COATED ORAL at 20:21

## 2022-10-20 RX ADMIN — VANCOMYCIN HYDROCHLORIDE 1000 MG: 1 INJECTION, POWDER, LYOPHILIZED, FOR SOLUTION INTRAVENOUS at 06:21

## 2022-10-20 RX ADMIN — LITHIUM CARBONATE 300 MG: 300 TABLET, FILM COATED, EXTENDED RELEASE ORAL at 16:12

## 2022-10-20 RX ADMIN — LITHIUM CARBONATE 300 MG: 300 TABLET, FILM COATED, EXTENDED RELEASE ORAL at 20:21

## 2022-10-20 RX ADMIN — VANCOMYCIN HYDROCHLORIDE 1000 MG: 1 INJECTION, POWDER, LYOPHILIZED, FOR SOLUTION INTRAVENOUS at 18:31

## 2022-10-20 RX ADMIN — TOPIRAMATE 100 MG: 100 TABLET, FILM COATED ORAL at 11:03

## 2022-10-20 RX ADMIN — TIMOLOL MALEATE 1 DROP: 5 SOLUTION OPHTHALMIC at 11:08

## 2022-10-20 RX ADMIN — SODIUM CHLORIDE, PRESERVATIVE FREE 10 ML: 5 INJECTION INTRAVENOUS at 21:44

## 2022-10-20 RX ADMIN — MIDODRINE HYDROCHLORIDE 2.5 MG: 5 TABLET ORAL at 11:03

## 2022-10-20 RX ADMIN — Medication 100 MCG: at 11:03

## 2022-10-20 NOTE — PROGRESS NOTES
[facility]  [address]  [phone]  www. [employer]      NST: IMPRESSION: No ischemia or infarct demonstrated. LVEF 28%    Stress test negative, NICM, treatment limited due to hypotension, on midodrine. Follow up out patient for Bi V ICD placement.      Amando Gamez DNP, ANP-BC

## 2022-10-20 NOTE — PROGRESS NOTES
Problem: Pressure Injury - Risk of  Goal: *Prevention of pressure injury  Description: Document Mustapha Scale and appropriate interventions in the flowsheet. Outcome: Progressing Towards Goal  Note: Pressure Injury Interventions:       Moisture Interventions: Absorbent underpads    Activity Interventions: Increase time out of bed, PT/OT evaluation    Mobility Interventions: PT/OT evaluation    Nutrition Interventions: Document food/fluid/supplement intake, Offer support with meals,snacks and hydration    Friction and Shear Interventions: Minimize layers                Problem: Falls - Risk of  Goal: *Absence of Falls  Description: Document Jenna Fall Risk and appropriate interventions in the flowsheet.   Outcome: Progressing Towards Goal  Note: Fall Risk Interventions:  Mobility Interventions: Bed/chair exit alarm, Patient to call before getting OOB         Medication Interventions: Bed/chair exit alarm, Patient to call before getting OOB, Teach patient to arise slowly    Elimination Interventions: Bed/chair exit alarm, Call light in reach, Patient to call for help with toileting needs    History of Falls Interventions: Bed/chair exit alarm, Room close to nurse's station         Problem: Patient Education: Go to Patient Education Activity  Goal: Patient/Family Education  Outcome: Progressing Towards Goal     Problem: Patient Education: Go to Patient Education Activity  Goal: Patient/Family Education  Outcome: Progressing Towards Goal     Problem: Pain  Goal: *Control of Pain  Outcome: Progressing Towards Goal

## 2022-10-20 NOTE — PROGRESS NOTES
INTERNAL MEDICINE ATTENDING NOTE     Patient Name: Genna Garcia   : 1950   Admit: 10/17/2022    ASSESSMENT / PLAN    Cellulitis (10/17/2022) L lower extremity: Improving. On empiric cefepime and vancomycin, d4. Hypotension: Improved. She has been treated with IVF and midodrine. KI: Improved with hydration. Cr back to baseline. Hypothyroidism: Continue synthroid. Atrial Fibrillation: S/p hybrid ablation, s/p pacemaker for SSS. Outpatient F/U as per Dr. Hollie Khan.  On eliquis. Chronic LE edema. CHF / Nonischemic cardiomyopathy: EF 15-20%. Cardiology consulted for further evaluation and management--stress test today. .   Migraines. BPAD/Anxiety/Stress. Stable. Continue outpatient psychiatry follow up. Edema: Chronic. Not on diuretic due to low BP. CODE: Full. Almaz Jimenez MD, FACP  Best contact is via Pager: [phone], or via hospital  at [phone]. Do NOT use Perfect Serve. SUBJECTIVE:   Ms. Genna Garcia is a patient of mine with past medical history of A fib, hypothyroidism, migraines, BPAD, and chronic LE edema, who had a recent puncture wound of L mid shin on 10/2 when scratched by her dog and was treated with a course of augmentin, now admitted with worsened cellulitis of L leg. She was seen by me today during rounds. At this time, she is resting comfortably. Her left leg is much improved. ROS otherwise unremarkable except as noted elsewhere. No CP. No SOB. OBJECTIVE:   Blood pressure 100/80, pulse 77, temperature 97.7 °F (36.5 °C), resp. rate 16, height 5' 5\" (1.651 m), weight 222 lb 10.6 oz (101 kg), SpO2 98 %. Gen: Patient is in no acute distress. Lungs: CTAB. Heart: RRR. Abd: S, NT, ND, BS present. Exremities: Warm. She has 2-3+ edema bilaterally. L LE redness and pain has improved.      Recent Results (from the past 12 hour(s))   METABOLIC PANEL, BASIC    Collection Time: 10/20/22  3:02 AM   Result Value Ref Range    Sodium 145 136 - 145 mmol/L    Potassium 4.0 3.5 - 5.1 mmol/L    Chloride 118 (H) 97 - 108 mmol/L    CO2 23 21 - 32 mmol/L    Anion gap 4 (L) 5 - 15 mmol/L    Glucose 106 (H) 65 - 100 mg/dL    BUN 14 6 - 20 MG/DL    Creatinine 0.63 0.55 - 1.02 MG/DL    BUN/Creatinine ratio 22 (H) 12 - 20      eGFR >60 >60 ml/min/1.73m2    Calcium 9.1 8.5 - 10.1 MG/DL   GLUCOSE, POC    Collection Time: 10/20/22  8:10 AM   Result Value Ref Range    Glucose (POC) 95 65 - 117 mg/dL    Performed by Fleta Claude (CHARITY)         CXR: Pulmonary edema. XR Tib/fib left: Diffuse soft tissue edema without acute osseous abnormality. Duplex LE left: No evidence of DVT. Echo:     Left Ventricle: Reduced left ventricular systolic function with a visually estimated EF of 15 - 20%. Left ventricle size is normal. Increased wall thickness. There are regional wall motion abnormalities. Abnormal diastolic function. Mitral Valve: Mild regurgitation. Left Atrium: Left atrium is dilated. Right Atrium: Right atrium is dilated. Technical qualifiers: Echo study was a technically difficult Doppler study and technically difficult due to patient's heart rhythm. Yasmani Payton MD, FACP  Best contact is via Pager: [phone], or via hospital  at [phone]. Do NOT use Perfect Serve.

## 2022-10-20 NOTE — PROGRESS NOTES
Bedside shift change report given to Helena Gallagher RN (oncoming nurse) by Moy Tamayo RN (offgoing nurse). Report included the following information SBAR, Kardex, ED Summary, Intake/Output, MAR, Accordion, Recent Results, Med Rec Status, Cardiac Rhythm AV Paced, and Alarm Parameters . Pt. Is asymptomatic and stable vital signs.

## 2022-10-20 NOTE — PROGRESS NOTES
0700: Bedside shift change report given to Zeeshan Allen RN and Jules Robb, student nurse (oncoming nurse) by Kerrie Cervantes RN (offgoing nurse). Report included the following information SBAR and Kardex. 1033: Patient returned to unit from Nuclear Med test    1430: Bedside shift change report given to Anais Marie and Dorie Ritter RN (oncoming nurse) by Zeeshan Allen RN (offgoing nurse). Report included the following information SBAR, Kardex, and MAR.

## 2022-10-20 NOTE — PROGRESS NOTES
Problem: Pressure Injury - Risk of  Goal: *Prevention of pressure injury  Description: Document Mustapha Scale and appropriate interventions in the flowsheet. Outcome: Progressing Towards Goal  Note: Pressure Injury Interventions:       Moisture Interventions: Minimize layers, Maintain skin hydration (lotion/cream)    Activity Interventions: Increase time out of bed, PT/OT evaluation    Mobility Interventions: PT/OT evaluation    Nutrition Interventions: Document food/fluid/supplement intake, Discuss nutritional consult with provider, Offer support with meals,snacks and hydration    Friction and Shear Interventions: Minimize layers                Problem: Falls - Risk of  Goal: *Absence of Falls  Description: Document Jenna Fall Risk and appropriate interventions in the flowsheet.   Outcome: Progressing Towards Goal  Note: Fall Risk Interventions:  Mobility Interventions: Bed/chair exit alarm, Patient to call before getting OOB, Utilize walker, cane, or other assistive device         Medication Interventions: Assess postural VS orthostatic hypotension, Bed/chair exit alarm, Patient to call before getting OOB, Teach patient to arise slowly    Elimination Interventions: Bed/chair exit alarm, Call light in reach, Patient to call for help with toileting needs, Stay With Me (per policy)    History of Falls Interventions: Bed/chair exit alarm, Room close to nurse's station         Problem: Pain  Goal: *Control of Pain  Outcome: Progressing Towards Goal

## 2022-10-20 NOTE — PROGRESS NOTES
Problem: Pressure Injury - Risk of  Goal: *Prevention of pressure injury  Description: Document Mustapha Scale and appropriate interventions in the flowsheet. Outcome: Progressing Towards Goal  Note: Pressure Injury Interventions:       Moisture Interventions: Absorbent underpads, Limit adult briefs, Maintain skin hydration (lotion/cream)    Activity Interventions: Increase time out of bed, PT/OT evaluation    Mobility Interventions: Float heels    Nutrition Interventions: Document food/fluid/supplement intake    Friction and Shear Interventions: Minimize layers, Lift sheet                Problem: Patient Education: Go to Patient Education Activity  Goal: Patient/Family Education  Outcome: Progressing Towards Goal     Problem: Falls - Risk of  Goal: *Absence of Falls  Description: Document Jenna Fall Risk and appropriate interventions in the flowsheet.   Outcome: Progressing Towards Goal  Note: Fall Risk Interventions:  Mobility Interventions: Bed/chair exit alarm, Communicate number of staff needed for ambulation/transfer, OT consult for ADLs, PT Consult for mobility concerns, PT Consult for assist device competence, Utilize walker, cane, or other assistive device         Medication Interventions: Assess postural VS orthostatic hypotension, Bed/chair exit alarm, Evaluate medications/consider consulting pharmacy, Patient to call before getting OOB, Teach patient to arise slowly    Elimination Interventions: Bed/chair exit alarm, Call light in reach, Elevated toilet seat, Patient to call for help with toileting needs, Stay With Me (per policy), Toileting schedule/hourly rounds    History of Falls Interventions: Bed/chair exit alarm, Consult care management for discharge planning, Door open when patient unattended, Evaluate medications/consider consulting pharmacy, Investigate reason for fall, Room close to nurse's station, Utilize gait belt for transfer/ambulation, Assess for delayed presentation/identification of injury for 48 hrs (comment for end date)         Problem: Patient Education: Go to Patient Education Activity  Goal: Patient/Family Education  Outcome: Progressing Towards Goal     Problem: Patient Education: Go to Patient Education Activity  Goal: Patient/Family Education  Outcome: Progressing Towards Goal     Problem: Patient Education: Go to Patient Education Activity  Goal: Patient/Family Education  Outcome: Progressing Towards Goal     Problem: Pain  Goal: *Control of Pain  Outcome: Progressing Towards Goal  Goal: *PALLIATIVE CARE:  Alleviation of Pain  Outcome: Progressing Towards Goal     Problem: Patient Education: Go to Patient Education Activity  Goal: Patient/Family Education  Outcome: Progressing Towards Goal

## 2022-10-20 NOTE — PROGRESS NOTES
[facility] FELIPE [facility]  [address]  [phone]  [email or web address]         Cardiology Progress Note      10/20/2022 10:56 AM    Admit Date: 10/17/2022    Admit Diagnosis:   Cellulitis [L03.90]    Interval History/Subjective:     Hannah Traore has been doing well. Stress test completed today.     Visit Vitals  BP (!) 123/56 (BP 1 Location: Right upper arm, BP Patient Position: At rest;Sitting)   Pulse 75   Temp 97.4 °F (36.3 °C)   Resp 16   Ht 5' 5\" (1.651 m)   Wt 101 kg (222 lb 10.6 oz)   SpO2 98%   BMI 37.05 kg/m²       Current Facility-Administered Medications   Medication Dose Route Frequency    sodium chloride (NS) flush 10 mL  10 mL IntraVENous PRN    vancomycin (VANCOCIN) 1,000 mg in 0.9% sodium chloride 250 mL ([id number])  1,000 mg IntraVENous Q12H    cefepime (MAXIPIME) 2 g in 0.9% sodium chloride (MBP/ADV) 100 mL MBP  2 g IntraVENous Q12H    ALPRAZolam (XANAX) tablet 0.5 mg  0.5 mg Oral QHS    apixaban (ELIQUIS) tablet 5 mg  5 mg Oral BID    cyanocobalamin (VITAMIN B12) tablet 100 mcg  100 mcg Oral DAILY    [START ON 10/21/2022] ergocalciferol capsule 50,000 Units  50,000 Units Oral Q7D    pantoprazole (PROTONIX) tablet 40 mg  40 mg Oral ACB    hydrOXYzine HCL (ATARAX) tablet 10 mg  10 mg Oral TID PRN    latanoprost (XALATAN) 0.005 % ophthalmic solution 1 Drop  1 Drop Both Eyes QHS    levothyroxine (SYNTHROID) tablet 50 mcg  50 mcg Oral DAILY    lithium carbonate SR (LITHOBID) tablet 300 mg  300 mg Oral TID    timolol (TIMOPTIC) 0.5 % ophthalmic solution 1 Drop  1 Drop Both Eyes DAILY    topiramate (TOPAMAX) tablet 100 mg  100 mg Oral DAILY    sodium chloride (NS) flush 5-40 mL  5-40 mL IntraVENous Q8H    sodium chloride (NS) flush 5-40 mL  5-40 mL IntraVENous PRN    acetaminophen (TYLENOL) tablet 650 mg  650 mg Oral Q6H PRN    Or    acetaminophen (TYLENOL) suppository 650 mg  650 mg Rectal Q6H PRN    polyethylene glycol (MIRALAX) packet 17 g  17 g Oral DAILY PRN    ondansetron (ZOFRAN ODT) tablet 4 mg  4 mg Oral Q8H PRN    Or    ondansetron (ZOFRAN) injection 4 mg  4 mg IntraVENous Q6H PRN    midodrine (PROAMATINE) tablet 2.5 mg  2.5 mg Oral TID WITH MEALS    pregabalin (LYRICA) capsule 200 mg  200 mg Oral BID       Objective:      Physical Exam:  General: Well developed, in no acute distress, cooperative and alert  HEENT: No carotid bruits, PEERL, EOM intact. Heart:  reg rate and rhythm; normal S1/S2; no murmurs  Respiratory: Clear bilaterally x 4, no wheezing or rales  Abdomen:   Soft, non-tender, no distention, no masses. + BS. Extremities:  Normal cap refill, no cyanosis, atraumatic. No edema. Neuro: A&Ox3, speech clear  Skin: Skin color is normal. Non diaphoretic  Vascular: 2+ pulses symmetric in all extremities    Data Review:   Recent Labs     10/19/22  0535   WBC 4.5   HGB 11.4*   HCT 35.3   *     Recent Labs     10/20/22  0302 10/19/22  0535 10/18/22  0400    145 146*   K 4.0 4.2 3.8   * 118* 118*   CO2 23 23 23   * 90 101*   BUN 14 16 19   CREA 0.63 0.66 0.64   CA 9.1 9.5 9.4   MG  --   --  2.2   ALB  --   --  2.5*   TBILI  --   --  0.7   ALT  --   --  15       No results for input(s): TROIQ, CPK, CKMB in the last 72 hours. Intake/Output Summary (Last 24 hours) at 10/20/2022 1056  Last data filed at 10/20/2022 0353  Gross per 24 hour   Intake 500 ml   Output 600 ml   Net -100 ml        Telemetry: paced rhythm     Echocardiogram (10/18/22): Left Ventricle: Reduced left ventricular systolic function with a visually estimated EF of 15 - 20%. Left ventricle size is normal. Increased wall thickness. There are regional wall motion abnormalities. Abnormal diastolic function. Mitral Valve: Mild regurgitation. Left Atrium: Left atrium is dilated. Right Atrium: Right atrium is dilated.     Technical qualifiers: Echo study was a technically difficult Doppler study and technically difficult due to patient's heart rhythm. Radiology Results in the last 24 hours:  No results found. Assessment:     Active Problems:    Cellulitis (10/17/2022)        Plan:     Non-ischemic cardiomyopathy  Has had EF 20-25% on last echo in our office in 6/2022  Has been on GDMT since 7/2022  Lexiscan stress test done today; results pending  Plan for OP BiV ICD upgrade if stress test is negative. 2.  PAF  S/p hybrid ablation in 1/2015  Maintaining sinus rhythm  Continue Eliquis     3. SSS - s/p PPM     4. Hypotension  Continue midodrine     5.   Cellulitis  Care per primary team    Kem Joyner NP

## 2022-10-20 NOTE — PROGRESS NOTES
NUC MED: Lexiscan MPI study completed. Pt sent back to RM 0032. Please check with cardiology regarding diet.

## 2022-10-20 NOTE — PROGRESS NOTES
End of Shift Note    Bedside shift change report given to Garry Hutson RN (oncoming nurse) by Hung Torre RN (offgoing nurse). Report included the following information SBAR, Kardex, Intake/Output, MAR, Accordion, Recent Results, Med Rec Status, Cardiac Rhythm AV-Paced, and Alarm Parameters     Shift worked:  1900-0730     Shift summary and any significant changes:     N/A     Concerns for physician to address:    Zone phone for oncoming shift:       Activity:  Activity Level: Up with Assistance  Number times ambulated in hallways past shift: 0  Number of times OOB to chair past shift: 0    Cardiac:   Cardiac Monitoring: Yes      Cardiac Rhythm: AV Paced    Access:  Current line(s): PIV     Genitourinary:   Urinary status: voiding and external catheter    Respiratory:   O2 Device: None (Room air)  Chronic home O2 use?: NO  Incentive spirometer at bedside: NO       GI:  Last Bowel Movement Date: 10/19/22  Current diet:  ADULT DIET Regular; Low Fat/Low Chol/High Fiber/2 gm Na; Low Sodium (2 gm); 1800 ml  DIET NPO  Passing flatus: YES  Tolerating current diet: YES       Pain Management:   Patient states pain is manageable on current regimen: YES    Skin:  Mustapha Score: 21  Interventions: increase time out of bed    Patient Safety:  Fall Score:  Total Score: 3  Interventions: gripper socks, pt to call before getting OOB, and stay with me (per policy)  High Fall Risk: Yes    Length of Stay:  Expected LOS: 3d 4h  Actual LOS: 2      Hung Torre RN

## 2022-10-20 NOTE — PROGRESS NOTES
Bedside and Verbal shift change report given to [facility] GENERAL BERNAL, DARA and Urvashi Christine RN (oncoming nurse) by Carlos Fofana RN (offgoing nurse). Report included the following information SBAR, Intake/Output, Recent Results, Cardiac Rhythm AV paced, and Quality Measures. End of Shift Note    Bedside shift change report given to Elizabeth Mae RN (oncoming nurse) by Baby Kocher, RN and Urvashi Christine RN (offgoing nurse). Report included the following information SBAR, Intake/Output, MAR, Recent Results, Cardiac Rhythm AV paced, and Quality Measures    Shift worked:  day     Shift summary and any significant changes:     None to report     Concerns for physician to address:  None at this time     Zone phone for oncoming shift:   1148       Activity:  Activity Level: Up with Assistance  Number times ambulated in hallways past shift: 0  Number of times OOB to chair past shift: 0    Cardiac:   Cardiac Monitoring: Yes      Cardiac Rhythm: AV Paced    Access:  Current line(s): PIV     Genitourinary:   Urinary status: voiding    Respiratory:   O2 Device: None (Room air)  Chronic home O2 use?: NO  Incentive spirometer at bedside: NO       GI:  Last Bowel Movement Date: 10/20/22  Current diet:  ADULT DIET Regular; 4 carb choices (60 gm/meal); Low Sodium (2 gm)  Passing flatus: YES  Tolerating current diet: YES       Pain Management:   Patient states pain is manageable on current regimen: YES    Skin:  Mustapha Score: 21  Interventions: increase time out of bed, PT/OT consult, limit briefs, and nutritional support     Patient Safety:  Fall Score:  Total Score: 4  Interventions: bed/chair alarm, gripper socks, and pt to call before getting OOB  High Fall Risk: Yes    Length of Stay:  Expected LOS: 3d 4h  Actual LOS: 3      Baby Kocher, RN

## 2022-10-21 ENCOUNTER — HOME HEALTH ADMISSION (OUTPATIENT)
Dept: HOME HEALTH SERVICES | Facility: HOME HEALTH | Age: 72
End: 2022-10-21
Payer: MEDICARE

## 2022-10-21 VITALS
TEMPERATURE: 98 F | SYSTOLIC BLOOD PRESSURE: 121 MMHG | OXYGEN SATURATION: 100 % | BODY MASS INDEX: 37.32 KG/M2 | HEIGHT: 65 IN | WEIGHT: 223.99 LBS | HEART RATE: 76 BPM | RESPIRATION RATE: 18 BRPM | DIASTOLIC BLOOD PRESSURE: 58 MMHG

## 2022-10-21 LAB
ANION GAP SERPL CALC-SCNC: 5 MMOL/L (ref 5–15)
BUN SERPL-MCNC: 12 MG/DL (ref 6–20)
BUN/CREAT SERPL: 20 (ref 12–20)
CALCIUM SERPL-MCNC: 9.2 MG/DL (ref 8.5–10.1)
CHLORIDE SERPL-SCNC: 117 MMOL/L (ref 97–108)
CO2 SERPL-SCNC: 24 MMOL/L (ref 21–32)
CREAT SERPL-MCNC: 0.6 MG/DL (ref 0.55–1.02)
GLUCOSE SERPL-MCNC: 98 MG/DL (ref 65–100)
POTASSIUM SERPL-SCNC: 4.2 MMOL/L (ref 3.5–5.1)
SODIUM SERPL-SCNC: 146 MMOL/L (ref 136–145)

## 2022-10-21 PROCEDURE — 74011000250 HC RX REV CODE- 250: Performed by: GENERAL ACUTE CARE HOSPITAL

## 2022-10-21 PROCEDURE — 36415 COLL VENOUS BLD VENIPUNCTURE: CPT

## 2022-10-21 PROCEDURE — 74011000258 HC RX REV CODE- 258: Performed by: INTERNAL MEDICINE

## 2022-10-21 PROCEDURE — 74011250637 HC RX REV CODE- 250/637: Performed by: GENERAL ACUTE CARE HOSPITAL

## 2022-10-21 PROCEDURE — 74011250636 HC RX REV CODE- 250/636: Performed by: INTERNAL MEDICINE

## 2022-10-21 PROCEDURE — 80048 BASIC METABOLIC PNL TOTAL CA: CPT

## 2022-10-21 PROCEDURE — 99239 HOSP IP/OBS DSCHRG MGMT >30: CPT | Performed by: INTERNAL MEDICINE

## 2022-10-21 RX ORDER — CLINDAMYCIN HYDROCHLORIDE 300 MG/1
300 CAPSULE ORAL 3 TIMES DAILY
Qty: 15 CAPSULE | Refills: 0 | Status: SHIPPED | OUTPATIENT
Start: 2022-10-21 | End: 2022-10-24

## 2022-10-21 RX ADMIN — VANCOMYCIN HYDROCHLORIDE 1000 MG: 1 INJECTION, POWDER, LYOPHILIZED, FOR SOLUTION INTRAVENOUS at 06:05

## 2022-10-21 RX ADMIN — CEFEPIME 2 G: 2 INJECTION, POWDER, FOR SOLUTION INTRAVENOUS at 09:16

## 2022-10-21 RX ADMIN — ERGOCALCIFEROL 50000 UNITS: 1.25 CAPSULE ORAL at 11:15

## 2022-10-21 RX ADMIN — MIDODRINE HYDROCHLORIDE 2.5 MG: 5 TABLET ORAL at 09:11

## 2022-10-21 RX ADMIN — PREGABALIN 200 MG: 100 CAPSULE ORAL at 09:12

## 2022-10-21 RX ADMIN — LITHIUM CARBONATE 300 MG: 300 TABLET, FILM COATED, EXTENDED RELEASE ORAL at 09:14

## 2022-10-21 RX ADMIN — SODIUM CHLORIDE, PRESERVATIVE FREE 10 ML: 5 INJECTION INTRAVENOUS at 06:05

## 2022-10-21 RX ADMIN — ACETAMINOPHEN 650 MG: 325 TABLET ORAL at 09:44

## 2022-10-21 RX ADMIN — LEVOTHYROXINE SODIUM 50 MCG: 0.05 TABLET ORAL at 06:05

## 2022-10-21 RX ADMIN — PANTOPRAZOLE SODIUM 40 MG: 40 TABLET, DELAYED RELEASE ORAL at 09:10

## 2022-10-21 RX ADMIN — Medication 100 MCG: at 09:14

## 2022-10-21 RX ADMIN — TIMOLOL MALEATE 1 DROP: 5 SOLUTION OPHTHALMIC at 09:15

## 2022-10-21 RX ADMIN — TOPIRAMATE 100 MG: 100 TABLET, FILM COATED ORAL at 09:14

## 2022-10-21 RX ADMIN — APIXABAN 5 MG: 5 TABLET, FILM COATED ORAL at 09:16

## 2022-10-21 NOTE — PROGRESS NOTES
0800- report rec'd from Yazmin Busch, [facility] and assumed care of pt.  0830- assessment completed. 4334 pt c/o headache . Tylenol tabs given po.  0950- pt up to [facility] .  1145- pt up to bathroom and then assisted with dressing. 1230- after care visit discussed and pt given opportunity to ask questions. ACV information /discharge paper signed. 1255- pt discharged, taken via wheelchair by nurse with  as .

## 2022-10-21 NOTE — PROGRESS NOTES
[facility] follow-up PCP transitional care appointment has been scheduled with Dr. Fernando Roland. Epifania Osgood on 10/24/22 at 1430. Pending patient discharge. Chema Yates Care Management Assistant     10/24/22 8989 - Attempted to schedule hospital follow up PCP appointment. Sent a message to PCP office to find patient an appointment. Awaiting callback from PCP office. Sixto Fernandez     Attempted to schedule hospital follow up PCP appointment. Sent a message to PCP office to find patient an appointment. Awaiting callback from PCP office.  Sixto Fernandez

## 2022-10-21 NOTE — PROGRESS NOTES
Transition of Care Plan:    RUR: 8%  Disposition: Home with Home Health [facility])  Follow up appointments: PCP  DME needed: None  Transportation at Discharge: Pt's  will transport at d/c.   [address] or means to access home:      Pt has access   IM Medicare Letter: Given 10/21/2022  Is patient a [place] and connected with the [place]? No            If yes, was [place] transfer form completed and VA notified? Caregiver Contact: Yuan Beavers-  -[phone]  Discharge Caregiver contacted prior to discharge? Yes  Care Conference needed?:     no    12:00pm- No further CM needs identified. CM notified pt's nurse of d/c.    11:36am-[facility] [address] accepted referral.     11:20am- CM called Dr. Clau Brand office to schedule follow-up appointment. Appointment scheduled for 10/24/2022 at 9:45am with Tiny Khoury NP.     10:45am- CM met with pt and pt's  at bedside to discuss d/c plan. CM discussed with pt about home health. CM provided pt with a home health list. Pt selected [address] [facility] [address] document signed by pt and placed in pt's bedside chart. Referral was sent to [facility] via [facility]. Pt received and signed 2nd IM Letter. Pt stated that her  will transport at d/c. Care Management Interventions  PCP Verified by CM:  Yes Jessica Chappell MD)  Palliative Care Criteria Met (RRAT>21 & CHF Dx)?: No (No MD order for Palliative Care)  Mode of Transport at Discharge: Self  Transition of Care Consult (CM Consult): [address]: Yes  Discharge Durable Medical Equipment: No  Physical Therapy Consult: Yes  Occupational Therapy Consult: Yes  Speech Therapy Consult: No  Support Systems: Spouse/Significant Other  The Plan for Transition of Care is Related to the Following Treatment Goals : Home Health  The Patient and/or Patient Representative was Provided with a Choice of Provider and Agrees with the Discharge Plan?: Yes  Name of the Patient Representative Who was Provided with a Choice of Provider and Agrees with the Discharge Plan: Self  Freedom of Choice List was Provided with Basic Dialogue that Supports the Patient's Individualized Plan of Care/Goals, Treatment Preferences and Shares the Quality Data Associated with the Providers?: Yes  [place] Resource Information Provided?: No  Discharge Location  Patient Expects to be Discharged to[de-identified] Home with home health (Home with [facility]))      Radha Martinez [address] [place] [address] [facility]  [phone]

## 2022-10-21 NOTE — DISCHARGE SUMMARY
Physician Discharge Summary     Patient ID:  Moshe Estrada  [id number]  34 y.o.  1950    Admit date: 10/17/2022    Discharge date: 10/21/2022    Admission Diagnoses: Cellulitis [L03.90]    Discharge Diagnoses:  Principal Diagnosis                                               Active Problems:    Cellulitis (10/17/2022)    Nonischemic cardiomyopathy. Consults: Cardiology    Significant Diagnostic Studies:     CXR: Pulmonary edema. XR Tib/fib left: Diffuse soft tissue edema without acute osseous abnormality. Duplex LE left: No evidence of DVT. Echo:     Left Ventricle: Reduced left ventricular systolic function with a visually estimated EF of 15 - 20%. Left ventricle size is normal. Increased wall thickness. There are regional wall motion abnormalities. Abnormal diastolic function. Mitral Valve: Mild regurgitation. Left Atrium: Left atrium is dilated. Right Atrium: Right atrium is dilated. Technical qualifiers: Echo study was a technically difficult Doppler study and technically difficult due to patient's heart rhythm. Stress test: No ischemia or infarct demonstrated. EF 28%. Hospital Course: Ms. Moshe Estrada is a patient of mine who presented with the problems above. See the initial H and P for full details. CHIEF COMPLAINT: leg redness/pain/confusion      HISTORY OF PRESENT ILLNESS:     Jose Kolb is a 67 y.o.  female who presents with the above CC. Pt has been feeling sick for few days, had a puncture wound by her dog 2 weeks ago. Pt saw her PCP X2 for Left leg redness, Augmentin completed with out much benefit. Leg swelling and redness was getting worse.  reported multiple falls, no head trauma, no LOC.  also reported confusion over last 2 days. No CP, SOB, N/V/diarrhea. XR TIB/FIB LT     Result Date: 10/17/2022  Diffuse soft tissue edema without acute osseous abnormality.      XR CHEST PORT     Result Date: 10/17/2022  Pulmonary edema. By problem. .. Cellulitis (10/17/2022) L lower extremity: This has markedly improved on empiric cefepime and vancomycin, 5 days. We will send her home on 5 days of clindamycin. Hypotension: Improved. She has been treated with IVF and midodrine. This is improved. KI: Improved with hydration. Cr back to baseline. Hypothyroidism: Continue synthroid. Atrial Fibrillation: S/p hybrid ablation, s/p pacemaker for SSS. Outpatient F/U as per Dr. Jose Brink.  On eliquis. Chronic LE edema. CHF / Nonischemic cardiomyopathy: EF 15-20%. Cardiology consulted for further evaluation and management--stress test negative. The plan will be for upgrade to BiV pacer as outpatient. Migraines: Continue usual management. BPAD/Anxiety/Stress. Stable. Continue outpatient psychiatry follow up. Edema: Chronic. Not on diuretic due to low BP. CODE: Full. Discharge Exam:  See progress notes. Disposition: home    Patient Instructions:   Current Discharge Medication List        START taking these medications    Details   clindamycin (CLEOCIN) 300 mg capsule Take 1 Capsule by mouth three (3) times daily for 5 days. Qty: 15 Capsule, Refills: 0           CONTINUE these medications which have NOT CHANGED    Details   furosemide (LASIX) 20 mg tablet Take 20 mg by mouth two (2) times a day. pregabalin (LYRICA) 200 mg capsule Take 200 mg by mouth two (2) times a day. topiramate (TOPAMAX) 100 mg tablet Take 100 mg by mouth nightly. HYDROcodone-acetaminophen (NORCO) 5-325 mg per tablet Take 1 Tablet by mouth daily as needed for Pain.      propranolol LA (INDERAL LA) 60 mg SR capsule Take 60 mg by mouth daily. zolpidem (AMBIEN) 10 mg tablet Take 10 mg by mouth nightly as needed for Sleep. Entresto 24-26 mg tablet Take 1 Tablet by mouth two (2) times a day.       ergocalciferol (ERGOCALCIFEROL) 1,250 mcg (50,000 unit) capsule TAKE 1 CAPSULE BY MOUTH ONE TIME PER WEEK  Qty: 12 Capsule, Refills: 1    Associated Diagnoses: Vitamin D deficiency      esomeprazole (NEXIUM) 40 mg capsule Take 40 mg by mouth daily. levothyroxine (SYNTHROID) 50 mcg tablet TAKE 1 TABLET BY MOUTH EVERY DAY  Qty: 90 Tablet, Refills: 3    Associated Diagnoses: Hypothyroidism, unspecified type      apixaban (ELIQUIS) 5 mg tablet Take 1 Tablet by mouth two (2) times a day. Qty: 56 Tablet, Refills: 0      lithium carbonate SR (LITHOBID) 300 mg CR tablet Take 300 mg by mouth three (3) times daily. cyanocobalamin (VITAMIN B12) 100 mcg tablet Take 100 mcg by mouth daily. omeprazole (PRILOSEC) 40 mg capsule TAKE 1 CAPSULE BY MOUTH EVERY DAY IN THE MORNING      timolol (TIMOPTIC) 0.5 % ophthalmic solution INSTILL 1 DROP 1 TIME EVERY DAY INTO BOTH EYES EVERY MORNING      hydrOXYchloroQUINE (PLAQUENIL) 200 mg tablet Take 200 mg by mouth two (2) times a day. latanoprost (XALATAN) 0.005 % ophthalmic solution Administer 1 Drop to both eyes two (2) times a day. ALPRAZolam (XANAX) 0.5 mg tablet Take 0.5 mg by mouth nightly. STOP taking these medications       hydrOXYzine HCL (ATARAX) 25 mg tablet Comments:   Reason for Stopping:             Activity: Activity as tolerated  Diet: Cardiac Diet      Follow-up with Dr. Russ Orellana in about a week. Follow-up with Cardiologist, Dr. Jeyson Hernandez and Dr. Tess Lamas, as directed. Signed:  Rox Castrejon MD  10/21/2022  8:20 AM    Note: Greater than 30 minutes were spent on activities related to this discharge.

## 2022-10-21 NOTE — PROGRESS NOTES
Bedside shift change report given to Quentin Arellano RN (oncoming nurse) by  DARA Huertas  (offgoing nurse). Report included the following information SBAR, Kardex, Intake/Output, MAR, Accordion, Recent Results, Med Rec Status, Cardiac Rhythm AV-Paced, and Alarm Parameters .

## 2022-10-21 NOTE — PROGRESS NOTES
Care Management Interventions  PCP Verified by CM: Yes Almaz Jimenez MD)  Palliative Care Criteria Met (RRAT>21 & CHF Dx)?: No (No MD order for Palliative Care)  Mode of Transport at Discharge: Self  Transition of Care Consult (CM Consult): Discharge Planning  Discharge Durable Medical Equipment: No  Physical Therapy Consult: Yes  Occupational Therapy Consult: Yes  Speech Therapy Consult: No  Support Systems: Spouse/Significant Other   Resource Information Provided?: No  Discharge Location  Patient Expects to be Discharged to[de-identified] Home     TELEPHONE CARE MANAGEMENT INTERVIEW:  Spoke with the patient's  who was at bedside(patient with nurse at the time) to review and discuss the plan of care. He informed me that the patient was being discharged today. He confirmed demographics, insurnace and emergency contact on file. Patient does not have an Advanced Directive on file so asked that a copy if they have one to be provided to the hospital. Mr. Eve Kang is the Patient's Primary Healthcare Decision maker per legal hierarchy for Advanced Directive criteria. Patient lives with her  in a single level home with 4 steps. At baseline the patient is independent and ADLs. Patient has a walker and cane at home for use to assist with ambulation. 1st IMM Notification letter obtained by Diamond Gonsales on 10/17/22. Encouraged the patient's  to contact Care Management if his wife should have any concerns, issues, questions or problems after discharged to home. Reason for Admission:  LLE Cellulitia                   RUR Score: 8%                    Plan for utilizing home health:   Not at this time       PCP: First and Last name:  Adrien White MD    Name of Practice:  [facility]  Are you a current patient: Yes/No:  YES  Approximate date of last visit:  According to the Mr. Eve Kang, PCP came to visit the patient everyday while in hospital  Can you participate in a virtual visit with your PCP:  YES                    Current Advanced Directive/Advance Care Plan: Full Code    Healthcare Decision Maker:   Click here to complete [address] including selection of the Healthcare Decision Maker Relationship (ie \"Primary\")  Encouraged the patient's  to provide AD document to the PCP office or to the hospital if at all possible             Primary Decision Maker: Amy Saldaña Spouse - [phone]                  Transition of Care Plan:   [address] (ACP) Conversation      Date of Conversation: 10/17/2022  Conducted with: Patient with Decision Making Capacity    Healthcare Decision Maker:     Primary Decision Maker: Amy Saldaña Spouse - [phone]  Click here to complete [address] including selection of the [address] Relationship (ie \"Primary\")    Today we documented Decision Maker(s) consistent with Legal Next of Kin hierarchy. Content/Action Overview: Has ACP document(s) NOT on file - requested patient to provide  Reviewed DNR/DNI and patient elects Full Code (Attempt Resuscitation)  Topics discussed: Encouraged  to provide copy. Additional Comments: NA      Length of Voluntary ACP Conversation in minutes:  30 minutes    Faustino Guzman                                   Transition of Care [facility]) Plan:  Discharged home with no needs at this time. Spoke with the patient's . JONO Transportation:      How is patient being transported at discharge? POV/    When? 10/21/22    Is transport scheduled? NA    Follow-up appointment and transportation:  Encouraged the  to be sure follow up appointment made prior to discharge. PCP? Mai Vu MD     Specialist? Cardiologist:     Time/Date? Who is transporting to the follow-up appointment? POV/    Is transport for follow up appointment scheduled? NA    Communication plan (with patient/family): Who is being called? Patient     What number(s) is to be used? [phone]     What service provider is calling for [facility] services? Primary Care Physician office and Specialist Office    When are they calling?  Probably 24 - 48 hours prior to appointment      Click here to [address] including selection of the Healthcare Decision Maker Relationship (ie \"Primary\")  @[email or web address]

## 2022-10-21 NOTE — PROGRESS NOTES
Bedside shift change report given to Emilia Peterson RN (oncoming nurse) by  Annalee Coates RN  (offgoing nurse). Report included the following information SBAR, Kardex, Intake/Output, MAR, Accordion, Recent Results, Med Rec Status, Cardiac Rhythm AV-Paced, and Alarm Parameters . ameters .

## 2022-10-22 ENCOUNTER — HOME CARE VISIT (OUTPATIENT)
Dept: SCHEDULING | Facility: HOME HEALTH | Age: 72
End: 2022-10-22
Payer: MEDICARE

## 2022-10-22 PROCEDURE — 3331090002 HH PPS REVENUE DEBIT

## 2022-10-22 PROCEDURE — 400018 HH-NO PAY CLAIM PROCEDURE

## 2022-10-22 PROCEDURE — 3331090001 HH PPS REVENUE CREDIT

## 2022-10-22 PROCEDURE — G0299 HHS/HOSPICE OF RN EA 15 MIN: HCPCS

## 2022-10-22 PROCEDURE — 400013 HH SOC

## 2022-10-23 LAB
BACTERIA SPEC CULT: NORMAL
BACTERIA SPEC CULT: NORMAL
SERVICE CMNT-IMP: NORMAL
SERVICE CMNT-IMP: NORMAL

## 2022-10-23 PROCEDURE — 3331090001 HH PPS REVENUE CREDIT

## 2022-10-23 PROCEDURE — 3331090002 HH PPS REVENUE DEBIT

## 2022-10-24 ENCOUNTER — HOME CARE VISIT (OUTPATIENT)
Dept: HOME HEALTH SERVICES | Facility: HOME HEALTH | Age: 72
End: 2022-10-24
Payer: MEDICARE

## 2022-10-24 ENCOUNTER — HOME CARE VISIT (OUTPATIENT)
Dept: SCHEDULING | Facility: HOME HEALTH | Age: 72
End: 2022-10-24
Payer: MEDICARE

## 2022-10-24 ENCOUNTER — TELEPHONE (OUTPATIENT)
Dept: INTERNAL MEDICINE CLINIC | Age: 72
End: 2022-10-24

## 2022-10-24 VITALS
RESPIRATION RATE: 16 BRPM | SYSTOLIC BLOOD PRESSURE: 112 MMHG | OXYGEN SATURATION: 95 % | DIASTOLIC BLOOD PRESSURE: 68 MMHG | TEMPERATURE: 98.5 F | HEART RATE: 73 BPM

## 2022-10-24 PROCEDURE — 3331090002 HH PPS REVENUE DEBIT

## 2022-10-24 PROCEDURE — 3331090001 HH PPS REVENUE CREDIT

## 2022-10-24 PROCEDURE — G0151 HHCP-SERV OF PT,EA 15 MIN: HCPCS

## 2022-10-24 RX ORDER — CEFDINIR 300 MG/1
300 CAPSULE ORAL 2 TIMES DAILY
Qty: 14 CAPSULE | Refills: 0 | Status: SHIPPED | OUTPATIENT
Start: 2022-10-24 | End: 2022-10-31

## 2022-10-24 RX ORDER — MINOCYCLINE HYDROCHLORIDE 100 MG/1
100 CAPSULE ORAL 2 TIMES DAILY
Qty: 14 CAPSULE | Refills: 0 | Status: SHIPPED | OUTPATIENT
Start: 2022-10-24 | End: 2022-10-31

## 2022-10-24 NOTE — TELEPHONE ENCOUNTER
Patient called and states increased swelling post discharge and \"bright red\" in color and is Hot to touch. No fever . Patient states there is a little pain.

## 2022-10-24 NOTE — TELEPHONE ENCOUNTER
Spoke w/ pt, scheduled hospital f/up for today 10/24 at 2:30 pm. If pt Is unable to make pt will call back to r/s

## 2022-10-24 NOTE — TELEPHONE ENCOUNTER
Called and spoke to patient. Informed patient This was improving on vanc and cefepime. It had previously worsened on augmentin. Now worsening on clindamycin. We can try an alternative agents:       Orders Placed This Encounter    minocycline (MINOCIN, DYNACIN) 100 mg capsule       Sig: Take 1 Capsule by mouth two (2) times a day for 7 days. Dispense:  14 Capsule       Refill:  0    cefdinir (OMNICEF) 300 mg capsule       Sig: Take 1 Capsule by mouth two (2) times a day for 7 days. Dispense:  14 Capsule       Refill:  0         Keep leg elevated as much as possible. If this markedly worsens, go back to ER. If fails to improve, next step, referral to ID.

## 2022-10-24 NOTE — TELEPHONE ENCOUNTER
Patient originally calling for hospital follow up     However pt states that since discharge, her leg is now increasingly swollen, and \"bright red\" in color and is Hot to touch

## 2022-10-24 NOTE — TELEPHONE ENCOUNTER
This was improving on vanc and cefepime. It had previously worsened on augmentin. Now worsening on clindamycin. We can try an alternative agents:  Orders Placed This Encounter    minocycline (MINOCIN, DYNACIN) 100 mg capsule     Sig: Take 1 Capsule by mouth two (2) times a day for 7 days. Dispense:  14 Capsule     Refill:  0    cefdinir (OMNICEF) 300 mg capsule     Sig: Take 1 Capsule by mouth two (2) times a day for 7 days. Dispense:  14 Capsule     Refill:  0       Keep leg elevated as much as possible. If this markedly worsens, go back to ER. If fails to improve, next step, referral to ID.

## 2022-10-24 NOTE — TELEPHONE ENCOUNTER
Received notification from [facility] stating patient was admitted in the hospital on 10/17 and discharged on 10/21- called patient and left voicemail to contact our office to schedule hosp f/up appt

## 2022-10-25 ENCOUNTER — HOME CARE VISIT (OUTPATIENT)
Dept: SCHEDULING | Facility: HOME HEALTH | Age: 72
End: 2022-10-25
Payer: MEDICARE

## 2022-10-25 VITALS
DIASTOLIC BLOOD PRESSURE: 68 MMHG | OXYGEN SATURATION: 98 % | TEMPERATURE: 97.8 F | HEART RATE: 78 BPM | RESPIRATION RATE: 16 BRPM | SYSTOLIC BLOOD PRESSURE: 118 MMHG

## 2022-10-25 VITALS
RESPIRATION RATE: 18 BRPM | TEMPERATURE: 98.5 F | OXYGEN SATURATION: 98 % | HEART RATE: 76 BPM | SYSTOLIC BLOOD PRESSURE: 116 MMHG | DIASTOLIC BLOOD PRESSURE: 74 MMHG

## 2022-10-25 PROCEDURE — G0299 HHS/HOSPICE OF RN EA 15 MIN: HCPCS

## 2022-10-25 PROCEDURE — 3331090002 HH PPS REVENUE DEBIT

## 2022-10-25 PROCEDURE — 3331090001 HH PPS REVENUE CREDIT

## 2022-10-26 PROCEDURE — 3331090001 HH PPS REVENUE CREDIT

## 2022-10-26 PROCEDURE — 3331090002 HH PPS REVENUE DEBIT

## 2022-10-27 ENCOUNTER — HOME CARE VISIT (OUTPATIENT)
Dept: SCHEDULING | Facility: HOME HEALTH | Age: 72
End: 2022-10-27
Payer: MEDICARE

## 2022-10-27 VITALS
HEART RATE: 78 BPM | DIASTOLIC BLOOD PRESSURE: 68 MMHG | OXYGEN SATURATION: 98 % | RESPIRATION RATE: 17 BRPM | TEMPERATURE: 98.5 F | SYSTOLIC BLOOD PRESSURE: 116 MMHG

## 2022-10-27 PROCEDURE — G0299 HHS/HOSPICE OF RN EA 15 MIN: HCPCS

## 2022-10-27 PROCEDURE — 3331090002 HH PPS REVENUE DEBIT

## 2022-10-27 PROCEDURE — 3331090001 HH PPS REVENUE CREDIT

## 2022-10-28 ENCOUNTER — HOME CARE VISIT (OUTPATIENT)
Dept: SCHEDULING | Facility: HOME HEALTH | Age: 72
End: 2022-10-28
Payer: MEDICARE

## 2022-10-28 PROCEDURE — 3331090002 HH PPS REVENUE DEBIT

## 2022-10-28 PROCEDURE — G0151 HHCP-SERV OF PT,EA 15 MIN: HCPCS

## 2022-10-28 PROCEDURE — 3331090001 HH PPS REVENUE CREDIT

## 2022-10-29 VITALS
HEART RATE: 76 BPM | OXYGEN SATURATION: 98 % | RESPIRATION RATE: 16 BRPM | SYSTOLIC BLOOD PRESSURE: 125 MMHG | DIASTOLIC BLOOD PRESSURE: 68 MMHG | TEMPERATURE: 98.1 F

## 2022-10-29 PROCEDURE — 3331090001 HH PPS REVENUE CREDIT

## 2022-10-29 PROCEDURE — 3331090002 HH PPS REVENUE DEBIT

## 2022-10-30 PROCEDURE — 3331090002 HH PPS REVENUE DEBIT

## 2022-10-30 PROCEDURE — 3331090001 HH PPS REVENUE CREDIT

## 2022-10-31 ENCOUNTER — HOME CARE VISIT (OUTPATIENT)
Dept: SCHEDULING | Facility: HOME HEALTH | Age: 72
End: 2022-10-31
Payer: MEDICARE

## 2022-10-31 VITALS
OXYGEN SATURATION: 98 % | DIASTOLIC BLOOD PRESSURE: 68 MMHG | BODY MASS INDEX: 36.52 KG/M2 | WEIGHT: 219.44 LBS | TEMPERATURE: 97.8 F | HEART RATE: 93 BPM | RESPIRATION RATE: 18 BRPM | SYSTOLIC BLOOD PRESSURE: 128 MMHG

## 2022-10-31 PROCEDURE — G0151 HHCP-SERV OF PT,EA 15 MIN: HCPCS

## 2022-10-31 PROCEDURE — 3331090001 HH PPS REVENUE CREDIT

## 2022-10-31 PROCEDURE — 3331090002 HH PPS REVENUE DEBIT

## 2022-10-31 PROCEDURE — G0299 HHS/HOSPICE OF RN EA 15 MIN: HCPCS

## 2022-11-01 PROCEDURE — 3331090001 HH PPS REVENUE CREDIT

## 2022-11-01 PROCEDURE — 3331090002 HH PPS REVENUE DEBIT

## 2022-11-02 ENCOUNTER — HOME CARE VISIT (OUTPATIENT)
Dept: SCHEDULING | Facility: HOME HEALTH | Age: 72
End: 2022-11-02
Payer: MEDICARE

## 2022-11-02 VITALS
HEART RATE: 93 BPM | SYSTOLIC BLOOD PRESSURE: 128 MMHG | DIASTOLIC BLOOD PRESSURE: 68 MMHG | TEMPERATURE: 97.8 F | WEIGHT: 219.7 LBS | OXYGEN SATURATION: 98 % | RESPIRATION RATE: 18 BRPM | BODY MASS INDEX: 36.56 KG/M2

## 2022-11-02 PROCEDURE — 3331090001 HH PPS REVENUE CREDIT

## 2022-11-02 PROCEDURE — G0151 HHCP-SERV OF PT,EA 15 MIN: HCPCS

## 2022-11-02 PROCEDURE — 3331090002 HH PPS REVENUE DEBIT

## 2022-11-03 ENCOUNTER — HOME CARE VISIT (OUTPATIENT)
Dept: SCHEDULING | Facility: HOME HEALTH | Age: 72
End: 2022-11-03
Payer: MEDICARE

## 2022-11-03 VITALS
RESPIRATION RATE: 17 BRPM | SYSTOLIC BLOOD PRESSURE: 122 MMHG | DIASTOLIC BLOOD PRESSURE: 76 MMHG | HEART RATE: 76 BPM | OXYGEN SATURATION: 98 %

## 2022-11-03 VITALS
TEMPERATURE: 97.5 F | RESPIRATION RATE: 16 BRPM | DIASTOLIC BLOOD PRESSURE: 65 MMHG | SYSTOLIC BLOOD PRESSURE: 118 MMHG | OXYGEN SATURATION: 98 % | BODY MASS INDEX: 36.11 KG/M2 | WEIGHT: 217 LBS | HEART RATE: 78 BPM

## 2022-11-03 PROCEDURE — 3331090001 HH PPS REVENUE CREDIT

## 2022-11-03 PROCEDURE — 3331090002 HH PPS REVENUE DEBIT

## 2022-11-03 PROCEDURE — G0299 HHS/HOSPICE OF RN EA 15 MIN: HCPCS

## 2022-11-04 PROCEDURE — 3331090001 HH PPS REVENUE CREDIT

## 2022-11-04 PROCEDURE — 3331090002 HH PPS REVENUE DEBIT

## 2022-11-05 PROCEDURE — 3331090002 HH PPS REVENUE DEBIT

## 2022-11-05 PROCEDURE — 3331090001 HH PPS REVENUE CREDIT

## 2022-11-05 RX ORDER — FLUOCINONIDE 0.5 MG/G
CREAM TOPICAL
Qty: 60 G | Refills: 0 | Status: SHIPPED | OUTPATIENT
Start: 2022-11-05 | End: 2022-11-24

## 2022-11-06 PROCEDURE — 3331090002 HH PPS REVENUE DEBIT

## 2022-11-06 PROCEDURE — 3331090001 HH PPS REVENUE CREDIT

## 2022-11-07 ENCOUNTER — HOME CARE VISIT (OUTPATIENT)
Dept: SCHEDULING | Facility: HOME HEALTH | Age: 72
End: 2022-11-07
Payer: MEDICARE

## 2022-11-07 VITALS
DIASTOLIC BLOOD PRESSURE: 78 MMHG | TEMPERATURE: 98 F | SYSTOLIC BLOOD PRESSURE: 120 MMHG | HEART RATE: 69 BPM | OXYGEN SATURATION: 95 % | RESPIRATION RATE: 18 BRPM

## 2022-11-07 PROCEDURE — G0151 HHCP-SERV OF PT,EA 15 MIN: HCPCS

## 2022-11-07 PROCEDURE — 3331090001 HH PPS REVENUE CREDIT

## 2022-11-07 PROCEDURE — 3331090002 HH PPS REVENUE DEBIT

## 2022-11-07 PROCEDURE — G0299 HHS/HOSPICE OF RN EA 15 MIN: HCPCS

## 2022-11-08 VITALS
OXYGEN SATURATION: 98 % | HEART RATE: 78 BPM | BODY MASS INDEX: 36.26 KG/M2 | TEMPERATURE: 97.8 F | SYSTOLIC BLOOD PRESSURE: 128 MMHG | WEIGHT: 217.9 LBS | DIASTOLIC BLOOD PRESSURE: 78 MMHG | RESPIRATION RATE: 18 BRPM

## 2022-11-08 PROCEDURE — 3331090002 HH PPS REVENUE DEBIT

## 2022-11-08 PROCEDURE — 3331090001 HH PPS REVENUE CREDIT

## 2022-11-09 ENCOUNTER — HOME CARE VISIT (OUTPATIENT)
Dept: SCHEDULING | Facility: HOME HEALTH | Age: 72
End: 2022-11-09
Payer: MEDICARE

## 2022-11-09 PROCEDURE — 3331090001 HH PPS REVENUE CREDIT

## 2022-11-09 PROCEDURE — 3331090002 HH PPS REVENUE DEBIT

## 2022-11-09 PROCEDURE — G0151 HHCP-SERV OF PT,EA 15 MIN: HCPCS

## 2022-11-10 ENCOUNTER — HOME CARE VISIT (OUTPATIENT)
Dept: SCHEDULING | Facility: HOME HEALTH | Age: 72
End: 2022-11-10
Payer: MEDICARE

## 2022-11-10 VITALS
TEMPERATURE: 97 F | DIASTOLIC BLOOD PRESSURE: 62 MMHG | OXYGEN SATURATION: 97 % | SYSTOLIC BLOOD PRESSURE: 100 MMHG | RESPIRATION RATE: 18 BRPM | HEART RATE: 77 BPM

## 2022-11-10 VITALS
WEIGHT: 217.1 LBS | DIASTOLIC BLOOD PRESSURE: 60 MMHG | BODY MASS INDEX: 36.13 KG/M2 | RESPIRATION RATE: 16 BRPM | SYSTOLIC BLOOD PRESSURE: 110 MMHG | HEART RATE: 78 BPM | OXYGEN SATURATION: 98 % | TEMPERATURE: 97.3 F

## 2022-11-10 PROCEDURE — 3331090002 HH PPS REVENUE DEBIT

## 2022-11-10 PROCEDURE — 3331090001 HH PPS REVENUE CREDIT

## 2022-11-10 PROCEDURE — G0299 HHS/HOSPICE OF RN EA 15 MIN: HCPCS

## 2022-11-11 PROCEDURE — 3331090001 HH PPS REVENUE CREDIT

## 2022-11-11 PROCEDURE — 3331090002 HH PPS REVENUE DEBIT

## 2022-11-12 PROCEDURE — 3331090001 HH PPS REVENUE CREDIT

## 2022-11-12 PROCEDURE — 3331090002 HH PPS REVENUE DEBIT

## 2022-11-13 PROCEDURE — 3331090001 HH PPS REVENUE CREDIT

## 2022-11-13 PROCEDURE — 3331090002 HH PPS REVENUE DEBIT

## 2022-11-14 ENCOUNTER — HOME CARE VISIT (OUTPATIENT)
Dept: SCHEDULING | Facility: HOME HEALTH | Age: 72
End: 2022-11-14
Payer: MEDICARE

## 2022-11-14 DIAGNOSIS — R52 PAIN: Primary | ICD-10-CM

## 2022-11-14 PROCEDURE — G0151 HHCP-SERV OF PT,EA 15 MIN: HCPCS

## 2022-11-14 PROCEDURE — 3331090001 HH PPS REVENUE CREDIT

## 2022-11-14 PROCEDURE — 3331090002 HH PPS REVENUE DEBIT

## 2022-11-14 RX ORDER — HYDROCODONE BITARTRATE AND ACETAMINOPHEN 5; 325 MG/1; MG/1
1 TABLET ORAL
Qty: 30 TABLET | Refills: 0 | Status: SHIPPED | OUTPATIENT
Start: 2022-11-14 | End: 2022-12-14

## 2022-11-14 NOTE — TELEPHONE ENCOUNTER
PCP: Angely Delvalle MD    Last appt: 10/14/2022  Future Appointments   Date Time Provider Radha Yoon   11/16/2022 11:00 AM Flor Sharp PT [address]   11/21/2022 To Be Determined [address]   11/22/2022 To Be Determined Alan Gutierrez [facility]   11/23/2022 To Be Determined [address]   11/28/2022 To Be Determined [address]   11/30/2022 To Be Determined [facility]   12/21/2022  3:30 PM Lisa Johnston NP [facility] BS AMB   1/10/2023  1:30 PM Angely Delvalle MD [facility] BS AMB       Requested Prescriptions     Pending Prescriptions Disp Refills    HYDROcodone-acetaminophen (NORCO) 5-325 mg per tablet 30 Tablet 0     Sig: Take 1 Tablet by mouth daily as needed for Pain for up to 30 days.

## 2022-11-14 NOTE — TELEPHONE ENCOUNTER
Caller requests Refill of:  HYDROcodone-acetaminophen (NORCO) 5-325 mg per tablet      Please send to:  [facility]/pharmacy #[id number]- [address]  [phone]      Visit Appointment History:   Future:   1/10/23  Previous: 10/14/22      Caller confirmed instructions and dosages as correct. Caller was advised that Meds will be refilled as soon as possible, however there can be a 48-72 business hour turn around on refill requests.

## 2022-11-15 ENCOUNTER — HOME CARE VISIT (OUTPATIENT)
Dept: HOME HEALTH SERVICES | Facility: HOME HEALTH | Age: 72
End: 2022-11-15
Payer: MEDICARE

## 2022-11-15 VITALS
RESPIRATION RATE: 16 BRPM | DIASTOLIC BLOOD PRESSURE: 65 MMHG | WEIGHT: 214.7 LBS | TEMPERATURE: 97.7 F | HEART RATE: 78 BPM | SYSTOLIC BLOOD PRESSURE: 115 MMHG | OXYGEN SATURATION: 99 % | BODY MASS INDEX: 35.73 KG/M2

## 2022-11-15 PROCEDURE — 3331090002 HH PPS REVENUE DEBIT

## 2022-11-15 PROCEDURE — 3331090001 HH PPS REVENUE CREDIT

## 2022-11-16 ENCOUNTER — HOME CARE VISIT (OUTPATIENT)
Dept: SCHEDULING | Facility: HOME HEALTH | Age: 72
End: 2022-11-16
Payer: MEDICARE

## 2022-11-16 PROCEDURE — 3331090002 HH PPS REVENUE DEBIT

## 2022-11-16 PROCEDURE — 3331090001 HH PPS REVENUE CREDIT

## 2022-11-16 PROCEDURE — G0151 HHCP-SERV OF PT,EA 15 MIN: HCPCS

## 2022-11-17 PROCEDURE — 3331090001 HH PPS REVENUE CREDIT

## 2022-11-17 PROCEDURE — 3331090002 HH PPS REVENUE DEBIT

## 2022-11-18 PROCEDURE — 3331090001 HH PPS REVENUE CREDIT

## 2022-11-18 PROCEDURE — 3331090002 HH PPS REVENUE DEBIT

## 2022-11-19 PROCEDURE — 3331090001 HH PPS REVENUE CREDIT

## 2022-11-19 PROCEDURE — 3331090002 HH PPS REVENUE DEBIT

## 2022-11-20 PROCEDURE — 3331090002 HH PPS REVENUE DEBIT

## 2022-11-20 PROCEDURE — 3331090001 HH PPS REVENUE CREDIT

## 2022-11-21 ENCOUNTER — HOME CARE VISIT (OUTPATIENT)
Dept: SCHEDULING | Facility: HOME HEALTH | Age: 72
End: 2022-11-21
Payer: MEDICARE

## 2022-11-21 VITALS
HEART RATE: 78 BPM | RESPIRATION RATE: 16 BRPM | TEMPERATURE: 97.4 F | DIASTOLIC BLOOD PRESSURE: 62 MMHG | OXYGEN SATURATION: 98 % | WEIGHT: 215.3 LBS | SYSTOLIC BLOOD PRESSURE: 105 MMHG | BODY MASS INDEX: 35.83 KG/M2

## 2022-11-21 PROCEDURE — 3331090001 HH PPS REVENUE CREDIT

## 2022-11-21 PROCEDURE — G0151 HHCP-SERV OF PT,EA 15 MIN: HCPCS

## 2022-11-21 PROCEDURE — 400013 HH SOC

## 2022-11-21 PROCEDURE — 3331090002 HH PPS REVENUE DEBIT

## 2022-11-22 PROCEDURE — 3331090002 HH PPS REVENUE DEBIT

## 2022-11-22 PROCEDURE — 3331090001 HH PPS REVENUE CREDIT

## 2022-11-23 ENCOUNTER — HOME CARE VISIT (OUTPATIENT)
Dept: SCHEDULING | Facility: HOME HEALTH | Age: 72
End: 2022-11-23
Payer: MEDICARE

## 2022-11-23 VITALS
HEART RATE: 80 BPM | WEIGHT: 216.8 LBS | DIASTOLIC BLOOD PRESSURE: 75 MMHG | SYSTOLIC BLOOD PRESSURE: 132 MMHG | OXYGEN SATURATION: 98 % | BODY MASS INDEX: 36.08 KG/M2 | TEMPERATURE: 97.8 F | RESPIRATION RATE: 16 BRPM

## 2022-11-23 VITALS
RESPIRATION RATE: 16 BRPM | WEIGHT: 215.5 LBS | TEMPERATURE: 97.8 F | DIASTOLIC BLOOD PRESSURE: 68 MMHG | HEART RATE: 82 BPM | OXYGEN SATURATION: 99 % | BODY MASS INDEX: 35.86 KG/M2 | SYSTOLIC BLOOD PRESSURE: 131 MMHG

## 2022-11-23 PROCEDURE — G0151 HHCP-SERV OF PT,EA 15 MIN: HCPCS

## 2022-11-23 PROCEDURE — 3331090001 HH PPS REVENUE CREDIT

## 2022-11-23 PROCEDURE — 3331090002 HH PPS REVENUE DEBIT

## 2022-11-24 PROCEDURE — 3331090001 HH PPS REVENUE CREDIT

## 2022-11-24 PROCEDURE — 3331090002 HH PPS REVENUE DEBIT

## 2022-11-24 RX ORDER — FLUOCINONIDE 0.5 MG/G
CREAM TOPICAL
Qty: 60 G | Refills: 0 | Status: SHIPPED | OUTPATIENT
Start: 2022-11-24

## 2022-11-25 PROCEDURE — 3331090001 HH PPS REVENUE CREDIT

## 2022-11-25 PROCEDURE — 3331090002 HH PPS REVENUE DEBIT

## 2022-11-26 PROCEDURE — 3331090002 HH PPS REVENUE DEBIT

## 2022-11-26 PROCEDURE — 3331090001 HH PPS REVENUE CREDIT

## 2022-11-27 PROCEDURE — 3331090001 HH PPS REVENUE CREDIT

## 2022-11-27 PROCEDURE — 3331090002 HH PPS REVENUE DEBIT

## 2022-11-28 ENCOUNTER — HOME CARE VISIT (OUTPATIENT)
Dept: SCHEDULING | Facility: HOME HEALTH | Age: 72
End: 2022-11-28
Payer: MEDICARE

## 2022-11-28 PROCEDURE — G0151 HHCP-SERV OF PT,EA 15 MIN: HCPCS

## 2022-11-28 PROCEDURE — 3331090001 HH PPS REVENUE CREDIT

## 2022-11-28 PROCEDURE — 3331090002 HH PPS REVENUE DEBIT

## 2022-11-29 VITALS
WEIGHT: 217 LBS | TEMPERATURE: 97.6 F | OXYGEN SATURATION: 95 % | SYSTOLIC BLOOD PRESSURE: 127 MMHG | HEART RATE: 69 BPM | DIASTOLIC BLOOD PRESSURE: 75 MMHG | RESPIRATION RATE: 16 BRPM | BODY MASS INDEX: 36.11 KG/M2

## 2022-11-29 PROCEDURE — 3331090001 HH PPS REVENUE CREDIT

## 2022-11-29 PROCEDURE — 3331090002 HH PPS REVENUE DEBIT

## 2022-11-30 PROCEDURE — 3331090001 HH PPS REVENUE CREDIT

## 2022-11-30 PROCEDURE — 3331090002 HH PPS REVENUE DEBIT

## 2022-12-01 PROCEDURE — 3331090001 HH PPS REVENUE CREDIT

## 2022-12-01 PROCEDURE — 3331090002 HH PPS REVENUE DEBIT

## 2022-12-02 ENCOUNTER — HOME CARE VISIT (OUTPATIENT)
Dept: SCHEDULING | Facility: HOME HEALTH | Age: 72
End: 2022-12-02
Payer: MEDICARE

## 2022-12-02 ENCOUNTER — TELEPHONE (OUTPATIENT)
Dept: INTERNAL MEDICINE CLINIC | Age: 72
End: 2022-12-02

## 2022-12-02 PROCEDURE — G0151 HHCP-SERV OF PT,EA 15 MIN: HCPCS

## 2022-12-02 NOTE — TELEPHONE ENCOUNTER
----- Message from Lori Fischer sent at 12/2/2022 12:14 PM EST -----  Subject: Hospital Follow Up    QUESTIONS  What hospital was the Patient Discharged from? [facility]   Date of Discharge? 2022-10-21  Discharge Location? Home  Reason for hospitalization as patient stated? Bacterial infection in left   foot & ankle  What question does the patient have, if applicable?   ---------------------------------------------------------------------------  --------------  CALL BACK INFO  What is the best way for the office to contact you? OK to leave message on   voicemail  Preferred Call Back Phone Number? [phone]  ---------------------------------------------------------------------------  --------------  SCRIPT ANSWERS  Relationship to Patient?  Self

## 2022-12-03 VITALS
RESPIRATION RATE: 16 BRPM | BODY MASS INDEX: 35.94 KG/M2 | HEART RATE: 75 BPM | DIASTOLIC BLOOD PRESSURE: 80 MMHG | TEMPERATURE: 98 F | WEIGHT: 216 LBS | SYSTOLIC BLOOD PRESSURE: 126 MMHG | OXYGEN SATURATION: 98 %

## 2022-12-05 ENCOUNTER — HOME CARE VISIT (OUTPATIENT)
Dept: SCHEDULING | Facility: HOME HEALTH | Age: 72
End: 2022-12-05
Payer: MEDICARE

## 2022-12-05 PROCEDURE — G0151 HHCP-SERV OF PT,EA 15 MIN: HCPCS

## 2022-12-06 ENCOUNTER — TRANSCRIBE ORDER (OUTPATIENT)
Dept: REGISTRATION | Age: 72
End: 2022-12-06

## 2022-12-06 ENCOUNTER — HOSPITAL ENCOUNTER (OUTPATIENT)
Dept: GENERAL RADIOLOGY | Age: 72
Discharge: HOME | End: 2022-12-06
Payer: MEDICARE

## 2022-12-06 VITALS
WEIGHT: 215 LBS | HEART RATE: 81 BPM | DIASTOLIC BLOOD PRESSURE: 65 MMHG | RESPIRATION RATE: 16 BRPM | TEMPERATURE: 97.8 F | BODY MASS INDEX: 35.78 KG/M2 | SYSTOLIC BLOOD PRESSURE: 121 MMHG | OXYGEN SATURATION: 98 %

## 2022-12-06 DIAGNOSIS — I42.9 PRIMARY CARDIOMYOPATHY (HCC): Primary | ICD-10-CM

## 2022-12-06 DIAGNOSIS — I42.9 PRIMARY CARDIOMYOPATHY (HCC): ICD-10-CM

## 2022-12-06 PROCEDURE — 71046 X-RAY EXAM CHEST 2 VIEWS: CPT

## 2022-12-08 ENCOUNTER — HOME CARE VISIT (OUTPATIENT)
Dept: HOME HEALTH SERVICES | Facility: HOME HEALTH | Age: 72
End: 2022-12-08
Payer: MEDICARE

## 2022-12-09 ENCOUNTER — VIRTUAL VISIT (OUTPATIENT)
Dept: INTERNAL MEDICINE CLINIC | Age: 72
End: 2022-12-09
Payer: MEDICARE

## 2022-12-09 DIAGNOSIS — E55.9 VITAMIN D DEFICIENCY: ICD-10-CM

## 2022-12-09 DIAGNOSIS — E03.9 HYPOTHYROIDISM, UNSPECIFIED TYPE: ICD-10-CM

## 2022-12-09 DIAGNOSIS — R32 URINARY INCONTINENCE, UNSPECIFIED TYPE: ICD-10-CM

## 2022-12-09 DIAGNOSIS — R73.9 HYPERGLYCEMIA: ICD-10-CM

## 2022-12-09 DIAGNOSIS — I10 BENIGN ESSENTIAL HYPERTENSION: ICD-10-CM

## 2022-12-09 DIAGNOSIS — R60.0 LOWER EXTREMITY EDEMA: Primary | ICD-10-CM

## 2022-12-09 DIAGNOSIS — E53.8 B12 DEFICIENCY: ICD-10-CM

## 2022-12-09 DIAGNOSIS — Z09 HOSPITAL DISCHARGE FOLLOW-UP: ICD-10-CM

## 2022-12-09 RX ORDER — FUROSEMIDE 40 MG/1
40 TABLET ORAL 2 TIMES DAILY
Qty: 60 TABLET | Refills: 11 | Status: SHIPPED | OUTPATIENT
Start: 2022-12-09

## 2022-12-09 NOTE — PROGRESS NOTES
Lesvia Nogueira is a 67 y.o. female who was seen by synchronous (real-time) audio-video technology on 2022 for Hospital Follow Up        Progress Note         PROGRESS NOTE  Name: Lesvia Nogueira   : 1950       ASSESSMENT/ PLAN:     Cellulitis (10/17/2022) L lower extremity: Resolved. Edema, severe: We'll try increasing the lasix to 40. Urinary incontinence: We'll order PureWick, as requested. Hypothyroidism: Continue synthroid. Atrial Fibrillation: S/p hybrid ablation, s/p pacemaker for SSS. Outpatient F/U as per Dr. FRANKLIN [facility].  On eliquis. CHF / Nonischemic cardiomyopathy: EF 15-20%. She is about to get upgrade to BiV pacer as outpatient. CODE: Full. Keep scheduled appt in . We'll order routine labs. I have reviewed the patient's medications and risks/side effects/benefits were discussed. Diagnosis(-es) explained to patient and questions answered. Literature provided where appropriate. SUBJECTIVE  Ms. Lesvia Nogueira presents today acutely for     Chief Complaint   Patient presents with    Hospital Follow Up     She was hospitalized in October for cellulitis: She was treated with vancomycin followed by clindamycin. She was hypotensive, which improved with IVF and midodrine. She was noted to have an EF of 15-20% on echo; Cardiology was consulted and she had a negative stress test. Upgrade to BiV pacer was planned for later. Now, \"my feet are still swollen and still hurt. \" No redness, warmth, or other signs of cellulitis. \"Joe checks me every day. \"   She is working with PT. \"I prop me feet up. \"  She is on lasix 20 mg bid. \"Can you prescribe a pureWick? It worked great in the hospital.\" She has incontinence and \"I am ruining my sheets every night. \"     She will be having a pacemaker procedure done next week. \"He is moving the pacemaker from the right side to the left side, and putting in an extra wire. \"       Past Medical History:   Diagnosis Date    Arrhythmia 2014    atrial fibrillation 2014, sick sinus syndrome: [facility]    Arthritis     Rheumatoid    Bipolar affect, depressed ([facility]     Bipolar affective disorder ([facility] 3/31/2010    Chronic pain 2018    right shoulder    Colon polyps 03/31/2010    also 5/2018: colon polyps    Depression     Diarrhea 07/19/2013    D/t alpha gal allergy says patient    Epigastric pain 6/5/2015    GERD (gastroesophageal reflux disease)     occasiional only; controlled with med    Hemispheric carotid artery syndrome No stroke    Neuro: Ford Ramires  (CT scan head/neck negative 4/2018 in [facility])    Hyperlipidemia 3/31/2010    Hypothyroidism 3/31/2010    Long term current use of anticoagulant therapy     Lupus ([facility]     patient denies-says she has RA    Migraine 03/31/2010    has them x2 a month: last one 5/23/2018    Psychotic disorder (HCC)     S/P ablation of atrial fibrillation 05/13/2014    S/P cardiac pacemaker procedure 9/16/2014 9/16/14 Medtronic MRI compatible dual chamber pacemaker implant    Sleep apnea     unable to tolerate CPAP    Stool color black     Thyroid disease     Tick-borne disease     Alpha Gal allergy: no eat pork, beef, milk (Meat allergy showed up on a allergy test)    Umbilical hernia without obstruction and without gangrene 6/22/2020    Urge incontinence 3/31/2010    Vitamin D deficiency 3/31/2015        Current Outpatient Medications on File Prior to Visit   Medication Sig Dispense Refill    fluocinoNIDE (LIDEX) 0.05 % topical cream APPLY TO AFFECTED AREA TWICE A DAY 60 g 0    HYDROcodone-acetaminophen (NORCO) 5-325 mg per tablet Take 1 Tablet by mouth daily as needed for Pain for up to 30 days. 30 Tablet 0    dimethicone/colloidal oatmeal (DAILY MOISTURIZING LOTION EX) Apply 1 Film to affected area daily. OTHER Take 4 Billion Cells by mouth daily.  Natures Bounty Probiotic Gummies 2 gummies by mouth daily      acetaminophen (TYLENOL) 500 mg tablet Take 500 mg by mouth every six (6) hours as needed for Fever or Pain. Take 1 tablet by mouth every 6 hours prn moderate pain      zinc 50 mg tab tablet Take 50 mg by mouth daily. Zinc is on hold while on antibiotic, may resume after completion       pregabalin (LYRICA) 200 mg capsule Take 200 mg by mouth two (2) times a day. topiramate (TOPAMAX) 100 mg tablet Take 100 mg by mouth nightly. propranolol LA (INDERAL LA) 60 mg SR capsule Take 60 mg by mouth daily. zolpidem (AMBIEN) 10 mg tablet Take 10 mg by mouth nightly as needed for Sleep. Entresto 24-26 mg tablet Take 1 Tablet by mouth two (2) times a day. ergocalciferol (ERGOCALCIFEROL) 1,250 mcg (50,000 unit) capsule TAKE 1 CAPSULE BY MOUTH ONE TIME PER WEEK 12 Capsule 1    esomeprazole (NEXIUM) 40 mg capsule Take 40 mg by mouth daily. levothyroxine (SYNTHROID) 50 mcg tablet TAKE 1 TABLET BY MOUTH EVERY DAY 90 Tablet 3    apixaban (ELIQUIS) 5 mg tablet Take 1 Tablet by mouth two (2) times a day. 56 Tablet 0    lithium carbonate SR (LITHOBID) 300 mg CR tablet Take 300 mg by mouth three (3) times daily. cyanocobalamin (VITAMIN B12) 100 mcg tablet Take 100 mcg by mouth daily. omeprazole (PRILOSEC) 40 mg capsule TAKE 1 CAPSULE BY MOUTH EVERY DAY IN THE MORNING      timolol (TIMOPTIC) 0.5 % ophthalmic solution INSTILL 1 DROP 1 TIME EVERY DAY INTO BOTH EYES EVERY MORNING      hydrOXYchloroQUINE (PLAQUENIL) 200 mg tablet Take 200 mg by mouth two (2) times a day. latanoprost (XALATAN) 0.005 % ophthalmic solution Administer 1 Drop to both eyes two (2) times a day. ALPRAZolam (XANAX) 0.5 mg tablet Take 0.5 mg by mouth nightly. cephALEXin (KEFLEX) 250 mg capsule Take 250 mg by mouth four (4) times daily. Dr. JUAN CARLOS FULLER [facility]) (Patient not taking: Reported on 12/9/2022)       No current facility-administered medications on file prior to visit.      ROS: Complete review of systems was performed and is otherwise unremarkable except as noted elsewhere. OBJECTIVE  There were no vitals taken for this visit. Gen: Pleasant 67 y.o.  female in NAD. Objective:   No flowsheet data found. [INSTRUCTIONS:  \"[x]\" Indicates a positive item  \"[]\" Indicates a negative item  -- DELETE ALL ITEMS NOT EXAMINED]    Constitutional: [x] Appears well-developed and well-nourished [x] No apparent distress      [] Abnormal -     Mental status: [x] Alert and awake  [x] Oriented to person/place/time [x] Able to follow commands    [] Abnormal -     Eyes:   EOM    [x]  Normal    [] Abnormal -   Sclera  [x]  Normal    [] Abnormal -          Discharge [x]  None visible   [] Abnormal -     HENT: [x] Normocephalic, atraumatic  [] Abnormal -   [x] Mouth/Throat: Mucous membranes are moist    External Ears [x] Normal  [] Abnormal -    Neck: [x] No visualized mass [] Abnormal -     Pulmonary/Chest: [x] Respiratory effort normal   [x] No visualized signs of difficulty breathing or respiratory distress        [] Abnormal -      Musculoskeletal:   [x] Normal gait with no signs of ataxia         [x] Normal range of motion of neck        [] Abnormal -     Neurological:        [x] No Facial Asymmetry (Cranial nerve 7 motor function) (limited exam due to video visit)          [x] No gaze palsy        [] Abnormal -          Skin:        [x] No significant exanthematous lesions or discoloration noted on facial skin         [] Abnormal -            Psychiatric:       [x] Normal Affect [] Abnormal -       Other pertinent observable physical exam findings:-        We discussed the expected course, resolution and complications of the diagnosis(es) in detail. Medication risks, benefits, costs, interactions, and alternatives were discussed as indicated. I advised her to contact the office if her condition worsens, changes or fails to improve as anticipated. She expressed understanding with the diagnosis(es) and plan.        Feroz Martinez, who was evaluated through a patient-initiated, synchronous (real-time) audio-video encounter, and/or her healthcare decision maker, is aware that it is a billable service, with coverage as determined by her insurance carrier. She provided verbal consent to proceed: YES, and patient identification was verified. It was conducted pursuant to the emergency declaration under the [address] [place] [address] and the [employer] Act. A caregiver was present when appropriate. Ability to conduct physical exam was limited. I was not in office. The patient was at home or otherwise outside the office.       Syd Navas MD

## 2022-12-09 NOTE — PROGRESS NOTES
1. \"Have you been to the ER, urgent care clinic since your last visit? Hospitalized since your last visit? Yes, 10/17/2022 Cellulitis     2. \"Have you seen or consulted any other health care providers outside of the [address] [place] [address] since your last visit? \" No     3. For patients aged 39-70: Has the patient had a colonoscopy / FIT/ Cologuard? Yes - no Care Gap present      If the patient is female:    4. For patients aged 41-77: Has the patient had a mammogram within the past 2 years? Yes - Care Gap present. Rooming MA/LPN to request most recent results      5. For patients aged 21-65: Has the patient had a pap smear?  NA - based on age or sex

## 2022-12-12 ENCOUNTER — HOME CARE VISIT (OUTPATIENT)
Dept: SCHEDULING | Facility: HOME HEALTH | Age: 72
End: 2022-12-12
Payer: MEDICARE

## 2022-12-12 PROCEDURE — G0151 HHCP-SERV OF PT,EA 15 MIN: HCPCS

## 2022-12-13 ENCOUNTER — HOME CARE VISIT (OUTPATIENT)
Dept: HOME HEALTH SERVICES | Facility: HOME HEALTH | Age: 72
End: 2022-12-13
Payer: MEDICARE

## 2022-12-13 ENCOUNTER — ANESTHESIA EVENT (OUTPATIENT)
Dept: CARDIAC CATH/INVASIVE PROCEDURES | Age: 72
End: 2022-12-13
Payer: MEDICARE

## 2022-12-13 VITALS
OXYGEN SATURATION: 95 % | RESPIRATION RATE: 16 BRPM | BODY MASS INDEX: 35.89 KG/M2 | WEIGHT: 215.7 LBS | SYSTOLIC BLOOD PRESSURE: 128 MMHG | HEART RATE: 76 BPM | TEMPERATURE: 97.6 F | DIASTOLIC BLOOD PRESSURE: 75 MMHG

## 2022-12-13 RX ORDER — HYDROMORPHONE HYDROCHLORIDE 2 MG/ML
0.2 INJECTION, SOLUTION INTRAMUSCULAR; INTRAVENOUS; SUBCUTANEOUS
Status: CANCELLED | OUTPATIENT
Start: 2022-12-13

## 2022-12-13 RX ORDER — LIDOCAINE HYDROCHLORIDE 10 MG/ML
0.1 INJECTION, SOLUTION EPIDURAL; INFILTRATION; INTRACAUDAL; PERINEURAL AS NEEDED
Status: CANCELLED | OUTPATIENT
Start: 2022-12-13

## 2022-12-13 RX ORDER — FENTANYL CITRATE 50 UG/ML
50 INJECTION, SOLUTION INTRAMUSCULAR; INTRAVENOUS AS NEEDED
Status: CANCELLED | OUTPATIENT
Start: 2022-12-13

## 2022-12-13 RX ORDER — ONDANSETRON 2 MG/ML
4 INJECTION INTRAMUSCULAR; INTRAVENOUS AS NEEDED
Status: CANCELLED | OUTPATIENT
Start: 2022-12-13

## 2022-12-13 RX ORDER — ACETAMINOPHEN 325 MG/1
650 TABLET ORAL ONCE
Status: CANCELLED | OUTPATIENT
Start: 2022-12-13 | End: 2022-12-13

## 2022-12-13 RX ORDER — MIDAZOLAM HYDROCHLORIDE 1 MG/ML
1 INJECTION, SOLUTION INTRAMUSCULAR; INTRAVENOUS AS NEEDED
Status: CANCELLED | OUTPATIENT
Start: 2022-12-13

## 2022-12-13 RX ORDER — SODIUM CHLORIDE, SODIUM LACTATE, POTASSIUM CHLORIDE, CALCIUM CHLORIDE 600; 310; 30; 20 MG/100ML; MG/100ML; MG/100ML; MG/100ML
50 INJECTION, SOLUTION INTRAVENOUS CONTINUOUS
Status: CANCELLED | OUTPATIENT
Start: 2022-12-13 | End: 2022-12-14

## 2022-12-13 RX ORDER — SODIUM CHLORIDE 0.9 % (FLUSH) 0.9 %
5-40 SYRINGE (ML) INJECTION EVERY 8 HOURS
Status: CANCELLED | OUTPATIENT
Start: 2022-12-13

## 2022-12-13 RX ORDER — FENTANYL CITRATE 50 UG/ML
25 INJECTION, SOLUTION INTRAMUSCULAR; INTRAVENOUS
Status: CANCELLED | OUTPATIENT
Start: 2022-12-13

## 2022-12-13 RX ORDER — SODIUM CHLORIDE 0.9 % (FLUSH) 0.9 %
5-40 SYRINGE (ML) INJECTION AS NEEDED
Status: CANCELLED | OUTPATIENT
Start: 2022-12-13

## 2022-12-14 ENCOUNTER — APPOINTMENT (OUTPATIENT)
Dept: GENERAL RADIOLOGY | Age: 72
End: 2022-12-14
Attending: INTERNAL MEDICINE
Payer: MEDICARE

## 2022-12-14 ENCOUNTER — HOSPITAL ENCOUNTER (OUTPATIENT)
Age: 72
Discharge: HOME | End: 2022-12-14
Attending: INTERNAL MEDICINE | Admitting: INTERNAL MEDICINE
Payer: MEDICARE

## 2022-12-14 ENCOUNTER — ANESTHESIA (OUTPATIENT)
Dept: CARDIAC CATH/INVASIVE PROCEDURES | Age: 72
End: 2022-12-14
Payer: MEDICARE

## 2022-12-14 VITALS
DIASTOLIC BLOOD PRESSURE: 60 MMHG | RESPIRATION RATE: 15 BRPM | HEIGHT: 65 IN | WEIGHT: 216 LBS | HEART RATE: 75 BPM | BODY MASS INDEX: 35.99 KG/M2 | SYSTOLIC BLOOD PRESSURE: 107 MMHG | OXYGEN SATURATION: 92 % | TEMPERATURE: 96.8 F

## 2022-12-14 DIAGNOSIS — I42.9 CARDIOMYOPATHY, UNSPECIFIED TYPE (HCC): ICD-10-CM

## 2022-12-14 PROCEDURE — C1894 INTRO/SHEATH, NON-LASER: HCPCS | Performed by: INTERNAL MEDICINE

## 2022-12-14 PROCEDURE — 76060000033 HC ANESTHESIA 1 TO 1.5 HR: Performed by: INTERNAL MEDICINE

## 2022-12-14 PROCEDURE — 74011000250 HC RX REV CODE- 250: Performed by: INTERNAL MEDICINE

## 2022-12-14 PROCEDURE — C1751 CATH, INF, PER/CENT/MIDLINE: HCPCS | Performed by: INTERNAL MEDICINE

## 2022-12-14 PROCEDURE — C1769 GUIDE WIRE: HCPCS | Performed by: INTERNAL MEDICINE

## 2022-12-14 PROCEDURE — C1882 AICD, OTHER THAN SING/DUAL: HCPCS | Performed by: INTERNAL MEDICINE

## 2022-12-14 PROCEDURE — C1900 LEAD, CORONARY VENOUS: HCPCS | Performed by: INTERNAL MEDICINE

## 2022-12-14 PROCEDURE — 71045 X-RAY EXAM CHEST 1 VIEW: CPT

## 2022-12-14 PROCEDURE — 74011250636 HC RX REV CODE- 250/636

## 2022-12-14 PROCEDURE — 74011250636 HC RX REV CODE- 250/636: Performed by: INTERNAL MEDICINE

## 2022-12-14 PROCEDURE — C1777 LEAD, AICD, ENDO SINGLE COIL: HCPCS | Performed by: INTERNAL MEDICINE

## 2022-12-14 PROCEDURE — 74011000250 HC RX REV CODE- 250: Performed by: NURSE ANESTHETIST, CERTIFIED REGISTERED

## 2022-12-14 PROCEDURE — 77030022704 HC SUT VLOC COVD -B: Performed by: INTERNAL MEDICINE

## 2022-12-14 PROCEDURE — C1898 LEAD, PMKR, OTHER THAN TRANS: HCPCS | Performed by: INTERNAL MEDICINE

## 2022-12-14 PROCEDURE — 77030018729 HC ELECTRD DEFIB PAD CARD -B: Performed by: INTERNAL MEDICINE

## 2022-12-14 PROCEDURE — 77030002996 HC SUT SLK J&J -A: Performed by: INTERNAL MEDICINE

## 2022-12-14 PROCEDURE — 74011250636 HC RX REV CODE- 250/636: Performed by: NURSE ANESTHETIST, CERTIFIED REGISTERED

## 2022-12-14 PROCEDURE — C1893 INTRO/SHEATH, FIXED,NON-PEEL: HCPCS | Performed by: INTERNAL MEDICINE

## 2022-12-14 PROCEDURE — 77030037400 HC ADH TISS HI VISC EXOFIN CHMP -B: Performed by: INTERNAL MEDICINE

## 2022-12-14 PROCEDURE — 74011250637 HC RX REV CODE- 250/637: Performed by: INTERNAL MEDICINE

## 2022-12-14 PROCEDURE — 33249 INSJ/RPLCMT DEFIB W/LEAD(S): CPT | Performed by: INTERNAL MEDICINE

## 2022-12-14 PROCEDURE — C1887 CATHETER, GUIDING: HCPCS | Performed by: INTERNAL MEDICINE

## 2022-12-14 PROCEDURE — 74011000272 HC RX REV CODE- 272: Performed by: INTERNAL MEDICINE

## 2022-12-14 PROCEDURE — 77030012468 HC VLV BLEEDBK CNTRL ABBT -B: Performed by: INTERNAL MEDICINE

## 2022-12-14 DEVICE — PACE/SENSE LEAD
Type: IMPLANTABLE DEVICE | Status: FUNCTIONAL
Brand: ACUITY™ X4 SPIRAL S

## 2022-12-14 DEVICE — PACE/SENSE LEAD
Type: IMPLANTABLE DEVICE | Status: FUNCTIONAL
Brand: INGEVITY™+

## 2022-12-14 DEVICE — LEAD DEFIB 64CM MODEL 0273 -- ENDOTAK RELIANCE S: Type: IMPLANTABLE DEVICE | Status: FUNCTIONAL

## 2022-12-14 DEVICE — CARDIAC RESYNCHRONIZATION THERAPY DEFIBRILLATOR
Type: IMPLANTABLE DEVICE | Status: FUNCTIONAL
Brand: VIGILANT™ X4 CRT-D

## 2022-12-14 RX ORDER — PROPOFOL 10 MG/ML
INJECTION, EMULSION INTRAVENOUS
Status: DISCONTINUED | OUTPATIENT
Start: 2022-12-14 | End: 2022-12-14 | Stop reason: HOSPADM

## 2022-12-14 RX ORDER — ONDANSETRON 2 MG/ML
INJECTION INTRAMUSCULAR; INTRAVENOUS AS NEEDED
Status: DISCONTINUED | OUTPATIENT
Start: 2022-12-14 | End: 2022-12-14 | Stop reason: HOSPADM

## 2022-12-14 RX ORDER — SODIUM CHLORIDE 0.9 % (FLUSH) 0.9 %
5-40 SYRINGE (ML) INJECTION EVERY 8 HOURS
Status: DISCONTINUED | OUTPATIENT
Start: 2022-12-14 | End: 2022-12-14 | Stop reason: HOSPADM

## 2022-12-14 RX ORDER — LIDOCAINE HYDROCHLORIDE 20 MG/ML
INJECTION, SOLUTION EPIDURAL; INFILTRATION; INTRACAUDAL; PERINEURAL AS NEEDED
Status: DISCONTINUED | OUTPATIENT
Start: 2022-12-14 | End: 2022-12-14 | Stop reason: HOSPADM

## 2022-12-14 RX ORDER — SODIUM CHLORIDE 0.9 % (FLUSH) 0.9 %
5-40 SYRINGE (ML) INJECTION AS NEEDED
Status: DISCONTINUED | OUTPATIENT
Start: 2022-12-14 | End: 2022-12-14 | Stop reason: HOSPADM

## 2022-12-14 RX ORDER — EPHEDRINE SULFATE/0.9% NACL/PF 50 MG/5 ML
SYRINGE (ML) INTRAVENOUS AS NEEDED
Status: DISCONTINUED | OUTPATIENT
Start: 2022-12-14 | End: 2022-12-14 | Stop reason: HOSPADM

## 2022-12-14 RX ORDER — ACETAMINOPHEN 325 MG/1
650 TABLET ORAL
Status: DISCONTINUED | OUTPATIENT
Start: 2022-12-14 | End: 2022-12-14 | Stop reason: HOSPADM

## 2022-12-14 RX ORDER — NALOXONE HYDROCHLORIDE 0.4 MG/ML
0.4 INJECTION, SOLUTION INTRAMUSCULAR; INTRAVENOUS; SUBCUTANEOUS AS NEEDED
Status: DISCONTINUED | OUTPATIENT
Start: 2022-12-14 | End: 2022-12-14 | Stop reason: HOSPADM

## 2022-12-14 RX ORDER — FENTANYL CITRATE 50 UG/ML
INJECTION, SOLUTION INTRAMUSCULAR; INTRAVENOUS AS NEEDED
Status: DISCONTINUED | OUTPATIENT
Start: 2022-12-14 | End: 2022-12-14 | Stop reason: HOSPADM

## 2022-12-14 RX ORDER — PROPOFOL 10 MG/ML
INJECTION, EMULSION INTRAVENOUS AS NEEDED
Status: DISCONTINUED | OUTPATIENT
Start: 2022-12-14 | End: 2022-12-14 | Stop reason: HOSPADM

## 2022-12-14 RX ORDER — HYDROCODONE BITARTRATE AND ACETAMINOPHEN 5; 325 MG/1; MG/1
1 TABLET ORAL
Status: DISCONTINUED | OUTPATIENT
Start: 2022-12-14 | End: 2022-12-14 | Stop reason: HOSPADM

## 2022-12-14 RX ORDER — CEFAZOLIN SODIUM/WATER 2 G/20 ML
SYRINGE (ML) INTRAVENOUS AS NEEDED
Status: DISCONTINUED | OUTPATIENT
Start: 2022-12-14 | End: 2022-12-14 | Stop reason: HOSPADM

## 2022-12-14 RX ORDER — PHENYLEPHRINE HCL IN 0.9% NACL 0.4MG/10ML
SYRINGE (ML) INTRAVENOUS
Status: DISCONTINUED | OUTPATIENT
Start: 2022-12-14 | End: 2022-12-14 | Stop reason: HOSPADM

## 2022-12-14 RX ORDER — SODIUM CHLORIDE 9 MG/ML
INJECTION, SOLUTION INTRAVENOUS
Status: DISCONTINUED | OUTPATIENT
Start: 2022-12-14 | End: 2022-12-14 | Stop reason: HOSPADM

## 2022-12-14 RX ORDER — LIDOCAINE HYDROCHLORIDE 10 MG/ML
INJECTION, SOLUTION EPIDURAL; INFILTRATION; INTRACAUDAL; PERINEURAL AS NEEDED
Status: DISCONTINUED | OUTPATIENT
Start: 2022-12-14 | End: 2022-12-14 | Stop reason: HOSPADM

## 2022-12-14 RX ADMIN — PROPOFOL 50 MCG/KG/MIN: 10 INJECTION, EMULSION INTRAVENOUS at 07:56

## 2022-12-14 RX ADMIN — LIDOCAINE HYDROCHLORIDE 60 MG: 20 INJECTION, SOLUTION INTRAVENOUS at 07:56

## 2022-12-14 RX ADMIN — FENTANYL CITRATE 50 MCG: 50 INJECTION, SOLUTION INTRAMUSCULAR; INTRAVENOUS at 08:18

## 2022-12-14 RX ADMIN — SODIUM CHLORIDE: 9 INJECTION, SOLUTION INTRAVENOUS at 07:50

## 2022-12-14 RX ADMIN — FENTANYL CITRATE 25 MCG: 50 INJECTION, SOLUTION INTRAMUSCULAR; INTRAVENOUS at 08:02

## 2022-12-14 RX ADMIN — Medication 10 MG: at 08:27

## 2022-12-14 RX ADMIN — Medication 40 MCG/MIN: at 08:16

## 2022-12-14 RX ADMIN — FENTANYL CITRATE 25 MCG: 50 INJECTION, SOLUTION INTRAMUSCULAR; INTRAVENOUS at 08:16

## 2022-12-14 RX ADMIN — Medication 2 G: at 08:01

## 2022-12-14 RX ADMIN — Medication 10 MG: at 08:40

## 2022-12-14 RX ADMIN — Medication 10 MG: at 08:32

## 2022-12-14 RX ADMIN — ACETAMINOPHEN 650 MG: 325 TABLET ORAL at 09:47

## 2022-12-14 RX ADMIN — ONDANSETRON HYDROCHLORIDE 4 MG: 2 INJECTION, SOLUTION INTRAMUSCULAR; INTRAVENOUS at 08:55

## 2022-12-14 RX ADMIN — PROPOFOL 20 MG: 10 INJECTION, EMULSION INTRAVENOUS at 08:07

## 2022-12-14 RX ADMIN — PROPOFOL 10 MG: 10 INJECTION, EMULSION INTRAVENOUS at 07:59

## 2022-12-14 NOTE — PROGRESS NOTES
Cardiac Cath Lab Recovery Arrival Note:      Guy Hodges arrived to Cardiac Cath Lab, Recovery Area. Staff introduced to patient. Patient identifiers verified with NAME and DATE OF BIRTH. Procedure verified with patient. Consent forms reviewed and signed by patient or authorized representative and verified. Allergies verified. Patient and family oriented to department. Patient and family informed of procedure and plan of care. Questions answered with review. Patient prepped for procedure, per orders from physician, prior to arrival.    Patient on cardiac monitor, non-invasive blood pressure, SPO2 monitor. On room air. Patient is A&Ox 4. Patient reports no complaints. Patient in stretcher, in low position, with side rails up, call bell within reach, patient instructed to call if assistance as needed. Patient prep in: [address], [place] [address]. Patient family has pager # n/a  Family in: Jing Jose () in [employer].    Prep by: Tori Llanes hurst

## 2022-12-14 NOTE — Clinical Note
TRANSFER - IN REPORT:     Verbal report received from: recovery. Report consisted of patient's Situation, Background, Assessment and   Recommendations(SBAR). Opportunity for questions and clarification was provided. Assessment completed upon patient's arrival to unit and care assumed. Patient transported with a Registered Nurse and [address] [place] [address] / [facility] [facility].

## 2022-12-14 NOTE — PROGRESS NOTES
Nurse Lisa Wallace RN and Connie Velasco RN performed a dual skin assessment on this patient No impairment noted  Mustapha score is 22.

## 2022-12-14 NOTE — ANESTHESIA PREPROCEDURE EVALUATION
Anesthetic History   No history of anesthetic complications            Review of Systems / Medical History  Patient summary reviewed, nursing notes reviewed and pertinent labs reviewed    Pulmonary        Sleep apnea (uses mouthpiece): No treatment           Neuro/Psych         TIA, headaches (migraines) and psychiatric history    Comments: Bipolar Affective d/o Cardiovascular    Hypertension: well controlled        Dysrhythmias : atrial fibrillation  Pacemaker ( PM for SSS) and hyperlipidemia    Exercise tolerance: <4 METS  Comments:   MPI: No ischemia or infarct demonstrated. LVEF 28%.   ECG: Resting ECG demonstrates normal sinus rhythm.   Stress Test: A pharmacological stress test was performed using lexiscan. Blood pressure demonstrated a normal response and heart rate demonstrated a normal response to stress. The patient's heart rate recovery was normal. The patient reported no chest pain during the stress test.    Non Diagnotic cardiac stress test. Nuclear images to follow.      GI/Hepatic/Renal     GERD ( occasional only)          Comments: Colon polyps Endo/Other      Hypothyroidism: well controlled  Morbid obesity and arthritis     Other Findings   Comments: Vitamin D Deficiency  Urge incontinence  Hemispheric carotid artery syndrome   Osteoarthritis of right shoulder           Physical Exam    Airway  Mallampati: II  TM Distance: 4 - 6 cm  Neck ROM: normal range of motion   Mouth opening: Normal     Cardiovascular  Regular rate and rhythm,  S1 and S2 normal,  no murmur, click, rub, or gallop  Rhythm: regular  Rate: normal      Pertinent negatives: No murmur   Dental    Dentition: Caps/crowns     Pulmonary  Breath sounds clear to auscultation               Abdominal  GI exam deferred       Other Findings            Anesthetic Plan    ASA: 3  Anesthesia type: MAC          Induction: Intravenous  Anesthetic plan and risks discussed with: Patient

## 2022-12-14 NOTE — PROGRESS NOTES
Removed pressure dressing from left chest wall. Small hematoma but no bleeding noted. Continue ice pack at incision site, will closely monitor. Dr. Garcia Nearing at patient's bedside to assess the incision site, will continue to monitor the incision site. Per MD, patient to hold Eliquis for tonight.

## 2022-12-14 NOTE — DISCHARGE INSTRUCTIONS
[facility]  [address] [place] [address], [address] [facility]  [phone]  WWW. [email or web address]   ICD INSERTION DISCHARGE INSTRUCTIONS    Patient ID:  Kath Marie  [id number]  6 y.o.  1950    Admit Date: 12/14/2022    Discharge Date: 12/14/2022     Admitting Physician: [unfilled]     Discharge Physician: [unfilled]    Admission Diagnoses:   Cardiomyopathy, unspecified type ([address] [I42.9]  Cardiomyopathy ([address] [I42.9]    Discharge Diagnoses: Active Problems:    Cardiomyopathy [facility]) (12/14/2022)        Discharge Condition: Good    Cardiology Procedures this Admission:  S/P ICD implantation. Disposition: home    Reference discharge instructions provided by nursing for diet and activity. Follow-up with ICD/PM clinic in 3 weeks. Call [phone] to make an appointment. Signed:  Alba Baumgarten, MD  12/14/2022  8:58 AM      DISCHARGE INSTRUCTIONS FOR PATIENTS WITH ICD'S    Remember to call for an appointment in 3 weeks [phone].    2. Medic Alert Bracelets are available from your pharmacist to wear at all times if you choose to wear one. 3. Carry your ID card for your ICD with you at all times. This card will be given to you in the hospital or mailed to you. 4. The ICD will bulge slightly under your skin. The bulge will decrease in size over the next few weeks. Please notify the doctor's office if you notice any of the following around your ICD site:   A.  A bruise that does not go away. B.  Soreness or yellow, green, or brown drainage from the site. C. Any swelling from the site. D. If you have a fever of 100 degrees or higher that lasts for a few days. INCISION CARE       1.  Leave the dressing over your site until your follow up visit. 2.  You may not shower until after follow up visit. 3.  For comfort, wear loose fitting clothing. 4.  Ice pack to affected shoulder for first 24 hours, wear your sling for 2 days.   5.  Report any signs of infection, fever, pain, swelling, redness, oozing, or heat at site especially if these symptoms increase after the first 3 to 4 days. ACTIVITY PRECAUTIONS     1. Avoid rough contact with the implant site. 2. No driving for 14 days. 3. Avoid lifting your arm over your head, carrying anything on the affected side, or lifting over 10 pounds for 90 days. For the first 2 days only bend your arm at the elbow. 4. Any extreme activity such as golf, weight lifting or exercise biking should be restricted for 60 days. 5. Do not carry objects by holding them against your implant site. 6.  No shooting rifles or any type of gun with the affected shoulder permanently. 7.  Welding and chainsaws are prohibited. SPECIAL PRECAUTIONS     1. You should avoid all strong magnetic fields, such as arc welding, large transformers, large motors. Some ICD devices will beep if it detects a strong magnet. If this occurs, move out of the area. 2.  You may or may not have an MRI which uses a strong magnet to take pictures. 3.  Treatments or surgery that requires diathermy or electrocautery should be discussed with your doctor before scheduled. 4.  Avoid radio frequency transmitters, including radar. 5.  Advise dentist or other medical personnel you see that you have an ICD. 6.  Cell phones and microwave oven use is okay. 7.  If you plan to move or take a trip to a new area, the doctor's office will give you a name of a doctor to contact for any problems. SPECIAL INSTRUCTIONS ON SHOCKS     1. Notify your doctor for any of the following:       A. Anytime a shock is received in a 24 hour period. An office visit is not usually required for a single shock. B.  Two or more shocks in a row. If you do not feel well, call the [facility], otherwise call your doctor. This may require an office visit. C. Two or more shocks spaced apart by several hours. This may require an office visit.   2.  Keep a record of events. Include date, time, symptoms and activity at that time. ANTIBIOTIC THERAPY    During the first 8 weeks after your ICD insertion, you may need antibiotics before any dental work or certain tests or operations. Let the dentist or doctor who is caring for you know that you have had an implanted device.

## 2022-12-14 NOTE — ANESTHESIA POSTPROCEDURE EVALUATION
Post-Anesthesia Evaluation and Assessment    Patient: Genna Garcia MRN: [id number]  SSN: [id number]    YOB: 1950  Age: 67 y.o. Sex: female      I have evaluated the patient and they are stable and ready for discharge from the PACU. Cardiovascular Function/Vital Signs  Visit Vitals  /71 (BP 1 Location: Right upper arm, BP Patient Position: Supine)   Pulse 75   Temp 36 °C (96.8 °F)   Resp 17   Ht 5' 5\" (1.651 m)   Wt 98 kg (216 lb)   SpO2 95%   Breastfeeding No   BMI 35.94 kg/m²       Patient is status post MAC anesthesia for Procedure(s):  INSERT ICD BIV MULTI. Nausea/Vomiting: None    Postoperative hydration reviewed and adequate. Pain:  Pain Scale 1: Numeric (0 - 10) (12/14/22 0945)  Pain Intensity 1: 8 (12/14/22 0945)   Managed    Neurological Status: At baseline    Mental Status, Level of Consciousness: Alert and  oriented to person, place, and time    Pulmonary Status:   O2 Device: None (Room air) (12/14/22 1000)   Adequate oxygenation and airway patent    Complications related to anesthesia: None    Post-anesthesia assessment completed.  No concerns    Signed By: Ivan Roberts MD     December 14, 2022

## 2022-12-14 NOTE — PROGRESS NOTES
TRANSFER - IN REPORT:    Verbal report received from Lisa MOHR and Compa Alberto on Genna Garcia  being received from EP Lab for routine progression of care. Report consisted of patients Situation, Background, Assessment and Recommendations(SBAR). Information from the following report(s) SBAR was reviewed with the receiving clinician. Opportunity for questions and clarification was provided. Assessment completed upon patients arrival to [address] [place] [address] and care assumed. Cardiac Cath Lab Recovery Arrival Note:    Genna Garcia arrived to [facility] recovery area. Patient procedure= BiVCD. Patient on cardiac monitor, non-invasive blood pressure, SPO2 monitor. On 2L via NC.  IV  of NS on pump at [facility] ml/hr. Patient status doing well without problems. Patient is A&Ox 4. Patient reports pain at incision site. PROCEDURE SITE CHECK:    Procedure site:without any bleeding and small hematoma noted, pressure dressing applied MD aware. Pain/discomfort reported at procedure site. No change in patient status. Continue to monitor patient and status.

## 2022-12-14 NOTE — PROGRESS NOTES
I have reviewed discharge instructions with the patient and spouse. The patient and spouse verbalized understanding. Given sling and ice pack to patient. Ambulated in hallway without difficulty, no complaint voiced. PIV removed. Patient escorted to car by RN via wheelchair. e

## 2022-12-14 NOTE — Clinical Note
TRANSFER - OUT REPORT:     Verbal report given to: recovery. Report consisted of patient's Situation, Background, Assessment and   Recommendations(SBAR). Opportunity for questions and clarification was provided. Patient transported with a Registered Nurse and [address] [place] [address] / [facility] [employer]. Patient transported to: recovery.

## 2022-12-15 ENCOUNTER — HOME CARE VISIT (OUTPATIENT)
Dept: SCHEDULING | Facility: HOME HEALTH | Age: 72
End: 2022-12-15
Payer: MEDICARE

## 2022-12-15 PROCEDURE — G0151 HHCP-SERV OF PT,EA 15 MIN: HCPCS

## 2022-12-16 VITALS
DIASTOLIC BLOOD PRESSURE: 68 MMHG | BODY MASS INDEX: 36.11 KG/M2 | RESPIRATION RATE: 16 BRPM | TEMPERATURE: 97.8 F | SYSTOLIC BLOOD PRESSURE: 131 MMHG | HEART RATE: 75 BPM | OXYGEN SATURATION: 98 % | WEIGHT: 217 LBS

## 2022-12-29 ENCOUNTER — CLINICAL SUPPORT (OUTPATIENT)
Dept: INTERNAL MEDICINE CLINIC | Age: 72
End: 2022-12-29
Payer: MEDICARE

## 2022-12-29 VITALS
HEART RATE: 78 BPM | RESPIRATION RATE: 16 BRPM | SYSTOLIC BLOOD PRESSURE: 85 MMHG | TEMPERATURE: 98.8 F | OXYGEN SATURATION: 96 % | DIASTOLIC BLOOD PRESSURE: 47 MMHG

## 2022-12-29 DIAGNOSIS — Z23 NEEDS FLU SHOT: Primary | ICD-10-CM

## 2022-12-29 PROCEDURE — 90694 VACC AIIV4 NO PRSRV 0.5ML IM: CPT | Performed by: INTERNAL MEDICINE

## 2022-12-29 NOTE — PROGRESS NOTES
Identified pt with two pt identifiers(name and ). Reviewed record in preparation for visit and have obtained necessary documentation. Chief Complaint   Patient presents with    Immunization/Injection     Flu        Vitals:    22 1355 22 1407   BP: (!) 92/54 (!) 85/47   Pulse: 78    Resp: 16    Temp: 98.8 °F (37.1 °C)    TempSrc: Temporal    SpO2: 96%      The patient has a follow up appointment with Aakash Kahn today re:pacemaker. I gave the patient her VS reading to take with her to the appt.  was made aware.     Franklyn Amador, [address] [facility]  [address] [facility], [address] [place] [address] [address] [place] [address]  W: [phone]  F: [phone]

## 2023-01-10 ENCOUNTER — OFFICE VISIT (OUTPATIENT)
Dept: INTERNAL MEDICINE CLINIC | Age: 73
End: 2023-01-10
Payer: MEDICARE

## 2023-01-10 VITALS — HEIGHT: 65 IN | WEIGHT: 223 LBS | BODY MASS INDEX: 37.15 KG/M2

## 2023-01-10 DIAGNOSIS — F31.70 BIPOLAR AFFECTIVE DISORDER IN REMISSION (HCC): ICD-10-CM

## 2023-01-10 DIAGNOSIS — E55.9 VITAMIN D DEFICIENCY: ICD-10-CM

## 2023-01-10 DIAGNOSIS — R73.9 HYPERGLYCEMIA: ICD-10-CM

## 2023-01-10 DIAGNOSIS — R60.0 LOWER EXTREMITY EDEMA: Primary | ICD-10-CM

## 2023-01-10 DIAGNOSIS — I42.9 CARDIOMYOPATHY, UNSPECIFIED TYPE (HCC): ICD-10-CM

## 2023-01-10 DIAGNOSIS — I48.0 PAF (PAROXYSMAL ATRIAL FIBRILLATION) (HCC): ICD-10-CM

## 2023-01-10 DIAGNOSIS — E66.01 SEVERE OBESITY (BMI 35.0-39.9) WITH COMORBIDITY (HCC): ICD-10-CM

## 2023-01-10 DIAGNOSIS — I10 BENIGN ESSENTIAL HYPERTENSION: ICD-10-CM

## 2023-01-10 DIAGNOSIS — E03.9 HYPOTHYROIDISM, UNSPECIFIED TYPE: ICD-10-CM

## 2023-01-10 DIAGNOSIS — Z78.0 POSTMENOPAUSAL: ICD-10-CM

## 2023-01-10 DIAGNOSIS — E53.8 B12 DEFICIENCY: ICD-10-CM

## 2023-01-10 PROCEDURE — G0463 HOSPITAL OUTPT CLINIC VISIT: HCPCS | Performed by: INTERNAL MEDICINE

## 2023-01-10 PROCEDURE — G8417 CALC BMI ABV UP PARAM F/U: HCPCS | Performed by: INTERNAL MEDICINE

## 2023-01-10 PROCEDURE — G8536 NO DOC ELDER MAL SCRN: HCPCS | Performed by: INTERNAL MEDICINE

## 2023-01-10 PROCEDURE — G8427 DOCREV CUR MEDS BY ELIG CLIN: HCPCS | Performed by: INTERNAL MEDICINE

## 2023-01-10 PROCEDURE — G8400 PT W/DXA NO RESULTS DOC: HCPCS | Performed by: INTERNAL MEDICINE

## 2023-01-10 PROCEDURE — 1101F PT FALLS ASSESS-DOCD LE1/YR: CPT | Performed by: INTERNAL MEDICINE

## 2023-01-10 PROCEDURE — G9717 DOC PT DX DEP/BP F/U NT REQ: HCPCS | Performed by: INTERNAL MEDICINE

## 2023-01-10 PROCEDURE — 99214 OFFICE O/P EST MOD 30 MIN: CPT | Performed by: INTERNAL MEDICINE

## 2023-01-10 PROCEDURE — 3017F COLORECTAL CA SCREEN DOC REV: CPT | Performed by: INTERNAL MEDICINE

## 2023-01-10 PROCEDURE — 1090F PRES/ABSN URINE INCON ASSESS: CPT | Performed by: INTERNAL MEDICINE

## 2023-01-10 NOTE — PROGRESS NOTES
PROGRESS NOTE  Name: Jennifer Hilliard   : 1950       ASSESSMENT/ PLAN:     Cellulitis: Resolved. LE edema: Continue wraps and lasix. Refer to lymphedema clinic. Hypothyroidism: Clinically euthyroid. Continue synthroid. Recheck labs periodically. Atrial Fibrillation: F/U as per Dr. Ashly Anguiano.  On eliquis. HLP:  Continue OTC fish oil. Also encouraged lifestyle efforts. Migraines: She has been seeing neurologist, Dr. Jinny Whitfield (formerly Dr. Karma Cardoso). Has prn triptan, and topamax, and prn oxycodone. BPAD/Anxiety/Stress. Continue psychiatry follow up. Wilfred Mishra, psychology. Urge incontinence: Ongoing. Continue f/u with Dr. Eva Mancia. Epigastric burning / GERD: On Prilosec OTC. Follows with GI. Hyperglycemia: A1C normal now. She has in the past met criteria for DM (fasting glc > 125 on more than 1 occasion). For now, work on diet and exercise. Memory disturbance: Referred previously to psych, Dr. Marilyn Sanders. Follow up in 6 months    I have reviewed the patient's medications and risks/side effects/benefits were discussed. Diagnosis(-es) explained to patient and questions answered. Literature provided where appropriate. SUBJECTIVE:   Ms. Jennifer Hilliard is a 75 y.o. female who is here for follow up of routine medical issues. Chief Complaint   Patient presents with    Follow-up     6 month fu       She has ongoing severe problem with edema. \"I take lasix and pee all the time. \" She uses ACE wraps. \"The pain is terrible. \"     Her cellulitis has resolved. She is seeing Dr. Chandrakant Perales for L knee DJD; has had cortisone shot. Plans to see him next week. Memory: She had memory testing with Dr. Marilyn Sanders. He found that she has mild cognitive impairment, but not dementia. She also sees psychiatrist Merly Nick in Dr. Chivo Thrasher office, psychiatrist, and is on lithium. Her therapist is Wilfred Mishra. Migraines: Sometimes has aura without headache.   Had been having a frequent unilateral HA, associated with photophobia and phonophobia. Tried botox injections in scalp, for migraines--\"it didn't work. \" She had been treated by Dr. Gama Tang, neurologist, with meds including topamax and hydrocodone. Just about the only thing that has worked for her for abortive therapy is hydrocodone. She is still getting some PT, due to some leg weakness. For the foot pain, this is ongoing. Dr. Lawrence Getting currently. \"I've seen four foot doctors and none of them recommended surgery. So far, nothing worked. I hobble around. \" We note from 2018: Dr. Suri Ayers is seeing her for her foot pain. Her L foot is in a boot, and \"I have several torn tendons. \"      A fib:  She has had two EP studies in 2014, and ablation for a fib, with Dr. Tess Lamas.    She was seeing Dr. Iza Vo for GI issues/GERD. On PPI. Blood testing has revealed allergies to pork, beef, egg white, milk. She had been referred by [facility] in Dr. Kayla Keita office, to go see an allergist.  She has found this month that she tolerates pork. Weight: 240 in the summer. Wt Readings from Last 3 Encounters:   01/10/23 223 lb (101.2 kg)   12/15/22 217 lb (98.4 kg)   12/14/22 216 lb (98 kg)       Insomnia is a little better. ASHELY no longer on CPAP. Has trouble tolerating the mask--takes it off early in the morning. She no longer gets botox injections into bladder--This was working; plans to go back in Jan.  She is not having much urinary incontinence. Dr. Narendra Herrera sees her. At this time, she is otherwise doing well and has brought no other complaints to my attention today. For a list of the medical issues addressed today, see the assessment and plan below.     PMH:   Past Medical History:   Diagnosis Date    Arrhythmia 2014    atrial fibrillation 2014, sick sinus syndrome: [facility]    Arthritis     Rheumatoid    Bipolar affect, depressed ([facility]     Bipolar affective disorder ([facility] 3/31/2010    Chronic pain 2018    right shoulder    Colon polyps 03/31/2010    also 5/2018: colon polyps    Depression     Diarrhea 07/19/2013    D/t alpha gal allergy says patient    Epigastric pain 6/5/2015    GERD (gastroesophageal reflux disease)     occasiional only; controlled with med    Hemispheric carotid artery syndrome No stroke    Neuro: Jose Maria Purdy  (CT scan head/neck negative 4/2018 in [facility])    Hyperlipidemia 3/31/2010    Hypothyroidism 3/31/2010    Long term current use of anticoagulant therapy     Lupus ([facility]     patient denies-says she has RA    Migraine 03/31/2010    has them x2 a month: last one 5/23/2018    Psychotic disorder ([facility]     S/P ablation of atrial fibrillation 05/13/2014    S/P cardiac pacemaker procedure 9/16/2014 9/16/14 Medtronic MRI compatible dual chamber pacemaker implant    Sleep apnea     unable to tolerate CPAP    Stool color black     Thyroid disease     Tick-borne disease     Alpha Gal allergy: no eat pork, beef, milk (Meat allergy showed up on a allergy test)    Umbilical hernia without obstruction and without gangrene 6/22/2020    Urge incontinence 3/31/2010    Vitamin D deficiency 3/31/2015       Past Surgical History:   Procedure Laterality Date    COLONOSCOPY N/A 4/27/2018    COLONOSCOPY performed by William Huber MD at [facility] AMBULATORY OR    COLONOSCOPY N/A 11/25/2020    COLONOSCOPY, EGD performed by Silvina Ríos MD at [address] [place] [address]  4/27/2018         [facility]  4/27/2018         HX BUNIONECTOMY      HX GI  2010, 5/2018    egd, colonoscopy    HX HERNIA REPAIR  01/11/2021    HX HERNIA REPAIR  01/2021    HX HERNIA REPAIR  94/5420    4 umbillical     HX HYSTERECTOMY      HX ORTHOPAEDIC  2000's    removed bone spurs from R & L hand    HX ORTHOPAEDIC  1990s?     left bunionectomy     HX OTHER SURGICAL  07/2014    2 shocks to heart & ablations    HX OTHER SURGICAL  01/11/2021    incarcerated incisional hernia surgery    HX PACEMAKER  2014    On the right side    HX PACEMAKER PLACEMENT  2015    HX SHOULDER REPLACEMENT Right 2017    HX TONSILLECTOMY  15 yrs old    MS UNLISTED PROCEDURE CARDIAC SURGERY  5/2014    ablation       All: She is allergic to latex, beef containing products, ciprofloxacin, pork derived (porcine), bovine cartilage, milk, sulfa (sulfonamide antibiotics), and xarelto [rivaroxaban]. Meds:    Current Outpatient Medications on File Prior to Visit   Medication Sig Dispense Refill    furosemide (LASIX) 40 mg tablet Take 1 Tablet by mouth two (2) times a day. 60 Tablet 11    fluocinoNIDE (LIDEX) 0.05 % topical cream APPLY TO AFFECTED AREA TWICE A DAY 60 g 0    dimethicone/colloidal oatmeal (DAILY MOISTURIZING LOTION EX) Apply 1 Film to affected area as needed. OTHER Take 4 Billion Cells by mouth daily. Natures Bounty Probiotic Gummies 2 gummies by mouth daily      acetaminophen (TYLENOL) 500 mg tablet Take 500 mg by mouth every six (6) hours as needed for Fever or Pain. Take 1 tablet by mouth every 6 hours prn moderate pain      zinc 50 mg tab tablet Take 50 mg by mouth daily. Zinc is on hold while on antibiotic, may resume after completion       pregabalin (LYRICA) 200 mg capsule Take 200 mg by mouth two (2) times a day. topiramate (TOPAMAX) 100 mg tablet Take 100 mg by mouth nightly. propranolol LA (INDERAL LA) 60 mg SR capsule Take 60 mg by mouth daily. zolpidem (AMBIEN) 10 mg tablet Take 10 mg by mouth nightly as needed for Sleep. Entresto 24-26 mg tablet Take 1 Tablet by mouth two (2) times a day. ergocalciferol (ERGOCALCIFEROL) 1,250 mcg (50,000 unit) capsule TAKE 1 CAPSULE BY MOUTH ONE TIME PER WEEK 12 Capsule 1    esomeprazole (NEXIUM) 40 mg capsule Take 40 mg by mouth daily. levothyroxine (SYNTHROID) 50 mcg tablet TAKE 1 TABLET BY MOUTH EVERY DAY 90 Tablet 3    apixaban (ELIQUIS) 5 mg tablet Take 1 Tablet by mouth two (2) times a day.  56 Tablet 0    lithium carbonate SR (LITHOBID) 300 mg CR tablet Take 900 mg by mouth nightly. cyanocobalamin (VITAMIN B12) 100 mcg tablet Take 100 mcg by mouth daily. timolol (TIMOPTIC) 0.5 % ophthalmic solution INSTILL 1 DROP 1 TIME EVERY DAY INTO BOTH EYES EVERY MORNING      hydrOXYchloroQUINE (PLAQUENIL) 200 mg tablet Take 200 mg by mouth two (2) times a day. latanoprost (XALATAN) 0.005 % ophthalmic solution Administer 1 Drop to both eyes two (2) times a day. ALPRAZolam (XANAX) 0.5 mg tablet Take 0.5 mg by mouth nightly. cephALEXin (KEFLEX) 250 mg capsule Take 250 mg by mouth four (4) times daily. Dr. Cyril Cleveland [facility] ZEPHY\[facility]\"") (Patient not taking: No sig reported)      omeprazole (PRILOSEC) 40 mg capsule TAKE 1 CAPSULE BY MOUTH EVERY DAY IN THE MORNING (Patient not taking: No sig reported)       No current facility-administered medications on file prior to visit. 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 FH:  Father, colon cancer. Mother, stroke. Grandmother, DM.  ROS: See above; Complete ROS otherwise negative. OBJECTIVE:   Vitals:   Visit Vitals  Ht 5' 5\" (1.651 m)   Wt 223 lb (101.2 kg)   BMI 37.11 kg/m²     Gen: Pleasant 75 y.o.  female in NAD. HEENT: PERRLA. EOMI. OP moist and pink. Neck: Supple. No LAD. HEART: RRR, No M/G/R.   LUNGS: CTAB No W/R. ABDOMEN: S, NT, ND, BS+. EXTREMITIES: Warm. No C/C/E. MUSCULOSKELETAL: Normal ROM, muscle strength 5/5 all groups. NEURO: Alert and oriented x 3. Cranial nerves grossly intact. No focal sensory or motor deficits noted. SKIN: Warm. Dry. No rashes or other lesions noted.       Lab Results   Component Value Date/Time    Sodium 146 (H) 10/21/2022 02:46 AM    Potassium 4.2 10/21/2022 02:46 AM    Chloride 117 (H) 10/21/2022 02:46 AM    CO2 24 10/21/2022 02:46 AM    Anion gap 5 10/21/2022 02:46 AM    Glucose 98 10/21/2022 02:46 AM    BUN 12 10/21/2022 02:46 AM    Creatinine 0.60 10/21/2022 02:46 AM    BUN/Creatinine ratio 20 10/21/2022 02:46 AM    GFR est AA 74 02/11/2022 09:29 AM    GFR est non-AA 65 02/11/2022 09:29 AM    Calcium 9.2 10/21/2022 02:46 AM    Bilirubin, total 0.7 10/18/2022 04:00 AM    ALT (SGPT) 15 10/18/2022 04:00 AM    Alk.  phosphatase 65 10/18/2022 04:00 AM    Protein, total 5.2 (L) 10/18/2022 04:00 AM    Albumin 2.5 (L) 10/18/2022 04:00 AM    Globulin 2.7 10/18/2022 04:00 AM    A-G Ratio 0.9 (L) 10/18/2022 04:00 AM       Lab Results   Component Value Date/Time    Cholesterol, total 198 02/11/2022 09:29 AM    HDL Cholesterol 53 02/11/2022 09:29 AM    LDL,Direct 124 (H) 03/30/2010 08:40 AM    LDL, calculated 121 (H) 02/11/2022 09:29 AM    LDL, calculated 125 (H) 11/06/2018 08:19 AM    Triglyceride 134 02/11/2022 09:29 AM    CHOL/HDL Ratio 5.3 (H) 03/30/2010 08:40 AM        Lab Results   Component Value Date/Time    Hemoglobin A1c 4.9 02/11/2022 09:29 AM       Lab Results   Component Value Date/Time    WBC 4.5 10/19/2022 05:35 AM    HGB 11.4 (L) 10/19/2022 05:35 AM    HCT 35.3 10/19/2022 05:35 AM    PLATELET 244 (L) 36/56/2836 05:35 AM    MCV 98.1 10/19/2022 05:35 AM

## 2023-01-10 NOTE — PROGRESS NOTES
1. \"Have you been to the ER, urgent care clinic since your last visit? Hospitalized since your last visit? Yes 10/17/2022 cellulitis     2. \"Have you seen or consulted any other health care providers outside of the [address] [place] [address] since your last visit? \" No     3. For patients aged 39-70: Has the patient had a colonoscopy / FIT/ Cologuard? Yes - no Care Gap present      If the patient is female:    4. For patients aged 41-77: Has the patient had a mammogram within the past 2 years? NA - based on age or sex      11. For patients aged 21-65: Has the patient had a pap smear?  NA - based on age or sex

## 2023-01-19 DIAGNOSIS — R52 PAIN: Primary | ICD-10-CM

## 2023-01-19 NOTE — TELEPHONE ENCOUNTER
----- Message from Salo Johnson sent at 1/19/2023 12:09 PM EST -----  Subject: Refill Request    QUESTIONS  Name of Medication? HYDROcodone-acetaminophen (NORCO) 5-325 mg per tablet  Patient-reported dosage and instructions? 1x every 12 hours   How many days do you have left? 0  Preferred Pharmacy? [id number]  Pharmacy phone number (if available)? [phone]  Additional Information for Provider? Patient has been having migraines,   says the current will last 3-4 days. ---------------------------------------------------------------------------  --------------  Chapis Winn INFO  What is the best way for the office to contact you? OK to leave message on   voicemail  Preferred Call Back Phone Number? [phone]  ---------------------------------------------------------------------------  --------------  SCRIPT ANSWERS  Relationship to Patient?  Self

## 2023-01-19 NOTE — TELEPHONE ENCOUNTER
Future Appointments:  Future Appointments   Date Time Provider Radha Yoon   3/7/2023  2:00 PM [id number] [facility]   6/5/2023  1:30 PM Jasson Chapa, GIA [facility]MPHREYS BS AMB        Last Appointment With Me:  1/10/2023     Requested Prescriptions     Pending Prescriptions Disp Refills    HYDROcodone-acetaminophen (NORCO) 5-325 mg per tablet 30 Tablet 0     Sig: Take 1 Tablet by mouth every twelve (12) hours for 3 days. Max Daily Amount: 2 Tablets.

## 2023-01-20 RX ORDER — HYDROCODONE BITARTRATE AND ACETAMINOPHEN 5; 325 MG/1; MG/1
1 TABLET ORAL EVERY 12 HOURS
Qty: 30 TABLET | Refills: 0 | Status: SHIPPED | OUTPATIENT
Start: 2023-01-20 | End: 2023-01-23

## 2023-01-30 ENCOUNTER — HOSPITAL ENCOUNTER (INPATIENT)
Age: 73
LOS: 4 days | Discharge: HOME HEALTH | End: 2023-02-03
Attending: EMERGENCY MEDICINE | Admitting: INTERNAL MEDICINE
Payer: MEDICARE

## 2023-01-30 ENCOUNTER — APPOINTMENT (OUTPATIENT)
Dept: CT IMAGING | Age: 73
End: 2023-01-30
Attending: EMERGENCY MEDICINE
Payer: MEDICARE

## 2023-01-30 DIAGNOSIS — T56.891A LITHIUM TOXICITY, ACCIDENTAL OR UNINTENTIONAL, INITIAL ENCOUNTER: Primary | ICD-10-CM

## 2023-01-30 LAB
ALBUMIN SERPL-MCNC: 3.5 G/DL (ref 3.5–5)
ALBUMIN/GLOB SERPL: 1.2 (ref 1.1–2.2)
ALP SERPL-CCNC: 102 U/L (ref 45–117)
ALT SERPL-CCNC: 23 U/L (ref 12–78)
ANION GAP SERPL CALC-SCNC: 2 MMOL/L (ref 5–15)
APPEARANCE UR: CLEAR
AST SERPL-CCNC: 18 U/L (ref 15–37)
BACTERIA URNS QL MICRO: NEGATIVE /HPF
BASOPHILS # BLD: 0.1 K/UL (ref 0–0.1)
BASOPHILS NFR BLD: 1 % (ref 0–1)
BILIRUB SERPL-MCNC: 0.4 MG/DL (ref 0.2–1)
BILIRUB UR QL: NEGATIVE
BUN SERPL-MCNC: 38 MG/DL (ref 6–20)
BUN/CREAT SERPL: 38 (ref 12–20)
CALCIUM SERPL-MCNC: 9.9 MG/DL (ref 8.5–10.1)
CHLORIDE SERPL-SCNC: 104 MMOL/L (ref 97–108)
CO2 SERPL-SCNC: 32 MMOL/L (ref 21–32)
COLOR UR: NORMAL
CREAT SERPL-MCNC: 0.99 MG/DL (ref 0.55–1.02)
DATE LAST DOSE: ABNORMAL
DIFFERENTIAL METHOD BLD: NORMAL
EOSINOPHIL # BLD: 0.3 K/UL (ref 0–0.4)
EOSINOPHIL NFR BLD: 6 % (ref 0–7)
EPITH CASTS URNS QL MICRO: NORMAL /LPF
ERYTHROCYTE [DISTWIDTH] IN BLOOD BY AUTOMATED COUNT: 13.4 % (ref 11.5–14.5)
GLOBULIN SER CALC-MCNC: 3 G/DL (ref 2–4)
GLUCOSE SERPL-MCNC: 101 MG/DL (ref 65–100)
GLUCOSE UR STRIP.AUTO-MCNC: NEGATIVE MG/DL
HCT VFR BLD AUTO: 36.4 % (ref 35–47)
HGB BLD-MCNC: 11.9 G/DL (ref 11.5–16)
HGB UR QL STRIP: NEGATIVE
IMM GRANULOCYTES # BLD AUTO: 0 K/UL (ref 0–0.04)
IMM GRANULOCYTES NFR BLD AUTO: 0 % (ref 0–0.5)
KETONES UR QL STRIP.AUTO: NEGATIVE MG/DL
LEUKOCYTE ESTERASE UR QL STRIP.AUTO: NEGATIVE
LITHIUM SERPL-SCNC: 2.02 MMOL/L (ref 0.6–1.2)
LYMPHOCYTES # BLD: 1.6 K/UL (ref 0.8–3.5)
LYMPHOCYTES NFR BLD: 28 % (ref 12–49)
MCH RBC QN AUTO: 30.7 PG (ref 26–34)
MCHC RBC AUTO-ENTMCNC: 32.7 G/DL (ref 30–36.5)
MCV RBC AUTO: 93.8 FL (ref 80–99)
MONOCYTES # BLD: 0.5 K/UL (ref 0–1)
MONOCYTES NFR BLD: 9 % (ref 5–13)
NEUTS SEG # BLD: 3.1 K/UL (ref 1.8–8)
NEUTS SEG NFR BLD: 56 % (ref 32–75)
NITRITE UR QL STRIP.AUTO: NEGATIVE
NRBC # BLD: 0 K/UL (ref 0–0.01)
NRBC BLD-RTO: 0 PER 100 WBC
PH UR STRIP: 6 (ref 5–8)
PLATELET # BLD AUTO: 166 K/UL (ref 150–400)
PMV BLD AUTO: 11 FL (ref 8.9–12.9)
POTASSIUM SERPL-SCNC: 3.5 MMOL/L (ref 3.5–5.1)
PROT SERPL-MCNC: 6.5 G/DL (ref 6.4–8.2)
PROT UR STRIP-MCNC: NEGATIVE MG/DL
RBC # BLD AUTO: 3.88 M/UL (ref 3.8–5.2)
RBC #/AREA URNS HPF: NORMAL /HPF (ref 0–5)
REPORTED DOSE,DOSE: ABNORMAL UNITS
REPORTED DOSE/TIME,TMG: ABNORMAL
SODIUM SERPL-SCNC: 138 MMOL/L (ref 136–145)
SP GR UR REFRACTOMETRY: 1.01
UA: UC IF INDICATED,UAUC: NORMAL
UROBILINOGEN UR QL STRIP.AUTO: 0.2 EU/DL (ref 0.2–1)
WBC # BLD AUTO: 5.6 K/UL (ref 3.6–11)
WBC URNS QL MICRO: NORMAL /HPF (ref 0–4)

## 2023-01-30 PROCEDURE — 74011250636 HC RX REV CODE- 250/636: Performed by: EMERGENCY MEDICINE

## 2023-01-30 PROCEDURE — 74011250637 HC RX REV CODE- 250/637: Performed by: INTERNAL MEDICINE

## 2023-01-30 PROCEDURE — 65270000046 HC RM TELEMETRY

## 2023-01-30 PROCEDURE — 74011000250 HC RX REV CODE- 250: Performed by: INTERNAL MEDICINE

## 2023-01-30 PROCEDURE — 93005 ELECTROCARDIOGRAM TRACING: CPT

## 2023-01-30 PROCEDURE — 85027 COMPLETE CBC AUTOMATED: CPT

## 2023-01-30 PROCEDURE — 80178 ASSAY OF LITHIUM: CPT

## 2023-01-30 PROCEDURE — 74011250636 HC RX REV CODE- 250/636: Performed by: INTERNAL MEDICINE

## 2023-01-30 PROCEDURE — 99285 EMERGENCY DEPT VISIT HI MDM: CPT

## 2023-01-30 PROCEDURE — 96360 HYDRATION IV INFUSION INIT: CPT

## 2023-01-30 PROCEDURE — 83735 ASSAY OF MAGNESIUM: CPT

## 2023-01-30 PROCEDURE — 85025 COMPLETE CBC W/AUTO DIFF WBC: CPT

## 2023-01-30 PROCEDURE — 81001 URINALYSIS AUTO W/SCOPE: CPT

## 2023-01-30 PROCEDURE — 36415 COLL VENOUS BLD VENIPUNCTURE: CPT

## 2023-01-30 PROCEDURE — 96361 HYDRATE IV INFUSION ADD-ON: CPT

## 2023-01-30 PROCEDURE — 70450 CT HEAD/BRAIN W/O DYE: CPT

## 2023-01-30 PROCEDURE — 80053 COMPREHEN METABOLIC PANEL: CPT

## 2023-01-30 RX ORDER — LEVOTHYROXINE SODIUM 50 UG/1
50 TABLET ORAL DAILY
Status: DISCONTINUED | OUTPATIENT
Start: 2023-01-31 | End: 2023-02-03 | Stop reason: HOSPADM

## 2023-01-30 RX ORDER — HYDROXYCHLOROQUINE SULFATE 200 MG/1
200 TABLET, FILM COATED ORAL 2 TIMES DAILY
Status: DISCONTINUED | OUTPATIENT
Start: 2023-01-30 | End: 2023-02-03 | Stop reason: HOSPADM

## 2023-01-30 RX ORDER — ACETAMINOPHEN 325 MG/1
650 TABLET ORAL
Status: DISCONTINUED | OUTPATIENT
Start: 2023-01-30 | End: 2023-02-03 | Stop reason: HOSPADM

## 2023-01-30 RX ORDER — TIMOLOL MALEATE 5 MG/ML
1 SOLUTION/ DROPS OPHTHALMIC DAILY
Status: DISCONTINUED | OUTPATIENT
Start: 2023-01-31 | End: 2023-02-03

## 2023-01-30 RX ORDER — PANTOPRAZOLE SODIUM 40 MG/1
40 TABLET, DELAYED RELEASE ORAL
Status: DISCONTINUED | OUTPATIENT
Start: 2023-01-31 | End: 2023-02-03 | Stop reason: HOSPADM

## 2023-01-30 RX ORDER — ONDANSETRON 4 MG/1
4 TABLET, ORALLY DISINTEGRATING ORAL
Status: DISCONTINUED | OUTPATIENT
Start: 2023-01-30 | End: 2023-02-03 | Stop reason: HOSPADM

## 2023-01-30 RX ORDER — FUROSEMIDE 40 MG/1
40 TABLET ORAL 2 TIMES DAILY
Status: DISCONTINUED | OUTPATIENT
Start: 2023-01-30 | End: 2023-02-03 | Stop reason: HOSPADM

## 2023-01-30 RX ORDER — SODIUM CHLORIDE 0.9 % (FLUSH) 0.9 %
5-40 SYRINGE (ML) INJECTION AS NEEDED
Status: DISCONTINUED | OUTPATIENT
Start: 2023-01-30 | End: 2023-02-03 | Stop reason: HOSPADM

## 2023-01-30 RX ORDER — TOPIRAMATE 100 MG/1
100 TABLET, FILM COATED ORAL
Status: DISCONTINUED | OUTPATIENT
Start: 2023-01-30 | End: 2023-02-03 | Stop reason: HOSPADM

## 2023-01-30 RX ORDER — SODIUM CHLORIDE 9 MG/ML
100 INJECTION, SOLUTION INTRAVENOUS CONTINUOUS
Status: DISCONTINUED | OUTPATIENT
Start: 2023-01-30 | End: 2023-02-01

## 2023-01-30 RX ORDER — POLYETHYLENE GLYCOL 3350 17 G/17G
17 POWDER, FOR SOLUTION ORAL DAILY PRN
Status: DISCONTINUED | OUTPATIENT
Start: 2023-01-30 | End: 2023-02-03 | Stop reason: HOSPADM

## 2023-01-30 RX ORDER — PREGABALIN 100 MG/1
200 CAPSULE ORAL 2 TIMES DAILY
Status: DISCONTINUED | OUTPATIENT
Start: 2023-01-30 | End: 2023-02-03 | Stop reason: HOSPADM

## 2023-01-30 RX ORDER — ONDANSETRON 2 MG/ML
4 INJECTION INTRAMUSCULAR; INTRAVENOUS
Status: DISCONTINUED | OUTPATIENT
Start: 2023-01-30 | End: 2023-02-03 | Stop reason: HOSPADM

## 2023-01-30 RX ORDER — UREA 10 %
100 LOTION (ML) TOPICAL DAILY
Status: DISCONTINUED | OUTPATIENT
Start: 2023-01-31 | End: 2023-02-03 | Stop reason: HOSPADM

## 2023-01-30 RX ORDER — SODIUM CHLORIDE 0.9 % (FLUSH) 0.9 %
5-40 SYRINGE (ML) INJECTION EVERY 8 HOURS
Status: DISCONTINUED | OUTPATIENT
Start: 2023-01-30 | End: 2023-02-03 | Stop reason: HOSPADM

## 2023-01-30 RX ORDER — LATANOPROST 50 UG/ML
1 SOLUTION/ DROPS OPHTHALMIC 2 TIMES DAILY
Status: DISCONTINUED | OUTPATIENT
Start: 2023-01-30 | End: 2023-02-03 | Stop reason: HOSPADM

## 2023-01-30 RX ORDER — ACETAMINOPHEN 650 MG/1
650 SUPPOSITORY RECTAL
Status: DISCONTINUED | OUTPATIENT
Start: 2023-01-30 | End: 2023-02-03 | Stop reason: HOSPADM

## 2023-01-30 RX ADMIN — SODIUM CHLORIDE 75 ML/HR: 9 INJECTION, SOLUTION INTRAVENOUS at 18:28

## 2023-01-30 RX ADMIN — PREGABALIN 200 MG: 150 CAPSULE ORAL at 18:36

## 2023-01-30 RX ADMIN — TOPIRAMATE 100 MG: 100 TABLET, FILM COATED ORAL at 22:00

## 2023-01-30 RX ADMIN — SODIUM CHLORIDE, PRESERVATIVE FREE 10 ML: 5 INJECTION INTRAVENOUS at 22:21

## 2023-01-30 RX ADMIN — SODIUM CHLORIDE 1000 ML: 9 INJECTION, SOLUTION INTRAVENOUS at 13:24

## 2023-01-30 RX ADMIN — HYDROXYCHLOROQUINE SULFATE 200 MG: 200 TABLET ORAL at 21:00

## 2023-01-30 NOTE — H&P
Hospitalist Admission Note    NAME: Kaelyn Weiner   :  1950   MRN:  [id number]     Date/Time:  2023 4:20 PM    Patient PCP: Veronica Ledesma MD  ______________________________________________________________________  Given the patient's current clinical presentation, I have a high level of concern for decompensation if discharged from the emergency department. Complex decision making was performed, which includes reviewing the patient's available past medical records, laboratory results, and x-ray films. My assessment of this patient's clinical condition and my plan of care is as follows. Assessment / Plan:  Lithium toxicity  Acute metabolic encephalopathy  -She was told by her psychiatrist that lithium level was elevated at 1.7 and is advised to go to the emergency department  -Her exam is nonfocal  -CT head is pending  -UA is normal  -Stat lithium level ordered. We will check serial lithium levels. She has an ejection fraction of around 20 to 25% so gentle hydration. Start IV fluids with normal saline at 75 mill per hour  -We will follow-up on lithium level and decide on the next steps    Systolic congestive heart failure  Borderline hypotension  -Last echo shows ejection fraction of around 15 to 20%  -Continue Lasix and Entresto but will add holding parameters given borderline low blood pressure  -Caution with IV fluids due to risk of decompensation    Atrial fibrillation on anticoagulation with Eliquis  -Continue Eliquis. Holding propranolol due to borderline low blood pressure    Hypertension  GERD  Hypothyroidism  Bipolar disorder  -Continue Lasix and Entresto with holding parameters.   Holding propranolol  -Continue PPI, levothyroxine  -Holding home lithium due to toxicity    Rheumatoid arthritis  -Continue home Plaquenil    Dr. Russ Orellana will take over patient's care from tomorrow    Code Status: Full code  Surrogate Decision Maker:  carolann    DVT Prophylaxis: Eliquis  GI Prophylaxis: not indicated    Baseline: From home, independent of ADLs      Subjective:   CHIEF COMPLAINT: Altered mental status    HISTORY OF PRESENT ILLNESS:     Paul Lanier is a 75 y.o.  female who presents with past medical history of systolic congestive heart failure, bipolar disorder on lithium is coming the hospital chief complaints of confusion. According to family, patient was in his usual of health until about 3 days ago when she started having confusion. She started having some worsening of lower extremity edema was prescribed increasing doses of diuretics recently. She started acting different and was also refusing feeds and not acting herself in the last 3 days. She had a lithium level drawn and got a call today saying that her lithium level is high at 1.7 and was advised to go to the emergency department for further evaluation. On arrival today, noted to have borderline low blood pressure of 93/44. On labs CBC is normal.  UA is normal.  CMP is normal.  LFTs are normal.  Lithium level is pending. CT head is pending. We were asked to admit for work up and evaluation of the above problems.      Past Medical History:   Diagnosis Date    Arrhythmia 2014    atrial fibrillation 2014, sick sinus syndrome: [facility]    Arthritis     Rheumatoid    Bipolar affect, depressed ([address]     Bipolar affective disorder ([address] 3/31/2010    Chronic pain 2018    right shoulder    Colon polyps 03/31/2010    also 5/2018: colon polyps    Depression     Diarrhea 07/19/2013    D/t alpha gal allergy says patient    Epigastric pain 6/5/2015    GERD (gastroesophageal reflux disease)     occasiional only; controlled with med    Hemispheric carotid artery syndrome No stroke    Neuro: [facility] Asif  (CT scan head/neck negative 4/2018 in [facility])    Hyperlipidemia 3/31/2010    Hypothyroidism 3/31/2010    Long term current use of anticoagulant therapy     Lupus ([address]     patient denies-says she has RA    Migraine 03/31/2010    has them x2 a month: last one 5/23/2018    Psychotic disorder (HCC)     S/P ablation of atrial fibrillation 05/13/2014    S/P cardiac pacemaker procedure 9/16/2014 9/16/14 Med[employer] MRI compatible dual chamber pacemaker implant    Sleep apnea     unable to tolerate CPAP    Stool color black     Thyroid disease     Tick-borne disease     Alpha Gal allergy: no eat pork, beef, milk (Meat allergy showed up on a allergy test)    Umbilical hernia without obstruction and without gangrene 6/22/2020    Urge incontinence 3/31/2010    Vitamin D deficiency 3/31/2015        Past Surgical History:   Procedure Laterality Date    COLONOSCOPY N/A 4/27/2018    COLONOSCOPY performed by Melanie Mckeon MD at [facility] OR    COLONOSCOPY N/A 11/25/2020    COLONOSCOPY, EGD performed by Yolanda Medina MD at [address]  4/27/2018         Laisha Moleatha  4/27/2018         HX BUNIONECTOMY      HX GI  2010, 5/2018    egd, colonoscopy    HX HERNIA REPAIR  01/11/2021    HX HERNIA REPAIR  01/2021    HX HERNIA REPAIR  08/6585    4 umbillical     HX HYSTERECTOMY      HX ORTHOPAEDIC  2000's    removed bone spurs from [employer] hand    HX ORTHOPAEDIC  1990s?     left bunionectomy     HX OTHER SURGICAL  07/2014    2 shocks to heart & ablations    HX OTHER SURGICAL  01/11/2021    incarcerated incisional hernia surgery    HX PACEMAKER  2014    On the right side    HX PACEMAKER PLACEMENT  2015    HX SHOULDER REPLACEMENT Right 2017    HX TONSILLECTOMY  15 yrs old    HI UNLISTED PROCEDURE CARDIAC SURGERY  5/2014    ablation       Social History     Tobacco Use    Smoking status: Never    Smokeless tobacco: Never   Substance Use Topics    Alcohol use: Not Currently     Alcohol/week: 1.0 standard drink     Types: 1 Cans of beer per week        Family History   Problem Relation Age of Onset    Arrhythmia Mother         afib    Stroke Mother     Heart Failure Mother     Colon Cancer Father     Heart Disease Father     Cancer Father         colon cancer    Arrhythmia Brother         afib    Asthma Brother     COPD Brother      Allergies   Allergen Reactions    Latex Swelling     Swelling of lips says patient    Beef Containing Products Nausea and Vomiting     diarrhea    Ciprofloxacin Unknown (comments)    Pork Derived (Porcine) Nausea and Vomiting     diarrhea    Bovine Cartilage Nausea and Vomiting     diarrhea    Milk Nausea and Vomiting     diarrhea  diarrhea    Sulfa (Sulfonamide Antibiotics) Hives    Xarelto [Rivaroxaban] Other (comments)     GI problems        Prior to Admission medications    Medication Sig Start Date End Date Taking? Authorizing Provider   furosemide (LASIX) 40 mg tablet Take 1 Tablet by mouth two (2) times a day. 12/9/22   Homa Monson MD   fluocinoNIDE (LIDEX) 0.05 % topical cream APPLY TO AFFECTED AREA TWICE A DAY 11/24/22   Homa Monson MD   dimethicone/colloidal oatmeal (DAILY MOISTURIZING LOTION EX) Apply 1 Film to affected area as needed. Provider, Historical   OTHER Take 4 Billion Cells by mouth daily. Natures Bounty Probiotic Gummies 2 gummies by mouth daily    Provider, Historical   acetaminophen (TYLENOL) 500 mg tablet Take 500 mg by mouth every six (6) hours as needed for Fever or Pain. Take 1 tablet by mouth every 6 hours prn moderate pain    Provider, Historical   zinc 50 mg tab tablet Take 50 mg by mouth daily. Zinc is on hold while on antibiotic, may resume after completion     Provider, Historical   pregabalin (LYRICA) 200 mg capsule Take 200 mg by mouth two (2) times a day. Provider, Historical   topiramate (TOPAMAX) 100 mg tablet Take 100 mg by mouth nightly. Provider, Historical   propranolol LA (INDERAL LA) 60 mg SR capsule Take 60 mg by mouth daily. Provider, Historical   zolpidem (AMBIEN) 10 mg tablet Take 10 mg by mouth nightly as needed for Sleep.     Provider, Historical   Entresto 24-26 mg tablet Take 1 Tablet by mouth two (2) times a day. 7/12/22   Provider, Historical   ergocalciferol (ERGOCALCIFEROL) 1,250 mcg (50,000 unit) capsule TAKE 1 CAPSULE BY MOUTH ONE TIME PER WEEK 5/16/22   Jose Ramirez MD   esomeprazole (NEXIUM) 40 mg capsule Take 40 mg by mouth daily. 10/6/21   Provider, Historical   levothyroxine (SYNTHROID) 50 mcg tablet TAKE 1 TABLET BY MOUTH EVERY DAY 9/20/21   Jose Ramirez MD   apixaban (ELIQUIS) 5 mg tablet Take 1 Tablet by mouth two (2) times a day. 8/11/21   Kristen Sheth ANP   lithium carbonate SR (LITHOBID) 300 mg CR tablet Take 900 mg by mouth nightly. 2/24/21   Provider, Historical   cyanocobalamin (VITAMIN B12) 100 mcg tablet Take 100 mcg by mouth daily. Provider, Historical   omeprazole (PRILOSEC) 40 mg capsule TAKE 1 CAPSULE BY MOUTH EVERY DAY IN THE MORNING  Patient not taking: No sig reported 9/24/20   Provider, Historical   timolol (TIMOPTIC) 0.5 % ophthalmic solution INSTILL 1 DROP 1 TIME EVERY DAY INTO BOTH EYES EVERY MORNING 8/25/20   Provider, Historical   hydrOXYchloroQUINE (PLAQUENIL) 200 mg tablet Take 200 mg by mouth two (2) times a day. 10/1/20   Provider, Historical   latanoprost (XALATAN) 0.005 % ophthalmic solution Administer 1 Drop to both eyes two (2) times a day. 4/26/20   Provider, Historical   ALPRAZolam Otelia Adjutant) 0.5 mg tablet Take 0.5 mg by mouth nightly. 3/23/10   Provider, Historical       REVIEW OF SYSTEMS:     I am not able to complete the review of systems because:    The patient is intubated and sedated   y The patient has altered mental status due to his acute medical problems    The patient has baseline aphasia from prior stroke(s)    The patient has baseline dementia and is not reliable historian    The patient is in acute medical distress and unable to provide information           Total of 12 systems reviewed as follows:       POSITIVE= underlined text  Negative = text not underlined  General:  fever, chills, sweats, generalized weakness, weight loss/gain,      loss of appetite   Eyes:    blurred vision, eye pain, loss of vision, double vision  ENT:    rhinorrhea, pharyngitis   Respiratory:   cough, sputum production, SOB, BIANCHI, wheezing, pleuritic pain   Cardiology:   chest pain, palpitations, orthopnea, PND, edema, syncope   Gastrointestinal:  abdominal pain , N/V, diarrhea, dysphagia, constipation, bleeding   Genitourinary:  frequency, urgency, dysuria, hematuria, incontinence   Muskuloskeletal :  arthralgia, myalgia, back pain  Hematology:  easy bruising, nose or gum bleeding, lymphadenopathy   Dermatological: rash, ulceration, pruritis, color change / jaundice  Endocrine:   hot flashes or polydipsia   Neurological:  headache, dizziness, confusion, focal weakness, paresthesia,     Speech difficulties, memory loss, gait difficulty  Psychological: Feelings of anxiety, depression, agitation    Objective:   VITALS:    Visit Vitals  BP (!) 93/44   Pulse 75   Temp 97.3 °F (36.3 °C)   Resp 22   Ht 5' 5\" (1.651 m)   Wt 100.4 kg (221 lb 5.5 oz)   SpO2 100%   BMI 36.83 kg/m²       PHYSICAL EXAM:    General:    no distress, appears stated age. HEENT: Atraumatic, anicteric sclerae, pink conjunctivae     No oral ulcers, mucosa moist  Neck:  Supple, symmetrical,  thyroid: non tender  Lungs:   Clear to auscultation bilaterally. No Wheezing or Rhonchi. No rales. Chest wall:  No tenderness  No Accessory muscle use. Heart:   Regular  rhythm,  No  murmur   No edema  Abdomen:   Soft, non-tender. Not distended. Bowel sounds normal  Extremities: No cyanosis. No clubbing,      Skin turgor normal, Capillary refill normal, Radial dial pulse 2+  Skin:     Not pale. Not Jaundiced  No rashes   Psych:  Not anxious or agitated. Neurologic: EOMs intact. No facial asymmetry. No aphasia or slurred speech. Symmetrical strength, Sensation grossly intact.  Alert and awake,    _______________________________________________________________________  Care Plan discussed with: Comments   Patient y    Family      RN y    Care Manager                    Consultant:      _______________________________________________________________________  Expected  Disposition:   Home with Family y   HH/PT/OT/RN    SNF/LTC    SUGAR    ________________________________________________________________________  TOTAL TIME:  76 Minutes    Critical Care Provided     Minutes non procedure based      Comments    y Reviewed previous records   >50% of visit spent in counseling and coordination of care y Discussion with patient and/or family and questions answered       ________________________________________________________________________  Signed: Sony Rey MD    Procedures: see electronic medical records for all procedures/Xrays and details which were not copied into this note but were reviewed prior to creation of Plan. LAB DATA REVIEWED:    Recent Results (from the past 24 hour(s))   CBC WITH AUTOMATED DIFF    Collection Time: 01/30/23 10:17 AM   Result Value Ref Range    WBC 5.6 3.6 - 11.0 K/uL    RBC 3.88 3.80 - 5.20 M/uL    HGB 11.9 11.5 - 16.0 g/dL    HCT 36.4 35.0 - 47.0 %    MCV 93.8 80.0 - 99.0 FL    MCH 30.7 26.0 - 34.0 PG    MCHC 32.7 30.0 - 36.5 g/dL    RDW 13.4 11.5 - 14.5 %    PLATELET 046 141 - 058 K/uL    MPV 11.0 8.9 - 12.9 FL    NRBC 0.0 0  WBC    ABSOLUTE NRBC 0.00 0.00 - 0.01 K/uL    NEUTROPHILS 56 32 - 75 %    LYMPHOCYTES 28 12 - 49 %    MONOCYTES 9 5 - 13 %    EOSINOPHILS 6 0 - 7 %    BASOPHILS 1 0 - 1 %    IMMATURE GRANULOCYTES 0 0.0 - 0.5 %    ABS. NEUTROPHILS 3.1 1.8 - 8.0 K/UL    ABS. LYMPHOCYTES 1.6 0.8 - 3.5 K/UL    ABS. MONOCYTES 0.5 0.0 - 1.0 K/UL    ABS. EOSINOPHILS 0.3 0.0 - 0.4 K/UL    ABS. BASOPHILS 0.1 0.0 - 0.1 K/UL    ABS. IMM.  GRANS. 0.0 0.00 - 0.04 K/UL    DF AUTOMATED     METABOLIC PANEL, COMPREHENSIVE    Collection Time: 01/30/23 10:17 AM   Result Value Ref Range    Sodium 138 136 - 145 mmol/L    Potassium 3.5 3.5 - 5.1 mmol/L    Chloride 104 97 - 108 mmol/L    CO2 32 21 - 32 mmol/L    Anion gap 2 (L) 5 - 15 mmol/L    Glucose 101 (H) 65 - 100 mg/dL    BUN 38 (H) 6 - 20 MG/DL    Creatinine 0.99 0.55 - 1.02 MG/DL    BUN/Creatinine ratio 38 (H) 12 - 20      eGFR 60 (L) >60 ml/min/1.73m2    Calcium 9.9 8.5 - 10.1 MG/DL    Bilirubin, total 0.4 0.2 - 1.0 MG/DL    ALT (SGPT) 23 12 - 78 U/L    AST (SGOT) 18 15 - 37 U/L    Alk.  phosphatase 102 45 - 117 U/L    Protein, total 6.5 6.4 - 8.2 g/dL    Albumin 3.5 3.5 - 5.0 g/dL    Globulin 3.0 2.0 - 4.0 g/dL    A-G Ratio 1.2 1.1 - 2.2     URINALYSIS W/ REFLEX CULTURE    Collection Time: 01/30/23 10:22 AM    Specimen: Urine   Result Value Ref Range    Color YELLOW/STRAW      Appearance CLEAR CLEAR      Specific gravity 1.010      pH (UA) 6.0 5.0 - 8.0      Protein Negative NEG mg/dL    Glucose Negative NEG mg/dL    Ketone Negative NEG mg/dL    Bilirubin Negative NEG      Blood Negative NEG      Urobilinogen 0.2 0.2 - 1.0 EU/dL    Nitrites Negative NEG      Leukocyte Esterase Negative NEG      WBC 0-4 0 - 4 /hpf    RBC 0-5 0 - 5 /hpf    Epithelial cells FEW FEW /lpf    Bacteria Negative NEG /hpf    UA:UC IF INDICATED CULTURE NOT INDICATED BY UA RESULT CNI

## 2023-01-30 NOTE — ED PROVIDER NOTES
[facility] EMERGENCY DEPT  EMERGENCY DEPARTMENT ENCOUNTER       Pt Name: Ananth Grady  MRN: [id number]  [place] 1950  Date of evaluation: 1/30/2023  Provider: Kraig Dunham MD   PCP: Renu Zepeda MD  Note Started: 12:34 PM 1/30/23     CHIEF COMPLAINT       Chief Complaint   Patient presents with    Altered mental status     Pt ambulatory into triage with a cc of confusion x 3 days; family states that pt is having UTI symptoms and this has happened in the past         HISTORY OF PRESENT ILLNESS: 1 or more elements      History From: Patient  HPI Limitations : Altered Mental Status     Ananth Grady is a 75 y.o. female who presents presenting with confusion, nonsensical speech, and tremor over the last several days. Patient has history of bipolar disorder and takes lithium daily 3 times a day. She is also dealt with lower extremity edema and has been on diuretics for this over the last several months. She is drinking normal amounts of water. No other new medications or fevers or chills. There is some concern she may have urinary tract infection but not having symptoms of this. Nursing Notes were all reviewed and agreed with or any disagreements were addressed in the HPI. REVIEW OF SYSTEMS      Review of Systems     Positives and Pertinent negatives as per HPI.     PAST HISTORY     Past Medical History:  Past Medical History:   Diagnosis Date    Arrhythmia 2014    atrial fibrillation 2014, sick sinus syndrome: [facility]    Arthritis     Rheumatoid    Bipolar affect, depressed ([address]     Bipolar affective disorder ([facility] [address] 3/31/2010    Chronic pain 2018    right shoulder    Colon polyps 03/31/2010    also 5/2018: colon polyps    Depression     Diarrhea 07/19/2013    D/t alpha gal allergy says patient    Epigastric pain 6/5/2015    GERD (gastroesophageal reflux disease)     occasiional only; controlled with med    Hemispheric carotid artery syndrome No stroke    Neuro: Vinnie Wilson Hegab  (CT scan head/neck negative 4/2018 in [facility])    Hyperlipidemia 3/31/2010    Hypothyroidism 3/31/2010    Long term current use of anticoagulant therapy     Lupus ([address]     patient denies-says she has RA    Migraine 03/31/2010    has them x2 a month: last one 5/23/2018    Psychotic disorder (HCC)     S/P ablation of atrial fibrillation 05/13/2014    S/P cardiac pacemaker procedure 9/16/2014 9/16/14 Medtronic MRI compatible dual chamber pacemaker implant    Sleep apnea     unable to tolerate CPAP    Stool color black     Thyroid disease     Tick-borne disease     Alpha Gal allergy: no eat pork, beef, milk (Meat allergy showed up on a allergy test)    Umbilical hernia without obstruction and without gangrene 6/22/2020    Urge incontinence 3/31/2010    Vitamin D deficiency 3/31/2015       Past Surgical History:  Past Surgical History:   Procedure Laterality Date    COLONOSCOPY N/A 4/27/2018    COLONOSCOPY performed by Trev Parker MD at [facility] AMBULATORY OR    COLONOSCOPY N/A 11/25/2020    COLONOSCOPY, EGD performed by Berenice Burkitt, MD at [address] [place] [address]  4/27/2018         [facility]  4/27/2018         HX BUNIONECTOMY      HX GI  2010, 5/2018    egd, colonoscopy    HX HERNIA REPAIR  01/11/2021    HX HERNIA REPAIR  01/2021    HX HERNIA REPAIR  49/0670    4 umbillical     HX HYSTERECTOMY      HX ORTHOPAEDIC  2000's    removed bone spurs from [employer] hand    HX ORTHOPAEDIC  1990s?     left bunionectomy     HX OTHER SURGICAL  07/2014    2 shocks to heart & ablations    HX OTHER SURGICAL  01/11/2021    incarcerated incisional hernia surgery    HX PACEMAKER  2014    On the right side    HX PACEMAKER PLACEMENT  2015    HX SHOULDER REPLACEMENT Right 2017    HX TONSILLECTOMY  15 yrs old    GA UNLISTED PROCEDURE CARDIAC SURGERY  5/2014    ablation       Family History:  Family History   Problem Relation Age of Onset    Arrhythmia Mother         afib    Stroke Mother Heart Failure Mother     Colon Cancer Father     Heart Disease Father     Cancer Father         colon cancer    Arrhythmia Brother         afib    Asthma Brother     COPD Brother        Social History:  Social History     Tobacco Use    Smoking status: Never    Smokeless tobacco: Never   Vaping Use    Vaping Use: Never used   Substance Use Topics    Alcohol use: Not Currently     Alcohol/week: 1.0 standard drink     Types: 1 Cans of beer per week    Drug use: Yes     Types: Prescription       Allergies: Allergies   Allergen Reactions    Latex Swelling     Swelling of lips says patient    Beef Containing Products Nausea and Vomiting     diarrhea    Ciprofloxacin Unknown (comments)    Pork Derived (Porcine) Nausea and Vomiting     diarrhea    Bovine Cartilage Nausea and Vomiting     diarrhea    Milk Nausea and Vomiting     diarrhea  diarrhea    Sulfa (Sulfonamide Antibiotics) Hives    Xarelto [Rivaroxaban] Other (comments)     GI problems       CURRENT MEDICATIONS      Previous Medications    ACETAMINOPHEN (TYLENOL) 500 MG TABLET    Take 500 mg by mouth every six (6) hours as needed for Fever or Pain. Take 1 tablet by mouth every 6 hours prn moderate pain    ALPRAZOLAM (XANAX) 0.5 MG TABLET    Take 0.5 mg by mouth nightly. APIXABAN (ELIQUIS) 5 MG TABLET    Take 1 Tablet by mouth two (2) times a day. CYANOCOBALAMIN (VITAMIN B12) 100 MCG TABLET    Take 100 mcg by mouth daily. DIMETHICONE/COLLOIDAL OATMEAL (DAILY MOISTURIZING LOTION EX)    Apply 1 Film to affected area as needed. ENTRESTO 24-26 MG TABLET    Take 1 Tablet by mouth two (2) times a day. ERGOCALCIFEROL (ERGOCALCIFEROL) 1,250 MCG (50,000 UNIT) CAPSULE    TAKE 1 CAPSULE BY MOUTH ONE TIME PER WEEK    ESOMEPRAZOLE (NEXIUM) 40 MG CAPSULE    Take 40 mg by mouth daily. FLUOCINONIDE (LIDEX) 0.05 % TOPICAL CREAM    APPLY TO AFFECTED AREA TWICE A DAY    FUROSEMIDE (LASIX) 40 MG TABLET    Take 1 Tablet by mouth two (2) times a day. HYDROXYCHLOROQUINE (PLAQUENIL) 200 MG TABLET    Take 200 mg by mouth two (2) times a day. LATANOPROST (XALATAN) 0.005 % OPHTHALMIC SOLUTION    Administer 1 Drop to both eyes two (2) times a day. LEVOTHYROXINE (SYNTHROID) 50 MCG TABLET    TAKE 1 TABLET BY MOUTH EVERY DAY    LITHIUM CARBONATE SR (LITHOBID) 300 MG CR TABLET    Take 900 mg by mouth nightly. OMEPRAZOLE (PRILOSEC) 40 MG CAPSULE    TAKE 1 CAPSULE BY MOUTH EVERY DAY IN THE MORNING    OTHER    Take 4 Billion Cells by mouth daily. Natures Bounty Probiotic Gummies 2 gummies by mouth daily    PREGABALIN (LYRICA) 200 MG CAPSULE    Take 200 mg by mouth two (2) times a day. PROPRANOLOL LA (INDERAL LA) 60 MG SR CAPSULE    Take 60 mg by mouth daily. TIMOLOL (TIMOPTIC) 0.5 % OPHTHALMIC SOLUTION    INSTILL 1 DROP 1 TIME EVERY DAY INTO BOTH EYES EVERY MORNING    TOPIRAMATE (TOPAMAX) 100 MG TABLET    Take 100 mg by mouth nightly. ZINC 50 MG TAB TABLET    Take 50 mg by mouth daily. Zinc is on hold while on antibiotic, may resume after completion     ZOLPIDEM (AMBIEN) 10 MG TABLET    Take 10 mg by mouth nightly as needed for Sleep. PHYSICAL EXAM      ED Triage Vitals   ED Encounter Vitals Group      BP 01/30/23 1015 (!) 105/54      Pulse (Heart Rate) 01/30/23 1015 74      Resp Rate 01/30/23 1015 16      Temp 01/30/23 1015 97.3 °F (36.3 °C)      Temp src --       O2 Sat (%) 01/30/23 1015 100 %      Weight 01/30/23 1014 221 lb 5.5 oz      Height 01/30/23 1014 5' 5\"        Physical Exam  Vitals and nursing note reviewed. Constitutional:       General: She is in acute distress. Appearance: She is well-developed. She is not ill-appearing. HENT:      Head: Normocephalic and atraumatic. Mouth/Throat:      Pharynx: No oropharyngeal exudate. Eyes:      Conjunctiva/sclera: Conjunctivae normal.      Pupils: Pupils are equal, round, and reactive to light. Cardiovascular:      Rate and Rhythm: Normal rate and regular rhythm.       Heart sounds: Normal heart sounds. No murmur heard. Pulmonary:      Effort: Pulmonary effort is normal. No tachypnea or accessory muscle usage. Breath sounds: Normal breath sounds. No wheezing or rales. Abdominal:      General: Bowel sounds are normal. There is no distension. Palpations: Abdomen is soft. Tenderness: There is no abdominal tenderness. Musculoskeletal:         General: No deformity. Normal range of motion. Cervical back: Normal range of motion and neck supple. Skin:     General: Skin is warm. Findings: No rash. Neurological:      Mental Status: She is alert. She is disoriented. Sensory: No sensory deficit. Coordination: Coordination normal.      Comments: Patient is confused from baseline, answers questions inappropriately. She has a mild resting tremor in the right upper extremity and tongue fasciculations as well. Reportedly she has had baseline twitching at home that is abnormal for her. Psychiatric:         Behavior: Behavior normal.          DIAGNOSTIC RESULTS   LABS:     Recent Results (from the past 12 hour(s))   CBC WITH AUTOMATED DIFF    Collection Time: 01/30/23 10:17 AM   Result Value Ref Range    WBC 5.6 3.6 - 11.0 K/uL    RBC 3.88 3.80 - 5.20 M/uL    HGB 11.9 11.5 - 16.0 g/dL    HCT 36.4 35.0 - 47.0 %    MCV 93.8 80.0 - 99.0 FL    MCH 30.7 26.0 - 34.0 PG    MCHC 32.7 30.0 - 36.5 g/dL    RDW 13.4 11.5 - 14.5 %    PLATELET 863 318 - 411 K/uL    MPV 11.0 8.9 - 12.9 FL    NRBC 0.0 0  WBC    ABSOLUTE NRBC 0.00 0.00 - 0.01 K/uL    NEUTROPHILS 56 32 - 75 %    LYMPHOCYTES 28 12 - 49 %    MONOCYTES 9 5 - 13 %    EOSINOPHILS 6 0 - 7 %    BASOPHILS 1 0 - 1 %    IMMATURE GRANULOCYTES 0 0.0 - 0.5 %    ABS. NEUTROPHILS 3.1 1.8 - 8.0 K/UL    ABS. LYMPHOCYTES 1.6 0.8 - 3.5 K/UL    ABS. MONOCYTES 0.5 0.0 - 1.0 K/UL    ABS. EOSINOPHILS 0.3 0.0 - 0.4 K/UL    ABS. BASOPHILS 0.1 0.0 - 0.1 K/UL    ABS. IMM.  GRANS. 0.0 0.00 - 0.04 K/UL    DF AUTOMATED     METABOLIC PANEL, COMPREHENSIVE    Collection Time: 01/30/23 10:17 AM   Result Value Ref Range    Sodium 138 136 - 145 mmol/L    Potassium 3.5 3.5 - 5.1 mmol/L    Chloride 104 97 - 108 mmol/L    CO2 32 21 - 32 mmol/L    Anion gap 2 (L) 5 - 15 mmol/L    Glucose 101 (H) 65 - 100 mg/dL    BUN 38 (H) 6 - 20 MG/DL    Creatinine 0.99 0.55 - 1.02 MG/DL    BUN/Creatinine ratio 38 (H) 12 - 20      eGFR 60 (L) >60 ml/min/1.73m2    Calcium 9.9 8.5 - 10.1 MG/DL    Bilirubin, total 0.4 0.2 - 1.0 MG/DL    ALT (SGPT) 23 12 - 78 U/L    AST (SGOT) 18 15 - 37 U/L    Alk. phosphatase 102 45 - 117 U/L    Protein, total 6.5 6.4 - 8.2 g/dL    Albumin 3.5 3.5 - 5.0 g/dL    Globulin 3.0 2.0 - 4.0 g/dL    A-G Ratio 1.2 1.1 - 2.2     URINALYSIS W/ REFLEX CULTURE    Collection Time: 01/30/23 10:22 AM    Specimen: Urine   Result Value Ref Range    Color YELLOW/STRAW      Appearance CLEAR CLEAR      Specific gravity 1.010      pH (UA) 6.0 5.0 - 8.0      Protein Negative NEG mg/dL    Glucose Negative NEG mg/dL    Ketone Negative NEG mg/dL    Bilirubin Negative NEG      Blood Negative NEG      Urobilinogen 0.2 0.2 - 1.0 EU/dL    Nitrites Negative NEG      Leukocyte Esterase Negative NEG      WBC 0-4 0 - 4 /hpf    RBC 0-5 0 - 5 /hpf    Epithelial cells FEW FEW /lpf    Bacteria Negative NEG /hpf    UA:UC IF INDICATED CULTURE NOT INDICATED BY UA RESULT CNI          EKG: Interpreted by me, AV paced at 75, no ischemic changes, QTC of 629     RADIOLOGY:  Non-plain film images such as CT, Ultrasound and MRI are read by the radiologist. Plain radiographic images are visualized and preliminarily interpreted by the ED Provider with the below findings:          Interpretation per the Radiologist below, if available at the time of this note:     No results found.       PROCEDURES   Unless otherwise noted below, none  Critical Care  Performed by: Sky Mac MD  Authorized by: Sky Mac MD     Critical care provider statement:     Critical care time (minutes):  40    Critical care time was exclusive of:  Separately billable procedures and treating other patients and teaching time    Critical care was necessary to treat or prevent imminent or life-threatening deterioration of the following conditions:  Toxidrome and CNS failure or compromise    Critical care was time spent personally by me on the following activities:  Development of treatment plan with patient or surrogate, review of old charts, pulse oximetry, re-evaluation of patient's condition, ordering and performing treatments and interventions, ordering and review of laboratory studies, ordering and review of radiographic studies, evaluation of patient's response to treatment and examination of patient    Care discussed with: admitting provider       CRITICAL CARE TIME   [facility] and DIFFERENTIAL DIAGNOSIS/MDM   Vitals:    Vitals:    01/30/23 1015 01/30/23 1439 01/30/23 1509 01/30/23 1539   BP: (!) 105/54 (!) 115/49 (!) 91/46 (!) 93/44   Pulse: 74 75 75 75   Resp: 16 16 20 22   Temp: 97.3 °F (36.3 °C)      SpO2: 100% 100% 100% 100%   Weight:       Height:            Patient was given the following medications:  Medications   apixaban (ELIQUIS) tablet 5 mg (has no administration in time range)   cyanocobalamin (VITAMIN B12) tablet 100 mcg (has no administration in time range)   sacubitriL-valsartan (ENTRESTO) 24-26 mg tablet 1 Tablet (has no administration in time range)   pantoprazole (PROTONIX) tablet 20 mg (has no administration in time range)   furosemide (LASIX) tablet 40 mg (has no administration in time range)   hydrOXYchloroQUINE (PLAQUENIL) tablet 200 mg (has no administration in time range)   latanoprost (XALATAN) 0.005 % ophthalmic solution 1 Drop (has no administration in time range)   levothyroxine (SYNTHROID) tablet 50 mcg (has no administration in time range)   pregabalin (LYRICA) capsule 200 mg (has no administration in time range)   timolol (TIMOPTIC) 0.5 % ophthalmic solution 1 Drop (has no administration in time range)   topiramate (TOPAMAX) tablet 100 mg (has no administration in time range)   sodium chloride (NS) flush 5-40 mL (has no administration in time range)   sodium chloride (NS) flush 5-40 mL (has no administration in time range)   acetaminophen (TYLENOL) tablet 650 mg (has no administration in time range)     Or   acetaminophen (TYLENOL) suppository 650 mg (has no administration in time range)   polyethylene glycol (MIRALAX) packet 17 g (has no administration in time range)   ondansetron (ZOFRAN ODT) tablet 4 mg (has no administration in time range)     Or   ondansetron (ZOFRAN) injection 4 mg (has no administration in time range)   0.9% sodium chloride infusion (has no administration in time range)   sodium chloride 0.9 % bolus infusion 1,000 mL (0 mL IntraVENous IV Completed 1/30/23 7954)       CONSULTS: (Who and What was discussed)  IP CONSULT TO HOSPITALIST  IP CONSULT TO [address] [place] [address]: Recommendations are to keep patient receiving normal saline, and producing 1 to 2 cc/kg/h of urine output. Chronic Conditions: Bipolar disorder, atrial fibrillation on anticoagulation    Social Determinants affecting Dx or Tx: None    Records Reviewed (source and summary of external notes): Prior medical records and Previous Radiology studies    CC/HPI Summary, DDx, ED Course, and Reassessment: Patient is a 50-year-old female presenting with altered mental status, confusion, and resting tremor. Considered infection however no significant leukocytosis, no source of infection found. Considered intracranial hemorrhage or mass, however CT scan showed no evidence of either of these findings. Considered lithium toxicity, reported outpatient lab value of 1.7, significantly elevated from baseline.   Given clinical presentation think patient meets criteria for lithium toxicity of chronic nature, likely reactive secondary to hypovolemia due to diuretic use. Consulted poison control center, will admit for IV fluid hydration, serial lithium levels, cardiac monitoring, neurologic checks. Disposition Considerations (Tests not done, Shared Decision Making, Pt Expectation of Test or Tx.):      FINAL IMPRESSION     1. Lithium toxicity, accidental or unintentional, initial encounter          DISPOSITION/PLAN   Admitted    Admit Note: Pt is being admitted by Dr. Charlotte Marley. The results of their tests and reason(s) for their admission have been discussed with pt and/or available family. They convey agreement and understanding for the need to be admitted and for the admission diagnosis. PATIENT REFERRED TO:  Follow-up Information    None           DISCHARGE MEDICATIONS:  Current Discharge Medication List            DISCONTINUED MEDICATIONS:  Current Discharge Medication List          I am the Primary Clinician of Record. Yaron Hardy MD (electronically signed)    (Please note that parts of this dictation were completed with voice recognition software. Quite often unanticipated grammatical, syntax, homophones, and other interpretive errors are inadvertently transcribed by the computer software. Please disregards these errors.  Please excuse any errors that have escaped final proofreading.)

## 2023-01-31 DIAGNOSIS — G44.321 CHRONIC POST-TRAUMATIC HEADACHE, INTRACTABLE: ICD-10-CM

## 2023-01-31 DIAGNOSIS — G25.0 ESSENTIAL TREMOR: ICD-10-CM

## 2023-01-31 DIAGNOSIS — E03.9 HYPOTHYROIDISM, UNSPECIFIED TYPE: ICD-10-CM

## 2023-01-31 LAB
ALBUMIN SERPL-MCNC: 3 G/DL (ref 3.5–5)
ALBUMIN/GLOB SERPL: 1.2 (ref 1.1–2.2)
ALP SERPL-CCNC: 87 U/L (ref 45–117)
ALT SERPL-CCNC: 20 U/L (ref 12–78)
ANION GAP SERPL CALC-SCNC: 4 MMOL/L (ref 5–15)
AST SERPL-CCNC: 15 U/L (ref 15–37)
ATRIAL RATE: 87 BPM
BILIRUB SERPL-MCNC: 0.4 MG/DL (ref 0.2–1)
BUN SERPL-MCNC: 27 MG/DL (ref 6–20)
BUN/CREAT SERPL: 33 (ref 12–20)
CALCIUM SERPL-MCNC: 9.3 MG/DL (ref 8.5–10.1)
CALCULATED R AXIS, ECG10: -52 DEGREES
CALCULATED T AXIS, ECG11: 97 DEGREES
CHLORIDE SERPL-SCNC: 110 MMOL/L (ref 97–108)
CO2 SERPL-SCNC: 29 MMOL/L (ref 21–32)
CREAT SERPL-MCNC: 0.82 MG/DL (ref 0.55–1.02)
DATE LAST DOSE: ABNORMAL
DIAGNOSIS, 93000: NORMAL
ERYTHROCYTE [DISTWIDTH] IN BLOOD BY AUTOMATED COUNT: 13.3 % (ref 11.5–14.5)
GLOBULIN SER CALC-MCNC: 2.6 G/DL (ref 2–4)
GLUCOSE SERPL-MCNC: 109 MG/DL (ref 65–100)
HCT VFR BLD AUTO: 31.9 % (ref 35–47)
HGB BLD-MCNC: 10.5 G/DL (ref 11.5–16)
LITHIUM SERPL-SCNC: 1.68 MMOL/L (ref 0.6–1.2)
MAGNESIUM SERPL-MCNC: 2.1 MG/DL (ref 1.6–2.4)
MCH RBC QN AUTO: 30.2 PG (ref 26–34)
MCHC RBC AUTO-ENTMCNC: 32.9 G/DL (ref 30–36.5)
MCV RBC AUTO: 91.7 FL (ref 80–99)
NRBC # BLD: 0 K/UL (ref 0–0.01)
NRBC BLD-RTO: 0 PER 100 WBC
P-R INTERVAL, ECG05: 186 MS
PLATELET # BLD AUTO: 145 K/UL (ref 150–400)
PMV BLD AUTO: 10.9 FL (ref 8.9–12.9)
POTASSIUM SERPL-SCNC: 3.1 MMOL/L (ref 3.5–5.1)
PROT SERPL-MCNC: 5.6 G/DL (ref 6.4–8.2)
Q-T INTERVAL, ECG07: 566 MS
QRS DURATION, ECG06: 184 MS
QTC CALCULATION (BEZET), ECG08: 632 MS
RBC # BLD AUTO: 3.48 M/UL (ref 3.8–5.2)
REPORTED DOSE,DOSE: ABNORMAL UNITS
REPORTED DOSE/TIME,TMG: ABNORMAL
SODIUM SERPL-SCNC: 143 MMOL/L (ref 136–145)
VENTRICULAR RATE, ECG03: 75 BPM
WBC # BLD AUTO: 5 K/UL (ref 3.6–11)

## 2023-01-31 PROCEDURE — 74011250637 HC RX REV CODE- 250/637: Performed by: INTERNAL MEDICINE

## 2023-01-31 PROCEDURE — 99233 SBSQ HOSP IP/OBS HIGH 50: CPT | Performed by: INTERNAL MEDICINE

## 2023-01-31 PROCEDURE — 65270000046 HC RM TELEMETRY

## 2023-01-31 PROCEDURE — 74011000250 HC RX REV CODE- 250: Performed by: INTERNAL MEDICINE

## 2023-01-31 RX ORDER — TOPIRAMATE 100 MG/1
TABLET, FILM COATED ORAL
Qty: 180 TABLET | Refills: 1 | Status: SHIPPED | OUTPATIENT
Start: 2023-01-31

## 2023-01-31 RX ORDER — LEVOTHYROXINE SODIUM 50 UG/1
TABLET ORAL
Qty: 90 TABLET | Refills: 3 | Status: SHIPPED | OUTPATIENT
Start: 2023-01-31

## 2023-01-31 RX ADMIN — TOPIRAMATE 100 MG: 100 TABLET, FILM COATED ORAL at 23:31

## 2023-01-31 RX ADMIN — SACUBITRIL AND VALSARTAN 1 TABLET: 24; 26 TABLET, FILM COATED ORAL at 20:21

## 2023-01-31 RX ADMIN — LEVOTHYROXINE SODIUM 50 MCG: 0.05 TABLET ORAL at 09:57

## 2023-01-31 RX ADMIN — HYDROXYCHLOROQUINE SULFATE 200 MG: 200 TABLET ORAL at 19:40

## 2023-01-31 RX ADMIN — SODIUM CHLORIDE, PRESERVATIVE FREE 10 ML: 5 INJECTION INTRAVENOUS at 23:31

## 2023-01-31 RX ADMIN — PREGABALIN 200 MG: 150 CAPSULE ORAL at 09:56

## 2023-01-31 RX ADMIN — ACETAMINOPHEN 325MG 650 MG: 325 TABLET ORAL at 14:48

## 2023-01-31 RX ADMIN — LATANOPROST 1 DROP: 50 SOLUTION OPHTHALMIC at 06:31

## 2023-01-31 RX ADMIN — HYDROXYCHLOROQUINE SULFATE 200 MG: 200 TABLET ORAL at 09:57

## 2023-01-31 RX ADMIN — LATANOPROST 1 DROP: 50 SOLUTION OPHTHALMIC at 23:32

## 2023-01-31 RX ADMIN — TIMOLOL MALEATE 1 DROP: 5 SOLUTION OPHTHALMIC at 10:02

## 2023-01-31 RX ADMIN — APIXABAN 5 MG: 5 TABLET, FILM COATED ORAL at 09:57

## 2023-01-31 RX ADMIN — APIXABAN 5 MG: 5 TABLET, FILM COATED ORAL at 19:40

## 2023-01-31 RX ADMIN — VITAM B12 100 MCG: 100 TAB at 09:57

## 2023-01-31 RX ADMIN — FUROSEMIDE 40 MG: 40 TABLET ORAL at 20:20

## 2023-01-31 RX ADMIN — PREGABALIN 200 MG: 150 CAPSULE ORAL at 19:39

## 2023-01-31 RX ADMIN — PANTOPRAZOLE SODIUM 40 MG: 40 TABLET, DELAYED RELEASE ORAL at 09:57

## 2023-01-31 RX ADMIN — LATANOPROST 1 DROP: 50 SOLUTION OPHTHALMIC at 08:00

## 2023-01-31 RX ADMIN — SODIUM CHLORIDE, PRESERVATIVE FREE 10 ML: 5 INJECTION INTRAVENOUS at 14:48

## 2023-01-31 NOTE — PROGRESS NOTES
Bedside and Verbal shift change report given to Brandon De Leon (oncoming nurse) by Lourdes Rao RN (offgoing nurse). Report included the following information SBAR, Kardex, Intake/Output, MAR, Recent Results, and Cardiac Rhythm AV Paced .

## 2023-01-31 NOTE — PROGRESS NOTES
Reason for Admission:  Lithium Toxicity                     RUR Score:  12%                   Plan for utilizing home health:   Agreeable if recommended       PCP: First and Last name:  Sudha Brannon MD     Name of Practice:    Are you a current patient: Yes/No:    Approximate date of last visit: last week   Can you participate in a virtual visit with your PCP:                     Current Advanced Directive/Advance Care Plan: Full Code  CM confirmed patient is a Full Code    Healthcare Decision Maker:   Click here to complete [address] including selection of the Healthcare Decision Maker Relationship (ie \"Primary\")             Primary Decision Maker: Yelitza Lara - Spouse - [phone]                  Transition of Care Plan:                    Patient lives at home with her . Patient has a walker that she uses PRN.  states that patient is not very steady at home. Patient's  says she is independent in care. Patient has had HH in the past.   says they would be agreeable to that again if recommended. Patient's  will be her ride at discharge. Patient uses [facility] on [address].   Current Dispo: Home/self vs HH

## 2023-01-31 NOTE — PROGRESS NOTES
INTERNAL MEDICINE ATTENDING NOTE     Patient Name: Moshe Estrada   : 1950   Admit: 2023    ASSESSMENT / PLAN    Lithium toxicity (2023): Careful IV hydration to promote elimination. I'll increase the fluid rate, as she is still a little hypotensive. Continue supportive care. Acute metabolic encephalopathy: Improving. Systolic CHF: EF 53-95% in 2022; has ICD. HTN, now borderline Hypotension: Holding propranolol. Continue lasix and Entresto as tolerated. Atrial fibrillation: On eliquis. GERD: Stable. On protonix. Hypothyroidism: Continue levothyroxine. Bipolar disorder: Holding lithium due to toxicity. Rheumatoid arthritis. CODE: Full code. Anastasia Sanders MD, FACP  Best contact is via Pager: [phone], or via hospital  at [phone]. Do NOT use Perfect Serve. SUBJECTIVE:   Ms. Moshe Estrada is a patient of mine with CHF, A fib, bipolar disorder, and hypothyroidism, who presented to ER with confusion and worsening LE edema; lithium level high at 1.7. She was seen by me today during rounds. At this time, she is resting comfortably. \"I sleep all the time\" but less confused. The patient has no new complaints today. ROS otherwise unremarkable except as noted elsewhere. OBJECTIVE:   Blood pressure (!) 97/55, pulse 74, temperature 97.5 °F (36.4 °C), resp. rate 18, height 5' 5\" (1.651 m), weight 221 lb 5.5 oz (100.4 kg), SpO2 98 %. Gen: Patient is in no acute distress. Lungs: CTAB. Heart: RRR. Abd: S, NT, ND, BS present. Exremities: Warm.      Recent Results (from the past 12 hour(s))   MAGNESIUM    Collection Time: 23 11:45 PM   Result Value Ref Range    Magnesium 2.1 1.6 - 2.4 mg/dL   METABOLIC PANEL, COMPREHENSIVE    Collection Time: 23 11:45 PM   Result Value Ref Range    Sodium 143 136 - 145 mmol/L    Potassium 3.1 (L) 3.5 - 5.1 mmol/L    Chloride 110 (H) 97 - 108 mmol/L    CO2 29 21 - 32 mmol/L    Anion gap 4 (L) 5 - 15 mmol/L    Glucose 109 (H) 65 - 100 mg/dL    BUN 27 (H) 6 - 20 MG/DL    Creatinine 0.82 0.55 - 1.02 MG/DL    BUN/Creatinine ratio 33 (H) 12 - 20      eGFR >60 >60 ml/min/1.73m2    Calcium 9.3 8.5 - 10.1 MG/DL    Bilirubin, total 0.4 0.2 - 1.0 MG/DL    ALT (SGPT) 20 12 - 78 U/L    AST (SGOT) 15 15 - 37 U/L    Alk. phosphatase 87 45 - 117 U/L    Protein, total 5.6 (L) 6.4 - 8.2 g/dL    Albumin 3.0 (L) 3.5 - 5.0 g/dL    Globulin 2.6 2.0 - 4.0 g/dL    A-G Ratio 1.2 1.1 - 2.2     CBC W/O DIFF    Collection Time: 01/30/23 11:45 PM   Result Value Ref Range    WBC 5.0 3.6 - 11.0 K/uL    RBC 3.48 (L) 3.80 - 5.20 M/uL    HGB 10.5 (L) 11.5 - 16.0 g/dL    HCT 31.9 (L) 35.0 - 47.0 %    MCV 91.7 80.0 - 99.0 FL    MCH 30.2 26.0 - 34.0 PG    MCHC 32.9 30.0 - 36.5 g/dL    RDW 13.3 11.5 - 14.5 %    PLATELET 280 (L) 627 - 400 K/uL    MPV 10.9 8.9 - 12.9 FL    NRBC 0.0 0  WBC    ABSOLUTE NRBC 0.00 0.00 - 0.01 K/uL   LITHIUM    Collection Time: 01/30/23 11:45 PM   Result Value Ref Range    Lithium level 1.68 (HH) 0.60 - 1.20 MMOL/L    Reported dose date NOT PROVIDED      Reported dose time: NOT PROVIDED      Reported dose: NOT PROVIDED UNITS        CT head: No acute intracranial finding. Carlin Peterson MD, FACP  Best contact is via Pager: [phone], or via hospital  at [phone]. Do NOT use Perfect Serve.

## 2023-01-31 NOTE — PROGRESS NOTES
Problem: Falls - Risk of  Goal: *Absence of Falls  Description: Document Clearence Skates Fall Risk and appropriate interventions in the flowsheet.   Outcome: Progressing Towards Goal  Note: Fall Risk Interventions:                                Problem: Patient Education: Go to Patient Education Activity  Goal: Patient/Family Education  Outcome: Progressing Towards Goal     Problem: General Medical Care Plan  Goal: *Vital signs within specified parameters  Outcome: Progressing Towards Goal  Goal: *Labs within defined limits  Outcome: Progressing Towards Goal  Goal: *Absence of infection signs and symptoms  Outcome: Progressing Towards Goal  Goal: *Optimal pain control at patient's stated goal  Outcome: Progressing Towards Goal  Goal: *Skin integrity maintained  Outcome: Progressing Towards Goal  Goal: *Fluid volume balance  Outcome: Progressing Towards Goal  Goal: *Anxiety reduced or absent  Outcome: Progressing Towards Goal  Goal: *Progressive mobility and function (eg: ADL's)  Outcome: Progressing Towards Goal     Problem: Anxiety  Goal: *Alleviation of anxiety  Outcome: Progressing Towards Goal

## 2023-02-01 LAB
ALBUMIN SERPL-MCNC: 3.2 G/DL (ref 3.5–5)
ALBUMIN/GLOB SERPL: 1.1 (ref 1.1–2.2)
ALP SERPL-CCNC: 95 U/L (ref 45–117)
ALT SERPL-CCNC: 20 U/L (ref 12–78)
ANION GAP SERPL CALC-SCNC: 5 MMOL/L (ref 5–15)
AST SERPL-CCNC: 19 U/L (ref 15–37)
BASOPHILS # BLD: 0 K/UL (ref 0–0.1)
BASOPHILS NFR BLD: 1 % (ref 0–1)
BILIRUB SERPL-MCNC: 0.6 MG/DL (ref 0.2–1)
BUN SERPL-MCNC: 17 MG/DL (ref 6–20)
BUN/CREAT SERPL: 21 (ref 12–20)
CALCIUM SERPL-MCNC: 9.7 MG/DL (ref 8.5–10.1)
CHLORIDE SERPL-SCNC: 112 MMOL/L (ref 97–108)
CO2 SERPL-SCNC: 27 MMOL/L (ref 21–32)
CREAT SERPL-MCNC: 0.8 MG/DL (ref 0.55–1.02)
DATE LAST DOSE: NORMAL
DATE LAST DOSE: NORMAL
DIFFERENTIAL METHOD BLD: ABNORMAL
EOSINOPHIL # BLD: 0.3 K/UL (ref 0–0.4)
EOSINOPHIL NFR BLD: 5 % (ref 0–7)
ERYTHROCYTE [DISTWIDTH] IN BLOOD BY AUTOMATED COUNT: 13.5 % (ref 11.5–14.5)
GLOBULIN SER CALC-MCNC: 2.8 G/DL (ref 2–4)
GLUCOSE SERPL-MCNC: 110 MG/DL (ref 65–100)
HCT VFR BLD AUTO: 34.1 % (ref 35–47)
HGB BLD-MCNC: 11.3 G/DL (ref 11.5–16)
IMM GRANULOCYTES # BLD AUTO: 0 K/UL (ref 0–0.04)
IMM GRANULOCYTES NFR BLD AUTO: 0 % (ref 0–0.5)
LITHIUM SERPL-SCNC: 1.13 MMOL/L (ref 0.6–1.2)
LITHIUM SERPL-SCNC: 1.15 MMOL/L (ref 0.6–1.2)
LYMPHOCYTES # BLD: 1.6 K/UL (ref 0.8–3.5)
LYMPHOCYTES NFR BLD: 30 % (ref 12–49)
MCH RBC QN AUTO: 30.7 PG (ref 26–34)
MCHC RBC AUTO-ENTMCNC: 33.1 G/DL (ref 30–36.5)
MCV RBC AUTO: 92.7 FL (ref 80–99)
MONOCYTES # BLD: 0.4 K/UL (ref 0–1)
MONOCYTES NFR BLD: 7 % (ref 5–13)
NEUTS SEG # BLD: 3 K/UL (ref 1.8–8)
NEUTS SEG NFR BLD: 57 % (ref 32–75)
NRBC # BLD: 0 K/UL (ref 0–0.01)
NRBC BLD-RTO: 0 PER 100 WBC
PLATELET # BLD AUTO: 146 K/UL (ref 150–400)
PMV BLD AUTO: 10.3 FL (ref 8.9–12.9)
POTASSIUM SERPL-SCNC: 3.7 MMOL/L (ref 3.5–5.1)
PROT SERPL-MCNC: 6 G/DL (ref 6.4–8.2)
RBC # BLD AUTO: 3.68 M/UL (ref 3.8–5.2)
REPORTED DOSE,DOSE: NORMAL UNITS
REPORTED DOSE,DOSE: NORMAL UNITS
REPORTED DOSE/TIME,TMG: NORMAL
REPORTED DOSE/TIME,TMG: NORMAL
SODIUM SERPL-SCNC: 144 MMOL/L (ref 136–145)
WBC # BLD AUTO: 5.3 K/UL (ref 3.6–11)

## 2023-02-01 PROCEDURE — 80178 ASSAY OF LITHIUM: CPT

## 2023-02-01 PROCEDURE — 80053 COMPREHEN METABOLIC PANEL: CPT

## 2023-02-01 PROCEDURE — 36415 COLL VENOUS BLD VENIPUNCTURE: CPT

## 2023-02-01 PROCEDURE — 74011000250 HC RX REV CODE- 250: Performed by: INTERNAL MEDICINE

## 2023-02-01 PROCEDURE — 74011250637 HC RX REV CODE- 250/637: Performed by: INTERNAL MEDICINE

## 2023-02-01 PROCEDURE — 85025 COMPLETE CBC W/AUTO DIFF WBC: CPT

## 2023-02-01 PROCEDURE — 65270000046 HC RM TELEMETRY

## 2023-02-01 PROCEDURE — 99233 SBSQ HOSP IP/OBS HIGH 50: CPT | Performed by: INTERNAL MEDICINE

## 2023-02-01 RX ADMIN — TIMOLOL MALEATE 1 DROP: 5 SOLUTION OPHTHALMIC at 09:43

## 2023-02-01 RX ADMIN — LATANOPROST 1 DROP: 50 SOLUTION OPHTHALMIC at 22:48

## 2023-02-01 RX ADMIN — HYDROXYCHLOROQUINE SULFATE 200 MG: 200 TABLET ORAL at 09:30

## 2023-02-01 RX ADMIN — FUROSEMIDE 40 MG: 40 TABLET ORAL at 09:40

## 2023-02-01 RX ADMIN — SODIUM CHLORIDE, PRESERVATIVE FREE 10 ML: 5 INJECTION INTRAVENOUS at 14:23

## 2023-02-01 RX ADMIN — APIXABAN 5 MG: 5 TABLET, FILM COATED ORAL at 09:31

## 2023-02-01 RX ADMIN — HYDROXYCHLOROQUINE SULFATE 200 MG: 200 TABLET ORAL at 20:22

## 2023-02-01 RX ADMIN — PREGABALIN 200 MG: 150 CAPSULE ORAL at 09:31

## 2023-02-01 RX ADMIN — VITAM B12 100 MCG: 100 TAB at 09:31

## 2023-02-01 RX ADMIN — PREGABALIN 200 MG: 150 CAPSULE ORAL at 20:22

## 2023-02-01 RX ADMIN — APIXABAN 5 MG: 5 TABLET, FILM COATED ORAL at 20:22

## 2023-02-01 RX ADMIN — PANTOPRAZOLE SODIUM 40 MG: 40 TABLET, DELAYED RELEASE ORAL at 09:31

## 2023-02-01 RX ADMIN — SACUBITRIL AND VALSARTAN 1 TABLET: 24; 26 TABLET, FILM COATED ORAL at 09:40

## 2023-02-01 RX ADMIN — SODIUM CHLORIDE, PRESERVATIVE FREE 10 ML: 5 INJECTION INTRAVENOUS at 22:48

## 2023-02-01 RX ADMIN — ACETAMINOPHEN 325MG 650 MG: 325 TABLET ORAL at 09:31

## 2023-02-01 RX ADMIN — LEVOTHYROXINE SODIUM 50 MCG: 0.05 TABLET ORAL at 09:31

## 2023-02-01 RX ADMIN — LATANOPROST 1 DROP: 50 SOLUTION OPHTHALMIC at 09:40

## 2023-02-01 RX ADMIN — TOPIRAMATE 100 MG: 100 TABLET, FILM COATED ORAL at 22:48

## 2023-02-01 NOTE — PROGRESS NOTES
Bedside and Verbal shift change report given to Brandon De Leon (oncoming nurse) by Leela Hendrickson RN (offgoing nurse). Report included the following information SBAR, Kardex, Intake/Output, MAR, Recent Results, and Cardiac Rhythm AV Paced .

## 2023-02-01 NOTE — PROGRESS NOTES
Problem: Falls - Risk of  Goal: *Absence of Falls  Description: Document Chitra Turk Fall Risk and appropriate interventions in the flowsheet.   Outcome: Progressing Towards Goal  Note: Fall Risk Interventions:                                Problem: Patient Education: Go to Patient Education Activity  Goal: Patient/Family Education  Outcome: Progressing Towards Goal     Problem: General Medical Care Plan  Goal: *Vital signs within specified parameters  Outcome: Progressing Towards Goal  Goal: *Labs within defined limits  Outcome: Progressing Towards Goal  Goal: *Absence of infection signs and symptoms  Outcome: Progressing Towards Goal  Goal: *Optimal pain control at patient's stated goal  Outcome: Progressing Towards Goal  Goal: *Skin integrity maintained  Outcome: Progressing Towards Goal  Goal: *Fluid volume balance  Outcome: Progressing Towards Goal  Goal: *Anxiety reduced or absent  Outcome: Progressing Towards Goal  Goal: *Progressive mobility and function (eg: ADL's)  Outcome: Progressing Towards Goal     Problem: Anxiety  Goal: *Alleviation of anxiety  Outcome: Progressing Towards Goal Security at bedside. MD at bedside.

## 2023-02-01 NOTE — PROGRESS NOTES
INTERNAL MEDICINE ATTENDING NOTE     Patient Name: Guy Hodges   : 1950   Admit: 2023    ASSESSMENT / PLAN    Lithium toxicity (2023): Li levels Normalized. Bipolar disorder: We have been holding lithium itnitially due to toxicity. Acute metabolic encephalopathy: Improved. Systolic CHF: EF 89-64% in 2022; has ICD. HTN, intially a bit low on admission: We have been holding propranolol. Continue lasix and Entresto as tolerated. Atrial fibrillation: On eliquis. GERD: Stable. On protonix. Hypothyroidism: Continue levothyroxine. Rheumatoid arthritis. On plaquenil. Debility: PT/OT. CODE: Full code. Discharge planning. Karina Dao MD, FACP  Best contact is via Pager: [phone], or via hospital  at [phone]. Do NOT use Perfect Serve. SUBJECTIVE:   Ms. Guy Hodges is a patient of mine with CHF, A fib, bipolar disorder, and hypothyroidism, who presented to ER with confusion and worsening LE edema; lithium level high at 1.7. She was seen by me today during rounds. At this time, she is resting comfortably. She is awake and alert, less lethargic. ROS otherwise unremarkable except as noted elsewhere. OBJECTIVE:   Blood pressure 108/60, pulse 73, temperature 97.3 °F (36.3 °C), resp. rate 18, height 5' 5\" (1.651 m), weight 221 lb 5.5 oz (100.4 kg), SpO2 99 %. Gen: Patient is in no acute distress. Lungs: CTAB. Heart: RRR. Abd: S, NT, ND, BS present. Exremities: Warm. 2+ edema noted. No results found for this or any previous visit (from the past 12 hour(s)). CT head: No acute intracranial finding. Karina Dao MD, FACP  Best contact is via Pager: [phone], or via hospital  at [phone]. Do NOT use Perfect Serve.

## 2023-02-02 PROCEDURE — 97530 THERAPEUTIC ACTIVITIES: CPT | Performed by: OCCUPATIONAL THERAPIST

## 2023-02-02 PROCEDURE — 74011000250 HC RX REV CODE- 250: Performed by: INTERNAL MEDICINE

## 2023-02-02 PROCEDURE — 97530 THERAPEUTIC ACTIVITIES: CPT

## 2023-02-02 PROCEDURE — 97116 GAIT TRAINING THERAPY: CPT

## 2023-02-02 PROCEDURE — 65270000046 HC RM TELEMETRY

## 2023-02-02 PROCEDURE — 97165 OT EVAL LOW COMPLEX 30 MIN: CPT | Performed by: OCCUPATIONAL THERAPIST

## 2023-02-02 PROCEDURE — 97535 SELF CARE MNGMENT TRAINING: CPT | Performed by: OCCUPATIONAL THERAPIST

## 2023-02-02 PROCEDURE — 99233 SBSQ HOSP IP/OBS HIGH 50: CPT | Performed by: INTERNAL MEDICINE

## 2023-02-02 PROCEDURE — 97161 PT EVAL LOW COMPLEX 20 MIN: CPT

## 2023-02-02 PROCEDURE — 74011250637 HC RX REV CODE- 250/637: Performed by: FAMILY MEDICINE

## 2023-02-02 PROCEDURE — 74011250637 HC RX REV CODE- 250/637: Performed by: INTERNAL MEDICINE

## 2023-02-02 RX ORDER — ZOLPIDEM TARTRATE 5 MG/1
5 TABLET ORAL
Status: DISCONTINUED | OUTPATIENT
Start: 2023-02-02 | End: 2023-02-03 | Stop reason: HOSPADM

## 2023-02-02 RX ADMIN — FUROSEMIDE 40 MG: 40 TABLET ORAL at 17:50

## 2023-02-02 RX ADMIN — PREGABALIN 200 MG: 150 CAPSULE ORAL at 10:42

## 2023-02-02 RX ADMIN — APIXABAN 5 MG: 5 TABLET, FILM COATED ORAL at 17:50

## 2023-02-02 RX ADMIN — ACETAMINOPHEN 325MG 650 MG: 325 TABLET ORAL at 14:33

## 2023-02-02 RX ADMIN — PANTOPRAZOLE SODIUM 40 MG: 40 TABLET, DELAYED RELEASE ORAL at 06:48

## 2023-02-02 RX ADMIN — VITAM B12 100 MCG: 100 TAB at 10:40

## 2023-02-02 RX ADMIN — TOPIRAMATE 100 MG: 100 TABLET, FILM COATED ORAL at 22:27

## 2023-02-02 RX ADMIN — SODIUM CHLORIDE, PRESERVATIVE FREE 10 ML: 5 INJECTION INTRAVENOUS at 17:50

## 2023-02-02 RX ADMIN — LATANOPROST 1 DROP: 50 SOLUTION OPHTHALMIC at 10:46

## 2023-02-02 RX ADMIN — SACUBITRIL AND VALSARTAN 1 TABLET: 24; 26 TABLET, FILM COATED ORAL at 17:50

## 2023-02-02 RX ADMIN — SACUBITRIL AND VALSARTAN 1 TABLET: 24; 26 TABLET, FILM COATED ORAL at 10:42

## 2023-02-02 RX ADMIN — LATANOPROST 1 DROP: 50 SOLUTION OPHTHALMIC at 22:27

## 2023-02-02 RX ADMIN — APIXABAN 5 MG: 5 TABLET, FILM COATED ORAL at 10:42

## 2023-02-02 RX ADMIN — PREGABALIN 200 MG: 150 CAPSULE ORAL at 17:50

## 2023-02-02 RX ADMIN — ZOLPIDEM TARTRATE 5 MG: 5 TABLET ORAL at 22:55

## 2023-02-02 RX ADMIN — SODIUM CHLORIDE, PRESERVATIVE FREE 10 ML: 5 INJECTION INTRAVENOUS at 06:48

## 2023-02-02 RX ADMIN — HYDROXYCHLOROQUINE SULFATE 200 MG: 200 TABLET ORAL at 10:40

## 2023-02-02 RX ADMIN — HYDROXYCHLOROQUINE SULFATE 200 MG: 200 TABLET ORAL at 17:50

## 2023-02-02 RX ADMIN — SODIUM CHLORIDE, PRESERVATIVE FREE 10 ML: 5 INJECTION INTRAVENOUS at 22:27

## 2023-02-02 RX ADMIN — TIMOLOL MALEATE 1 DROP: 5 SOLUTION OPHTHALMIC at 10:46

## 2023-02-02 RX ADMIN — LEVOTHYROXINE SODIUM 50 MCG: 0.05 TABLET ORAL at 10:40

## 2023-02-02 NOTE — PROGRESS NOTES
0300 Patient  she is not able to sleep. Contacted on call and ordered Ambien 5mg night  as needed,   Going to the patients room she was asleep.

## 2023-02-02 NOTE — PROGRESS NOTES
INTERNAL MEDICINE ATTENDING NOTE     Patient Name: Jules Ford   : 1950   Admit: 2023    ASSESSMENT / PLAN    Lithium toxicity (2023): Li levels Normalized. Bipolar disorder: We have been holding lithium itnitially due to toxicity. Now she is beginning to exhibit paranoia, per . I have reached out to her psychiatrist, Shayne Dewey, and discussed options--plan for now will be to reinstitute lithium at a lower dose. Tremor: Worsening of her underlying essential tremor, possibly due to deconditioning/stress (augmented physiologic tremor), vs neuronal excitation due to withdrawal of lithium. Acute metabolic encephalopathy: Improved. Systolic CHF: EF 46-56% in 2022; has ICD. HTN, intially a bit low on admission: We have been holding propranolol. Continue lasix and Entresto as tolerated. Atrial fibrillation: On eliquis. GERD: Stable. On protonix. Hypothyroidism: Continue levothyroxine. Rheumatoid arthritis. On plaquenil. Debility: PT/OT. CODE: Full code. Discharge planning. Jackeline Valencia MD, FACP  Best contact is via Pager: [phone], or via hospital  at [phone]. Do NOT use Perfect Serve. SUBJECTIVE:   Ms. Jules Ford is a patient of mine with CHF, A fib, bipolar disorder, and hypothyroidism, who presented to ER with confusion and worsening LE edema; lithium level high at 1.7. She was seen by me today during rounds. At this time, she is resting comfortably. She is awake and alert, less lethargic. She is concerned that she can't walk. Her  is concerned that off lithium she has started to exhibit some paranoid ideation. ROS otherwise unremarkable except as noted elsewhere. OBJECTIVE:   Blood pressure (!) 101/54, pulse 78, temperature 98.2 °F (36.8 °C), resp. rate 18, height 5' 5\" (1.651 m), weight 221 lb 5.5 oz (100.4 kg), SpO2 96 %. Gen: Patient is in no acute distress. Lungs: CTAB. Heart: RRR.  Abd: S, NT, ND, BS present. Exremities: Warm. Edema is much improved. No results found for this or any previous visit (from the past 12 hour(s)). CT head: No acute intracranial finding. Irena Garza MD, FACP  Best contact is via Pager: [phone], or via hospital  at [phone]. Do NOT use Perfect Serve.

## 2023-02-02 NOTE — PROGRESS NOTES
Problem: Falls - Risk of  Goal: *Absence of Falls  Description: Document Robin Lowry Fall Risk and appropriate interventions in the flowsheet.   Outcome: Progressing Towards Goal  Note: Fall Risk Interventions:

## 2023-02-02 NOTE — PROGRESS NOTES
PHYSICAL THERAPY EVALUATION/DISCHARGE  Patient: Jennifer Hilliard (47 y.o. female)  Date: 2/2/2023  Primary Diagnosis: Lithium toxicity [T56.891A]       Precautions:   Fall      ASSESSMENT  Based on the objective data described below, the patient presents with LE decreased strength, BUE/LE edema, impaired standing balance, slow gait, decreased activity tolerance, and overall decreased functional independence. Pt in bed on arrival, eager to get up and participate in PT. Pt moving well with RW and demonstrates I bed mobility, and SBA to S with transfers and ambulation. She reports an inability to ambulate right before coming to the hospital due to BLE jerking movement and having to use a wheelchair. She is very pleased with her progress and is agreeable to discharging home with HHPT. Functional Outcome Measure: The patient scored 20/28 on the Tinetti outcome measure which is indicative of a moderate fall risk. Other factors to consider for discharge: close to baseline, support at home     Further skilled acute physical therapy is not indicated at this time. PLAN :  Recommendation for discharge: (in order for the patient to meet his/her long term goals)  Physical therapy at least 2 days/week in the home     This discharge recommendation:  A follow-up discussion with the attending provider and/or case management is planned    IF patient discharges home will need the following DME: patient owns DME required for discharge       SUBJECTIVE:   Patient stated please tell Dr. Marsha Blake that I don't have to go to rehab.     OBJECTIVE DATA SUMMARY:   HISTORY:    Past Medical History:   Diagnosis Date    Arrhythmia 2014    atrial fibrillation 2014, sick sinus syndrome: [facility]    Arthritis     Rheumatoid    Bipolar affect, depressed ([facility]     Bipolar affective disorder ([facility] 3/31/2010    Chronic pain 2018    right shoulder    Colon polyps 03/31/2010    also 5/2018: colon polyps    Depression Diarrhea 07/19/2013    D/t alpha gal allergy says patient    Epigastric pain 6/5/2015    GERD (gastroesophageal reflux disease)     occasiional only; controlled with med    Hemispheric carotid artery syndrome No stroke    Neuro: Jadon Sharps  (CT scan head/neck negative 4/2018 in [facility])    Hyperlipidemia 3/31/2010    Hypothyroidism 3/31/2010    Long term current use of anticoagulant therapy     Lupus ([facility]     patient denies-says she has RA    Migraine 03/31/2010    has them x2 a month: last one 5/23/2018    Psychotic disorder ([facility]     S/P ablation of atrial fibrillation 05/13/2014    S/P cardiac pacemaker procedure 9/16/2014 9/16/14 Medtronic MRI compatible dual chamber pacemaker implant    Sleep apnea     unable to tolerate CPAP    Stool color black     Thyroid disease     Tick-borne disease     Alpha Gal allergy: no eat pork, beef, milk (Meat allergy showed up on a allergy test)    Umbilical hernia without obstruction and without gangrene 6/22/2020    Urge incontinence 3/31/2010    Vitamin D deficiency 3/31/2015     Past Surgical History:   Procedure Laterality Date    COLONOSCOPY N/A 4/27/2018    COLONOSCOPY performed by Sal Nguyen MD at [facility] AM[facility]UL[facility] OR    COLONOSCOPY N/A 11/25/2020    COLONOSCOPY, EGD performed by Darren Sevilla MD at [address]  4/27/2018         Bill Fitzgerald  4/27/2018         HX BUNIONECTOMY      HX GI  2010, 5/2018    egd, colonoscopy    HX HERNIA REPAIR  01/11/2021    HX HERNIA REPAIR  01/2021    HX HERNIA REPAIR  04/3851    4 umbillical     HX HYSTERECTOMY      HX ORTHOPAEDIC  2000's    removed bone spurs from R & L hand    HX ORTHOPAEDIC  1990s?     left bunionectomy     HX OTHER SURGICAL  07/2014    2 shocks to heart & ablations    HX OTHER SURGICAL  01/11/2021    incarcerated incisional hernia surgery    HX PACEMAKER  2014    On the right side    HX PACEMAKER PLACEMENT  2015    HX SHOULDER REPLACEMENT Right 2017    HX TONSILLECTOMY  15 yrs old    OK UNLISTED PROCEDURE CARDIAC SURGERY  5/2014    ablation       Prior level of function: I without an ad, but a day or so just prior to coming to the hospital was unable to ambulate. I adls and doesn't drive.   Personal factors and/or comorbidities impacting plan of care: none    Home Situation  Home Environment: Private residence  # Steps to Enter: 5  Rails to Enter: Yes  Hand Rails : Right  One/Two Story Residence: One story  Living Alone: No  Support Systems: Spouse/Significant Other  Patient Expects to be Discharged to[de-identified] Home  Current DME Used/Available at Home: Walker, rolling, Grab bars, Raised toilet seat, Shower chair, Cane, straight  Tub or Shower Type: Tub/Shower combination (has access to walk-in shower)    EXAMINATION/PRESENTATION/DECISION MAKING:   Critical Behavior:  Neurologic State: Alert  Orientation Level: Oriented to person  Cognition: Follows commands   Range Of Motion:  AROM: Within functional limits         Strength:    Strength: Generally decreased, functional      Tone & Sensation:   Tone: Normal         Coordination:  Coordination: Within functional limits     Functional Mobility:  Bed Mobility:     Supine to Sit: Supervision     Scooting: Supervision  Transfers:  Sit to Stand: Stand-by assistance  Stand to Sit: Modified independent        Bed to Chair: Stand-by assistance;Supervision              Balance:   Sitting: Intact  Ambulation/Gait Training:  Distance (ft): 150 Feet (ft)  Assistive Device: Walker, rolling;Gait belt  Ambulation - Level of Assistance: Stand-by assistance     Gait Description (WDL): Exceptions to WDL  Gait Abnormalities: Decreased step clearance        Base of Support: Widened        Step Length: Left shortened;Right shortened     Functional Measure:  Tinetti test:    Sitting Balance: 1  Arises: 2  Attempts to Rise: 2  Immediate Standing Balance: 1  Standing Balance: 1  Nudged: 1  Eyes Closed: 1  Turn 360 Degrees - Continuous/Discontinuous: 1  Turn 360 Degrees - Steady/Unsteady: 1  Sitting Down: 1  Balance Score: 12 Balance total score  Indication of Gait: 1  R Step Length/Height: 1  L Step Length/Height: 1  R Foot Clearance: 1  L Foot Clearance: 1  Step Symmetry: 1  Step Continuity: 1  Path: 1  Trunk: 0  Walking Time: 0  Gait Score: 8 Gait total score  Total Score: 20/28 Overall total score         Tinetti Tool Score Risk of Falls  <19 = High Fall Risk  19-24 = Moderate Fall Risk  25-28 = Low Fall Risk  Tinetti ME. Performance-Oriented Assessment of Mobility Problems in Elderly Patients. [facility] 66; [id number]. (Scoring Description: PT Bulletin Feb. 10, 1993)    Older adults: Sherryle Hobby et al, 2009; n = 1000 [facility] elderly evaluated with ABC, JUANITA, ADL, and IADL)  · Mean JUANITA score for males aged 69-68 years = 26.21(3.40)  · Mean JUANITA score for females age 69-68 years = 25.16(4.30)  · Mean JUANITA score for males over 80 years = 23.29(6.02)  · Mean JUANITA score for females over 80 years = 17.20(8.32)      Pain Rating:  None reported    Activity Tolerance:   Good and SpO2 stable on RA      After treatment patient left in no apparent distress:   Sitting in chair and Call bell within reach    COMMUNICATION/EDUCATION:   The patients plan of care was discussed with: Occupational therapist and Registered nurse. Fall prevention education was provided and the patient/caregiver indicated understanding.     Thank you for this referral.  Brook Goodson, PT

## 2023-02-02 NOTE — PROGRESS NOTES
Problem: Falls - Risk of  Goal: *Absence of Falls  Description: Document Jessica Mcguire Fall Risk and appropriate interventions in the flowsheet.   Outcome: Progressing Towards Goal  Note: Fall Risk Interventions:

## 2023-02-03 ENCOUNTER — HOME HEALTH ADMISSION (OUTPATIENT)
Dept: HOME HEALTH SERVICES | Facility: HOME HEALTH | Age: 73
End: 2023-02-03
Payer: MEDICARE

## 2023-02-03 VITALS
DIASTOLIC BLOOD PRESSURE: 58 MMHG | HEIGHT: 65 IN | BODY MASS INDEX: 36.88 KG/M2 | RESPIRATION RATE: 16 BRPM | SYSTOLIC BLOOD PRESSURE: 110 MMHG | HEART RATE: 70 BPM | OXYGEN SATURATION: 97 % | TEMPERATURE: 97.9 F | WEIGHT: 221.34 LBS

## 2023-02-03 PROCEDURE — 74011000250 HC RX REV CODE- 250: Performed by: INTERNAL MEDICINE

## 2023-02-03 PROCEDURE — 74011250637 HC RX REV CODE- 250/637: Performed by: INTERNAL MEDICINE

## 2023-02-03 PROCEDURE — 99239 HOSP IP/OBS DSCHRG MGMT >30: CPT | Performed by: INTERNAL MEDICINE

## 2023-02-03 RX ORDER — LITHIUM CARBONATE 300 MG/1
300 TABLET, FILM COATED, EXTENDED RELEASE ORAL
Qty: 30 TABLET | Refills: 0 | Status: SHIPPED | OUTPATIENT
Start: 2023-02-03

## 2023-02-03 RX ORDER — TIMOLOL MALEATE 5 MG/ML
1 SOLUTION/ DROPS OPHTHALMIC DAILY
Status: DISCONTINUED | OUTPATIENT
Start: 2023-02-03 | End: 2023-02-03 | Stop reason: HOSPADM

## 2023-02-03 RX ADMIN — ACETAMINOPHEN 325MG 650 MG: 325 TABLET ORAL at 09:41

## 2023-02-03 RX ADMIN — APIXABAN 5 MG: 5 TABLET, FILM COATED ORAL at 08:32

## 2023-02-03 RX ADMIN — HYDROXYCHLOROQUINE SULFATE 200 MG: 200 TABLET ORAL at 08:32

## 2023-02-03 RX ADMIN — LATANOPROST 1 DROP: 50 SOLUTION OPHTHALMIC at 08:32

## 2023-02-03 RX ADMIN — LEVOTHYROXINE SODIUM 50 MCG: 0.05 TABLET ORAL at 08:32

## 2023-02-03 RX ADMIN — SODIUM CHLORIDE, PRESERVATIVE FREE 10 ML: 5 INJECTION INTRAVENOUS at 06:41

## 2023-02-03 RX ADMIN — VITAM B12 100 MCG: 100 TAB at 08:32

## 2023-02-03 RX ADMIN — PREGABALIN 200 MG: 150 CAPSULE ORAL at 08:31

## 2023-02-03 RX ADMIN — PANTOPRAZOLE SODIUM 40 MG: 40 TABLET, DELAYED RELEASE ORAL at 06:41

## 2023-02-03 NOTE — PROGRESS NOTES
Reviewed all discharge information with patient all questions answered appropriately  PIV removed  Pt has all home belongings packed  Pt wheeled to car via wheel chair  Pt denied pain on discharge  PT left into care via [address] with

## 2023-02-03 NOTE — PROGRESS NOTES
Problem: Falls - Risk of  Goal: *Absence of Falls  Description: Document Lettie Duverney Fall Risk and appropriate interventions in the flowsheet. Outcome: Progressing Towards Goal  Note: Fall Risk Interventions:                                Problem: Falls - Risk of  Goal: *Absence of Falls  Description: Document Jenna Fall Risk and appropriate interventions in the flowsheet.   Outcome: Progressing Towards Goal  Note: Fall Risk Interventions:

## 2023-02-03 NOTE — PROGRESS NOTES
Bedside shift change report given to [address] (oncoming nurse) by Chyna Gonzalez (offgoing nurse). Report included the following information SBAR, Kardex, Intake/Output, and Cardiac Rhythm  .

## 2023-02-03 NOTE — PROGRESS NOTES
Transition of Care Plan:    RUR: 11%  Disposition: home with spouse, [facility]   If SNF or IPR: Date FOC offered:  Date FOC received:  Date authorization started with reference number:  Date authorization received and expires:  Accepting facility:  Follow up appointments: PCP  DME needed: N/A  Transportation at Discharge: spouse   Charli Hernández or means to access home: yes    IM Medicare Letter: delivered on 2/3/23  Is patient a [place] and connected with the [address]? No              If yes, was [place] transfer form completed and [facility] notified? Caregiver Contact: Vidhi Goldberg (spouse)  Discharge Caregiver contacted prior to discharge? Pt contacted   Care Conference needed?: No    Chart reviewed. CM aware of discharge order. Pt returning home with spouse. Spouse will transport home. Met with pt at bedside to review recommendations for [facility]. Pt prefers [facility]. Referral sent and [facility] able to accept for services. Information added to AVS for review. 2nd IMM letter delivered today at bedside. Signed copy placed into chart. Please see scanned documents. No further CM needs identified at this time. Ready for d/c from CM standpoint. Care Management Interventions  PCP Verified by CM: Yes  Palliative Care Criteria Met (RRAT>21 & CHF Dx)?: No  Mode of Transport at Discharge:  Other (see comment) (spouse)  Transition of Care Consult (CM Consult): Discharge Planning  Discharge Durable Medical Equipment: No  Physical Therapy Consult: Yes  Occupational Therapy Consult: Yes  Speech Therapy Consult: No  Support Systems: Spouse/Significant Other  Confirm Follow Up Transport: Family  The Plan for Transition of Care is Related to the Following Treatment Goals : [facility]  The Patient and/or Patient Representative was Provided with a Choice of Provider and Agrees with the Discharge Plan?: Yes  Freedom of Choice List was Provided with Basic Dialogue that Supports the Patient's Individualized Plan of Care/Goals, Treatment Preferences and Shares the Quality Data Associated with the Providers?: Yes  Discharge Location  Patient Expects to be Discharged to[de-identified] Home with home health [facility])    NAVEEN Lazo   Care Manager, [facility]  [phone]

## 2023-02-03 NOTE — PROGRESS NOTES
Problem: Falls - Risk of  Goal: *Absence of Falls  Description: Document Aneudy Moniquejocelyn Fall Risk and appropriate interventions in the flowsheet.   Outcome: Progressing Towards Goal  Note: Fall Risk Interventions:                                Problem: General Medical Care Plan  Goal: *Absence of infection signs and symptoms  Outcome: Progressing Towards Goal     Problem: Anxiety  Goal: *Alleviation of anxiety  Outcome: Progressing Towards Goal

## 2023-02-03 NOTE — DISCHARGE SUMMARY
Physician Discharge Summary     Patient ID:  Julián Huang  [id number]  74 y.o.  1950    Admit date: 1/30/2023    Discharge date: 2/3/2023    Admission Diagnoses: Lithium toxicity [T56.891A]    Discharge Diagnoses:  Principal Diagnosis                                               Active Problems:    Lithium toxicity (1/30/2023)       Consults: None    Significant Diagnostic Studies:     CT head: No acute intracranial finding. Hospital Course: Ms. Julián Huang is a patient of mine who presented with the problems above. See the initial H and P for full details. CHIEF COMPLAINT: Altered mental status     HISTORY OF PRESENT ILLNESS:     Adry Guerin is a 75 y.o.  female who presents with past medical history of systolic congestive heart failure, bipolar disorder on lithium is coming the hospital chief complaints of confusion. According to family, patient was in his usual of health until about 3 days ago when she started having confusion. She started having some worsening of lower extremity edema was prescribed increasing doses of diuretics recently. She started acting different and was also refusing feeds and not acting herself in the last 3 days. She had a lithium level drawn and got a call today saying that her lithium level is high at 1.7 and was advised to go to the emergency department for further evaluation. On arrival today, noted to have borderline low blood pressure of 93/44. On labs CBC is normal.  UA is normal.  CMP is normal.  LFTs are normal.  Lithium level is pending. CT head is pending. By problem. .. Lithium toxicity (1/30/2023): She was admitted and treated with IV hydration. Her Li levels Normalized. Bipolar disorder: We have been holding lithium initially due to toxicity. Now she is beginning to exhibit paranoia, per .  I have reached out to her psychiatrist, Wanda Floyd, and discussed options--the plan for now will be to reinstitute lithium at a lower dose. She will follow up soon with her psychiatrist.   Tremor: She has noted worsening of her underlying essential tremor, as well as some myoclonic jerks. This is possibly due to deconditioning/stress (augmented physiologic tremor), vs neuronal excitation due to withdrawal of lithium. She has outpatient follow up with neurology. Acute metabolic encephalopathy: This has improved. Systolic CHF: EF 41-10% in October 2022; has ICD. HTN, intially a bit low on admission: We have been holding propranolol. Continue lasix and Entresto as tolerated. Atrial fibrillation: On eliquis. GERD: Stable. On protonix. Hypothyroidism: Continue levothyroxine. Rheumatoid arthritis. On plaquenil. Debility: PT/OT recommend home health. CODE: Full code. Discharge Exam:  Blood pressure (!) 110/58, pulse 70, temperature 97.9 °F (36.6 °C), resp. rate 16, height 5' 5\" (1.651 m), weight 221 lb 5.5 oz (100.4 kg), SpO2 97 %. Gen: Patient is in no acute distress. Lungs: CTAB. Heart: RRR. Abd: S, NT, ND, BS present. Exremities: Warm. Disposition: home    Patient Instructions:   Current Discharge Medication List        CONTINUE these medications which have CHANGED    Details   lithium carbonate SR (LITHOBID) 300 mg CR tablet Take 1 Tablet by mouth nightly. Qty: 30 Tablet, Refills: 0           CONTINUE these medications which have NOT CHANGED    Details   levothyroxine (SYNTHROID) 50 mcg tablet TAKE 1 TABLET BY MOUTH EVERY MORNING  Qty: 90 Tablet, Refills: 3    Associated Diagnoses: Hypothyroidism, unspecified type      topiramate (TOPAMAX) 100 mg tablet TAKE 1 TABLET BY MOUTH TWO TIMES A DAY. Qty: 180 Tablet, Refills: 1    Associated Diagnoses: Essential tremor; Chronic post-traumatic headache, intractable      furosemide (LASIX) 40 mg tablet Take 1 Tablet by mouth two (2) times a day.   Qty: 60 Tablet, Refills: 11    Associated Diagnoses: Lower extremity edema      fluocinoNIDE (LIDEX) 0.05 % topical cream APPLY TO AFFECTED AREA TWICE A DAY  Qty: 60 g, Refills: 0      dimethicone/colloidal oatmeal (DAILY MOISTURIZING LOTION EX) Apply 1 Film to affected area as needed. acetaminophen (TYLENOL) 500 mg tablet Take 500 mg by mouth every six (6) hours as needed for Fever or Pain. Take 1 tablet by mouth every 6 hours prn moderate pain      pregabalin (LYRICA) 200 mg capsule Take 200 mg by mouth two (2) times a day. zolpidem (AMBIEN) 10 mg tablet Take 10 mg by mouth nightly as needed for Sleep. Entresto 24-26 mg tablet Take 1 Tablet by mouth two (2) times a day. ergocalciferol (ERGOCALCIFEROL) 1,250 mcg (50,000 unit) capsule TAKE 1 CAPSULE BY MOUTH ONE TIME PER WEEK  Qty: 12 Capsule, Refills: 1    Associated Diagnoses: Vitamin D deficiency      esomeprazole (NEXIUM) 40 mg capsule Take 40 mg by mouth daily. apixaban (ELIQUIS) 5 mg tablet Take 1 Tablet by mouth two (2) times a day. Qty: 56 Tablet, Refills: 0      cyanocobalamin (VITAMIN B12) 100 mcg tablet Take 100 mcg by mouth daily. timolol (TIMOPTIC) 0.5 % ophthalmic solution INSTILL 1 DROP 1 TIME EVERY DAY INTO BOTH EYES EVERY MORNING      hydrOXYchloroQUINE (PLAQUENIL) 200 mg tablet Take 200 mg by mouth two (2) times a day. latanoprost (XALATAN) 0.005 % ophthalmic solution Administer 1 Drop to both eyes two (2) times a day. ALPRAZolam (XANAX) 0.5 mg tablet Take 0.5 mg by mouth nightly. STOP taking these medications       OTHER Comments:   Reason for Stopping:         zinc 50 mg tab tablet Comments:   Reason for Stopping:         propranolol LA (INDERAL LA) 60 mg SR capsule Comments:   Reason for Stopping:         omeprazole (PRILOSEC) 40 mg capsule Comments:   Reason for Stopping:             Activity: PT/OT per Home Health  Diet: Resume previous diet      Follow-up with Dr. Marsha Blake in about a week. Follow up with psychiatrist Merly RINALDI.      Signed:  Amy Zacarias MD  2/3/2023  8:34 AM    Note: Greater than 30 minutes were spent on activities related to this discharge.

## 2023-02-03 NOTE — PROGRESS NOTES
Problem: Falls - Risk of  Goal: *Absence of Falls  Description: Document Darlen [place] Fall Risk and appropriate interventions in the flowsheet.   2/3/2023 1046 by Luigi Carmichael RN  Outcome: Progressing Towards Goal  Note: Fall Risk Interventions:                             2/3/2023 1039 by Luigi Carmichael RN  Outcome: Progressing Towards Goal  Note: Fall Risk Interventions:

## 2023-02-03 NOTE — DISCHARGE INSTRUCTIONS
PATIENT DISCHARGE INSTRUCTIONS      PATIENT DISCHARGE INSTRUCTIONS    Marjorie Valencia / [id number] : 1950    Admitted 2023 Discharged: 2/3/2023       It is important that you take the medication exactly as they are prescribed. Keep your medication in the bottles provided by the pharmacist and keep a list of the medication names, dosages, and times to be taken in your wallet. Do not take other medications without consulting your doctor.      What to do at Home    Recommended Diet: Resume previous diet    Recommended Activity: PT/OT per [address]

## 2023-02-03 NOTE — PROGRESS NOTES
1008 - PCP hospital follow-up transitional care appointment has been scheduled with Dr. Nba Alcaraz. Jonelle Lu on 2/8/23 at 1130. Pending patient discharge. Juan Nelson, Care Management Assistant       Attempted to schedule hospital follow up for PCP appointment. Sent a message to PCP office to find patient an appointment. Awaiting callback from PCP office.  Sixto Fernandez

## 2023-02-04 ENCOUNTER — HOME CARE VISIT (OUTPATIENT)
Dept: SCHEDULING | Facility: HOME HEALTH | Age: 73
End: 2023-02-04
Payer: MEDICARE

## 2023-02-04 PROCEDURE — 400018 HH-NO PAY CLAIM PROCEDURE

## 2023-02-04 PROCEDURE — G0299 HHS/HOSPICE OF RN EA 15 MIN: HCPCS

## 2023-02-04 NOTE — HOME HEALTH
Skilled reason for admission/summary of clinical condition:Per H&P:HISTORY OF PRESENT ILLNESS:   Delta Ellington is a 75 y.o.  female who presents with past medical history of systolic congestive heart failure, bipolar disorder on lithium is coming the hospital chief complaints of confusion. According to family, patient was in his usual of health until about 3 days ago when she started having confusion. She started having some worsening of lower extremity edema was prescribed increasing doses of diuretics recently. She started acting different and was also refusing feeds and not acting herself in the last 3 days. She had a lithium level drawn and got a call today saying that her lithium level is high at 1.7 and was advised to go to the emergency department for further evaluation. Treated with IV hydration and leveles normalized. Diagnosis: Toxic effect of oth metals, accidental (unintentional  Subjective:I didnt take my medication wrong did I  Caregiver: Naomi Patel- spouse present and active in care. Spouse is now giving medications to patient. Medications reconciled and all medications are available in the home this visit. The following education was provided regarding medications: medication interactions and look alike medications:  Patient/CG able to demonstrate knowledge through teach back with 95 percent accuracy. Medications are effective at this time. notified of any discrepancies/medication interactions n/a   A list of reconciled medications has been given to the patient/caregiver and a copy has been uploaded to media. Home health supplies by type and quantity ordered/delivered this visit include: none  Patient/caregiver instructed on plan of care and are agreeable to plan of care at this time.     Clinician reviewed orientation to home health booklet with patient/caregiver including agency phone number, agency complaint process, state hotline number, as well as joint commission's quality hotline number. Consent forms signed. Patient at risk for falls Yes:   Recommended requesting PT/OT orders due to fall risk YES:   Patient response to recommended requesting of PT/OT orders: ordered    Discharge planning discussed with patient and caregiver. Discharge planning as follows: When goals of education on disease process complete and goals of therapy met  Patient/caregiver did verbalize agreement with discharge planning. Clinical Assessment per assessment    Written Teaching Material Utilized: dischage instruction and [facility] handbook    Specific plan for next visit: medication assess    Plan of care and admission to home health status called to attending physician. The following practitioner has agreed to sign the ongoing POC Dr Manjarrez, and was notified of the following: visit frequency of 1w1, 2w3, 1w6, 3prn    Interdisciplinary communication with: CarmenDadeclane Angry for PT    PCP: Dr Platt Click  Next scheduled doctor appointment: to [facility]  Patient/Caregiver instructed to keep follow up appointment because lack of follow through with physician appointments could result in discontinuation of home care services for non-compliance. Patient/Caregiver verbalize knowledge of above through teach back with 95 percent accuracy. Emergency Preparedness: Patient/Caregiver instructed in the following:  Have one gallon of water per person for at least 3 days on hand. Have non-perishable food for at least 3 days that do not need to be cooked. Have flashlights and batteries. Charge your cell phones and any back up lithium batteries for your cell phones. Have 3+ days of back up oxygen in your home. Have a phone in your home that is hard wired and does not require power. Have medication for a week in your home. Make sure you have a caregiver in the home to provide care in case your home health nurse cannot get to your house.   Make sure you have all of your paperwork i.e. written emergency preparedness plan, Identification, insurance cards, DME phone number, physician and pharmacy phone number, agency phone number, and your medications in one place for easy access and in a zip lock bag to protect them. Take your Admission Handbook, written emergency preparedness plan, written medication list and folder if you relocate in the event of an emergency, if possible. Call agency if you relocate so we can contact you. Patient/Caregiver verbalize knowledge of above through teach back with 95 percent accuracy.

## 2023-02-06 ENCOUNTER — PATIENT OUTREACH (OUTPATIENT)
Dept: CASE MANAGEMENT | Age: 73
End: 2023-02-06

## 2023-02-06 VITALS
SYSTOLIC BLOOD PRESSURE: 118 MMHG | TEMPERATURE: 98.3 F | RESPIRATION RATE: 18 BRPM | DIASTOLIC BLOOD PRESSURE: 80 MMHG | OXYGEN SATURATION: 97 % | HEART RATE: 76 BPM

## 2023-02-06 NOTE — PROGRESS NOTES
23 at 9:55am: Care Transitions Outreach     Call within 2 business days of discharge: Yes   Care Transitions Nurse (CTN) met briefly with patient and patient's spouse, Ailyn Whitt (on [address] dated 10-6-22), and both state they are unable to have a conversation at this time as patient is on her way to her psychology appointment with Nitin Jensen NP. Mr. Sahra Lizarraga requests a call back this afternoon after 3:30pm. CTN will attempt to reach patient/ again and CTN will continue to monitor. Patient: Yasmin López Patient : 1950 MRN: [id number]    Last Discharge [address]       Date Complaint Diagnosis Description Type Department Provider    23 Altered mental status Lithium toxicity, accidental or unintentional, initial encounter ED to Hosp-Admission (Discharged) (ADMIT) Glenn Oviedo MD; Luis M Stuart Chr... Was this an external facility discharge? No     Noted following upcoming appointments from discharge chart review:   [facility] Viviane Holm follow up appointment(s):   Future Appointments   Date Time Provider Radha Yoon   2023 To Be Determined Chris Sarmiento,  [facility] [address]   2023  1:00 PM Traci Lozano  [place] [address]   2023 11:30 AM Santa Berry MD [facility] BS AMB   2023 To Be Determined Oscar Min, RN [address] [facility] [address]   3/7/2023  2:00 PM [address] DEXA 1 [facility]   2023  1:30 PM JANICE BeltranC PETR BS AMB     Non-[facility] follow up appointment(s): Nitin Jensen NP/Psy 23.         23 at 3:59pm:  Care Transitions Nurse (CTN) attempted to reach patient/ by phone for transitions of care follow-up, unable to reach patient/. CTN will attempt to reach patient/ again and CTN will continue to monitor. 23 at 12:27pm:  CTN met with patient briefly by phone for transitions of care follow-up.  Patient states she is unable to have a conversation at this time and requests a call back today after 1:30pm.  CTN will attempt to reach patient again and CTN will continue to monitor. 23 at 2:54pm: Care Transitions Initial Call    Call within 2 business days of discharge: Yes     Patient: Jamie Cadet Patient : 1950 MRN: [id number]    Last Discharge  Street       Date Complaint Diagnosis Description Type Department Provider    23 Altered mental status Lithium toxicity, accidental or unintentional, initial encounter ED to Hosp-Admission (Discharged) (ADMIT) Charlse Jaramillo MD; Anton Vargas, Chr... Was this an external facility discharge? No     Challenges to be reviewed by the provider   Additional needs identified to be addressed with provider: yes  -  reports patient becomes short of breath upon exertion, resolves with rest.   - Swelling in bilateral lower extremities, from knees through ankles. -  reports patient takes lithium carbonate 150mg every morning, in addition to lithium carbonate SR 300mg CR nightly. Lithium carbonate 150mg was added to patient's list of medications as a 'Patient Reported' medication.   -  states patient is out of Lyrica 200mg cap. Please send refill to pharmacy if appropriate. Lyrica was marked as 'Not Taking' on today's medication reconciliation. Method of communication with provider:  chart routing to PCP.      Component      Latest Ref Rng & Units 2023           8:55 AM 11:45 PM 10:17 AM   RBC      3.80 - 5.20 M/uL 3.68 (L) 3.48 (L) 3.88   HGB      11.5 - 16.0 g/dL 11.3 (L) 10.5 (L) 11.9   HCT      35.0 - 47.0 % 34.1 (L) 31.9 (L) 36.4     Component      Latest Ref Rng & Units 2023           8:55 AM 11:45 PM 10:17 AM   PLATELET      857 - 605 K/uL 146 (L) 145 (L) 166     Component      Latest Ref Rng & Units 2023           8:55 AM 11:45 PM 10:17 AM   Chloride      97 - 108 mmol/L 112 (H) 110 (H) 104     Component      Latest Ref Rng & Units 2023           8:55 AM 11:45 PM 10:17 AM   BUN/Creatinine ratio      12 - 20   21 (H) 33 (H) 38 (H)     Component      Latest Ref Rng & Units 2023           8:55 AM 11:45 PM 10:17 AM   Protein, total      6.4 - 8.2 g/dL 6.0 (L) 5.6 (L) 6.5   Albumin      3.5 - 5.0 g/dL 3.2 (L) 3.0 (L) 3.5     COVID-19 related testing was not completed during this admission. Advance Care Planning:   Does patient have an Advance Directive: not on file; education provided to patient's . Inpatient Readmission Risk score: Unplanned Readmit Risk Score: 11    Was this a readmission? no   Patient stated reason for the admission: N/A, telephonic assessment completed with patient's , Katheryn Reich (on [address] [place] [address] Road dated 10-6-22). Patients top risk factors for readmission:  Medical condition - Bipolar affective disorder and recent lithium toxicity per chart. Interventions to address risk factors: Obtained and reviewed discharge summary and/or continuity of care documents and Education of patient/family/caregiver/guardian to support self-management-Education provided regarding signs/symptoms of AMS and lithium toxicity, patient's  verbalized an understanding. Care Transition Nurse (CTN) contacted the  patient's , Katheryn Reich (on [address] [place] [address] Road dated 10-6-22),  by telephone to perform post hospital discharge assessment. Verified name and  with  the   as identifiers. Provided introduction to self, and explanation of the CTN role. CTN reviewed discharge instructions, medical action plan and red flags with  the   who verbalized understanding. Were discharge instructions available to patient/? yes. Reviewed appropriate site of care based on symptoms and resources available to patient including: PCP, Specialist, Urgent [address], and When to call 911.   The  was  given an opportunity to ask questions and does not have any further questions or concerns at this time. The    agrees to contact the PCP office for questions related to patient's healthcare. Medication reconciliation was performed with  the  , who verbalizes understanding of administration of home medications. Advised obtaining a 90-day supply of all daily and as-needed medications. Referral to Pharm D needed: no     Home Health/Outpatient orders at discharge: home health care, PT, OT, and [address]: [address]  Date of initial visit:  states SN visited patient on 2-4-23. PT is scheduled to visit patient on 2-9-23 per chart. Durable Medical Equipment ordered at discharge: None  [address] received: n/a    Was patient discharged with a pulse oximeter? no    Discussed follow-up appointments. If no appointment was previously scheduled, appointment scheduling offered:  N/A, patient has JONO appointment scheduled with her PCP on 2-8-23 . Is follow up appointment scheduled within 7 days of discharge? yes.    [address] Viv[address] Holm follow up appointment(s):   Future Appointments   Date Time Provider Radha Yoon   2/8/2023 11:30 AM Bi Dozier MD [facility] BS AMB   2/8/2023 To Be Determined Patria Choi OT [facility]   2/9/2023 10:00 AM Vicky Gonsalez PT [facility]   2/9/2023 To Be Determined Kizzie Dubin, RN [facility]   2/14/2023 To Be Determined Kizzie Dubin RN [facility]   2/17/2023 To Be Determined Kizzie Dubin RN [address]   2/21/2023 To Be Determined Kizzie Dubin RN [address] [facility]   2/23/2023 To Be Determined Kizzie Dubin RN [address]   2/27/2023 To Be Determined Shantel Dominguez LPN [facility]   3/6/2023 To Be Determined Shantel Dominguez LPN [address] [facility]   3/7/2023  2:00 PM [address] DEXA 1 [facility] REG   3/14/2023 To Be Determined Philadelphiasuleman Dominguez LPN [facility]   6/5/2023  1:30 PM Sarah Chapa, GIA HUMPHREYS BS [facility]   All above appointments were reviewed with patient's  and the  states he has written down the above appointments. Non-[facility] follow up appointment(s):  states patient saw Chery Alejandro NP/Psy on 2-6-23 as scheduled. Plan for follow-up call in 7-10 days based on severity of symptoms and risk factors. Plan for next call: self management-Review red flags of AMS and lithium toxicity, and follow up appointment-Evaluate if patient is attending follow-up appointments as scheduled, offer assistance with scheduling as needed. CTN provided contact information for future needs. Goals        Understands red flags post discharge. 2-7-23: Red flags of AMS and lithium toxicity reviewed with patient's , Ariel Mccall (on [address] dated 10-6-22), and the  verbalized an understanding.  denies patient having chest pain, denies fever/chills, and denies nausea/vomiting.  reports patient becomes short of breath upon exertion and states SOB upon exertion resolves with rest.  reports swelling in bilateral lower extremities, from knees through ankles.  states patient utilizes a regular diet at home, appetite is fair.  reports patient has had 3 falls in the last 6 months, and a total of 4 falls in the last 12 months, states patient did not sustain traumatic injury during any of the 4 falls. Care Transitions Nurse will review red flags again on next phone conversation with patient/.  Radha Mrecado

## 2023-02-07 ENCOUNTER — HOME CARE VISIT (OUTPATIENT)
Dept: HOME HEALTH SERVICES | Facility: HOME HEALTH | Age: 73
End: 2023-02-07
Payer: MEDICARE

## 2023-02-07 RX ORDER — LITHIUM CARBONATE 150 MG/1
150 CAPSULE ORAL DAILY
COMMUNITY

## 2023-02-07 NOTE — PROGRESS NOTES
Physician Progress Note      PATIENT:               Leon Nayak  CSN #:                  [id number]  :                       1950  ADMIT DATE:       2023 12:07 PM  [address] [place] DATE:        2/3/2023 10:44 AM  RESPONDING  PROVIDER #:        Oksana Walker MD          QUERY TEXT:    Pt admitted with Lithium toxicity. Pt noted to have encephalopathy. If possible, please document in progress notes and discharge summary the relationship, if any, between Lithium toxicity and encephalopathy. The medical record reflects the following:  Risk Factors: Per ED admitted for AMS: \"66 y.o. female who presents presenting with confusion, nonsensical speech, and tremor over the last several days\". Noted to have lithium toxicity. Clinical Indicators:  ED final impression:  Lithium toxicity, accidental or unintentional, initial encounter    ED assessment: Neurological:  Mental Status: She is disoriented. Comments: Patient is confused from baseline, answers questions inappropriately.  1620 H&P  Lithium toxicity  Acute metabolic encephalopathy  She was told by her psychiatrist that lithium level was elevated at 1.7 and is advised to go to the emergency department. Neurology assessment noted in H&P as:  Neurological: headache, dizziness, confusion, focal weakness, paresthesia,  Speech difficulties, memory loss, gait difficulty. Lithium toxicity (2023): Careful IV hydration to promote elimination. I'll increase the fluid rate, as she is still a little hypotensive. Continue supportive care. RN Assessment  Neuro: [address] x 4, neuro assessment, CT, Lithium level    23 13:25  Lithium level: 2.02 (HH)    23 23:45  Lithium level: 1.68 ()      Treatment: Admit, Hospitalist consult, Labs: Lithium levels, IV hydration, CM consult, PT / OT consult, EKG, CT head, vitals per unit routine, cardiac monitoring.   Options provided:  -- Metabolic encephalopathy due to lithium  -- Toxic and metabolic encephalopathy due to lithium  -- Drug-induced encephalopathy due to Lithium  -- Other - I will add my own diagnosis  -- Disagree - Not applicable / Not valid  -- Disagree - Clinically unable to determine / Unknown  -- Refer to Clinical Documentation Reviewer    PROVIDER RESPONSE TEXT:    This patient has metabolic encephalopathy due to lithium.     Query created by: Lesley Miles on 2/7/2023 11:27 AM      Electronically signed by:  Elisha White MD 2/7/2023 1:28 PM

## 2023-02-07 NOTE — ACP (ADVANCE CARE PLANNING)
Patient's , Jose Delmi (on [address] dated 10-6-22), states patient does not have an ACP document, education provided to the .

## 2023-02-08 ENCOUNTER — OFFICE VISIT (OUTPATIENT)
Dept: INTERNAL MEDICINE CLINIC | Age: 73
End: 2023-02-08
Payer: MEDICARE

## 2023-02-08 VITALS
HEART RATE: 75 BPM | DIASTOLIC BLOOD PRESSURE: 48 MMHG | BODY MASS INDEX: 35.29 KG/M2 | OXYGEN SATURATION: 99 % | WEIGHT: 211.8 LBS | HEIGHT: 65 IN | TEMPERATURE: 97.4 F | SYSTOLIC BLOOD PRESSURE: 93 MMHG | RESPIRATION RATE: 16 BRPM

## 2023-02-08 DIAGNOSIS — T56.891A LITHIUM TOXICITY, ACCIDENTAL OR UNINTENTIONAL, INITIAL ENCOUNTER: ICD-10-CM

## 2023-02-08 DIAGNOSIS — G93.40 ENCEPHALOPATHY: ICD-10-CM

## 2023-02-08 DIAGNOSIS — Z09 HOSPITAL DISCHARGE FOLLOW-UP: Primary | ICD-10-CM

## 2023-02-08 DIAGNOSIS — E03.9 HYPOTHYROIDISM, UNSPECIFIED TYPE: ICD-10-CM

## 2023-02-08 NOTE — PROGRESS NOTES
1. \"Have you been to the ER, urgent care clinic since your last visit? Hospitalized since your last visit? Yes, 1/30/2023  Lithium toxicity    2. \"Have you seen or consulted any other health care providers outside of the [address] [place] [address] since your last visit? \" No     3. For patients aged 39-70: Has the patient had a colonoscopy / FIT/ Cologuard? Yes - no Care Gap present      If the patient is female:    4. For patients aged 41-77: Has the patient had a mammogram within the past 2 years? Yes - no Care Gap present      5. For patients aged 21-65: Has the patient had a pap smear?  NA - based on age or sex

## 2023-02-08 NOTE — PROGRESS NOTES
PROGRESS NOTE  Name: Jem Pinto   : 1950       ASSESSMENT/ PLAN:     Lithium toxicity (2023): She was admitted and treated with IV hydration. Her Li levels normalized. Recheck Li level again today. Bipolar disorder: Doing a bit worse. Continue close follow up with her psychiatrist, Shelly Power; the plan for now is to use lithium at a lower dose. Tremor: This is better. She has outpatient follow up with neurology. Acute metabolic encephalopathy: This has improved. Systolic CHF: EF 12-79% in 2022; has ICD. HTN, a bit low today: Continue lasix and Entresto as tolerated. Follow up with Cardiology. Atrial fibrillation: On eliquis. GERD: Stable. On protonix. Hypothyroidism: Continue levothyroxine. Rheumatoid arthritis. On plaquenil. Debility: PT/OT via home health. Neuropathy: May stop Lyrica for now. Follow-up and Dispositions    Return in about 4 months (around 2023) for HTN, etc.        I have reviewed the patient's medications and risks/side effects/benefits were discussed. Diagnosis(-es) explained to patient and questions answered. Literature provided where appropriate. SUBJECTIVE  Ms. Jem Pinto presents today acutely for     No chief complaint on file. She was in the hospital on my service;  - 2/3, for AMS and lithium toxicity. Hospital Course: Ms. Jem Pinto is a patient of mine who presented with the problems above. See the initial H and P for full details. CHIEF COMPLAINT: Altered mental status     HISTORY OF PRESENT ILLNESS:     Cam Jeff is a 75 y.o.  female who presents with past medical history of systolic congestive heart failure, bipolar disorder on lithium is coming the hospital chief complaints of confusion. According to family, patient was in his usual of health until about 3 days ago when she started having confusion.   She started having some worsening of lower extremity edema was prescribed increasing doses of diuretics recently. She started acting different and was also refusing feeds and not acting herself in the last 3 days. She had a lithium level drawn and got a call today saying that her lithium level is high at 1.7 and was advised to go to the emergency department for further evaluation. On arrival today, noted to have borderline low blood pressure of 93/44. On labs CBC is normal.  UA is normal.  CMP is normal.  LFTs are normal.  Lithium level is pending. CT head is pending. By problem. .. Lithium toxicity (1/30/2023): She was admitted and treated with IV hydration. Her Li levels Normalized. Bipolar disorder: We have been holding lithium initially due to toxicity. Now she is beginning to exhibit paranoia, per . I have reached out to her psychiatrist, Nitin Most, and discussed options--the plan for now will be to reinstitute lithium at a lower dose. She will follow up soon with her psychiatrist.   Tremor: She has noted worsening of her underlying essential tremor, as well as some myoclonic jerks. This is possibly due to deconditioning/stress (augmented physiologic tremor), vs neuronal excitation due to withdrawal of lithium. She has outpatient follow up with neurology. Acute metabolic encephalopathy: This has improved. Systolic CHF: EF 13-70% in October 2022; has ICD. HTN, intially a bit low on admission: We have been holding propranolol. Continue lasix and Entresto as tolerated. Atrial fibrillation: On eliquis. GERD: Stable. On protonix. Hypothyroidism: Continue levothyroxine. Rheumatoid arthritis. On plaquenil. Debility: PT/OT recommend home health. CODE: Full code. Now, she is back home. Her confusion and lethargy have not completely resolved. \"Sometimes she is not with us, she is in another place. I know that has to do with not having enough lithium. I can tells he is on the verge of being psychotic. \"    Tremor is better. No more myoclonus currently. She has had a visit with Gisella Clement this week. I am asked about Lyrica. She has been off of this for a couple of weeks, and hasn't really noticed any worsening of LE burning/stinging.  is concerned that the recently imposed no salt diet may have impaired the excretion of lithium. Past Medical History:   Diagnosis Date    Arrhythmia 2014    atrial fibrillation 2014, sick sinus syndrome: [facility]    Arthritis     Rheumatoid    Bipolar affect, depressed ([address]     Bipolar affective disorder ([address] 3/31/2010    Chronic pain 2018    right shoulder    Colon polyps 03/31/2010    also 5/2018: colon polyps    Depression     Diarrhea 07/19/2013    D/t alpha gal allergy says patient    Epigastric pain 6/5/2015    GERD (gastroesophageal reflux disease)     occasiional only; controlled with med    Hemispheric carotid artery syndrome No stroke    Neuro: Minnie Denney  (CT scan head/neck negative 4/2018 in [facility])    Hyperlipidemia 3/31/2010    Hypothyroidism 3/31/2010    Long term current use of anticoagulant therapy     Lupus ([address]     patient denies-says she has RA    Migraine 03/31/2010    has them x2 a month: last one 5/23/2018    Psychotic disorder (HCC)     S/P ablation of atrial fibrillation 05/13/2014    S/P cardiac pacemaker procedure 9/16/2014 9/16/14 Medtronic MRI compatible dual chamber pacemaker implant    Sleep apnea     unable to tolerate CPAP    Stool color black     Thyroid disease     Tick-borne disease     Alpha Gal allergy: no eat pork, beef, milk (Meat allergy showed up on a allergy test)    Umbilical hernia without obstruction and without gangrene 6/22/2020    Urge incontinence 3/31/2010    Vitamin D deficiency 3/31/2015        Current Outpatient Medications on File Prior to Visit   Medication Sig Dispense Refill    lithium carbonate 150 mg capsule Take 150 mg by mouth daily.  2-7-23: Patient's  states patient takes Lithium carbonate 150mg every morning. cyanocobalamin (VITAMIN B12) 500 mcg tablet Take 1,000 mcg by mouth daily. bumetanide (BUMEX) 2 mg tablet Take 2 mg by mouth daily. cephALEXin (KEFLEX) 250 mg capsule Take 250 mg by mouth daily. lithium carbonate SR (LITHOBID) 300 mg CR tablet Take 1 Tablet by mouth nightly. 30 Tablet 0    levothyroxine (SYNTHROID) 50 mcg tablet TAKE 1 TABLET BY MOUTH EVERY MORNING 90 Tablet 3    topiramate (TOPAMAX) 100 mg tablet TAKE 1 TABLET BY MOUTH TWO TIMES A DAY. 180 Tablet 1    furosemide (LASIX) 40 mg tablet Take 1 Tablet by mouth two (2) times a day. 60 Tablet 11    fluocinoNIDE (LIDEX) 0.05 % topical cream APPLY TO AFFECTED AREA TWICE A DAY 60 g 0    dimethicone/colloidal oatmeal (DAILY MOISTURIZING LOTION EX) Apply 1 Film to affected area as needed. acetaminophen (TYLENOL) 500 mg tablet Take 500 mg by mouth every six (6) hours as needed for Fever or Pain. Take 1 tablet by mouth every 6 hours prn moderate pain      pregabalin (LYRICA) 200 mg capsule Take 200 mg by mouth two (2) times a day. (Patient not taking: Reported on 2/7/2023)      zolpidem (AMBIEN) 10 mg tablet Take 10 mg by mouth nightly as needed for Sleep. Entresto 24-26 mg tablet Take 1 Tablet by mouth two (2) times a day. ergocalciferol (ERGOCALCIFEROL) 1,250 mcg (50,000 unit) capsule TAKE 1 CAPSULE BY MOUTH ONE TIME PER WEEK 12 Capsule 1    esomeprazole (NEXIUM) 40 mg capsule Take 40 mg by mouth daily. apixaban (ELIQUIS) 5 mg tablet Take 1 Tablet by mouth two (2) times a day. 56 Tablet 0    timolol (TIMOPTIC) 0.5 % ophthalmic solution INSTILL 1 DROP 1 TIME EVERY DAY INTO BOTH EYES EVERY MORNING      hydrOXYchloroQUINE (PLAQUENIL) 200 mg tablet Take 200 mg by mouth two (2) times a day. latanoprost (XALATAN) 0.005 % ophthalmic solution Administer 1 Drop to both eyes two (2) times a day. ALPRAZolam (XANAX) 0.5 mg tablet Take 0.5 mg by mouth nightly.        No current facility-administered medications on file prior to visit. ROS: Complete review of systems was performed and is otherwise unremarkable except as noted elsewhere. OBJECTIVE  There were no vitals taken for this visit. Gen: Pleasant 75 y.o.  female in NAD. HEENT: PERRLA. EOMI. OP moist and pink. Neck: Supple. No LAD. HEART: RRR, No M/G/R.   LUNGS: CTAB No W/R. ABDOMEN: S, NT, ND, BS+. EXTREMITIES: Warm. 2+ edema. MUSCULOSKELETAL: Normal ROM, muscle strength 5/5 all groups. NEURO: Alert and oriented x 3. Cranial nerves grossly intact. No focal sensory or motor deficits noted. Slight essential tremor noted. SKIN: Warm. Dry. No rashes or other lesions noted. CT head: No acute intracranial finding.

## 2023-02-08 NOTE — HOME HEALTH
Visit missed due to client refused and  on errand. When I came to door for pre-arranged visit, nobody answered. When I called client, she said her  had gone shopping and wound not return for at least 2 hours and pt. was instructed to not answer the door. She said she couldn't open the door for me then.

## 2023-02-09 ENCOUNTER — HOME CARE VISIT (OUTPATIENT)
Dept: SCHEDULING | Facility: HOME HEALTH | Age: 73
End: 2023-02-09
Payer: MEDICARE

## 2023-02-09 VITALS
OXYGEN SATURATION: 100 % | WEIGHT: 209 LBS | TEMPERATURE: 97.8 F | SYSTOLIC BLOOD PRESSURE: 100 MMHG | DIASTOLIC BLOOD PRESSURE: 62 MMHG | RESPIRATION RATE: 16 BRPM | BODY MASS INDEX: 34.78 KG/M2 | HEART RATE: 85 BPM

## 2023-02-09 LAB
ANION GAP SERPL CALC-SCNC: 9 MMOL/L (ref 5–15)
BUN SERPL-MCNC: 31 MG/DL (ref 6–20)
BUN/CREAT SERPL: 29 (ref 12–20)
CALCIUM SERPL-MCNC: 10.1 MG/DL (ref 8.5–10.1)
CHLORIDE SERPL-SCNC: 110 MMOL/L (ref 97–108)
CO2 SERPL-SCNC: 23 MMOL/L (ref 21–32)
CREAT SERPL-MCNC: 1.06 MG/DL (ref 0.55–1.02)
GLUCOSE SERPL-MCNC: 110 MG/DL (ref 65–100)
POTASSIUM SERPL-SCNC: 3.4 MMOL/L (ref 3.5–5.1)
SODIUM SERPL-SCNC: 142 MMOL/L (ref 136–145)
TSH SERPL DL<=0.05 MIU/L-ACNC: 1.61 UIU/ML (ref 0.36–3.74)

## 2023-02-09 PROCEDURE — G0300 HHS/HOSPICE OF LPN EA 15 MIN: HCPCS

## 2023-02-09 PROCEDURE — G0151 HHCP-SERV OF PT,EA 15 MIN: HCPCS

## 2023-02-09 PROCEDURE — G0152 HHCP-SERV OF OT,EA 15 MIN: HCPCS

## 2023-02-10 VITALS
SYSTOLIC BLOOD PRESSURE: 102 MMHG | HEART RATE: 77 BPM | OXYGEN SATURATION: 96 % | DIASTOLIC BLOOD PRESSURE: 64 MMHG | RESPIRATION RATE: 18 BRPM | TEMPERATURE: 98 F

## 2023-02-10 VITALS
TEMPERATURE: 97.7 F | DIASTOLIC BLOOD PRESSURE: 60 MMHG | BODY MASS INDEX: 34.78 KG/M2 | HEART RATE: 76 BPM | RESPIRATION RATE: 16 BRPM | WEIGHT: 209 LBS | SYSTOLIC BLOOD PRESSURE: 105 MMHG | OXYGEN SATURATION: 95 %

## 2023-02-10 NOTE — HOME HEALTH
Subjective: Spouse:  \"From what we've been able to find out, it sounds like cutting sodium out of her diet caused her lithium to build up in her system, because it needs sodium to get out of her body. \"  Falls since last visit NO(if yes complete the Fall Tracking Form and include bsrifallreport):   Caregiver involvement changes: No  Home health supplies by type and quantity ordered/delivered this visit include: n/a    Clinician asked if patient has had any physician contact since last home care visit and patient states: YES  Clinician asked if patient has any new or changed medications and patient states:  YES Lithium Carbonate 150mg PO daily  If Yes, were medications reconciled? YES   Was the certifying physician notified of changes in medications? NO MD ordered    Clinical assessment (what this visit means for the patient overall and need for ongoing skilled care) and progress or lack of progress towards SPECIFIC goals: Pt at risk for rehospitalization r/t fall risk, bipolar disorder exacerbation, risk for lithium toxicity    Written Teaching Material Utilized: N/A    Interdisciplinary communication with: N/A for the purpose of n/a    Discharge planning as follows:  When goals are met    Specific plan for next visit: Assessment, education as needed

## 2023-02-10 NOTE — HOME HEALTH
Skilled reason for admission/summary of clinical condition: Rene Mueller is a 75 y.o. female who presents with past medical history of systolic congestive heart failure, bipolar disorder on lithium. Reported increase in confusion and Lithium levels noted to be elevated. She had a lithium level drawn and got a call saying that her lithium level is high at 1.7 and was advised to go to the emergency department for further evaluation. Treated with IV hydration and leveles normalized. At baseline pt resides in a one story home with 4 MARLENE with her spouse. She was IND with no AD prior. IND with ADLS. Today pt is pleasant but forgetful. She is ambulatory with CGA with no AD, but did encourage use of RW when more fatigued and at night time. Lateral sway and mild balance deficits noted. She has returned to her ADL baseline, however her spouse is providing more oversight with bathing for safety at this time. She reports she did little IADLS prior and is really not interested in increasing her participation in [employer]. She scores 80/100 on the Barthel Index indicating minimal disability. OT educated pt on fall prevention strategies, bladder retraining programs due to incontinence, ADL safety. ELIQUIS--Instructed patient/caregiver on anticoagulant purpose, dose, appearance, preparation, scheduling, side effects, food/drug interactions, storage, potential complications, and risks of co-administration with other medications such as ASA, NSAID drugs, and herbals. Instruct patient/caregiver regarding bleeding risk with anticoagulants. Instruct on strategies to prevent bleeding such as: use of soft toothbrushes, flossing gently, and use of electric razor in lieu of standard safety razor. Instruct on safety around sharp utensils, prevention of falls, and avoiding contact injuries.  Instruct patient to notify all practitioners (including dentists) of anticoagulant therapy and consider carrying an ID card or other medical identification item that indicates they are on anticoagulant therapy. Instruct patient/caregiver in identifying and reporting to physician as well as skilled nurse and or therapist signs and symptoms of abnormal bleeding including: unexplained bruising, dizziness/lightheadedness, red or tarry looking stool, or blood in urine or vomit. At this time pt has no further skilled OT needs and pt/spouse are agreeable. Pt will continue with PT services for further balance, strength and mobiluty training   Diagnosis: Toxic effect of oth metals, accidental (unintentional   Subjective: I think Im walking much better    Caregiver: Naomi Patel- spouse present and active in care. Spouse is now giving medications to patient. Medications reconciled and all medications are available in the home this visit. The following education was provided regarding medications: medication interactions and look alike medications: Patient/CG able to demonstrate knowledge through teach back with 95 percent accuracy. Medications are effective at this time. notified of any discrepancies/medication interactions n/a A list of reconciled medications has been given to the patient/caregiver and a copy has been uploaded to media. Home health supplies by type and quantity ordered/delivered this visit include: none Patient/caregiver instructed on plan of care and are agreeable to plan of care at this time. Clinician reviewed orientation to home health booklet with patient/caregiver including agency phone number, agency complaint process, state hotline number, as well as joint commission's quality hotline number. Consent forms signed. Patient at risk for falls Yes: Recommended requesting PT/OT orders due to fall risk YES:   Patient response to recommended requesting of PT/OT orders: ordered and already seen pt   Discharge planning discussed with patient and caregiver. Discharge planning as follows: OT to dc today as all OT goals are met. Patient/caregiver did verbalize agreement with discharge planning. Clinical Assessment: Please refer to narrative above   Written Teaching Material Utilized: dischage instruction and [place] handbook   Specific plan for next visit: NA for OT, eval only   Plan of care and admission to home health status called to attending physician. The following practitioner has agreed to sign the ongoing POC Dr Manjarrez, and was notified of the following: visit frequency of 1w1   Interdisciplinary communication with: [place] tx team regarding [address] collaboration   PCP: Dr Carollee Olszewski Next scheduled doctor appointment: Working on setting that up    Patient/Caregiver instructed to keep follow up appointment because lack of follow through with physician appointments could result in discontinuation of home care services for non-compliance. Patient/Caregiver verbalize knowledge of above through teach back with 95 percent accuracy. Emergency Preparedness: Patient/Caregiver instructed in the following: Have one gallon of water per person for at least 3 days on hand. Have non-perishable food for at least 3 days that do not need to be cooked. Have flashlights and batteries. Charge your cell phones and any back up lithium batteries for your cell phones. Have 3+ days of back up oxygen in your home. Have a phone in your home that is hard wired and does not require power. Have medication for a week in your home. Make sure you have a caregiver in the home to provide care in case your home health nurse cannot get to your house. Make sure you have all of your paperwork i.e. written emergency preparedness plan, Identification, insurance cards, DME phone number, physician and pharmacy phone number, agency phone number, and your medications in one place for easy access and in a zip lock bag to protect them.  Take your Admission Handbook, written emergency preparedness plan, written medication list and folder if you relocate in the event of an emergency, if possible. Call agency if you relocate so we can contact you. Patient/Caregiver verbalize knowledge of above through teach back with 95 percent accuracy.

## 2023-02-13 ENCOUNTER — HOME CARE VISIT (OUTPATIENT)
Dept: SCHEDULING | Facility: HOME HEALTH | Age: 73
End: 2023-02-13
Payer: MEDICARE

## 2023-02-13 ENCOUNTER — TELEPHONE (OUTPATIENT)
Dept: INTERNAL MEDICINE CLINIC | Age: 73
End: 2023-02-13

## 2023-02-13 DIAGNOSIS — T56.891A LITHIUM TOXICITY, ACCIDENTAL OR UNINTENTIONAL, INITIAL ENCOUNTER: Primary | ICD-10-CM

## 2023-02-13 PROCEDURE — G0151 HHCP-SERV OF PT,EA 15 MIN: HCPCS

## 2023-02-13 NOTE — TELEPHONE ENCOUNTER
Pt states that she received a call from the lab. She has to do her labs all over again. Please put that same order in system and pt will be in on Tuesday, 2-14-23 morning to do those.

## 2023-02-14 ENCOUNTER — LAB ONLY (OUTPATIENT)
Dept: INTERNAL MEDICINE CLINIC | Age: 73
DRG: 690 | End: 2023-02-14
Payer: MEDICARE

## 2023-02-14 ENCOUNTER — TELEPHONE (OUTPATIENT)
Dept: INTERNAL MEDICINE CLINIC | Age: 73
End: 2023-02-14

## 2023-02-14 ENCOUNTER — CLINICAL SUPPORT (OUTPATIENT)
Dept: INTERNAL MEDICINE CLINIC | Age: 73
DRG: 690 | End: 2023-02-14
Payer: MEDICARE

## 2023-02-14 VITALS
SYSTOLIC BLOOD PRESSURE: 115 MMHG | BODY MASS INDEX: 34.45 KG/M2 | RESPIRATION RATE: 16 BRPM | WEIGHT: 207 LBS | TEMPERATURE: 97.5 F | HEART RATE: 55 BPM | DIASTOLIC BLOOD PRESSURE: 65 MMHG | OXYGEN SATURATION: 98 %

## 2023-02-14 DIAGNOSIS — T56.891A LITHIUM TOXICITY, ACCIDENTAL OR UNINTENTIONAL, INITIAL ENCOUNTER: ICD-10-CM

## 2023-02-14 DIAGNOSIS — R35.0 URINARY FREQUENCY: ICD-10-CM

## 2023-02-14 DIAGNOSIS — R35.0 URINARY FREQUENCY: Primary | ICD-10-CM

## 2023-02-14 LAB
BILIRUB UR QL STRIP: NEGATIVE
DATE LAST DOSE: NORMAL
GLUCOSE UR-MCNC: NEGATIVE MG/DL
KETONES P FAST UR STRIP-MCNC: NEGATIVE MG/DL
LITHIUM SERPL-SCNC: 0.9 MMOL/L (ref 0.6–1.2)
PH UR STRIP: 5 [PH] (ref 4.6–8)
PROT UR QL STRIP: NORMAL
REPORTED DOSE,DOSE: NORMAL UNITS
REPORTED DOSE/TIME,TMG: 1050
SP GR UR STRIP: 1.03 (ref 1–1.03)
UA UROBILINOGEN AMB POC: NORMAL (ref 0.2–1)
URINALYSIS CLARITY POC: NORMAL
URINALYSIS COLOR POC: NORMAL
URINE BLOOD POC: NORMAL
URINE LEUKOCYTES POC: NORMAL
URINE NITRITES POC: NEGATIVE

## 2023-02-14 PROCEDURE — 99211 OFF/OP EST MAY X REQ PHY/QHP: CPT | Performed by: INTERNAL MEDICINE

## 2023-02-14 PROCEDURE — 81002 URINALYSIS NONAUTO W/O SCOPE: CPT | Performed by: INTERNAL MEDICINE

## 2023-02-14 RX ORDER — NITROFURANTOIN 25; 75 MG/1; MG/1
100 CAPSULE ORAL 2 TIMES DAILY
Qty: 14 CAPSULE | Refills: 0 | Status: SHIPPED | OUTPATIENT
Start: 2023-02-14 | End: 2023-02-18

## 2023-02-14 NOTE — TELEPHONE ENCOUNTER
Patient states she is very sensitive when she urinates, describes as a burning sensation on the outside.    Patient also complains of fatigue    Patient states these are her usual UTI symptoms and is asking if a urinalysis can be completed when she comes in for her Lithium level today     Patient advises PCP has not arrived to office and a message would be sent to him

## 2023-02-14 NOTE — TELEPHONE ENCOUNTER
Pt states she has to come in to re do labs. She has a UTI and is asking if she can get her urine checked at that same time? Please call to let pt know what she can do. Thanks.

## 2023-02-14 NOTE — TELEPHONE ENCOUNTER
Trace hilton. I'll order macrobid. BJF    Orders Placed This Encounter    nitrofurantoin, macrocrystal-monohydrate, (MACROBID) 100 mg capsule     Sig: Take 1 Capsule by mouth two (2) times a day for 7 days.      Dispense:  14 Capsule     Refill:  0

## 2023-02-15 ENCOUNTER — HOME CARE VISIT (OUTPATIENT)
Dept: HOME HEALTH SERVICES | Facility: HOME HEALTH | Age: 73
End: 2023-02-15
Payer: MEDICARE

## 2023-02-15 ENCOUNTER — PATIENT OUTREACH (OUTPATIENT)
Dept: CASE MANAGEMENT | Age: 73
End: 2023-02-15

## 2023-02-15 NOTE — PROGRESS NOTES
Care Transitions Follow Up Call    Patient Current Location: [address] [place] [address] to be reviewed by the provider   Additional needs identified to be addressed with provider: no  none         Method of communication with provider:  none, neither patient or patient's  have any red flags to report at this time. Care Transition Nurse (CTN) contacted the  patient and patient's , Celina Berry (on [facility] dated 10-6-22),  by telephone to follow up after admission on 23. Verified name and  with patient as identifiers. Addressed changes since last contact:   reports lower extremity edema is improving. Patient reports shortness of breath upon exertion has resolved. Patient complains of UTI and states she is taking Macrobid 100mg cap, take one cap two times a day as ordered. Follow up appointment completed? yes. Was follow up appointment scheduled within 7 days of discharge? yes. CTN reviewed discharge instructions, medical action plan and red flags with  the patient and   and discussed any barriers to care and/or understanding of plan of care after discharge. Discussed appropriate site of care based on symptoms and resources available to patient including: PCP, Specialist, Urgent [address], and When to call 911. The  The   agrees to contact the PCP office for questions related to patient's healthcare. Patients top risk factors for readmission: Medical condition - Bipolar affective disorder, recent lithium toxicity, and UTI per chart. Interventions to address risk factors: Education of patient/family/caregiver/guardian to support self-management-Education provided regarding signs/symptoms of AMS and lithium toxicity, patient's  verbalized an understanding.      [facility] follow up appointment(s):   Future Appointments   Date Time Provider Department Center   2023 [facility] [address]   2023 To Be Determined Nikole May, PT [facility]   2/21/2023 To Be Determined Chris Young RN [address] [facility]   2/22/2023 To Be Determined Maris [address]   2/23/2023 To Be Determined Chris Young RN [address] [facility]   2/27/2023 To Be Determined Maris [facility]   2/27/2023 To Be Determined Grady Watson LPN [facility]   3/1/2023 To Be Determined Maris Rachael [place]   3/6/2023 To Be Determined Grady Watson LPN [facility]   3/6/2023 To Be Determined Maris Rachael [facility]   3/7/2023  2:00 PM [address] [facility]   3/8/2023 To Be Determined Maris [address]   3/13/2023 To Be Determined Maris Rachael [place]   3/14/2023 To Be Determined Grady Watson LPN [facility]   3/15/2023 To Be Determined Maris Rachael [facility]   3/20/2023 To Be Determined Maris Rachael [place]   3/22/2023 To Be Determined Maris Rachael [place]   6/5/2023  1:30 PM Jose Miguel Lebron ANP-C [facility]   6/8/2023 10:45 AM Angelita Kilgore MD [facility] BS AMB     Non-[facility] follow up appointment(s): None noted at this time. CTN provided contact information for future needs. Plan for follow-up call in 7-10 days based on severity of symptoms and risk factors. Plan for next call: self management-Review red flags of AMS and lithium toxicity, and follow up appointment-Evaluate if patient continues to attend follow-up appointments as scheduled, offer assistance with scheduling as needed. Goals Addressed                   This Visit's Progress     Understands red flags post discharge. 2-7-23: Red flags of AMS and lithium toxicity reviewed with patient's , Vinnie Weems (on [address] dated 10-6-22), and the  verbalized an understanding.   denies patient having chest pain, denies fever/chills, and denies nausea/vomiting.  reports patient becomes short of breath upon exertion and states SOB upon exertion resolves with rest.  reports swelling in bilateral lower extremities, from knees through ankles.  states patient utilizes a regular diet at home, appetite is fair.  reports patient has had 3 falls in the last 6 months, and a total of 4 falls in the last 12 months, states patient did not sustain traumatic injury during any of the 4 falls. Care Transitions Nurse will review red flags again on next phone conversation with patient/. Edie Hyde     2-15-23: Red flags of AMS and lithium toxicity reviewed with patient and patient's . Patient states her  is preparing her medications with a weekly pill box and is administering all medications. Patient denies chest pain, denies shortness of breath, denies fever/chills, and denies nausea/vomiting.  states swelling in bilateral lower extremities is improving and lessening. Neither the  or patient have any red flags to report at this time. Care Transitions Nurse will review red flags again on next phone conversation with patient/.  Edie Hyde

## 2023-02-16 DIAGNOSIS — N28.9 RENAL INSUFFICIENCY: Primary | ICD-10-CM

## 2023-02-16 NOTE — PROGRESS NOTES
Called, spoke to pt. Two pt identifiers confirmed. Patient informed lab results per.     See message below. Please call the patient and let the patient know that her test result(s) is/are normal.  Thanks. Dina Snowden. Patient verbalized understanding of information discussed with no further questions at this time.      Miguel Stratton LPN

## 2023-02-17 ENCOUNTER — HOME CARE VISIT (OUTPATIENT)
Dept: SCHEDULING | Facility: HOME HEALTH | Age: 73
End: 2023-02-17
Payer: MEDICARE

## 2023-02-17 ENCOUNTER — TELEPHONE (OUTPATIENT)
Dept: INTERNAL MEDICINE CLINIC | Age: 73
End: 2023-02-17

## 2023-02-17 ENCOUNTER — HOME CARE VISIT (OUTPATIENT)
Dept: HOME HEALTH SERVICES | Facility: HOME HEALTH | Age: 73
End: 2023-02-17
Payer: MEDICARE

## 2023-02-17 ENCOUNTER — HOSPITAL ENCOUNTER (INPATIENT)
Age: 73
LOS: 1 days | Discharge: HOME | DRG: 690 | End: 2023-02-18
Attending: EMERGENCY MEDICINE | Admitting: STUDENT IN AN ORGANIZED HEALTH CARE EDUCATION/TRAINING PROGRAM
Payer: MEDICARE

## 2023-02-17 DIAGNOSIS — N39.0 URINARY TRACT INFECTION DUE TO ESBL KLEBSIELLA: Primary | ICD-10-CM

## 2023-02-17 DIAGNOSIS — B96.89 URINARY TRACT INFECTION DUE TO ESBL KLEBSIELLA: Primary | ICD-10-CM

## 2023-02-17 LAB
ALBUMIN SERPL-MCNC: 3.9 G/DL (ref 3.5–5)
ALBUMIN/GLOB SERPL: 1.3 (ref 1.1–2.2)
ALP SERPL-CCNC: 81 U/L (ref 45–117)
ALT SERPL-CCNC: 18 U/L (ref 12–78)
ANION GAP SERPL CALC-SCNC: 6 MMOL/L (ref 5–15)
APPEARANCE UR: ABNORMAL
AST SERPL-CCNC: 14 U/L (ref 15–37)
BACTERIA SPEC CULT: ABNORMAL
BACTERIA URNS QL MICRO: ABNORMAL /HPF
BASOPHILS # BLD: 0.1 K/UL (ref 0–0.1)
BASOPHILS NFR BLD: 1 % (ref 0–1)
BILIRUB SERPL-MCNC: 0.4 MG/DL (ref 0.2–1)
BILIRUB UR QL: NEGATIVE
BUN SERPL-MCNC: 32 MG/DL (ref 6–20)
BUN/CREAT SERPL: 28 (ref 12–20)
CALCIUM SERPL-MCNC: 10 MG/DL (ref 8.5–10.1)
CC UR VC: ABNORMAL
CHLORIDE SERPL-SCNC: 108 MMOL/L (ref 97–108)
CO2 SERPL-SCNC: 27 MMOL/L (ref 21–32)
COLOR UR: ABNORMAL
CREAT SERPL-MCNC: 1.13 MG/DL (ref 0.55–1.02)
DIFFERENTIAL METHOD BLD: NORMAL
EOSINOPHIL # BLD: 0.2 K/UL (ref 0–0.4)
EOSINOPHIL NFR BLD: 3 % (ref 0–7)
EPITH CASTS URNS QL MICRO: ABNORMAL /LPF
ERYTHROCYTE [DISTWIDTH] IN BLOOD BY AUTOMATED COUNT: 13.2 % (ref 11.5–14.5)
GLOBULIN SER CALC-MCNC: 3 G/DL (ref 2–4)
GLUCOSE SERPL-MCNC: 106 MG/DL (ref 65–100)
GLUCOSE UR STRIP.AUTO-MCNC: NEGATIVE MG/DL
HCT VFR BLD AUTO: 38.2 % (ref 35–47)
HGB BLD-MCNC: 12.9 G/DL (ref 11.5–16)
HGB UR QL STRIP: NEGATIVE
IMM GRANULOCYTES # BLD AUTO: 0 K/UL (ref 0–0.04)
IMM GRANULOCYTES NFR BLD AUTO: 0 % (ref 0–0.5)
KETONES UR QL STRIP.AUTO: ABNORMAL MG/DL
LEUKOCYTE ESTERASE UR QL STRIP.AUTO: ABNORMAL
LYMPHOCYTES # BLD: 2.1 K/UL (ref 0.8–3.5)
LYMPHOCYTES NFR BLD: 34 % (ref 12–49)
MCH RBC QN AUTO: 30.6 PG (ref 26–34)
MCHC RBC AUTO-ENTMCNC: 33.8 G/DL (ref 30–36.5)
MCV RBC AUTO: 90.5 FL (ref 80–99)
MONOCYTES # BLD: 0.6 K/UL (ref 0–1)
MONOCYTES NFR BLD: 10 % (ref 5–13)
NEUTS SEG # BLD: 3.2 K/UL (ref 1.8–8)
NEUTS SEG NFR BLD: 52 % (ref 32–75)
NITRITE UR QL STRIP.AUTO: POSITIVE
NRBC # BLD: 0 K/UL (ref 0–0.01)
NRBC BLD-RTO: 0 PER 100 WBC
PH UR STRIP: 5.5 (ref 5–8)
PLATELET # BLD AUTO: 242 K/UL (ref 150–400)
PMV BLD AUTO: 10.4 FL (ref 8.9–12.9)
POTASSIUM SERPL-SCNC: 3.4 MMOL/L (ref 3.5–5.1)
PROT SERPL-MCNC: 6.9 G/DL (ref 6.4–8.2)
PROT UR STRIP-MCNC: 30 MG/DL
RBC # BLD AUTO: 4.22 M/UL (ref 3.8–5.2)
RBC #/AREA URNS HPF: ABNORMAL /HPF (ref 0–5)
SERVICE CMNT-IMP: ABNORMAL
SODIUM SERPL-SCNC: 141 MMOL/L (ref 136–145)
SP GR UR REFRACTOMETRY: 1.02
UA: UC IF INDICATED,UAUC: ABNORMAL
UROBILINOGEN UR QL STRIP.AUTO: 1 EU/DL (ref 0.2–1)
WBC # BLD AUTO: 6.1 K/UL (ref 3.6–11)
WBC URNS QL MICRO: ABNORMAL /HPF (ref 0–4)

## 2023-02-17 PROCEDURE — G0151 HHCP-SERV OF PT,EA 15 MIN: HCPCS

## 2023-02-17 PROCEDURE — 74011000250 HC RX REV CODE- 250: Performed by: STUDENT IN AN ORGANIZED HEALTH CARE EDUCATION/TRAINING PROGRAM

## 2023-02-17 PROCEDURE — 36415 COLL VENOUS BLD VENIPUNCTURE: CPT

## 2023-02-17 PROCEDURE — 87077 CULTURE AEROBIC IDENTIFY: CPT

## 2023-02-17 PROCEDURE — 65270000029 HC RM PRIVATE

## 2023-02-17 PROCEDURE — 74011250636 HC RX REV CODE- 250/636: Performed by: PHYSICIAN ASSISTANT

## 2023-02-17 PROCEDURE — 80053 COMPREHEN METABOLIC PANEL: CPT

## 2023-02-17 PROCEDURE — 99285 EMERGENCY DEPT VISIT HI MDM: CPT

## 2023-02-17 PROCEDURE — 87086 URINE CULTURE/COLONY COUNT: CPT

## 2023-02-17 PROCEDURE — 87186 SC STD MICRODIL/AGAR DIL: CPT

## 2023-02-17 PROCEDURE — 74011250637 HC RX REV CODE- 250/637: Performed by: STUDENT IN AN ORGANIZED HEALTH CARE EDUCATION/TRAINING PROGRAM

## 2023-02-17 PROCEDURE — 93005 ELECTROCARDIOGRAM TRACING: CPT

## 2023-02-17 PROCEDURE — 74011000258 HC RX REV CODE- 258: Performed by: PHYSICIAN ASSISTANT

## 2023-02-17 PROCEDURE — 81001 URINALYSIS AUTO W/SCOPE: CPT

## 2023-02-17 PROCEDURE — G0299 HHS/HOSPICE OF RN EA 15 MIN: HCPCS

## 2023-02-17 PROCEDURE — 74011250636 HC RX REV CODE- 250/636: Performed by: STUDENT IN AN ORGANIZED HEALTH CARE EDUCATION/TRAINING PROGRAM

## 2023-02-17 PROCEDURE — 85025 COMPLETE CBC W/AUTO DIFF WBC: CPT

## 2023-02-17 RX ORDER — ALPRAZOLAM 0.5 MG/1
0.5 TABLET ORAL
Status: DISCONTINUED | OUTPATIENT
Start: 2023-02-17 | End: 2023-02-18 | Stop reason: HOSPADM

## 2023-02-17 RX ORDER — SODIUM CHLORIDE 0.9 % (FLUSH) 0.9 %
5-40 SYRINGE (ML) INJECTION EVERY 8 HOURS
Status: DISCONTINUED | OUTPATIENT
Start: 2023-02-17 | End: 2023-02-18 | Stop reason: HOSPADM

## 2023-02-17 RX ORDER — LITHIUM CARBONATE 300 MG/1
300 TABLET, FILM COATED, EXTENDED RELEASE ORAL
Status: DISCONTINUED | OUTPATIENT
Start: 2023-02-17 | End: 2023-02-17

## 2023-02-17 RX ORDER — LEVOTHYROXINE SODIUM 50 UG/1
50 TABLET ORAL
Status: DISCONTINUED | OUTPATIENT
Start: 2023-02-18 | End: 2023-02-18 | Stop reason: HOSPADM

## 2023-02-17 RX ORDER — ACETAMINOPHEN 325 MG/1
650 TABLET ORAL
Status: DISCONTINUED | OUTPATIENT
Start: 2023-02-17 | End: 2023-02-18 | Stop reason: HOSPADM

## 2023-02-17 RX ORDER — SODIUM CHLORIDE 0.9 % (FLUSH) 0.9 %
5-40 SYRINGE (ML) INJECTION AS NEEDED
Status: DISCONTINUED | OUTPATIENT
Start: 2023-02-17 | End: 2023-02-18 | Stop reason: HOSPADM

## 2023-02-17 RX ORDER — ONDANSETRON 4 MG/1
4 TABLET, ORALLY DISINTEGRATING ORAL
Status: DISCONTINUED | OUTPATIENT
Start: 2023-02-17 | End: 2023-02-18 | Stop reason: HOSPADM

## 2023-02-17 RX ORDER — LATANOPROST 50 UG/ML
1 SOLUTION/ DROPS OPHTHALMIC DAILY
Status: DISCONTINUED | OUTPATIENT
Start: 2023-02-18 | End: 2023-02-18 | Stop reason: HOSPADM

## 2023-02-17 RX ORDER — POTASSIUM CHLORIDE 750 MG/1
20 TABLET, FILM COATED, EXTENDED RELEASE ORAL
Status: COMPLETED | OUTPATIENT
Start: 2023-02-17 | End: 2023-02-17

## 2023-02-17 RX ORDER — MAGNESIUM SULFATE 1 G/100ML
1 INJECTION INTRAVENOUS ONCE
Status: COMPLETED | OUTPATIENT
Start: 2023-02-17 | End: 2023-02-17

## 2023-02-17 RX ORDER — ACETAMINOPHEN 650 MG/1
650 SUPPOSITORY RECTAL
Status: DISCONTINUED | OUTPATIENT
Start: 2023-02-17 | End: 2023-02-18 | Stop reason: HOSPADM

## 2023-02-17 RX ORDER — HYDROXYCHLOROQUINE SULFATE 200 MG/1
200 TABLET, FILM COATED ORAL 2 TIMES DAILY
Status: DISCONTINUED | OUTPATIENT
Start: 2023-02-17 | End: 2023-02-18 | Stop reason: HOSPADM

## 2023-02-17 RX ORDER — FLUCONAZOLE 100 MG/1
150 TABLET ORAL
Status: COMPLETED | OUTPATIENT
Start: 2023-02-17 | End: 2023-02-17

## 2023-02-17 RX ORDER — TIMOLOL MALEATE 5 MG/ML
1 SOLUTION/ DROPS OPHTHALMIC DAILY
Status: DISCONTINUED | OUTPATIENT
Start: 2023-02-18 | End: 2023-02-18 | Stop reason: HOSPADM

## 2023-02-17 RX ORDER — LANOLIN ALCOHOL/MO/W.PET/CERES
1000 CREAM (GRAM) TOPICAL DAILY
Status: DISCONTINUED | OUTPATIENT
Start: 2023-02-18 | End: 2023-02-18 | Stop reason: HOSPADM

## 2023-02-17 RX ORDER — LITHIUM CARBONATE 150 MG/1
150 CAPSULE ORAL DAILY
Status: DISCONTINUED | OUTPATIENT
Start: 2023-02-18 | End: 2023-02-18

## 2023-02-17 RX ORDER — TOPIRAMATE 100 MG/1
100 TABLET, FILM COATED ORAL 2 TIMES DAILY
Status: DISCONTINUED | OUTPATIENT
Start: 2023-02-17 | End: 2023-02-18 | Stop reason: HOSPADM

## 2023-02-17 RX ORDER — PANTOPRAZOLE SODIUM 40 MG/1
40 TABLET, DELAYED RELEASE ORAL
Status: DISCONTINUED | OUTPATIENT
Start: 2023-02-18 | End: 2023-02-18 | Stop reason: HOSPADM

## 2023-02-17 RX ORDER — POTASSIUM CHLORIDE 750 MG/1
20 TABLET, FILM COATED, EXTENDED RELEASE ORAL
Status: DISCONTINUED | OUTPATIENT
Start: 2023-02-17 | End: 2023-02-17

## 2023-02-17 RX ORDER — POLYETHYLENE GLYCOL 3350 17 G/17G
17 POWDER, FOR SOLUTION ORAL DAILY PRN
Status: DISCONTINUED | OUTPATIENT
Start: 2023-02-17 | End: 2023-02-18 | Stop reason: HOSPADM

## 2023-02-17 RX ORDER — ONDANSETRON 2 MG/ML
4 INJECTION INTRAMUSCULAR; INTRAVENOUS
Status: DISCONTINUED | OUTPATIENT
Start: 2023-02-17 | End: 2023-02-18 | Stop reason: HOSPADM

## 2023-02-17 RX ADMIN — ACETAMINOPHEN 325MG 650 MG: 325 TABLET ORAL at 22:06

## 2023-02-17 RX ADMIN — APIXABAN 5 MG: 5 TABLET, FILM COATED ORAL at 20:54

## 2023-02-17 RX ADMIN — POTASSIUM CHLORIDE 20 MEQ: 750 TABLET, FILM COATED, EXTENDED RELEASE ORAL at 20:53

## 2023-02-17 RX ADMIN — SACUBITRIL AND VALSARTAN 1 TABLET: 24; 26 TABLET, FILM COATED ORAL at 20:54

## 2023-02-17 RX ADMIN — FLUCONAZOLE 150 MG: 100 TABLET ORAL at 20:54

## 2023-02-17 RX ADMIN — MAGNESIUM SULFATE HEPTAHYDRATE 1 G: 1 INJECTION, SOLUTION INTRAVENOUS at 22:04

## 2023-02-17 RX ADMIN — HYDROXYCHLOROQUINE SULFATE 200 MG: 200 TABLET ORAL at 22:06

## 2023-02-17 RX ADMIN — SODIUM CHLORIDE, PRESERVATIVE FREE 10 ML: 5 INJECTION INTRAVENOUS at 22:54

## 2023-02-17 RX ADMIN — ALPRAZOLAM 0.5 MG: 0.5 TABLET ORAL at 20:53

## 2023-02-17 RX ADMIN — TOPIRAMATE 100 MG: 100 TABLET, FILM COATED ORAL at 22:06

## 2023-02-17 RX ADMIN — MEROPENEM 1 G: 1 INJECTION, POWDER, FOR SOLUTION INTRAVENOUS at 20:53

## 2023-02-17 NOTE — TELEPHONE ENCOUNTER
Jaylin Lima from [facility] lab called about CULTURE, URINE  Klebsiella pneumoniae ** (EXTENDED SPECTRUM BETA LACTAMASE ) ** ** (EXTENDED SPECTRUM BETA LACTAMASE ) **

## 2023-02-17 NOTE — ED PROVIDER NOTES
[facility] EMERGENCY DEPT  EMERGENCY DEPARTMENT ENCOUNTER       Pt Name: Chaitanya Simon  MRN: [id number]  [place] 1950  Date of evaluation: 2/17/2023  Provider: Lovella Phalen, PA   PCP: José Luis Martin MD  Note Started: 5:46 PM 2/17/23     CHIEF COMPLAINT       Chief Complaint   Patient presents with    Bladder Infection    Back Pain     Pt has back pain a week; pt found out she has a bad uti; her doctor called her to come to ED for IV-it does burn to void; pt was just in the hospital two weeks ago for Lithium toxicity and CHF    Urinary Pain        HISTORY OF PRESENT ILLNESS: 1 or more elements      History From: Patient and Patient's   HPI Limitations : None     Chaitanya Simon is a 75 y.o. female who presents BIB self with CC of resistant UTI. About a week ago the patient developed burning with urination, urinary frequency, and a worsening of her baseline urinary incontinence. She saw her PCP, Dr. Mckenzie Krishnamurthy, on Monday and a urine culture was sent. She started taking Macrobid which did not help. Today she received a call from Dr. Reilly Prudent her she had a resistant UTI and needed to go to the ED for IV antibiotics. She notes her urinary symptoms are present still and she now has new bilateral lumbar back pain. She notes decreased appetite but is tolerating p.o. Denies fever, flank pain, vomiting, diarrhea, abdominal pain. Nursing Notes were all reviewed and agreed with or any disagreements were addressed in the HPI. REVIEW OF SYSTEMS      Review of Systems   Constitutional:  Positive for appetite change. Negative for fever. Gastrointestinal:  Negative for abdominal pain and nausea. Genitourinary:  Positive for dysuria and frequency. Negative for flank pain. Musculoskeletal:  Positive for back pain. Negative for gait problem. Hematological:  Bruises/bleeds easily. Positives and Pertinent negatives as per HPI.     PAST HISTORY     Past Medical History:  Past Medical History:   Diagnosis Date    Arrhythmia 2014    atrial fibrillation 2014, sick sinus syndrome: [facility]    Arthritis     Rheumatoid    Bipolar affect, depressed ([facility]     Bipolar affective disorder ([facility] 3/31/2010    Chronic pain 2018    right shoulder    Colon polyps 03/31/2010    also 5/2018: colon polyps    Depression     Diarrhea 07/19/2013    D/t alpha gal allergy says patient    Epigastric pain 6/5/2015    GERD (gastroesophageal reflux disease)     occasiional only; controlled with med    Hemispheric carotid artery syndrome No stroke    Neuro: Megan Gagnon  (CT scan head/neck negative 4/2018 in [facility])    Hyperlipidemia 3/31/2010    Hypothyroidism 3/31/2010    Long term current use of anticoagulant therapy     Lupus ([facility]     patient denies-says she has RA    Migraine 03/31/2010    has them x2 a month: last one 5/23/2018    Psychotic disorder (HCC)     S/P ablation of atrial fibrillation 05/13/2014    S/P cardiac pacemaker procedure 9/16/2014 9/16/14 Medtronic MRI compatible dual chamber pacemaker implant    Sleep apnea     unable to tolerate CPAP    Stool color black     Thyroid disease     Tick-borne disease     Alpha Gal allergy: no eat pork, beef, milk (Meat allergy showed up on a allergy test)    Umbilical hernia without obstruction and without gangrene 6/22/2020    Urge incontinence 3/31/2010    Vitamin D deficiency 3/31/2015       Past Surgical History:  Past Surgical History:   Procedure Laterality Date    COLONOSCOPY N/A 4/27/2018    COLONOSCOPY performed by Kiana Anderson MD at [facility] AM[facility] OR    COLONOSCOPY N/A 11/25/2020    COLONOSCOPY, EGD performed by Hiren Stiles MD at [address] [place] [address]  4/27/2018         Alida Middleton  4/27/2018         HX BUNIONECTOMY      HX GI  2010, 5/2018    egd, colonoscopy    HX HERNIA REPAIR  01/11/2021    HX HERNIA REPAIR  01/2021    HX HERNIA REPAIR  88/7947    4 umbillical     HX HYSTERECTOMY      HX ORTHOPAEDIC  2000's    removed bone spurs from [address] [place] [address]? left bunionectomy     HX OTHER SURGICAL  07/2014    2 shocks to heart & ablations    HX OTHER SURGICAL  01/11/2021    incarcerated incisional hernia surgery    HX PACEMAKER  2014    On the right side    HX PACEMAKER PLACEMENT  2015    HX SHOULDER REPLACEMENT Right 2017    HX TONSILLECTOMY  15 yrs old    AK UNLISTED PROCEDURE CARDIAC SURGERY  5/2014    ablation       Family History:  Family History   Problem Relation Age of Onset    Arrhythmia Mother         afib    Stroke Mother     Heart Failure Mother     Colon Cancer Father     Heart Disease Father     Cancer Father         colon cancer    Arrhythmia Brother         afib    Asthma Brother     COPD Brother        Social History:  Social History     Tobacco Use    Smoking status: Never    Smokeless tobacco: Never   Vaping Use    Vaping Use: Never used   Substance Use Topics    Alcohol use: Not Currently     Alcohol/week: 1.0 standard drink     Types: 1 Cans of beer per week    Drug use: Yes     Types: Prescription       Allergies: Allergies   Allergen Reactions    Latex Swelling     Swelling of lips says patient    Beef Containing Products Nausea and Vomiting     diarrhea    Ciprofloxacin Unknown (comments)    Depakote [Divalproex] Other (comments)     2008    Pork Derived (Porcine) Nausea and Vomiting     diarrhea    Bovine Cartilage Nausea and Vomiting     diarrhea    Milk Nausea and Vomiting     diarrhea  diarrhea    Sulfa (Sulfonamide Antibiotics) Hives    Xarelto [Rivaroxaban] Other (comments)     GI problems       CURRENT MEDICATIONS      Previous Medications    ACETAMINOPHEN (TYLENOL) 500 MG TABLET    Take 500 mg by mouth every six (6) hours as needed for Fever or Pain. Take 1 tablet by mouth every 6 hours prn moderate pain    ALPRAZOLAM (XANAX) 0.5 MG TABLET    Take 0.5 mg by mouth nightly. APIXABAN (ELIQUIS) 5 MG TABLET    Take 1 Tablet by mouth two (2) times a day. BUMETANIDE (BUMEX) 2 MG TABLET    Take 2 mg by mouth daily. CYANOCOBALAMIN (VITAMIN B12) 500 MCG TABLET    Take 1,000 mcg by mouth daily. DIMETHICONE/COLLOIDAL OATMEAL (DAILY MOISTURIZING LOTION EX)    Apply 1 Film to affected area as needed. ENTRESTO 24-26 MG TABLET    Take 1 Tablet by mouth two (2) times a day. ERGOCALCIFEROL (ERGOCALCIFEROL) 1,250 MCG (50,000 UNIT) CAPSULE    TAKE 1 CAPSULE BY MOUTH ONE TIME PER WEEK    ESOMEPRAZOLE (NEXIUM) 40 MG CAPSULE    Take 40 mg by mouth daily. FLUOCINONIDE (LIDEX) 0.05 % TOPICAL CREAM    APPLY TO AFFECTED AREA TWICE A DAY    HYDROXYCHLOROQUINE (PLAQUENIL) 200 MG TABLET    Take 200 mg by mouth two (2) times a day. LATANOPROST (XALATAN) 0.005 % OPHTHALMIC SOLUTION    Administer 1 Drop to both eyes two (2) times a day. LEVOTHYROXINE (SYNTHROID) 50 MCG TABLET    TAKE 1 TABLET BY MOUTH EVERY MORNING    LITHIUM CARBONATE 150 MG CAPSULE    Take 150 mg by mouth daily. 2-7-23: Patient's  states patient takes Lithium carbonate 150mg every morning. LITHIUM CARBONATE SR (LITHOBID) 300 MG CR TABLET    Take 1 Tablet by mouth nightly. NITROFURANTOIN, MACROCRYSTAL-MONOHYDRATE, (MACROBID) 100 MG CAPSULE    Take 1 Capsule by mouth two (2) times a day for 7 days. TIMOLOL (TIMOPTIC) 0.5 % OPHTHALMIC SOLUTION    INSTILL 1 DROP 1 TIME EVERY DAY INTO BOTH EYES EVERY MORNING    TOPIRAMATE (TOPAMAX) 100 MG TABLET    TAKE 1 TABLET BY MOUTH TWO TIMES A DAY. ZOLPIDEM (AMBIEN) 10 MG TABLET    Take 10 mg by mouth nightly as needed for Sleep. PHYSICAL EXAM      ED Triage Vitals [02/17/23 1727]   ED Encounter Vitals Group      BP (!) 118/49      Pulse (Heart Rate) (!) 39      Resp Rate 16      Temp 98.1 °F (36.7 °C)      Temp src       O2 Sat (%) 99 %      Weight 208 lb 15.9 oz      Height 5' 5\"        Physical Exam  Vitals and nursing note reviewed. Constitutional:       Appearance: Normal appearance. HENT:      Head: Normocephalic and atraumatic. Nose: Nose normal.      Mouth/Throat:      Mouth: Mucous membranes are moist.   Eyes:      Extraocular Movements: Extraocular movements intact. Conjunctiva/sclera: Conjunctivae normal.   Cardiovascular:      Rate and Rhythm: Normal rate and regular rhythm. Pulmonary:      Effort: Pulmonary effort is normal.      Breath sounds: Normal breath sounds. Abdominal:      Palpations: Abdomen is soft. Tenderness: There is no abdominal tenderness. There is no right CVA tenderness, left CVA tenderness or guarding. Musculoskeletal:      Cervical back: Normal range of motion and neck supple. Skin:     General: Skin is warm and dry. Neurological:      General: No focal deficit present. Mental Status: She is alert and oriented to person, place, and time.       Gait: Gait normal.   Psychiatric:         Mood and Affect: Mood normal.         Behavior: Behavior normal.        DIAGNOSTIC RESULTS   LABS:     Recent Results (from the past 12 hour(s))   EKG, 12 LEAD, INITIAL    Collection Time: 02/17/23  5:34 PM   Result Value Ref Range    Ventricular Rate 75 BPM    Atrial Rate 75 BPM    QRS Duration 190 ms    Q-T Interval 542 ms    QTC Calculation (Bezet) 605 ms    Calculated R Axis 71 degrees    Calculated T Axis -99 degrees    Diagnosis       Ventricular-paced rhythm with frequent AV dual-paced complexes in a pattern   of bigeminy  When compared with ECG of 30-JAN-2023 15:57,  No significant change was found     CBC WITH AUTOMATED DIFF    Collection Time: 02/17/23  5:42 PM   Result Value Ref Range    WBC 6.1 3.6 - 11.0 K/uL    RBC 4.22 3.80 - 5.20 M/uL    HGB 12.9 11.5 - 16.0 g/dL    HCT 38.2 35.0 - 47.0 %    MCV 90.5 80.0 - 99.0 FL    MCH 30.6 26.0 - 34.0 PG    MCHC 33.8 30.0 - 36.5 g/dL    RDW 13.2 11.5 - 14.5 %    PLATELET 032 208 - 730 K/uL    MPV 10.4 8.9 - 12.9 FL    NRBC 0.0 0  WBC    ABSOLUTE NRBC 0.00 0.00 - 0.01 K/uL    NEUTROPHILS 52 32 - 75 %    LYMPHOCYTES 34 12 - 49 %    MONOCYTES 10 5 - 13 %    EOSINOPHILS 3 0 - 7 %    BASOPHILS 1 0 - 1 %    IMMATURE GRANULOCYTES 0 0.0 - 0.5 %    ABS. NEUTROPHILS 3.2 1.8 - 8.0 K/UL    ABS. LYMPHOCYTES 2.1 0.8 - 3.5 K/UL    ABS. MONOCYTES 0.6 0.0 - 1.0 K/UL    ABS. EOSINOPHILS 0.2 0.0 - 0.4 K/UL    ABS. BASOPHILS 0.1 0.0 - 0.1 K/UL    ABS. IMM. GRANS. 0.0 0.00 - 0.04 K/UL    DF AUTOMATED     METABOLIC PANEL, COMPREHENSIVE    Collection Time: 02/17/23  5:42 PM   Result Value Ref Range    Sodium 141 136 - 145 mmol/L    Potassium 3.4 (L) 3.5 - 5.1 mmol/L    Chloride 108 97 - 108 mmol/L    CO2 27 21 - 32 mmol/L    Anion gap 6 5 - 15 mmol/L    Glucose 106 (H) 65 - 100 mg/dL    BUN 32 (H) 6 - 20 MG/DL    Creatinine 1.13 (H) 0.55 - 1.02 MG/DL    BUN/Creatinine ratio 28 (H) 12 - 20      eGFR 51 (L) >60 ml/min/1.73m2    Calcium 10.0 8.5 - 10.1 MG/DL    Bilirubin, total 0.4 0.2 - 1.0 MG/DL    ALT (SGPT) 18 12 - 78 U/L    AST (SGOT) 14 (L) 15 - 37 U/L    Alk. phosphatase 81 45 - 117 U/L    Protein, total 6.9 6.4 - 8.2 g/dL    Albumin 3.9 3.5 - 5.0 g/dL    Globulin 3.0 2.0 - 4.0 g/dL    A-G Ratio 1.3 1.1 - 2.2          EKG: When ordered, EKG's are interpreted by the Emergency Department Physician in the absence of a cardiologist.  Please see their note for interpretation of EKG. RADIOLOGY:  Non-plain film images such as CT, Ultrasound and MRI are read by the radiologist. Plain radiographic images are visualized and preliminarily interpreted by the ED Provider with the below findings:          Interpretation per the Radiologist below, if available at the time of this note:     No results found.       PROCEDURES   Unless otherwise noted below, none  Procedures     CRITICAL CARE TIME       EMERGENCY DEPARTMENT COURSE and DIFFERENTIAL DIAGNOSIS/MDM   Vitals:    Vitals:    02/17/23 1727 02/17/23 1746 02/17/23 1831   BP: (!) 118/49  117/70   Pulse: (!) 39 75    Resp: 16     Temp: 98.1 °F (36.7 °C)     SpO2: 99%  100%   Weight: 94.8 kg (208 lb 15.9 oz)     Height: 5' 5\" (1.651 m) Patient was given the following medications:  Medications   meropenem (MERREM) 1 g in 0.9% sodium chloride (MBP/ADV) 50 mL MBP (has no administration in time range)       CONSULTS: (Who and What was discussed)  IP CONSULT TO HOSPITALIST  I spoke with the patient's PCP, Dr. Yumiko Kraus, regarding admission and treatment plan. I spoke with the [facility] hospitalist, Dr. Maria Luisa Rojas, who accepts for admission. Chronic Conditions:   Afib, bipolar, RA, GERD, HLD, hypothyroid, anticoagulated, migraines, s/p pacemaker, urinary incontinence     Social Determinants affecting Dx or Tx: None    Records Reviewed (source and summary of external records): Prior medical records, Previous Laboratory studies, and Nursing notes    CC/HPI Summary, DDx, ED Course, and Reassessment:    69-year-old female presents for admission and IV antibiotics for ESBL Klebsiella UTI. She endorses dysuria, urinary frequency, and worsening of her baseline urinary incontinence over the last week. She notes bilateral lumbar low back pain. She is overall well-appearing, does not meet SIRS criteria, has no CVA tenderness, and is tolerating p.o. Low suspicion for sepsis, pyelonephritis at this time. We will get labs, initiate IV antibiotics, and page for admission. ED Course as of 02/17/23 1952 Fri Feb 17, 2023 1851 Unable to reach Dr. Yumiko Kraus via [employer]. Dr. Yumiko Kraus paged via his answering service. [AS]   1901 Dr. Yumiko Kraus returned call. He prefers admission with the on-call hospitalist. [AS]   Enrique Rojas accepts for admission [AS]      ED Course User Index  [AS] LATRICIA Gray       Disposition Considerations (Tests not done, Shared Decision Making, Pt Expectation of Test or Tx.):      FINAL IMPRESSION     1. Urinary tract infection due to ESBL Klebsiella          DISPOSITION/PLAN   Admitted    Admit Note: Pt is being admitted by Dr. Maria Luisa Rojas.  The results of their tests and reason(s) for their admission have been discussed with pt and/or available family. They convey agreement and understanding for the need to be admitted and for the admission diagnosis. PATIENT REFERRED TO:  Follow-up Information    None           DISCHARGE MEDICATIONS:  Current Discharge Medication List            DISCONTINUED MEDICATIONS:  Current Discharge Medication List          ED Attending Involvement : I have seen and evaluated the patient. My supervision physician was available for consultation. I am the Primary Clinician of Record. Carlos Contreras (electronically signed)    (Please note that parts of this dictation were completed with voice recognition software. Quite often unanticipated grammatical, syntax, homophones, and other interpretive errors are inadvertently transcribed by the computer software. Please disregards these errors.  Please excuse any errors that have escaped final proofreading.)

## 2023-02-18 VITALS
DIASTOLIC BLOOD PRESSURE: 60 MMHG | SYSTOLIC BLOOD PRESSURE: 105 MMHG | RESPIRATION RATE: 16 BRPM | WEIGHT: 204 LBS | HEART RATE: 75 BPM | OXYGEN SATURATION: 97 % | TEMPERATURE: 97.6 F | BODY MASS INDEX: 33.95 KG/M2

## 2023-02-18 VITALS
TEMPERATURE: 97.3 F | WEIGHT: 209 LBS | BODY MASS INDEX: 34.82 KG/M2 | OXYGEN SATURATION: 99 % | RESPIRATION RATE: 18 BRPM | HEIGHT: 65 IN | DIASTOLIC BLOOD PRESSURE: 49 MMHG | SYSTOLIC BLOOD PRESSURE: 106 MMHG | HEART RATE: 52 BPM

## 2023-02-18 LAB
ANION GAP SERPL CALC-SCNC: 6 MMOL/L (ref 5–15)
ATRIAL RATE: 75 BPM
BACTERIA SPEC CULT: ABNORMAL
BASOPHILS # BLD: 0.1 K/UL (ref 0–0.1)
BASOPHILS NFR BLD: 1 % (ref 0–1)
BUN SERPL-MCNC: 31 MG/DL (ref 6–20)
BUN/CREAT SERPL: 36 (ref 12–20)
CALCIUM SERPL-MCNC: 9.5 MG/DL (ref 8.5–10.1)
CALCULATED R AXIS, ECG10: 71 DEGREES
CALCULATED T AXIS, ECG11: -99 DEGREES
CC UR VC: ABNORMAL
CHLORIDE SERPL-SCNC: 111 MMOL/L (ref 97–108)
CO2 SERPL-SCNC: 25 MMOL/L (ref 21–32)
CREAT SERPL-MCNC: 0.87 MG/DL (ref 0.55–1.02)
DATE LAST DOSE: NORMAL
DIAGNOSIS, 93000: NORMAL
DIFFERENTIAL METHOD BLD: ABNORMAL
EOSINOPHIL # BLD: 0.2 K/UL (ref 0–0.4)
EOSINOPHIL NFR BLD: 4 % (ref 0–7)
ERYTHROCYTE [DISTWIDTH] IN BLOOD BY AUTOMATED COUNT: 13.2 % (ref 11.5–14.5)
GLUCOSE SERPL-MCNC: 101 MG/DL (ref 65–100)
HCT VFR BLD AUTO: 33.5 % (ref 35–47)
HGB BLD-MCNC: 11.8 G/DL (ref 11.5–16)
IMM GRANULOCYTES # BLD AUTO: 0 K/UL (ref 0–0.04)
IMM GRANULOCYTES NFR BLD AUTO: 0 % (ref 0–0.5)
LITHIUM SERPL-SCNC: 0.77 MMOL/L (ref 0.6–1.2)
LYMPHOCYTES # BLD: 2.3 K/UL (ref 0.8–3.5)
LYMPHOCYTES NFR BLD: 39 % (ref 12–49)
MCH RBC QN AUTO: 31.6 PG (ref 26–34)
MCHC RBC AUTO-ENTMCNC: 35.2 G/DL (ref 30–36.5)
MCV RBC AUTO: 89.8 FL (ref 80–99)
MONOCYTES # BLD: 0.6 K/UL (ref 0–1)
MONOCYTES NFR BLD: 10 % (ref 5–13)
NEUTS SEG # BLD: 2.7 K/UL (ref 1.8–8)
NEUTS SEG NFR BLD: 46 % (ref 32–75)
NRBC # BLD: 0 K/UL (ref 0–0.01)
NRBC BLD-RTO: 0 PER 100 WBC
PLATELET # BLD AUTO: 160 K/UL (ref 150–400)
PMV BLD AUTO: 10.9 FL (ref 8.9–12.9)
POTASSIUM SERPL-SCNC: 3.2 MMOL/L (ref 3.5–5.1)
Q-T INTERVAL, ECG07: 542 MS
QRS DURATION, ECG06: 190 MS
QTC CALCULATION (BEZET), ECG08: 605 MS
RBC # BLD AUTO: 3.73 M/UL (ref 3.8–5.2)
REPORTED DOSE,DOSE: NORMAL UNITS
REPORTED DOSE/TIME,TMG: NORMAL
SERVICE CMNT-IMP: ABNORMAL
SODIUM SERPL-SCNC: 142 MMOL/L (ref 136–145)
VENTRICULAR RATE, ECG03: 75 BPM
WBC # BLD AUTO: 5.8 K/UL (ref 3.6–11)

## 2023-02-18 PROCEDURE — 74011250636 HC RX REV CODE- 250/636: Performed by: INTERNAL MEDICINE

## 2023-02-18 PROCEDURE — 74011250636 HC RX REV CODE- 250/636: Performed by: STUDENT IN AN ORGANIZED HEALTH CARE EDUCATION/TRAINING PROGRAM

## 2023-02-18 PROCEDURE — 97535 SELF CARE MNGMENT TRAINING: CPT

## 2023-02-18 PROCEDURE — 74011000250 HC RX REV CODE- 250: Performed by: STUDENT IN AN ORGANIZED HEALTH CARE EDUCATION/TRAINING PROGRAM

## 2023-02-18 PROCEDURE — 80048 BASIC METABOLIC PNL TOTAL CA: CPT

## 2023-02-18 PROCEDURE — 74011000258 HC RX REV CODE- 258: Performed by: INTERNAL MEDICINE

## 2023-02-18 PROCEDURE — 36415 COLL VENOUS BLD VENIPUNCTURE: CPT

## 2023-02-18 PROCEDURE — 97530 THERAPEUTIC ACTIVITIES: CPT

## 2023-02-18 PROCEDURE — 74011000258 HC RX REV CODE- 258: Performed by: STUDENT IN AN ORGANIZED HEALTH CARE EDUCATION/TRAINING PROGRAM

## 2023-02-18 PROCEDURE — 74011250637 HC RX REV CODE- 250/637: Performed by: STUDENT IN AN ORGANIZED HEALTH CARE EDUCATION/TRAINING PROGRAM

## 2023-02-18 PROCEDURE — 99239 HOSP IP/OBS DSCHRG MGMT >30: CPT | Performed by: INTERNAL MEDICINE

## 2023-02-18 PROCEDURE — 80178 ASSAY OF LITHIUM: CPT

## 2023-02-18 PROCEDURE — 85025 COMPLETE CBC W/AUTO DIFF WBC: CPT

## 2023-02-18 PROCEDURE — 97165 OT EVAL LOW COMPLEX 30 MIN: CPT

## 2023-02-18 PROCEDURE — 97162 PT EVAL MOD COMPLEX 30 MIN: CPT

## 2023-02-18 RX ORDER — LOPERAMIDE HYDROCHLORIDE 2 MG/1
2 CAPSULE ORAL
Status: DISCONTINUED | OUTPATIENT
Start: 2023-02-18 | End: 2023-02-18 | Stop reason: HOSPADM

## 2023-02-18 RX ORDER — LITHIUM CARBONATE 150 MG/1
150 CAPSULE ORAL DAILY
Status: DISCONTINUED | OUTPATIENT
Start: 2023-02-18 | End: 2023-02-18 | Stop reason: HOSPADM

## 2023-02-18 RX ADMIN — LEVOTHYROXINE SODIUM 50 MCG: 0.05 TABLET ORAL at 06:29

## 2023-02-18 RX ADMIN — APIXABAN 5 MG: 5 TABLET, FILM COATED ORAL at 09:43

## 2023-02-18 RX ADMIN — MEROPENEM 1 G: 1 INJECTION, POWDER, FOR SOLUTION INTRAVENOUS at 09:55

## 2023-02-18 RX ADMIN — GENTAMICIN SULFATE 360 MG: 40 INJECTION, SOLUTION INTRAMUSCULAR; INTRAVENOUS at 15:26

## 2023-02-18 RX ADMIN — HYDROXYCHLOROQUINE SULFATE 200 MG: 200 TABLET ORAL at 09:43

## 2023-02-18 RX ADMIN — SODIUM CHLORIDE, PRESERVATIVE FREE 10 ML: 5 INJECTION INTRAVENOUS at 14:23

## 2023-02-18 RX ADMIN — PANTOPRAZOLE SODIUM 40 MG: 40 TABLET, DELAYED RELEASE ORAL at 06:30

## 2023-02-18 RX ADMIN — Medication 1000 MCG: at 09:43

## 2023-02-18 RX ADMIN — TIMOLOL MALEATE 1 DROP: 5 SOLUTION OPHTHALMIC at 09:45

## 2023-02-18 RX ADMIN — SODIUM CHLORIDE, PRESERVATIVE FREE 10 ML: 5 INJECTION INTRAVENOUS at 06:29

## 2023-02-18 RX ADMIN — TOPIRAMATE 100 MG: 100 TABLET, FILM COATED ORAL at 09:43

## 2023-02-18 RX ADMIN — LATANOPROST 1 DROP: 50 SOLUTION OPHTHALMIC at 09:46

## 2023-02-18 NOTE — PROGRESS NOTES
Transition of Care Plan:    RUR: 10  MAYCO: 2/18  Spouse will transport Pt home this afternoon. Disposition: Plan with resumption of care with [facility] SN[facility]   -MISAEL orders are in the chart. 2 PM   Spouse will transport Pt back home today. CM called [address] and notified them of MISAEL orders. If SNF or IPR: Date FOC offered:  Date FOC received:  Date authorization started with reference number:  Date authorization received and expires:  Accepting facility:    Follow up appointments: Weekend CM unable to make appt. Pt will make appt. DME needed:n/a  Transportation at Discharge: Spouse will transport Pt home this afternoon. [place] or means to access home:      Spouse  IM Medicare Letter: 1st IM 2/18  Is patient a [place] and connected with the [place]?       no         If yes, was [employer] transfer form completed and [facility] notified? Caregiver Contact:  Discharge Caregiver contacted prior to discharge? Care Conference needed?:                   Reason for Readmission:     Pt was readmitted on 2/17 d/t UTI         RUR Score/Risk Level:     10 low    PCP: First and Last name:  Gurdeep Zambrano   Name of Practice: [facility]   Are you a current patient: Yes/No: yes    Approximate date of last visit: no    Can you participate in a virtual visit with your PCP: yes    Is a Care Conference indicated: no      Did you attend your follow up appointment (s): If not, why not: yes         Resources/supports as identified by patient/family:   Spouse and Friends       Top Challenges facing patient (as identified by patient/family and CM): Finances/Medication cost?   yes    Transportation      spouse  Support system or lack thereof? Spouse and Friends      Living arrangements? Patient lives with her  in a single level home with 4 steps  Self-care/ADLs/Cognition? Current Advanced Directive/Advance Care Plan:  no           Plan for utilizing home health:   [address] Management Interventions  PCP Verified by CM: Yes  Palliative Care Criteria Met (RRAT>21 & CHF Dx)?: No  Mode of Transport at Discharge: Other (see comment)  Transition of Care Consult (CM Consult): Discharge Planning, [address]: Yes  MyChart Signup: Yes  Discharge Durable Medical Equipment: No  Physical Therapy Consult: Yes  Support Systems: Spouse/Significant Other, Friend/Neighbor  Confirm Follow Up Transport: Family  The Plan for Transition of Care is Related to the Following Treatment Goals : HH:  [address]  The Patient and/or Patient Representative was Provided with a Choice of Provider and Agrees with the Discharge Plan?: Yes  Name of the Patient Representative Who was Provided with a Choice of Provider and Agrees with the Discharge Plan: Pt  Freedom of Choice List was Provided with Basic Dialogue that Supports the Patient's Individualized Plan of Care/Goals, Treatment Preferences and Shares the Quality Data Associated with the Providers?: Yes  Discharge Location  Patient Expects to be Discharged to[de-identified] Home with home health    Readmission Assessment  Number of days since last admission?: 8-30 days  Previous disposition: Home with Home Health  Who is being interviewed?: Patient  What was the patient's/caregiver's perception as to why they think they needed to return back to the hospital?: Other (Comment)  Did you visit your Primary Care Physician after you left the hospital, before you returned this time?: Yes  Did you see a specialist, such as Cardiac, Pulmonary, Orthopedic Physician, etc. after you left the hospital?: No  Who advised the patient to return to the hospital?: Physician  Does the patient report anything that got in the way of taking their medications?: No  In our efforts to provide the best possible care to you and others like you, can you think of anything that we could have done to help you after you left the hospital the first time, so that you might not have needed to return so soon?: Other (Comment) (MD told Pt to come to ER d/t UTI)    No further CM needs.      Loye Court, Weekend CM  Ext [phone]

## 2023-02-18 NOTE — H&P
This note is compiled to communicate with the medical care team. It may contain sensitive and protected information. It is not intended to serve as a source of communication with patients/families/friends; that communication occurs at the bedside or via alternative direct communications means (secure messaging, letters, video/telephone, etc.). Hospitalist Admission Note    NAME: Janice Pascual   :  1950   MRN:  [id number]     Date/Time:  2023 2:05 AM    Patient PCP: Gillian Fox MD  ______________________________________________________________________  Given the patient's current clinical presentation, I have a high level of concern for decompensation if discharged from the emergency department. Complex decision making was performed, which includes reviewing the patient's available past medical records, laboratory results, and x-ray films. My assessment of this patient's clinical condition and my plan of care is as follows. Subjective:   CHIEF COMPLAINT: PCP referral    HISTORY OF PRESENT ILLNESS:     Alix Loo is a 75 y.o.  female with pertinent past medical history as listed below who presents at direction of PCP. Patient reports 1 week history of dysuria, vaginal burning, frequency, and new onset back pain. Was evaluated by PCP (Dr. Kim Padilla) on Monday and started on Macrobid. UA was collected and reflexed to culture and resulted today with ESBL Klebsiella and patient reports referred to the emergency department for IV antibiotics. Of note, urine culture grew 30,000 colony-forming units. Patient denies any systemic signs of infection. ROS otherwise negative. Of note, patient with recent hospitalization for lithium toxicity who remains on lithium at reduced dose. In the ED, patient afebrile and hemodynamically stable saturating upper 90s on room air. ECG demonstrates V-paced rhythm.   Labs demonstrate: Unremarkable CBC, sodium 141, potassium 3.4, glucose 106, BUN 32, creatinine 1.13 (baseline 0.8), UA reflex to culture (protein, ketone, nitrites, leukocyte esterase, 3+ bacteria). Patient started on meropenem after review of sensitivities by ED provider. We were asked to admit for work up and evaluation of the above problems.      Past Medical History:   Diagnosis Date    Arrhythmia 2014    atrial fibrillation 2014, sick sinus syndrome: [facility]    Arthritis     Rheumatoid    Bipolar affect, depressed ([address]     Bipolar affective disorder ([address] 3/31/2010    Chronic pain 2018    right shoulder    Colon polyps 03/31/2010    also 5/2018: colon polyps    Depression     Diarrhea 07/19/2013    D/t alpha gal allergy says patient    Epigastric pain 6/5/2015    GERD (gastroesophageal reflux disease)     occasiional only; controlled with med    Hemispheric carotid artery syndrome No stroke    Neuro: Farncoise Helms  (CT scan head/neck negative 4/2018 in [facility])    Hyperlipidemia 3/31/2010    Hypothyroidism 3/31/2010    Long term current use of anticoagulant therapy     Lupus ([facility]     patient denies-says she has RA    Migraine 03/31/2010    has them x2 a month: last one 5/23/2018    Psychotic disorder (HCC)     S/P ablation of atrial fibrillation 05/13/2014    S/P cardiac pacemaker procedure 9/16/2014 9/16/14 Med[employer]onic MRI compatible dual chamber pacemaker implant    Sleep apnea     unable to tolerate CPAP    Stool color black     Thyroid disease     Tick-borne disease     Alpha Gal allergy: no eat pork, beef, milk (Meat allergy showed up on a allergy test)    Umbilical hernia without obstruction and without gangrene 6/22/2020    Urge incontinence 3/31/2010    Vitamin D deficiency 3/31/2015        Past Surgical History:   Procedure Laterality Date    COLONOSCOPY N/A 4/27/2018    COLONOSCOPY performed by Silvestre Walker MD at [facility] AMBULATORY OR    COLONOSCOPY N/A 11/25/2020    COLONOSCOPY, EGD performed by Dee Dee Leo MD at [address] [place] [address] COLONOSCOPY,DIAGNOSTIC  4/27/2018         COLONOSCOPY,RENALDO MITCHELL,SNARE  4/27/2018         HX BUNIONECTOMY      HX GI  2010, 5/2018    egd, colonoscopy    HX HERNIA REPAIR  01/11/2021    HX HERNIA REPAIR  01/2021    HX HERNIA REPAIR  93/3408    4 umbillical     HX HYSTERECTOMY      HX ORTHOPAEDIC  2000's    removed bone spurs from R & L hand    HX ORTHOPAEDIC  1990s? left bunionectomy     HX OTHER SURGICAL  07/2014    2 shocks to heart & ablations    HX OTHER SURGICAL  01/11/2021    incarcerated incisional hernia surgery    HX PACEMAKER  2014    On the right side    HX PACEMAKER PLACEMENT  2015    HX SHOULDER REPLACEMENT Right 2017    HX TONSILLECTOMY  15 yrs old    MA UNLISTED PROCEDURE CARDIAC SURGERY  5/2014    ablation       Social History     Tobacco Use    Smoking status: Never    Smokeless tobacco: Never   Substance Use Topics    Alcohol use: Not Currently     Alcohol/week: 1.0 standard drink     Types: 1 Cans of beer per week        Family History   Problem Relation Age of Onset    Arrhythmia Mother         afib    Stroke Mother     Heart Failure Mother     Colon Cancer Father     Heart Disease Father     Cancer Father         colon cancer    Arrhythmia Brother         afib    Asthma Brother     COPD Brother        Allergies   Allergen Reactions    Latex Swelling     Swelling of lips says patient    Beef Containing Products Nausea and Vomiting     diarrhea    Ciprofloxacin Unknown (comments)    Depakote [Divalproex] Other (comments)     2008    Pork Derived (Porcine) Nausea and Vomiting     diarrhea    Bovine Cartilage Nausea and Vomiting     diarrhea    Milk Nausea and Vomiting     diarrhea  diarrhea    Sulfa (Sulfonamide Antibiotics) Hives    Xarelto [Rivaroxaban] Other (comments)     GI problems        Prior to Admission medications    Medication Sig Start Date End Date Taking?  Authorizing Provider   nitrofurantoin, macrocrystal-monohydrate, (MACROBID) 100 mg capsule Take 1 Capsule by mouth two (2) times a day for 7 days. 2/14/23 2/21/23 Yes Luc Smith MD   lithium carbonate 150 mg capsule Take 150 mg by mouth daily. 2-7-23: Patient's  states patient takes Lithium carbonate 150mg every morning. Yes Provider, Historical   cyanocobalamin (VITAMIN B12) 500 mcg tablet Take 1,000 mcg by mouth daily. Yes Provider, Historical   bumetanide (BUMEX) 2 mg tablet Take 2 mg by mouth daily. Yes Provider, Historical   lithium carbonate SR (LITHOBID) 300 mg CR tablet Take 1 Tablet by mouth nightly. 2/3/23  Yes Luc Smith MD   levothyroxine (SYNTHROID) 50 mcg tablet TAKE 1 TABLET BY MOUTH EVERY MORNING 1/31/23  Yes Luc Smith MD   topiramate (TOPAMAX) 100 mg tablet TAKE 1 TABLET BY MOUTH TWO TIMES A DAY. 1/31/23  Yes Zahra Batista NP   zolpidem (AMBIEN) 10 mg tablet Take 10 mg by mouth nightly as needed for Sleep. Yes Provider, Historical   Entresto 24-26 mg tablet Take 1 Tablet by mouth two (2) times a day. 7/12/22  Yes Provider, Historical   ergocalciferol (ERGOCALCIFEROL) 1,250 mcg (50,000 unit) capsule TAKE 1 CAPSULE BY MOUTH ONE TIME PER WEEK 5/16/22  Yes Luc Smith MD   esomeprazole (NEXIUM) 40 mg capsule Take 40 mg by mouth daily. 10/6/21  Yes Provider, Historical   apixaban (ELIQUIS) 5 mg tablet Take 1 Tablet by mouth two (2) times a day. 8/11/21  Yes Kristen Sheth ANP   timolol (TIMOPTIC) 0.5 % ophthalmic solution INSTILL 1 DROP 1 TIME EVERY DAY INTO BOTH EYES EVERY MORNING 8/25/20  Yes Provider, Historical   hydrOXYchloroQUINE (PLAQUENIL) 200 mg tablet Take 200 mg by mouth two (2) times a day. 10/1/20  Yes Provider, Historical   latanoprost (XALATAN) 0.005 % ophthalmic solution Administer 1 Drop to both eyes two (2) times a day. 4/26/20  Yes Provider, Historical   ALPRAZolam (XANAX) 0.5 mg tablet Take 0.5 mg by mouth nightly.  3/23/10  Yes Provider, Historical   fluocinoNIDE (LIDEX) 0.05 % topical cream APPLY TO AFFECTED AREA TWICE A DAY 11/24/22   Luc Smith MD dimethicone/colloidal oatmeal (DAILY MOISTURIZING LOTION EX) Apply 1 Film to affected area as needed. Provider, Historical   acetaminophen (TYLENOL) 500 mg tablet Take 500 mg by mouth every six (6) hours as needed for Fever or Pain. Take 1 tablet by mouth every 6 hours prn moderate pain    Provider, Historical       REVIEW OF SYSTEMS:       Total of 12 systems reviewed with pertinent positives and negatives noted in HPI      Objective:   VITALS:    Visit Vitals  /68   Pulse 70   Temp 98.4 °F (36.9 °C)   Resp 17   Ht 5' 5\" (1.651 m)   Wt 94.8 kg (208 lb 15.9 oz)   SpO2 100%   BMI 34.78 kg/m²       PHYSICAL EXAM:    General:   Alert, cooperative, no distress, elderly  female        HEENT:  Atraumatic, anicteric sclerae, pink conjunctivae, mucosa moist, dentition fair     Neck:  Supple, symmetrical, trachea midline, no abnormalities on palpation     Lungs:   CTAB. Symmetric expansion. Good aeration. Normal respiratory effort. Chest wall:  No tenderness. No Accessory muscle use. Cardiovascular:   RRR, No murmur/rub/gallop. No JVD. Radial/AT/DP pulse 2+     GI/:   Soft, non-tender. Not distended. Bowel sounds normal. No HSM     Extremities No edema. No cyanosis. Capillary refill normal     Skin: No significant lesions noted. Not Jaundiced. No rashes      Neurologic: PERRL. EOMI. No facial asymmetry. No aphasia or slurred speech. Moves all extremities. Sensation to light touch grossly intact BUE/BLE. No overt focal deficits appreciated     Psych:  Alert and oriented X 4. Normal affect.  Good insight     Other:   N/A         LAB DATA REVIEWED:    Recent Results (from the past 24 hour(s))   EKG, 12 LEAD, INITIAL    Collection Time: 02/17/23  5:34 PM   Result Value Ref Range    Ventricular Rate 75 BPM    Atrial Rate 75 BPM    QRS Duration 190 ms    Q-T Interval 542 ms    QTC Calculation (Bezet) 605 ms    Calculated R Axis 71 degrees    Calculated T Axis -99 degrees    Diagnosis Ventricular-paced rhythm with frequent AV dual-paced complexes in a pattern   of bigeminy  When compared with ECG of 30-JAN-2023 15:57,  No significant change was found     CBC WITH AUTOMATED DIFF    Collection Time: 02/17/23  5:42 PM   Result Value Ref Range    WBC 6.1 3.6 - 11.0 K/uL    RBC 4.22 3.80 - 5.20 M/uL    HGB 12.9 11.5 - 16.0 g/dL    HCT 38.2 35.0 - 47.0 %    MCV 90.5 80.0 - 99.0 FL    MCH 30.6 26.0 - 34.0 PG    MCHC 33.8 30.0 - 36.5 g/dL    RDW 13.2 11.5 - 14.5 %    PLATELET 541 561 - 944 K/uL    MPV 10.4 8.9 - 12.9 FL    NRBC 0.0 0  WBC    ABSOLUTE NRBC 0.00 0.00 - 0.01 K/uL    NEUTROPHILS 52 32 - 75 %    LYMPHOCYTES 34 12 - 49 %    MONOCYTES 10 5 - 13 %    EOSINOPHILS 3 0 - 7 %    BASOPHILS 1 0 - 1 %    IMMATURE GRANULOCYTES 0 0.0 - 0.5 %    ABS. NEUTROPHILS 3.2 1.8 - 8.0 K/UL    ABS. LYMPHOCYTES 2.1 0.8 - 3.5 K/UL    ABS. MONOCYTES 0.6 0.0 - 1.0 K/UL    ABS. EOSINOPHILS 0.2 0.0 - 0.4 K/UL    ABS. BASOPHILS 0.1 0.0 - 0.1 K/UL    ABS. IMM. GRANS. 0.0 0.00 - 0.04 K/UL    DF AUTOMATED     METABOLIC PANEL, COMPREHENSIVE    Collection Time: 02/17/23  5:42 PM   Result Value Ref Range    Sodium 141 136 - 145 mmol/L    Potassium 3.4 (L) 3.5 - 5.1 mmol/L    Chloride 108 97 - 108 mmol/L    CO2 27 21 - 32 mmol/L    Anion gap 6 5 - 15 mmol/L    Glucose 106 (H) 65 - 100 mg/dL    BUN 32 (H) 6 - 20 MG/DL    Creatinine 1.13 (H) 0.55 - 1.02 MG/DL    BUN/Creatinine ratio 28 (H) 12 - 20      eGFR 51 (L) >60 ml/min/1.73m2    Calcium 10.0 8.5 - 10.1 MG/DL    Bilirubin, total 0.4 0.2 - 1.0 MG/DL    ALT (SGPT) 18 12 - 78 U/L    AST (SGOT) 14 (L) 15 - 37 U/L    Alk.  phosphatase 81 45 - 117 U/L    Protein, total 6.9 6.4 - 8.2 g/dL    Albumin 3.9 3.5 - 5.0 g/dL    Globulin 3.0 2.0 - 4.0 g/dL    A-G Ratio 1.3 1.1 - 2.2     URINALYSIS W/ REFLEX CULTURE    Collection Time: 02/17/23  6:10 PM    Specimen: Urine   Result Value Ref Range    Color YELLOW/STRAW      Appearance CLOUDY (A) CLEAR      Specific gravity 1.021      pH (UA) 5.5 5.0 - 8.0      Protein 30 (A) NEG mg/dL    Glucose Negative NEG mg/dL    Ketone TRACE (A) NEG mg/dL    Bilirubin Negative NEG      Blood Negative NEG      Urobilinogen 1.0 0.2 - 1.0 EU/dL    Nitrites Positive (A) NEG      Leukocyte Esterase MODERATE (A) NEG      WBC 20-50 0 - 4 /hpf    RBC 5-10 0 - 5 /hpf    Epithelial cells FEW FEW /lpf    Bacteria 3+ (A) NEG /hpf    UA:UC IF INDICATED URINE CULTURE ORDERED (A) CNI         EKG: V paced rhythm  ______________________________________________________________________    Assessment / Plan:  ESBL Klebsiella UTI  KI  Recent lithium toxicity  Bipolar disorder on lithium  Essential hypertension  Hyperlipidemia  Sick sinus syndrome status post PPM  GERD  Glaucoma  Migraines  Rheumatoid arthritis    PLAN:    ESBL Klebsiella UTI  KI  Admit to telemetry monitoring  Continue empiric meropenem  -There is some literature to support single dose gentamicin for treatment of resistant UTIs, but will defer to PCP who will assume care in the morning  Noted urine culture is only growing 30,000 units, but given symptomatology will pursue treatment  Trend renal function, avoid nephrotoxic agents, and renally dose medications as appropriate  -Consider holding Entresto given ARB component    Recent lithium toxicity  Bipolar disorder on lithium  Repeat lithium level  Confirmed current dosing with   Monitor for declining renal function and avoid competing agents    Essential hypertension  Hyperlipidemia  Sick sinus syndrome status post PPM  Continue PTA Eliquis, Entresto    GERD  Formulary substitution Protonix    Glaucoma  Continue PTA eyedrops    Migraines  Continue PTA topiramate    Rheumatoid arthritis  Continue PTA Plaquenil    Code Status: Full    DVT Prophylaxis: Eliquis  GI Prophylaxis: Protonix  Diet: Cardiac       Care Plan discussed with:    Comments   Patient x    Family      RN     Care Manager                    Consultant: _______________________________________________________________________  Baseline Level of Function: Home with assistance    Expected  Disposition:   Home with Family    HH/PT/OT/RN x   SNF/LTC    SUGAR    ________________________________________________________________________  TOTAL TIME:  75 Minutes   MEDICAL COMPLEXITY: high  TOBACCO CESSATION COUNSELING: NO        Comments    x Reviewed previous records   >50% of visit spent in counseling and coordination of care x Discussion with patient and/or family and questions answered       ________________________________________________________________________  Signed: Eugenio Gage DO  2/18/2023 2:05 AM    Please note that this documentation was completed in part with Dragon dictation software. Occasionally unanticipated grammatical, syntax, homophones, and other interpretive errors are inadvertently transcribed by the computer software. Please excuse and disregard any errors that have escaped final proofreading. If in doubt, please do not hesitate to reach out to myself or the assigned hospitalist via [facility] paging system for clarification.

## 2023-02-18 NOTE — PROGRESS NOTES
Bedside shift change report given to [facility] (oncoming nurse) by Clifford Vega  (offgoing nurse). Report included the following information SBAR, Kardex, Intake/Output, and MAR. Patient is awake in bed during the handoff report.

## 2023-02-18 NOTE — PROGRESS NOTES
DISCHARGE  PATIENT ALERT AND ORIENTED X4  DENIES PAIN AT THIS TIME  IV REMOVED, CANNULA INTACT   D/C INSTRUCTIONS GIVEN, ALL QUESTIONS ANSWERED, VERBALIZED UNDERSTANDING  PATIENT LEFT WITH ALL BELONGINGS   TRANSPORTED TO MAIN ENTRANCE VIA WHEELCHAIR

## 2023-02-18 NOTE — ED NOTES
Bedside shift change report given to [facility] (oncoming nurse) by Tracey Puentes (offgoing nurse). Report included the following information SBAR, Kardex, ED Summary, and MAR.

## 2023-02-18 NOTE — DISCHARGE SUMMARY
Physician Discharge Summary     Patient ID:  Dorothy Dangelo  [id number]  74 y.o.  1950    Admit date: 2/17/2023    Discharge date: 2/18/2023    Admission Diagnoses: UTI due to Klebsiella species [N39.0, B96.89]    Discharge Diagnoses:  Principal Diagnosis                                               Active Problems:    UTI due to Klebsiella species (2/17/2023)       Consults: None    Significant Diagnostic Studies: N/A. Hospital Course: Ms. Dorothy Dangelo is a patient of mine who presented with the problems above. See the initial H and P for full details. Silvia Tapai is a 75 y.o.  female with pertinent past medical history as listed below who presents at direction of PCP. Patient reports 1 week history of dysuria, vaginal burning, frequency, and new onset back pain. Was evaluated by PCP (Dr. Mini Medina) on Monday and started on Macrobid. UA was collected and reflexed to culture and resulted today with ESBL Klebsiella and patient reports referred to the emergency department for IV antibiotics. Of note, urine culture grew 30,000 colony-forming units. Patient denies any systemic signs of infection. ROS otherwise negative. Of note, patient with recent hospitalization for lithium toxicity who remains on lithium at reduced dose. In the ED, patient afebrile and hemodynamically stable saturating upper 90s on room air. ECG demonstrates V-paced rhythm. Labs demonstrate: Unremarkable CBC, sodium 141, potassium 3.4, glucose 106, BUN 32, creatinine 1.13 (baseline 0.8), UA reflex to culture (protein, ketone, nitrites, leukocyte esterase, 3+ bacteria). Patient started on meropenem after review of sensitivities by ED provider. By problem. .. Symptomatic UTI, with culture showing 30,000 colonies ESBL Klebsiella: She was admitted and started on meropenem. Today, on review of culture, I'll give her a single dose of gentamicin, which based on literature should be adequate treatment.  She will be discharged back home on her usual medications. Hypotension: She has been given IV fluids, and her Alyse Snooks was held. KI: Mild. She was treated with IVF. Diarrhea: She has been dealing with this since January. OTC imodium. Recent admission for Lithium toxicity (1/30/2023): Her Li levels are normal.  Bipolar disorder: Resume follow up with her psychiatrist, Azar Van. Systolic CHF: EF 68-34% in October 2022; has ICD. HTN, intially a bit low on admission: Continue usual medications as tolerated. Atrial fibrillation: On eliquis. GERD: Stable. On protonix. Hypothyroidism: Continue levothyroxine. Rheumatoid arthritis. On plaquenil. CODE: Full code. Discharge Exam:  Visit Vitals  BP 92/68 (BP Patient Position: Sitting) Comment (BP Patient Position): post activity   Pulse 74   Temp 97.3 °F (36.3 °C)   Resp 18   Ht 5' 5\" (1.651 m)   Wt 208 lb 15.9 oz (94.8 kg)   SpO2 99%   BMI 34.78 kg/m²     Gen: Pleasant 75 y.o.  female in NAD. HEENT: PERRLA. EOMI. OP moist and pink. Neck: Supple. No LAD. HEART: RRR, No M/G/R.   LUNGS: CTAB No W/R. ABDOMEN: S, NT, ND, BS+. EXTREMITIES: Warm. Disposition: home    Patient Instructions:   Current Discharge Medication List        CONTINUE these medications which have NOT CHANGED    Details   nitrofurantoin, macrocrystal-monohydrate, (MACROBID) 100 mg capsule Take 1 Capsule by mouth two (2) times a day for 7 days. Qty: 14 Capsule, Refills: 0      lithium carbonate 150 mg capsule Take 150 mg by mouth daily. 2-7-23: Patient's  states patient takes Lithium carbonate 150mg every morning. cyanocobalamin (VITAMIN B12) 500 mcg tablet Take 1,000 mcg by mouth daily. bumetanide (BUMEX) 2 mg tablet Take 2 mg by mouth daily. lithium carbonate SR (LITHOBID) 300 mg CR tablet Take 1 Tablet by mouth nightly.   Qty: 30 Tablet, Refills: 0      levothyroxine (SYNTHROID) 50 mcg tablet TAKE 1 TABLET BY MOUTH EVERY MORNING  Qty: 90 Tablet, Refills: 3    Associated Diagnoses: Hypothyroidism, unspecified type      topiramate (TOPAMAX) 100 mg tablet TAKE 1 TABLET BY MOUTH TWO TIMES A DAY. Qty: 180 Tablet, Refills: 1    Associated Diagnoses: Essential tremor; Chronic post-traumatic headache, intractable      zolpidem (AMBIEN) 10 mg tablet Take 10 mg by mouth nightly as needed for Sleep. Entresto 24-26 mg tablet Take 1 Tablet by mouth two (2) times a day. ergocalciferol (ERGOCALCIFEROL) 1,250 mcg (50,000 unit) capsule TAKE 1 CAPSULE BY MOUTH ONE TIME PER WEEK  Qty: 12 Capsule, Refills: 1    Associated Diagnoses: Vitamin D deficiency      esomeprazole (NEXIUM) 40 mg capsule Take 40 mg by mouth daily. apixaban (ELIQUIS) 5 mg tablet Take 1 Tablet by mouth two (2) times a day. Qty: 56 Tablet, Refills: 0      timolol (TIMOPTIC) 0.5 % ophthalmic solution INSTILL 1 DROP 1 TIME EVERY DAY INTO BOTH EYES EVERY MORNING      hydrOXYchloroQUINE (PLAQUENIL) 200 mg tablet Take 200 mg by mouth two (2) times a day. latanoprost (XALATAN) 0.005 % ophthalmic solution Administer 1 Drop to both eyes two (2) times a day. ALPRAZolam (XANAX) 0.5 mg tablet Take 0.5 mg by mouth nightly. fluocinoNIDE (LIDEX) 0.05 % topical cream APPLY TO AFFECTED AREA TWICE A DAY  Qty: 60 g, Refills: 0      dimethicone/colloidal oatmeal (DAILY MOISTURIZING LOTION EX) Apply 1 Film to affected area as needed. acetaminophen (TYLENOL) 500 mg tablet Take 500 mg by mouth every six (6) hours as needed for Fever or Pain. Take 1 tablet by mouth every 6 hours prn moderate pain           Activity: Activity as tolerated  Diet: Resume previous diet    Follow-up with Dr. Sugey Krishnamurthy    Signed:  Aj Herron MD  2/18/2023  12:06 PM    Note: Greater than 30 minutes were spent on activities related to this discharge.

## 2023-02-18 NOTE — ED NOTES
ADMISSION SBAR NOTE    IP UNIT CALLED NOTE IS READY: Yes Spoke to [employer] verbal report over phone. SITUATION/BACKGROUND:    Patient is being transferred to [address], Room# 3241    Patient's Chief Complaint was urinary pain and is admitted for UTI. CODE STATUS: Full Code    VITAL SIGNS (MUST BE WITHIN 1 HOUR OF TRANSFER TO IP UNIT:    TEMP: 98.1   PULSE: 74  RESP: 15  BP: 125/57  PAIN SCORE: 8  MEWS: 1       ISOLATION/PRECAUTIONS: Yes  ISOLATION TYPE: contact/ESBL    Called outstanding consults: No      Are there still sign and held orders that need to be released? No     STAT labs collected: Yes  REPEAT LACTIC ACID DUE? No  TIME DUE:     All STAT orders are complete: Yes    The following personal items will be sent with the patient during transfer to the floor: All valuables:   ITEM:    ITEM: Visual Aid: Glasses, With patient  ITEM:               NEURO:   NIH SCORE:        BLAKE SCREENING:   Swallow Screening  Is the Patient Unable to Remain Alert for Testing?: No (02/17/23 1835)  Is the Patient on a Modified Diet (Thickened Liquids) Due to Pre-existing Dysphagia?: No (02/17/23 1835)  Is There Presence of Existing Enteral Tube Feeding via the Stomach or Nose?: No (02/17/23 1835)  Is There Presence of Head-of-Bed Restrictions (Less than 30 Degrees)?: No (02/17/23 1835)  Is There Presence of Tracheotomy Tube?: No (02/17/23 1835)  Is the Patient Ordered Nothing-by-Mouth Status?: No (02/17/23 1835)  3 oz Water Swallow Screen: Pass (02/17/23 1835)      NEURO ASSESSMENT:   Neuro  Neurologic State: Alert (02/17/23 1832)  Orientation Level: Oriented X4 (02/17/23 1832)  Cognition: Appropriate decision making (02/17/23 1832)  Speech: Clear (02/17/23 1832)  Assessment L Pupil: Brisk (02/17/23 1832)  Size L Pupil (mm): 3 (02/17/23 1832)  Assessment R Pupil: Brisk (02/17/23 1832)  Size R Pupil (mm): 3 (02/17/23 1832)    Is patient impulsive? No   Is patient oriented? Yes   Do they follow commands?  Yes  Is the patient ambulatory? Yes Device need 1 assist    FALL RISK? Yes   INTERVENTIONS: assist to restroom, call button within reach, bed in lowest position    INTEGUMENTARY:   IS THE PATIENT UNDRESSED? No   ARE THERE WOUNDS PRESENT? No   ARE THE WOUNDS DOCUMENTED? No     RESPIRATORY:   Is patient on Oxygen? No    OXYGEN: Oxygen Therapy  O2 Device: None (Room air) (02/17/23 5975)    CARDIAC:   Is cardiac monitoring ordered? Yes Last Rhythm: paced  Patient to transfer with tele box on? Yes  Is patient using a LIFE VEST? No     LINE ACCESS:   Peripheral IV 02/17/23 Right Antecubital (Active)        /GI:   CONTINENT BOWEL/BLADDER? Yes  URINARY OUTPUT: voiding  CHRONIC OR ACUTE? N/a   If CHRONIC, is it 1days old, was it changed prior to specimen collection? No   WAS UA WITH REFLEX SENT TO LAB?  Yes IF NO,  COLLECT AND SEND PRIOR TO TRANSPORT TO INPATIENT AREA          REVIEW:

## 2023-02-18 NOTE — PROGRESS NOTES
Problem: Self Care Deficits Care Plan (Adult)  Goal: *Acute Goals and Plan of Care (Insert Text)  Description: FUNCTIONAL STATUS PRIOR TO ADMISSION: Patient was independent for functional mobility and independent-mod I for ADL performance at baseline. She reports using a shower chair. HOME SUPPORT: The patient lived with her  who is able to assist.    Occupational Therapy Goals  Initiated 2/18/2023  1. Patient will perform grooming with modified independence in standing within 7 day(s). 2.  Patient will perform lower body dressing with modified independence within 7 day(s). 3.  Patient will perform toilet transfers with modified independence within 7 day(s). 4.  Patient will perform all aspects of toileting with modified independence within 7 day(s). 5.  Patient will participate in upper extremity therapeutic exercise/activities with independence for 5 minutes within 7 day(s). 6.  Patient will utilize energy conservation techniques during functional activities with verbal cues within 7 day(s). Outcome: Progressing Towards Goal  OCCUPATIONAL THERAPY EVALUATION  Patient: Grant Dejesus (85 y.o. female)  Date: 2/18/2023  Primary Diagnosis: UTI due to Klebsiella species [N39.0, B96.89]       Precautions:       ASSESSMENT  Based on the objective data described below, the patient presents with generalized weakness, decreased activity tolerance, impaired functional mobility and hypotension following admission for UTI. At baseline pt lives with her  and is independent- mod I with ADLs and mobility. Pt was received semisupine in bed, agreeable to participate,  present. She transferred supine>sit with SBA and demo'd good sitting balance EOB. Pt was able to don socks in tailor sitting with setup. Vitals monitored and pt generally hypotensive, asymptomatic supine>sit. She stood with CGA, obtained standing BP (80/60) and pt reported feeling lightheaded, returned to sitting EOB.  Symptoms resolved in sitting, and then pt performed stand pivot transfer to chair. BP 92/68 while seated in chair. Pt is functioning below her baseline and will benefit from skilled therapy intervention to address the above noted impairments. Recommend HH therapy at discharge. Pulse BP SpO2   02/18/23 1109 74 92/68 99 %   02/18/23 1102 86 (!) 80/60 --   02/18/23 1058 (!) 105 (!) 86/47 --   02/18/23 1053 75 (!) 91/53 --        Current Level of Function Impacting Discharge (ADLs/self-care): CGA transfers, independent-CGA ADLs    Functional Outcome Measure: The patient scored 50/100 on the Barthel Index outcome measure. Other factors to consider for discharge: supportive spouse     PLAN :  Recommendations and Planned Interventions: self care training, functional mobility training, therapeutic exercise, balance training, therapeutic activities, endurance activities, patient education, home safety training, and family training/education    Frequency/Duration: Patient will be followed by occupational therapy 3 times a week to address goals. Recommendation for discharge: (in order for the patient to meet his/her long term goals)  Occupational therapy at least 2 days/week in the home AND ensure assist and/or supervision for safety with ADLs and transfers    This discharge recommendation:  Has been made in collaboration with the attending provider and/or case management    IF patient discharges home will need the following DME: patient owns DME required for discharge       SUBJECTIVE:   Patient stated I'd like to sit up.     OBJECTIVE DATA SUMMARY:   HISTORY:   Past Medical History:   Diagnosis Date    Arrhythmia 2014    atrial fibrillation 2014, sick sinus syndrome: [facility]    Arthritis     Rheumatoid    Bipolar affect, depressed ([facility]     Bipolar affective disorder ([facility] 3/31/2010    Chronic pain 2018    right shoulder    Colon polyps 03/31/2010    also 5/2018: colon polyps    Depression     Diarrhea 07/19/2013    D/t alpha gal allergy says patient    Epigastric pain 6/5/2015    GERD (gastroesophageal reflux disease)     occasiional only; controlled with med    Hemispheric carotid artery syndrome No stroke    Neuro: Heidee Ou  (CT scan head/neck negative 4/2018 in [facility])    Hyperlipidemia 3/31/2010    Hypothyroidism 3/31/2010    Long term current use of anticoagulant therapy     Lupus ([facility]     patient denies-says she has RA    Migraine 03/31/2010    has them x2 a month: last one 5/23/2018    Psychotic disorder ([facility]     S/P ablation of atrial fibrillation 05/13/2014    S/P cardiac pacemaker procedure 9/16/2014 9/16/14 Medtronic MRI compatible dual chamber pacemaker implant    Sleep apnea     unable to tolerate CPAP    Stool color black     Thyroid disease     Tick-borne disease     Alpha Gal allergy: no eat pork, beef, milk (Meat allergy showed up on a allergy test)    Umbilical hernia without obstruction and without gangrene 6/22/2020    Urge incontinence 3/31/2010    Vitamin D deficiency 3/31/2015     Past Surgical History:   Procedure Laterality Date    COLONOSCOPY N/A 4/27/2018    COLONOSCOPY performed by Christal Atkinson MD at [facility] AMBUL[facility]ORY OR    COLONOSCOPY N/A 11/25/2020    COLONOSCOPY, EGD performed by Mayo Campbell MD at [address] [place] [address]  4/27/2018         Porfirioar Lesser  4/27/2018         HX BUNIONECTOMY      HX GI  2010, 5/2018    egd, colonoscopy    HX HERNIA REPAIR  01/11/2021    HX HERNIA REPAIR  01/2021    HX HERNIA REPAIR  08/0314    4 umbillical     HX HYSTERECTOMY      HX ORTHOPAEDIC  2000's    removed bone spurs from R & L hand    HX ORTHOPAEDIC  1990s?     left bunionectomy     HX OTHER SURGICAL  07/2014    2 shocks to heart & ablations    HX OTHER SURGICAL  01/11/2021    incarcerated incisional hernia surgery    HX PACEMAKER  2014    On the right side    HX PACEMAKER PLACEMENT  2015    HX SHOULDER REPLACEMENT Right 2017    HX TONSILLECTOMY  15 yrs old    [place] UNLISTED PROCEDURE CARDIAC SURGERY  5/2014    ablation       Expanded or extensive additional review of patient history:     Home Situation  Home Environment: Private residence  # Steps to Enter: 5  Rails to Enter: Yes  Hand Rails : Right  One/Two Story Residence: One story  Living Alone: No  Support Systems: Spouse/Significant Other  Patient Expects to be Discharged to[de-identified] Home  Current DME Used/Available at Home: Walker, rolling, Shower chair, Cane, straight, Grab bars  Tub or Shower Type: Tub/Shower combination    Hand dominance: Right    EXAMINATION OF PERFORMANCE DEFICITS:  Cognitive/Behavioral Status:  Neurologic State: Alert            Hearing: Auditory  Auditory Impairment: None    Vision/Perceptual:                           Acuity: Within Defined Limits         Range of Motion:  AROM: Generally decreased, functional       Strength:  Strength: Generally decreased, functional        Coordination:  Coordination: Within functional limits  Fine Motor Skills-Upper: Left Intact; Right Intact    Gross Motor Skills-Upper: Left Intact; Right Intact    Tone & Sensation:    Tone: Normal  Sensation: Intact        Balance:  Sitting: Intact  Standing: Impaired  Standing - Static: Good  Standing - Dynamic : Good    Functional Mobility and Transfers for ADLs:  Bed Mobility:  Supine to Sit: Stand-by assistance; Adaptive equipment;Bed Modified (HOB elevated)  Scooting: Stand-by assistance    Transfers:  Sit to Stand: Contact guard assistance  Stand to Sit: Contact guard assistance  Bed to Chair: Contact guard assistance    ADL Assessment:  Feeding: Independent    Oral Facial Hygiene/Grooming: Setup (seated)    Bathing: Contact guard assistance;Setup         Upper Body Dressing: Setup    Lower Body Dressing: Contact guard assistance    Toileting: Contact guard assistance          ADL Intervention and task modifications:       Grooming  Position Performed: Seated in chair  Washing Hands: Set-up Lower Body Dressing Assistance  Socks: Contact guard assistance  Leg Crossed Method Used: Yes  Position Performed: Seated edge of bed  Cues: Verbal cues provided       Functional Measure:    Barthel Index:  Bathin  Bladder: 0  Bowels: 10  Groomin  Dressing: 10  Feeding: 10  Mobility: 0  Stairs: 0  Toilet Use: 5  Transfer (Bed to Chair and Back): 10  Total: 50/100      The Barthel ADL Index: Guidelines  1. The index should be used as a record of what a patient does, not as a record of what a patient could do. 2. The main aim is to establish degree of independence from any help, physical or verbal, however minor and for whatever reason. 3. The need for supervision renders the patient not independent. 4. A patient's performance should be established using the best available evidence. Asking the patient, friends/relatives and nurses are the usual sources, but direct observation and common sense are also important. However direct testing is not needed. 5. Usually the patient's performance over the preceding 24-48 hours is important, but occasionally longer periods will be relevant. 6. Middle categories imply that the patient supplies over 50 per cent of the effort. 7. Use of aids to be independent is allowed. Score Interpretation (from [address] [address])    Independent   60-79 Minimally independent   40-59 Partially dependent   20-39 Very dependent   <20 Totally dependent     -Jaelyn Orellana., Barthel, D.W. (1965). Functional evaluation: the Barthel Index. [facility] ([address] (1997). The Barthel activities of daily living index: self-reporting versus actual performance in the old (> or = 75 years). Journal of [address] [place] [address] 45(7), 14 [facility], JAS, Jennifer Bronson., Raji [facility]. (1999).  Measuring the change in disability after inpatient rehabilitation; comparison of the responsiveness of the Barthel Index and Functional [place] Measure. Journal of Neurology, Neurosurgery, and Psychiatry, 66(4), 181-142. CINDA Lou.TY, PATRICK Brooks, & Semaj Kennedy M.A. (2004) Assessment of post-stroke quality of life in cost-effectiveness studies: The usefulness of the Barthel Index and the EuroQoL-5D. Quality of Life Research, 15, 408-97        Occupational Therapy Evaluation Charge Determination   History Examination Decision-Making   LOW Complexity : Brief history review  LOW Complexity : 1-3 performance deficits relating to physical, cognitive , or psychosocial skils that result in activity limitations and / or participation restrictions  LOW Complexity : No comorbidities that affect functional and no verbal or physical assistance needed to complete eval tasks       Based on the above components, the patient evaluation is determined to be of the following complexity level: LOW   Pain Rating:  Pt reported no pain during session    Activity Tolerance:   Fair and requires rest breaks    After treatment patient left in no apparent distress:    Sitting in chair, Call bell within reach, and Caregiver / family present    COMMUNICATION/EDUCATION:   The patients plan of care was discussed with: Physical therapist and Registered nurse. Home safety education was provided and the patient/caregiver indicated understanding., Patient/family have participated as able in goal setting and plan of care. , and Patient/family agree to work toward stated goals and plan of care. This patients plan of care is appropriate for delegation to [facility].     Thank you for this referral.  Ludy Babcock OT  Time Calculation: 32 mins

## 2023-02-18 NOTE — PROGRESS NOTES
Problem: Mobility Impaired (Adult and Pediatric)  Goal: *Acute Goals and Plan of Care (Insert Text)  Description: FUNCTIONAL STATUS PRIOR TO ADMISSION: Patient was independent and active without use of DME. Patient was modified independent for basic and instrumental ADLs. Patient with recent hospitalization and was using RW briefly after, however has progressed with HHPT to ambulating independently. Continues to receive HHPT 2x/week. HOME SUPPORT PRIOR TO ADMISSION: The patient lived with  who assists patient as needed. Receives HHPT and [facility] nursing 2x/week each. Physical Therapy Goals  Initiated 2/18/2023  1. Patient will move from supine to sit and sit to supine , scoot up and down, and roll side to side in bed with independence within 7 day(s). 2.  Patient will transfer from bed to chair and chair to bed with independence using the least restrictive device within 7 day(s). 3.  Patient will perform sit to stand with independence within 7 day(s). 4.  Patient will ambulate with independence for 150 feet with the least restrictive device within 7 day(s). 5.  Patient will ascend/descend 5 stairs with 1 handrail(s) with supervision/set-up within 7 day(s). Outcome: Progressing Towards Goal   PHYSICAL THERAPY EVALUATION  Patient: Satya Stewart (76 y.o. female)  Date: 2/18/2023  Primary Diagnosis: UTI due to Klebsiella species [N39.0, B96.89]       Precautions:        ASSESSMENT  Based on the objective data described below, the patient presents with impaired functional mobility and decreased independence secondary to mild balance and gait impairments, symptomatic orthostatic hypotension, generalized weakness, decreased ROM, and decreased activity tolerance. Received supine in bed with  at bedside and agreeable to PT evaluation. VS noted below for session. Patient only able to tolerate brief periods in standing due to worsening lightheadedness with decline in BP.  Symptoms resolved and BP improved once returned to seated position, therefore completed short transfer from EOB>recliner chair. Pt was left sitting in bedside chair with all needs met, VSS, RN aware, and  present following session. Recommend HHPT and 24/7 supervision/assist at discharge. 02/18/23 1053 02/18/23 1058 02/18/23 1102   Vital Signs   Pulse (Heart Rate) 75 (!) 105 86   BP (!) 91/53 (!) 86/47 (!) 80/60   MAP (Calculated) 66 (!) 60 67   BP 1 Method Automatic Automatic Automatic   BP Patient Position Semi fowlers Sitting Standing   O2 Sat (%)  --   --   --       02/18/23 1109   Vital Signs   Pulse (Heart Rate) 74   BP 92/68   MAP (Calculated) 76   BP 1 Method Automatic   BP Patient Position Sitting  (post activity)   O2 Sat (%) 99 %       Current Level of Function Impacting Discharge (mobility/balance): SBA for bed mobility; CGA for transfers    Functional Outcome Measure: The patient scored 50/100 on the Barthel Index outcome measure which is indicative of 50% impairment in ADLs and mobility. Other factors to consider for discharge: increased risk for falls; decline from baseline mobility; symptomatic orthostatic hypotension     Patient will benefit from skilled therapy intervention to address the above noted impairments. PLAN :  Recommendations and Planned Interventions: bed mobility training, transfer training, gait training, therapeutic exercises, patient and family training/education, and therapeutic activities      Frequency/Duration: Patient will be followed by physical therapy:  3 times a week to address goals.     Recommendation for discharge: (in order for the patient to meet his/her long term goals)  Physical therapy at least 2 days/week in the home AND ensure assist and/or supervision for safety with mobility and ADLs    This discharge recommendation:  Has not yet been discussed the attending provider and/or case management    IF patient discharges home will need the following DME: patient owns DME required for discharge         SUBJECTIVE:   Patient stated I think I need to sit down.     OBJECTIVE DATA SUMMARY:   HISTORY:    Past Medical History:   Diagnosis Date    Arrhythmia 2014    atrial fibrillation 2014, sick sinus syndrome: [facility]    Arthritis     Rheumatoid    Bipolar affect, depressed ([address]     Bipolar affective disorder ([address] 3/31/2010    Chronic pain 2018    right shoulder    Colon polyps 03/31/2010    also 5/2018: colon polyps    Depression     Diarrhea 07/19/2013    D/t alpha gal allergy says patient    Epigastric pain 6/5/2015    GERD (gastroesophageal reflux disease)     occasiional only; controlled with med    Hemispheric carotid artery syndrome No stroke    Neuro: Benna Jose Manuel  (CT scan head/neck negative 4/2018 in [facility])    Hyperlipidemia 3/31/2010    Hypothyroidism 3/31/2010    Long term current use of anticoagulant therapy     Lupus ([facility]     patient denies-says she has RA    Migraine 03/31/2010    has them x2 a month: last one 5/23/2018    Psychotic disorder (H[facility])     S/P ablation of atrial fibrillation 05/13/2014    S/P cardiac pacemaker procedure 9/16/2014 9/16/14 Medtronic MRI compatible dual chamber pacemaker implant    Sleep apnea     unable to tolerate CPAP    Stool color black     Thyroid disease     Tick-borne disease     Alpha Gal allergy: no eat pork, beef, milk (Meat allergy showed up on a allergy test)    Umbilical hernia without obstruction and without gangrene 6/22/2020    Urge incontinence 3/31/2010    Vitamin D deficiency 3/31/2015     Past Surgical History:   Procedure Laterality Date    COLONOSCOPY N/A 4/27/2018    COLONOSCOPY performed by Kathryn Santillan MD at [facility] AMBUL[facility] OR    COLONOSCOPY N/A 11/25/2020    COLONOSCOPY, EGD performed by Amari Gruber MD at [address] [place] [address]  4/27/2018         Gillian Silence  4/27/2018         HX BUNIONECTOMY      HX GI  2010, 5/2018    egd, colonoscopy    HX HERNIA REPAIR  01/11/2021    HX HERNIA REPAIR  01/2021    HX HERNIA REPAIR  22/9919    4 umbillical     HX HYSTERECTOMY      HX ORTHOPAEDIC  2000's    removed bone spurs from R & L hand    HX ORTHOPAEDIC  1990s? left bunionectomy     HX OTHER SURGICAL  07/2014    2 shocks to heart & ablations    HX OTHER SURGICAL  01/11/2021    incarcerated incisional hernia surgery    HX PACEMAKER  2014    On the right side    HX PACEMAKER PLACEMENT  2015    HX SHOULDER REPLACEMENT Right 2017    HX TONSILLECTOMY  15 yrs old    [place] UNLISTED PROCEDURE CARDIAC SURGERY  5/2014    ablation       Personal factors and/or comorbidities impacting plan of care: RA; bipolar; chronic pain; recent hospitalization for lithium toxicity    Home Situation  Home Environment: Private residence  # Steps to Enter: 5  Rails to Enter: Yes  Hand Rails : Right  One/Two Story Residence: One story  Living Alone: No  Support Systems: Spouse/Significant Other  Patient Expects to be Discharged to[de-identified] Home  Current DME Used/Available at Home: Walker, rolling, Shower chair, Cane, straight, Grab bars  Tub or Shower Type: Tub/Shower combination    EXAMINATION/PRESENTATION/DECISION MAKING:   Critical Behavior:  Neurologic State: Alert, Eyes open spontaneously  Orientation Level: Oriented X4  Cognition: Appropriate for age attention/concentration, Appropriate decision making, Appropriate safety awareness, Follows commands     Hearing: Auditory  Auditory Impairment: None  Skin:  Intact  Edema: Mild BLE edema  Range Of Motion:  AROM: Generally decreased, functional                       Strength:    Strength: Generally decreased, functional                    Tone & Sensation:   Tone: Normal              Sensation: Intact               Coordination:  Coordination: Within functional limits  Vision:      Functional Mobility:  Bed Mobility:     Supine to Sit: Stand-by assistance; Adaptive equipment;Bed Modified (HOB elevated)     Scooting: Stand-by assistance  Transfers:  Sit to Stand: Contact guard assistance  Stand to Sit: Contact guard assistance        Bed to Chair: Contact guard assistance              Balance:   Sitting: Intact  Standing: Impaired  Standing - Static: Good  Standing - Dynamic : Good  Ambulation/Gait Training:                        Therapeutic Exercises:   Seated toe/heel raises 5x BLE    Functional Measure:  Barthel Index:    Bathin  Bladder: 0  Bowels: 10  Groomin  Dressing: 10  Feeding: 10  Mobility: 0  Stairs: 0  Toilet Use: 5  Transfer (Bed to Chair and Back): 10  Total: 50/100       The Barthel ADL Index: Guidelines  1. The index should be used as a record of what a patient does, not as a record of what a patient could do. 2. The main aim is to establish degree of independence from any help, physical or verbal, however minor and for whatever reason. 3. The need for supervision renders the patient not independent. 4. A patient's performance should be established using the best available evidence. Asking the patient, friends/relatives and nurses are the usual sources, but direct observation and common sense are also important. However direct testing is not needed. 5. Usually the patient's performance over the preceding 24-48 hours is important, but occasionally longer periods will be relevant. 6. Middle categories imply that the patient supplies over 50 per cent of the effort. 7. Use of aids to be independent is allowed. Score Interpretation (from [address] [place] [address])    Independent   60-79 Minimally independent   40-59 Partially dependent   20-39 Very dependent   <20 Totally dependent     -Jaelyn Orellana., Barthel, D.W. (1966). Functional evaluation: the Barthel Index. [facility] ([address] (1997). The Barthel activities of daily living index: self-reporting versus actual performance in the old (> or = 75 years).  Journal of [address] [place] [address] 45(7), 14 [facility], [email or web address], Titus Hooker., Michele Sam., Paralee President (1999). Measuring the change in disability after inpatient rehabilitation; comparison of the responsiveness of the Barthel Index and Functional [place] Measure. Journal of Neurology, Neurosurgery, and Psychiatry, 66(4), 619-474. MALIK Cade, PATRICK Brooks, & Carina Carrera M.A. (2004) Assessment of post-stroke quality of life in cost-effectiveness studies: The usefulness of the Barthel Index and the EuroQoL-5D. Quality of Life Research, 15, 151-02           Physical Therapy Evaluation Charge Determination   History Examination Presentation Decision-Making   HIGH Complexity :3+ comorbidities / personal factors will impact the outcome/ POC  HIGH Complexity : 4+ Standardized tests and measures addressing body structure, function, activity limitation and / or participation in recreation  MEDIUM Complexity : Evolving with changing characteristics  Other outcome measures Barthel Index  MEDIUM      Based on the above components, the patient evaluation is determined to be of the following complexity level: MEDIUM    Pain Rating:  No pain complaints    Activity Tolerance:   Fair, SpO2 stable on RA, requires rest breaks, and signs and symptoms of orthostatic hypotension    After treatment patient left in no apparent distress:   Sitting in chair, Call bell within reach, and Caregiver / family present    COMMUNICATION/EDUCATION:   The patients plan of care was discussed with: Physical therapist, Occupational therapist, and Registered nurse. Fall prevention education was provided and the patient/caregiver indicated understanding., Patient/family have participated as able in goal setting and plan of care. , and Patient/family agree to work toward stated goals and plan of care.     Thank you for this referral.  Phoebe Gutierres, PT, DPT   Time Calculation: 32 mins

## 2023-02-19 VITALS
HEART RATE: 74 BPM | WEIGHT: 204.25 LBS | DIASTOLIC BLOOD PRESSURE: 64 MMHG | TEMPERATURE: 98 F | BODY MASS INDEX: 33.99 KG/M2 | SYSTOLIC BLOOD PRESSURE: 116 MMHG | RESPIRATION RATE: 16 BRPM | OXYGEN SATURATION: 98 %

## 2023-02-20 ENCOUNTER — PATIENT OUTREACH (OUTPATIENT)
Dept: CASE MANAGEMENT | Age: 73
End: 2023-02-20

## 2023-02-20 ENCOUNTER — HOME CARE VISIT (OUTPATIENT)
Dept: SCHEDULING | Facility: HOME HEALTH | Age: 73
End: 2023-02-20
Payer: MEDICARE

## 2023-02-20 LAB
BACTERIA SPEC CULT: ABNORMAL
CC UR VC: ABNORMAL
SERVICE CMNT-IMP: ABNORMAL

## 2023-02-20 PROCEDURE — G0299 HHS/HOSPICE OF RN EA 15 MIN: HCPCS

## 2023-02-20 RX ORDER — CEPHALEXIN 250 MG/1
250 CAPSULE ORAL
COMMUNITY

## 2023-02-20 RX ORDER — LANOLIN ALCOHOL/MO/W.PET/CERES
3 CREAM (GRAM) TOPICAL
COMMUNITY

## 2023-02-20 NOTE — PROGRESS NOTES
23 at 12:13pm:  Care Transitions Outreach     Call within 2 business days of discharge: Yes   Care Transitions Nurse (CTN) met briefly with patient and patient's spouse, Elaine Hawk (on [address] dated 10-6-22), by phone for transitions of care follow-up. Mr. Alvarado [facility] nurse is currently with patient and requests a call back later today. CTN will attempt to reach patient/spouse again and CTN will continue to monitor. Patient: Kathyanne Castleman Patient : 1950 MRN: [id number]    Last Discharge  Street       Date Complaint Diagnosis Description Type Department Provider    23 Bladder Infection; Back Pain; Urinary Pain Urinary tract infection due to ESBL Klebsiella ED to Hosp-Admission (Discharged) (ADMIT) La Velasco MD; Janak Byrd. .. Was this an external facility discharge? No    Noted following upcoming appointments from discharge chart review:   [facility] follow up appointment(s):   Future Appointments   Date Time Provider Radha Yoon   3/7/2023  2:00 PM [address] 1 [facility]   2023  1:30 PM GIA Poole BS AMB   2023 10:45 AM Farheen Velásquez MD [facility] BS AMB     Non-BS follow up appointment(s): None noted at this time. 23 at 3:29pm:  Care Transitions Initial Call    Call within 2 business days of discharge: Yes     Patient Current Location: [place]    Patient: Kathyanne Castleman Patient : 1950 MRN: [id number]    Last Discharge [address]       Date Complaint Diagnosis Description Type Department Provider    23 Bladder Infection; Back Pain; Urinary Pain Urinary tract infection due to ESBL Klebsiella ED to Hosp-Admission (Discharged) (ADMIT) Aubrey Randhawa MD; Janak Byrd. .. Was this an external facility discharge?  No     Challenges to be reviewed by the provider   Additional needs identified to be addressed with provider: yes  - Complains of increased anxiety, and a \"Daily headache every afternoon about 5pm.\"   - Complains ongoing fatigue and swelling in lower extremities from her calves throughout her feet, utilizing elevation, swelling is improving.   -  reports patient is taking the following 2 medications:       1. Keflex 250mg at night. 2. Melatonin 3mg at night. Both Keflex and melatonin were added to patient's current list of medications as 'Patient Reported' medications. - Potassium 3.2 on 2-18-23. Method of communication with provider:  chart routing to PCP. Component      Latest Ref Rng & Units 2/18/2023 2/17/2023 2/1/2023           3:09 AM  5:42 PM  8:55 AM   RBC      3.80 - 5.20 M/uL 3.73 (L) 4.22 3.68 (L)     Component      Latest Ref Rng & Units 2/18/2023 2/17/2023 2/1/2023           3:09 AM  5:42 PM  8:55 AM   HCT      35.0 - 47.0 % 33.5 (L) 38.2 34.1 (L)     Component      Latest Ref Rng & Units 2/18/2023 2/17/2023 2/8/2023           3:09 AM  5:42 PM 12:20 PM   Potassium      3.5 - 5.1 mmol/L 3.2 (L) 3.4 (L) 3.4 (L)   Chloride      97 - 108 mmol/L 111 (H) 108 110 (H)     Component      Latest Ref Rng & Units 2/18/2023 2/17/2023 2/8/2023           3:09 AM  5:42 PM 12:20 PM   BUN      6 - 20 MG/DL 31 (H) 32 (H) 31 (H)     Component      Latest Ref Rng & Units 2/18/2023 2/17/2023 2/8/2023           3:09 AM  5:42 PM 12:20 PM   BUN/Creatinine ratio      12 - 20   36 (H) 28 (H) 29 (H)     Component      Latest Ref Rng & Units 2/17/2023           5:42 PM   AST      15 - 37 U/L 14 (L)     Component      Latest Ref Rng & Units 2/17/2023           6:10 PM   Culture result:       Klebsiella pneumoniae ** (EXTENDED SPECTRUM BETA LACTAMASE ) ** (A)     COVID-19 related testing was not completed during this admission. Advance Care Planning:   Does patient have an Advance Directive: not on file; education provided. Inpatient Readmission Risk score: Unplanned Readmit Risk Score: 14.2    Was this a readmission?  yes   Patient stated reason for the admission: \"I had a really bad UTI and I couldn't get rid of it. \"     Patients top risk factors for readmission:  Medical condition - CHF, recent UTI due to Klebsiella species and recurrent urinary tract infection, and recent lithium toxicity per chart. Interventions to address risk factors: Obtained and reviewed discharge summary and/or continuity of care documents and Education of patient/family/caregiver/guardian to support self-management-Education provided regarding signs/symptoms of AMS, lithium toxicity, and UTI, patient verbalized an understanding. Care Transition Nurse (CTN) contacted the patient by telephone to perform post hospital discharge assessment. Verified name and  with patient as identifiers. Provided introduction to self, and explanation of the CTN role. CTN reviewed discharge instructions, medical action plan and red flags with patient who verbalized understanding. Were discharge instructions available to patient? yes. Reviewed appropriate site of care based on symptoms and resources available to patient including: PCP, Specialist, Urgent [address], and When to call 911. Patient given an opportunity to ask questions and does not have any further questions or concerns at this time. The patient agrees to contact the PCP office for questions related to their healthcare. Medication reconciliation was performed with  patient's , Dayna Patel (on [address] dated 10-6-22) , who verbalizes understanding of administration of home medications. Advised obtaining a 90-day supply of all daily and as-needed medications. Referral to Pharm D needed: no     Home Health/Outpatient orders at discharge: home health care, PT, OT, and SN resumption of care. [employer] company: [address].  Date of initial visit: Patient reports SN visited on 23.      Durable Medical Equipment ordered at discharge: None  Durable Medical Equipment Company: n/a  Durable Medical Equipment received: n/a    Was patient discharged with a pulse oximeter? no    Discussed follow-up appointments. If no appointment was previously scheduled, appointment scheduling offered: yes. Is follow up appointment scheduled within 7 days of discharge? No, patient states she will call her PCP office today to schedule her JONO appointment. [facility] follow up appointment(s):   Future Appointments   Date Time Provider Radha Karrie   3/7/2023  2:00 PM [address] 1 [facility]   6/5/2023  1:30 PM Dearl Gibran, GIA HUMPHREYS BS AMB   6/8/2023 10:45 AM Blanche Wyatt, MD [facility] BS AMB     Non-[facility] follow up appointment(s): Patient states she will call the office of Perry Finley LCSW today to schedule her follow-up appointment. Plan for follow-up call in 7-10 days based on severity of symptoms and risk factors. Plan for next call: self management-Review red flags of AMS, lithium toxicity, and UTI, and follow up appointment-Evaluate if patient is attending follow-up appointments as scheduled, offer assistance with scheduling as needed. CTN provided contact information for future needs. Goals Addressed                   This Visit's Progress     Understands red flags post discharge. 2-7-23: Red flags of AMS and lithium toxicity reviewed with patient's , Ann Wynn (on [address] dated 10-6-22), and the  verbalized an understanding.  denies patient having chest pain, denies fever/chills, and denies nausea/vomiting.  reports patient becomes short of breath upon exertion and states SOB upon exertion resolves with rest.  reports swelling in bilateral lower extremities, from knees through ankles.  states patient utilizes a regular diet at home, appetite is fair.   reports patient has had 3 falls in the last 6 months, and a total of 4 falls in the last 12 months, states patient did not sustain traumatic injury during any of the 4 falls. Care Transitions Nurse will review red flags again on next phone conversation with patient/. Marcel Waldron     2-15-23: Red flags of AMS and lithium toxicity reviewed with patient and patient's . Patient states her  is preparing her medications with a weekly pill box and is administering all medications. Patient denies chest pain, denies shortness of breath, denies fever/chills, and denies nausea/vomiting.  states swelling in bilateral lower extremities is improving and lessening.  reports lower extremity edema is improving. Patient reports shortness of breath upon exertion has resolved. Patient complains of UTI and states she is taking Macrobid 100mg cap, take one cap two times a day as ordered. Neither the  or patient have any red flags to report at this time. Care Transitions Nurse will review red flags again on next phone conversation with patient/. Marcel Waldron     2-20-23: Patient was readmitted 2-17-23 to 2-18-23 for UTI. Red flags of AMS, lithium toxicity, and UTI reviewed with patient and patient verbalized an understanding. Patient denies chest pain, denies shortness of breath, denies fever/chills, and denies nausea/vomiting since discharge on 2-18-23. Patient complains of increased anxiety, and a \"Daily headache every afternoon about 5pm.\" Patient states she has ongoing fatigue and swelling in lower extremities from her calves throughout her feet, states she is utilizing elevation of her lower extremities and states swelling is improving. States she is utilizing a regular diet at home, appetite is fair. Reports she has had 3 falls in the last 6 months, and a total of 3 falls in the last 12 months, states she did not sustain traumatic injury during any of the 3 falls. Patient has no red flags to report at this time. Care Transitions Nurse will review red flags again on next phone conversation with patient.  Marcel Waldron

## 2023-02-20 NOTE — Clinical Note
Ms. Joce Suarez is taking both xanax and ambien. Was worried about cumulative sedative effects. What are your thoughts?

## 2023-02-22 VITALS
HEART RATE: 76 BPM | DIASTOLIC BLOOD PRESSURE: 58 MMHG | OXYGEN SATURATION: 99 % | RESPIRATION RATE: 14 BRPM | SYSTOLIC BLOOD PRESSURE: 128 MMHG | TEMPERATURE: 97.8 F

## 2023-02-23 ENCOUNTER — HOME CARE VISIT (OUTPATIENT)
Dept: SCHEDULING | Facility: HOME HEALTH | Age: 73
End: 2023-02-23
Payer: MEDICARE

## 2023-02-23 PROCEDURE — G0151 HHCP-SERV OF PT,EA 15 MIN: HCPCS

## 2023-02-23 NOTE — HOME HEALTH
Mrs. Arden Velasquez is a 75year old  female discharged on 2/18/2023 from [facility] for UTI with klebsiella. They have a past medical history of Lithium toxicity, Bipolar disorder, Systolic CHF: EF 87-39%, HTN, Atrial fibrillation, GERD, hypothyroidism, Rheumatoid arthritis. Home health handbook reviewd and documentation signed including consent to treat. All medications reconciled. High risk medications reviewed including eliquis, lithium, xanax, and ambien. Pt. lives  in 1 story house with regular daytime and overnight caregiver support. Concerns included: Pt. taking Xanax and ambien together despite warnings of danger. Pt. has access to lithium/xanax/ambien and occassionally has severe depressive episodes. Skilled reason for admission/summary of clinical condition: Klebsiella UTI admitted to home care for education, fall risk reduction. Nursing needed for education on disease proccess, medications, fall prevention, and skin protection. .   Diagnosis: UTI   Subjective: \"My pelvis just hurts. \"  Caregiver: spouse. Caregiver assists with: Medications, Meals, Transportation and Housekeeping Caregiver unable to assist with: n/a. Caregiver is available In the evenings, Regularly and most daytime hours Caregiver is present at this visit and did participate with clinician. Medications reconciled and all medications are available in the home this visit. The following education was provided regarding medications: medication interactions and look alike medications: Medication interaction between Xanax and Ambien. Patient/CG able to demonstrate knowledge through teach back with 90 percent accuracy. Medications are effective at this time. Dr. Carol Arita notified of any discrepancies/medication interactions using both xanax and ambien. A list of reconciled medications has been given to the patient/caregiver and a copy has been uploaded to media.     Home health supplies by type and quantity ordered/delivered this visit include: n/a  Patient/caregiver instructed on plan of care and are agreeable to plan of care at this time. Clinician reviewed orientation to home health booklet with patient/caregiver including agency phone number, agency complaint process, state hotline number, as well as joint commission's quality hotline number. Consent forms signed. Patient at risk for falls Yes:   Recommended requesting PT/OT orders due to fall risk YES:   Patient response to recommended requesting of PT/OT orders: very amenable to PT consult    Discharge planning discussed with patient and caregiver. Discharge planning as follows: Is no longer homebound, Per physician order, when pt/cg are knowledgeable with 100% accuracy about diseae processes and management, medications, when to notify MD, fall prevention strategies, skin protecting strategies, and infection prevention techniques., Will discharge when the patient has reached their maximum functional potential and maximum safety in their home and When goals are met Patient/caregiver did verbalize agreement with discharge planning. Clinical Assessment (What this means for the patient overall and need for ongoing skilled care):Pt. at risk for falls due to previous falls, sedative medications, and polypharmacy requiring SN for education. Pt. at risk for re-hospitalization due to multiple comorbidities, polypharmacy, lack of CHF knowledge, varying psychological status, and risk for infection requiring SN for education. Risk for infection due to frequent incontinence requiring SN for education    Written Teaching Material Utilized: home health admission book    Specific plan for next visit: Assess knowledge of medications, assess daily weight log, instruct on s/s of chf to report. Assess mental/emotional health. Continue instruction on incontence management. Plan of care and admission to home health status called to attending physician.  The following practitioner has agreed to sign the ongoing POC Dr. Xin Wilson, and was notified of the following: visit frequency of 1d1, 1w5, 1w1, and 3 prn. Interdisciplinary communication with: Dr. Xin Wilson Physician for the purpose of medication concerns with concurrent ambient and xanax use    PCP: Dr. Xin Wilson  Next scheduled doctor appointment: 2/27/2023  Patient/Caregiver instructed to keep follow up appointment because lack of follow through with physician appointments could result in discontinuation of home care services for non-compliance. Patient/Caregiver verbalize knowledge of above through teach back with 100 percent accuracy. Emergency Preparedness: Patient/Caregiver instructed in the following:  Have one gallon of water per person for at least 3 days on hand. Have non-perishable food for at least 3 days that do not need to be cooked. Have flashlights and batteries. Charge your cell phones and any back up lithium batteries for your cell phones. Have 3+ days of back up oxygen in your home. Have a phone in your home that is hard wired and does not require power. Have medication for a week in your home. Make sure you have a caregiver in the home to provide care in case your home health nurse cannot get to your house. Make sure you have all of your paperwork i.e. written emergency preparedness plan, Identification, insurance cards, DME phone number, physician and pharmacy phone number, agency phone number, and your medications in one place for easy access and in a zip lock bag to protect them. Take your Admission Handbook, written emergency preparedness plan, written medication list and folder if you relocate in the event of an emergency, if possible. Call agency if you relocate so we can contact you. Patient/Caregiver verbalize knowledge of above through teach back with 100 percent accuracy. [FreeTextEntry1] : IMPRESSION: Mr. MALIK is a 54 year old man with a long-standing history of HTN, hyperlipidemia, transverse myelitis, and family history of congestive heart failure who presents today for follow up of HTN. \par \par PLAN:\par 1. His blood pressure is elevated and given that he eats foods that are higher in salt, I have started him on HCTZ 12.5mg daily in addition to Amlodipine 10 mg daily. He will also modify his diet. I will be checking a BMP today to evaluate his creatinine and potassium.\par 2. He will modify his diet given his hyperlipidemia. \par 3. He will follow up with me in one month for follow up of his blood pressure.

## 2023-02-24 VITALS
OXYGEN SATURATION: 97 % | DIASTOLIC BLOOD PRESSURE: 75 MMHG | HEART RATE: 80 BPM | RESPIRATION RATE: 16 BRPM | TEMPERATURE: 97.6 F | SYSTOLIC BLOOD PRESSURE: 131 MMHG

## 2023-02-28 ENCOUNTER — HOME CARE VISIT (OUTPATIENT)
Dept: HOME HEALTH SERVICES | Facility: HOME HEALTH | Age: 73
End: 2023-02-28
Payer: MEDICARE

## 2023-03-01 ENCOUNTER — HOME CARE VISIT (OUTPATIENT)
Dept: SCHEDULING | Facility: HOME HEALTH | Age: 73
End: 2023-03-01
Payer: MEDICARE

## 2023-03-01 ENCOUNTER — PATIENT OUTREACH (OUTPATIENT)
Dept: CASE MANAGEMENT | Age: 73
End: 2023-03-01

## 2023-03-01 VITALS
HEART RATE: 75 BPM | TEMPERATURE: 97.5 F | RESPIRATION RATE: 16 BRPM | SYSTOLIC BLOOD PRESSURE: 100 MMHG | DIASTOLIC BLOOD PRESSURE: 62 MMHG | OXYGEN SATURATION: 98 %

## 2023-03-01 PROCEDURE — G0300 HHS/HOSPICE OF LPN EA 15 MIN: HCPCS

## 2023-03-01 NOTE — HOME HEALTH
Subjective: \"I've been feeling pretty depressed and low energy. \"  Falls since last visit NO(if yes complete the Fall Tracking Form and include bsrifallreport):   Caregiver involvement changes: No  Home health supplies by type and quantity ordered/delivered this visit include: n/a    Clinician asked if patient has had any physician contact since last home care visit and patient states: NO  Clinician asked if patient has any new or changed medications and patient states:  NO   If Yes, were medications reconciled? N/A   Was the certifying physician notified of changes in medications? N/A     Clinical assessment (what this visit means for the patient overall and need for ongoing skilled care) and progress or lack of progress towards SPECIFIC goals: Pt at risk for rehospitalization r/t risk for psychosis, complications r/t bipolar disorder. Written Teaching Material Utilized: N/A    Interdisciplinary communication with: N/A for the purpose of n/a    Discharge planning as follows:  Is no longer homebound, Per physician order, Will discharge when the patient has reached their maximum functional potential and maximum safety in their home and When goals are met    Specific plan for next visit: Assessment, education as needed

## 2023-03-01 NOTE — PROGRESS NOTES
Care Transitions Follow Up Call    Patient Current Location: [address] [place] [address] to be reviewed by the provider   Additional needs identified to be addressed with provider: yes  - Reports symptoms of Alpha-gal to include diarrhea and abdominal pain. Feeling better today. - Continues to experience increased anxiety and headache every afternoon between 3pm-5pm.   - Swelling in bilateral ankles and feet, utilizing elevation with success. - Appetite remains fair, no improvement since discharge. Method of communication with provider:  chart routing to PCP. Care Transition Nurse (CTN) contacted the patient by telephone to follow up after admission on 23. Verified name and  with patient as identifiers. Addressed changes since last contact:  Reports recent symptoms of Alpha-gal to include diarrhea and abdominal pain, states she is feeling better today. Follow up appointment completed? no.   Was follow up appointment scheduled within 7 days of discharge? no.     CTN reviewed discharge instructions, medical action plan and red flags with patient and discussed any barriers to care and/or understanding of plan of care after discharge. Discussed appropriate site of care based on symptoms and resources available to patient including: PCP, Specialist, Urgent [address], When to call 911, and [place] . The patient agrees to contact the PCP office for questions related to their healthcare. Patients top risk factors for readmission: Medical condition - CHF, recent UTI due to Klebsiella species and recurrent urinary tract infection, and recent lithium toxicity per chart. Interventions to address risk factors: Education of patient/family/caregiver/guardian to support self-management-Education provided regarding signs/symptoms of AMS, lithium toxicity, and UTI, patient verbalized an understanding.       [address]le [address] Dr follow up appointment(s):   Future Appointments   Date Time Provider [place]   3/2/2023 11:00 AM Onur Ndiaye, PT [place]   3/2/2023 To Be Determined Gaurang Jarett, LPN [address] [facility]   3/3/2023 11:30 AM Lisandra Paredes MD [facility] BS AMB   3/7/2023 To Be Determined Quiana Min RN [facility] [place] [address]   3/7/2023 To Be Determined Aida Horse [place]   3/7/2023  2:00 PM [id number] [facility] [address] [facility]   3/9/2023 To Be Determined Quiana Min RN [facility] [place] [address]   3/9/2023 To Be Determined Aida Horse [place]   3/14/2023 To Be Determined Aida Horse [place]   3/16/2023 To Be Determined Aida Horse [facility] [place] [address]   3/21/2023 To Be Determined Aida Horse [place]   3/23/2023 To Be Determined Aida Horse [place]   3/28/2023 To Be Determined Aida Horse [place]   3/30/2023 To Be Determined Aida Horse [place]   6/5/2023  1:30 PM Daniela HUMPHREYS [facility]   6/8/2023 10:45 AM Lisandra Paredes MD [facility] BS AMB     Non-[facility] follow up appointment(s): Patient states she is waiting for a call back from the office of Arash Rand LCSW with mental health follow-up appointment date/time. Patient reports she has a gastro follow-up appointment in April of 2023 (unable to provide exact date of gastro appointment and providers name at this time). CTN provided contact information for future needs. Plan for follow-up call in 7-10 days based on severity of symptoms and risk factors. Plan for next call: self management-Review red flags of AMS, lithium toxicity, and UTI, and follow up appointment-Evaluate if patient is attending follow-up appointments as scheduled, offer assistance with scheduling as needed. Goals Addressed                   This Visit's Progress     Understands red flags post discharge.         2-7-23: Red flags of AMS and lithium toxicity reviewed with patient's , Yogesh Morel (on [facility] dated 10-6-22), and the  verbalized an understanding.  denies patient having chest pain, denies fever/chills, and denies nausea/vomiting.  reports patient becomes short of breath upon exertion and states SOB upon exertion resolves with rest.  reports swelling in bilateral lower extremities, from knees through ankles.  states patient utilizes a regular diet at home, appetite is fair.  reports patient has had 3 falls in the last 6 months, and a total of 4 falls in the last 12 months, states patient did not sustain traumatic injury during any of the 4 falls. Care Transitions Nurse will review red flags again on next phone conversation with patient/. Dorcus Shells     2-15-23: Red flags of AMS and lithium toxicity reviewed with patient and patient's . Patient states her  is preparing her medications with a weekly pill box and is administering all medications. Patient denies chest pain, denies shortness of breath, denies fever/chills, and denies nausea/vomiting.  states swelling in bilateral lower extremities is improving and lessening.  reports lower extremity edema is improving. Patient reports shortness of breath upon exertion has resolved. Patient complains of UTI and states she is taking Macrobid 100mg cap, take one cap two times a day as ordered. Neither the  or patient have any red flags to report at this time. Care Transitions Nurse will review red flags again on next phone conversation with patient/. Dorcus Shells     2-20-23: Patient was readmitted 2-17-23 to 2-18-23 for UTI. Red flags of AMS, lithium toxicity, and UTI reviewed with patient and patient verbalized an understanding. Patient denies chest pain, denies shortness of breath, denies fever/chills, and denies nausea/vomiting since discharge on 2-18-23.  Patient complains of increased anxiety, and a \"Daily headache every afternoon about 5pm.\" Patient states she has ongoing fatigue and swelling in lower extremities from her calves throughout her feet, states she is utilizing elevation of her lower extremities and states swelling is improving. States she is utilizing a regular diet at home, appetite is fair. Reports she has had 3 falls in the last 6 months, and a total of 3 falls in the last 12 months, states she did not sustain traumatic injury during any of the 3 falls. Patient has no red flags to report at this time. Care Transitions Nurse will review red flags again on next phone conversation with patient. GABRIEL     3-1-23: Red flags of AMS, lithium toxicity, and UTI reviewed with patient and patient verbalized an understanding. Patient denies chest pain, denies shortness of breath, denies fever/chills, and denies nausea/vomiting. Patient reports she has been experiencing symptoms of Alpha-gal to include diarrhea and abdominal pain. Patient states she rescheduled her [facility] appointment to a virtual appointment on 3-3-23 due to feeling ill from Alpha-gal earlier this week. States she is feeling much better today. Patient continues to complain of increased anxiety and headache every afternoon between 3pm - 5pm. States the swelling in her lower extremities has improved and is lessening, states there is swelling in her ankles and feet at this time. Reports she is utilizing elevation of her lower extremities with success. Reports appetite remains fair, no improvement since discharge. Care Transitions Nurse will review red flags again on next phone conversation with patient.  Cassi Jacobo

## 2023-03-02 ENCOUNTER — HOME CARE VISIT (OUTPATIENT)
Dept: SCHEDULING | Facility: HOME HEALTH | Age: 73
End: 2023-03-02
Payer: MEDICARE

## 2023-03-02 PROCEDURE — G0300 HHS/HOSPICE OF LPN EA 15 MIN: HCPCS

## 2023-03-02 PROCEDURE — G0151 HHCP-SERV OF PT,EA 15 MIN: HCPCS

## 2023-03-03 ENCOUNTER — VIRTUAL VISIT (OUTPATIENT)
Dept: INTERNAL MEDICINE CLINIC | Age: 73
End: 2023-03-03

## 2023-03-03 VITALS
RESPIRATION RATE: 16 BRPM | HEART RATE: 75 BPM | DIASTOLIC BLOOD PRESSURE: 68 MMHG | BODY MASS INDEX: 34.16 KG/M2 | OXYGEN SATURATION: 98 % | SYSTOLIC BLOOD PRESSURE: 115 MMHG | TEMPERATURE: 97 F | WEIGHT: 205.3 LBS

## 2023-03-03 DIAGNOSIS — Z09 HOSPITAL DISCHARGE FOLLOW-UP: Primary | ICD-10-CM

## 2023-03-03 DIAGNOSIS — N17.9 AKI (ACUTE KIDNEY INJURY) (HCC): ICD-10-CM

## 2023-03-03 DIAGNOSIS — N39.0 URINARY TRACT INFECTION WITHOUT HEMATURIA, SITE UNSPECIFIED: ICD-10-CM

## 2023-03-03 NOTE — PROGRESS NOTES
Bob Li is a 75 y.o. female who was seen by synchronous (real-time) audio-video technology on 3/3/2023 for Hospital Follow Up        Progress Note         PROGRESS NOTE  Name: Bob Li   : 1950       ASSESSMENT/ PLAN:     Symptomatic UTI, with culture showing 30,000 colonies ESBL Klebsiella: She was admitted and started on meropenem; On day 2 she was given a single dose of gentamicin, and discharged home. Her symptoms have resolved. KI: Mild. She was treated with IVF. Diarrhea: Ongoing \"off and on\" She has been dealing with this since January. OTC imodium. Recent admission for Lithium toxicity (2023): Her last Li levels are normal.  Bipolar disorder: She continues follow up with her psychiatrist, Edna Lanza. Systolic CHF: EF 32-87% in 2022; has ICD. Atrial fibrillation: On eliquis. GERD: Stable. On protonix. Hypothyroidism: Continue levothyroxine. Rheumatoid arthritis. On plaquenil. RTC as planned in . I have reviewed the patient's medications and risks/side effects/benefits were discussed. Diagnosis(-es) explained to patient and questions answered. Literature provided where appropriate. SUBJECTIVE  Ms. Bob Li presents today acutely for     Chief Complaint   Patient presents with    Hospital Follow Up       She was hospitalized -:    Hospital Course: Ms. Bob Li is a patient of mine who presented with the problems above. See the initial H and P for full details. Reyna Spencer is a 75 y.o.  female with pertinent past medical history as listed below who presents at direction of PCP. Patient reports 1 week history of dysuria, vaginal burning, frequency, and new onset back pain. Was evaluated by PCP (Dr. Beth Bee) on Monday and started on Macrobid.   UA was collected and reflexed to culture and resulted today with ESBL Klebsiella and patient reports referred to the emergency department for IV antibiotics. Of note, urine culture grew 30,000 colony-forming units. Patient denies any systemic signs of infection. ROS otherwise negative. Of note, patient with recent hospitalization for lithium toxicity who remains on lithium at reduced dose. In the ED, patient afebrile and hemodynamically stable saturating upper 90s on room air. ECG demonstrates V-paced rhythm. Labs demonstrate: Unremarkable CBC, sodium 141, potassium 3.4, glucose 106, BUN 32, creatinine 1.13 (baseline 0.8), UA reflex to culture (protein, ketone, nitrites, leukocyte esterase, 3+ bacteria). Patient started on meropenem after review of sensitivities by ED provider. By problem. .. Symptomatic UTI, with culture showing 30,000 colonies ESBL Klebsiella: She was admitted and started on meropenem. Today, on review of culture, she was given a single dose of gentamicin. She will be discharged back home on her usual medications. Hypotension: She has been given IV fluids, and her Bevelyn Yanni was held. KI: Mild. She was treated with IVF. Diarrhea: She has been dealing with this since January. OTC imodium. Recent admission for Lithium toxicity (1/30/2023): Her Li levels are normal.  Bipolar disorder: Resume follow up with her psychiatrist, Onur Collado. Systolic CHF: EF 47-18% in October 2022; has ICD. HTN, intially a bit low on admission: Continue usual medications as tolerated. Atrial fibrillation: On eliquis. GERD: Stable. On protonix. Hypothyroidism: Continue levothyroxine. Rheumatoid arthritis. On plaquenil. CODE: Full code. \"I had alpha-gal this week. It was the milk in the milkshake, and I had it worse than I've ever had it. \" She had \"terrible itching in my head and face and inside my mouth, and flu-like symptoms. \"     Her UTI symptoms have subsided. She has been getting PT. She is following with psych. \"Joe says I'm a tiny bit manic, but manageable. \"     Past Medical History:   Diagnosis Date Arrhythmia 2014    atrial fibrillation 2014, sick sinus syndrome: [facility]    Arthritis     Rheumatoid    Bipolar affect, depressed ([facility]     Bipolar affective disorder ([facility] 3/31/2010    Chronic pain 2018    right shoulder    Colon polyps 03/31/2010    also 5/2018: colon polyps    Depression     Diarrhea 07/19/2013    D/t alpha gal allergy says patient    Epigastric pain 6/5/2015    GERD (gastroesophageal reflux disease)     occasiional only; controlled with med    Hemispheric carotid artery syndrome No stroke    Neuro: Lorena Seras  (CT scan head/neck negative 4/2018 in [facility])    Hyperlipidemia 3/31/2010    Hypothyroidism 3/31/2010    Long term current use of anticoagulant therapy     Lupus ([facility]     patient denies-says she has RA    Migraine 03/31/2010    has them x2 a month: last one 5/23/2018    Psychotic disorder (HCC)     S/P ablation of atrial fibrillation 05/13/2014    S/P cardiac pacemaker procedure 9/16/2014 9/16/14 [employer] MRI compatible dual chamber pacemaker implant    Sleep apnea     unable to tolerate CPAP    Stool color black     Thyroid disease     Tick-borne disease     Alpha Gal allergy: no eat pork, beef, milk (Meat allergy showed up on a allergy test)    Umbilical hernia without obstruction and without gangrene 6/22/2020    Urge incontinence 3/31/2010    Vitamin D deficiency 3/31/2015     Current Outpatient Medications on File Prior to Visit   Medication Sig Dispense Refill    cephALEXin (KEFLEX) 250 mg capsule Take 250 mg by mouth nightly. melatonin 3 mg tablet Take 3 mg by mouth nightly. fluticasone propionate (FLONASE) 50 mcg/actuation nasal spray 2 Sprays by Both Nostrils route daily. Use 2 sprays nasally every day      lactobacillus combination no.8 (Adult Probiotic) 3 billion cell cap Take 2 Tablets by mouth daily. Take 2 gummies by mouth daily      diclofenac (VOLTAREN) 1 % gel Apply 2 g to affected area daily.       loperamide (IMODIUM) 1 mg/7.5 mL solution Take 1 mg by mouth daily as needed for Diarrhea. Take 1 dose daily as needed for diarrhea      hydrOXYzine HCL (ATARAX) 25 mg tablet Take 25 mg by mouth two (2) times a day. take 1 tablet twice a day as needed for itching      propranoloL (INDERAL) 80 mg tablet Take 80 mg by mouth daily. Take 1 tablet by mouth daily      zinc 50 mg tab tablet Take 50 mg by mouth daily. lithium carbonate 150 mg capsule Take 150 mg by mouth daily. 2-7-23: Patient's  states patient takes Lithium carbonate 150mg every morning. cyanocobalamin (VITAMIN B12) 500 mcg tablet Take 1,000 mcg by mouth daily. bumetanide (BUMEX) 2 mg tablet Take 2 mg by mouth daily. lithium carbonate SR (LITHOBID) 300 mg CR tablet Take 1 Tablet by mouth nightly. 30 Tablet 0    levothyroxine (SYNTHROID) 50 mcg tablet TAKE 1 TABLET BY MOUTH EVERY MORNING 90 Tablet 3    topiramate (TOPAMAX) 100 mg tablet TAKE 1 TABLET BY MOUTH TWO TIMES A DAY. 180 Tablet 1    fluocinoNIDE (LIDEX) 0.05 % topical cream APPLY TO AFFECTED AREA TWICE A DAY 60 g 0    dimethicone/colloidal oatmeal (DAILY MOISTURIZING LOTION EX) Apply 1 Film to affected area as needed. acetaminophen (TYLENOL) 500 mg tablet Take 1,000 mg by mouth daily. Take 1 tablet by mouth every 6 hours prn moderate pain       zolpidem (AMBIEN) 10 mg tablet Take 10 mg by mouth nightly as needed for Sleep. Entresto 24-26 mg tablet Take 1 Tablet by mouth two (2) times a day. ergocalciferol (ERGOCALCIFEROL) 1,250 mcg (50,000 unit) capsule TAKE 1 CAPSULE BY MOUTH ONE TIME PER WEEK 12 Capsule 1    esomeprazole (NEXIUM) 40 mg capsule Take 40 mg by mouth daily. apixaban (ELIQUIS) 5 mg tablet Take 1 Tablet by mouth two (2) times a day. 56 Tablet 0    timolol (TIMOPTIC) 0.5 % ophthalmic solution INSTILL 1 DROP 1 TIME EVERY DAY INTO BOTH EYES EVERY MORNING      hydrOXYchloroQUINE (PLAQUENIL) 200 mg tablet Take 200 mg by mouth two (2) times a day.       latanoprost (XALATAN) 0.005 % ophthalmic solution Administer 1 Drop to both eyes two (2) times a day. ALPRAZolam (XANAX) 0.5 mg tablet Take 0.5 mg by mouth nightly. No current facility-administered medications on file prior to visit. ROS: Complete review of systems was performed and is otherwise unremarkable except as noted elsewhere. OBJECTIVE  There were no vitals taken for this visit. Gen: Pleasant 75 y.o.  female in NAD.         Objective:     Patient-Reported Vitals 3/3/2023   Patient-Reported Pulse 74   Patient-Reported Systolic  95   Patient-Reported Diastolic 63        [INSTRUCTIONS:  \"[x]\" Indicates a positive item  \"[]\" Indicates a negative item  -- DELETE ALL ITEMS NOT EXAMINED]    Constitutional: [x] Appears well-developed and well-nourished [x] No apparent distress      [] Abnormal -     Mental status: [x] Alert and awake  [x] Oriented to person/place/time [x] Able to follow commands    [] Abnormal -     Eyes:   EOM    [x]  Normal    [] Abnormal -   Sclera  [x]  Normal    [] Abnormal -          Discharge [x]  None visible   [] Abnormal -     HENT: [x] Normocephalic, atraumatic  [] Abnormal -   [x] Mouth/Throat: Mucous membranes are moist    External Ears [x] Normal  [] Abnormal -    Neck: [x] No visualized mass [] Abnormal -     Pulmonary/Chest: [x] Respiratory effort normal   [x] No visualized signs of difficulty breathing or respiratory distress        [] Abnormal -      Musculoskeletal:   [x] Normal gait with no signs of ataxia         [x] Normal range of motion of neck        [] Abnormal -     Neurological:        [x] No Facial Asymmetry (Cranial nerve 7 motor function) (limited exam due to video visit)          [x] No gaze palsy        [] Abnormal -          Skin:        [x] No significant exanthematous lesions or discoloration noted on facial skin         [] Abnormal -            Psychiatric:       [x] Normal Affect [] Abnormal -       Other pertinent observable physical exam findings:-        We discussed the expected course, resolution and complications of the diagnosis(es) in detail. Medication risks, benefits, costs, interactions, and alternatives were discussed as indicated. I advised her to contact the office if her condition worsens, changes or fails to improve as anticipated. She expressed understanding with the diagnosis(es) and plan. Tayler Lizama, who was evaluated through a patient-initiated, synchronous (real-time) audio-video encounter, and/or her healthcare decision maker, is aware that it is a billable service, with coverage as determined by her insurance carrier. She provided verbal consent to proceed: YES, and patient identification was verified. It was conducted pursuant to the emergency declaration under the [address] [place] [address] authority and the [employer] Act. A caregiver was present when appropriate. Ability to conduct physical exam was limited. I was in office. The patient was at home or otherwise outside the office.       Moise Toro MD

## 2023-03-03 NOTE — PROGRESS NOTES
1. \"Have you been to the ER, urgent care clinic since your last visit? Hospitalized since your last visit? Yes, Bladder infection 2/18/2023     2. \"Have you seen or consulted any other health care providers outside of the [address] [place] [address] since your last visit? No     3. For patients aged 39-70: Has the patient had a colonoscopy / FIT/ Cologuard? Yes - no Care Gap present      If the patient is female:    4. For patients aged 41-77: Has the patient had a mammogram within the past 2 years? Yes - no Care Gap present      5. For patients aged 21-65: Has the patient had a pap smear?  NA - based on age or sex

## 2023-03-06 ENCOUNTER — HOME CARE VISIT (OUTPATIENT)
Dept: SCHEDULING | Facility: HOME HEALTH | Age: 73
End: 2023-03-06
Payer: MEDICARE

## 2023-03-06 PROCEDURE — G0151 HHCP-SERV OF PT,EA 15 MIN: HCPCS

## 2023-03-07 ENCOUNTER — HOME CARE VISIT (OUTPATIENT)
Dept: SCHEDULING | Facility: HOME HEALTH | Age: 73
End: 2023-03-07
Payer: MEDICARE

## 2023-03-07 PROCEDURE — G0299 HHS/HOSPICE OF RN EA 15 MIN: HCPCS

## 2023-03-08 VITALS
DIASTOLIC BLOOD PRESSURE: 64 MMHG | HEART RATE: 68 BPM | SYSTOLIC BLOOD PRESSURE: 121 MMHG | OXYGEN SATURATION: 98 % | TEMPERATURE: 97.5 F | RESPIRATION RATE: 16 BRPM

## 2023-03-08 VITALS
WEIGHT: 205.3 LBS | RESPIRATION RATE: 16 BRPM | DIASTOLIC BLOOD PRESSURE: 70 MMHG | SYSTOLIC BLOOD PRESSURE: 118 MMHG | TEMPERATURE: 97.7 F | BODY MASS INDEX: 34.16 KG/M2 | HEART RATE: 76 BPM

## 2023-03-09 DIAGNOSIS — E55.9 VITAMIN D DEFICIENCY: ICD-10-CM

## 2023-03-09 RX ORDER — ERGOCALCIFEROL 1.25 MG/1
CAPSULE ORAL
Qty: 12 CAPSULE | Refills: 1 | Status: SHIPPED | OUTPATIENT
Start: 2023-03-09

## 2023-03-10 ENCOUNTER — HOME CARE VISIT (OUTPATIENT)
Dept: SCHEDULING | Facility: HOME HEALTH | Age: 73
End: 2023-03-10
Payer: MEDICARE

## 2023-03-10 ENCOUNTER — PATIENT OUTREACH (OUTPATIENT)
Dept: CASE MANAGEMENT | Age: 73
End: 2023-03-10

## 2023-03-10 PROCEDURE — G0300 HHS/HOSPICE OF LPN EA 15 MIN: HCPCS

## 2023-03-10 PROCEDURE — G0151 HHCP-SERV OF PT,EA 15 MIN: HCPCS

## 2023-03-10 NOTE — HOME HEALTH
Subjective: I've been sick over days due to a allerrgic reaction but feeling better  Falls since last visit NO(if yes complete the Fall Tracking Form and include bsrifallreport):   Caregiver involvement changes: none  Home health supplies by type and quantity ordered/delivered this visit include: none    Does the patient have any new or changed medications? NO   If Yes, were medications reconciled? NO   Was the certifying physician notified of changes in medications?  NO     Clinical assessment (what this visit means for the patient overall and need for ongoing skilled care) and progress or lack of progress towards SPECIFIC goals: patient progressing, educated on avoiding allergens to prevent reactions    Interdisciplinary communication: na  Discharge planning as follows: Per physician order and When goals are met  Specific plan for next visit: Assessment and education

## 2023-03-10 NOTE — PROGRESS NOTES
3-10-23 at 12:27pm: Care Transitions Outreach     Call within 2 business days of discharge: Yes   Care Transitions Nurse (CTN) met briefly with patient by phone for transitions of care follow-up. Patient states she is unable to have a conversation at this time and requests a call back later this afternoon. CTN will attempt to reach patient again and CTN will continue to monitor. Patient: Sharath Vazquez Patient : 1950 MRN: [id number]    Last Discharge  Street       Date Complaint Diagnosis Description Type Department Provider    23 Bladder Infection; Back Pain; Urinary Pain Urinary tract infection due to ESBL Klebsiella ED to Hosp-Admission (Discharged) (ADMIT) Karla Nguyen MD; [facility]. .. Was this an external facility discharge? No     Noted following upcoming appointments from discharge chart review:   [facility] follow up appointment(s):   Future Appointments   Date Time Provider Radha Yoon   3/10/2023 To Be Determined Terrea Fleischer, LPN [facility] [place] [address]   3/14/2023 To Be Determined Melania Main PT [facility]   3/14/2023 To Be Determined Terrea Fleischer, LPN [facility]   3/16/2023 To Be Determined [place] [facility]   3/17/2023 To Be Determined Terrea Fleischer, LPN [address]   3/21/2023 To Be Determined [place] [facility]   3/23/2023 To Be Determined Alka Floro [address]   3/28/2023 To Be Determined [place] Flor[place] [address]   3/30/2023 To Be Determined Alak Floro [place]   2023  3:00 PM [address] [facility]A [facility]   2023  1:30 PM GIA Weaver BS AMB   2023 10:45 AM Swapna Mckenzie MD [facility] BS AMB     Non-[facility] follow up appointment(s): None noted at this time.            3-10-23 at 2:39pm:  Care Transitions Follow Up Call    Patient Current Location: [place]    Challenges to be reviewed by the provider Additional needs identified to be addressed with provider: yes  - Continues to experience increased anxiety and headache every afternoon between 3pm-5pm.   - Swelling in bilateral ankles and feet, utilizing elevation with success. - Appetite remains fair, no improvement since discharge on 23. Method of communication with provider:  chart routing to PCP. Care Transition Nurse (CTN) contacted the patient by telephone to follow up after admission on 23. Verified name and  with patient as identifiers. Addressed changes since last contact:  Patient reports signs/symptoms of Alpha-gal have resolved. Follow up appointment completed? yes. Was follow up appointment scheduled within 7 days of discharge? no.     CTN reviewed discharge instructions, medical action plan and red flags with patient and discussed any barriers to care and/or understanding of plan of care after discharge. Discussed appropriate site of care based on symptoms and resources available to patient including: PCP, Specialist, Urgent [address], When to call 911, and [facility] . The patient agrees to contact the PCP office for questions related to their healthcare. Patients top risk factors for readmission: Medical condition - CHF, recent UTI due to Klebsiella species and recurrent urinary tract infection, and recent lithium toxicity per chart. Interventions to address risk factors: Education of patient/family/caregiver/guardian to support self-management-Education provided regarding signs/symptoms of AMS, lithium toxicity, and UTI, patient verbalized an understanding.       [facility] follow up appointment(s):   Future Appointments   Date Time Provider Radha Yoon   3/14/2023 To Be Determined Yesenia Zafar   3/14/2023 To Be Determined Magdalena Garza LPN [facility]   3/16/2023 [facility]   3/17/2023 To Be Determined Alissa Govea LPN [facility] [place] Louie Celestin [facility]   3/21/2023 To Be Determined [place] And[place] [facility]   3/23/2023 To Be Determined [place] [facility]   3/28/2023 To Be Determined [place] [facility]   3/30/2023 To Be Determined [place] [facility]   5/9/2023  3:00 PM [address] [facility]A [facility]   6/5/2023  1:30 PM GIA Chandra BS AMB   6/8/2023 10:45 AM Tian Person MD [facility] BS AMB     Non-[facility] follow up appointment(s): None noted at this time. CTN provided contact information for future needs. Plan for follow-up call in 7-10 days based on severity of symptoms and risk factors. Plan for next call: self management-Review red flags of AMS, lithium toxicity, and UTI, and follow up appointment-Evaluate if patient continues to attend follow-up appointments as scheduled, offer assistance with scheduling as needed. Goals Addressed                   This Visit's Progress     Understands red flags post discharge. 2-7-23: Red flags of AMS and lithium toxicity reviewed with patient's , Celina Berry (on [address] dated 10-6-22), and the  verbalized an understanding.  denies patient having chest pain, denies fever/chills, and denies nausea/vomiting.  reports patient becomes short of breath upon exertion and states SOB upon exertion resolves with rest.  reports swelling in bilateral lower extremities, from knees through ankles.  states patient utilizes a regular diet at home, appetite is fair.  reports patient has had 3 falls in the last 6 months, and a total of 4 falls in the last 12 months, states patient did not sustain traumatic injury during any of the 4 falls. Care Transitions Nurse will review red flags again on next phone conversation with patient/. Ralf Major     2-15-23: Red flags of AMS and lithium toxicity reviewed with patient and patient's .  Patient states her  is preparing her medications with a weekly pill box and is administering all medications. Patient denies chest pain, denies shortness of breath, denies fever/chills, and denies nausea/vomiting.  states swelling in bilateral lower extremities is improving and lessening.  reports lower extremity edema is improving. Patient reports shortness of breath upon exertion has resolved. Patient complains of UTI and states she is taking Macrobid 100mg cap, take one cap two times a day as ordered. Neither the  or patient have any red flags to report at this time. Care Transitions Nurse will review red flags again on next phone conversation with patient/. Kiarra Hamper     2-20-23: Patient was readmitted 2-17-23 to 2-18-23 for UTI. Red flags of AMS, lithium toxicity, and UTI reviewed with patient and patient verbalized an understanding. Patient denies chest pain, denies shortness of breath, denies fever/chills, and denies nausea/vomiting since discharge on 2-18-23. Patient complains of increased anxiety, and a \"Daily headache every afternoon about 5pm.\" Patient states she has ongoing fatigue and swelling in lower extremities from her calves throughout her feet, states she is utilizing elevation of her lower extremities and states swelling is improving. States she is utilizing a regular diet at home, appetite is fair. Reports she has had 3 falls in the last 6 months, and a total of 3 falls in the last 12 months, states she did not sustain traumatic injury during any of the 3 falls. Patient has no red flags to report at this time. Care Transitions Nurse will review red flags again on next phone conversation with patient. GABRIEL     3-1-23: Red flags of AMS, lithium toxicity, and UTI reviewed with patient and patient verbalized an understanding. Patient denies chest pain, denies shortness of breath, denies fever/chills, and denies nausea/vomiting.  Patient reports she has been experiencing symptoms of Alpha-gal to include diarrhea and abdominal pain. Patient states she rescheduled her [facility] [place] appointment to a virtual appointment on 3-3-23 due to feeling ill from Alpha-gal earlier this week. States she is feeling much better today. Patient continues to complain of increased anxiety and headache every afternoon between 3pm - 5pm. States the swelling in her lower extremities has improved and is lessening, states there is swelling in her ankles and feet at this time. Reports she is utilizing elevation of her lower extremities with success. Reports appetite remains fair, no improvement since discharge. Care Transitions Nurse will review red flags again on next phone conversation with patient. Samra President    3-10-23 at 12:27pm: Met with patient briefly by phone for transitions of care follow-up. Patient states she is unable to have a conversation at this time. CTN will attempt to reach patient again. Samra President     3-10-23 at 2:39pm: Red flags of AMS, lithium toxicity, and UTI reviewed with patient and patient verbalized an understanding. Patient denies chest pain, denies shortness of breath, denies fever/chills, and denies nausea/vomiting. Patient reports signs/symptoms of Alpha-gal have resolved. Patient continues to complain of increased anxiety and headache every afternoon between 3pm - 5pm. States she continues to have swelling in her bilateral ankles and feet. Reports she is utilizing elevation of her lower extremities with success. Reports appetite remains fair, no improvement since discharge. Care Transitions Nurse will review red flags again on next phone conversation with patient.  Samra Chappellident

## 2023-03-12 VITALS
DIASTOLIC BLOOD PRESSURE: 60 MMHG | TEMPERATURE: 97.5 F | SYSTOLIC BLOOD PRESSURE: 110 MMHG | RESPIRATION RATE: 16 BRPM | HEART RATE: 62 BPM | WEIGHT: 208.7 LBS | BODY MASS INDEX: 34.73 KG/M2 | OXYGEN SATURATION: 98 %

## 2023-03-13 VITALS — TEMPERATURE: 97.5 F | BODY MASS INDEX: 34.73 KG/M2 | RESPIRATION RATE: 16 BRPM | WEIGHT: 208.7 LBS

## 2023-03-15 ENCOUNTER — HOME CARE VISIT (OUTPATIENT)
Dept: SCHEDULING | Facility: HOME HEALTH | Age: 73
End: 2023-03-15
Payer: MEDICARE

## 2023-03-15 PROCEDURE — G0299 HHS/HOSPICE OF RN EA 15 MIN: HCPCS

## 2023-03-15 PROCEDURE — G0151 HHCP-SERV OF PT,EA 15 MIN: HCPCS

## 2023-03-17 ENCOUNTER — HOME CARE VISIT (OUTPATIENT)
Dept: SCHEDULING | Facility: HOME HEALTH | Age: 73
End: 2023-03-17
Payer: MEDICARE

## 2023-03-17 VITALS
OXYGEN SATURATION: 99 % | RESPIRATION RATE: 16 BRPM | SYSTOLIC BLOOD PRESSURE: 118 MMHG | BODY MASS INDEX: 34.45 KG/M2 | TEMPERATURE: 98.1 F | DIASTOLIC BLOOD PRESSURE: 62 MMHG | WEIGHT: 207 LBS

## 2023-03-17 VITALS
OXYGEN SATURATION: 98 % | SYSTOLIC BLOOD PRESSURE: 131 MMHG | WEIGHT: 208 LBS | HEART RATE: 75 BPM | RESPIRATION RATE: 16 BRPM | DIASTOLIC BLOOD PRESSURE: 75 MMHG | TEMPERATURE: 97.6 F | BODY MASS INDEX: 34.61 KG/M2

## 2023-03-17 PROCEDURE — G0300 HHS/HOSPICE OF LPN EA 15 MIN: HCPCS

## 2023-03-17 PROCEDURE — G0151 HHCP-SERV OF PT,EA 15 MIN: HCPCS

## 2023-03-17 NOTE — HOME HEALTH
History of condition: CHF, Weakness  Subjective: Pt with no complaints today. Falls since last visit (if yes include . bsrifallreport): No  Caregiver involvement: Pt lives with her  who assists with all care needs.  is retired and physically able to assist with most care. Clinician asked if patient has had any physician contact since last home care visit and patient states: no    Clinician asked if patient has any new or changed medications and patient states:  no    Home health supplies by type and quantity ordered/delivered this visit include: NA  Does the patient have any new or changed medications? No  If Yes, were medications reconciled? NA  Was the certifying physician notified of changes in medications? NA  Clinical assessment: Why do I still need to come? What does this visit mean for patient overall? PT focus of care continues on improving strength and mobility for Ms. Jj to promote independence both inside and outside the home as well as reduce fall risk concerns. Progress or lack of progress toward specific goals: Pt is making progress weekly and is getting to be more independent with mobility in the home. Still needs assistance on unlevel ground but is showing signs of improved safety and stability. Interdisciplinary communication: NA  Discharge planning as follows: Patient will be discharge from home PT when they are no longer homebound, have reached their maximum functional potential and maximum safety in their home and/or When goals are met.       Specific plan for next visit: Instruct caregiver/patient in home therex for strengthening;  Gait training to improve safety and reduce fall risk

## 2023-03-18 VITALS
DIASTOLIC BLOOD PRESSURE: 70 MMHG | SYSTOLIC BLOOD PRESSURE: 102 MMHG | TEMPERATURE: 98.1 F | OXYGEN SATURATION: 100 % | HEART RATE: 65 BPM

## 2023-03-19 VITALS
SYSTOLIC BLOOD PRESSURE: 138 MMHG | TEMPERATURE: 97.8 F | RESPIRATION RATE: 16 BRPM | OXYGEN SATURATION: 98 % | HEART RATE: 76 BPM | DIASTOLIC BLOOD PRESSURE: 74 MMHG

## 2023-03-19 NOTE — HOME HEALTH
Subjective: I saw my dr and she didn't make any changes  Falls since last visit NO(if yes complete the Fall Tracking Form and include bsrifallreport):   Caregiver involvement changes: none  Home health supplies by type and quantity ordered/delivered this visit include: none    Does the patient have any new or changed medications? NO   If Yes, were medications reconciled? NO   Was the certifying physician notified of changes in medications?  NO     Clinical assessment (what this visit means for the patient overall and need for ongoing skilled care) and progress or lack of progress towards SPECIFIC goals: patient remains at risk of hospitalization   Interdisciplinary communication: na  Discharge planning as follows: Per physician order  Specific plan for next visit: Assessment and education

## 2023-03-19 NOTE — HOME HEALTH
Subjective: \"I'm just feeling a bit more sad lately. \"  Falls since last visit NO(if yes complete the Fall Tracking Form and include bsrifallreport):   Caregiver involvement changes: no  Home health supplies by type and quantity ordered/delivered this visit include: tubi     Clinician asked if patient has had any physician contact since last home care visit and patient states: NO  Clinician asked if patient has any new or changed medications and patient states:  NO   If Yes, were medications reconciled? N/A   Was the certifying physician notified of changes in medications? N/A     Clinical assessment (what this visit means for the patient overall and need for ongoing skilled care) and progress or lack of progress towards SPECIFIC goals: Pt.at risk for falls due to previous falls, sedative medications, and polypharmacy requiring SN for education. Pt. at risk for re-hospitalization due to multiple comorbidities, polypharmacy, lack of CHF knowledge, varying psychological status, and risk for infection requiring SN for education. Risk for infection due to frequent incontinence requiring SN for education. Pt. at risk for mental health exacerbation req;uiring SN for education and monitoring. Written Teaching Material Utilized: N/A    Interdisciplinary communication with: N/A for the purpose of n/a    Discharge planning as follows: Patient/Caregiver is knowledgeable in disease process as evidence by: can repeat  stop light instructions, can verbalize 100% of s/s of chf and uti, chf monitoring, daily weights, dietary restrictions, and when to notify MD, and uti prevention and skin breakdown prevention, Is no longer homebound, Per physician order and When goals are met    Specific plan for next visit: Instruct pt.  on continued swelling help, incontinence and uti prevention

## 2023-03-20 RX ORDER — LITHIUM CARBONATE 300 MG/1
300 TABLET, FILM COATED, EXTENDED RELEASE ORAL
Qty: 30 TABLET | Refills: 2 | Status: SHIPPED | OUTPATIENT
Start: 2023-03-20

## 2023-03-20 NOTE — TELEPHONE ENCOUNTER
PCP: Te Panda MD    Last appt: 3/3/2023  Future Appointments   Date Time Provider Department Center   3/21/2023 To Be Determined Elisabet Moyer LPN [address] [facility]   3/21/2023 10:00 AM  Bump, PT [facility]   3/23/2023 To Be Determined Kathy Nicci [facility]   3/23/2023 10:00 AM  Bump, PT [facility]   3/28/2023 To Be Determined Ronny Mckeon RN [place] [address] [address] [facility]   3/28/2023 To Be Determined [place] [address] [address] [facility]   3/30/2023 To Be Determined [place] [address] [address] [facility]   3/31/2023 To Be Determined Ronny Mckeon RN [place] [address] [address] [facility]   4/4/2023 To Be Determined Ronny Mckeon RN [address] [facility]   5/9/2023  3:00 PM [facility] DEXA 1 [facility]   6/5/2023  1:30 PM GIA Dawn BS AMB   6/8/2023 10:45 AM Te Panda MD [facility] BS AMB       Requested Prescriptions     Pending Prescriptions Disp Refills    lithium carbonate SR (LITHOBID) 300 mg CR tablet 30 Tablet 0     Sig: Take 1 Tablet by mouth nightly.

## 2023-03-21 ENCOUNTER — HOME CARE VISIT (OUTPATIENT)
Dept: HOME HEALTH SERVICES | Facility: HOME HEALTH | Age: 73
End: 2023-03-21
Payer: MEDICARE

## 2023-03-21 ENCOUNTER — HOME CARE VISIT (OUTPATIENT)
Dept: SCHEDULING | Facility: HOME HEALTH | Age: 73
End: 2023-03-21
Payer: MEDICARE

## 2023-03-21 PROCEDURE — G0151 HHCP-SERV OF PT,EA 15 MIN: HCPCS

## 2023-03-22 ENCOUNTER — PATIENT OUTREACH (OUTPATIENT)
Dept: CASE MANAGEMENT | Age: 73
End: 2023-03-22

## 2023-03-22 VITALS
OXYGEN SATURATION: 97 % | TEMPERATURE: 97.6 F | HEART RATE: 75 BPM | BODY MASS INDEX: 34.61 KG/M2 | DIASTOLIC BLOOD PRESSURE: 60 MMHG | WEIGHT: 208 LBS | RESPIRATION RATE: 16 BRPM | SYSTOLIC BLOOD PRESSURE: 115 MMHG

## 2023-03-22 VITALS
OXYGEN SATURATION: 98 % | RESPIRATION RATE: 12 BRPM | TEMPERATURE: 97.6 F | SYSTOLIC BLOOD PRESSURE: 138 MMHG | DIASTOLIC BLOOD PRESSURE: 76 MMHG | HEART RATE: 78 BPM

## 2023-03-22 NOTE — PROGRESS NOTES
Patient has graduated from the Transitions of Care Coordination  program on 3-22-23. Patient/family have the ability to self-manage at this time. Care management goals have been completed. Patient was not referred to the [facility] team for further management. Goals Addressed                   This Visit's Progress     COMPLETED: Understands red flags post discharge. 2-7-23: Red flags of AMS and lithium toxicity reviewed with patient's , Kendra Morris (on [address] dated 10-6-22), and the  verbalized an understanding.  denies patient having chest pain, denies fever/chills, and denies nausea/vomiting.  reports patient becomes short of breath upon exertion and states SOB upon exertion resolves with rest.  reports swelling in bilateral lower extremities, from knees through ankles.  states patient utilizes a regular diet at home, appetite is fair.  reports patient has had 3 falls in the last 6 months, and a total of 4 falls in the last 12 months, states patient did not sustain traumatic injury during any of the 4 falls. Care Transitions Nurse will review red flags again on next phone conversation with patient/. Oz Fermín     2-15-23: Red flags of AMS and lithium toxicity reviewed with patient and patient's . Patient states her  is preparing her medications with a weekly pill box and is administering all medications. Patient denies chest pain, denies shortness of breath, denies fever/chills, and denies nausea/vomiting.  states swelling in bilateral lower extremities is improving and lessening.  reports lower extremity edema is improving. Patient reports shortness of breath upon exertion has resolved. Patient complains of UTI and states she is taking Macrobid 100mg cap, take one cap two times a day as ordered. Neither the  or patient have any red flags to report at this time.  Care Transitions Nurse will review red flags again on next phone conversation with patient/. Timothy Soliz     2-20-23: Patient was readmitted 2-17-23 to 2-18-23 for UTI. Red flags of AMS, lithium toxicity, and UTI reviewed with patient and patient verbalized an understanding. Patient denies chest pain, denies shortness of breath, denies fever/chills, and denies nausea/vomiting since discharge on 2-18-23. Patient complains of increased anxiety, and a \"Daily headache every afternoon about 5pm.\" Patient states she has ongoing fatigue and swelling in lower extremities from her calves throughout her feet, states she is utilizing elevation of her lower extremities and states swelling is improving. States she is utilizing a regular diet at home, appetite is fair. Reports she has had 3 falls in the last 6 months, and a total of 3 falls in the last 12 months, states she did not sustain traumatic injury during any of the 3 falls. Patient has no red flags to report at this time. Care Transitions Nurse will review red flags again on next phone conversation with patient. GABRIEL     3-1-23: Red flags of AMS, lithium toxicity, and UTI reviewed with patient and patient verbalized an understanding. Patient denies chest pain, denies shortness of breath, denies fever/chills, and denies nausea/vomiting. Patient reports she has been experiencing symptoms of Alpha-gal to include diarrhea and abdominal pain. Patient states she rescheduled her [facility] appointment to a virtual appointment on 3-3-23 due to feeling ill from Alpha-gal earlier this week. States she is feeling much better today. Patient continues to complain of increased anxiety and headache every afternoon between 3pm - 5pm. States the swelling in her lower extremities has improved and is lessening, states there is swelling in her ankles and feet at this time. Reports she is utilizing elevation of her lower extremities with success. Reports appetite remains fair, no improvement since discharge.  Care Transitions Nurse will review red flags again on next phone conversation with patient. Oz Kovacs    3-10-23 at 12:27pm: Met with patient briefly by phone for transitions of care follow-up. Patient states she is unable to have a conversation at this time. CTN will attempt to reach patient again. Oz Kovacs     3-10-23 at 2:39pm: Red flags of AMS, lithium toxicity, and UTI reviewed with patient and patient verbalized an understanding. Patient denies chest pain, denies shortness of breath, denies fever/chills, and denies nausea/vomiting. Patient reports signs/symptoms of Alpha-gal have resolved. Patient continues to complain of increased anxiety and headache every afternoon between 3pm - 5pm. States she continues to have swelling in her bilateral ankles and feet. Reports she is utilizing elevation of her lower extremities with success. Reports appetite remains fair, no improvement since discharge. Care Transitions Nurse will review red flags again on next phone conversation with patient. GABRIEL    3-22-23: Red flags of AMS, lithium toxicity, and UTI reviewed with patient and patient verbalized an understanding. Patient denies chest pain, denies shortness of breath, denies fever/chills, and denies nausea/vomiting. Patient states she has not had any more signs/symptoms of Alpha-gal. Continues to complain of increased anxiety and a headache every afternoon. Patient has not had any readmissions. Goal met. GABRIEL             Patient and patient's , Kendra Morris (on [address] dated 10-6-22), have Care Transition Nurse's contact information for any further questions, concerns, or needs.   Patients upcoming visits:    Future Appointments   Date Time Provider Radha Yoon   3/23/2023 To Be Determined Suresh Nicholson [facility] [address]   3/23/2023 10:00 AM Claribel Carmichael PT [place] [address]   3/28/2023 To Be Determined Magdaline Olszewski, RN [place] [address] [address] [facility]   3/28/2023 To Be Determined Lexus Zafar   3/30/2023 To Be Determined Claribel Carmichael PT [facility] [address]   3/31/2023 To Be Determined Jose Blake RN [facility] [place] [address]   4/4/2023 To Be Determined Jose Blake RN [address] [facility] [address]   5/9/2023  3:00 PM [address] DEXA 1 [facility]   6/5/2023  1:30 PM Elton Peabody [facility] BS AMB   6/8/2023 10:45 AM Mukund Singer MD [facility] BS AMB

## 2023-03-22 NOTE — HOME HEALTH
History of condition: CHF, Weakness  Subjective: Pt states that she is tired today but will try as much exercise as she can. Falls since last visit (if yes include . bsrifallreport): No  Caregiver involvement: Pt lives with her  who assists with all care needs.  is retired and physically able to assist with most care. Clinician asked if patient has had any physician contact since last home care visit and patient states: no    Clinician asked if patient has any new or changed medications and patient states:  no    Home health supplies by type and quantity ordered/delivered this visit include: NA  Does the patient have any new or changed medications? No  If Yes, were medications reconciled? NA  Was the certifying physician notified of changes in medications? NA  Clinical assessment: Why do I still need to come? What does this visit mean for patient overall? PT focus of care continues on improving strength and mobility for Ms. Gardner to promote independence both inside and outside the home as well as reduce fall risk concerns. Pt has weakness of her legs, arthritis pain in bilateral knees, edema of bilateral legs which limit mobility and CHF that causes SOB with exertion. Progress or lack of progress toward specific goals: Pt is making progress weekly and is getting to be more independent with mobility in the home. Ms Toan Gonzalez still needs assistance on unlevel ground but is showing signs of improved safety and stability. Distance of gait is now up to 300' and she is getting close to meeting her goals. Will need training in HEP to ensure patient can continue all therex independently. Interdisciplinary communication: NA  Discharge planning as follows: Patient will be discharge from home PT when they are no longer homebound, have reached their maximum functional potential and maximum safety in their home and/or When goals are met.       Specific plan for next visit: Patricio Martinez caregiver/patient in home therex for strengthening;  Gait training to improve safety and reduce fall risk; Ensure independence with final HEP.

## 2023-03-22 NOTE — HOME HEALTH
Subjective: \"I'm feeling so much better. I was able to go on a walk with a friend and it felt so good to be getting back to normal.\"  Falls since last visit NO(if yes complete the Fall Tracking Form and include bsrifallreport):   Caregiver involvement changes: no  Home health supplies by type and quantity ordered/delivered this visit include: n/a    Clinician asked if patient has had any physician contact since last home care visit and patient states: N/A  Clinician asked if patient has any new or changed medications and patient states:  N/A   If Yes, were medications reconciled? N/A   Was the certifying physician notified of changes in medications? N/A     Clinical assessment (what this visit means for the patient overall and need for ongoing skilled care) and progress or lack of progress towards SPECIFIC goals: Pt.at risk for falls due to previous falls, sedative medications, and polypharmacy requiring SN for education. Pt. at risk for re-hospitalization due to multiple comorbidities, polypharmacy, lack of CHF knowledge, varying psychological status, and risk for infection requiring SN for education. Risk for infection due to frequent incontinence requiring SN for education. Pt. at risk for mental health exacerbation req;uiring SN for education and monitoring    Written Teaching Material Utilized: N/A    Interdisciplinary communication with: N/A for the purpose of n/a    Discharge planning as follows: Once pt is fully knowledgeable about CHF and her mental health  and ways to manage them as well as being fully knowledgeable about s/s to report ot MD, medications, and diet. Specific plan for next visit: Review and discharge.

## 2023-03-23 ENCOUNTER — HOME CARE VISIT (OUTPATIENT)
Dept: SCHEDULING | Facility: HOME HEALTH | Age: 73
End: 2023-03-23
Payer: MEDICARE

## 2023-03-23 VITALS
OXYGEN SATURATION: 99 % | RESPIRATION RATE: 16 BRPM | DIASTOLIC BLOOD PRESSURE: 62 MMHG | TEMPERATURE: 97.3 F | HEART RATE: 75 BPM | SYSTOLIC BLOOD PRESSURE: 124 MMHG

## 2023-03-23 PROCEDURE — G0300 HHS/HOSPICE OF LPN EA 15 MIN: HCPCS

## 2023-03-23 PROCEDURE — G0151 HHCP-SERV OF PT,EA 15 MIN: HCPCS

## 2023-03-23 NOTE — HOME HEALTH
Subjective: \"I have an appointment with my therapist this afternoon. \"  Falls since last visit NO(if yes complete the Fall Tracking Form and include bsrifallreport):   Caregiver involvement changes: No  Home health supplies by type and quantity ordered/delivered this visit include: n/a    Clinician asked if patient has had any physician contact since last home care visit and patient states: NO  Clinician asked if patient has any new or changed medications and patient states:  NO   If Yes, were medications reconciled? N/A   Was the certifying physician notified of changes in medications? N/A     Clinical assessment (what this visit means for the patient overall and need for ongoing skilled care) and progress or lack of progress towards SPECIFIC goals: Pt at risk for rehospitalization r/t atrial fibrillation exacerbation, psychosis, side effects r/t lithium medication. Written Teaching Material Utilized: N/A    Interdisciplinary communication with: N/A for the purpose of n/a    Discharge planning as follows:  Is no longer homebound, Per physician order, Will discharge when the patient has reached their maximum functional potential and maximum safety in their home and When goals are met    Specific plan for next visit: Assessment, education as needed

## 2023-03-24 VITALS
WEIGHT: 207 LBS | TEMPERATURE: 97.8 F | SYSTOLIC BLOOD PRESSURE: 108 MMHG | OXYGEN SATURATION: 98 % | BODY MASS INDEX: 34.45 KG/M2 | HEART RATE: 65 BPM | RESPIRATION RATE: 16 BRPM | DIASTOLIC BLOOD PRESSURE: 58 MMHG

## 2023-03-24 NOTE — HOME HEALTH
History of condition: CHF, Weakness  Subjective:  Pt with no complaints today. Falls since last visit (if yes include . bsrifallreport): No  Caregiver involvement: Pt lives with her  who assists with all care needs.  is retired and physically able to assist with most care. Clinician asked if patient has had any physician contact since last home care visit and patient states: no    Clinician asked if patient has any new or changed medications and patient states:  no    Home health supplies by type and quantity ordered/delivered this visit include: NA  Does the patient have any new or changed medications? No  If Yes, were medications reconciled? NA  Was the certifying physician notified of changes in medications? NA  Clinical assessment: Why do I still need to come? What does this visit mean for patient overall? PT focus of care continues on improving strength and mobility for Ms. Gardner to promote independence both inside and outside the home as well as reduce fall risk concerns. Pt has weakness of her legs, arthritis pain in bilateral knees, edema of bilateral legs which limit mobility and CHF that causes SOB with exertion. Progress or lack of progress toward specific goals: Pt is making progress weekly and is getting to be more independent with mobility in the home. Ms Caren Cristobal still needs supervision level assistance unlevel ground but is showing signs of improved safety and stability. Distance of gait is now up to 300' and Ms. Caren Cristobal is showing more consistency in gait. Interdisciplinary communication: VIJAY  Discharge planning as follows: Patient will be discharge from home PT when they are no longer homebound, have reached their maximum functional potential and maximum safety in their home and/or When goals are met. Specific plan for next visit: Instruct caregiver/patient in home therex for strengthening;  Gait training to improve safety and reduce fall risk;   Ensure independence with final HEP.

## 2023-03-28 ENCOUNTER — HOME CARE VISIT (OUTPATIENT)
Dept: HOME HEALTH SERVICES | Facility: HOME HEALTH | Age: 73
End: 2023-03-28
Payer: MEDICARE

## 2023-03-28 ENCOUNTER — HOME CARE VISIT (OUTPATIENT)
Dept: SCHEDULING | Facility: HOME HEALTH | Age: 73
End: 2023-03-28
Payer: MEDICARE

## 2023-03-28 VITALS
HEART RATE: 78 BPM | OXYGEN SATURATION: 98 % | SYSTOLIC BLOOD PRESSURE: 122 MMHG | DIASTOLIC BLOOD PRESSURE: 72 MMHG | TEMPERATURE: 97.8 F | RESPIRATION RATE: 16 BRPM

## 2023-03-28 PROCEDURE — G0300 HHS/HOSPICE OF LPN EA 15 MIN: HCPCS

## 2023-03-28 NOTE — HOME HEALTH
Subjective: \"Dr. Cindy Vargas said I have an antibiotic resistant urinary infection. \"  Falls since last visit NO(if yes complete the Fall Tracking Form and include bsrifallreport):   Caregiver involvement changes: No  Home health supplies by type and quantity ordered/delivered this visit include: n/a    Clinician asked if patient has had any physician contact since last home care visit and patient states: YES  Clinician asked if patient has any new or changed medications and patient states:  YES discontinued Keflex r/t antibiotic resistant infection  If Yes, were medications reconciled? N/A   Was the certifying physician notified of changes in medications? NO MD ordered    Clinical assessment (what this visit means for the patient overall and need for ongoing skilled care) and progress or lack of progress towards SPECIFIC goals: Pt at risk for rehospitalization r/t UTI, fall risk, infection. Written Teaching Material Utilized: N/A    Interdisciplinary communication with: N/A for the purpose of n/a    Discharge planning as follows:  Is no longer homebound, Per physician order, Will discharge when the patient has reached their maximum functional potential and maximum safety in their home and When goals are met    Specific plan for next visit: Pt is scheduled for discharge on Friday, 3-

## 2023-03-30 ENCOUNTER — HOME CARE VISIT (OUTPATIENT)
Dept: SCHEDULING | Facility: HOME HEALTH | Age: 73
End: 2023-03-30
Payer: MEDICARE

## 2023-03-30 PROCEDURE — G0151 HHCP-SERV OF PT,EA 15 MIN: HCPCS

## 2023-03-31 ENCOUNTER — HOME CARE VISIT (OUTPATIENT)
Dept: SCHEDULING | Facility: HOME HEALTH | Age: 73
End: 2023-03-31
Payer: MEDICARE

## 2023-03-31 VITALS
RESPIRATION RATE: 16 BRPM | TEMPERATURE: 97.6 F | DIASTOLIC BLOOD PRESSURE: 60 MMHG | HEART RATE: 51 BPM | SYSTOLIC BLOOD PRESSURE: 115 MMHG | OXYGEN SATURATION: 96 %

## 2023-03-31 PROCEDURE — G0299 HHS/HOSPICE OF RN EA 15 MIN: HCPCS

## 2023-04-04 NOTE — HOME HEALTH
Ms. Leslie Graves is being discharged to home after meeting all goals. She is knowledgeable with 100% accuracy about CHF diagnosis, monitoring, and dietary management. She is also knowledgeable about a.fib and HTN and dietary  restrictions for HTN. Pt was also educated on all medications including purpose, dosage, administration, and pertinent side effects. Pt. was educated and knows about  fall prevention, pressure ulcer prevention, and pain management    Pt. instructed to keep skin clean, dry, and moist and to wear tubigrip stockings to help with edema. Pt. instructed to keep following heart healthy, low sodium diet. Pt. instructed to keep all follow up appointments and to take all medications as prescribed. Pt. instructed to continue being with friends and getting outside as these are improving her mood. Pt. instructed to keep walking to build strength.

## 2023-05-02 DIAGNOSIS — R52 PAIN: ICD-10-CM

## 2023-05-03 RX ORDER — HYDROCODONE BITARTRATE AND ACETAMINOPHEN 5; 325 MG/1; MG/1
1 TABLET ORAL EVERY 12 HOURS
Qty: 30 TABLET | Refills: 0 | Status: SHIPPED | OUTPATIENT
Start: 2023-05-03 | End: 2023-05-06

## 2023-05-11 DIAGNOSIS — G25.0 ESSENTIAL TREMOR: ICD-10-CM

## 2023-05-12 RX ORDER — PROPRANOLOL HYDROCHLORIDE 80 MG/1
CAPSULE, EXTENDED RELEASE ORAL
Qty: 90 CAPSULE | Refills: 3 | Status: SHIPPED | OUTPATIENT
Start: 2023-05-12

## 2023-05-12 NOTE — TELEPHONE ENCOUNTER
Future Appointments:  Future Appointments   Date Time Provider Scooby Lopez   6/5/2023  1:30 PM MADDY Perales [facility] BS AMB   6/8/2023 10:45 AM Marcelo Lutz MD [facility] BS AMB        Last Appointment With Me:  3/3/2023     Requested Prescriptions     Pending Prescriptions Disp Refills    fluocinonide (LIDEX) 0.05 % cream [Pharmacy Med Name: FLUOCINONIDE 0.05% CREAM] 60 g      Sig: APPLY TO AFFECTED AREA TWICE A DAY

## 2023-05-23 ENCOUNTER — TELEPHONE (OUTPATIENT)
Age: 73
End: 2023-05-23

## 2023-05-23 NOTE — TELEPHONE ENCOUNTER
Spoke w/ pt, scheduled pt for 5/25 and swapped pt's  (on HIPPA and confirmed switch) for 6/8.  Both appts have been confirmed

## 2023-05-23 NOTE — TELEPHONE ENCOUNTER
Noted.     Zaki Khan, [address] [facility]  [address] [facility], [address] [place] [address]  W: [phone]  F: [phone]

## 2023-05-23 NOTE — TELEPHONE ENCOUNTER
Pt has a terrible rash and burning all over her body. Pt states she has seen two other physicians for this. Please call as pt needs relief right away. There are no appts for pt to be seen in some time.  has an appt scheduled for 5-25-23 and is willing to give her this appt if we can not find a spot for pt right away.

## 2023-05-25 ENCOUNTER — OFFICE VISIT (OUTPATIENT)
Age: 73
End: 2023-05-25
Payer: MEDICARE

## 2023-05-25 VITALS
BODY MASS INDEX: 34.42 KG/M2 | RESPIRATION RATE: 14 BRPM | TEMPERATURE: 97.1 F | WEIGHT: 206.6 LBS | HEART RATE: 75 BPM | DIASTOLIC BLOOD PRESSURE: 55 MMHG | SYSTOLIC BLOOD PRESSURE: 82 MMHG | HEIGHT: 65 IN | OXYGEN SATURATION: 99 %

## 2023-05-25 DIAGNOSIS — E03.9 HYPOTHYROIDISM, UNSPECIFIED TYPE: ICD-10-CM

## 2023-05-25 DIAGNOSIS — E53.8 DEFICIENCY OF OTHER SPECIFIED B GROUP VITAMINS: ICD-10-CM

## 2023-05-25 DIAGNOSIS — I50.22 CHRONIC SYSTOLIC (CONGESTIVE) HEART FAILURE (HCC): ICD-10-CM

## 2023-05-25 DIAGNOSIS — E55.9 VITAMIN D DEFICIENCY: ICD-10-CM

## 2023-05-25 DIAGNOSIS — F31.70 BIPOLAR DISORDER, CURRENTLY IN REMISSION, MOST RECENT EPISODE UNSPECIFIED (HCC): ICD-10-CM

## 2023-05-25 DIAGNOSIS — R73.9 HYPERGLYCEMIA: ICD-10-CM

## 2023-05-25 DIAGNOSIS — I10 ESSENTIAL HYPERTENSION: Primary | ICD-10-CM

## 2023-05-25 LAB
BASOPHILS # BLD: 0.1 K/UL (ref 0–0.1)
BASOPHILS NFR BLD: 1 % (ref 0–1)
DATE LAST DOSE: NORMAL
DIFFERENTIAL METHOD BLD: NORMAL
DOSE AMOUNT: 0 UNITS
DOSE DATE/TIME: NORMAL
EOSINOPHIL # BLD: 0.3 K/UL (ref 0–0.4)
EOSINOPHIL NFR BLD: 4 % (ref 0–7)
ERYTHROCYTE [DISTWIDTH] IN BLOOD BY AUTOMATED COUNT: 12.6 % (ref 11.5–14.5)
HCT VFR BLD AUTO: 38.5 % (ref 35–47)
HGB BLD-MCNC: 12.1 G/DL (ref 11.5–16)
IMM GRANULOCYTES # BLD AUTO: 0 K/UL (ref 0–0.04)
IMM GRANULOCYTES NFR BLD AUTO: 0 % (ref 0–0.5)
LITHIUM SERPL-SCNC: 1.13 MMOL/L (ref 0.6–1.2)
LYMPHOCYTES # BLD: 1.5 K/UL (ref 0.8–3.5)
LYMPHOCYTES NFR BLD: 23 % (ref 12–49)
MCH RBC QN AUTO: 30 PG (ref 26–34)
MCHC RBC AUTO-ENTMCNC: 31.4 G/DL (ref 30–36.5)
MCV RBC AUTO: 95.5 FL (ref 80–99)
MONOCYTES # BLD: 0.5 K/UL (ref 0–1)
MONOCYTES NFR BLD: 8 % (ref 5–13)
NEUTS SEG # BLD: 4.1 K/UL (ref 1.8–8)
NEUTS SEG NFR BLD: 64 % (ref 32–75)
NRBC # BLD: 0 K/UL (ref 0–0.01)
NRBC BLD-RTO: 0 PER 100 WBC
PLATELET # BLD AUTO: 246 K/UL (ref 150–400)
PMV BLD AUTO: 11.1 FL (ref 8.9–12.9)
RBC # BLD AUTO: 4.03 M/UL (ref 3.8–5.2)
WBC # BLD AUTO: 6.5 K/UL (ref 3.6–11)

## 2023-05-25 PROCEDURE — G8427 DOCREV CUR MEDS BY ELIG CLIN: HCPCS | Performed by: INTERNAL MEDICINE

## 2023-05-25 PROCEDURE — 3078F DIAST BP <80 MM HG: CPT | Performed by: INTERNAL MEDICINE

## 2023-05-25 PROCEDURE — 3017F COLORECTAL CA SCREEN DOC REV: CPT | Performed by: INTERNAL MEDICINE

## 2023-05-25 PROCEDURE — G8400 PT W/DXA NO RESULTS DOC: HCPCS | Performed by: INTERNAL MEDICINE

## 2023-05-25 PROCEDURE — 1036F TOBACCO NON-USER: CPT | Performed by: INTERNAL MEDICINE

## 2023-05-25 PROCEDURE — 1123F ACP DISCUSS/DSCN MKR DOCD: CPT | Performed by: INTERNAL MEDICINE

## 2023-05-25 PROCEDURE — 99214 OFFICE O/P EST MOD 30 MIN: CPT | Performed by: INTERNAL MEDICINE

## 2023-05-25 PROCEDURE — 3074F SYST BP LT 130 MM HG: CPT | Performed by: INTERNAL MEDICINE

## 2023-05-25 PROCEDURE — G8417 CALC BMI ABV UP PARAM F/U: HCPCS | Performed by: INTERNAL MEDICINE

## 2023-05-25 PROCEDURE — 1090F PRES/ABSN URINE INCON ASSESS: CPT | Performed by: INTERNAL MEDICINE

## 2023-05-25 RX ORDER — CYCLOBENZAPRINE HCL 5 MG
TABLET ORAL
COMMUNITY
Start: 2023-05-19 | End: 2023-05-25

## 2023-05-25 SDOH — ECONOMIC STABILITY: FOOD INSECURITY: WITHIN THE PAST 12 MONTHS, THE FOOD YOU BOUGHT JUST DIDN'T LAST AND YOU DIDN'T HAVE MONEY TO GET MORE.: NEVER TRUE

## 2023-05-25 SDOH — ECONOMIC STABILITY: HOUSING INSECURITY
IN THE LAST 12 MONTHS, WAS THERE A TIME WHEN YOU DID NOT HAVE A STEADY PLACE TO SLEEP OR SLEPT IN A SHELTER (INCLUDING NOW)?: NO

## 2023-05-25 SDOH — ECONOMIC STABILITY: FOOD INSECURITY: WITHIN THE PAST 12 MONTHS, YOU WORRIED THAT YOUR FOOD WOULD RUN OUT BEFORE YOU GOT MONEY TO BUY MORE.: NEVER TRUE

## 2023-05-25 SDOH — ECONOMIC STABILITY: INCOME INSECURITY: HOW HARD IS IT FOR YOU TO PAY FOR THE VERY BASICS LIKE FOOD, HOUSING, MEDICAL CARE, AND HEATING?: NOT HARD AT ALL

## 2023-05-25 ASSESSMENT — ANXIETY QUESTIONNAIRES
7. FEELING AFRAID AS IF SOMETHING AWFUL MIGHT HAPPEN: 3
3. WORRYING TOO MUCH ABOUT DIFFERENT THINGS: 0
1. FEELING NERVOUS, ANXIOUS, OR ON EDGE: 3
6. BECOMING EASILY ANNOYED OR IRRITABLE: 3
IF YOU CHECKED OFF ANY PROBLEMS ON THIS QUESTIONNAIRE, HOW DIFFICULT HAVE THESE PROBLEMS MADE IT FOR YOU TO DO YOUR WORK, TAKE CARE OF THINGS AT HOME, OR GET ALONG WITH OTHER PEOPLE: VERY DIFFICULT
GAD7 TOTAL SCORE: 15
2. NOT BEING ABLE TO STOP OR CONTROL WORRYING: 3
5. BEING SO RESTLESS THAT IT IS HARD TO SIT STILL: 0
4. TROUBLE RELAXING: 3

## 2023-05-25 ASSESSMENT — PATIENT HEALTH QUESTIONNAIRE - PHQ9
SUM OF ALL RESPONSES TO PHQ9 QUESTIONS 1 & 2: 0
SUM OF ALL RESPONSES TO PHQ QUESTIONS 1-9: 0
2. FEELING DOWN, DEPRESSED OR HOPELESS: 0
1. LITTLE INTEREST OR PLEASURE IN DOING THINGS: 0

## 2023-05-26 LAB
25(OH)D3 SERPL-MCNC: 70 NG/ML (ref 30–100)
ALBUMIN SERPL-MCNC: 3.9 G/DL (ref 3.5–5)
ALBUMIN/GLOB SERPL: 1.3 (ref 1.1–2.2)
ALP SERPL-CCNC: 84 U/L (ref 45–117)
ALT SERPL-CCNC: 20 U/L (ref 12–78)
ANION GAP SERPL CALC-SCNC: 4 MMOL/L (ref 5–15)
AST SERPL-CCNC: 14 U/L (ref 15–37)
BILIRUB SERPL-MCNC: 0.4 MG/DL (ref 0.2–1)
BUN SERPL-MCNC: 39 MG/DL (ref 6–20)
BUN/CREAT SERPL: 22 (ref 12–20)
CALCIUM SERPL-MCNC: 10 MG/DL (ref 8.5–10.1)
CHLORIDE SERPL-SCNC: 106 MMOL/L (ref 97–108)
CHOLEST SERPL-MCNC: 218 MG/DL
CO2 SERPL-SCNC: 28 MMOL/L (ref 21–32)
CREAT SERPL-MCNC: 1.81 MG/DL (ref 0.55–1.02)
EST. AVERAGE GLUCOSE BLD GHB EST-MCNC: 108 MG/DL
GLOBULIN SER CALC-MCNC: 2.9 G/DL (ref 2–4)
GLUCOSE SERPL-MCNC: 92 MG/DL (ref 65–100)
HBA1C MFR BLD: 5.4 % (ref 4–5.6)
HDLC SERPL-MCNC: 44 MG/DL
HDLC SERPL: 5 (ref 0–5)
LDLC SERPL CALC-MCNC: 127.6 MG/DL (ref 0–100)
POTASSIUM SERPL-SCNC: 3.8 MMOL/L (ref 3.5–5.1)
PROT SERPL-MCNC: 6.8 G/DL (ref 6.4–8.2)
SODIUM SERPL-SCNC: 138 MMOL/L (ref 136–145)
T4 FREE SERPL-MCNC: 1 NG/DL (ref 0.8–1.5)
TRIGL SERPL-MCNC: 232 MG/DL
TSH SERPL DL<=0.05 MIU/L-ACNC: 4.97 UIU/ML (ref 0.36–3.74)
VIT B12 SERPL-MCNC: 749 PG/ML (ref 193–986)
VLDLC SERPL CALC-MCNC: 46.4 MG/DL

## 2023-06-01 DIAGNOSIS — E03.9 HYPOTHYROIDISM, UNSPECIFIED TYPE: Primary | ICD-10-CM

## 2023-06-01 DIAGNOSIS — N19 RENAL FAILURE, UNSPECIFIED CHRONICITY: ICD-10-CM

## 2023-06-02 RX ORDER — LEVOTHYROXINE SODIUM 0.07 MG/1
75 TABLET ORAL DAILY
Qty: 90 TABLET | Refills: 1 | Status: SHIPPED | OUTPATIENT
Start: 2023-06-02

## 2023-06-06 ENCOUNTER — NURSE ONLY (OUTPATIENT)
Age: 73
End: 2023-06-06

## 2023-06-06 DIAGNOSIS — N19 RENAL FAILURE, UNSPECIFIED CHRONICITY: ICD-10-CM

## 2023-06-07 ENCOUNTER — TELEPHONE (OUTPATIENT)
Age: 73
End: 2023-06-07

## 2023-06-07 LAB
ALBUMIN SERPL-MCNC: 3.9 G/DL (ref 3.5–5)
ALBUMIN/GLOB SERPL: 1.6 (ref 1.1–2.2)
ALP SERPL-CCNC: 95 U/L (ref 45–117)
ALT SERPL-CCNC: 21 U/L (ref 12–78)
ANION GAP SERPL CALC-SCNC: 3 MMOL/L (ref 5–15)
AST SERPL-CCNC: 15 U/L (ref 15–37)
BILIRUB SERPL-MCNC: 0.4 MG/DL (ref 0.2–1)
BUN SERPL-MCNC: 31 MG/DL (ref 6–20)
BUN/CREAT SERPL: 27 (ref 12–20)
CALCIUM SERPL-MCNC: 9.9 MG/DL (ref 8.5–10.1)
CHLORIDE SERPL-SCNC: 112 MMOL/L (ref 97–108)
CO2 SERPL-SCNC: 27 MMOL/L (ref 21–32)
CREAT SERPL-MCNC: 1.14 MG/DL (ref 0.55–1.02)
GLOBULIN SER CALC-MCNC: 2.5 G/DL (ref 2–4)
GLUCOSE SERPL-MCNC: 102 MG/DL (ref 65–100)
POTASSIUM SERPL-SCNC: 4.2 MMOL/L (ref 3.5–5.1)
PROT SERPL-MCNC: 6.4 G/DL (ref 6.4–8.2)
SODIUM SERPL-SCNC: 142 MMOL/L (ref 136–145)

## 2023-06-08 ENCOUNTER — TELEPHONE (OUTPATIENT)
Age: 73
End: 2023-06-08

## 2023-06-18 DIAGNOSIS — E55.9 VITAMIN D DEFICIENCY, UNSPECIFIED: ICD-10-CM

## 2023-06-19 RX ORDER — ERGOCALCIFEROL 1.25 MG/1
CAPSULE ORAL
Qty: 12 CAPSULE | Refills: 1 | Status: ON HOLD | OUTPATIENT
Start: 2023-06-19

## 2023-06-24 ENCOUNTER — HOSPITAL ENCOUNTER (INPATIENT)
Facility: HOSPITAL | Age: 73
LOS: 1 days | Discharge: HOME | DRG: 291 | End: 2023-06-27
Attending: EMERGENCY MEDICINE | Admitting: STUDENT IN AN ORGANIZED HEALTH CARE EDUCATION/TRAINING PROGRAM
Payer: MEDICARE

## 2023-06-24 ENCOUNTER — APPOINTMENT (OUTPATIENT)
Facility: HOSPITAL | Age: 73
DRG: 291 | End: 2023-06-24
Payer: MEDICARE

## 2023-06-24 DIAGNOSIS — R60.0 BILATERAL LEG EDEMA: Primary | ICD-10-CM

## 2023-06-24 DIAGNOSIS — E03.9 HYPOTHYROIDISM, UNSPECIFIED TYPE: ICD-10-CM

## 2023-06-24 DIAGNOSIS — I50.21 ACUTE SYSTOLIC CHF (CONGESTIVE HEART FAILURE) (HCC): ICD-10-CM

## 2023-06-24 DIAGNOSIS — Z86.79 HISTORY OF CHF (CONGESTIVE HEART FAILURE): ICD-10-CM

## 2023-06-24 PROCEDURE — 83880 ASSAY OF NATRIURETIC PEPTIDE: CPT

## 2023-06-24 PROCEDURE — 85025 COMPLETE CBC W/AUTO DIFF WBC: CPT

## 2023-06-24 PROCEDURE — 99285 EMERGENCY DEPT VISIT HI MDM: CPT

## 2023-06-24 PROCEDURE — 80053 COMPREHEN METABOLIC PANEL: CPT

## 2023-06-24 PROCEDURE — 83735 ASSAY OF MAGNESIUM: CPT

## 2023-06-24 PROCEDURE — 71045 X-RAY EXAM CHEST 1 VIEW: CPT

## 2023-06-24 PROCEDURE — 36415 COLL VENOUS BLD VENIPUNCTURE: CPT

## 2023-06-24 PROCEDURE — 84484 ASSAY OF TROPONIN QUANT: CPT

## 2023-06-24 PROCEDURE — 93005 ELECTROCARDIOGRAM TRACING: CPT | Performed by: EMERGENCY MEDICINE

## 2023-06-24 RX ORDER — FUROSEMIDE 10 MG/ML
60 INJECTION INTRAMUSCULAR; INTRAVENOUS ONCE
Status: COMPLETED | OUTPATIENT
Start: 2023-06-24 | End: 2023-06-25

## 2023-06-24 ASSESSMENT — PAIN DESCRIPTION - LOCATION: LOCATION: CHEST

## 2023-06-24 ASSESSMENT — PAIN DESCRIPTION - ORIENTATION: ORIENTATION: MID

## 2023-06-24 ASSESSMENT — PAIN SCALES - GENERAL: PAINLEVEL_OUTOF10: 4

## 2023-06-25 PROBLEM — R60.0 BILATERAL EDEMA OF LOWER EXTREMITY: Status: ACTIVE | Noted: 2023-06-25

## 2023-06-25 LAB
ALBUMIN SERPL-MCNC: 3.4 G/DL (ref 3.5–5)
ALBUMIN/GLOB SERPL: 1.1 (ref 1.1–2.2)
ALP SERPL-CCNC: 97 U/L (ref 45–117)
ALT SERPL-CCNC: 19 U/L (ref 12–78)
ANION GAP SERPL CALC-SCNC: 3 MMOL/L (ref 5–15)
APPEARANCE UR: CLEAR
AST SERPL-CCNC: 15 U/L (ref 15–37)
BACTERIA URNS QL MICRO: ABNORMAL /HPF
BASOPHILS # BLD: 0.1 K/UL (ref 0–0.1)
BASOPHILS NFR BLD: 1 % (ref 0–1)
BILIRUB SERPL-MCNC: 0.3 MG/DL (ref 0.2–1)
BILIRUB UR QL: NEGATIVE
BUN SERPL-MCNC: 30 MG/DL (ref 6–20)
BUN/CREAT SERPL: 25 (ref 12–20)
CALCIUM SERPL-MCNC: 9.7 MG/DL (ref 8.5–10.1)
CHLORIDE SERPL-SCNC: 109 MMOL/L (ref 97–108)
CO2 SERPL-SCNC: 27 MMOL/L (ref 21–32)
COLOR UR: ABNORMAL
CREAT SERPL-MCNC: 1.2 MG/DL (ref 0.55–1.02)
DIFFERENTIAL METHOD BLD: ABNORMAL
EOSINOPHIL # BLD: 0.3 K/UL (ref 0–0.4)
EOSINOPHIL NFR BLD: 4 % (ref 0–7)
EPITH CASTS URNS QL MICRO: ABNORMAL /LPF
ERYTHROCYTE [DISTWIDTH] IN BLOOD BY AUTOMATED COUNT: 13 % (ref 11.5–14.5)
GLOBULIN SER CALC-MCNC: 3 G/DL (ref 2–4)
GLUCOSE SERPL-MCNC: 104 MG/DL (ref 65–100)
GLUCOSE UR STRIP.AUTO-MCNC: NEGATIVE MG/DL
HCT VFR BLD AUTO: 31.5 % (ref 35–47)
HGB BLD-MCNC: 10.5 G/DL (ref 11.5–16)
HGB UR QL STRIP: NEGATIVE
HYALINE CASTS URNS QL MICRO: ABNORMAL /LPF (ref 0–2)
IMM GRANULOCYTES # BLD AUTO: 0 K/UL (ref 0–0.04)
IMM GRANULOCYTES NFR BLD AUTO: 0 % (ref 0–0.5)
KETONES UR QL STRIP.AUTO: NEGATIVE MG/DL
LEUKOCYTE ESTERASE UR QL STRIP.AUTO: ABNORMAL
LYMPHOCYTES # BLD: 1.9 K/UL (ref 0.8–3.5)
LYMPHOCYTES NFR BLD: 32 % (ref 12–49)
MAGNESIUM SERPL-MCNC: 2.4 MG/DL (ref 1.6–2.4)
MCH RBC QN AUTO: 31.1 PG (ref 26–34)
MCHC RBC AUTO-ENTMCNC: 33.3 G/DL (ref 30–36.5)
MCV RBC AUTO: 93.2 FL (ref 80–99)
MONOCYTES # BLD: 0.5 K/UL (ref 0–1)
MONOCYTES NFR BLD: 9 % (ref 5–13)
NEUTS SEG # BLD: 3.2 K/UL (ref 1.8–8)
NEUTS SEG NFR BLD: 54 % (ref 32–75)
NITRITE UR QL STRIP.AUTO: NEGATIVE
NRBC # BLD: 0 K/UL (ref 0–0.01)
NRBC BLD-RTO: 0 PER 100 WBC
NT PRO BNP: 235 PG/ML
PH UR STRIP: 7.5 (ref 5–8)
PLATELET # BLD AUTO: 184 K/UL (ref 150–400)
PMV BLD AUTO: 10.6 FL (ref 8.9–12.9)
POTASSIUM SERPL-SCNC: 3.9 MMOL/L (ref 3.5–5.1)
PROT SERPL-MCNC: 6.4 G/DL (ref 6.4–8.2)
PROT UR STRIP-MCNC: NEGATIVE MG/DL
RBC # BLD AUTO: 3.38 M/UL (ref 3.8–5.2)
RBC #/AREA URNS HPF: ABNORMAL /HPF (ref 0–5)
SODIUM SERPL-SCNC: 139 MMOL/L (ref 136–145)
SP GR UR REFRACTOMETRY: 1.01
TROPONIN I SERPL HS-MCNC: 6 NG/L (ref 0–51)
URINE CULTURE IF INDICATED: ABNORMAL
UROBILINOGEN UR QL STRIP.AUTO: 0.2 EU/DL (ref 0.2–1)
WBC # BLD AUTO: 5.9 K/UL (ref 3.6–11)
WBC URNS QL MICRO: ABNORMAL /HPF (ref 0–4)

## 2023-06-25 PROCEDURE — 6360000002 HC RX W HCPCS: Performed by: STUDENT IN AN ORGANIZED HEALTH CARE EDUCATION/TRAINING PROGRAM

## 2023-06-25 PROCEDURE — 81001 URINALYSIS AUTO W/SCOPE: CPT

## 2023-06-25 PROCEDURE — 87086 URINE CULTURE/COLONY COUNT: CPT

## 2023-06-25 PROCEDURE — 6370000000 HC RX 637 (ALT 250 FOR IP): Performed by: STUDENT IN AN ORGANIZED HEALTH CARE EDUCATION/TRAINING PROGRAM

## 2023-06-25 PROCEDURE — 96372 THER/PROPH/DIAG INJ SC/IM: CPT

## 2023-06-25 PROCEDURE — G0378 HOSPITAL OBSERVATION PER HR: HCPCS

## 2023-06-25 PROCEDURE — 87077 CULTURE AEROBIC IDENTIFY: CPT

## 2023-06-25 PROCEDURE — 6360000002 HC RX W HCPCS: Performed by: EMERGENCY MEDICINE

## 2023-06-25 PROCEDURE — 87186 SC STD MICRODIL/AGAR DIL: CPT

## 2023-06-25 PROCEDURE — 2580000003 HC RX 258: Performed by: STUDENT IN AN ORGANIZED HEALTH CARE EDUCATION/TRAINING PROGRAM

## 2023-06-25 PROCEDURE — 96374 THER/PROPH/DIAG INJ IV PUSH: CPT

## 2023-06-25 RX ORDER — ACETAMINOPHEN 325 MG/1
650 TABLET ORAL EVERY 6 HOURS PRN
Status: DISCONTINUED | OUTPATIENT
Start: 2023-06-25 | End: 2023-06-25

## 2023-06-25 RX ORDER — TIMOLOL MALEATE 5 MG/ML
1 SOLUTION/ DROPS OPHTHALMIC DAILY
Status: DISCONTINUED | OUTPATIENT
Start: 2023-06-25 | End: 2023-06-27 | Stop reason: HOSPADM

## 2023-06-25 RX ORDER — SODIUM CHLORIDE 0.9 % (FLUSH) 0.9 %
5-40 SYRINGE (ML) INJECTION PRN
Status: DISCONTINUED | OUTPATIENT
Start: 2023-06-25 | End: 2023-06-27 | Stop reason: HOSPADM

## 2023-06-25 RX ORDER — HYDROXYZINE HYDROCHLORIDE 25 MG/1
25 TABLET, FILM COATED ORAL 2 TIMES DAILY
Status: DISCONTINUED | OUTPATIENT
Start: 2023-06-25 | End: 2023-06-27 | Stop reason: HOSPADM

## 2023-06-25 RX ORDER — ARIPIPRAZOLE 5 MG/1
10 TABLET ORAL DAILY
Status: DISCONTINUED | OUTPATIENT
Start: 2023-06-26 | End: 2023-06-27 | Stop reason: HOSPADM

## 2023-06-25 RX ORDER — ENOXAPARIN SODIUM 100 MG/ML
40 INJECTION SUBCUTANEOUS DAILY
Status: DISCONTINUED | OUTPATIENT
Start: 2023-06-25 | End: 2023-06-25 | Stop reason: SDUPTHER

## 2023-06-25 RX ORDER — LATANOPROST 50 UG/ML
1 SOLUTION/ DROPS OPHTHALMIC 2 TIMES DAILY
Status: DISCONTINUED | OUTPATIENT
Start: 2023-06-25 | End: 2023-06-26

## 2023-06-25 RX ORDER — LITHIUM CARBONATE 300 MG/1
300 TABLET, FILM COATED, EXTENDED RELEASE ORAL EVERY 12 HOURS SCHEDULED
Status: DISCONTINUED | OUTPATIENT
Start: 2023-06-25 | End: 2023-06-27 | Stop reason: HOSPADM

## 2023-06-25 RX ORDER — ONDANSETRON 4 MG/1
4 TABLET, ORALLY DISINTEGRATING ORAL EVERY 8 HOURS PRN
Status: DISCONTINUED | OUTPATIENT
Start: 2023-06-25 | End: 2023-06-27 | Stop reason: HOSPADM

## 2023-06-25 RX ORDER — SODIUM CHLORIDE 0.9 % (FLUSH) 0.9 %
5-40 SYRINGE (ML) INJECTION EVERY 12 HOURS SCHEDULED
Status: DISCONTINUED | OUTPATIENT
Start: 2023-06-25 | End: 2023-06-27 | Stop reason: HOSPADM

## 2023-06-25 RX ORDER — ALPRAZOLAM 0.5 MG/1
0.5 TABLET ORAL 3 TIMES DAILY PRN
Status: DISCONTINUED | OUTPATIENT
Start: 2023-06-25 | End: 2023-06-27 | Stop reason: HOSPADM

## 2023-06-25 RX ORDER — ZOLPIDEM TARTRATE 5 MG/1
5 TABLET ORAL NIGHTLY PRN
Status: DISCONTINUED | OUTPATIENT
Start: 2023-06-25 | End: 2023-06-27 | Stop reason: HOSPADM

## 2023-06-25 RX ORDER — METHOCARBAMOL 500 MG/1
500 TABLET, FILM COATED ORAL NIGHTLY PRN
COMMUNITY

## 2023-06-25 RX ORDER — TOPIRAMATE 25 MG/1
100 TABLET ORAL 2 TIMES DAILY
Status: DISCONTINUED | OUTPATIENT
Start: 2023-06-25 | End: 2023-06-27 | Stop reason: HOSPADM

## 2023-06-25 RX ORDER — ONDANSETRON 2 MG/ML
4 INJECTION INTRAMUSCULAR; INTRAVENOUS EVERY 6 HOURS PRN
Status: DISCONTINUED | OUTPATIENT
Start: 2023-06-25 | End: 2023-06-27 | Stop reason: HOSPADM

## 2023-06-25 RX ORDER — LITHIUM CARBONATE 150 MG/1
150 CAPSULE ORAL DAILY
Status: DISCONTINUED | OUTPATIENT
Start: 2023-06-25 | End: 2023-06-25

## 2023-06-25 RX ORDER — PANTOPRAZOLE SODIUM 40 MG/1
40 TABLET, DELAYED RELEASE ORAL
Status: DISCONTINUED | OUTPATIENT
Start: 2023-06-25 | End: 2023-06-27 | Stop reason: HOSPADM

## 2023-06-25 RX ORDER — POLYETHYLENE GLYCOL 3350 17 G/17G
17 POWDER, FOR SOLUTION ORAL DAILY PRN
Status: DISCONTINUED | OUTPATIENT
Start: 2023-06-25 | End: 2023-06-27 | Stop reason: HOSPADM

## 2023-06-25 RX ORDER — ARIPIPRAZOLE 10 MG/1
10 TABLET ORAL DAILY
COMMUNITY

## 2023-06-25 RX ORDER — HYDROXYCHLOROQUINE SULFATE 200 MG/1
200 TABLET, FILM COATED ORAL 2 TIMES DAILY
Status: DISCONTINUED | OUTPATIENT
Start: 2023-06-25 | End: 2023-06-27 | Stop reason: HOSPADM

## 2023-06-25 RX ORDER — TORSEMIDE 20 MG/1
20 TABLET ORAL EVERY EVENING
COMMUNITY

## 2023-06-25 RX ORDER — ACETAMINOPHEN 650 MG/1
650 SUPPOSITORY RECTAL EVERY 6 HOURS PRN
Status: DISCONTINUED | OUTPATIENT
Start: 2023-06-25 | End: 2023-06-27 | Stop reason: HOSPADM

## 2023-06-25 RX ORDER — ACETAMINOPHEN 325 MG/1
650 TABLET ORAL EVERY 6 HOURS PRN
Status: DISCONTINUED | OUTPATIENT
Start: 2023-06-25 | End: 2023-06-27 | Stop reason: HOSPADM

## 2023-06-25 RX ORDER — CHOLECALCIFEROL (VITAMIN D3) 125 MCG
500 CAPSULE ORAL DAILY
Status: DISCONTINUED | OUTPATIENT
Start: 2023-06-25 | End: 2023-06-27 | Stop reason: HOSPADM

## 2023-06-25 RX ORDER — LEVOTHYROXINE SODIUM 0.07 MG/1
75 TABLET ORAL DAILY
Status: DISCONTINUED | OUTPATIENT
Start: 2023-06-25 | End: 2023-06-27 | Stop reason: HOSPADM

## 2023-06-25 RX ORDER — SODIUM CHLORIDE 9 MG/ML
INJECTION, SOLUTION INTRAVENOUS PRN
Status: DISCONTINUED | OUTPATIENT
Start: 2023-06-25 | End: 2023-06-27 | Stop reason: HOSPADM

## 2023-06-25 RX ADMIN — SODIUM CHLORIDE, PRESERVATIVE FREE 10 ML: 5 INJECTION INTRAVENOUS at 08:59

## 2023-06-25 RX ADMIN — HYDROXYZINE HYDROCHLORIDE 25 MG: 25 TABLET, FILM COATED ORAL at 08:57

## 2023-06-25 RX ADMIN — TIMOLOL MALEATE 1 DROP: 5 SOLUTION/ DROPS OPHTHALMIC at 09:52

## 2023-06-25 RX ADMIN — APIXABAN 5 MG: 5 TABLET, FILM COATED ORAL at 08:57

## 2023-06-25 RX ADMIN — LATANOPROST 1 DROP: 50 SOLUTION OPHTHALMIC at 21:06

## 2023-06-25 RX ADMIN — ENOXAPARIN SODIUM 40 MG: 100 INJECTION SUBCUTANEOUS at 08:57

## 2023-06-25 RX ADMIN — SODIUM CHLORIDE, PRESERVATIVE FREE 10 ML: 5 INJECTION INTRAVENOUS at 21:10

## 2023-06-25 RX ADMIN — FUROSEMIDE 60 MG: 10 INJECTION, SOLUTION INTRAMUSCULAR; INTRAVENOUS at 00:56

## 2023-06-25 RX ADMIN — PANTOPRAZOLE SODIUM 40 MG: 40 TABLET, DELAYED RELEASE ORAL at 08:57

## 2023-06-25 RX ADMIN — TOPIRAMATE 100 MG: 25 TABLET, FILM COATED ORAL at 21:05

## 2023-06-25 RX ADMIN — SACUBITRIL AND VALSARTAN 1 TABLET: 24; 26 TABLET, FILM COATED ORAL at 21:05

## 2023-06-25 RX ADMIN — HYDROXYCHLOROQUINE SULFATE 200 MG: 200 TABLET ORAL at 08:57

## 2023-06-25 RX ADMIN — CYANOCOBALAMIN TAB 500 MCG 500 MCG: 500 TAB at 08:57

## 2023-06-25 RX ADMIN — LEVOTHYROXINE SODIUM 75 MCG: 0.07 TABLET ORAL at 08:57

## 2023-06-25 RX ADMIN — HYDROXYZINE HYDROCHLORIDE 25 MG: 25 TABLET, FILM COATED ORAL at 21:05

## 2023-06-25 RX ADMIN — HYDROXYCHLOROQUINE SULFATE 200 MG: 200 TABLET ORAL at 21:05

## 2023-06-25 RX ADMIN — TOPIRAMATE 100 MG: 25 TABLET, FILM COATED ORAL at 08:57

## 2023-06-25 RX ADMIN — LITHIUM CARBONATE 300 MG: 300 TABLET, FILM COATED, EXTENDED RELEASE ORAL at 21:04

## 2023-06-25 RX ADMIN — SACUBITRIL AND VALSARTAN 1 TABLET: 24; 26 TABLET, FILM COATED ORAL at 08:57

## 2023-06-25 RX ADMIN — ZOLPIDEM TARTRATE 5 MG: 5 TABLET ORAL at 21:05

## 2023-06-25 RX ADMIN — APIXABAN 5 MG: 5 TABLET, FILM COATED ORAL at 21:05

## 2023-06-25 ASSESSMENT — LIFESTYLE VARIABLES
HOW OFTEN DO YOU HAVE A DRINK CONTAINING ALCOHOL: NEVER
HOW MANY STANDARD DRINKS CONTAINING ALCOHOL DO YOU HAVE ON A TYPICAL DAY: PATIENT DOES NOT DRINK

## 2023-06-25 ASSESSMENT — PAIN SCALES - GENERAL: PAINLEVEL_OUTOF10: 0

## 2023-06-26 PROBLEM — I50.21 ACUTE SYSTOLIC CHF (CONGESTIVE HEART FAILURE) (HCC): Status: ACTIVE | Noted: 2023-06-26

## 2023-06-26 LAB
ANION GAP SERPL CALC-SCNC: 5 MMOL/L (ref 5–15)
BASOPHILS # BLD: 0 K/UL (ref 0–0.1)
BASOPHILS NFR BLD: 1 % (ref 0–1)
BUN SERPL-MCNC: 25 MG/DL (ref 6–20)
BUN/CREAT SERPL: 25 (ref 12–20)
CALCIUM SERPL-MCNC: 9.2 MG/DL (ref 8.5–10.1)
CHLORIDE SERPL-SCNC: 112 MMOL/L (ref 97–108)
CO2 SERPL-SCNC: 24 MMOL/L (ref 21–32)
CREAT SERPL-MCNC: 1.02 MG/DL (ref 0.55–1.02)
DIFFERENTIAL METHOD BLD: ABNORMAL
EKG ATRIAL RATE: 75 BPM
EKG DIAGNOSIS: NORMAL
EKG P-R INTERVAL: 172 MS
EKG Q-T INTERVAL: 518 MS
EKG QRS DURATION: 176 MS
EKG QTC CALCULATION (BAZETT): 578 MS
EKG R AXIS: -27 DEGREES
EKG T AXIS: 102 DEGREES
EKG VENTRICULAR RATE: 75 BPM
EOSINOPHIL # BLD: 0.2 K/UL (ref 0–0.4)
EOSINOPHIL NFR BLD: 5 % (ref 0–7)
ERYTHROCYTE [DISTWIDTH] IN BLOOD BY AUTOMATED COUNT: 13.1 % (ref 11.5–14.5)
GLUCOSE SERPL-MCNC: 114 MG/DL (ref 65–100)
HCT VFR BLD AUTO: 29 % (ref 35–47)
HGB BLD-MCNC: 9.7 G/DL (ref 11.5–16)
IMM GRANULOCYTES # BLD AUTO: 0 K/UL (ref 0–0.04)
IMM GRANULOCYTES NFR BLD AUTO: 0 % (ref 0–0.5)
LYMPHOCYTES # BLD: 1.8 K/UL (ref 0.8–3.5)
LYMPHOCYTES NFR BLD: 36 % (ref 12–49)
MAGNESIUM SERPL-MCNC: 2.4 MG/DL (ref 1.6–2.4)
MCH RBC QN AUTO: 30.9 PG (ref 26–34)
MCHC RBC AUTO-ENTMCNC: 33.4 G/DL (ref 30–36.5)
MCV RBC AUTO: 92.4 FL (ref 80–99)
MONOCYTES # BLD: 0.4 K/UL (ref 0–1)
MONOCYTES NFR BLD: 9 % (ref 5–13)
NEUTS SEG # BLD: 2.4 K/UL (ref 1.8–8)
NEUTS SEG NFR BLD: 49 % (ref 32–75)
NRBC # BLD: 0 K/UL (ref 0–0.01)
NRBC BLD-RTO: 0 PER 100 WBC
PLATELET # BLD AUTO: 174 K/UL (ref 150–400)
PMV BLD AUTO: 10 FL (ref 8.9–12.9)
POTASSIUM SERPL-SCNC: 3.3 MMOL/L (ref 3.5–5.1)
RBC # BLD AUTO: 3.14 M/UL (ref 3.8–5.2)
SODIUM SERPL-SCNC: 141 MMOL/L (ref 136–145)
T4 FREE SERPL-MCNC: 1 NG/DL (ref 0.8–1.5)
TSH SERPL DL<=0.05 MIU/L-ACNC: 1.99 UIU/ML (ref 0.36–3.74)
WBC # BLD AUTO: 4.9 K/UL (ref 3.6–11)

## 2023-06-26 PROCEDURE — 80048 BASIC METABOLIC PNL TOTAL CA: CPT

## 2023-06-26 PROCEDURE — 84443 ASSAY THYROID STIM HORMONE: CPT

## 2023-06-26 PROCEDURE — 96376 TX/PRO/DX INJ SAME DRUG ADON: CPT

## 2023-06-26 PROCEDURE — 85025 COMPLETE CBC W/AUTO DIFF WBC: CPT

## 2023-06-26 PROCEDURE — 2580000003 HC RX 258: Performed by: INTERNAL MEDICINE

## 2023-06-26 PROCEDURE — 99233 SBSQ HOSP IP/OBS HIGH 50: CPT | Performed by: INTERNAL MEDICINE

## 2023-06-26 PROCEDURE — 6370000000 HC RX 637 (ALT 250 FOR IP): Performed by: INTERNAL MEDICINE

## 2023-06-26 PROCEDURE — 84439 ASSAY OF FREE THYROXINE: CPT

## 2023-06-26 PROCEDURE — 1100000000 HC RM PRIVATE

## 2023-06-26 PROCEDURE — 2580000003 HC RX 258: Performed by: STUDENT IN AN ORGANIZED HEALTH CARE EDUCATION/TRAINING PROGRAM

## 2023-06-26 PROCEDURE — 6370000000 HC RX 637 (ALT 250 FOR IP): Performed by: STUDENT IN AN ORGANIZED HEALTH CARE EDUCATION/TRAINING PROGRAM

## 2023-06-26 PROCEDURE — G0378 HOSPITAL OBSERVATION PER HR: HCPCS

## 2023-06-26 PROCEDURE — 6360000002 HC RX W HCPCS: Performed by: INTERNAL MEDICINE

## 2023-06-26 PROCEDURE — 36415 COLL VENOUS BLD VENIPUNCTURE: CPT

## 2023-06-26 PROCEDURE — 83735 ASSAY OF MAGNESIUM: CPT

## 2023-06-26 RX ORDER — LATANOPROST 50 UG/ML
1 SOLUTION/ DROPS OPHTHALMIC NIGHTLY
Status: DISCONTINUED | OUTPATIENT
Start: 2023-06-27 | End: 2023-06-27 | Stop reason: HOSPADM

## 2023-06-26 RX ORDER — FUROSEMIDE 10 MG/ML
80 INJECTION INTRAMUSCULAR; INTRAVENOUS ONCE
Status: COMPLETED | OUTPATIENT
Start: 2023-06-26 | End: 2023-06-26

## 2023-06-26 RX ORDER — LATANOPROST 50 UG/ML
1 SOLUTION/ DROPS OPHTHALMIC NIGHTLY
COMMUNITY

## 2023-06-26 RX ORDER — POTASSIUM CHLORIDE 750 MG/1
20 TABLET, FILM COATED, EXTENDED RELEASE ORAL 2 TIMES DAILY
Status: DISCONTINUED | OUTPATIENT
Start: 2023-06-26 | End: 2023-06-27 | Stop reason: ALTCHOICE

## 2023-06-26 RX ADMIN — LITHIUM CARBONATE 300 MG: 300 TABLET, FILM COATED, EXTENDED RELEASE ORAL at 09:41

## 2023-06-26 RX ADMIN — TOPIRAMATE 100 MG: 25 TABLET, FILM COATED ORAL at 09:42

## 2023-06-26 RX ADMIN — TOPIRAMATE 100 MG: 25 TABLET, FILM COATED ORAL at 21:22

## 2023-06-26 RX ADMIN — SODIUM CHLORIDE 1000 MG: 900 INJECTION INTRAVENOUS at 17:53

## 2023-06-26 RX ADMIN — POTASSIUM CHLORIDE 20 MEQ: 750 TABLET, EXTENDED RELEASE ORAL at 09:43

## 2023-06-26 RX ADMIN — TIMOLOL MALEATE 1 DROP: 5 SOLUTION/ DROPS OPHTHALMIC at 09:44

## 2023-06-26 RX ADMIN — PANTOPRAZOLE SODIUM 40 MG: 40 TABLET, DELAYED RELEASE ORAL at 09:43

## 2023-06-26 RX ADMIN — ZOLPIDEM TARTRATE 5 MG: 5 TABLET ORAL at 21:23

## 2023-06-26 RX ADMIN — APIXABAN 5 MG: 5 TABLET, FILM COATED ORAL at 21:23

## 2023-06-26 RX ADMIN — LATANOPROST 1 DROP: 50 SOLUTION OPHTHALMIC at 09:44

## 2023-06-26 RX ADMIN — HYDROXYZINE HYDROCHLORIDE 25 MG: 25 TABLET, FILM COATED ORAL at 21:23

## 2023-06-26 RX ADMIN — LEVOTHYROXINE SODIUM 75 MCG: 0.07 TABLET ORAL at 09:42

## 2023-06-26 RX ADMIN — CYANOCOBALAMIN TAB 500 MCG 500 MCG: 500 TAB at 09:41

## 2023-06-26 RX ADMIN — FUROSEMIDE 80 MG: 10 INJECTION, SOLUTION INTRAMUSCULAR; INTRAVENOUS at 12:16

## 2023-06-26 RX ADMIN — HYDROXYZINE HYDROCHLORIDE 25 MG: 25 TABLET, FILM COATED ORAL at 09:42

## 2023-06-26 RX ADMIN — SODIUM CHLORIDE, PRESERVATIVE FREE 10 ML: 5 INJECTION INTRAVENOUS at 09:44

## 2023-06-26 RX ADMIN — APIXABAN 5 MG: 5 TABLET, FILM COATED ORAL at 09:42

## 2023-06-26 RX ADMIN — SACUBITRIL AND VALSARTAN 1 TABLET: 24; 26 TABLET, FILM COATED ORAL at 21:23

## 2023-06-26 RX ADMIN — HYDROXYCHLOROQUINE SULFATE 200 MG: 200 TABLET ORAL at 09:41

## 2023-06-26 RX ADMIN — POTASSIUM CHLORIDE 20 MEQ: 750 TABLET, EXTENDED RELEASE ORAL at 21:23

## 2023-06-26 RX ADMIN — LITHIUM CARBONATE 300 MG: 300 TABLET, FILM COATED, EXTENDED RELEASE ORAL at 21:23

## 2023-06-26 RX ADMIN — HYDROXYCHLOROQUINE SULFATE 200 MG: 200 TABLET ORAL at 21:23

## 2023-06-26 RX ADMIN — SACUBITRIL AND VALSARTAN 1 TABLET: 24; 26 TABLET, FILM COATED ORAL at 09:43

## 2023-06-26 RX ADMIN — SODIUM CHLORIDE, PRESERVATIVE FREE 10 ML: 5 INJECTION INTRAVENOUS at 23:20

## 2023-06-26 RX ADMIN — ARIPIPRAZOLE 10 MG: 5 TABLET ORAL at 09:42

## 2023-06-27 ENCOUNTER — TELEPHONE (OUTPATIENT)
Age: 73
End: 2023-06-27

## 2023-06-27 ENCOUNTER — APPOINTMENT (OUTPATIENT)
Facility: HOSPITAL | Age: 73
DRG: 291 | End: 2023-06-27
Attending: INTERNAL MEDICINE
Payer: MEDICARE

## 2023-06-27 VITALS
OXYGEN SATURATION: 97 % | SYSTOLIC BLOOD PRESSURE: 106 MMHG | DIASTOLIC BLOOD PRESSURE: 58 MMHG | WEIGHT: 212.3 LBS | HEIGHT: 65 IN | BODY MASS INDEX: 35.37 KG/M2 | HEART RATE: 75 BPM | TEMPERATURE: 97.9 F | RESPIRATION RATE: 16 BRPM

## 2023-06-27 LAB
ANION GAP SERPL CALC-SCNC: 4 MMOL/L (ref 5–15)
BACTERIA SPEC CULT: ABNORMAL
BASOPHILS # BLD: 0.1 K/UL (ref 0–0.1)
BASOPHILS NFR BLD: 1 % (ref 0–1)
BUN SERPL-MCNC: 25 MG/DL (ref 6–20)
BUN/CREAT SERPL: 23 (ref 12–20)
CALCIUM SERPL-MCNC: 9.2 MG/DL (ref 8.5–10.1)
CC UR VC: ABNORMAL
CHLORIDE SERPL-SCNC: 113 MMOL/L (ref 97–108)
CO2 SERPL-SCNC: 23 MMOL/L (ref 21–32)
CREAT SERPL-MCNC: 1.11 MG/DL (ref 0.55–1.02)
DATE LAST DOSE: NORMAL
DIFFERENTIAL METHOD BLD: ABNORMAL
DOSE AMOUNT: NORMAL UNITS
DOSE DATE/TIME: NORMAL
ECHO AO ASC DIAM: 3.5 CM
ECHO AO ASCENDING AORTA INDEX: 1.72 CM/M2
ECHO AV AREA PEAK VELOCITY: 3.4 CM2
ECHO AV AREA VTI: 3.5 CM2
ECHO AV AREA/BSA PEAK VELOCITY: 1.7 CM2/M2
ECHO AV AREA/BSA VTI: 1.7 CM2/M2
ECHO AV MEAN GRADIENT: 3 MMHG
ECHO AV MEAN VELOCITY: 0.9 M/S
ECHO AV PEAK GRADIENT: 5 MMHG
ECHO AV PEAK VELOCITY: 1.2 M/S
ECHO AV VELOCITY RATIO: 0.83
ECHO AV VTI: 22.3 CM
ECHO BSA: 2.1 M2
ECHO LA DIAMETER INDEX: 2.51 CM/M2
ECHO LA DIAMETER: 5.1 CM
ECHO LA VOL 2C: 95 ML (ref 22–52)
ECHO LA VOL 2C: 96 ML (ref 22–52)
ECHO LA VOL 4C: 117 ML (ref 22–52)
ECHO LA VOL 4C: 124 ML (ref 22–52)
ECHO LA VOL BP: 110 ML (ref 22–52)
ECHO LA VOL/BSA BIPLANE: 54 ML/M2 (ref 16–34)
ECHO LA VOLUME AREA LENGTH: 114 ML
ECHO LA VOLUME INDEX AREA LENGTH: 56 ML/M2 (ref 16–34)
ECHO LV EDV A4C: 86 ML
ECHO LV EDV INDEX A4C: 42 ML/M2
ECHO LV EJECTION FRACTION A4C: 41 %
ECHO LV ESV A4C: 51 ML
ECHO LV ESV INDEX A4C: 25 ML/M2
ECHO LV FRACTIONAL SHORTENING: 26 % (ref 28–44)
ECHO LV INTERNAL DIMENSION DIASTOLE INDEX: 2.61 CM/M2
ECHO LV INTERNAL DIMENSION DIASTOLIC: 5.3 CM (ref 3.9–5.3)
ECHO LV INTERNAL DIMENSION SYSTOLIC INDEX: 1.92 CM/M2
ECHO LV INTERNAL DIMENSION SYSTOLIC: 3.9 CM
ECHO LV IVSD: 0.7 CM (ref 0.6–0.9)
ECHO LV MASS 2D: 162.1 G (ref 67–162)
ECHO LV MASS INDEX 2D: 79.9 G/M2 (ref 43–95)
ECHO LV POSTERIOR WALL DIASTOLIC: 1 CM (ref 0.6–0.9)
ECHO LV RELATIVE WALL THICKNESS RATIO: 0.38
ECHO LVOT AREA: 3.8 CM2
ECHO LVOT AV VTI INDEX: 0.89
ECHO LVOT DIAM: 2.2 CM
ECHO LVOT MEAN GRADIENT: 2 MMHG
ECHO LVOT PEAK GRADIENT: 4 MMHG
ECHO LVOT PEAK VELOCITY: 1 M/S
ECHO LVOT STROKE VOLUME INDEX: 37.1 ML/M2
ECHO LVOT SV: 75.2 ML
ECHO LVOT VTI: 19.8 CM
ECHO RV TAPSE: 1.9 CM (ref 1.7–?)
ECHO TV REGURGITANT MAX VELOCITY: 2.11 M/S
ECHO TV REGURGITANT PEAK GRADIENT: 18 MMHG
EOSINOPHIL # BLD: 0.3 K/UL (ref 0–0.4)
EOSINOPHIL NFR BLD: 5 % (ref 0–7)
ERYTHROCYTE [DISTWIDTH] IN BLOOD BY AUTOMATED COUNT: 13.2 % (ref 11.5–14.5)
GLUCOSE SERPL-MCNC: 103 MG/DL (ref 65–100)
HCT VFR BLD AUTO: 32.1 % (ref 35–47)
HGB BLD-MCNC: 10.5 G/DL (ref 11.5–16)
IMM GRANULOCYTES # BLD AUTO: 0 K/UL (ref 0–0.04)
IMM GRANULOCYTES NFR BLD AUTO: 0 % (ref 0–0.5)
LITHIUM SERPL-SCNC: 1.09 MMOL/L (ref 0.6–1.2)
LYMPHOCYTES # BLD: 2.1 K/UL (ref 0.8–3.5)
LYMPHOCYTES NFR BLD: 34 % (ref 12–49)
MCH RBC QN AUTO: 30.5 PG (ref 26–34)
MCHC RBC AUTO-ENTMCNC: 32.7 G/DL (ref 30–36.5)
MCV RBC AUTO: 93.3 FL (ref 80–99)
MONOCYTES # BLD: 0.4 K/UL (ref 0–1)
MONOCYTES NFR BLD: 7 % (ref 5–13)
NEUTS SEG # BLD: 3.3 K/UL (ref 1.8–8)
NEUTS SEG NFR BLD: 53 % (ref 32–75)
NRBC # BLD: 0 K/UL (ref 0–0.01)
NRBC BLD-RTO: 0 PER 100 WBC
PLATELET # BLD AUTO: 193 K/UL (ref 150–400)
PMV BLD AUTO: 10.3 FL (ref 8.9–12.9)
POTASSIUM SERPL-SCNC: 5 MMOL/L (ref 3.5–5.1)
RBC # BLD AUTO: 3.44 M/UL (ref 3.8–5.2)
SERVICE CMNT-IMP: ABNORMAL
SODIUM SERPL-SCNC: 140 MMOL/L (ref 136–145)
WBC # BLD AUTO: 6.1 K/UL (ref 3.6–11)

## 2023-06-27 PROCEDURE — 99239 HOSP IP/OBS DSCHRG MGMT >30: CPT | Performed by: INTERNAL MEDICINE

## 2023-06-27 PROCEDURE — 85025 COMPLETE CBC W/AUTO DIFF WBC: CPT

## 2023-06-27 PROCEDURE — 80178 ASSAY OF LITHIUM: CPT

## 2023-06-27 PROCEDURE — 6360000004 HC RX CONTRAST MEDICATION: Performed by: INTERNAL MEDICINE

## 2023-06-27 PROCEDURE — 6370000000 HC RX 637 (ALT 250 FOR IP): Performed by: INTERNAL MEDICINE

## 2023-06-27 PROCEDURE — 36415 COLL VENOUS BLD VENIPUNCTURE: CPT

## 2023-06-27 PROCEDURE — 2580000003 HC RX 258: Performed by: STUDENT IN AN ORGANIZED HEALTH CARE EDUCATION/TRAINING PROGRAM

## 2023-06-27 PROCEDURE — 80048 BASIC METABOLIC PNL TOTAL CA: CPT

## 2023-06-27 PROCEDURE — 93306 TTE W/DOPPLER COMPLETE: CPT

## 2023-06-27 PROCEDURE — 6370000000 HC RX 637 (ALT 250 FOR IP): Performed by: STUDENT IN AN ORGANIZED HEALTH CARE EDUCATION/TRAINING PROGRAM

## 2023-06-27 RX ORDER — LEVOTHYROXINE SODIUM 0.07 MG/1
75 TABLET ORAL DAILY
Qty: 90 TABLET | Refills: 3 | Status: SHIPPED | OUTPATIENT
Start: 2023-06-27

## 2023-06-27 RX ORDER — CEFDINIR 300 MG/1
300 CAPSULE ORAL 2 TIMES DAILY
Qty: 10 CAPSULE | Refills: 0 | Status: SHIPPED | OUTPATIENT
Start: 2023-06-27 | End: 2023-07-02

## 2023-06-27 RX ADMIN — ARIPIPRAZOLE 10 MG: 5 TABLET ORAL at 09:46

## 2023-06-27 RX ADMIN — PANTOPRAZOLE SODIUM 40 MG: 40 TABLET, DELAYED RELEASE ORAL at 08:00

## 2023-06-27 RX ADMIN — ACETAMINOPHEN 650 MG: 325 TABLET ORAL at 00:09

## 2023-06-27 RX ADMIN — PERFLUTREN 1.5 ML: 6.52 INJECTION, SUSPENSION INTRAVENOUS at 15:32

## 2023-06-27 RX ADMIN — ALPRAZOLAM 0.5 MG: 0.5 TABLET ORAL at 04:53

## 2023-06-27 RX ADMIN — TOPIRAMATE 100 MG: 25 TABLET, FILM COATED ORAL at 08:00

## 2023-06-27 RX ADMIN — APIXABAN 5 MG: 5 TABLET, FILM COATED ORAL at 08:00

## 2023-06-27 RX ADMIN — LEVOTHYROXINE SODIUM 75 MCG: 0.07 TABLET ORAL at 08:00

## 2023-06-27 RX ADMIN — ACETAMINOPHEN 650 MG: 325 TABLET ORAL at 08:00

## 2023-06-27 RX ADMIN — SACUBITRIL AND VALSARTAN 1 TABLET: 24; 26 TABLET, FILM COATED ORAL at 08:00

## 2023-06-27 RX ADMIN — HYDROXYCHLOROQUINE SULFATE 200 MG: 200 TABLET ORAL at 08:00

## 2023-06-27 RX ADMIN — CYANOCOBALAMIN TAB 500 MCG 500 MCG: 500 TAB at 08:00

## 2023-06-27 RX ADMIN — HYDROXYZINE HYDROCHLORIDE 25 MG: 25 TABLET, FILM COATED ORAL at 08:00

## 2023-06-27 RX ADMIN — SODIUM CHLORIDE, PRESERVATIVE FREE 10 ML: 5 INJECTION INTRAVENOUS at 12:43

## 2023-06-27 RX ADMIN — TIMOLOL MALEATE 1 DROP: 5 SOLUTION/ DROPS OPHTHALMIC at 12:43

## 2023-06-27 RX ADMIN — LITHIUM CARBONATE 300 MG: 300 TABLET, FILM COATED, EXTENDED RELEASE ORAL at 09:46

## 2023-06-27 ASSESSMENT — PAIN DESCRIPTION - LOCATION
LOCATION: HEAD
LOCATION: ELBOW

## 2023-06-27 ASSESSMENT — PAIN DESCRIPTION - DESCRIPTORS: DESCRIPTORS: ACHING

## 2023-06-27 ASSESSMENT — PAIN SCALES - GENERAL
PAINLEVEL_OUTOF10: 3
PAINLEVEL_OUTOF10: 6

## 2023-06-27 ASSESSMENT — PAIN DESCRIPTION - ORIENTATION: ORIENTATION: LEFT

## 2023-06-30 ENCOUNTER — TELEPHONE (OUTPATIENT)
Age: 73
End: 2023-06-30

## 2023-06-30 DIAGNOSIS — N39.0 URINARY TRACT INFECTION DUE TO ESBL KLEBSIELLA: ICD-10-CM

## 2023-06-30 DIAGNOSIS — B96.89 URINARY TRACT INFECTION DUE TO ESBL KLEBSIELLA: ICD-10-CM

## 2023-06-30 DIAGNOSIS — N39.0 RECURRENT UTI: Primary | ICD-10-CM

## 2023-06-30 PROBLEM — D84.9 IMMUNE DEFICIENCY DISORDER (HCC): Status: ACTIVE | Noted: 2023-06-30

## 2023-06-30 PROBLEM — M06.9 RHEUMATOID ARTHRITIS (HCC): Status: ACTIVE | Noted: 2023-06-30

## 2023-06-30 PROBLEM — N30.00 ACUTE CYSTITIS WITHOUT HEMATURIA: Status: ACTIVE | Noted: 2023-06-30

## 2023-06-30 PROBLEM — K58.9 IRRITABLE BOWEL SYNDROME: Status: ACTIVE | Noted: 2023-06-30

## 2023-06-30 PROBLEM — A49.8 PROTEUS INFECTION: Status: ACTIVE | Noted: 2023-06-30

## 2023-06-30 PROBLEM — R30.0 DYSURIA: Status: ACTIVE | Noted: 2023-06-30

## 2023-06-30 RX ORDER — GRANULES FOR ORAL 3 G/1
3 POWDER ORAL
Qty: 2 EACH | Refills: 0 | Status: SHIPPED | OUTPATIENT
Start: 2023-06-30

## 2023-06-30 NOTE — TELEPHONE ENCOUNTER
Please notify patient that her urine culture has come back positive with resistant bacteria  -ESBL Klebsiella. Stop [employer] taking fosfomycin. It is taken once in 3 days x 2 doses. I am sending prescription to pharmacy. Also take a probiotic or yogurt daily.

## 2023-06-30 NOTE — TELEPHONE ENCOUNTER
Pt called back because shew did not think she was taking Omnicef, I informed pt that the other name was Cefdinir, and then pt recognized the medication. Pt states ok she will stop the Cefdinir and start the Fosfomycin after it is picked up. Pt states her  is going to the pharmacy to get her medication.

## 2023-06-30 NOTE — TELEPHONE ENCOUNTER
Pt returned call to office     She has not been checking Mychart     Pls try her again at [phone]

## 2023-06-30 NOTE — TELEPHONE ENCOUNTER
Pt returned my call. I informed her per Dr. Uma Rocha that her  urine culture has come back positive with resistant bacteria  -ESBL Klebsiella. Stop Omnicef and start taking Fosfomycin. Dr. Uma Rocha has sent the prescription to pt pharmacy and to take It once in 3 days x 2 doses. Pt states ok she will pick it up. Pt asked to call the office back if she have any questions or concerns while taking this medication.

## 2023-06-30 NOTE — TELEPHONE ENCOUNTER
Pt called no answer left voicemail, also sent pt a detailed [facility] message.

## 2023-07-03 RX ORDER — METOLAZONE 2.5 MG/1
TABLET ORAL
Qty: 14 TABLET | OUTPATIENT
Start: 2023-07-03

## 2023-07-09 DIAGNOSIS — G25.0 ESSENTIAL TREMOR: ICD-10-CM

## 2023-07-09 DIAGNOSIS — G44.321 CHRONIC POST-TRAUMATIC HEADACHE, INTRACTABLE: ICD-10-CM

## 2023-07-11 RX ORDER — TOPIRAMATE 100 MG/1
TABLET, FILM COATED ORAL
Qty: 180 TABLET | Refills: 2 | Status: SHIPPED | OUTPATIENT
Start: 2023-07-11

## 2023-07-12 ENCOUNTER — OFFICE VISIT (OUTPATIENT)
Age: 73
End: 2023-07-12

## 2023-07-12 VITALS
OXYGEN SATURATION: 100 % | HEIGHT: 65 IN | RESPIRATION RATE: 16 BRPM | TEMPERATURE: 97.3 F | DIASTOLIC BLOOD PRESSURE: 78 MMHG | HEART RATE: 75 BPM | BODY MASS INDEX: 34.42 KG/M2 | WEIGHT: 206.6 LBS | SYSTOLIC BLOOD PRESSURE: 124 MMHG

## 2023-07-12 DIAGNOSIS — I50.21 ACUTE SYSTOLIC CHF (CONGESTIVE HEART FAILURE) (HCC): Primary | ICD-10-CM

## 2023-07-12 DIAGNOSIS — N39.0 URINARY TRACT INFECTION WITHOUT HEMATURIA, SITE UNSPECIFIED: ICD-10-CM

## 2023-07-12 DIAGNOSIS — F31.70 BIPOLAR AFFECTIVE DISORDER IN REMISSION (HCC): ICD-10-CM

## 2023-07-12 DIAGNOSIS — Z09 HOSPITAL DISCHARGE FOLLOW-UP: ICD-10-CM

## 2023-07-12 DIAGNOSIS — I10 ESSENTIAL (PRIMARY) HYPERTENSION: ICD-10-CM

## 2023-07-12 SDOH — ECONOMIC STABILITY: FOOD INSECURITY: WITHIN THE PAST 12 MONTHS, YOU WORRIED THAT YOUR FOOD WOULD RUN OUT BEFORE YOU GOT MONEY TO BUY MORE.: NEVER TRUE

## 2023-07-12 SDOH — ECONOMIC STABILITY: INCOME INSECURITY: HOW HARD IS IT FOR YOU TO PAY FOR THE VERY BASICS LIKE FOOD, HOUSING, MEDICAL CARE, AND HEATING?: NOT HARD AT ALL

## 2023-07-12 SDOH — ECONOMIC STABILITY: FOOD INSECURITY: WITHIN THE PAST 12 MONTHS, THE FOOD YOU BOUGHT JUST DIDN'T LAST AND YOU DIDN'T HAVE MONEY TO GET MORE.: NEVER TRUE

## 2023-07-12 ASSESSMENT — ANXIETY QUESTIONNAIRES
1. FEELING NERVOUS, ANXIOUS, OR ON EDGE: 0
2. NOT BEING ABLE TO STOP OR CONTROL WORRYING: 0
GAD7 TOTAL SCORE: 0
3. WORRYING TOO MUCH ABOUT DIFFERENT THINGS: 0
IF YOU CHECKED OFF ANY PROBLEMS ON THIS QUESTIONNAIRE, HOW DIFFICULT HAVE THESE PROBLEMS MADE IT FOR YOU TO DO YOUR WORK, TAKE CARE OF THINGS AT HOME, OR GET ALONG WITH OTHER PEOPLE: NOT DIFFICULT AT ALL
5. BEING SO RESTLESS THAT IT IS HARD TO SIT STILL: 0
4. TROUBLE RELAXING: 0
7. FEELING AFRAID AS IF SOMETHING AWFUL MIGHT HAPPEN: 0
6. BECOMING EASILY ANNOYED OR IRRITABLE: 0

## 2023-07-12 ASSESSMENT — PATIENT HEALTH QUESTIONNAIRE - PHQ9
1. LITTLE INTEREST OR PLEASURE IN DOING THINGS: 0
SUM OF ALL RESPONSES TO PHQ QUESTIONS 1-9: 0
3. TROUBLE FALLING OR STAYING ASLEEP: 0
5. POOR APPETITE OR OVEREATING: 0
4. FEELING TIRED OR HAVING LITTLE ENERGY: 0
SUM OF ALL RESPONSES TO PHQ QUESTIONS 1-9: 0
10. IF YOU CHECKED OFF ANY PROBLEMS, HOW DIFFICULT HAVE THESE PROBLEMS MADE IT FOR YOU TO DO YOUR WORK, TAKE CARE OF THINGS AT HOME, OR GET ALONG WITH OTHER PEOPLE: 0
SUM OF ALL RESPONSES TO PHQ9 QUESTIONS 1 & 2: 0
6. FEELING BAD ABOUT YOURSELF - OR THAT YOU ARE A FAILURE OR HAVE LET YOURSELF OR YOUR FAMILY DOWN: 0
SUM OF ALL RESPONSES TO PHQ QUESTIONS 1-9: 0
9. THOUGHTS THAT YOU WOULD BE BETTER OFF DEAD, OR OF HURTING YOURSELF: 0
2. FEELING DOWN, DEPRESSED OR HOPELESS: 0
7. TROUBLE CONCENTRATING ON THINGS, SUCH AS READING THE NEWSPAPER OR WATCHING TELEVISION: 0
SUM OF ALL RESPONSES TO PHQ QUESTIONS 1-9: 0
8. MOVING OR SPEAKING SO SLOWLY THAT OTHER PEOPLE COULD HAVE NOTICED. OR THE OPPOSITE, BEING SO FIGETY OR RESTLESS THAT YOU HAVE BEEN MOVING AROUND A LOT MORE THAN USUAL: 0

## 2023-07-12 NOTE — PROGRESS NOTES
PROGRESS NOTE  Name: Sophia Wilcox   : 1950       ASSESSMENT/ PLAN:     Colletta Leander was seen today for follow-up from hospital.    Diagnoses and all orders for this visit:    Hospital discharge follow-up  -     CO DISCHARGE MEDS RECONCILED W/ CURRENT OUTPATIENT MED LIST      Bilateral LE edema: Improved with diuresis. Added KCL for hypokalemia. Acute on chronic systolic CHF: EF 78-72% in 2022; has ICD. On Entresto. Echo was ordered, but due to clinical improvement, it was decided that this can be pursued as an outpatient. Continue outpatient follow up with Cardiology. Continue duriesis. UTI: Culture preliminarily showed proteus, but only 80,000 colonies, so not significant. As she is having dysuria, I continued her on Omnicef at discharge. HTN: Continue current Treatment. Atrial fibrillation: On eliquis. GERD: Continue Protonix. Hypothyroidism: Continue levothyroxine. Rheumatoid arthritis. On plaquenil. Lupus: Per rheumatologist.   Urge incontinence: Per urology. Vitamin D deficeincy: Recheck Vit D.   B Vitamin deficiency: Check B12. Bipolar disorder:/Anxiety: Continue home medications. Continue outpatient follow up with psychiatrist, Janes Kang. Tremor: Essential tremor. She has outpatient follow up with neurology. Full code. I have reviewed the patient's medications and risks/side effects/benefits were discussed. Diagnosis(-es) explained to patient and questions answered. Literature provided where appropriate. SUBJECTIVE:   Ms. Sophia Wilcox is a 75 y.o. female who is here for follow up of routine medical issues. Chief Complaint   Patient presents with    Follow-Up from  E 14     She was hospitalized -:    Hospital Course: Ms. Sophia Wilcox is a patient of mine who presented with the problems above. See the initial H and P for full details.       CHIEF COMPLAINT: Bilateral artery swelling, confusion     HISTORY OF PRESENT

## 2023-07-12 NOTE — PROGRESS NOTES
1. \"Have you been to the ER, urgent care clinic since your last visit? Hospitalized since your last visit? Yes, 6/24/2023 UTI bilateral leg swelling    2. \"Have you seen or consulted any other health care providers outside of the [address] [place] [address] since your last visit? \" No     3. For patients aged 43-73: Has the patient had a colonoscopy / FIT/ Cologuard? Yes - no Care Gap present      If the patient is female:    4. For patients aged 43-66: Has the patient had a mammogram within the past 2 years? Yes - no Care Gap present      5. For patients aged 21-65: Has the patient had a pap smear?  NA - based on age or sex

## 2023-07-20 DIAGNOSIS — R52 PAIN, UNSPECIFIED: Primary | ICD-10-CM

## 2023-07-20 RX ORDER — HYDROCODONE BITARTRATE AND ACETAMINOPHEN 5; 325 MG/1; MG/1
1 TABLET ORAL EVERY 4 HOURS PRN
Qty: 30 TABLET | Refills: 0 | Status: SHIPPED | OUTPATIENT
Start: 2023-07-20 | End: 2023-07-25

## 2023-07-20 NOTE — TELEPHONE ENCOUNTER
The patient reported the heat and weather has given her a horrible head, it is a 9 out of 10 on the pain scale. The Topamax filled by Cefer[facility] Rosas on 07/11/2023, she takes daily has not been effective.

## 2023-07-20 NOTE — TELEPHONE ENCOUNTER
Pt needs refill on hydrocodone. Pt has had migraine for three days and has no medication.     Please call into [facility] #[phone]

## 2023-08-02 ENCOUNTER — TELEPHONE (OUTPATIENT)
Age: 73
End: 2023-08-02

## 2023-08-23 ENCOUNTER — TELEPHONE (OUTPATIENT)
Age: 73
End: 2023-08-23

## 2023-08-23 DIAGNOSIS — R52 PAIN, UNSPECIFIED: Primary | ICD-10-CM

## 2023-08-23 RX ORDER — HYDROCODONE BITARTRATE AND ACETAMINOPHEN 5; 325 MG/1; MG/1
TABLET ORAL
COMMUNITY
End: 2023-08-25 | Stop reason: SDUPTHER

## 2023-08-23 NOTE — TELEPHONE ENCOUNTER
Future Appointments:  Future Appointments   Date Time Provider [address]   8/28/2023  2:30 PM Glendy Billings MD [facility] BS AMB   8/31/2023  9:30 AM [facility] CT 1 [facility]   10/16/2023  3:45 PM Glendy Billings MD [facility] BS AMB   1/12/2024  2:30 PM MADDY Mccain [facility] BS AMB        Last Appointment With Me:  7/12/2023     Requested Prescriptions     Pending Prescriptions Disp Refills    HYDROcodone-acetaminophen (NORCO) 5-325 MG per tablet       Sig: Take by mouth every 12 hours as needed for Pain.

## 2023-08-23 NOTE — TELEPHONE ENCOUNTER
Pt needs refill on hydrocodone to go to [facility] #[phone]      Pt states she is having migraines and needs this refill. She is having more headaches due to the heat.

## 2023-08-25 RX ORDER — HYDROCODONE BITARTRATE AND ACETAMINOPHEN 5; 325 MG/1; MG/1
1 TABLET ORAL EVERY 12 HOURS PRN
Qty: 30 TABLET | Refills: 0 | Status: SHIPPED | OUTPATIENT
Start: 2023-08-25 | End: 2023-09-09

## 2023-08-28 ENCOUNTER — OFFICE VISIT (OUTPATIENT)
Age: 73
End: 2023-08-28
Payer: MEDICARE

## 2023-08-28 VITALS
RESPIRATION RATE: 16 BRPM | DIASTOLIC BLOOD PRESSURE: 63 MMHG | TEMPERATURE: 97.3 F | HEIGHT: 65 IN | OXYGEN SATURATION: 98 % | BODY MASS INDEX: 34.85 KG/M2 | SYSTOLIC BLOOD PRESSURE: 98 MMHG | HEART RATE: 68 BPM | WEIGHT: 209.2 LBS

## 2023-08-28 DIAGNOSIS — E55.9 VITAMIN D DEFICIENCY: ICD-10-CM

## 2023-08-28 DIAGNOSIS — I42.9 CARDIOMYOPATHY, UNSPECIFIED TYPE (HCC): ICD-10-CM

## 2023-08-28 DIAGNOSIS — G57.93 NEUROPATHY OF BOTH FEET: ICD-10-CM

## 2023-08-28 DIAGNOSIS — I10 ESSENTIAL (PRIMARY) HYPERTENSION: ICD-10-CM

## 2023-08-28 DIAGNOSIS — Z00.00 ENCOUNTER FOR SUBSEQUENT ANNUAL WELLNESS VISIT (AWV) IN MEDICARE PATIENT: Primary | ICD-10-CM

## 2023-08-28 DIAGNOSIS — M79.676 PAIN OF TOE, UNSPECIFIED LATERALITY: ICD-10-CM

## 2023-08-28 DIAGNOSIS — E03.9 HYPOTHYROIDISM, UNSPECIFIED TYPE: ICD-10-CM

## 2023-08-28 DIAGNOSIS — M17.12 PRIMARY OSTEOARTHRITIS OF LEFT KNEE: ICD-10-CM

## 2023-08-28 DIAGNOSIS — F31.70 BIPOLAR AFFECTIVE DISORDER IN REMISSION (HCC): ICD-10-CM

## 2023-08-28 PROCEDURE — G0439 PPPS, SUBSEQ VISIT: HCPCS | Performed by: INTERNAL MEDICINE

## 2023-08-28 PROCEDURE — 99214 OFFICE O/P EST MOD 30 MIN: CPT | Performed by: INTERNAL MEDICINE

## 2023-08-28 PROCEDURE — 1090F PRES/ABSN URINE INCON ASSESS: CPT | Performed by: INTERNAL MEDICINE

## 2023-08-28 PROCEDURE — 3074F SYST BP LT 130 MM HG: CPT | Performed by: INTERNAL MEDICINE

## 2023-08-28 PROCEDURE — G8428 CUR MEDS NOT DOCUMENT: HCPCS | Performed by: INTERNAL MEDICINE

## 2023-08-28 PROCEDURE — G8400 PT W/DXA NO RESULTS DOC: HCPCS | Performed by: INTERNAL MEDICINE

## 2023-08-28 PROCEDURE — 1036F TOBACCO NON-USER: CPT | Performed by: INTERNAL MEDICINE

## 2023-08-28 PROCEDURE — 1123F ACP DISCUSS/DSCN MKR DOCD: CPT | Performed by: INTERNAL MEDICINE

## 2023-08-28 PROCEDURE — G8417 CALC BMI ABV UP PARAM F/U: HCPCS | Performed by: INTERNAL MEDICINE

## 2023-08-28 PROCEDURE — 3017F COLORECTAL CA SCREEN DOC REV: CPT | Performed by: INTERNAL MEDICINE

## 2023-08-28 PROCEDURE — 3078F DIAST BP <80 MM HG: CPT | Performed by: INTERNAL MEDICINE

## 2023-08-28 RX ORDER — PREGABALIN 75 MG/1
75 CAPSULE ORAL 3 TIMES DAILY
Qty: 60 CAPSULE | Refills: 5 | Status: SHIPPED | OUTPATIENT
Start: 2023-08-28 | End: 2024-02-24

## 2023-08-28 RX ORDER — ESTRADIOL 0.1 MG/G
CREAM VAGINAL
COMMUNITY
Start: 2023-08-23

## 2023-08-28 SDOH — ECONOMIC STABILITY: INCOME INSECURITY: HOW HARD IS IT FOR YOU TO PAY FOR THE VERY BASICS LIKE FOOD, HOUSING, MEDICAL CARE, AND HEATING?: NOT HARD AT ALL

## 2023-08-28 SDOH — ECONOMIC STABILITY: FOOD INSECURITY: WITHIN THE PAST 12 MONTHS, YOU WORRIED THAT YOUR FOOD WOULD RUN OUT BEFORE YOU GOT MONEY TO BUY MORE.: NEVER TRUE

## 2023-08-28 SDOH — ECONOMIC STABILITY: FOOD INSECURITY: WITHIN THE PAST 12 MONTHS, THE FOOD YOU BOUGHT JUST DIDN'T LAST AND YOU DIDN'T HAVE MONEY TO GET MORE.: NEVER TRUE

## 2023-08-28 ASSESSMENT — PATIENT HEALTH QUESTIONNAIRE - PHQ9
SUM OF ALL RESPONSES TO PHQ QUESTIONS 1-9: 0
10. IF YOU CHECKED OFF ANY PROBLEMS, HOW DIFFICULT HAVE THESE PROBLEMS MADE IT FOR YOU TO DO YOUR WORK, TAKE CARE OF THINGS AT HOME, OR GET ALONG WITH OTHER PEOPLE: 0
4. FEELING TIRED OR HAVING LITTLE ENERGY: 0
5. POOR APPETITE OR OVEREATING: 0
9. THOUGHTS THAT YOU WOULD BE BETTER OFF DEAD, OR OF HURTING YOURSELF: 0
7. TROUBLE CONCENTRATING ON THINGS, SUCH AS READING THE NEWSPAPER OR WATCHING TELEVISION: 0
1. LITTLE INTEREST OR PLEASURE IN DOING THINGS: 0
SUM OF ALL RESPONSES TO PHQ9 QUESTIONS 1 & 2: 0
SUM OF ALL RESPONSES TO PHQ QUESTIONS 1-9: 0
2. FEELING DOWN, DEPRESSED OR HOPELESS: 0
8. MOVING OR SPEAKING SO SLOWLY THAT OTHER PEOPLE COULD HAVE NOTICED. OR THE OPPOSITE, BEING SO FIGETY OR RESTLESS THAT YOU HAVE BEEN MOVING AROUND A LOT MORE THAN USUAL: 0
6. FEELING BAD ABOUT YOURSELF - OR THAT YOU ARE A FAILURE OR HAVE LET YOURSELF OR YOUR FAMILY DOWN: 0
3. TROUBLE FALLING OR STAYING ASLEEP: 0

## 2023-08-28 ASSESSMENT — ANXIETY QUESTIONNAIRES
1. FEELING NERVOUS, ANXIOUS, OR ON EDGE: 2
GAD7 TOTAL SCORE: 14
5. BEING SO RESTLESS THAT IT IS HARD TO SIT STILL: 3
6. BECOMING EASILY ANNOYED OR IRRITABLE: 3
4. TROUBLE RELAXING: 1
7. FEELING AFRAID AS IF SOMETHING AWFUL MIGHT HAPPEN: 0
3. WORRYING TOO MUCH ABOUT DIFFERENT THINGS: 2
IF YOU CHECKED OFF ANY PROBLEMS ON THIS QUESTIONNAIRE, HOW DIFFICULT HAVE THESE PROBLEMS MADE IT FOR YOU TO DO YOUR WORK, TAKE CARE OF THINGS AT HOME, OR GET ALONG WITH OTHER PEOPLE: NOT DIFFICULT AT ALL
2. NOT BEING ABLE TO STOP OR CONTROL WORRYING: 3

## 2023-08-28 NOTE — PROGRESS NOTES
1. \"Have you been to the ER, urgent care clinic since your last visit? Hospitalized since your last visit? \" No    2. \"Have you seen or consulted any other health care providers outside of the [address] [place] [address] since your last visit? \" No     3. For patients aged 43-73: Has the patient had a colonoscopy / FIT/ Cologuard? Yes - no Care Gap present      If the patient is female:    4. For patients aged 43-66: Has the patient had a mammogram within the past 2 years? NA - based on age or sex      11. For patients aged 21-65: Has the patient had a pap smear?  NA - based on age or sex
This is the Subsequent Medicare Annual Wellness Exam, performed 12 months or more after the Initial AWV or the last Subsequent AWV    I have reviewed the patient's medical history in detail and updated the computerized patient record. Assessment/Plan   Education and counseling provided:  Are appropriate based on today's review and evaluation         Depression Risk Factor Screening     PHQ-9  8/28/2023 7/12/2023 5/25/2023   Little interest or pleasure in doing things 0 0 0   Feeling down, depressed, or hopeless 0 0 0   Trouble falling or staying asleep, or sleeping too much 0 0 -   Feeling tired or having little energy 0 0 -   Poor appetite or overeating 0 0 -   Feeling bad about yourself - or that you are a failure or have let yourself or your family down 0 0 -   Trouble concentrating on things, such as reading the newspaper or watching television 0 0 -   Moving or speaking so slowly that other people could have noticed. Or the opposite - being so fidgety or restless that you have been moving around a lot more than usual 0 0 -   Thoughts that you would be better off dead, or of hurting yourself in some way 0 0 -   PHQ-2 Score 0 0 0   PHQ-9 Total Score 0 0 0   If you checked off any problems, how difficult have these problems made it for you to do your work, take care of things at home, or get along with other people? 0 0 -         Alcohol & Drug Abuse Risk Screen    Do you average more than 1 drink per night or more than 7 drinks a week:  No    On any one occasion in the past three months have you have had more than 3 drinks containing alcohol:  No          Functional Ability and Level of Safety    Hearing: The patient is a little impaired; has had testing in past year. She lives in [address] home. Activities of Daily Living: The home contains: [employer]  Patient does total self care      Ambulation: with mild difficulty; She has a walker but uses it only when tired.         Fall Risk:  Amb Fall Risk
06/27/2023 02:33 AM    CO2 23 06/27/2023 02:33 AM    BUN 25 06/27/2023 02:33 AM    GFRAA 74 02/11/2022 09:29 AM    ALT 19 06/24/2023 11:43 PM    GLOB 3.0 06/24/2023 11:43 PM       Lab Results   Component Value Date/Time    CHOL 218 05/25/2023 09:25 AM    HDL 44 05/25/2023 09:25 AM        No results found for: HBA1C    Lab Results   Component Value Date/Time    WBC 6.1 06/27/2023 02:33 AM    HGB 10.5 06/27/2023 02:33 AM    HCT 32.1 06/27/2023 02:33 AM     06/27/2023 02:33 AM    MCV 93.3 06/27/2023 02:33 AM

## 2023-08-29 LAB
25(OH)D3 SERPL-MCNC: 58.3 NG/ML (ref 30–100)
ALBUMIN SERPL-MCNC: 3.6 G/DL (ref 3.5–5)
ALBUMIN/GLOB SERPL: 1.3 (ref 1.1–2.2)
ALP SERPL-CCNC: 91 U/L (ref 45–117)
ALT SERPL-CCNC: 19 U/L (ref 12–78)
ANION GAP SERPL CALC-SCNC: 6 MMOL/L (ref 5–15)
AST SERPL-CCNC: 12 U/L (ref 15–37)
BASOPHILS # BLD: 0.1 K/UL (ref 0–0.1)
BASOPHILS NFR BLD: 1 % (ref 0–1)
BILIRUB SERPL-MCNC: 0.2 MG/DL (ref 0.2–1)
BUN SERPL-MCNC: 32 MG/DL (ref 6–20)
BUN/CREAT SERPL: 31 (ref 12–20)
CALCIUM SERPL-MCNC: 9.9 MG/DL (ref 8.5–10.1)
CHLORIDE SERPL-SCNC: 110 MMOL/L (ref 97–108)
CHOLEST SERPL-MCNC: 207 MG/DL
CO2 SERPL-SCNC: 28 MMOL/L (ref 21–32)
CREAT SERPL-MCNC: 1.03 MG/DL (ref 0.55–1.02)
DATE LAST DOSE: ABNORMAL
DIFFERENTIAL METHOD BLD: NORMAL
DOSE AMOUNT: ABNORMAL UNITS
DOSE DATE/TIME: ABNORMAL
EOSINOPHIL # BLD: 0.3 K/UL (ref 0–0.4)
EOSINOPHIL NFR BLD: 5 % (ref 0–7)
ERYTHROCYTE [DISTWIDTH] IN BLOOD BY AUTOMATED COUNT: 12.8 % (ref 11.5–14.5)
GLOBULIN SER CALC-MCNC: 2.7 G/DL (ref 2–4)
GLUCOSE SERPL-MCNC: 132 MG/DL (ref 65–100)
HCT VFR BLD AUTO: 37.7 % (ref 35–47)
HDLC SERPL-MCNC: 62 MG/DL
HDLC SERPL: 3.3 (ref 0–5)
HGB BLD-MCNC: 11.9 G/DL (ref 11.5–16)
IMM GRANULOCYTES # BLD AUTO: 0 K/UL (ref 0–0.04)
IMM GRANULOCYTES NFR BLD AUTO: 0 % (ref 0–0.5)
LDLC SERPL CALC-MCNC: 111.8 MG/DL (ref 0–100)
LITHIUM SERPL-SCNC: <0.2 MMOL/L (ref 0.6–1.2)
LYMPHOCYTES # BLD: 2.2 K/UL (ref 0.8–3.5)
LYMPHOCYTES NFR BLD: 36 % (ref 12–49)
MCH RBC QN AUTO: 30.4 PG (ref 26–34)
MCHC RBC AUTO-ENTMCNC: 31.6 G/DL (ref 30–36.5)
MCV RBC AUTO: 96.2 FL (ref 80–99)
MONOCYTES # BLD: 0.4 K/UL (ref 0–1)
MONOCYTES NFR BLD: 6 % (ref 5–13)
NEUTS SEG # BLD: 3.2 K/UL (ref 1.8–8)
NEUTS SEG NFR BLD: 52 % (ref 32–75)
NRBC # BLD: 0 K/UL (ref 0–0.01)
NRBC BLD-RTO: 0 PER 100 WBC
PLATELET # BLD AUTO: 226 K/UL (ref 150–400)
PMV BLD AUTO: 10.9 FL (ref 8.9–12.9)
POTASSIUM SERPL-SCNC: 3.8 MMOL/L (ref 3.5–5.1)
PROT SERPL-MCNC: 6.3 G/DL (ref 6.4–8.2)
RBC # BLD AUTO: 3.92 M/UL (ref 3.8–5.2)
SODIUM SERPL-SCNC: 144 MMOL/L (ref 136–145)
T4 FREE SERPL-MCNC: 1 NG/DL (ref 0.8–1.5)
TRIGL SERPL-MCNC: 166 MG/DL
TSH SERPL DL<=0.05 MIU/L-ACNC: 0.89 UIU/ML (ref 0.36–3.74)
URATE SERPL-MCNC: 7.9 MG/DL (ref 2.6–6)
VLDLC SERPL CALC-MCNC: 33.2 MG/DL
WBC # BLD AUTO: 6.1 K/UL (ref 3.6–11)

## 2023-08-29 NOTE — PATIENT INSTRUCTIONS
Medicare Wellness Visit  The best way to live healthy is to have a lifestyle where you eat a well-balanced diet, exercise regularly, limit alcohol use,  and quit all forms of tobacco/nicotine, if applicable. Regular preventive services are another way to keep healthy. Preventive services (vaccines, screening tests, monitoring &  exams) can help personalize your care plan, which helps you manage your own care. Screening tests can find health  problems at the earliest stages, when they are easiest to treat. [facility] follows the current, evidence-based guidelines published by the [place] Liam Carrizales (USPSTF) when recommending preventive services for our patients. Because we follow  these guidelines, sometimes recommendations change over time as research supports it. (For example, a prostate  screening blood test is no longer routinely recommended for men with no symptoms). Of course, you and your doctor  may decide to screen more often for some diseases, based on your risk and co-morbidities (chronic disease you are  already diagnosed with). Preventive services for you include:  - Medicare offers their members a free annual wellness visit, which is time for you and your primary care provider to  discuss and plan for your preventive service needs. Take advantage of this benefit every year!  -All adults over age 72 should receive the recommended pneumonia vaccines. Current USPSTF guidelines recommend a  series of two vaccines for the best pneumonia protection.  -All adults should have a flu vaccine yearly and tetanus vaccine every 10 years.  -All adults age 48 and older should receive the shingles vaccines (series of two vaccines).   -All adults age 44-73 who are overweight should have a diabetes screening test once every three years.  -Other screening tests & preventive services for persons with diabetes include: an eye exam to screen for diabetic  retinopathy, a

## 2023-09-05 ENCOUNTER — HOSPITAL ENCOUNTER (OUTPATIENT)
Facility: HOSPITAL | Age: 73
Discharge: HOME | End: 2023-09-08
Payer: MEDICARE

## 2023-09-05 DIAGNOSIS — I50.22 CHRONIC SYSTOLIC HEART FAILURE (HCC): ICD-10-CM

## 2023-09-05 DIAGNOSIS — I20.9 ANGINA PECTORIS (HCC): ICD-10-CM

## 2023-09-05 PROCEDURE — 75574 CT ANGIO HRT W/3D IMAGE: CPT

## 2023-09-05 PROCEDURE — 6360000004 HC RX CONTRAST MEDICATION: Performed by: INTERNAL MEDICINE

## 2023-09-05 RX ADMIN — IOPAMIDOL 100 ML: 755 INJECTION, SOLUTION INTRAVENOUS at 10:46

## 2023-10-24 DIAGNOSIS — R52 PAIN: Primary | ICD-10-CM

## 2023-10-24 RX ORDER — HYDROCODONE BITARTRATE AND ACETAMINOPHEN 5; 325 MG/1; MG/1
1 TABLET ORAL EVERY 12 HOURS PRN
Qty: 60 TABLET | Refills: 0 | Status: SHIPPED | OUTPATIENT
Start: 2023-10-24 | End: 2023-11-23

## 2023-10-24 NOTE — TELEPHONE ENCOUNTER
Future Appointments:  Future Appointments   Date Time Provider [address]   1/12/2024  2:30 PM MADDY Bennett BS AMB   3/12/2024  2:00 PM Yasmine Mitchell MD [facility] BS AMB        Last Appointment With Me:  8/28/2023     Requested Prescriptions     Pending Prescriptions Disp Refills    HYDROcodone-acetaminophen (NORCO) 5-325 MG per tablet 120 tablet 0     Sig: Take 1 tablet by mouth every 12 hours as needed for Pain for up to 30 days.  Intended supply: 30 days Max Daily Amount: 2 tablets

## 2023-10-24 NOTE — TELEPHONE ENCOUNTER
Patient states she needs refill done thru [facility] for her HYDROcodone-acetaminophen (NORCO) 5-325 MG per tablet. Please call if any questions.  Thank you      Patient reminded of [address] on refills

## 2023-11-06 ENCOUNTER — NURSE TRIAGE (OUTPATIENT)
Dept: OTHER | Facility: CLINIC | Age: 73
End: 2023-11-06

## 2023-11-06 NOTE — TELEPHONE ENCOUNTER
Location of patient: [address] [facility] call from [address] at [facility] with "[employer] Subjective: Caller states \"I have abdominal pain\"     Current Symptoms: Abdominal pain on and off. Does have gallstones diagnosed by ultrasound x8 months ago. Diarrhea for 2 weeks with lower back pain and nausea, on and off. No vomiting. No blood in stool. Stools have been yellow in color. Last episode of her symptoms was yesterday. No symptoms at time of call. Onset: 8 months ago; intermittent    Associated Symptoms: NA    Pain Severity: 0/10; N/A; none    Temperature: denies fever     LMP: NA Pregnant: NA    Recommended disposition: See in Office Today    Care advice provided, patient verbalizes understanding; denies any other questions or concerns; instructed to call back for any new or worsening symptoms. Patient/Caller agrees with recommended disposition; writer provided warm transfer to Gennaro Jasso at [facility] for appointment scheduling    Attention Provider: Thank you for allowing me to participate in the care of your patient. The patient was connected to triage in response to information provided to the ECC/PSC. Please do not respond through this encounter as the response is not directed to a shared pool.     Reason for Disposition   MODERATE pain (e.g., interferes with normal activities that comes and goes (cramps) lasts > 24 hours  (Exception: Pain with Vomiting or Diarrhea - see that Protocol.)    Protocols used: Abdominal Pain - Female-ADULT-[place]

## 2023-11-07 ENCOUNTER — OFFICE VISIT (OUTPATIENT)
Age: 73
End: 2023-11-07
Payer: MEDICARE

## 2023-11-07 VITALS
SYSTOLIC BLOOD PRESSURE: 105 MMHG | RESPIRATION RATE: 18 BRPM | BODY MASS INDEX: 35.02 KG/M2 | DIASTOLIC BLOOD PRESSURE: 65 MMHG | HEART RATE: 75 BPM | HEIGHT: 65 IN | OXYGEN SATURATION: 100 % | TEMPERATURE: 98.1 F | WEIGHT: 210.2 LBS

## 2023-11-07 DIAGNOSIS — R10.31 RIGHT LOWER QUADRANT ABDOMINAL PAIN: Primary | ICD-10-CM

## 2023-11-07 LAB
BILIRUBIN, URINE, POC: NEGATIVE
BLOOD URINE, POC: NEGATIVE
GLUCOSE URINE, POC: NEGATIVE
KETONES, URINE, POC: NEGATIVE
LEUKOCYTE ESTERASE, URINE, POC: NEGATIVE
NITRITE, URINE, POC: NEGATIVE
PH, URINE, POC: 5.5 (ref 4.6–8)
PROTEIN,URINE, POC: NEGATIVE
SPECIFIC GRAVITY, URINE, POC: 1.02 (ref 1–1.03)
URINALYSIS CLARITY, POC: CLEAR
URINALYSIS COLOR, POC: YELLOW
UROBILINOGEN, POC: NORMAL

## 2023-11-07 PROCEDURE — 81001 URINALYSIS AUTO W/SCOPE: CPT | Performed by: INTERNAL MEDICINE

## 2023-11-07 PROCEDURE — PBSHW AMB POC URINALYSIS DIP STICK AUTO W/ MICRO: Performed by: INTERNAL MEDICINE

## 2023-11-07 PROCEDURE — 3074F SYST BP LT 130 MM HG: CPT | Performed by: INTERNAL MEDICINE

## 2023-11-07 PROCEDURE — 3078F DIAST BP <80 MM HG: CPT | Performed by: INTERNAL MEDICINE

## 2023-11-07 PROCEDURE — G8417 CALC BMI ABV UP PARAM F/U: HCPCS | Performed by: INTERNAL MEDICINE

## 2023-11-07 PROCEDURE — G8484 FLU IMMUNIZE NO ADMIN: HCPCS | Performed by: INTERNAL MEDICINE

## 2023-11-07 PROCEDURE — 99213 OFFICE O/P EST LOW 20 MIN: CPT | Performed by: INTERNAL MEDICINE

## 2023-11-07 PROCEDURE — 1036F TOBACCO NON-USER: CPT | Performed by: INTERNAL MEDICINE

## 2023-11-07 PROCEDURE — 1123F ACP DISCUSS/DSCN MKR DOCD: CPT | Performed by: INTERNAL MEDICINE

## 2023-11-07 PROCEDURE — 3017F COLORECTAL CA SCREEN DOC REV: CPT | Performed by: INTERNAL MEDICINE

## 2023-11-07 PROCEDURE — G8427 DOCREV CUR MEDS BY ELIG CLIN: HCPCS | Performed by: INTERNAL MEDICINE

## 2023-11-07 PROCEDURE — 1090F PRES/ABSN URINE INCON ASSESS: CPT | Performed by: INTERNAL MEDICINE

## 2023-11-07 PROCEDURE — G8400 PT W/DXA NO RESULTS DOC: HCPCS | Performed by: INTERNAL MEDICINE

## 2023-11-07 NOTE — PROGRESS NOTES
Two pt identifiers confirmed. I spoke to Treva Joseph and scheduled a STAT CT for patient. I advised the patient, the appt is tomorrow at [facility] arrival time at 4pm for a 4:30 appt. Only clear liquids 4 hours prior to her appt. I gave her the phone # to Central Scheduling if she needed to make any schedule changes. Patient verbalized understanding of information discussed w/ no further questions at this time.

## 2023-11-08 ENCOUNTER — HOSPITAL ENCOUNTER (OUTPATIENT)
Facility: HOSPITAL | Age: 73
Discharge: HOME | End: 2023-11-11
Attending: INTERNAL MEDICINE
Payer: MEDICARE

## 2023-11-08 DIAGNOSIS — R10.31 RIGHT LOWER QUADRANT ABDOMINAL PAIN: ICD-10-CM

## 2023-11-08 PROCEDURE — 6360000004 HC RX CONTRAST MEDICATION: Performed by: RADIOLOGY

## 2023-11-08 PROCEDURE — 74177 CT ABD & PELVIS W/CONTRAST: CPT

## 2023-11-08 RX ADMIN — IOPAMIDOL 100 ML: 755 INJECTION, SOLUTION INTRAVENOUS at 16:15

## 2023-12-04 ENCOUNTER — TELEPHONE (OUTPATIENT)
Age: 73
End: 2023-12-04

## 2023-12-04 ENCOUNTER — NURSE TRIAGE (OUTPATIENT)
Dept: OTHER | Facility: CLINIC | Age: 73
End: 2023-12-04

## 2023-12-04 NOTE — TELEPHONE ENCOUNTER
Pt comes from triage for an appt today. This is for dizziness and tingling in arms, hands, legs and feet. She thinks this is medication doing this.     Pt has to be seen today as this has been going on seriously for two weeks, but last night was the worst.

## 2023-12-04 NOTE — TELEPHONE ENCOUNTER
[place]    Received call from Gisel Jalloh at [facility] with [employer]. Subjective: Caller states \"It started when I first started taking it. I thought it would subside. He prescribed 3 pills a day and I couldn't take that. I called and left a message that I was going to cut back to one. I'm just dizzy and I can't stand it. Even walking, I'm just dizzy. I want to ask him if I can stop it. I have neuropathy and my feet burn and now my hands are starting to burn. \"     Current Symptoms: dizziness, burning in feet/tingling in hands-NOT new, knee pain-in PT currently    Hx of Neuropathy-seeing a Neurologist    Dizziness started when patient started taking Lyrica for Neuropathy. Patient has discussed with PCP and dose decreased. Patient states Neuropathy is getting worse and dizziness are getting worse. Onset: a few months ago; worsening    Associated Symptoms: reduced activity    Pain Severity: 5/10; tingling; constant    Temperature: Denies    What has been tried: sitting down    LMP: NA Pregnant: NA    Recommended disposition: See in Office Today. Patient agreeable. Care advice provided, patient verbalizes understanding; denies any other questions or concerns; instructed to call back for any new or worsening symptoms. Patient/Caller agrees with recommended disposition; writer provided warm transfer to [employer] at [facility] for appointment scheduling. Attention Provider: Thank you for allowing me to participate in the care of your patient. The patient was connected to triage in response to information provided to the ECC/PSC. Please do not respond through this encounter as the response is not directed to a shared pool.       Reason for Disposition   Caller has NON-URGENT medicine question about med that PCP or specialist prescribed and triager unable to answer question    Protocols used: Medication Question Call-ADULT-[place]

## 2023-12-04 NOTE — TELEPHONE ENCOUNTER
Try to get sooner appt with Neurology. Otherwise, may need to go ER or perhaps [facility] can come out.  BJF

## 2023-12-05 ENCOUNTER — OFFICE VISIT (OUTPATIENT)
Age: 73
End: 2023-12-05
Payer: MEDICARE

## 2023-12-05 VITALS
SYSTOLIC BLOOD PRESSURE: 90 MMHG | BODY MASS INDEX: 35.06 KG/M2 | TEMPERATURE: 98 F | HEART RATE: 75 BPM | OXYGEN SATURATION: 97 % | HEIGHT: 65 IN | WEIGHT: 210.4 LBS | RESPIRATION RATE: 16 BRPM | DIASTOLIC BLOOD PRESSURE: 62 MMHG

## 2023-12-05 DIAGNOSIS — G62.9 POLYNEUROPATHY, UNSPECIFIED: ICD-10-CM

## 2023-12-05 DIAGNOSIS — R20.0 NUMBNESS AND TINGLING: ICD-10-CM

## 2023-12-05 DIAGNOSIS — R20.2 NUMBNESS AND TINGLING: ICD-10-CM

## 2023-12-05 DIAGNOSIS — G25.0 ESSENTIAL TREMOR: ICD-10-CM

## 2023-12-05 DIAGNOSIS — R20.0 NUMBNESS AND TINGLING: Primary | ICD-10-CM

## 2023-12-05 DIAGNOSIS — R20.2 NUMBNESS AND TINGLING: Primary | ICD-10-CM

## 2023-12-05 PROBLEM — N30.00 ACUTE CYSTITIS WITHOUT HEMATURIA: Status: RESOLVED | Noted: 2023-06-30 | Resolved: 2023-12-05

## 2023-12-05 PROBLEM — A49.8 PROTEUS INFECTION: Status: RESOLVED | Noted: 2023-06-30 | Resolved: 2023-12-05

## 2023-12-05 PROCEDURE — 1090F PRES/ABSN URINE INCON ASSESS: CPT | Performed by: PSYCHIATRY & NEUROLOGY

## 2023-12-05 PROCEDURE — 3017F COLORECTAL CA SCREEN DOC REV: CPT | Performed by: PSYCHIATRY & NEUROLOGY

## 2023-12-05 PROCEDURE — 3078F DIAST BP <80 MM HG: CPT | Performed by: PSYCHIATRY & NEUROLOGY

## 2023-12-05 PROCEDURE — 1123F ACP DISCUSS/DSCN MKR DOCD: CPT | Performed by: PSYCHIATRY & NEUROLOGY

## 2023-12-05 PROCEDURE — 3074F SYST BP LT 130 MM HG: CPT | Performed by: PSYCHIATRY & NEUROLOGY

## 2023-12-05 PROCEDURE — G8400 PT W/DXA NO RESULTS DOC: HCPCS | Performed by: PSYCHIATRY & NEUROLOGY

## 2023-12-05 PROCEDURE — 99215 OFFICE O/P EST HI 40 MIN: CPT | Performed by: PSYCHIATRY & NEUROLOGY

## 2023-12-05 PROCEDURE — 1036F TOBACCO NON-USER: CPT | Performed by: PSYCHIATRY & NEUROLOGY

## 2023-12-05 PROCEDURE — G8417 CALC BMI ABV UP PARAM F/U: HCPCS | Performed by: PSYCHIATRY & NEUROLOGY

## 2023-12-05 PROCEDURE — G8484 FLU IMMUNIZE NO ADMIN: HCPCS | Performed by: PSYCHIATRY & NEUROLOGY

## 2023-12-05 PROCEDURE — G8427 DOCREV CUR MEDS BY ELIG CLIN: HCPCS | Performed by: PSYCHIATRY & NEUROLOGY

## 2023-12-05 RX ORDER — PREGABALIN 25 MG/1
25 CAPSULE ORAL 3 TIMES DAILY PRN
Qty: 90 CAPSULE | Refills: 5 | Status: SHIPPED | OUTPATIENT
Start: 2023-12-05 | End: 2024-06-02

## 2023-12-05 ASSESSMENT — PATIENT HEALTH QUESTIONNAIRE - PHQ9
SUM OF ALL RESPONSES TO PHQ QUESTIONS 1-9: 0
SUM OF ALL RESPONSES TO PHQ9 QUESTIONS 1 & 2: 0
SUM OF ALL RESPONSES TO PHQ QUESTIONS 1-9: 0
SUM OF ALL RESPONSES TO PHQ QUESTIONS 1-9: 0
1. LITTLE INTEREST OR PLEASURE IN DOING THINGS: 0
SUM OF ALL RESPONSES TO PHQ QUESTIONS 1-9: 0
2. FEELING DOWN, DEPRESSED OR HOPELESS: 0

## 2023-12-05 NOTE — ASSESSMENT & PLAN NOTE
Unclear etiology doubt serious significant neurologic issues.   We will check a head CT and EEG to make sure there is no additional pathology    I would like her to hold her Topamax for an entire week and then restart it taking 1 tablet only every other day to help manage headaches and migraines    I am also going to check B1 and B6 levels which can affect nerve function    And it is possible that this is a somatic anxiety presentation

## 2023-12-05 NOTE — PROGRESS NOTES
[address] [facility]  Neurology [address] Bill Stoddard is a 75 y.o. female who presents today for the following:  Chief Complaint   Patient presents with    Follow-up     States that she is here for tremor and is having another problem of numbness and tingles thru the whole body really , feet, legs arms hands and face. States that it started a couple of weeks ago and Sunday was really bad and today not bad at all. ASSESSMENT AND PLAN  Patient is known to this practice. This is my first time seeing the patient. Chart and history reviewed in detail at today's office visit. 1. Numbness and tingling  Assessment & Plan:   Unclear etiology doubt serious significant neurologic issues. We will check a head CT and EEG to make sure there is no additional pathology    I would like her to hold her Topamax for an entire week and then restart it taking 1 tablet only every other day to help manage headaches and migraines    I am also going to check B1 and B6 levels which can affect nerve function    And it is possible that this is a somatic anxiety presentation  Orders:  -     Vitamin B6; Future  -     Vitamin B1, Whole Blood; Future  -     NEURO EEG 24 HR; Future  -     CT HEAD WO CONTRAST; Future  2. Essential tremor  Assessment & Plan:   Reasonably stable and well-controlled continue on propranolol 80 mg daily  3. Polyneuropathy, unspecified  Assessment & Plan:   Patient reports she cannot take Lyrica 75 mg it makes her loopy  I will reduce the dose to 25 mg 1 tablet 3 times a day as needed only  Orders:  -     pregabalin (LYRICA) 25 MG capsule; Take 1 capsule by mouth 3 times daily as needed (neuropathy pain) for up to 180 days. Max Daily Amount: 75 mg, Disp-90 capsule, R-5Normal      Patient and/or family was given time to ask questions and voice concerns. I believe all questions concerns were adequately addressed at this  office visit.   Patient and/or family

## 2023-12-05 NOTE — ASSESSMENT & PLAN NOTE
Patient reports she cannot take Lyrica 75 mg it makes her loopy  I will reduce the dose to 25 mg 1 tablet 3 times a day as needed only

## 2023-12-05 NOTE — PATIENT INSTRUCTIONS
As per discussion    I have ordered blood work an EEG and a CT scan. Once I get those results I will go over them with you via [employer] briefly and anything differently we may need to do based on those results. And then we can talk about them in more detail if needed at your follow-up appointment. I want you to hold the Topamax for 1 week and then go back to taking just 1 tablet every other day to help manage her headaches but without causing side effects  I think the numbness and tingling is coming from the Topamax but were doing the other test to be sure there is nothing else going on    Finally I have discontinued the pregabalin also known as Lyrica 75 mg because you are having side effects and I have changed you to a much lower dose of 25 mg and you can take that up to 1 tablet 3 times a day as needed. If you do not need it you do not have to take it  Hopefully the lower dose will be able to manage your neuropathy symptoms without causing side effects    Wishing you a very Darra Clarke Susan and a Happy New Year and also have a wonderful birthday        [employer]    Phone calls/patient messages:  Please allow up to 24 hours for someone in the office to contact you about your call or message. Be mindful your provider may be out of the office or your message may require further review. We encourage you to use [employer] for your messages as this is a faster, more efficient way to communicate with our office    Medication Refills:  Prescription medications require up to 48 business hours to process. We encourage you to use [employer] for your refills. For controlled medications: Please allow up to 72 business hours to process. Certain medications may require you to  a written prescription at our office. NO narcotic/controlled medications will be prescribed after 4pm Monday through Friday or on weekends    Form/Paperwork Completion:  We ask that you allow 7-14 business days.  You may also download

## 2023-12-08 LAB
VIT B1 BLD-SCNC: 112.9 NMOL/L (ref 66.5–200)
VIT B6 SERPL-MCNC: 5.7 UG/L (ref 3.4–65.2)

## 2023-12-12 NOTE — TELEPHONE ENCOUNTER
Patient states she needs a call back to get a Surgical Clearance for upcoming Cataract Surgery on 7/12/22. No Appt available at time of call. Please call.  Thank you
Pt admitted from home s/p choking incident on lettuce requiring Heimlich maneuver. +UTI and cdiff. PMHx: dementia
Pt had cancelled pre-op the day of on 7/1/22. Pt calling today to reschedule pre-op    Surgery is still 7/12/22. Procedure: Double Cataract surgery      I do not have anything I can schedule. Is there anything that can be done for patient?       [phone]
Scheduled patient for 7/1 for pre op and toe pain that she is experiencing. Patient accepted appointment and had no further questions.
Spoke w/ pt, scheduled appt for 7/11 at 9:30 am with Dr. Martha Hollis. Pt accepted appt.
Pt admitted from home s/p choking incident on lettuce requiring Heimlich maneuver. +UTI and cdiff. PMHx: dementia
Warm/Dry

## 2023-12-26 ENCOUNTER — TELEPHONE (OUTPATIENT)
Age: 73
End: 2023-12-26

## 2023-12-26 NOTE — TELEPHONE ENCOUNTER
Pls advise. ? Are you increasing her Pregabalin dose? States she wants higher dose and has run out of med. Is not due for refill until 1-3-2024 per pharmacy    Pernell Gallagher NP, covering messages tomorrow, 60-. Pls advise.

## 2023-12-26 NOTE — TELEPHONE ENCOUNTER
Patient called stating that her neuropathy is getting worse. Wants to know if Np  can increase the dosage of her pregabalin (LYRICA) 25 MG capsule. Pt stated that she spoke with the pharmacy and they stated that Pt cannot refill until 1/3 but Pt stated that she does not have enough to last until then. Please advise.      [facility]/pharmacy #[id number]- [place], [address] [phone] - F [phone]

## 2023-12-27 NOTE — TELEPHONE ENCOUNTER
On 12-5-2023, you gave her 90 tablets, not a 90 day supply. MADDY Amador sent to Lonny Fu LPN  Caller: Unspecified (Yesterday, 10:48 AM)    So I gave her a 90-day supply of the pregabalin 25 mg 1 tablet 3 times a day she was given 180 tablets why is she running out of the 25 mg tablets?   If she is only taking that 2 or 3 times a day she is within the parameters that I set I am very confused

## 2023-12-27 NOTE — TELEPHONE ENCOUNTER
Spoke with patient. Verified patient with two patient identifiers. States her neuropathy has worsened and needs higher dose of Pregabalin. Is on 25 mg tid. Does better when taking 2-3 times a day, vs just once a day. Will discuss with LEO Marmolejo NP

## 2023-12-28 DIAGNOSIS — G62.9 POLYNEUROPATHY, UNSPECIFIED: ICD-10-CM

## 2023-12-28 RX ORDER — PREGABALIN 25 MG/1
CAPSULE ORAL
Qty: 120 CAPSULE | Refills: 5 | Status: SHIPPED | OUTPATIENT
Start: 2023-12-28 | End: 2024-06-28

## 2023-12-28 NOTE — TELEPHONE ENCOUNTER
Spoke with patient. Verified patient with two patient identifiers. Advised Rx sent for Pregabalin 25 mg, one qam, one afternoon and two qhs. Patient verbalized understanding. Mercedes Almendarez DO sent to Lucien Woodbury, LPN  Caller: Unspecified (2 days ago, 10:48 AM)    Sent to her pharmacy.

## 2023-12-28 NOTE — TELEPHONE ENCOUNTER
Pt already on Pregabalin 25 mg tid. She is asking for a stronger dose to help her neuropathy pain. States in the past, she took a higher dose, taking it once a day only, and that did not work as well as taking the increased dose TID. Asking if she can increase the Pregabalin dose and still take it TID. She is out of meds and cannot fill until 1-3-2023. Pls advise.

## 2023-12-29 NOTE — TELEPHONE ENCOUNTER
Pt called again stating the pharmacy did not receive her medication request. I advised it does show we sent it but no confirmation was sent to let us know they received it.      Send over another medication request to: [facility]/pharmacy #[id number]- [id number] [facility], [address] [phone] - F [phone]

## 2023-12-29 NOTE — TELEPHONE ENCOUNTER
Pt called stating the pharmacy did not receive her medication request. I advised it does show we sent it but no confirmation was sent to let us know they received it.     Send over another medication request to: [facility]/pharmacy #[id number]- [id number] [facility] [address], [address] [phone] - F [phone]

## 2024-01-01 NOTE — PROGRESS NOTES
[place] discharged in stable condition in car seat accompanied by parents. Mother received discharge instructions and verbalized understanding.   Silva Stevenson  1950  [id number]    Situation:  Verbal report received from: Darrell De La Rosa RN   Procedure: Procedure(s):  COLONOSCOPY, EGD  ESOPHAGOGASTRODUODENOSCOPY (EGD)  ESOPHAGOGASTRODUODENAL (EGD) BIOPSY  ENDOSCOPIC POLYPECTOMY  COLON BIOPSY  ESOPHAGEAL DILATION    Background:    Preoperative diagnosis: DIARRHEA, CONSTIPATION, BLOATING, CHANGE IN BOWEL FUNCTION  Postoperative diagnosis: EGD-normal  Colon-Ascending polyps  Diverticulosis  Transverse polyp      :  Dr. Chip Vásquez  Assistant(s): Endoscopy Technician-1: Jorge Stiles  Endoscopy RN-1: Trudi Pacheco RN    Specimens:   ID Type Source Tests Collected by Time Destination   1 : biopsy Preservative Duodenum  Jerry Zhou MD 11/25/2020 6312 Pathology   2 : biopsy-EG junction Preservative   Jerry Zhou MD 11/25/2020 0831 Pathology   3 : biopsy Preservative Esophagus, Mid  Jerry Zhou MD 11/25/2020 0831 Pathology   4 : polyp Preservative Colon, Transverse  Jerry Zhou MD 11/25/2020 8819 Pathology   5 : polyp Preservative Colon, Ascending  Jerry Zhou MD 11/25/2020 8043 Pathology   6 : biopsy-random colon Preservative   Jerry Zhou MD 11/25/2020 0848 Pathology     H. Pylori  no    Assessment:    Anesthesia gave intra-procedure sedation and medications, see anesthesia flow sheet yes    Intravenous fluids: NS@ KVO     Vital signs stable     Abdominal assessment: round and soft     Recommendation:  Discharge patient per MD order.   Return to floor  Family or Friend   Permission to share finding with family or friend yes

## 2024-01-02 ENCOUNTER — TELEPHONE (OUTPATIENT)
Age: 74
End: 2024-01-02

## 2024-01-02 NOTE — TELEPHONE ENCOUNTER
Spoke with patient.  Verified patient with two patient identifiers.    States her tremors are worsening.  Having a difficult time buttoning her blouses and her fork shakes terribly when she eats.  Asking to increase her Propranolol LA.  Is on 80 mg qd, or add something else that will help.    Pls advise.

## 2024-01-02 NOTE — TELEPHONE ENCOUNTER
Spoke with [facility] pharmacist.  Verified patient with two patient identifiers.    Gave verbal for Pregabaline 25 mg one cap qam, one cap afternoon, two caps qhs.  [facility] is out of some doses of Pregabalin.  They are calling another pharmacy to get her prescription filled.    Pharmacist verbalized understanding.

## 2024-01-02 NOTE — TELEPHONE ENCOUNTER
Spoke with indu at [facility].  Verified patient with two patient identifiers.    Advised we sent Rx for Pregabaline 25 mg, #120, one qam, one afternoon and two qhs with 5 refills last week, however Rx does not say Rx received by pharmacy.    Phoned in the Rx as pt is out of her med.  Advised them to call her to notify when it is ready for .  He verbalized understanding.

## 2024-01-02 NOTE — TELEPHONE ENCOUNTER
Patient called stating that she needs her propranolol (INDERAL LA) 80 MG extended release capsule dosage increased. Please call [phone] to discuss.

## 2024-01-03 NOTE — TELEPHONE ENCOUNTER
Spoke with patient.  Verified patient with two patient identifiers.    Advised per Nadja Interiano NP, needs virtual visit to discuss progression of her tremors, as she did not mention tremors at her visit 12-5-2024.    Will ask PSR to call and schedule virtual visit with any NP within the next couple of weeks.  If no appt available, may double bood and make a 15 min VV with Nadja, per Nadja.    Patient verbalized understanding.      Gemma Interiano, ANP sent to Angelica Sharma LPN  Caller: Unspecified (Yesterday,  8:17 AM)    I just saw her several weeks ago and her tremor was reasonably well-controlled.  She needs another evaluation.  You can set her up with any of the nurse practitioners who may have availability.  If no one is available in the next several weeks then we will double book her for a virtual visit evaluation for 15 minutes

## 2024-01-04 ENCOUNTER — TELEMEDICINE (OUTPATIENT)
Age: 74
End: 2024-01-04
Payer: MEDICARE

## 2024-01-04 DIAGNOSIS — G25.0 ESSENTIAL TREMOR: Primary | ICD-10-CM

## 2024-01-04 DIAGNOSIS — E66.01 SEVERE OBESITY (BMI 35.0-39.9) WITH COMORBIDITY (HCC): ICD-10-CM

## 2024-01-04 PROCEDURE — G8400 PT W/DXA NO RESULTS DOC: HCPCS | Performed by: NURSE PRACTITIONER

## 2024-01-04 PROCEDURE — 1036F TOBACCO NON-USER: CPT | Performed by: NURSE PRACTITIONER

## 2024-01-04 PROCEDURE — 3017F COLORECTAL CA SCREEN DOC REV: CPT | Performed by: NURSE PRACTITIONER

## 2024-01-04 PROCEDURE — 99214 OFFICE O/P EST MOD 30 MIN: CPT | Performed by: NURSE PRACTITIONER

## 2024-01-04 PROCEDURE — 1123F ACP DISCUSS/DSCN MKR DOCD: CPT | Performed by: NURSE PRACTITIONER

## 2024-01-04 PROCEDURE — 1090F PRES/ABSN URINE INCON ASSESS: CPT | Performed by: NURSE PRACTITIONER

## 2024-01-04 PROCEDURE — G8417 CALC BMI ABV UP PARAM F/U: HCPCS | Performed by: NURSE PRACTITIONER

## 2024-01-04 PROCEDURE — G8484 FLU IMMUNIZE NO ADMIN: HCPCS | Performed by: NURSE PRACTITIONER

## 2024-01-04 PROCEDURE — G8427 DOCREV CUR MEDS BY ELIG CLIN: HCPCS | Performed by: NURSE PRACTITIONER

## 2024-01-04 NOTE — PROGRESS NOTES
implant    Sleep apnea     unable to tolerate CPAP    Stool color black     Thyroid disease     Tick-borne disease     Alpha Gal allergy: no eat pork, beef, milk (Meat allergy showed up on a allergy test)    Umbilical hernia without obstruction and without gangrene 6/22/2020    Urge incontinence 3/31/2010    Vitamin D deficiency 3/31/2015     Past Surgical History:   Procedure Laterality Date    BUNIONECTOMY      COLONOSCOPY N/A 4/27/2018    COLONOSCOPY performed by Roly Benz MD at [facility] AMBULATORY OR    COLONOSCOPY N/A 11/25/2020    COLONOSCOPY, EGD performed by Anmol Jorgensen MD at [facility] ENDOSCOPY    COLONOSCOPY,BIOPSY  4/27/2018         COLONOSCOPY,REMV LESN,SNARE  4/27/2018         GI  2010, 5/2018    egd, colonoscopy    HERNIA REPAIR  02/2021    4 umbillical     HERNIA REPAIR  01/2021    HERNIA REPAIR  01/11/2021    HYSTERECTOMY (CERVIX STATUS UNKNOWN)      ORTHOPEDIC SURGERY  1990s?    left bunionectomy     ORTHOPEDIC SURGERY  2000's    removed bone spurs from R & L hand    OTHER SURGICAL HISTORY  07/2014    2 shocks to heart & ablations    OTHER SURGICAL HISTORY  01/11/2021    incarcerated incisional hernia surgery    PACEMAKER  2014    On the right side    PACEMAKER PLACEMENT  2015    PA UNLISTED PROCEDURE CARDIAC SURGERY  5/2014    ablation    SHOULDER ARTHROPLASTY Right 2017    TONSILLECTOMY  16 yrs old     Social History     Tobacco Use    Smoking status: Never    Smokeless tobacco: Never   Substance Use Topics    Alcohol use: Not Currently     Alcohol/week: 1.0 standard drink of alcohol    Drug use: Yes     Types: Prescription      Family History   Problem Relation Age of Onset    Cancer Father         colon cancer    Arrhythmia Brother         afib    Heart Disease Father     Colon Cancer Father     Asthma Brother     COPD Brother     Arrhythmia Mother         afib    Stroke Mother     Heart Failure Mother         ROS  General: Denies headache, blurred vision, double vision, fevers, chills, weight

## 2024-01-05 RX ORDER — ESZOPICLONE 1 MG/1
1 TABLET, FILM COATED ORAL NIGHTLY PRN
COMMUNITY
Start: 2023-12-19

## 2024-01-05 NOTE — ASSESSMENT & PLAN NOTE
Essential tremor, feels worsening and impacting her quality of life as she has difficulties in applying her make-up, difficulties with writing, and difficulties with using utensils to eat.  She feels this is impacting her quality of life and she does not want to eat at restaurants.    Current medications include propranolol 80 mg nightly and Topamax 100 mg BID    1.  Options are limited for Ms. Haji.  Cannot increase dose of propranolol due to low blood pressures (although her heart rate is okay) she is already on the maximal amount of Topamax.  2.  Cannot add Mysoline to her medication regimen due to interactions with other medications, particularly the Eliquis.  3.  She needs to consider lifestyle modifications such as using utensils with larger handles, if she is drinking from a glass to use a straw.  She expresses understanding of this.

## 2024-01-08 ENCOUNTER — TELEPHONE (OUTPATIENT)
Age: 74
End: 2024-01-08

## 2024-01-08 NOTE — TELEPHONE ENCOUNTER
Received letter from [employer] [facility] in regards to patient's pregabalin 25 mg capsule     Letter states pregabalin is not formulary and will require a new prescription to be written for a alternative medication    After speaking with [employer] a determination has been made this drug is covered and does not need a pa for the drug itself or for a quantity limit     Test claim ran while future dates covered    Letter from [employer] dated 1/3/24    Sending as a FYI to nurse incase patient states in the near future she is unable to pick uo her pregabalin    Letter scanned to media

## 2024-01-17 ENCOUNTER — HOSPITAL ENCOUNTER (OUTPATIENT)
Facility: HOSPITAL | Age: 74
Discharge: HOME | End: 2024-01-20
Payer: MEDICARE

## 2024-01-17 DIAGNOSIS — R20.2 NUMBNESS AND TINGLING: ICD-10-CM

## 2024-01-17 DIAGNOSIS — R20.0 NUMBNESS AND TINGLING: ICD-10-CM

## 2024-01-17 PROCEDURE — 95708 EEG WO VID EA 12-26HR UNMNTR: CPT

## 2024-01-17 PROCEDURE — 70450 CT HEAD/BRAIN W/O DYE: CPT

## 2024-01-18 PROBLEM — R56.9 CONVULSIONS (HCC): Status: ACTIVE | Noted: 2024-01-18

## 2024-01-18 PROBLEM — R41.82 ACUTE ALTERATION IN MENTAL STATUS: Status: ACTIVE | Noted: 2024-01-18

## 2024-01-19 NOTE — PROCEDURES
[facility]  EEG    Name:  IVY JOYNER  MR#:  [id number]  :  1950  ACCOUNT #:  [id number]  DATE OF SERVICE:  2024    A 24-HOUR AMBULATORY EEG RECORDING    DURATION OF STUDY:  2024 to 2024.    CLINICAL INDICATION: The patient is a 74-year-old female with a history of migratory paresthesias, numbness and tingling, dizziness, spells of altered mental status, rule out seizures, rule out partial complex seizures, rule out convulsions.    EEG classification on this patient is normal ambulatory 24-hour EEG recording.    DESCRIPTION OF THE RECORD:  This is a 16-channel EEG recording on the patient to evaluate for possible seizures or partial complex seizures.  The patient with intermittent episodes of numbness and tingling, dizziness, altered mental status.  EEG to rule out seizures.  The patient had a posteriorly located occipital alpha rhythm of 8-9 Hz that did attenuate some with eye opening in the awake state.  Throughout the recording, there were no clear areas of focal slowing, no clear spike or spike-and-wave discharges, and no electrographic spells of any type seen.  The patient did have prolonged stages of sleep with K-complexes and sleep spindles identified in the evening hours.  The patient did not have hyperventilation performed.  Photic stimulation was not performed.  This study began on 2024, at approximately 11:30 a.m. and ended on 2024, at approximately 08:15 a.m. for a total time of the study 21 hours and 15 minutes.  There was some mild movement, muscle artifact, and electrode artifact at times seen in the recording, but the study was technically of very good quality and very interpretable. On her clinical diary, she had only spells of numbness and headache and pain described, during which there was no EEG abnormality and there was no recorded spells or seizure activity described.    INTERPRETATION:  This is a normal ambulatory 24-hour EEG

## 2024-01-26 ENCOUNTER — TELEPHONE (OUTPATIENT)
Age: 74
End: 2024-01-26

## 2024-01-26 NOTE — TELEPHONE ENCOUNTER
Patient calling saying [facility] sent prior authorization for pregabalin to continue taking it and they're wrong. She states all we need to do is send the pre approval back to them. So she is requesting to speak with the nurse. Thank you.     Patient states she spoke to her insurance and they already approved it. [facility] is saying they can't refill medication until they get the preapproval from us. She states she is completely out of medicine.

## 2024-01-29 ENCOUNTER — TELEPHONE (OUTPATIENT)
Age: 74
End: 2024-01-29

## 2024-01-29 DIAGNOSIS — R52 PAIN: ICD-10-CM

## 2024-01-29 NOTE — TELEPHONE ENCOUNTER
Pt states she tested positive for COVID today.      She has sore throat, headache, stuffy head.      Accepted VV so message not needed.

## 2024-01-29 NOTE — TELEPHONE ENCOUNTER
Crystal Reyes is a 57 year old who is here for evaluation and management of    Here for PE     Not since since 2019     HTN -run out of meds 2 weeks     C/o hair loss     C/o numbness fingers and feet   Thinks she is anemic   S/p Hysterectomy 2008   Last colonoscopy 2012 normal     Never smoking     Denies depression     Exercises walks runs     C/o bloatiness growling at night     Anxiety -states sertraline tried for 1 week    stopped -due to urinary frequency and not feeling right    Her twin sister Clonazepam is taking to calm her down   Has trouble sleeping and racing thoughts and felling anxious     Retired   COVID vaccinated       Patient Active Problem List    Diagnosis Date Noted   • Hair loss 11/22/2021     Priority: Low   • Type 2 diabetes mellitus without complication, without long-term current use of insulin (CMS/[facility]) 07/23/2020     Priority: Low   • Annual physical exam 01/15/2019     Priority: Low   • Anxiety 01/15/2019     Priority: Low   • Benign mole 01/15/2019     Priority: Low   • Vertigo 07/17/2018     Priority: Low   • Headache 05/20/2015     Priority: Low   • Essential hypertension 02/17/2010     Priority: Low       Current Outpatient Medications   Medication Sig   • amLODIPine (NORVASC) 10 MG tablet Take 1 tablet by mouth daily.   • venlafaxine XR (Effexor XR) 37.5 MG 24 hr capsule Take 1 capsule by mouth daily.     No current facility-administered medications for this visit.       ALLERGIES:  No Known Allergies    Past Medical History:   Diagnosis Date   • Anxiety    • Essential (primary) hypertension        Past Surgical History:   Procedure Laterality Date   • Hysterectomy  2008       Social History     Socioeconomic History   • Marital status: Single     Spouse name: Not on file   • Number of children: 0   • Years of education: Not on file   • Highest education level: Not on file   Occupational History   • Occupation:     Tobacco Use   • Smoking status: Never Smoker   •  Pt is requesting refill on hydrocodone to be sent to [facility] #[phone]         Smokeless tobacco: Never Used   Substance and Sexual Activity   • Alcohol use: No   • Drug use: No   • Sexual activity: Not on file   Other Topics Concern   • Not on file   Social History Narrative   • Not on file     Social Determinants of Health     Financial Resource Strain:    • Social Determinants: Financial Resource Strain: Not on file   Food Insecurity:    • Social Determinants: Food Insecurity: Not on file   Transportation Needs:    • Lack of Transportation (Medical): Not on file   • Lack of Transportation (Non-Medical): Not on file   Physical Activity:    • Days of Exercise per Week: Not on file   • Minutes of Exercise per Session: Not on file   Stress:    • Social Determinants: Stress: Not on file   Social Connections:    • Social Determinants: Social Connections: Not on file   Intimate Partner Violence:    • Social Determinants: Intimate Partner Violence Past Fear: Not on file   • Social Determinants: Intimate Partner Violence Current Fear: Not on file       Family History   Problem Relation Age of Onset   • Stroke Mother        Review of systems:  Eye Problem(s): negative  ENT Problem(s): negative  Cardiovascular problem(s): negative  Respiratory problem(s): negative  Gastro-intestinal problem(s): negative  Genito-urinary problem(s): negative  Musculoskeletal problem(s): negative  Integumentary problem(s): negative  Neurological problem(s): negative  Psychiatric problem(s): negative  Endocrine problem(s): negative  Hematologic and/or Lymphatic problem(s): negative    Physical Examination  Blood pressure (!) 154/75, pulse 75, temperature 98.4 °F (36.9 °C), temperature source Temporal, height 5' 3.5\" (1.613 m), weight 72.1 kg (159 lb).   Well nourished, comfortable, in no acute distress at rest  HEENT normal  No JVD, normal carotid pulse, no thyromegaly  Lungs clear to auscultation, normal respiratory effort  Normal heart sounds S1 S2, regular rate and rhythm no murmur or gallop  Abdomen soft, not  distended, normal bowel sounds  Extremities: no edema, no cyanosis, normal pedal pulses  Alert, oriented x3, normal motor function grossly, normal gait  Skin: normal, no abnormal lesions      No visits with results within 6 Month(s) from this visit.   Latest known visit with results is:   Lab Services on 01/15/2019   Component Date Value   • WBC 01/15/2019 7.3    • RBC 01/15/2019 5.01    • HGB 01/15/2019 13.8    • HCT 01/15/2019 44.4    • MCV 01/15/2019 88.6    • MCH 01/15/2019 27.5    • MCHC 01/15/2019 31.1*   • RDW-CV 01/15/2019 13.4    • PLT 01/15/2019 267    • NRBC 01/15/2019 0    • FASTING STATUS 01/15/2019 UNKNOWN    • CHOLESTEROL 01/15/2019 196    • CALCULATED LDL 01/15/2019 118    • HDL 01/15/2019 63    • TRIGLYCERIDE 01/15/2019 74    • CALCULATED NON HDL 01/15/2019 133    • CHOL/HDL 01/15/2019 3.1    • Hemoglobin A1C 01/15/2019 6.4*   • Fasting Status 01/15/2019 UNKNOWN    • Sodium 01/15/2019 139    • Potassium 01/15/2019 4.0    • Chloride 01/15/2019 104    • Carbon Dioxide 01/15/2019 29    • Anion Gap 01/15/2019 10    • Glucose 01/15/2019 92    • BUN 01/15/2019 10    • Creatinine 01/15/2019 0.69    • GFR Estimate,  Am* 01/15/2019 >90    • GFR Estimate, Non Elicia* 01/15/2019 >90    • BUN/Creatinine Ratio 01/15/2019 14    • CALCIUM 01/15/2019 9.5    • TOTAL BILIRUBIN 01/15/2019 0.7    • AST/SGOT 01/15/2019 20    • ALT/SGPT 01/15/2019 26    • ALK PHOSPHATASE 01/15/2019 120*   • TOTAL PROTEIN 01/15/2019 7.8    • Albumin 01/15/2019 4.0    • GLOBULIN 01/15/2019 3.8    • A/G Ratio, Serum 01/15/2019 1.1        Assessment and Plan:  Need for immunization against influenza    - INFLUENZA QUADRIVALENT SPLIT PRES FREE 0.5 ML VACC, IM (FLULAVAL,FLUARIX,FLUZONE)    Watery eyes    - SERVICE TO OPHTHALMOLOGY    Type 2 diabetes mellitus without complication, without long-term current use of insulin (CMS/[facility])  ? Control   - COMPREHENSIVE METABOLIC PANEL; Future  - GLYCOHEMOGLOBIN; Future  - LIPID PANEL WITH REFLEX;  Future  - MICROALBUMIN URINE RANDOM; Future    Routine adult health maintenance    - HEPATITIS C ANTIBODY WITH REFLEX; Future  - CBC WITH DIFFERENTIAL; Future    Annual physical exam  Exercise and diet     Hair loss    - THYROID STIMULATING HORMONE; Future  - SERVICE TO DERMATOLOGY    Essential hypertension  Uncontrolled   Start   - amLODIPine (NORVASC) 10 MG tablet; Take 1 tablet by mouth daily.  Dispense: 90 tablet; Refill: 1    Anxiety    - venlafaxine XR (Effexor XR) 37.5 MG 24 hr capsule; Take 1 capsule by mouth daily.  Dispense: 90 capsule; Refill: 0       Return to clinic in 3 months.     Spent 30 min with patient for coordination of care.        Dottie Patel MD

## 2024-01-30 ENCOUNTER — TELEMEDICINE (OUTPATIENT)
Age: 74
End: 2024-01-30
Payer: MEDICARE

## 2024-01-30 DIAGNOSIS — U07.1 COVID-19: Primary | ICD-10-CM

## 2024-01-30 PROCEDURE — 99441 PR PHYS/QHP TELEPHONE EVALUATION 5-10 MIN: CPT | Performed by: FAMILY MEDICINE

## 2024-01-30 ASSESSMENT — PATIENT HEALTH QUESTIONNAIRE - PHQ9
1. LITTLE INTEREST OR PLEASURE IN DOING THINGS: 0
2. FEELING DOWN, DEPRESSED OR HOPELESS: 0
SUM OF ALL RESPONSES TO PHQ QUESTIONS 1-9: 0
SUM OF ALL RESPONSES TO PHQ9 QUESTIONS 1 & 2: 0
SUM OF ALL RESPONSES TO PHQ QUESTIONS 1-9: 0

## 2024-01-30 NOTE — PROGRESS NOTES
1. \"Have you been to the ER, urgent care clinic since your last visit?  Hospitalized since your last visit?\" No    2. \"Have you seen or consulted any other health care providers outside of the [facility] since your last visit?\" No     3. For patients aged 45-75: Has the patient had a colonoscopy / FIT/ Cologuard? NA - based on age      If the patient is female:    4. For patients aged 40-74: Has the patient had a mammogram within the past 2 years? NA - based on age or sex      5. For patients aged 21-65: Has the patient had a pap smear? NA - based on age or sex  
Last Year: Never true   Transportation Needs: No Transportation Needs (9/18/2023)    PRAPARE - Transportation     Lack of Transportation (Medical): No     Lack of Transportation (Non-Medical): No   Physical Activity: Sufficiently Active (9/18/2023)    Exercise Vital Sign     Days of Exercise per Week: 7 days     Minutes of Exercise per Session: 60 min   Stress: Stress Concern Present (9/18/2023)    [employer] [place] [employer] - Occupational Stress Questionnaire     Feeling of Stress : Rather much   Social Connections: Moderately Integrated (9/18/2023)    Social Connection and Isolation Panel [NHANES]     Frequency of Communication with Friends and Family: More than three times a week     Frequency of Social Gatherings with Friends and Family: Twice a week     Attends Sabianism Services: More than 4 times per year     Active Member of Clubs or Organizations: No     Attends Club or Organization Meetings: Never     Marital Status:    Intimate Partner Violence: Not At Risk (9/18/2023)    Humiliation, Afraid, Rape, and Kick questionnaire     Fear of Current or Ex-Partner: No     Emotionally Abused: No     Physically Abused: No     Sexually Abused: No   Housing Stability: Low Risk  (9/18/2023)    Housing Stability Vital Sign     Unable to Pay for Housing in the Last Year: No     Number of Places Lived in the Last Year: 1     Unstable Housing in the Last Year: No        Current Outpatient Medications on File Prior to Visit   Medication Sig Dispense Refill    eszopiclone (LUNESTA) 1 MG TABS Take 1 tablet by mouth nightly as needed.      pregabalin (LYRICA) 25 MG capsule Take one pill in the morning, one pill in the afternoon, and 2 pills at bedtime. 120 capsule 5    pyridoxine (RA VITAMIN B-6) 50 MG tablet Take 1 tablet by mouth daily 90 tablet 0    tiZANidine (ZANAFLEX) 2 MG tablet Take 1 tablet by mouth 2 times daily as needed      estradiol (ESTRACE) 0.1 MG/GM vaginal cream       topiramate (TOPAMAX)

## 2024-01-31 RX ORDER — HYDROCODONE BITARTRATE 20 MG/1
20 TABLET, EXTENDED RELEASE ORAL EVERY 24 HOURS
Qty: 30 TABLET | Refills: 0 | Status: CANCELLED | OUTPATIENT
Start: 2024-01-31 | End: 2024-03-01

## 2024-01-31 NOTE — TELEPHONE ENCOUNTER
Future Appointments:  Future Appointments   Date Time Provider Department Center   3/12/2024  2:00 PM Tommy Simons MD [facility]   6/27/2024 10:40 AM Gemma Interiano ANP [facility] AM[facility]        Last Appointment With Me:  11/7/2023     Requested Prescriptions     Pending Prescriptions Disp Refills    HYDROcodone (HYSINGLA ER) 20 MG extended release tablet 30 tablet 0     Sig: Take 20 mg by mouth every 24 hours for 30 days. Max Daily Amount: 20 mg

## 2024-02-01 RX ORDER — HYDROCODONE BITARTRATE AND ACETAMINOPHEN 5; 325 MG/1; MG/1
1 TABLET ORAL EVERY 12 HOURS PRN
Qty: 60 TABLET | Refills: 0 | Status: SHIPPED | OUTPATIENT
Start: 2024-02-01 | End: 2024-03-02

## 2024-02-01 NOTE — TELEPHONE ENCOUNTER
Patient states she needs a call back in reference to refill request made on Monday, 1/29/24 that is still Pending. Please call if any questions or to advise when approved for . Thank you

## 2024-02-14 ENCOUNTER — TELEPHONE (OUTPATIENT)
Age: 74
End: 2024-02-14

## 2024-02-14 NOTE — TELEPHONE ENCOUNTER
Pt called to check the status on her results of the EEG and CT scans that were done on 01/17. Pt stated she would like a call to go over her results.     Please call: [phone]

## 2024-02-14 NOTE — TELEPHONE ENCOUNTER
Spoke with patient.  Verified patient with two patient identifiers.    Advised both EEG and ET head are normal.  States she does not use MyChart.  Advised that is the quickest way to get results, etc.    Patient verbalized understanding.        Gemma Interiano ANP sent to Angelica Sharma LPN  Caller: Unspecified (Today,  9:30 AM)    I sent a message to the patient regarding her CT results via [employer] which was normal  EEG results are also normal

## 2024-02-19 DIAGNOSIS — E55.9 VITAMIN D DEFICIENCY, UNSPECIFIED: ICD-10-CM

## 2024-02-20 RX ORDER — ERGOCALCIFEROL 1.25 MG/1
CAPSULE ORAL
Qty: 12 CAPSULE | Refills: 1 | Status: SHIPPED | OUTPATIENT
Start: 2024-02-20

## 2024-03-05 DIAGNOSIS — E53.1 INADEQUATE VITAMIN B6 INTAKE: ICD-10-CM

## 2024-03-05 NOTE — TELEPHONE ENCOUNTER
Last office visit 1/4/2024 with Samina Bass  Next office visit 6/27/2024  Last med refill 12/15/2023

## 2024-03-06 RX ORDER — LANOLIN ALCOHOL/MO/W.PET/CERES
50 CREAM (GRAM) TOPICAL DAILY
Qty: 90 TABLET | Refills: 1 | Status: SHIPPED | OUTPATIENT
Start: 2024-03-06

## 2024-03-12 ENCOUNTER — OFFICE VISIT (OUTPATIENT)
Age: 74
End: 2024-03-12
Payer: MEDICARE

## 2024-03-12 VITALS
BODY MASS INDEX: 35.99 KG/M2 | HEIGHT: 65 IN | SYSTOLIC BLOOD PRESSURE: 100 MMHG | RESPIRATION RATE: 18 BRPM | HEART RATE: 76 BPM | TEMPERATURE: 97.2 F | OXYGEN SATURATION: 99 % | WEIGHT: 216 LBS | DIASTOLIC BLOOD PRESSURE: 65 MMHG

## 2024-03-12 DIAGNOSIS — E79.0 HYPERURICEMIA: ICD-10-CM

## 2024-03-12 DIAGNOSIS — F31.70 BIPOLAR AFFECTIVE DISORDER IN REMISSION (HCC): ICD-10-CM

## 2024-03-12 DIAGNOSIS — I42.9 CARDIOMYOPATHY, UNSPECIFIED TYPE (HCC): ICD-10-CM

## 2024-03-12 DIAGNOSIS — F31.70 BIPOLAR DISORDER IN FULL REMISSION, MOST RECENT EPISODE UNSPECIFIED TYPE (HCC): ICD-10-CM

## 2024-03-12 DIAGNOSIS — M06.9 RHEUMATOID ARTHRITIS, INVOLVING UNSPECIFIED SITE, UNSPECIFIED WHETHER RHEUMATOID FACTOR PRESENT (HCC): ICD-10-CM

## 2024-03-12 DIAGNOSIS — D84.9 IMMUNE DEFICIENCY DISORDER (HCC): ICD-10-CM

## 2024-03-12 DIAGNOSIS — I50.20 SYSTOLIC CONGESTIVE HEART FAILURE, UNSPECIFIED HF CHRONICITY (HCC): ICD-10-CM

## 2024-03-12 DIAGNOSIS — E03.9 HYPOTHYROIDISM, UNSPECIFIED TYPE: Primary | ICD-10-CM

## 2024-03-12 DIAGNOSIS — E55.9 VITAMIN D DEFICIENCY, UNSPECIFIED: ICD-10-CM

## 2024-03-12 DIAGNOSIS — I48.0 PAROXYSMAL ATRIAL FIBRILLATION (HCC): ICD-10-CM

## 2024-03-12 DIAGNOSIS — I10 ESSENTIAL (PRIMARY) HYPERTENSION: ICD-10-CM

## 2024-03-12 PROBLEM — J81.0 ACUTE PULMONARY EDEMA (HCC): Status: RESOLVED | Noted: 2024-03-12 | Resolved: 2024-03-12

## 2024-03-12 PROBLEM — J81.0 ACUTE PULMONARY EDEMA (HCC): Status: ACTIVE | Noted: 2024-03-12

## 2024-03-12 PROCEDURE — G8427 DOCREV CUR MEDS BY ELIG CLIN: HCPCS | Performed by: INTERNAL MEDICINE

## 2024-03-12 PROCEDURE — G8417 CALC BMI ABV UP PARAM F/U: HCPCS | Performed by: INTERNAL MEDICINE

## 2024-03-12 PROCEDURE — 99214 OFFICE O/P EST MOD 30 MIN: CPT | Performed by: INTERNAL MEDICINE

## 2024-03-12 PROCEDURE — 3017F COLORECTAL CA SCREEN DOC REV: CPT | Performed by: INTERNAL MEDICINE

## 2024-03-12 PROCEDURE — G8400 PT W/DXA NO RESULTS DOC: HCPCS | Performed by: INTERNAL MEDICINE

## 2024-03-12 PROCEDURE — G8484 FLU IMMUNIZE NO ADMIN: HCPCS | Performed by: INTERNAL MEDICINE

## 2024-03-12 PROCEDURE — 3074F SYST BP LT 130 MM HG: CPT | Performed by: INTERNAL MEDICINE

## 2024-03-12 PROCEDURE — 1090F PRES/ABSN URINE INCON ASSESS: CPT | Performed by: INTERNAL MEDICINE

## 2024-03-12 PROCEDURE — 1036F TOBACCO NON-USER: CPT | Performed by: INTERNAL MEDICINE

## 2024-03-12 PROCEDURE — 3078F DIAST BP <80 MM HG: CPT | Performed by: INTERNAL MEDICINE

## 2024-03-12 PROCEDURE — 1123F ACP DISCUSS/DSCN MKR DOCD: CPT | Performed by: INTERNAL MEDICINE

## 2024-03-12 ASSESSMENT — PATIENT HEALTH QUESTIONNAIRE - PHQ9
SUM OF ALL RESPONSES TO PHQ9 QUESTIONS 1 & 2: 2
SUM OF ALL RESPONSES TO PHQ QUESTIONS 1-9: 2
1. LITTLE INTEREST OR PLEASURE IN DOING THINGS: 1
SUM OF ALL RESPONSES TO PHQ QUESTIONS 1-9: 2
SUM OF ALL RESPONSES TO PHQ QUESTIONS 1-9: 2
2. FEELING DOWN, DEPRESSED OR HOPELESS: 1
SUM OF ALL RESPONSES TO PHQ QUESTIONS 1-9: 2

## 2024-03-12 NOTE — PROGRESS NOTES
\"Have you been to the ER, urgent care clinic since your last visit?  Hospitalized since your last visit?\"    NO    “Have you seen or consulted any other health care providers outside of [facility] since your last visit?”    NO         
35.94 kg/m²   100 / 65 on repeat with manual cuff, L arm.  Gen: Pleasant 74 y.o.  female in NAD.  HEENT: PERRLA. EOMI. OP moist and pink.  Neck: Supple.  No LAD. HEART: RRR, No M/G/R.    LUNGS: CTAB No W/R.  ABDOMEN: S, NT, ND, BS+.   EXTREMITIES: Warm. No C/C/E.  MUSCULOSKELETAL: Normal ROM, muscle strength 5/5 all groups.  NEURO: Alert and oriented x 3.  Cranial nerves grossly intact.  No focal sensory or motor deficits noted. SKIN: Warm. Dry. No rashes or other lesions noted.    Lab Results   Component Value Date/Time     08/28/2023 03:43 PM    K 3.8 08/28/2023 03:43 PM     08/28/2023 03:43 PM    CO2 28 08/28/2023 03:43 PM    BUN 32 08/28/2023 03:43 PM    GFRAA 74 02/11/2022 09:29 AM    ALT 19 08/28/2023 03:43 PM    GLOB 2.7 08/28/2023 03:43 PM       Lab Results   Component Value Date/Time    CHOL 207 08/28/2023 03:43 PM    HDL 62 08/28/2023 03:43 PM        No results found for: \"HBA1C\"    Lab Results   Component Value Date/Time    WBC 6.1 08/28/2023 03:43 PM    HGB 11.9 08/28/2023 03:43 PM    HCT 37.7 08/28/2023 03:43 PM     08/28/2023 03:43 PM    MCV 96.2 08/28/2023 03:43 PM

## 2024-03-13 LAB
25(OH)D3 SERPL-MCNC: 77.4 NG/ML (ref 30–100)
ALBUMIN SERPL-MCNC: 4 G/DL (ref 3.5–5)
ALBUMIN/GLOB SERPL: 1.4 (ref 1.1–2.2)
ALP SERPL-CCNC: 82 U/L (ref 45–117)
ALT SERPL-CCNC: 21 U/L (ref 12–78)
ANION GAP SERPL CALC-SCNC: 4 MMOL/L (ref 5–15)
AST SERPL-CCNC: 19 U/L (ref 15–37)
BASOPHILS # BLD: 0.1 K/UL (ref 0–0.1)
BASOPHILS NFR BLD: 1 % (ref 0–1)
BILIRUB SERPL-MCNC: 0.4 MG/DL (ref 0.2–1)
BUN SERPL-MCNC: 31 MG/DL (ref 6–20)
BUN/CREAT SERPL: 26 (ref 12–20)
CALCIUM SERPL-MCNC: 9.9 MG/DL (ref 8.5–10.1)
CHLORIDE SERPL-SCNC: 108 MMOL/L (ref 97–108)
CHOLEST SERPL-MCNC: 227 MG/DL
CO2 SERPL-SCNC: 30 MMOL/L (ref 21–32)
CREAT SERPL-MCNC: 1.21 MG/DL (ref 0.55–1.02)
DIFFERENTIAL METHOD BLD: NORMAL
EOSINOPHIL # BLD: 0.1 K/UL (ref 0–0.4)
EOSINOPHIL NFR BLD: 2 % (ref 0–7)
ERYTHROCYTE [DISTWIDTH] IN BLOOD BY AUTOMATED COUNT: 13.7 % (ref 11.5–14.5)
GLOBULIN SER CALC-MCNC: 2.9 G/DL (ref 2–4)
GLUCOSE SERPL-MCNC: 98 MG/DL (ref 65–100)
HCT VFR BLD AUTO: 38.4 % (ref 35–47)
HDLC SERPL-MCNC: 59 MG/DL
HDLC SERPL: 3.8 (ref 0–5)
HGB BLD-MCNC: 12.8 G/DL (ref 11.5–16)
IMM GRANULOCYTES # BLD AUTO: 0 K/UL (ref 0–0.04)
IMM GRANULOCYTES NFR BLD AUTO: 0 % (ref 0–0.5)
LDLC SERPL CALC-MCNC: 126.6 MG/DL (ref 0–100)
LYMPHOCYTES # BLD: 2 K/UL (ref 0.8–3.5)
LYMPHOCYTES NFR BLD: 33 % (ref 12–49)
MCH RBC QN AUTO: 30 PG (ref 26–34)
MCHC RBC AUTO-ENTMCNC: 33.3 G/DL (ref 30–36.5)
MCV RBC AUTO: 90.1 FL (ref 80–99)
MONOCYTES # BLD: 0.5 K/UL (ref 0–1)
MONOCYTES NFR BLD: 8 % (ref 5–13)
NEUTS SEG # BLD: 3.5 K/UL (ref 1.8–8)
NEUTS SEG NFR BLD: 56 % (ref 32–75)
NRBC # BLD: 0 K/UL (ref 0–0.01)
NRBC BLD-RTO: 0 PER 100 WBC
PLATELET # BLD AUTO: 228 K/UL (ref 150–400)
PMV BLD AUTO: 10.4 FL (ref 8.9–12.9)
POTASSIUM SERPL-SCNC: 4.1 MMOL/L (ref 3.5–5.1)
PROT SERPL-MCNC: 6.9 G/DL (ref 6.4–8.2)
RBC # BLD AUTO: 4.26 M/UL (ref 3.8–5.2)
SODIUM SERPL-SCNC: 142 MMOL/L (ref 136–145)
T4 FREE SERPL-MCNC: 1.2 NG/DL (ref 0.8–1.5)
TRIGL SERPL-MCNC: 207 MG/DL
TSH SERPL DL<=0.05 MIU/L-ACNC: 0.17 UIU/ML (ref 0.36–3.74)
URATE SERPL-MCNC: 9.6 MG/DL (ref 2.6–6)
VLDLC SERPL CALC-MCNC: 41.4 MG/DL
WBC # BLD AUTO: 6.2 K/UL (ref 3.6–11)

## 2024-03-14 ENCOUNTER — TELEPHONE (OUTPATIENT)
Age: 74
End: 2024-03-14

## 2024-03-14 DIAGNOSIS — E03.9 HYPOTHYROIDISM, UNSPECIFIED TYPE: Primary | ICD-10-CM

## 2024-03-14 NOTE — TELEPHONE ENCOUNTER
States calling to :  Deactivate mychart  Psr deactivated mychart per pt request  Get lab results  Please call pt to review labs

## 2024-03-18 ENCOUNTER — TELEPHONE (OUTPATIENT)
Age: 74
End: 2024-03-18

## 2024-03-18 RX ORDER — LEVOTHYROXINE SODIUM 0.05 MG/1
50 TABLET ORAL DAILY
Qty: 90 TABLET | Refills: 1 | Status: SHIPPED | OUTPATIENT
Start: 2024-03-18

## 2024-03-18 NOTE — TELEPHONE ENCOUNTER
I advised the patient per Dr. Simons, \"TSH is a bit suppressed, indicating some hyperthyroidism. Let's cut back the levothyroxine to 50 mcg daily.\"Patient verbalized understanding of information discussed w/ no further questions at this time.  VORB per Tommy Simons MD / Grace Gabriel LPN

## 2024-05-02 DIAGNOSIS — G25.0 ESSENTIAL TREMOR: ICD-10-CM

## 2024-05-02 RX ORDER — PROPRANOLOL HYDROCHLORIDE 80 MG/1
CAPSULE, EXTENDED RELEASE ORAL
Qty: 90 CAPSULE | Refills: 3 | Status: SHIPPED | OUTPATIENT
Start: 2024-05-02

## 2024-06-03 ENCOUNTER — OFFICE VISIT (OUTPATIENT)
Age: 74
End: 2024-06-03
Payer: MEDICARE

## 2024-06-03 VITALS
SYSTOLIC BLOOD PRESSURE: 120 MMHG | WEIGHT: 223 LBS | BODY MASS INDEX: 37.15 KG/M2 | OXYGEN SATURATION: 98 % | RESPIRATION RATE: 18 BRPM | HEART RATE: 71 BPM | HEIGHT: 65 IN | DIASTOLIC BLOOD PRESSURE: 66 MMHG

## 2024-06-03 DIAGNOSIS — G62.9 PERIPHERAL POLYNEUROPATHY: ICD-10-CM

## 2024-06-03 DIAGNOSIS — G25.0 ESSENTIAL TREMOR: Primary | ICD-10-CM

## 2024-06-03 PROCEDURE — 3074F SYST BP LT 130 MM HG: CPT | Performed by: NURSE PRACTITIONER

## 2024-06-03 PROCEDURE — 1036F TOBACCO NON-USER: CPT | Performed by: NURSE PRACTITIONER

## 2024-06-03 PROCEDURE — G8400 PT W/DXA NO RESULTS DOC: HCPCS | Performed by: NURSE PRACTITIONER

## 2024-06-03 PROCEDURE — G8427 DOCREV CUR MEDS BY ELIG CLIN: HCPCS | Performed by: NURSE PRACTITIONER

## 2024-06-03 PROCEDURE — 1090F PRES/ABSN URINE INCON ASSESS: CPT | Performed by: NURSE PRACTITIONER

## 2024-06-03 PROCEDURE — 1123F ACP DISCUSS/DSCN MKR DOCD: CPT | Performed by: NURSE PRACTITIONER

## 2024-06-03 PROCEDURE — G8417 CALC BMI ABV UP PARAM F/U: HCPCS | Performed by: NURSE PRACTITIONER

## 2024-06-03 PROCEDURE — 99214 OFFICE O/P EST MOD 30 MIN: CPT | Performed by: NURSE PRACTITIONER

## 2024-06-03 PROCEDURE — 3017F COLORECTAL CA SCREEN DOC REV: CPT | Performed by: NURSE PRACTITIONER

## 2024-06-03 PROCEDURE — 3078F DIAST BP <80 MM HG: CPT | Performed by: NURSE PRACTITIONER

## 2024-06-03 ASSESSMENT — PATIENT HEALTH QUESTIONNAIRE - PHQ9
SUM OF ALL RESPONSES TO PHQ QUESTIONS 1-9: 0
SUM OF ALL RESPONSES TO PHQ9 QUESTIONS 1 & 2: 0
SUM OF ALL RESPONSES TO PHQ QUESTIONS 1-9: 0
SUM OF ALL RESPONSES TO PHQ QUESTIONS 1-9: 0
1. LITTLE INTEREST OR PLEASURE IN DOING THINGS: NOT AT ALL
SUM OF ALL RESPONSES TO PHQ QUESTIONS 1-9: 0
2. FEELING DOWN, DEPRESSED OR HOPELESS: NOT AT ALL

## 2024-06-03 ASSESSMENT — ENCOUNTER SYMPTOMS
CONSTIPATION: 0
TROUBLE SWALLOWING: 1

## 2024-06-03 NOTE — PROGRESS NOTES
Chief Complaint   Patient presents with    Tremors     Follow up - hands and mouth - worse than last OV     Neurologic Problem     Legs, feet, arms, hands, and mouth - some days better than others      1. Have you been to the ER, urgent care clinic since your last visit?  Hospitalized since your last visit? No     2. Have you seen or consulted any other health care providers outside of the [facility] since your last visit?  Include any pap smears or colon screening.  Yes Dr Chang for right knee injection     
additional pathology     I would like her to hold her Topamax for an entire week and then restart it taking 1 tablet only every other day to help manage headaches and migraines     I am also going to check B1 and B6 levels which can affect nerve function     And it is possible that this is a somatic anxiety presentation  Orders:  -     Vitamin B6; Future  -     Vitamin B1, Whole Blood; Future  -     NEURO EEG 24 HR; Future  -     CT HEAD WO CONTRAST; Future  2. Essential tremor  Assessment & Plan:   Reasonably stable and well-controlled continue on propranolol 80 mg daily  3. Polyneuropathy, unspecified  Assessment & Plan:   Patient reports she cannot take Lyrica 75 mg it makes her loopy  I will reduce the dose to 25 mg 1 tablet 3 times a day as needed only  Orders:  -     pregabalin (LYRICA) 25 MG capsule; Take 1 capsule by mouth 3 times daily as needed (neuropathy pain) for up to 180 days. Max Daily Amount: 75 mg, Disp-90 capsule, R-5Normal    REVIEW OF SYSTEMS:     Review of Systems   HENT:  Positive for trouble swallowing (struggles with dry foods, does ok with pills and liquids).    Gastrointestinal:  Negative for constipation.   Musculoskeletal:  Positive for gait problem (she is very careful, uses a cane or the furniture.).   Neurological:  Positive for tremors and headaches (all the time. she notes she has migraines).   Psychiatric/Behavioral:  Negative for hallucinations and sleep disturbance (ambien helps her sleep).    All other systems reviewed and are negative.          Current Outpatient Medications   Medication Sig    propranolol (INDERAL LA) 80 MG extended release capsule TAKE 1 CAPSULE BY MOUTH DAILY. INDICATIONS: ESSENTIAL TREMOR Strength: 80 mg    vitamin B-6 (PYRIDOXINE) 50 MG tablet TAKE 1 TABLET BY MOUTH EVERY DAY    vitamin D (ERGOCALCIFEROL) 1.25 MG (83552 UT) CAPS capsule TAKE 1 CAPSULE BY MOUTH ONE TIME PER WEEK    pregabalin (LYRICA) 25 MG capsule Take one pill in the morning, one pill in

## 2024-06-03 NOTE — PATIENT INSTRUCTIONS
Try and stop the Topamax all together.  Monitor your symptoms and call us if anything worsens or changes....     After 1-2 weeks, if you want, try to stop the Mid-day dosage of the Lyrica, continue Lyrica 25mg in the morning and 25mg (2 at bedtime).    Weighted utensils and 1/2 pound wrist weights to help with the tremor.    Then we can consult with Gemma about Propranolol    We will see you back in 4-6 months

## 2024-06-26 ENCOUNTER — TELEPHONE (OUTPATIENT)
Age: 74
End: 2024-06-26

## 2024-06-26 NOTE — TELEPHONE ENCOUNTER
Will forward to Lou and Nicky KELLER To see who has first opening for skin biopsy.      Charleen Guerrero APRN - NP sent to Angelica Shrama LPN  Caller: Unspecified (Today, 10:13 AM)    Who can do it quicker, vota or waite?  EN

## 2024-06-26 NOTE — TELEPHONE ENCOUNTER
Spoke with patient.  Verified patient with two patient identifiers.    Discussed note below in depth with pt.  States she would like to proceed with skin biopsy.    Will advise LUCILA Ye Erin A, APRN - NP sent to Angelica Sharma LPN  Caller: Unspecified (Today, 10:13 AM)    Would she like to do the skin biopsy so we are better able to identify the tremor? Another option would be to refer to Dr Rob to discuss focused ultrasound to treat the tremor in chris of medications.  EN

## 2024-06-26 NOTE — TELEPHONE ENCOUNTER
Spoke with patient.  Verified patient with two patient identifiers.    States she stopped the Topamax completely on 6-26-24 and her tremors and neuropathy are much worse.  Needs something for this.    Also states her cardiologist cut her Entresto dose in half and BP now improved at 120/70.    Pls advise.

## 2024-06-27 DIAGNOSIS — G62.9 POLYNEUROPATHY, UNSPECIFIED: ICD-10-CM

## 2024-06-27 RX ORDER — PREGABALIN 25 MG/1
CAPSULE ORAL
Qty: 120 CAPSULE | Refills: 5 | Status: SHIPPED | OUTPATIENT
Start: 2024-06-27 | End: 2024-12-23

## 2024-07-08 DIAGNOSIS — G25.0 ESSENTIAL TREMOR: Primary | ICD-10-CM

## 2024-07-08 NOTE — TELEPHONE ENCOUNTER
Message  Received: Today  Charleen Guerrero, RAMANDEEP - NP  Nicky Lucio, LPN  Caller: Unspecified (1 week ago)  Referral done. Yes syn 1    EN      Called pt,verified pt with two pt identifiers, advised pt calling to schedule her skin biopsy. Scheduled for 8/14/24 at 3:30 pm with . explained procedure to her briefly. Advised I will mail her the referral, appt reminder and info on skin biopsy. Pt verbalized understanding and thanked me for the call.       Placed referral, appt reminder and info on syn-one skin biopsy in mail to pt.

## 2024-08-05 DIAGNOSIS — E55.9 VITAMIN D DEFICIENCY, UNSPECIFIED: ICD-10-CM

## 2024-08-05 RX ORDER — ERGOCALCIFEROL 1.25 MG/1
CAPSULE ORAL
Qty: 12 CAPSULE | Refills: 1 | Status: SHIPPED | OUTPATIENT
Start: 2024-08-05

## 2024-08-12 NOTE — PROGRESS NOTES
Neurology Note    Patient ID:  Lesly Haji  [id number]  74 y.o.  1950      Date of Consultation:  August 14, 2024    Referring Physician: Charleen Guerrero NP    Reason for Consultation:  possible synucleinopathy      Assessment and Plan:    Patient is a pleasant 74-year-old female who was referred for a skin biopsy for possible synucleinopathy    PERFORMING PHYSICIAN: Ricky Arce DO FAAN  PROCEDURE:  Skin Punch Biopsy      ICD-10 Code: G20.C, R41.844, G31.83    Medications/ Supplies:   Skin-punch biopsy kit from [employer] including: skin punch biopsy tool, povidone/ iodine prep pad, alcohol prep pad   1% Lidocaine used for anesthesia.   5 mL syringe, 30G 1/2\" needle    The procedure and potential complications were explained to the patient, and verbal consent was obtained.    A timeout was performed    The skin was cleansed with the betadine swabs over the:      - Right Calf (10 centimeters proximal to the lat malleolus)  - Right distal thigh  - Right Cervical paraspinal muscles    A total of 1.5 cc of 1% lidocaine was used at each biopsy site.    A skin punch biopsy was performed at the distal site with the provided biopsy tool, and the biopsy was placed into container labeled distal sample.    The process was repeated at the proximal sites and the biopsies were placed into the appropriate containers.   The containers were labeled with the patient names, lids tightened and placed on ice in the supplied styrofoam box and sent by [employer] to the lab.    The biopsy sites were cleaned, pressure held, and then bandages applied.    Blood loss was minimal.  The patient tolerated the procedure well.  There were no immediate or obvious complications.  The patient will be contacted about biopsy results.        ___________________  Ricky Arce DO FAAN               Subjective: I am here for a skin biopsy       History of Present Illness:   Lesly Haji is a 74 y.o. female who was referred to the neurology clinic

## 2024-08-14 ENCOUNTER — PROCEDURE VISIT (OUTPATIENT)
Age: 74
End: 2024-08-14
Payer: MEDICARE

## 2024-08-14 DIAGNOSIS — G20.C PRIMARY PARKINSONISM (HCC): Primary | ICD-10-CM

## 2024-08-14 PROCEDURE — 11105 PUNCH BX SKIN EA SEP/ADDL: CPT | Performed by: PSYCHIATRY & NEUROLOGY

## 2024-08-14 PROCEDURE — 11104 PUNCH BX SKIN SINGLE LESION: CPT | Performed by: PSYCHIATRY & NEUROLOGY

## 2024-08-21 DIAGNOSIS — G25.0 ESSENTIAL TREMOR: ICD-10-CM

## 2024-08-21 DIAGNOSIS — G44.321 CHRONIC POST-TRAUMATIC HEADACHE, INTRACTABLE: ICD-10-CM

## 2024-08-21 RX ORDER — TOPIRAMATE 100 MG/1
TABLET, FILM COATED ORAL
Qty: 180 TABLET | Refills: 2 | Status: SHIPPED | OUTPATIENT
Start: 2024-08-21

## 2024-09-03 ENCOUNTER — TELEPHONE (OUTPATIENT)
Age: 74
End: 2024-09-03

## 2024-09-03 NOTE — TELEPHONE ENCOUNTER
Patient states that the topamax medication was discontinued by Charleen Guerrero and now [facility] had contacted her that the medication was ready for . Pt would like to know if she should start taking them. Please call her at [phone]

## 2024-09-03 NOTE — TELEPHONE ENCOUNTER
[facility] to call me.    Topamax 100 mg bid was decreased to one qod on 6-3-24.  Was stopped completely on 6-26-24.    Tremors and neuropathy were worse off Topamax.    Need to verify her current Lyrica dose.    ? Resume Topamax or try something else?

## 2024-09-06 DIAGNOSIS — E53.1 INADEQUATE VITAMIN B6 INTAKE: ICD-10-CM

## 2024-09-06 RX ORDER — LANOLIN ALCOHOL/MO/W.PET/CERES
CREAM (GRAM) TOPICAL
Qty: 30 TABLET | Refills: 0 | Status: SHIPPED | OUTPATIENT
Start: 2024-09-06

## 2024-09-06 NOTE — TELEPHONE ENCOUNTER
Patient called stating that her pharmacy sent over a refill request for her   vitamin B-6 (PYRIDOXINE) 50 MG tablet and they have not heard anything back from us. Pt asked that we please send this in. Please advise. [phone]    [facility]/pharmacy #[id number] - [place] - [address] [phone] - F [phone]

## 2024-09-06 NOTE — TELEPHONE ENCOUNTER
Last office visit: 06/03/2024 with Charleen  Next office visit: 10/03/2024 with Charleen   Last refill: 03/06/2024 by Samina CLARK 01/04/2024

## 2024-09-06 NOTE — TELEPHONE ENCOUNTER
Called patient and verified . Informed her that according to her last office visit note with Charleen from 2024 she was advised to discontinue to Topamax and that an automated refill must have come through from her pharmacy. Informed her that I would take the medication off of her list. She verbalized understanding.

## 2024-09-08 DIAGNOSIS — E03.9 HYPOTHYROIDISM, UNSPECIFIED TYPE: ICD-10-CM

## 2024-09-09 RX ORDER — LEVOTHYROXINE SODIUM 50 UG/1
50 TABLET ORAL DAILY
Qty: 90 TABLET | Refills: 1 | Status: SHIPPED | OUTPATIENT
Start: 2024-09-09

## 2024-09-12 ENCOUNTER — TELEPHONE (OUTPATIENT)
Age: 74
End: 2024-09-12

## 2024-09-16 ENCOUNTER — TELEPHONE (OUTPATIENT)
Age: 74
End: 2024-09-16

## 2024-09-16 DIAGNOSIS — R52 PAIN: Primary | ICD-10-CM

## 2024-09-16 RX ORDER — HYDROCODONE BITARTRATE AND ACETAMINOPHEN 5; 325 MG/1; MG/1
1 TABLET ORAL EVERY 12 HOURS PRN
Qty: 120 TABLET | Refills: 0 | Status: SHIPPED | OUTPATIENT
Start: 2024-09-16 | End: 2024-11-15

## 2024-09-16 NOTE — TELEPHONE ENCOUNTER
Patient would like a call back to discuss her skin biopsy results. She can be reached at [phone]

## 2024-09-17 ENCOUNTER — TELEPHONE (OUTPATIENT)
Age: 74
End: 2024-09-17

## 2024-09-18 ENCOUNTER — TELEPHONE (OUTPATIENT)
Age: 74
End: 2024-09-18

## 2024-09-18 NOTE — TELEPHONE ENCOUNTER
Spoke with Carlie at [employer]. Requested results of Syn 1 skin bx. Carlie states she will fax results to us. Verified that our fax number is [phone].

## 2024-09-19 ENCOUNTER — TELEPHONE (OUTPATIENT)
Age: 74
End: 2024-09-19

## 2024-10-01 DIAGNOSIS — E53.1 INADEQUATE VITAMIN B6 INTAKE: ICD-10-CM

## 2024-10-02 RX ORDER — LANOLIN ALCOHOL/MO/W.PET/CERES
CREAM (GRAM) TOPICAL
Qty: 90 TABLET | Refills: 1 | Status: SHIPPED | OUTPATIENT
Start: 2024-10-02

## 2024-10-03 ENCOUNTER — OFFICE VISIT (OUTPATIENT)
Age: 74
End: 2024-10-03
Payer: MEDICARE

## 2024-10-03 VITALS
OXYGEN SATURATION: 98 % | WEIGHT: 219.6 LBS | SYSTOLIC BLOOD PRESSURE: 110 MMHG | BODY MASS INDEX: 36.59 KG/M2 | HEIGHT: 65 IN | DIASTOLIC BLOOD PRESSURE: 72 MMHG | HEART RATE: 76 BPM | RESPIRATION RATE: 18 BRPM

## 2024-10-03 DIAGNOSIS — G25.0 ESSENTIAL TREMOR: Primary | ICD-10-CM

## 2024-10-03 DIAGNOSIS — G43.019 INTRACTABLE MIGRAINE WITHOUT AURA AND WITHOUT STATUS MIGRAINOSUS: ICD-10-CM

## 2024-10-03 DIAGNOSIS — G62.9 PERIPHERAL POLYNEUROPATHY: ICD-10-CM

## 2024-10-03 PROCEDURE — G8400 PT W/DXA NO RESULTS DOC: HCPCS | Performed by: NURSE PRACTITIONER

## 2024-10-03 PROCEDURE — G8427 DOCREV CUR MEDS BY ELIG CLIN: HCPCS | Performed by: NURSE PRACTITIONER

## 2024-10-03 PROCEDURE — G8484 FLU IMMUNIZE NO ADMIN: HCPCS | Performed by: NURSE PRACTITIONER

## 2024-10-03 PROCEDURE — 1036F TOBACCO NON-USER: CPT | Performed by: NURSE PRACTITIONER

## 2024-10-03 PROCEDURE — 99214 OFFICE O/P EST MOD 30 MIN: CPT | Performed by: NURSE PRACTITIONER

## 2024-10-03 PROCEDURE — 1123F ACP DISCUSS/DSCN MKR DOCD: CPT | Performed by: NURSE PRACTITIONER

## 2024-10-03 PROCEDURE — 3017F COLORECTAL CA SCREEN DOC REV: CPT | Performed by: NURSE PRACTITIONER

## 2024-10-03 PROCEDURE — 3078F DIAST BP <80 MM HG: CPT | Performed by: NURSE PRACTITIONER

## 2024-10-03 PROCEDURE — 3074F SYST BP LT 130 MM HG: CPT | Performed by: NURSE PRACTITIONER

## 2024-10-03 PROCEDURE — 1090F PRES/ABSN URINE INCON ASSESS: CPT | Performed by: NURSE PRACTITIONER

## 2024-10-03 PROCEDURE — G8417 CALC BMI ABV UP PARAM F/U: HCPCS | Performed by: NURSE PRACTITIONER

## 2024-10-03 RX ORDER — PREGABALIN 50 MG/1
50 CAPSULE ORAL 2 TIMES DAILY
Qty: 60 CAPSULE | Refills: 5 | Status: SHIPPED | OUTPATIENT
Start: 2024-10-03 | End: 2025-01-01

## 2024-10-03 RX ORDER — PROPRANOLOL HYDROCHLORIDE 120 MG/1
120 CAPSULE, EXTENDED RELEASE ORAL DAILY
Qty: 30 CAPSULE | Refills: 3 | Status: SHIPPED | OUTPATIENT
Start: 2024-10-03

## 2024-10-03 ASSESSMENT — ENCOUNTER SYMPTOMS: TROUBLE SWALLOWING: 1

## 2024-10-03 NOTE — PROGRESS NOTES
Chief Complaint   Patient presents with    Tremors     Hands and mouth \" pretty bad \" worse than last OV - however good today     Neurologic Problem     Neuropathy - legs, feet, arms, hands and mouth - sometimes worse - definitely worse as night      1. Have you been to the ER, urgent care clinic since your last visit?  Hospitalized since your last visit? No     2. Have you seen or consulted any other health care providers outside of the [facility] since your last visit?  Include any pap smears or colon screening.  Yes Dr Chang for knee - Ortho with [facility]

## 2024-10-03 NOTE — PATIENT INSTRUCTIONS
I have adjusted your prescription for Lyrica to 50 mg capsules, you should take 1 in the morning and 1 at bedtime.    I have increased your propranolol from 80 mg extended release to 120 mg extended release.  Please monitor your blood pressure cautiously, if lowered blood pressure or lowered heart rate please contact our office as soon as possible.  My hope is that this will help with your headaches as well as your tremor.

## 2024-10-03 NOTE — PROGRESS NOTES
[facility]  [address]  [address]  [place] [address]  Tel: [phone]  Fax: [phone]      Date:  10/03/24     Name:  IVY JOYNER  :  1950  MRN:  [id number]     PCP:  Tommy Simons MD    Chief Complaint   Patient presents with    Tremors     Hands and mouth \" pretty bad \" worse than last OV - however good today     Neurologic Problem     Neuropathy - legs, feet, arms, hands and mouth - sometimes worse - definitely worse as night        HISTORY OF PRESENT ILLNESS:  Patient presents today for regular follow-up last seen 2024.  Since last seen she did have the Syn-1 skin biopsy which was found to be negative for the alpha-synuclein.  Patient continues to experience a tremor.  It is located in her hands bilaterally in her mouth. She has cracked a tooth.  Sees a dentist Worse over the last few weeks.  Patient is wondering what else she can do to help manage this.  Patient is prescribed propranolol 80mg daily, patient has previously this dosage was limited because of her blood pressure, she notes it is much better now.  We have been unable to prescribe primidone due to the interaction with the Eliquis.  She is exploring possibly doing a Watchman device.  Patient complains of frequent headaches, she does note that primary care has been prescribing hydrocodone, she was told to follow-up with us to discuss this.  Patient was previously on Topamax, she was tapered off this, she does feel as though her headaches have slightly worsened.    For neuropathic pain patient has been taking Lyrica 25 mg 1 twice a day and 2 at bedtime, her  wonders if we can adjust the prescription where she can take it twice a day but at the same dose.    Recap from last visit : 1.  Essential tremor: Patient is to continue propranolol 80 mg extended release daily, there has been concern about polypharmacy as well as concern about the hypotension so the dosage has not previously

## 2024-12-28 DIAGNOSIS — G43.019 INTRACTABLE MIGRAINE WITHOUT AURA AND WITHOUT STATUS MIGRAINOSUS: ICD-10-CM

## 2024-12-28 DIAGNOSIS — G25.0 ESSENTIAL TREMOR: ICD-10-CM

## 2024-12-30 RX ORDER — PROPRANOLOL HYDROCHLORIDE 120 MG/1
CAPSULE, EXTENDED RELEASE ORAL DAILY
Qty: 90 CAPSULE | Refills: 3 | Status: SHIPPED | OUTPATIENT
Start: 2024-12-30

## 2025-02-07 DIAGNOSIS — E55.9 VITAMIN D DEFICIENCY, UNSPECIFIED: ICD-10-CM

## 2025-02-07 RX ORDER — ERGOCALCIFEROL 1.25 MG/1
CAPSULE, LIQUID FILLED ORAL
Qty: 12 CAPSULE | Refills: 1 | Status: SHIPPED | OUTPATIENT
Start: 2025-02-07

## 2025-03-06 DIAGNOSIS — E03.9 HYPOTHYROIDISM, UNSPECIFIED TYPE: ICD-10-CM

## 2025-03-06 RX ORDER — LEVOTHYROXINE SODIUM 50 UG/1
50 TABLET ORAL DAILY
Qty: 90 TABLET | Refills: 1 | Status: SHIPPED | OUTPATIENT
Start: 2025-03-06

## 2025-03-10 ENCOUNTER — TELEPHONE (OUTPATIENT)
Age: 75
End: 2025-03-10

## 2025-03-10 NOTE — TELEPHONE ENCOUNTER
Pt states found lump on right side of buttocks, just found lump on 03/06/25. Pt states pain is 4/10. Pt denies having any other symptoms.     Please call to schedule.

## 2025-04-02 ENCOUNTER — OFFICE VISIT (OUTPATIENT)
Age: 75
End: 2025-04-02
Payer: MEDICARE

## 2025-04-02 VITALS
TEMPERATURE: 97.3 F | WEIGHT: 236.2 LBS | RESPIRATION RATE: 20 BRPM | BODY MASS INDEX: 39.35 KG/M2 | SYSTOLIC BLOOD PRESSURE: 116 MMHG | HEIGHT: 65 IN | DIASTOLIC BLOOD PRESSURE: 46 MMHG

## 2025-04-02 DIAGNOSIS — M06.9 RHEUMATOID ARTHRITIS, INVOLVING UNSPECIFIED SITE, UNSPECIFIED WHETHER RHEUMATOID FACTOR PRESENT (HCC): ICD-10-CM

## 2025-04-02 DIAGNOSIS — I10 ESSENTIAL (PRIMARY) HYPERTENSION: ICD-10-CM

## 2025-04-02 DIAGNOSIS — F31.70 BIPOLAR DISORDER IN FULL REMISSION, MOST RECENT EPISODE UNSPECIFIED TYPE: ICD-10-CM

## 2025-04-02 DIAGNOSIS — I48.0 PAROXYSMAL ATRIAL FIBRILLATION (HCC): ICD-10-CM

## 2025-04-02 DIAGNOSIS — E79.0 HYPERURICEMIA: ICD-10-CM

## 2025-04-02 DIAGNOSIS — F31.9 BIPOLAR AFFECTIVE DISORDER, REMISSION STATUS UNSPECIFIED (HCC): ICD-10-CM

## 2025-04-02 DIAGNOSIS — E03.9 HYPOTHYROIDISM, UNSPECIFIED TYPE: ICD-10-CM

## 2025-04-02 DIAGNOSIS — E55.9 VITAMIN D DEFICIENCY, UNSPECIFIED: ICD-10-CM

## 2025-04-02 DIAGNOSIS — Z00.00 MEDICARE ANNUAL WELLNESS VISIT, SUBSEQUENT: Primary | ICD-10-CM

## 2025-04-02 DIAGNOSIS — I50.20 SYSTOLIC CONGESTIVE HEART FAILURE, UNSPECIFIED HF CHRONICITY (HCC): ICD-10-CM

## 2025-04-02 PROCEDURE — 99214 OFFICE O/P EST MOD 30 MIN: CPT | Performed by: INTERNAL MEDICINE

## 2025-04-02 PROCEDURE — G8427 DOCREV CUR MEDS BY ELIG CLIN: HCPCS | Performed by: INTERNAL MEDICINE

## 2025-04-02 PROCEDURE — 1123F ACP DISCUSS/DSCN MKR DOCD: CPT | Performed by: INTERNAL MEDICINE

## 2025-04-02 PROCEDURE — G0439 PPPS, SUBSEQ VISIT: HCPCS | Performed by: INTERNAL MEDICINE

## 2025-04-02 PROCEDURE — G8400 PT W/DXA NO RESULTS DOC: HCPCS | Performed by: INTERNAL MEDICINE

## 2025-04-02 PROCEDURE — 1090F PRES/ABSN URINE INCON ASSESS: CPT | Performed by: INTERNAL MEDICINE

## 2025-04-02 PROCEDURE — 1036F TOBACCO NON-USER: CPT | Performed by: INTERNAL MEDICINE

## 2025-04-02 PROCEDURE — 3078F DIAST BP <80 MM HG: CPT | Performed by: INTERNAL MEDICINE

## 2025-04-02 PROCEDURE — G8417 CALC BMI ABV UP PARAM F/U: HCPCS | Performed by: INTERNAL MEDICINE

## 2025-04-02 PROCEDURE — 1159F MED LIST DOCD IN RCRD: CPT | Performed by: INTERNAL MEDICINE

## 2025-04-02 PROCEDURE — 1160F RVW MEDS BY RX/DR IN RCRD: CPT | Performed by: INTERNAL MEDICINE

## 2025-04-02 PROCEDURE — 1125F AMNT PAIN NOTED PAIN PRSNT: CPT | Performed by: INTERNAL MEDICINE

## 2025-04-02 PROCEDURE — 3074F SYST BP LT 130 MM HG: CPT | Performed by: INTERNAL MEDICINE

## 2025-04-02 ASSESSMENT — PATIENT HEALTH QUESTIONNAIRE - PHQ9
2. FEELING DOWN, DEPRESSED OR HOPELESS: NEARLY EVERY DAY
1. LITTLE INTEREST OR PLEASURE IN DOING THINGS: NEARLY EVERY DAY
SUM OF ALL RESPONSES TO PHQ QUESTIONS 1-9: 12
SUM OF ALL RESPONSES TO PHQ QUESTIONS 1-9: 12
7. TROUBLE CONCENTRATING ON THINGS, SUCH AS READING THE NEWSPAPER OR WATCHING TELEVISION: MORE THAN HALF THE DAYS
SUM OF ALL RESPONSES TO PHQ QUESTIONS 1-9: 12
5. POOR APPETITE OR OVEREATING: NOT AT ALL
3. TROUBLE FALLING OR STAYING ASLEEP: MORE THAN HALF THE DAYS
8. MOVING OR SPEAKING SO SLOWLY THAT OTHER PEOPLE COULD HAVE NOTICED. OR THE OPPOSITE, BEING SO FIGETY OR RESTLESS THAT YOU HAVE BEEN MOVING AROUND A LOT MORE THAN USUAL: NOT AT ALL
SUM OF ALL RESPONSES TO PHQ QUESTIONS 1-9: 12
9. THOUGHTS THAT YOU WOULD BE BETTER OFF DEAD, OR OF HURTING YOURSELF: NOT AT ALL
10. IF YOU CHECKED OFF ANY PROBLEMS, HOW DIFFICULT HAVE THESE PROBLEMS MADE IT FOR YOU TO DO YOUR WORK, TAKE CARE OF THINGS AT HOME, OR GET ALONG WITH OTHER PEOPLE: VERY DIFFICULT
4. FEELING TIRED OR HAVING LITTLE ENERGY: MORE THAN HALF THE DAYS
6. FEELING BAD ABOUT YOURSELF - OR THAT YOU ARE A FAILURE OR HAVE LET YOURSELF OR YOUR FAMILY DOWN: NOT AT ALL

## 2025-04-02 ASSESSMENT — LIFESTYLE VARIABLES
HOW MANY STANDARD DRINKS CONTAINING ALCOHOL DO YOU HAVE ON A TYPICAL DAY: PATIENT DOES NOT DRINK
HOW OFTEN DO YOU HAVE A DRINK CONTAINING ALCOHOL: NEVER

## 2025-04-02 NOTE — PROGRESS NOTES
Medicare Annual Wellness Visit    Lesly Haji is here for Medicare AWV    Assessment & Plan   Medicare annual wellness visit, subsequent     Return in 6 months (on 10/2/2025).     Subjective       Patient's complete Health Risk Assessment and screening values have been reviewed and are found in Flowsheets. The following problems were reviewed today and where indicated follow up appointments were made and/or referrals ordered.    Positive Risk Factor Screenings with Interventions:    Fall Risk:  Do you feel unsteady or are you worried about falling? : (!) yes  2 or more falls in past year?: no  Fall with injury in past year?: no     Interventions:    Reviewed medications, home hazards, visual acuity, and co-morbidities that can increase risk for falls  See AVS for additional education material     Depression:  PHQ-2 Score: 6  PHQ-9 Total Score: 12  Total Score Interpretation: 10-14 = moderate depression  Interventions:  See AVS for additional education material     Drug Use:   Substance and Sexual Activity   Drug Use Yes    Types: Prescription     Interventions:  See AVS for additional education material       Self-assessment of health:  In general, how would you say your health is?: (!) Poor    Interventions:  See AVS for additional education material    General HRA Questions:  Select all that apply: (!) New or Increased Pain  Interventions - Pain:  See AVS for additional education material      Inactivity:  On average, how many days per week do you engage in moderate to strenuous exercise (like a brisk walk)?: 0 days (!) Abnormal  On average, how many minutes do you engage in exercise at this level?: 0 min  Interventions:  See AVS for additional education material     Abnormal BMI (obese):  Body mass index is 39.31 kg/m². (!) Abnormal  Interventions:  See AVS for additional education material          Vision Screen:  Do you have difficulty driving, watching TV, or doing any of your daily activities because of

## 2025-04-02 NOTE — PATIENT INSTRUCTIONS
's license or apply for some types of jobs.  Visual field tests  These tests are used:  To check for vision loss in any area of your range of vision.  To screen for certain eye diseases.  To look for nerve damage after a stroke, head injury, or other problem that could reduce blood flow to the brain.  Refraction and color tests  A refraction test is done to find the right prescription for glasses and contact lenses.  A color vision test is done to check for color blindness.  Color vision is often tested as part of a routine exam. You may also have this test when you apply for a job where recognizing different colors is important, such as , electronics, or the .  How are vision tests done?  Visual acuity test   You cover one eye at a time.  You read aloud from a wall chart across the room.  You read aloud from a small card that you hold in your hand.  Refraction   You look into a special device.  The device puts lenses of different strengths in front of each eye to see how strong your glasses or contact lenses need to be.  Visual field tests   Your doctor may have you look through special machines.  Or your doctor may simply have you stare straight ahead while they move a finger into and out of your field of vision.  Color vision test   You look at pieces of printed test patterns in various colors. You say what number or symbol you see.  Your doctor may have you trace the number or symbol using a pointer.  How do these tests feel?  There is very little chance of having a problem from this test. If dilating drops are used for a vision test, they may make the eyes sting and cause a medicine taste in the mouth.  Follow-up care is a key part of your treatment and safety. Be sure to make and go to all appointments, and call your doctor if you are having problems. It's also a good idea to know your test results and keep a list of the medicines you take.  Where can you learn more?  Go to

## 2025-04-02 NOTE — PROGRESS NOTES
PROGRESS NOTE  Name: Lesly Haji   : 1950       ASSESSMENT/ PLAN:     Peripheral neuropathy: Following with neurology. Pregabalin helps.   Systolic CHF: EF 15-20% in 2022; has ICD. Continue follow up with Cardiology.   HTN: BP normal today.   Hyperglycemia: Recheck labs.   Atrial fibrillation: On eliquis.   GERD: Stable. On protonix.   Hypothyroidism: Continue levothyroxine.   Rheumatoid arthritis. On plaquenil.   Lupus: Per rheumatologist.   Urge incontinence: Per urology.   Vitamin D deficeincy: Recheck Vit D.   B Vitamin deficiency: Check B12.   Bipolar disorder:/Anxiety Continue with psychiatrist, Maxine Fuentes. Off lithium.   Tremor: Essential tremor.  She has outpatient follow up with neurology.    Memory disturbance: Some MCI per neuropsych testing.   Morbid obesity: Worsening, as she is sedentary and meds for BPAD likely working against her.     RTC 6 months    I have reviewed the patient's medications and risks/side effects/benefits were discussed. Diagnosis(-es) explained to patient and questions answered. Literature provided where appropriate.                    SUBJECTIVE:   Ms. Lesly Haji is a 75 y.o. female who is here for Preop Evaluation; also for follow up of routine medical issues.      Chief Complaint   Patient presents with    Medicare AWV       She has worsening peripheral neuropathy, treated with Lyrica. She has tingling in face, upper and lower extremities. She had head CT and EEG in .     Essential tremors have been worsening.     She had TKR L knee in  by Dr. Chang. This is doing well.    R knee continues to hurt now, limiting walking.      It is recalled that she has been to allergist, and has alpha gal allergy.     She has been diagnosed with Lupus, follows with Dr. Wolfe.     It is recalled that she was hospitalized in 2023 for lithium toxicity, treated with IV hydration. Case was discussed with Maxine Fuentes, her psychiatrist, and we

## 2025-04-02 NOTE — PROGRESS NOTES
Patient identified with two identification factors, Name and Date of Birth.    Chief Complaint   Patient presents with    Medicare AWV       BP (!) 116/46 (BP Site: Left Lower Arm, Patient Position: Sitting, BP Cuff Size: Large Adult)   Temp 97.3 °F (36.3 °C) (Temporal)   Resp 20   Ht 1.651 m (5' 5\")   Wt 107.1 kg (236 lb 3.2 oz)   BMI 39.31 kg/m²       1. \"Have you been to the ER, urgent care clinic since your last visit?  Hospitalized since your last visit?\" No     2. \"Have you seen or consulted any other health care providers outside of the [facility] since your last visit?\" Yes. Doctor concerning her knee.  Rheumatologist as well.

## 2025-04-03 ENCOUNTER — RESULTS FOLLOW-UP (OUTPATIENT)
Age: 75
End: 2025-04-03

## 2025-04-03 DIAGNOSIS — G62.9 PERIPHERAL POLYNEUROPATHY: ICD-10-CM

## 2025-04-03 LAB
25(OH)D3 SERPL-MCNC: 70.4 NG/ML (ref 30–100)
ALBUMIN SERPL-MCNC: 3.6 G/DL (ref 3.5–5)
ALBUMIN/GLOB SERPL: 1.3 (ref 1.1–2.2)
ALP SERPL-CCNC: 88 U/L (ref 45–117)
ALT SERPL-CCNC: 23 U/L (ref 12–78)
ANION GAP SERPL CALC-SCNC: 5 MMOL/L (ref 2–12)
AST SERPL-CCNC: 23 U/L (ref 15–37)
BASOPHILS # BLD: 0.08 K/UL (ref 0–0.1)
BASOPHILS NFR BLD: 1 % (ref 0–1)
BILIRUB SERPL-MCNC: 0.4 MG/DL (ref 0.2–1)
BUN SERPL-MCNC: 24 MG/DL (ref 6–20)
BUN/CREAT SERPL: 22 (ref 12–20)
CALCIUM SERPL-MCNC: 9.6 MG/DL (ref 8.5–10.1)
CHLORIDE SERPL-SCNC: 111 MMOL/L (ref 97–108)
CHOLEST SERPL-MCNC: 179 MG/DL
CO2 SERPL-SCNC: 30 MMOL/L (ref 21–32)
CREAT SERPL-MCNC: 1.11 MG/DL (ref 0.55–1.02)
DIFFERENTIAL METHOD BLD: NORMAL
EOSINOPHIL # BLD: 0.13 K/UL (ref 0–0.4)
EOSINOPHIL NFR BLD: 1.7 % (ref 0–7)
ERYTHROCYTE [DISTWIDTH] IN BLOOD BY AUTOMATED COUNT: 13.9 % (ref 11.5–14.5)
GLOBULIN SER CALC-MCNC: 2.7 G/DL (ref 2–4)
GLUCOSE SERPL-MCNC: 92 MG/DL (ref 65–100)
HCT VFR BLD AUTO: 39.2 % (ref 35–47)
HDLC SERPL-MCNC: 60 MG/DL
HDLC SERPL: 3 (ref 0–5)
HGB BLD-MCNC: 12.7 G/DL (ref 11.5–16)
IMM GRANULOCYTES # BLD AUTO: 0.02 K/UL (ref 0–0.04)
IMM GRANULOCYTES NFR BLD AUTO: 0.3 % (ref 0–0.5)
LDLC SERPL CALC-MCNC: 94.8 MG/DL (ref 0–100)
LYMPHOCYTES # BLD: 2.07 K/UL (ref 0.8–3.5)
LYMPHOCYTES NFR BLD: 27.1 % (ref 12–49)
MCH RBC QN AUTO: 30 PG (ref 26–34)
MCHC RBC AUTO-ENTMCNC: 32.4 G/DL (ref 30–36.5)
MCV RBC AUTO: 92.7 FL (ref 80–99)
MONOCYTES # BLD: 0.58 K/UL (ref 0–1)
MONOCYTES NFR BLD: 7.6 % (ref 5–13)
NEUTS SEG # BLD: 4.76 K/UL (ref 1.8–8)
NEUTS SEG NFR BLD: 62.3 % (ref 32–75)
NRBC # BLD: 0 K/UL (ref 0–0.01)
NRBC BLD-RTO: 0 PER 100 WBC
PLATELET # BLD AUTO: 216 K/UL (ref 150–400)
PMV BLD AUTO: 10.6 FL (ref 8.9–12.9)
POTASSIUM SERPL-SCNC: 4.3 MMOL/L (ref 3.5–5.1)
PROT SERPL-MCNC: 6.3 G/DL (ref 6.4–8.2)
RBC # BLD AUTO: 4.23 M/UL (ref 3.8–5.2)
SODIUM SERPL-SCNC: 146 MMOL/L (ref 136–145)
T4 FREE SERPL-MCNC: 1.5 NG/DL (ref 0.8–1.5)
TRIGL SERPL-MCNC: 121 MG/DL
TSH SERPL DL<=0.05 MIU/L-ACNC: 0.56 UIU/ML (ref 0.36–3.74)
URATE SERPL-MCNC: 8.8 MG/DL (ref 2.6–6)
VLDLC SERPL CALC-MCNC: 24.2 MG/DL
WBC # BLD AUTO: 7.6 K/UL (ref 3.6–11)

## 2025-04-03 RX ORDER — PREGABALIN 50 MG/1
50 CAPSULE ORAL 2 TIMES DAILY
Qty: 180 CAPSULE | Refills: 1 | Status: SHIPPED | OUTPATIENT
Start: 2025-04-03 | End: 2025-09-30

## 2025-04-08 ENCOUNTER — OFFICE VISIT (OUTPATIENT)
Age: 75
End: 2025-04-08
Payer: MEDICARE

## 2025-04-08 VITALS
DIASTOLIC BLOOD PRESSURE: 62 MMHG | RESPIRATION RATE: 18 BRPM | WEIGHT: 237 LBS | HEART RATE: 75 BPM | BODY MASS INDEX: 39.49 KG/M2 | OXYGEN SATURATION: 99 % | SYSTOLIC BLOOD PRESSURE: 100 MMHG | HEIGHT: 65 IN

## 2025-04-08 DIAGNOSIS — G43.019 INTRACTABLE MIGRAINE WITHOUT AURA AND WITHOUT STATUS MIGRAINOSUS: ICD-10-CM

## 2025-04-08 DIAGNOSIS — G62.9 PERIPHERAL POLYNEUROPATHY: ICD-10-CM

## 2025-04-08 DIAGNOSIS — E53.1 INADEQUATE VITAMIN B6 INTAKE: ICD-10-CM

## 2025-04-08 DIAGNOSIS — R26.9 GAIT ABNORMALITY: ICD-10-CM

## 2025-04-08 DIAGNOSIS — G25.0 ESSENTIAL TREMOR: Primary | ICD-10-CM

## 2025-04-08 PROCEDURE — 1090F PRES/ABSN URINE INCON ASSESS: CPT | Performed by: NURSE PRACTITIONER

## 2025-04-08 PROCEDURE — 3074F SYST BP LT 130 MM HG: CPT | Performed by: NURSE PRACTITIONER

## 2025-04-08 PROCEDURE — 1036F TOBACCO NON-USER: CPT | Performed by: NURSE PRACTITIONER

## 2025-04-08 PROCEDURE — 99214 OFFICE O/P EST MOD 30 MIN: CPT | Performed by: NURSE PRACTITIONER

## 2025-04-08 PROCEDURE — G8427 DOCREV CUR MEDS BY ELIG CLIN: HCPCS | Performed by: NURSE PRACTITIONER

## 2025-04-08 PROCEDURE — 3078F DIAST BP <80 MM HG: CPT | Performed by: NURSE PRACTITIONER

## 2025-04-08 PROCEDURE — 1160F RVW MEDS BY RX/DR IN RCRD: CPT | Performed by: NURSE PRACTITIONER

## 2025-04-08 PROCEDURE — G8417 CALC BMI ABV UP PARAM F/U: HCPCS | Performed by: NURSE PRACTITIONER

## 2025-04-08 PROCEDURE — 1125F AMNT PAIN NOTED PAIN PRSNT: CPT | Performed by: NURSE PRACTITIONER

## 2025-04-08 PROCEDURE — G8400 PT W/DXA NO RESULTS DOC: HCPCS | Performed by: NURSE PRACTITIONER

## 2025-04-08 PROCEDURE — 1123F ACP DISCUSS/DSCN MKR DOCD: CPT | Performed by: NURSE PRACTITIONER

## 2025-04-08 PROCEDURE — 1159F MED LIST DOCD IN RCRD: CPT | Performed by: NURSE PRACTITIONER

## 2025-04-08 RX ORDER — ATOGEPANT 60 MG/1
60 TABLET ORAL DAILY
Qty: 30 TABLET | Refills: 11 | Status: ACTIVE | OUTPATIENT
Start: 2025-04-08

## 2025-04-08 ASSESSMENT — PATIENT HEALTH QUESTIONNAIRE - PHQ9
SUM OF ALL RESPONSES TO PHQ QUESTIONS 1-9: 2
SUM OF ALL RESPONSES TO PHQ QUESTIONS 1-9: 2
1. LITTLE INTEREST OR PLEASURE IN DOING THINGS: SEVERAL DAYS
SUM OF ALL RESPONSES TO PHQ QUESTIONS 1-9: 2
SUM OF ALL RESPONSES TO PHQ QUESTIONS 1-9: 2
2. FEELING DOWN, DEPRESSED OR HOPELESS: SEVERAL DAYS

## 2025-04-08 NOTE — PROGRESS NOTES
[facility]  [address]  [address]  [place] [address]  Tel: [phone]  Fax: [phone]      Date:  25     Name:  IVY JOYNER  :  1950  MRN:  [id number]     PCP:  Tommy Simons MD    Chief Complaint   Patient presents with    Tremors     Worse than last OV     Neurologic Problem     Neuropathy in both legs and arms, hand and face - worse than last OV        HISTORY OF PRESENT ILLNESS:  History of Present Illness  The patient is a 75-year-old female here today for a regular follow-up. She is routinely seen for tremor and also has peripheral neuropathy. She was last seen in 10/2024. Severe tremors are reported,  The tremors are bilateral hands, with the left hand being more severely impacted than the right. As a left-handed individual, this has significantly affected her daily activities, including eating out due to frequent spillage. Tremors are also present in both legs, and changes in her voice have been noticed. Despite attempts to use weighted utensils and wrist weights, they were found too heavy, and their use was not persisted. Strength has diminished to the extent that she can no longer cut her food or dress herself without assistance. An increase in propranolol dosage did not alleviate her symptoms. She expresses willingness to explore other treatment options.   Migraines have recurred.   Movements have slowed down considerably, and lack of physical exercise has led to weight gain. She is open to trying home health therapy.    Peripheral neuropathy is particularly severe in her arms, causing numbness in her arms.  Lyrica 50 mg twice daily has not provided relief for her tremors or neuropathy. Numbness on one side of her face, which started around the same time as the neuropathy. She also feels that these issues have affected her speech, requiring more concentration on her words. No significant difficulty swallowing is reported, although there is

## 2025-04-08 NOTE — PROGRESS NOTES
Chief Complaint   Patient presents with    Tremors     Worse than last OV     Neurologic Problem     Neuropathy in both legs and arms, hand and face - worse than last OV      1. Have you been to the ER, urgent care clinic since your last visit?  Hospitalized since your last visit? No     2. Have you seen or consulted any other health care providers outside of the [facility] since your last visit?  Include any pap smears or colon screening.  Dr Kareem Hall Va

## 2025-04-10 RX ORDER — LANOLIN ALCOHOL/MO/W.PET/CERES
50 CREAM (GRAM) TOPICAL DAILY
Qty: 90 TABLET | Refills: 1 | Status: SHIPPED | OUTPATIENT
Start: 2025-04-10

## 2025-04-14 ENCOUNTER — TELEPHONE (OUTPATIENT)
Age: 75
End: 2025-04-14

## 2025-04-14 NOTE — TELEPHONE ENCOUNTER
Pt is having lower back pain.  Should she call her lupus doctor?    Pt needs an appt and referral from Dr. Simons.  There are no appts that she could do.      She prefers [facility]

## 2025-04-17 ENCOUNTER — TELEMEDICINE (OUTPATIENT)
Age: 75
End: 2025-04-17
Payer: MEDICARE

## 2025-04-17 DIAGNOSIS — M54.50 MIDLINE LOW BACK PAIN, UNSPECIFIED CHRONICITY, UNSPECIFIED WHETHER SCIATICA PRESENT: Primary | ICD-10-CM

## 2025-04-17 PROCEDURE — G8400 PT W/DXA NO RESULTS DOC: HCPCS | Performed by: INTERNAL MEDICINE

## 2025-04-17 PROCEDURE — 1159F MED LIST DOCD IN RCRD: CPT | Performed by: INTERNAL MEDICINE

## 2025-04-17 PROCEDURE — 99213 OFFICE O/P EST LOW 20 MIN: CPT | Performed by: INTERNAL MEDICINE

## 2025-04-17 PROCEDURE — 1090F PRES/ABSN URINE INCON ASSESS: CPT | Performed by: INTERNAL MEDICINE

## 2025-04-17 PROCEDURE — G8427 DOCREV CUR MEDS BY ELIG CLIN: HCPCS | Performed by: INTERNAL MEDICINE

## 2025-04-17 PROCEDURE — G8417 CALC BMI ABV UP PARAM F/U: HCPCS | Performed by: INTERNAL MEDICINE

## 2025-04-17 PROCEDURE — 1123F ACP DISCUSS/DSCN MKR DOCD: CPT | Performed by: INTERNAL MEDICINE

## 2025-04-17 PROCEDURE — 1036F TOBACCO NON-USER: CPT | Performed by: INTERNAL MEDICINE

## 2025-04-17 RX ORDER — LIDOCAINE 50 MG/G
1 PATCH TOPICAL DAILY
Qty: 10 PATCH | Refills: 0 | Status: SHIPPED | OUTPATIENT
Start: 2025-04-17 | End: 2025-04-27

## 2025-04-17 SDOH — ECONOMIC STABILITY: FOOD INSECURITY: WITHIN THE PAST 12 MONTHS, THE FOOD YOU BOUGHT JUST DIDN'T LAST AND YOU DIDN'T HAVE MONEY TO GET MORE.: NEVER TRUE

## 2025-04-17 SDOH — ECONOMIC STABILITY: FOOD INSECURITY: WITHIN THE PAST 12 MONTHS, YOU WORRIED THAT YOUR FOOD WOULD RUN OUT BEFORE YOU GOT MONEY TO BUY MORE.: NEVER TRUE

## 2025-04-17 ASSESSMENT — PATIENT HEALTH QUESTIONNAIRE - PHQ9
3. TROUBLE FALLING OR STAYING ASLEEP: MORE THAN HALF THE DAYS
SUM OF ALL RESPONSES TO PHQ QUESTIONS 1-9: 6
5. POOR APPETITE OR OVEREATING: NOT AT ALL
1. LITTLE INTEREST OR PLEASURE IN DOING THINGS: SEVERAL DAYS
7. TROUBLE CONCENTRATING ON THINGS, SUCH AS READING THE NEWSPAPER OR WATCHING TELEVISION: MORE THAN HALF THE DAYS
6. FEELING BAD ABOUT YOURSELF - OR THAT YOU ARE A FAILURE OR HAVE LET YOURSELF OR YOUR FAMILY DOWN: NOT AT ALL
8. MOVING OR SPEAKING SO SLOWLY THAT OTHER PEOPLE COULD HAVE NOTICED. OR THE OPPOSITE, BEING SO FIGETY OR RESTLESS THAT YOU HAVE BEEN MOVING AROUND A LOT MORE THAN USUAL: NOT AT ALL
9. THOUGHTS THAT YOU WOULD BE BETTER OFF DEAD, OR OF HURTING YOURSELF: NOT AT ALL
4. FEELING TIRED OR HAVING LITTLE ENERGY: NOT AT ALL
2. FEELING DOWN, DEPRESSED OR HOPELESS: SEVERAL DAYS
SUM OF ALL RESPONSES TO PHQ QUESTIONS 1-9: 6
10. IF YOU CHECKED OFF ANY PROBLEMS, HOW DIFFICULT HAVE THESE PROBLEMS MADE IT FOR YOU TO DO YOUR WORK, TAKE CARE OF THINGS AT HOME, OR GET ALONG WITH OTHER PEOPLE: NOT DIFFICULT AT ALL
SUM OF ALL RESPONSES TO PHQ QUESTIONS 1-9: 6
SUM OF ALL RESPONSES TO PHQ QUESTIONS 1-9: 6

## 2025-04-17 NOTE — PROGRESS NOTES
\"Have you been to the ER, urgent care clinic since your last visit?  Hospitalized since your last visit?\"    NO    “Have you seen or consulted any other health care providers outside our system since your last visit?”    NO.    Charleen Guerrero, RAMANDEEP-CNP: Started PT 4/16. ([facility])    Dr. Lu: Cardio; in system    Dr. Wolfe: Rheuma; records requested

## 2025-04-17 NOTE — PROGRESS NOTES
Lesly Haji is a 75 y.o. female who was seen by synchronous (real-time) audio-video technology on 2025 for Pain (Lower back pain; pain in groin to back. Off and on for the last 6 months but the last 2 weeks have been bad. When propping feet up is when it hurts the worst.)        Progress Note         PROGRESS NOTE  Name: Lesly Haji   : 1950       ASSESSMENT/ PLAN:     Lesly was seen today for pain.    Midline low back pain, unspecified chronicity, unspecified whether sciatica present:  Discussed therapy, self care. Continue tylenol q6 hours. If the pain doesn't improve soon, then could go to Ortho Spine specialist.   -     lidocaine (LIDODERM) 5 %; Place 1 patch onto the skin daily for 10 days 12 hours on, 12 hours off.    Follow-up and Dispositions    Return if symptoms worsen or fail to improve.         I have reviewed the patient's medications and risks/side effects/benefits were discussed. Diagnosis(-es) explained to patient and questions answered. Literature provided where appropriate.                       SUBJECTIVE  Ms. Lesly Haji presents today acutely for     Chief Complaint   Patient presents with    Pain     Lower back pain; pain in groin to back. Off and on for the last 6 months but the last 2 weeks have been bad. When propping feet up is when it hurts the worst.       She had some pain six months ago. It went way.     She has a recliner, \"as I was doing that the pain started in my groin, so I stopped, and I felt discomfort in my low back.\". The pain is in the low back, present now for over two weeks, and unremitting. She has been taking tylenol every 6 hours which helps.     OBJECTIVE  There were no vitals taken for this visit.  Gen: Pleasant 75 y.o.  female in NAD.       XR lumbar spine 2024: Lumbar degenerative disease.   Mild curve.  Facet arthritis.  No fracture no lytic process.  Mild   spondylosis     Objective:          No data to display

## 2025-05-07 NOTE — TELEPHONE ENCOUNTER
Script called in to pharmacy and left on voicemail.
PAST MEDICAL HISTORY:  Anxiety and depression     Calculus of ureter     Chronic pain     Colon cancer     GERD (gastroesophageal reflux disease)     H/O pemphigoid     H/O spinal stenosis     Hyperlipidemia     Hypothyroid     IBS (irritable bowel syndrome)     Mariola-Cai tear repaired 5/14/15    Migraine     Raynaud disease

## 2025-05-29 ENCOUNTER — TELEPHONE (OUTPATIENT)
Age: 75
End: 2025-05-29

## 2025-05-29 NOTE — TELEPHONE ENCOUNTER
Verified patient with 2 identifiers   Advised that we have not received the notes from Dr Rob. Patient states she will call his office to requests her OV notes be faxed to us

## 2025-05-29 NOTE — TELEPHONE ENCOUNTER
Patient called stating she was seen at [facility] last week (Dr. Rob). States that an order has been put in for a CT Head Bone Density scan and was recommended that Eric Guerrero put patient on a medication for parkinsons. Patient called to see if they have reached out to us on this. Please advise. [phone]

## 2025-06-09 ENCOUNTER — TELEPHONE (OUTPATIENT)
Age: 75
End: 2025-06-09

## 2025-06-09 DIAGNOSIS — G20.C PRIMARY PARKINSONISM (HCC): Primary | ICD-10-CM

## 2025-06-09 RX ORDER — CARBIDOPA AND LEVODOPA 25; 100 MG/1; MG/1
1 TABLET ORAL 2 TIMES DAILY
Qty: 60 TABLET | Refills: 3 | Status: SHIPPED | OUTPATIENT
Start: 2025-06-09

## 2025-06-09 NOTE — TELEPHONE ENCOUNTER
Breana JANSENused for rooming process. Discharge instructions reviewed with patient per Dr. Lindsay Garcia. Patient directed to  to  AVS and schedule follow up appt. Verified patient with 2 identifiers   Advised per Charleen Guerrero NP We can try it although we may find limited clinical benefit. I will start with 25/100 Sinemet, take it twice a day and see if it helps improve her symptoms. Patient verified understanding.

## 2025-06-09 NOTE — TELEPHONE ENCOUNTER
Patient saw Julianne Bajwa NP from [facility]. It was documented that patient would benefit from a trial of carb/levo while awaiting images. Patient questioning who would prescribe. Please advise.

## 2025-07-05 DIAGNOSIS — E55.9 VITAMIN D DEFICIENCY, UNSPECIFIED: ICD-10-CM

## 2025-07-07 RX ORDER — ERGOCALCIFEROL 1.25 MG/1
50000 CAPSULE, LIQUID FILLED ORAL WEEKLY
Qty: 12 CAPSULE | Refills: 1 | Status: SHIPPED | OUTPATIENT
Start: 2025-07-07

## (undated) DEVICE — ELECTRODE,RADIOTRANSLUCENT,FOAM,3PK: Brand: MEDLINE

## (undated) DEVICE — SNARE ENDOSCP M L240CM W27MM SHTH DIA2.4MM CHN 2.8MM OVL

## (undated) DEVICE — BITEBLOCK ENDOSCP 60FR MAXI WHT POLYETH STURDY W/ VELC WVN

## (undated) DEVICE — STERILE POLYISOPRENE POWDER-FREE SURGICAL GLOVES: Brand: PROTEXIS

## (undated) DEVICE — SUTURE STRATAFIX SYMMETRIC SZ 1 L18IN ABSRB VLT CT1 L36CM SXPP1A404

## (undated) DEVICE — ABDOMINAL PAD: Brand: DERMACEA

## (undated) DEVICE — KENDALL DL ECG CABLE AND LEAD WIRE SYSTEM, 3-LEAD, SINGLE PATIENT USE: Brand: KENDALL

## (undated) DEVICE — SYSTEM INTRO 7FR L13CM NDL 18GA GWIRE L45CM DIA0.038IN STD

## (undated) DEVICE — CATH IV AUTOGRD BC PNK 20GA 25 -- INSYTE

## (undated) DEVICE — BAG SPEC BIOHZRD 10 X 10 IN --

## (undated) DEVICE — ENDO CARRY-ON PROCEDURE KIT INCLUDES ENZYMATIC SPONGE, GAUZE, BIOHAZARD LABEL, TRAY, LUBRICANT, DIRTY SCOPE LABEL, WATER LABEL, TRAY, DRAWSTRING PAD, AND DEFENDO 4-PIECE KIT.: Brand: ENDO CARRY-ON PROCEDURE KIT

## (undated) DEVICE — SURGICAL PROCEDURE PACK BASIN MAJ SET CUST NO CAUT

## (undated) DEVICE — GAUZE SPONGES,12 PLY: Brand: CURITY

## (undated) DEVICE — 2108 SERIES SAGITTAL BLADE (24.9 X 0.64 X 73.8MM)

## (undated) DEVICE — NON-ADHERENT STRIPS,OIL EMULSION: Brand: CURITY

## (undated) DEVICE — BASIC SINGLE BASIN 1-LF: Brand: MEDLINE INDUSTRIES, INC.

## (undated) DEVICE — VISUALIZATION SYSTEM: Brand: CLEARIFY

## (undated) DEVICE — Device

## (undated) DEVICE — SUT VCRL 2-0 54IN UD --

## (undated) DEVICE — GARMENT,MEDLINE,DVT,INT,CALF,MED, GEN2: Brand: MEDLINE

## (undated) DEVICE — FRAZIER SUCTION INSTRUMENT 12 FR W/CONTROL VENT & OBTURATOR: Brand: FRAZIER

## (undated) DEVICE — GLOVE SURG SZ 7.5 L11.2IN THK9.8MIL STRW LTX POLYMER BEAD

## (undated) DEVICE — PACEMAKER PACK: Brand: CARDINAL HEALTH

## (undated) DEVICE — SET ADMIN 16ML TBNG L100IN 2 Y INJ SITE IV PIGGY BK DISP

## (undated) DEVICE — SYSTEM INTRO 10.5FR L13CM STD WHT CAP HEMSTAT SPLITTABLE

## (undated) DEVICE — 1200 GUARD II KIT W/5MM TUBE W/O VAC TUBE: Brand: GUARDIAN

## (undated) DEVICE — CONTINU-FLO SOLUTION SET, 2 INJECTION SITES, MALE LUER LOCK ADAPTER WITH RETRACTABLE COLLAR, LARGE BORE STOPCOCK WITH ROTATING MALE LUER LOCK EXTENSION SET, 2 INJECTION SITES, MALE LUER LOCK ADAPTER WITH RETRACTABLE COLLAR: Brand: INTERLINK/CONTINU-FLO

## (undated) DEVICE — STOPCOCK IV 4 W TRNSPAR

## (undated) DEVICE — GOWN,SIRUS,FABRNF,XL,20/CS: Brand: MEDLINE

## (undated) DEVICE — REM POLYHESIVE ADULT PATIENT RETURN ELECTRODE: Brand: VALLEYLAB

## (undated) DEVICE — 3M™ TEGADERM™ TRANSPARENT FILM DRESSING FRAME STYLE, 1624W, 2-3/8 IN X 2-3/4 IN (6 CM X 7 CM), 100/CT 4CT/CASE: Brand: 3M™ TEGADERM™

## (undated) DEVICE — SOLUTION IRRIG 3000ML 0.9% SOD CHL FLX CONT 0797208] ICU MEDICAL INC]

## (undated) DEVICE — ROCKER SWITCH PENCIL BLADE ELECTRODE, HOLSTER: Brand: EDGE

## (undated) DEVICE — SYR 10ML LUER LOK 1/5ML GRAD --

## (undated) DEVICE — COVER,MAYO STAND,STERILE: Brand: MEDLINE

## (undated) DEVICE — OPTIVAC KIT DOUBLE MIX 80GM: Brand: DJO SURGICAL

## (undated) DEVICE — GOWN,SIRUS,NONRNF,SETINSLV,XL,20/CS: Brand: MEDLINE

## (undated) DEVICE — NEEDLE HYPO 18GA L1.5IN PNK S STL HUB POLYPR SHLD REG BVL

## (undated) DEVICE — GDWIRE WHISPER HITORQ EDS CSJ -- ACUITY SOLD BY BX ONLY 4648

## (undated) DEVICE — INFECTION CONTROL KIT SYS

## (undated) DEVICE — SPONGE LAP 18X18IN STRL -- 5/PK

## (undated) DEVICE — FORCEPS BX L240CM JAW DIA2.8MM L CAP W/ NDL MIC MESH TOOTH

## (undated) DEVICE — (D)PREP SKN CHLRAPRP APPL 26ML -- CONVERT TO ITEM 371833

## (undated) DEVICE — CONTAINER SPEC 20 ML LID NEUT BUFF FORMALIN 10 % POLYPR STS

## (undated) DEVICE — BLADELESS OBTURATOR: Brand: WECK VISTA

## (undated) DEVICE — SOLUTION IRRIG 1000ML H2O STRL BLT

## (undated) DEVICE — CANNULA CUSH AD W/ 14FT TBG

## (undated) DEVICE — LIGHT HANDLE: Brand: DEVON

## (undated) DEVICE — (D)SYR 10ML 1/5ML GRAD NSAF -- PKGING CHANGE USE ITEM 338027

## (undated) DEVICE — 3M™ IOBAN™ 2 ANTIMICROBIAL INCISE DRAPE 6650EZ: Brand: IOBAN™ 2

## (undated) DEVICE — KIT,1200CC CANISTER,3/16"X6' TUBING: Brand: MEDLINE INDUSTRIES, INC.

## (undated) DEVICE — 3M™ IOBAN™ 2 ANTIMICROBIAL INCISE DRAPE 6640EZ: Brand: IOBAN™ 2

## (undated) DEVICE — COVER MPLR TIP CRV SCIS ACC DA VINCI

## (undated) DEVICE — GUIDE COR SNUS L40CM DIA9FR 0.035IN STD CRV ADV UNIQUE

## (undated) DEVICE — DRAPE,REIN 53X77,STERILE: Brand: MEDLINE

## (undated) DEVICE — ADHESIVE SKIN CLSR 1ML TISS HI VISC EXOFIN

## (undated) DEVICE — INSTA-GARD PROCEDURE MASK, YELLOW: Brand: CONVERTORS

## (undated) DEVICE — SUTURE FIBERWIRE SZ 2 W/ TAPERED NEEDLE BLUE L38IN NONABSORB BLU L26.5MM 1/2 CIRCLE AR7200

## (undated) DEVICE — BAG BELONG PT PERS CLEAR HANDL

## (undated) DEVICE — COPILOT BLEEDBACK CONTROL VALVE: Brand: COPILOT

## (undated) DEVICE — KIT ACCS INTRO 4FR L10CM NDL 21GA L7CM GWIRE L40CM

## (undated) DEVICE — COVER,TABLE,60X90,STERILE: Brand: MEDLINE

## (undated) DEVICE — NEEDLE HYPO 21GA L1.5IN INTRAMUSCULAR S STL LATCH BVL UP

## (undated) DEVICE — SURGICAL PROCEDURE KIT GEN LAPAROSCOPY LF

## (undated) DEVICE — SOLIDIFIER MEDC 1200ML -- CONVERT TO 356117

## (undated) DEVICE — DERMABOND SKIN ADH 0.7ML -- DERMABOND ADVANCED 12/BX

## (undated) DEVICE — CONVERTORS STOCKINETTE: Brand: CONVERTORS

## (undated) DEVICE — PACK,SHOULDER II,DRAPE: Brand: MEDLINE

## (undated) DEVICE — SUTURE VCRL SZ 2-0 L36IN ABSRB UD L36MM CT-1 1/2 CIR J945H

## (undated) DEVICE — SUTURE MCRYL SZ 4-0 L27IN ABSRB UD L19MM PS-2 1/2 CIR PRIM Y426H

## (undated) DEVICE — PREP SKN CHLRAPRP APL 26ML STR --

## (undated) DEVICE — HANDPIECE SET WITH COAXIAL HIGH FLOW TIP AND SUCTION TUBE: Brand: INTERPULSE

## (undated) DEVICE — ARM DRAPE

## (undated) DEVICE — SEAL UNIV 5-8MM DISP BX/10 -- DA VINCI XI - SNGL USE

## (undated) DEVICE — BANDAGE COMPR SELF ADH 5 YDX4 IN TAN STRL PREMIERPRO LF

## (undated) DEVICE — NEEDLE HYPO 22GA L1.5IN BLK S STL HUB POLYPR SHLD REG BVL

## (undated) DEVICE — BINDER ABD M/L H12IN FOR 46-62IN WHT 4 SLD PNL DSGN HOOP

## (undated) DEVICE — SLIM BODY SKIN STAPLER: Brand: APPOSE ULC

## (undated) DEVICE — SUTURE VCRL SZ 0 L27IN ABSRB UD L36MM CT-1 1/2 CIR J260H

## (undated) DEVICE — SOLUTION LACTATED RINGERS INJECTION USP

## (undated) DEVICE — SUT ETHBND 2 30IN OS4 GRN --

## (undated) DEVICE — KIT COLON W/ 1.1OZ LUB AND 2 END

## (undated) DEVICE — CATHETER DIAG 6FR L110CM INTRO 6FR BLLN DIA9MM 1CC PULM ART

## (undated) DEVICE — TOWEL SURG W17XL27IN STD BLU COT NONFENESTRATED PREWASHED

## (undated) DEVICE — RADIFOCUS GLIDEWIRE: Brand: GLIDEWIRE

## (undated) DEVICE — 3M™ IOBAN™ 2 ANTIMICROBIAL INCISE DRAPE 6651EZ: Brand: IOBAN™ 2

## (undated) DEVICE — TAPE DSG RETEN W4INXL10YD NONWOVEN COMFORTABLE H2O RESIST

## (undated) DEVICE — SUTURE PDS II SZ 2-0 L27IN ABSRB VLT SH L26MM 1/2 CIR Z317H

## (undated) DEVICE — INTENDED FOR TISSUE SEPARATION, AND OTHER PROCEDURES THAT REQUIRE A SHARP SURGICAL BLADE TO PUNCTURE OR CUT.: Brand: BARD-PARKER ® CARBON RIB-BACK BLADES

## (undated) DEVICE — SUT STRATA PDS+ 15CM SZ 3-0 SH -- STRATAFIX

## (undated) DEVICE — POLYP TRAP: Brand: TRAPEASE®

## (undated) DEVICE — SUTURE PERMAHAND SZ 0 L30IN NONABSORBABLE BLK L26MM SH 1/2 K834H

## (undated) DEVICE — STRAP,POSITIONING,KNEE/BODY,FOAM,4X60": Brand: MEDLINE

## (undated) DEVICE — TRI-LUMEN FILTERED TUBE SET WITH ACTIVATED CHARCOAL FILTER: Brand: AIRSEAL

## (undated) DEVICE — SUTURE V-LOC 180 SZ 2-0 L12IN ABSRB VLT GS-21 L37MM 1/2 CIR VLOCM0315

## (undated) DEVICE — TRAY,IRRIGATION,PISTON SYRINGE,60ML,STRL: Brand: MEDLINE

## (undated) DEVICE — ZINACTIVE USE 2641837 CLIP LIG M BLU TI HRT SHP WIRE HORZ 600 PER BX

## (undated) DEVICE — MEDI-TRACE CADENCE ADULT, DEFIBRILLATION ELECTRODE -RTS  (10 PR/PK) - PHYSIO-CONTROL: Brand: MEDI-TRACE CADENCE

## (undated) DEVICE — TIP SUCT CRV REG REDI

## (undated) DEVICE — ABSORBABLE WOUND CLOSURE DEVICE: Brand: V-LOC 90

## (undated) DEVICE — 4-PORT MANIFOLD: Brand: NEPTUNE 2